# Patient Record
Sex: MALE | Race: WHITE | NOT HISPANIC OR LATINO | Employment: FULL TIME | URBAN - METROPOLITAN AREA
[De-identification: names, ages, dates, MRNs, and addresses within clinical notes are randomized per-mention and may not be internally consistent; named-entity substitution may affect disease eponyms.]

---

## 2017-01-03 DIAGNOSIS — R60.9 EDEMA: Primary | ICD-10-CM

## 2017-01-03 DIAGNOSIS — K76.6 PORTAL HYPERTENSION (H): ICD-10-CM

## 2017-01-03 DIAGNOSIS — I48.91 ATRIAL FIBRILLATION WITH RAPID VENTRICULAR RESPONSE (H): ICD-10-CM

## 2017-01-03 DIAGNOSIS — K21.9 GERD (GASTROESOPHAGEAL REFLUX DISEASE): ICD-10-CM

## 2017-01-03 RX ORDER — LISINOPRIL 2.5 MG/1
2.5 TABLET ORAL DAILY
Qty: 90 TABLET | Refills: 0 | Status: SHIPPED | OUTPATIENT
Start: 2017-01-03 | End: 2017-03-27

## 2017-01-03 RX ORDER — SPIRONOLACTONE 25 MG/1
25 TABLET ORAL DAILY
Qty: 90 TABLET | Refills: 0 | Status: SHIPPED | OUTPATIENT
Start: 2017-01-03 | End: 2017-03-27

## 2017-01-03 RX ORDER — PANTOPRAZOLE SODIUM 40 MG/1
40 TABLET, DELAYED RELEASE ORAL DAILY PRN
Qty: 90 TABLET | Refills: 0 | Status: SHIPPED | OUTPATIENT
Start: 2017-01-03 | End: 2017-03-27

## 2017-01-03 RX ORDER — METOPROLOL SUCCINATE 100 MG/1
100 TABLET, EXTENDED RELEASE ORAL DAILY
Qty: 90 TABLET | Refills: 0 | Status: SHIPPED | OUTPATIENT
Start: 2017-01-03 | End: 2017-03-27

## 2017-01-30 ENCOUNTER — TELEPHONE (OUTPATIENT)
Dept: GASTROENTEROLOGY | Facility: CLINIC | Age: 57
End: 2017-01-30

## 2017-01-30 NOTE — TELEPHONE ENCOUNTER
Patient contacted and reminded of upcoming appointment.  Patient confirmed they will be attending.  Patient instructed to bring updated medications list to appointment.  Instructed pt to arrive an hour and a half to two hours prior to appt time for labs.  Luis F Jon, CMA

## 2017-02-02 ENCOUNTER — OFFICE VISIT (OUTPATIENT)
Dept: GASTROENTEROLOGY | Facility: CLINIC | Age: 57
End: 2017-02-02
Attending: INTERNAL MEDICINE
Payer: COMMERCIAL

## 2017-02-02 VITALS
DIASTOLIC BLOOD PRESSURE: 66 MMHG | SYSTOLIC BLOOD PRESSURE: 106 MMHG | TEMPERATURE: 97.6 F | BODY MASS INDEX: 37 KG/M2 | WEIGHT: 230.2 LBS | HEIGHT: 66 IN | HEART RATE: 73 BPM

## 2017-02-02 DIAGNOSIS — K74.60 CIRRHOSIS OF LIVER WITHOUT ASCITES, UNSPECIFIED HEPATIC CIRRHOSIS TYPE (H): ICD-10-CM

## 2017-02-02 DIAGNOSIS — K74.60 CIRRHOSIS OF LIVER WITHOUT ASCITES, UNSPECIFIED HEPATIC CIRRHOSIS TYPE (H): Primary | ICD-10-CM

## 2017-02-02 PROBLEM — I81 PORTAL VEIN THROMBOSIS: Chronic | Status: ACTIVE | Noted: 2017-02-02

## 2017-02-02 LAB
AFP SERPL-MCNC: 2.5 UG/L (ref 0–8)
ALBUMIN SERPL-MCNC: 3.2 G/DL (ref 3.4–5)
ALP SERPL-CCNC: 77 U/L (ref 40–150)
ALT SERPL W P-5'-P-CCNC: 50 U/L (ref 0–70)
ANION GAP SERPL CALCULATED.3IONS-SCNC: 9 MMOL/L (ref 3–14)
AST SERPL W P-5'-P-CCNC: 46 U/L (ref 0–45)
BILIRUB DIRECT SERPL-MCNC: 0.3 MG/DL (ref 0–0.2)
BILIRUB SERPL-MCNC: 0.8 MG/DL (ref 0.2–1.3)
BUN SERPL-MCNC: 19 MG/DL (ref 7–30)
CALCIUM SERPL-MCNC: 8.5 MG/DL (ref 8.5–10.1)
CHLORIDE SERPL-SCNC: 109 MMOL/L (ref 94–109)
CO2 SERPL-SCNC: 26 MMOL/L (ref 20–32)
CREAT SERPL-MCNC: 0.64 MG/DL (ref 0.66–1.25)
ERYTHROCYTE [DISTWIDTH] IN BLOOD BY AUTOMATED COUNT: 13.2 % (ref 10–15)
GFR SERPL CREATININE-BSD FRML MDRD: ABNORMAL ML/MIN/1.7M2
GLUCOSE SERPL-MCNC: 108 MG/DL (ref 70–99)
HCT VFR BLD AUTO: 43.4 % (ref 40–53)
HGB BLD-MCNC: 15.1 G/DL (ref 13.3–17.7)
INR PPP: 1.2 (ref 0.86–1.14)
MCH RBC QN AUTO: 35 PG (ref 26.5–33)
MCHC RBC AUTO-ENTMCNC: 34.8 G/DL (ref 31.5–36.5)
MCV RBC AUTO: 101 FL (ref 78–100)
PLATELET # BLD AUTO: 62 10E9/L (ref 150–450)
POTASSIUM SERPL-SCNC: 4 MMOL/L (ref 3.4–5.3)
PROT SERPL-MCNC: 6.1 G/DL (ref 6.8–8.8)
RBC # BLD AUTO: 4.32 10E12/L (ref 4.4–5.9)
SODIUM SERPL-SCNC: 143 MMOL/L (ref 133–144)
WBC # BLD AUTO: 3.3 10E9/L (ref 4–11)

## 2017-02-02 PROCEDURE — G0009 ADMIN PNEUMOCOCCAL VACCINE: HCPCS | Mod: ZF

## 2017-02-02 PROCEDURE — 25000128 H RX IP 250 OP 636: Mod: ZF

## 2017-02-02 PROCEDURE — 36415 COLL VENOUS BLD VENIPUNCTURE: CPT | Performed by: INTERNAL MEDICINE

## 2017-02-02 PROCEDURE — 85610 PROTHROMBIN TIME: CPT | Performed by: INTERNAL MEDICINE

## 2017-02-02 PROCEDURE — 90670 PCV13 VACCINE IM: CPT | Mod: ZF

## 2017-02-02 PROCEDURE — 80076 HEPATIC FUNCTION PANEL: CPT | Performed by: INTERNAL MEDICINE

## 2017-02-02 PROCEDURE — 99212 OFFICE O/P EST SF 10 MIN: CPT | Mod: 25,ZF

## 2017-02-02 PROCEDURE — 80048 BASIC METABOLIC PNL TOTAL CA: CPT | Performed by: INTERNAL MEDICINE

## 2017-02-02 PROCEDURE — 85027 COMPLETE CBC AUTOMATED: CPT | Performed by: INTERNAL MEDICINE

## 2017-02-02 PROCEDURE — 82105 ALPHA-FETOPROTEIN SERUM: CPT | Performed by: INTERNAL MEDICINE

## 2017-02-02 ASSESSMENT — PAIN SCALES - GENERAL: PAINLEVEL: NO PAIN (0)

## 2017-02-02 NOTE — MR AVS SNAPSHOT
After Visit Summary   2/2/2017    Maurice Jon    MRN: [id number]           Patient Information     Date Of Birth          1960        Visit Information        Provider Department      2/2/2017 8:00 AM Hi Roque MD [facility]        Today's Diagnoses     Cirrhosis of liver without ascites, unspecified hepatic cirrhosis type (H)    -  1     Cirrhosis of liver without ascites, unspecified hepatic cirrhosis type (H) [K74.60]            Follow-ups after your visit        Follow-up notes from your care team     Return in about 6 months (around 8/2/2017).      Your next 10 appointments already scheduled     Aug 08, 2017  6:45 AM   Lab with  LAB   [facility] ([facility])    [address] [place] [id number] [phone]            Aug 08, 2017  7:00 AM   US ABDOMEN COMPLETE with US1   [facility] ([facility])    [address] [place] [id number] [phone]           Please bring a list of your medicines (including vitamins, minerals and over-the-counter drugs). Also, tell your doctor about any allergies you may have. Wear comfortable clothes and leave your valuables at home.  Adults: No eating or drinking for 8 hours before the exam. You may take medicine with a small sip of water.  Children: - Children 6+ years: No food or drink for 6 hours before exam. - Children 1-5 years: No food or drink for 4 hours before exam. - Infants, breast-fed: may have breast milk up to 2 hours before exam. - Infants, formula: may have bottle until 4 hours before exam.  Please call the Imaging Department at your exam site with any questions.            Aug 08, 2017  8:15 AM   (Arrive by 8:00 AM)   Return General Liver with Hi Roque MD   [facility] ([facility])    [address] [place] [id number] [phone]               Future tests that were ordered for you today     Open Standing Orders        Priority Remaining Interval Expires Ordered    US abdomen complete [ACR774] Routine 2/2 Every 6 Months 3/4/2018 2/2/2017          Open Future Orders        Priority Expected Expires Ordered     Hepatic Panel [LAB20] Routine 8/1/2017 3/4/2018 2/2/2017    Basic metabolic panel [LAB15] Routine 8/1/2017 3/4/2018 2/2/2017    CBC with platelets [NLP395] Routine 8/1/2017 3/4/2018 2/2/2017    INR [RIM3839] Routine 8/1/2017 3/4/2018 2/2/2017    AFP tumor marker [GZY473] Routine 8/1/2017 3/4/2018 2/2/2017            Who to contact     If you have questions or need follow up information about today's clinic visit or your schedule please contact [facility] directly at [phone].  Normal or non-critical lab and imaging results will be communicated to you by [employer], letter or phone within 4 business days after the clinic has received the results. If you do not hear from us within 7 days, please contact the clinic through [employer] or phone. If you have a critical or abnormal lab result, we will notify you by phone as soon as possible.  Submit refill requests through [employer] or call your pharmacy and they will forward the refill request to us. Please allow 3 business days for your refill to be completed.          Additional Information About Your Visit        [employer] Information     [employer] gives you secure access to your electronic health record. If you see a primary care provider, you can also send messages to your care team and make appointments. If you have questions, please call your primary care clinic.  If you do not have a primary care provider, please call [phone] and they will assist you.        Care EveryWhere ID     This is your Care EveryWhere ID. This could be used by other organizations to access your [place] medical records  [id number]        Your Vitals Were     Pulse Temperature Height BMI (Body Mass Index)        "   73 97.6  F (36.4  C) (Oral) 1.676 m (5' 6\") 37.17 kg/m2         Blood Pressure from Last 3 Encounters:   02/02/17 106/66   10/10/16 107/70   08/04/16 135/73    Weight from Last 3 Encounters:   02/02/17 104.418 kg (230 lb 3.2 oz)   10/10/16 103.42 kg (228 lb)   08/04/16 105.688 kg (233 lb)              We Performed the Following     COLOREC CANC SCRN,SCR COLONOSCOPY+BE     PNEUMOCOCCAL CONJ VACCINE 13 VALENT IM (PCV13)     Schedule follow up appointments          Today's Medication Changes          These changes are accurate as of: 2/2/17  8:45 AM.  If you have any questions, ask your nurse or doctor.               These medicines have changed or have updated prescriptions.        Dose/Directions    clotrimazole 1 % cream   Commonly known as:  LOTRIMIN   This may have changed:    - when to take this  - reasons to take this   Used for:  Yeast infection of the skin        Apply topically 2 times daily   Quantity:  60 g   Refills:  1       triamcinolone 0.1 % cream   Commonly known as:  KENALOG   This may have changed:    - when to take this  - reasons to take this  - additional instructions   Used for:  Eczema        Apply sparingly to affected area three times daily as needed   Quantity:  80 g   Refills:  0                Primary Care Provider Office Phone # Fax #    Adrian Braulio Pineda [phone]       [address] [address]        Thank you!     Thank you for choosing [facility]  for your care. Our goal is always to provide you with excellent care. Hearing back from our patients is one way we can continue to improve our services. Please take a few minutes to complete the written survey that you may receive in the mail after your visit with us. Thank you!             Your Updated Medication List - Protect others around you: Learn how to safely use, store and throw away your medicines at www.disposemymeds.org.          This list is accurate as " of: 2/2/17  8:45 AM.  Always use your most recent med list.                   Brand Name Dispense Instructions for use    ALBUTEROL INHALER 17GM     1 Inhaler    Inhale 2 puffs into the lungs every 4 hours as needed This product no longer available       calcium carb 1250 mg (500 mg [place])/vitamin D 200 units 500-200 MG-UNIT per tablet    OSCAL with D     Take 2 tablets by mouth daily with food.       clotrimazole 1 % cream    LOTRIMIN    60 g    Apply topically 2 times daily       furosemide 40 MG tablet    LASIX    45 tablet    Take 0.5 tablets (20 mg) by mouth daily Appt DUE Dec 2016       lisinopril 2.5 MG tablet    PRINIVIL/Zestril    90 tablet    Take 1 tablet (2.5 mg) by mouth daily No further refills until seen by cardiology       MAGNESIUM OXIDE PO      Take 1 tablet by mouth every other day.       metoprolol 100 MG 24 hr tablet    TOPROL XL    90 tablet    Take 1 tablet (100 mg) by mouth daily No further refills until seen by cardiology       mometasone-formoterol 200-5 MCG/ACT oral inhaler    DULERA    1 Inhaler    Inhale 2 puffs into the lungs 2 times daily as needed Appt DUE Jan 2017       * order for DME     2 Package    Juzo compression stockings, 30-40mm compression. Patient has portal hypertension and develops leg edema, which these control well.       * order for DME      Respironics RemStar 60 Series Auto AFlex 12-15 cm H2O with heated humidty and a modem.  Pt chose a Quattro Air FFM size medium.       * order for DME     1 Device    Equipment being ordered:  New CPAP mask, tube, filters,water tray       * order for DME     4 Package    Open toe size large 30/40 [facility] [employer] Compression socks Model [id number].       pantoprazole 40 MG EC tablet    PROTONIX    90 tablet    Take 1 tablet (40 mg) by mouth daily as needed No further refills until seen by cardiology       spironolactone 25 MG tablet    ALDACTONE    90 tablet    Take 1 tablet (25 mg) by mouth daily No further refills until seen by  cardiology       triamcinolone 0.1 % cream    KENALOG    80 g    Apply sparingly to affected area three times daily as needed       VIAGRA 100 MG cap/tab   Generic drug:  sildenafil     12    ONE TABLET TAKEN AT LEAST 30 MINUTES BEFORE INTERCOURSE       * Notice:  This list has 4 medication(s) that are the same as other medications prescribed for you. Read the directions carefully, and ask your doctor or other care provider to review them with you.

## 2017-02-02 NOTE — Clinical Note
2/2/2017       RE: Maurice Jon  [address] [place] [address]     Dear Colleague,    Thank you for referring your patient, Maurice Jon, to the [facility] at [facility]. Please see a copy of my visit note below.    I had the pleasure of seeing Maurice Jon for followup in the Liver Clinic at the [facility] on 02/02/2017.  Mr. 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  He also has a chronic portal vein thrombosis.      For the most part, he is doing well.  He does complain of some occasional abdominal bloating.  He also feels as though he is occasionally getting some fluid in his knees as well.  He does not report any lower extremity edema.  He has never had any documented ascites recently.  He did have an upper GI endoscopy just 3 months ago that showed trivial esophageal varices, and one could almost make the argument that none are present.      He otherwise denies any abdominal pain, itching or skin rash.  He does complain of some fatigue.  He also has some dyspnea on exertion when he walks longer distances or climbs hills.  He is involved with a cardiologist at [facility], as he does have recurrent atrial fibrillation.      He denies any cough or shortness of breath at rest.  He denies any fevers or chills.  He denies any nausea or vomiting.  He does not have any constipation or diarrhea, although he occasionally has some loose stools.  He has not had any gastrointestinal bleeding or any overt signs of encephalopathy.     Current Outpatient Prescriptions   Medication     spironolactone (ALDACTONE) 25 MG tablet     lisinopril (PRINIVIL/ZESTRIL) 2.5 MG tablet     metoprolol (TOPROL XL) 100 MG 24 hr tablet     pantoprazole (PROTONIX) 40 MG EC tablet     mometasone-formoterol (DULERA) 200-5 MCG/ACT oral inhaler     furosemide (LASIX) 40 MG tablet      order for DME     order for DME     triamcinolone (KENALOG) 0.1 % cream     clotrimazole (LOTRIMIN) 1 % cream     ALBUTEROL INHALER 17GM     ORDER FOR DME     ORDER FOR DME     MAGNESIUM OXIDE PO     calcium carb 1250 mg, 500 mg [place],/vitamin D 200 units (OSCAL WITH D) 500-200 MG-UNIT per tablet     VIAGRA 100 MG OR TABS     No current facility-administered medications for this visit.     B/P: 106/66, T: 97.6, P: 73, R: Data Unavailable    HEENT exam shows no scleral icterus and no temporal muscle wasting.  Chest is clear.  Abdominal exam shows no increase in girth.  No masses or tenderness to palpation are present.  His liver is 10 cm in span without left lobe enlargement.  No spleen tip is palpable, and extremity exam shows no edema.  Skin exam shows no stigmata of chronic liver disease.  Neurologic exam shows no asterixis.     Recent Results (from the past 168 hour(s))    Hepatic Panel [LAB20]    Collection Time: 02/02/17  6:28 AM   Result Value Ref Range    Bilirubin Direct 0.3 (H) 0.0 - 0.2 mg/dL    Bilirubin Total 0.8 0.2 - 1.3 mg/dL    Albumin 3.2 (L) 3.4 - 5.0 g/dL    Protein Total 6.1 (L) 6.8 - 8.8 g/dL    Alkaline Phosphatase 77 40 - 150 U/L    ALT 50 0 - 70 U/L    AST 46 (H) 0 - 45 U/L   Basic metabolic panel [LAB15]    Collection Time: 02/02/17  6:28 AM   Result Value Ref Range    Sodium 143 133 - 144 mmol/L    Potassium 4.0 3.4 - 5.3 mmol/L    Chloride 109 94 - 109 mmol/L    Carbon Dioxide 26 20 - 32 mmol/L    Anion Gap 9 3 - 14 mmol/L    Glucose 108 (H) 70 - 99 mg/dL    Urea Nitrogen 19 7 - 30 mg/dL    Creatinine 0.64 (L) 0.66 - 1.25 mg/dL    GFR Estimate >90  Non  GFR Calc   >60 mL/min/1.7m2    GFR Estimate If Black >90   GFR Calc   >60 mL/min/1.7m2    Calcium 8.5 8.5 - 10.1 mg/dL   CBC with platelets [RZI338]    Collection Time: 02/02/17  6:28 AM   Result Value Ref Range    WBC 3.3 (L) 4.0 - 11.0 10e9/L    RBC Count 4.32 (L) 4.4 - 5.9 10e12/L    Hemoglobin 15.1 13.3 -  17.7 g/dL    Hematocrit 43.4 40.0 - 53.0 %     (H) 78 - 100 fl    MCH 35.0 (H) 26.5 - 33.0 pg    MCHC 34.8 31.5 - 36.5 g/dL    RDW 13.2 10.0 - 15.0 %    Platelet Count 62 (L) 150 - 450 10e9/L   INR [[id number]]    Collection Time: 02/02/17  6:28 AM   Result Value Ref Range    INR 1.20 (H) 0.86 - 1.14   AFP tumor marker [STD400]    Collection Time: 02/02/17  6:28 AM   Result Value Ref Range    Alpha Fetoprotein 2.5 0 - 8 ug/L      I did review his ultrasound which shows no ascites.  He has a stable cyst in the liver. There are no other abnormalities.  That is my read. It has not yet been read officially.      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  His MELD score is currently 8.  He does report that he has a brother that also has the same illness and is currently on the wait list at the [facility] for liver transplantation.  He does need a Prevnar 13 vaccine, which we will give him today, and otherwise he will be up-to-date with regard to vaccines.  He is up-to-date with regard to bone density, and I think he can wait for 2 years given the results of her most recent endoscopy.      My plan will be to see the patient back in the clinic in 6 months with a followup ultrasound and blood work.      Thank you very much for allowing me to participate in the care of this patient.  If you have any questions regarding my recommendations, please do not hesitate to contact me.       Hi Roque MD    Professor of Medicine  [employer]    Executive Medical Director, Solid Organ Transplant Program  [facility]

## 2017-02-02 NOTE — NURSING NOTE
"Chief Complaint   Patient presents with     RECHECK     6 month follow up Cirrhosis       Initial /66 mmHg  Pulse 73  Temp(Src) 97.6  F (36.4  C) (Oral)  Ht 1.676 m (5' 6\")  Wt 104.418 kg (230 lb 3.2 oz)  BMI 37.17 kg/m2 Estimated body mass index is 37.17 kg/(m^2) as calculated from the following:    Height as of this encounter: 1.676 m (5' 6\").    Weight as of this encounter: 104.418 kg (230 lb 3.2 oz).  BP completed using cuff size: regular  "

## 2017-02-02 NOTE — PROGRESS NOTES
I had the pleasure of seeing Maurice Jon for followup in the Liver Clinic at the [facility] on 02/02/2017.  Mr. 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  He also has a chronic portal vein thrombosis.      For the most part, he is doing well.  He does complain of some occasional abdominal bloating.  He also feels as though he is occasionally getting some fluid in his knees as well.  He does not report any lower extremity edema.  He has never had any documented ascites recently.  He did have an upper GI endoscopy just 3 months ago that showed trivial esophageal varices, and one could almost make the argument that none are present.      He otherwise denies any abdominal pain, itching or skin rash.  He does complain of some fatigue.  He also has some dyspnea on exertion when he walks longer distances or climbs hills.  He is involved with a cardiologist at [facility], as he does have recurrent atrial fibrillation.      He denies any cough or shortness of breath at rest.  He denies any fevers or chills.  He denies any nausea or vomiting.  He does not have any constipation or diarrhea, although he occasionally has some loose stools.  He has not had any gastrointestinal bleeding or any overt signs of encephalopathy.     Current Outpatient Prescriptions   Medication     spironolactone (ALDACTONE) 25 MG tablet     lisinopril (PRINIVIL/ZESTRIL) 2.5 MG tablet     metoprolol (TOPROL XL) 100 MG 24 hr tablet     pantoprazole (PROTONIX) 40 MG EC tablet     mometasone-formoterol (DULERA) 200-5 MCG/ACT oral inhaler     furosemide (LASIX) 40 MG tablet     order for DME     order for DME     triamcinolone (KENALOG) 0.1 % cream     clotrimazole (LOTRIMIN) 1 % cream     ALBUTEROL INHALER 17GM     ORDER FOR DME     ORDER FOR DME     MAGNESIUM OXIDE PO     calcium carb 1250 mg, 500 mg [place],/vitamin D 200 units (OSCAL WITH D) 500-200 MG-UNIT  per tablet     VIAGRA 100 MG OR TABS     No current facility-administered medications for this visit.     B/P: 106/66, T: 97.6, P: 73, R: Data Unavailable    HEENT exam shows no scleral icterus and no temporal muscle wasting.  Chest is clear.  Abdominal exam shows no increase in girth.  No masses or tenderness to palpation are present.  His liver is 10 cm in span without left lobe enlargement.  No spleen tip is palpable, and extremity exam shows no edema.  Skin exam shows no stigmata of chronic liver disease.  Neurologic exam shows no asterixis.     Recent Results (from the past 168 hour(s))    Hepatic Panel [LAB20]    Collection Time: 02/02/17  6:28 AM   Result Value Ref Range    Bilirubin Direct 0.3 (H) 0.0 - 0.2 mg/dL    Bilirubin Total 0.8 0.2 - 1.3 mg/dL    Albumin 3.2 (L) 3.4 - 5.0 g/dL    Protein Total 6.1 (L) 6.8 - 8.8 g/dL    Alkaline Phosphatase 77 40 - 150 U/L    ALT 50 0 - 70 U/L    AST 46 (H) 0 - 45 U/L   Basic metabolic panel [LAB15]    Collection Time: 02/02/17  6:28 AM   Result Value Ref Range    Sodium 143 133 - 144 mmol/L    Potassium 4.0 3.4 - 5.3 mmol/L    Chloride 109 94 - 109 mmol/L    Carbon Dioxide 26 20 - 32 mmol/L    Anion Gap 9 3 - 14 mmol/L    Glucose 108 (H) 70 - 99 mg/dL    Urea Nitrogen 19 7 - 30 mg/dL    Creatinine 0.64 (L) 0.66 - 1.25 mg/dL    GFR Estimate >90  Non  GFR Calc   >60 mL/min/1.7m2    GFR Estimate If Black >90   GFR Calc   >60 mL/min/1.7m2    Calcium 8.5 8.5 - 10.1 mg/dL   CBC with platelets [[id number]]    Collection Time: 02/02/17  6:28 AM   Result Value Ref Range    WBC 3.3 (L) 4.0 - 11.0 10e9/L    RBC Count 4.32 (L) 4.4 - 5.9 10e12/L    Hemoglobin 15.1 13.3 - 17.7 g/dL    Hematocrit 43.4 40.0 - 53.0 %     (H) 78 - 100 fl    MCH 35.0 (H) 26.5 - 33.0 pg    MCHC 34.8 31.5 - 36.5 g/dL    RDW 13.2 10.0 - 15.0 %    Platelet Count 62 (L) 150 - 450 10e9/L   INR [[id number]]    Collection Time: 02/02/17  6:28 AM   Result Value Ref Range    INR  1.20 (H) 0.86 - 1.14   AFP tumor marker [[id number]]    Collection Time: 02/02/17  6:28 AM   Result Value Ref Range    Alpha Fetoprotein 2.5 0 - 8 ug/L      I did review his ultrasound which shows no ascites.  He has a stable cyst in the liver. There are no other abnormalities.  That is my read. It has not yet been read officially.      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  His MELD score is currently 8.  He does report that he has a brother that also has the same illness and is currently on the wait list at the [facility] for liver transplantation.  He does need a Prevnar 13 vaccine, which we will give him today, and otherwise he will be up-to-date with regard to vaccines.  He is up-to-date with regard to bone density, and I think he can wait for 2 years given the results of her most recent endoscopy.      My plan will be to see the patient back in the clinic in 6 months with a followup ultrasound and blood work.      Thank you very much for allowing me to participate in the care of this patient.  If you have any questions regarding my recommendations, please do not hesitate to contact me.       Hi Roque MD    Professor of Medicine  [employer]    Executive Medical Director, Solid Organ Transplant Program  [facility]

## 2017-03-17 ENCOUNTER — OFFICE VISIT (OUTPATIENT)
Dept: FAMILY MEDICINE | Facility: CLINIC | Age: 57
End: 2017-03-17
Payer: COMMERCIAL

## 2017-03-17 VITALS
TEMPERATURE: 99 F | BODY MASS INDEX: 36.96 KG/M2 | HEIGHT: 66 IN | DIASTOLIC BLOOD PRESSURE: 68 MMHG | HEART RATE: 84 BPM | OXYGEN SATURATION: 96 % | WEIGHT: 230 LBS | SYSTOLIC BLOOD PRESSURE: 102 MMHG

## 2017-03-17 DIAGNOSIS — J20.9 ACUTE BRONCHITIS WITH COEXISTING CONDITION REQUIRING PROPHYLACTIC TREATMENT: Primary | ICD-10-CM

## 2017-03-17 PROCEDURE — 99213 OFFICE O/P EST LOW 20 MIN: CPT | Performed by: FAMILY MEDICINE

## 2017-03-17 RX ORDER — AZITHROMYCIN 250 MG/1
TABLET, FILM COATED ORAL
Qty: 6 TABLET | Refills: 0 | Status: SHIPPED | OUTPATIENT
Start: 2017-03-17 | End: 2017-08-29

## 2017-03-17 NOTE — NURSING NOTE
"Chief Complaint   Patient presents with     Cough       Initial /68  Pulse 84  Temp 99  F (37.2  C) (Tympanic)  Ht 5' 6\" (1.676 m)  Wt 230 lb (104.3 kg)  SpO2 96%  BMI 37.12 kg/m2 Estimated body mass index is 37.12 kg/(m^2) as calculated from the following:    Height as of this encounter: 5' 6\" (1.676 m).    Weight as of this encounter: 230 lb (104.3 kg).  Medication Reconciliation: complete    "

## 2017-03-17 NOTE — PROGRESS NOTES
SUBJECTIVE:                                                    Maurice Jon is a 56 year old male who presents to clinic today for the following health issues:      ENT Symptoms             Symptoms: cc Present Absent Comment   Fever/Chills   x    Fatigue  x  Run down from work;    Muscle Aches  x     Eye Irritation   x  goopy   Sneezing  x     Nasal Leroy/Drg  x  Dryness causing some blood on the mucus    Sinus Pressure/Pain  x     Loss of smell   x    Dental pain   x    Sore Throat  x  Hoarse voice; intermittent sore    Swollen Glands   x    Ear Pain/Fullness   x    Cough  X  Productive    Wheeze  x  Intermittent    Chest Pain  x  Tightness, has gotten better   Shortness of breath  x  Hx of Afib   Rash   x    Other         Symptom duration:  x4 days   Symptom severity:  moderate    Treatments tried:  Mucinex;    Contacts:  URI from work      Maurice Jon is a 56 year old male with liver cirrhosis who presents with a four day history of upper respiratory symptoms as noted above. He is most bothered by the effect of his cough on his voice, which he needs for work. He comes in concerned because he has been told his cirrhosis makes him more susceptible to infection; he has also had a history of recurrent skin staph infections and severe sepsis.    Current Outpatient Prescriptions   Medication Sig     spironolactone (ALDACTONE) 25 MG tablet Take 1 tablet (25 mg) by mouth daily No further refills until seen by cardiology     lisinopril (PRINIVIL/ZESTRIL) 2.5 MG tablet Take 1 tablet (2.5 mg) by mouth daily No further refills until seen by cardiology     metoprolol (TOPROL XL) 100 MG 24 hr tablet Take 1 tablet (100 mg) by mouth daily No further refills until seen by cardiology     pantoprazole (PROTONIX) 40 MG EC tablet Take 1 tablet (40 mg) by mouth daily as needed No further refills until seen by cardiology     mometasone-formoterol (DULERA) 200-5 MCG/ACT oral inhaler Inhale 2 puffs into the lungs 2 times  "daily as needed Appt DUE Jan 2017     furosemide (LASIX) 40 MG tablet Take 0.5 tablets (20 mg) by mouth daily Appt DUE Dec 2016     triamcinolone (KENALOG) 0.1 % cream Apply sparingly to affected area three times daily as needed (Patient taking differently: as needed Apply sparingly to affected area three times daily as needed)     MAGNESIUM OXIDE PO Take 1 tablet by mouth every other day.     calcium carb 1250 mg, 500 mg [place],/vitamin D 200 units (OSCAL WITH D) 500-200 MG-UNIT per tablet Take 2 tablets by mouth daily with food.     order for DME Equipment being ordered:   New CPAP mask, tube, filters,water tray     order for DME Open toe size large 30/40 [employer] Assure Medical Compression socks Model 52374.     clotrimazole (LOTRIMIN) 1 % cream Apply topically 2 times daily (Patient taking differently: Apply topically 2 times daily as needed )     ALBUTEROL INHALER 17GM Inhale 2 puffs into the lungs every 4 hours as needed This product no longer available     ORDER FOR DME Respironics RemStar 60 Series Auto AFlex 12-15 cm H2O with heated humidty and a modem.  Pt chose a Quattro Air FFM size medium.     ORDER FOR DME Juzo compression stockings, 30-40mm compression. Patient has portal hypertension and develops leg edema, which these control well.     VIAGRA 100 MG OR TABS ONE TABLET TAKEN AT LEAST 30 MINUTES BEFORE INTERCOURSE     No current facility-administered medications for this visit.      The Dulera was ordered last fall for an episode of bronchitis. He denies any chronic respiratory problems.    OBJECTIVE: /68  Pulse 84  Temp 99  F (37.2  C) (Tympanic)  Ht 5' 6\" (1.676 m)  Wt 230 lb (104.3 kg)  SpO2 96%  BMI 37.12 kg/m2    Afebrile.  Voice is moderately hoarse.  Ear canals are normal, TM's normal without erythema or fluid.  Nose and throat are normal.  Lungs show no rales or wheezes, but a few scattered rhonchi.  His cough is coarse and bronchial.    ASSESSMENT: acute bronchitis with coexisting " condition requiring prophylactic treatment    PLAN: Rx azithromycin Z-hope.   OK for Mucinex and OTC cough relief. Continue fluid intake. Recheck if not improving after course of antibiotic.    Haley Quinn md

## 2017-03-17 NOTE — MR AVS SNAPSHOT
After Visit Summary   3/17/2017    Maurice Jon    MRN: [id number]           Patient Information     Date Of Birth          1960        Visit Information        Provider Department      3/17/2017 11:20 AM Haley Quinn MD [facility]        Today's Diagnoses     Acute bronchitis with coexisting condition requiring prophylactic treatment    -  1       Follow-ups after your visit        Your next 10 appointments already scheduled     Aug 08, 2017  6:45 AM CDT   Lab with  LAB   [facility] ([facility])    [address] [place] [address] [id number] [phone]            Aug 08, 2017  7:00 AM CDT   US ABDOMEN COMPLETE with US1   [facility] ([facility])    [address] [place] [address] [id number] [phone]           Please bring a list of your medicines (including vitamins, minerals and over-the-counter drugs). Also, tell your doctor about any allergies you may have. Wear comfortable clothes and leave your valuables at home.  Adults: No eating or drinking for 8 hours before the exam. You may take medicine with a small sip of water.  Children: - Children 6+ years: No food or drink for 6 hours before exam. - Children 1-5 years: No food or drink for 4 hours before exam. - Infants, breast-fed: may have breast milk up to 2 hours before exam. - Infants, formula: may have bottle until 4 hours before exam.  Please call the Imaging Department at your exam site with any questions.            Aug 08, 2017  8:15 AM CDT   (Arrive by 8:00 AM)   Return General Liver with Hi Roque MD   [facility] ([facility])    [address] [place] [id number] [phone]            Aug 14, 2017   Procedure with Jose Galvin MD   [facility] [place], Endoscopy ([facility])     "[address] [id number]   [phone]           The [place] campus is located on the corner of [address] and [address] on [address]. It is easily accessible from virtually any point in the [place] [facility] [place] area, via [address] and [address].              Who to contact     If you have questions or need follow up information about today's clinic visit or your schedule please contact [facility] directly at [phone].  Normal or non-critical lab and imaging results will be communicated to you by [employer], letter or phone within 4 business days after the clinic has received the results. If you do not hear from us within 7 days, please contact the clinic through [employer] or phone. If you have a critical or abnormal lab result, we will notify you by phone as soon as possible.  Submit refill requests through [employer] or call your pharmacy and they will forward the refill request to us. Please allow 3 business days for your refill to be completed.          Additional Information About Your Visit        [employer] Information     [employer] gives you secure access to your electronic health record. If you see a primary care provider, you can also send messages to your care team and make appointments. If you have questions, please call your primary care clinic.  If you do not have a primary care provider, please call [phone] and they will assist you.        Care EveryWhere ID     This is your Care EveryWhere ID. This could be used by other organizations to access your [facility] medical records  [id number]        Your Vitals Were     Pulse Temperature Height Pulse Oximetry BMI (Body Mass Index)       84 99  F (37.2  C) (Tympanic) 5' 6\" (1.676 m) 96% 37.12 kg/m2        Blood Pressure from Last 3 Encounters:   03/17/17 102/68   02/02/17 106/66   10/10/16 107/70    Weight from Last 3 Encounters:   03/17/17 230 lb (104.3 kg)   02/02/17 230 lb 3.2 oz (104.4 kg) "   10/10/16 228 lb (103.4 kg)              Today, you had the following     No orders found for display         Today's Medication Changes          These changes are accurate as of: 3/17/17 12:28 PM.  If you have any questions, ask your nurse or doctor.               Start taking these medicines.        Dose/Directions    azithromycin 250 MG tablet   Commonly known as:  ZITHROMAX   Used for:  Acute bronchitis with coexisting condition requiring prophylactic treatment        Two tablets first day, then one tablet daily for four days.   Quantity:  6 tablet   Refills:  0         These medicines have changed or have updated prescriptions.        Dose/Directions    clotrimazole 1 % cream   Commonly known as:  LOTRIMIN   This may have changed:    - when to take this  - reasons to take this   Used for:  Yeast infection of the skin        Apply topically 2 times daily   Quantity:  60 g   Refills:  1       triamcinolone 0.1 % cream   Commonly known as:  KENALOG   This may have changed:    - when to take this  - reasons to take this  - additional instructions   Used for:  Eczema        Apply sparingly to affected area three times daily as needed   Quantity:  80 g   Refills:  0            Where to get your medicines      [facility] [address]  [address], [place] [address]    Hours:  [facility] Phone:  [phone]     azithromycin 250 MG tablet                Primary Care Provider Office Phone # Fax #    Adrian Pineda [phone]       [facility] [id number] [place] [id number]        Thank you!     Thank you for choosing [facility]  for your care. Our goal is always to provide you with excellent care. Hearing back from our patients is one way we can continue to improve our services. Please take a few minutes to complete the written survey that you may receive  in the mail after your visit with us. Thank you!             Your Updated Medication List - Protect others around you: Learn how to safely use, store and throw away your medicines at www.disposemymeds.org.          This list is accurate as of: 3/17/17 12:28 PM.  Always use your most recent med list.                   Brand Name Dispense Instructions for use    ALBUTEROL INHALER 17GM     1 Inhaler    Inhale 2 puffs into the lungs every 4 hours as needed This product no longer available       azithromycin 250 MG tablet    ZITHROMAX    6 tablet    Two tablets first day, then one tablet daily for four days.       calcium carb 1250 mg (500 mg [place])/vitamin D 200 units 500-200 MG-UNIT per tablet    OSCAL with D     Take 2 tablets by mouth daily with food.       clotrimazole 1 % cream    LOTRIMIN    60 g    Apply topically 2 times daily       furosemide 40 MG tablet    LASIX    45 tablet    Take 0.5 tablets (20 mg) by mouth daily Appt DUE Dec 2016       lisinopril 2.5 MG tablet    PRINIVIL/Zestril    90 tablet    Take 1 tablet (2.5 mg) by mouth daily No further refills until seen by cardiology       MAGNESIUM OXIDE PO      Take 1 tablet by mouth every other day.       metoprolol 100 MG 24 hr tablet    TOPROL XL    90 tablet    Take 1 tablet (100 mg) by mouth daily No further refills until seen by cardiology       mometasone-formoterol 200-5 MCG/ACT oral inhaler    DULERA    1 Inhaler    Inhale 2 puffs into the lungs 2 times daily as needed Appt DUE Jan 2017       * order for DME     2 Package    Juzo compression stockings, 30-40mm compression. Patient has portal hypertension and develops leg edema, which these control well.       * order for DME      Respironics RemStar 60 Series Auto AFlex 12-15 cm H2O with heated humidty and a modem.  Pt chose a Quattro Air FFM size medium.       * order for DME     1 Device    Equipment being ordered:  New CPAP mask, tube, filters,water tray       * order for DME     4 Package    Open  toe size large 30/40 [facility] Assure Medical Compression socks Model 54769.       pantoprazole 40 MG EC tablet    PROTONIX    90 tablet    Take 1 tablet (40 mg) by mouth daily as needed No further refills until seen by cardiology       spironolactone 25 MG tablet    ALDACTONE    90 tablet    Take 1 tablet (25 mg) by mouth daily No further refills until seen by cardiology       triamcinolone 0.1 % cream    KENALOG    80 g    Apply sparingly to affected area three times daily as needed       VIAGRA 100 MG cap/tab   Generic drug:  sildenafil     12    ONE TABLET TAKEN AT LEAST 30 MINUTES BEFORE INTERCOURSE       * Notice:  This list has 4 medication(s) that are the same as other medications prescribed for you. Read the directions carefully, and ask your doctor or other care provider to review them with you.

## 2017-03-27 DIAGNOSIS — I48.91 ATRIAL FIBRILLATION WITH RAPID VENTRICULAR RESPONSE (H): ICD-10-CM

## 2017-03-27 DIAGNOSIS — R60.9 EDEMA: ICD-10-CM

## 2017-03-27 DIAGNOSIS — K76.6 PORTAL HYPERTENSION (H): ICD-10-CM

## 2017-03-27 DIAGNOSIS — K21.9 GERD (GASTROESOPHAGEAL REFLUX DISEASE): ICD-10-CM

## 2017-03-27 RX ORDER — PANTOPRAZOLE SODIUM 40 MG/1
40 TABLET, DELAYED RELEASE ORAL DAILY PRN
Qty: 30 TABLET | Refills: 0 | Status: SHIPPED | OUTPATIENT
Start: 2017-03-27 | End: 2017-08-07

## 2017-03-27 RX ORDER — LISINOPRIL 2.5 MG/1
2.5 TABLET ORAL DAILY
Qty: 30 TABLET | Refills: 0 | Status: SHIPPED | OUTPATIENT
Start: 2017-03-27 | End: 2017-08-07

## 2017-03-27 RX ORDER — METOPROLOL SUCCINATE 100 MG/1
100 TABLET, EXTENDED RELEASE ORAL DAILY
Qty: 30 TABLET | Refills: 0 | Status: SHIPPED | OUTPATIENT
Start: 2017-03-27 | End: 2017-08-07

## 2017-03-27 RX ORDER — SPIRONOLACTONE 25 MG/1
25 TABLET ORAL DAILY
Qty: 30 TABLET | Refills: 0 | Status: SHIPPED | OUTPATIENT
Start: 2017-03-27 | End: 2017-08-07

## 2017-03-27 NOTE — TELEPHONE ENCOUNTER
Furosemide      Last Written Prescription Date: 09/30/2016  Last Fill Quantity: 45, # refills: 1  Last Office Visit with [facility], [facility] or [facility] prescribing provider: 03/17/2017       Potassium   Date Value Ref Range Status   02/02/2017 4.0 3.4 - 5.3 mmol/L Final     Creatinine   Date Value Ref Range Status   02/02/2017 0.64 (L) 0.66 - 1.25 mg/dL Final     BP Readings from Last 3 Encounters:   03/17/17 102/68   02/02/17 106/66   10/10/16 107/70     Timo PERSON (R)

## 2017-03-28 RX ORDER — FUROSEMIDE 40 MG
20 TABLET ORAL DAILY
Qty: 45 TABLET | Refills: 3 | Status: SHIPPED | OUTPATIENT
Start: 2017-03-28 | End: 2018-05-23

## 2017-03-28 NOTE — TELEPHONE ENCOUNTER
Routing refill request to provider for review/approval because:  Labs out of range:  See below    Kathrin Zhu RN

## 2017-08-07 DIAGNOSIS — K76.6 PORTAL HYPERTENSION (H): ICD-10-CM

## 2017-08-07 DIAGNOSIS — I48.91 ATRIAL FIBRILLATION WITH RAPID VENTRICULAR RESPONSE (H): ICD-10-CM

## 2017-08-07 DIAGNOSIS — K21.9 GERD (GASTROESOPHAGEAL REFLUX DISEASE): ICD-10-CM

## 2017-08-07 DIAGNOSIS — R60.9 EDEMA: ICD-10-CM

## 2017-08-07 RX ORDER — LISINOPRIL 2.5 MG/1
2.5 TABLET ORAL DAILY
Qty: 30 TABLET | Refills: 0 | Status: SHIPPED | OUTPATIENT
Start: 2017-08-07 | End: 2017-09-05

## 2017-08-07 RX ORDER — PANTOPRAZOLE SODIUM 40 MG/1
40 TABLET, DELAYED RELEASE ORAL DAILY PRN
Qty: 30 TABLET | Refills: 0 | Status: SHIPPED | OUTPATIENT
Start: 2017-08-07 | End: 2017-09-06

## 2017-08-07 RX ORDER — METOPROLOL SUCCINATE 100 MG/1
100 TABLET, EXTENDED RELEASE ORAL DAILY
Qty: 30 TABLET | Refills: 0 | Status: SHIPPED | OUTPATIENT
Start: 2017-08-07 | End: 2017-10-09

## 2017-08-07 RX ORDER — SPIRONOLACTONE 25 MG/1
25 TABLET ORAL DAILY
Qty: 30 TABLET | Refills: 0 | Status: SHIPPED | OUTPATIENT
Start: 2017-08-07 | End: 2017-09-05

## 2017-08-09 ENCOUNTER — TELEPHONE (OUTPATIENT)
Dept: GASTROENTEROLOGY | Facility: CLINIC | Age: 57
End: 2017-08-09

## 2017-08-09 DIAGNOSIS — Z12.11 ENCOUNTER FOR SCREENING COLONOSCOPY: Primary | ICD-10-CM

## 2017-08-09 NOTE — TELEPHONE ENCOUNTER
Patient scheduled for colonoscopy    Indication for procedure. screening    Referring Provider. Dr. Roque    ? No    Arrival time verified? Yes, 12 noon    Facility location verified? Yes, [address]    Instructions given regarding prep and procedure    Prep Type yes    Are you taking any anticoagulants or blood thinners? no    Instructions given? email    Electronic implanted devices? No    Pre procedure teaching completed? Yes    Transportation from procedure? Yes, wife    H&P / Pre op physical completed? NA    Lori Kapadia RN

## 2017-08-14 ENCOUNTER — HOSPITAL ENCOUNTER (OUTPATIENT)
Facility: CLINIC | Age: 57
Discharge: HOME | End: 2017-08-14
Attending: INTERNAL MEDICINE | Admitting: INTERNAL MEDICINE
Payer: COMMERCIAL

## 2017-08-14 VITALS
RESPIRATION RATE: 18 BRPM | OXYGEN SATURATION: 93 % | SYSTOLIC BLOOD PRESSURE: 107 MMHG | HEART RATE: 90 BPM | DIASTOLIC BLOOD PRESSURE: 67 MMHG

## 2017-08-14 LAB — COLONOSCOPY: NORMAL

## 2017-08-14 PROCEDURE — G0121 COLON CA SCRN NOT HI RSK IND: HCPCS | Performed by: INTERNAL MEDICINE

## 2017-08-14 PROCEDURE — 25000128 H RX IP 250 OP 636: Performed by: INTERNAL MEDICINE

## 2017-08-14 PROCEDURE — G0500 MOD SEDAT ENDO SERVICE >5YRS: HCPCS | Performed by: INTERNAL MEDICINE

## 2017-08-14 PROCEDURE — 45378 DIAGNOSTIC COLONOSCOPY: CPT | Performed by: INTERNAL MEDICINE

## 2017-08-14 RX ORDER — FENTANYL CITRATE 50 UG/ML
INJECTION, SOLUTION INTRAMUSCULAR; INTRAVENOUS PRN
Status: DISCONTINUED | OUTPATIENT
Start: 2017-08-14 | End: 2017-08-14 | Stop reason: HOSPADM

## 2017-08-14 NOTE — IP AVS SNAPSHOT
MRN:[id number]                      After Visit Summary   8/14/2017    Maurice Jon    MRN: [id number]           Thank you!     Thank you for choosing [facility] for your care. Our goal is always to provide you with excellent care. Hearing back from our patients is one way we can continue to improve our services. Please take a few minutes to complete the written survey that you may receive in the mail after you visit with us. Thank you!        Patient Information     Date Of Birth          1960        About your hospital stay     You were admitted on:  August 14, 2017 You last received care in the:  [facility], Endoscopy    You were discharged on:  August 14, 2017       Who to Call     For medical emergencies, please call 911.  For non-urgent questions about your medical care, please call your primary care provider or clinic, [phone]  For questions related to your surgery, please call your surgery clinic        Attending Provider     Provider Specialty    Jose Nesbitt MD Gastroenterology       Primary Care Provider Office Phone # Fax #    Adrian Braulio Pineda [phone]      Your next 10 appointments already scheduled     Aug 29, 2017  7:00 AM CDT   Lab with  LAB   [facility] ([facility] [place])    [address] [place] [id number] [phone]            Aug 29, 2017  7:30 AM CDT   US ABDOMEN COMPLETE with [address] [facility] ([facility])    [address] [place] [address] [phone]           Please bring a list of your medicines (including vitamins, minerals and over-the-counter drugs). Also, tell your doctor about any allergies you may have. Wear comfortable clothes and leave your valuables at home.  Adults: No eating or drinking for 8 hours before the exam. You may take medicine with a small sip of water.  Children: - Children 6+  years: No food or drink for 6 hours before exam. - Children 1-5 years: No food or drink for 4 hours before exam. - Infants, breast-fed: may have breast milk up to 2 hours before exam. - Infants, formula: may have bottle until 4 hours before exam.  Please call the Imaging Department at your exam site with any questions.            Aug 29, 2017  8:30 AM CDT   (Arrive by 8:15 AM)   Return General Liver with Hi Roque MD   [facility] Hepatology ([facility] [place])    [address]  [address]rd [facility] [id number] [phone]            Sep 05, 2017  3:30 PM CDT   Return Visit with Krisotfer Brown MD   [facility] PHYSICIAN HEART AT [facility] ([facility])    [address] [place] [address] [phone]              Further instructions from your care team       Discharge Instructions after Colonoscopy  or Sigmoidoscopy    Today you had a _x___ Colonoscopy    Activity and Diet  You were given medicine for pain. You may be dizzy or sleepy.  For 24 hours:    Do not drive or use heavy equipment.    Do not make important decisions.    Do not drink any alcohol.  You may return to your normal diet and medicines.    Discomfort    Air was placed in your colon during the exam in order to see it. Walking helps to pass the air.    You may take Tylenol (acetaminophen) for pain unless your doctor has told you not to.    When to call:    Call right away if you have:    Unusual pain in belly or chest pain not relieved with passing air.    More than 1 to 2 Tablespoons of bleeding from your rectum.    Fever above 100.6  F (37.5  C).    If you have severe pain, bleeding, or shortness of breath, go to an emergency room.    If you have questions, call:  Monday to Friday, 7 a.m. to 4:30 p.m.  Endoscopy: [phone] (We may have to call you back)    After hours  Hospital: [phone] (Ask for the GI fellow on call)    Pending Results     No orders found from 8/12/2017 to  8/15/2017.            Admission Information     Date & Time Provider Department Dept. Phone    8/14/2017 Jose Nesbitt MD [facility], [place], Endoscopy [phone]      Your Vitals Were     Blood Pressure Pulse Respirations Pulse Oximetry          107/67 90 18 93%        MyChart Information     MyChart gives you secure access to your electronic health record. If you see a primary care provider, you can also send messages to your care team and make appointments. If you have questions, please call your primary care clinic.  If you do not have a primary care provider, please call [phone] and they will assist you.        Care EveryWhere ID     This is your Care EveryWhere ID. This could be used by other organizations to access your [place] medical records  [id number]        Equal Access to Services     SUSAN ONEIL : Venita Payton, wamisael logan, isak kaalmaarianna amaral, rakan duncan . So [facility] [place] [phone].    ATENCIÓN: Si habla español, tiene a allen disposición servicios gratuitos de asistencia lingüística. Llame al [phone].    We comply with applicable federal civil rights laws and [place] laws. We do not discriminate on the basis of race, color, national origin, age, disability sex, sexual orientation or gender identity.               Review of your medicines      UNREVIEWED medicines. Ask your doctor about these medicines        Dose / Directions    ALBUTEROL INHALER 17GM   Used for:  Acute bronchitis        Dose:  2 puff   Inhale 2 puffs into the lungs every 4 hours as needed This product no longer available   Quantity:  1 Inhaler   Refills:  6       azithromycin 250 MG tablet   Commonly known as:  ZITHROMAX   Used for:  Acute bronchitis with coexisting condition requiring prophylactic treatment        Two tablets first day, then one tablet daily for four days.   Quantity:  6 tablet   Refills:  0       calcium carb 1250 mg (500 mg [place])/vitamin D 200  units 500-200 MG-UNIT per tablet   Commonly known as:  OSCAL with D        Dose:  2 tablet   Take 2 tablets by mouth daily with food.   Refills:  0       clotrimazole 1 % cream   Commonly known as:  LOTRIMIN   Used for:  Yeast infection of the skin        Apply topically 2 times daily   Quantity:  60 g   Refills:  1       furosemide 40 MG tablet   Commonly known as:  LASIX   Used for:  Portal hypertension (H)        Dose:  20 mg   Take 0.5 tablets (20 mg) by mouth daily   Quantity:  45 tablet   Refills:  3       lisinopril 2.5 MG tablet   Commonly known as:  PRINIVIL/Zestril   Used for:  Portal hypertension (H)        Dose:  2.5 mg   Take 1 tablet (2.5 mg) by mouth daily We will no longer fill unless seen by cardiology   Quantity:  30 tablet   Refills:  0       MAGNESIUM OXIDE PO        Dose:  1 tablet   Take 1 tablet by mouth every other day.   Refills:  0       metoprolol 100 MG 24 hr tablet   Commonly known as:  TOPROL XL   Used for:  Atrial fibrillation with rapid ventricular response (H)        Dose:  100 mg   Take 1 tablet (100 mg) by mouth daily We will no longer fill unless seen by cardiology   Quantity:  30 tablet   Refills:  0       mometasone-formoterol 200-5 MCG/ACT oral inhaler   Commonly known as:  DULERA   Used for:  Bronchospasm        Dose:  2 puff   Inhale 2 puffs into the lungs 2 times daily as needed Appt DUE Jan 2017   Quantity:  1 Inhaler   Refills:  1       pantoprazole 40 MG EC tablet   Commonly known as:  PROTONIX   Used for:  GERD (gastroesophageal reflux disease)        Dose:  40 mg   Take 1 tablet (40 mg) by mouth daily as needed We will no longer fill unless seen by cardiology   Quantity:  30 tablet   Refills:  0       spironolactone 25 MG tablet   Commonly known as:  ALDACTONE   Used for:  Edema, Portal hypertension (H)        Dose:  25 mg   Take 1 tablet (25 mg) by mouth daily We will no longer fill unless seen by cardiology   Quantity:  30 tablet   Refills:  0       triamcinolone 0.1  % cream   Commonly known as:  KENALOG   Used for:  Eczema        Apply sparingly to affected area three times daily as needed   Quantity:  80 g   Refills:  0       VIAGRA 100 MG cap/tab   Used for:  Impotence of organic origin   Generic drug:  sildenafil        ONE TABLET TAKEN AT LEAST 30 MINUTES BEFORE INTERCOURSE   Quantity:  12   Refills:  11         CONTINUE these medicines which have NOT CHANGED        Dose / Directions    * order for DME   Used for:  Portal hypertension (H), Edema        Juzo compression stockings, 30-40mm compression. Patient has portal hypertension and develops leg edema, which these control well.   Quantity:  2 Package   Refills:  3       * order for DME   Used for:  BRUCE (obstructive sleep apnea)        Respironics RemStar 60 Series Auto AFlex 12-15 cm H2O with heated humidty and a modem.  Pt chose a QuVaunteo Air FFM size medium.   Refills:  0       * order for DME   Used for:  Sleep apnea        Equipment being ordered:  New CPAP mask, tube, filters,water tray   Quantity:  1 Device   Refills:  3       * order for DME   Used for:  Portal hypertension (H), Hepatic cirrhosis (H), Edema        Open toe size large 30/40 [employer] Assure Medical Compression socks Model 90872.   Quantity:  4 Package   Refills:  3       * Notice:  This list has 4 medication(s) that are the same as other medications prescribed for you. Read the directions carefully, and ask your doctor or other care provider to review them with you.             Protect others around you: Learn how to safely use, store and throw away your medicines at www.disposemymeds.org.             Medication List: This is a list of all your medications and when to take them. Check marks below indicate your daily home schedule. Keep this list as a reference.      Medications           Morning Afternoon Evening Bedtime As Needed    ALBUTEROL INHALER 17GM   Inhale 2 puffs into the lungs every 4 hours as needed This product no longer available                                 azithromycin 250 MG tablet   Commonly known as:  ZITHROMAX   Two tablets first day, then one tablet daily for four days.                                calcium carb 1250 mg (500 mg [place])/vitamin D 200 units 500-200 MG-UNIT per tablet   Commonly known as:  OSCAL with D   Take 2 tablets by mouth daily with food.                                clotrimazole 1 % cream   Commonly known as:  LOTRIMIN   Apply topically 2 times daily                                furosemide 40 MG tablet   Commonly known as:  LASIX   Take 0.5 tablets (20 mg) by mouth daily                                lisinopril 2.5 MG tablet   Commonly known as:  PRINIVIL/Zestril   Take 1 tablet (2.5 mg) by mouth daily We will no longer fill unless seen by cardiology                                MAGNESIUM OXIDE PO   Take 1 tablet by mouth every other day.                                metoprolol 100 MG 24 hr tablet   Commonly known as:  TOPROL XL   Take 1 tablet (100 mg) by mouth daily We will no longer fill unless seen by cardiology                                mometasone-formoterol 200-5 MCG/ACT oral inhaler   Commonly known as:  DULERA   Inhale 2 puffs into the lungs 2 times daily as needed Appt DUE Jan 2017                                * order for DME   Juzo compression stockings, 30-40mm compression. Patient has portal hypertension and develops leg edema, which these control well.                                * order for DME   Respironics RemStar 60 Series Auto AFlex 12-15 cm H2O with heated humidty and a modem.  Pt chose a Quattro Air FFM size medium.                                * order for DME   Equipment being ordered:  New CPAP mask, tube, filters,water tray                                * order for DME   Open toe size large 30/40 [facility] Assure Medical Compression socks Model 86530.                                pantoprazole 40 MG EC tablet   Commonly known as:  PROTONIX   Take 1 tablet (40 mg) by mouth  daily as needed We will no longer fill unless seen by cardiology                                spironolactone 25 MG tablet   Commonly known as:  ALDACTONE   Take 1 tablet (25 mg) by mouth daily We will no longer fill unless seen by cardiology                                triamcinolone 0.1 % cream   Commonly known as:  KENALOG   Apply sparingly to affected area three times daily as needed                                VIAGRA 100 MG cap/tab   ONE TABLET TAKEN AT LEAST 30 MINUTES BEFORE INTERCOURSE   Generic drug:  sildenafil                                * Notice:  This list has 4 medication(s) that are the same as other medications prescribed for you. Read the directions carefully, and ask your doctor or other care provider to review them with you.

## 2017-08-14 NOTE — IP AVS SNAPSHOT
[facility], [place], Endoscopy    [address] [address]    Phone:  [phone]                                       After Visit Summary   8/14/2017    Maurice Jon    MRN: [id number]           After Visit Summary Signature Page     I have received my discharge instructions, and my questions have been answered. I have discussed any challenges I see with this plan with the nurse or doctor.    ..........................................................................................................................................  Patient/Patient Representative Signature      ..........................................................................................................................................  Patient Representative Print Name and Relationship to Patient    ..................................................               ................................................  Date                                            Time    ..........................................................................................................................................  Reviewed by Signature/Title    ...................................................              ..............................................  Date                                                            Time

## 2017-08-24 ENCOUNTER — TELEPHONE (OUTPATIENT)
Dept: GASTROENTEROLOGY | Facility: CLINIC | Age: 57
End: 2017-08-24

## 2017-08-24 NOTE — TELEPHONE ENCOUNTER
Left message for pt reminding them of upcoming appointment.  Instructed pt to bring updated medications list.  Instructed pt to arrive an hour and a half to two hours prior to appt time for labs.  Luis F Jon, CMA

## 2017-08-29 ENCOUNTER — OFFICE VISIT (OUTPATIENT)
Dept: GASTROENTEROLOGY | Facility: CLINIC | Age: 57
End: 2017-08-29
Attending: INTERNAL MEDICINE
Payer: COMMERCIAL

## 2017-08-29 VITALS
TEMPERATURE: 97.7 F | SYSTOLIC BLOOD PRESSURE: 114 MMHG | WEIGHT: 239 LBS | HEART RATE: 68 BPM | BODY MASS INDEX: 38.41 KG/M2 | OXYGEN SATURATION: 98 % | HEIGHT: 66 IN | DIASTOLIC BLOOD PRESSURE: 74 MMHG

## 2017-08-29 DIAGNOSIS — K74.60 CIRRHOSIS OF LIVER WITHOUT ASCITES, UNSPECIFIED HEPATIC CIRRHOSIS TYPE (H): Primary | ICD-10-CM

## 2017-08-29 DIAGNOSIS — K74.60 CIRRHOSIS OF LIVER WITHOUT ASCITES, UNSPECIFIED HEPATIC CIRRHOSIS TYPE (H): ICD-10-CM

## 2017-08-29 LAB
AFP SERPL-MCNC: 4.4 UG/L (ref 0–8)
ALBUMIN SERPL-MCNC: 3.3 G/DL (ref 3.4–5)
ALP SERPL-CCNC: 72 U/L (ref 40–150)
ALT SERPL W P-5'-P-CCNC: 50 U/L (ref 0–70)
ANION GAP SERPL CALCULATED.3IONS-SCNC: 7 MMOL/L (ref 3–14)
AST SERPL W P-5'-P-CCNC: 49 U/L (ref 0–45)
BILIRUB DIRECT SERPL-MCNC: 0.4 MG/DL (ref 0–0.2)
BILIRUB SERPL-MCNC: 1.4 MG/DL (ref 0.2–1.3)
BUN SERPL-MCNC: 14 MG/DL (ref 7–30)
CALCIUM SERPL-MCNC: 8.8 MG/DL (ref 8.5–10.1)
CHLORIDE SERPL-SCNC: 104 MMOL/L (ref 94–109)
CO2 SERPL-SCNC: 27 MMOL/L (ref 20–32)
CREAT SERPL-MCNC: 0.69 MG/DL (ref 0.66–1.25)
ERYTHROCYTE [DISTWIDTH] IN BLOOD BY AUTOMATED COUNT: 13.7 % (ref 10–15)
GFR SERPL CREATININE-BSD FRML MDRD: >90 ML/MIN/1.7M2
GLUCOSE SERPL-MCNC: 111 MG/DL (ref 70–99)
HCT VFR BLD AUTO: 43.2 % (ref 40–53)
HGB BLD-MCNC: 15 G/DL (ref 13.3–17.7)
INR PPP: 1.24 (ref 0.86–1.14)
MCH RBC QN AUTO: 35.3 PG (ref 26.5–33)
MCHC RBC AUTO-ENTMCNC: 34.7 G/DL (ref 31.5–36.5)
MCV RBC AUTO: 102 FL (ref 78–100)
PLATELET # BLD AUTO: 62 10E9/L (ref 150–450)
POTASSIUM SERPL-SCNC: 4.2 MMOL/L (ref 3.4–5.3)
PROT SERPL-MCNC: 6.5 G/DL (ref 6.8–8.8)
RBC # BLD AUTO: 4.25 10E12/L (ref 4.4–5.9)
SODIUM SERPL-SCNC: 138 MMOL/L (ref 133–144)
WBC # BLD AUTO: 3 10E9/L (ref 4–11)

## 2017-08-29 PROCEDURE — 99212 OFFICE O/P EST SF 10 MIN: CPT | Mod: ZF

## 2017-08-29 PROCEDURE — 36415 COLL VENOUS BLD VENIPUNCTURE: CPT | Performed by: INTERNAL MEDICINE

## 2017-08-29 PROCEDURE — 80076 HEPATIC FUNCTION PANEL: CPT | Performed by: INTERNAL MEDICINE

## 2017-08-29 PROCEDURE — 82105 ALPHA-FETOPROTEIN SERUM: CPT | Performed by: INTERNAL MEDICINE

## 2017-08-29 PROCEDURE — 85027 COMPLETE CBC AUTOMATED: CPT | Performed by: INTERNAL MEDICINE

## 2017-08-29 PROCEDURE — 85610 PROTHROMBIN TIME: CPT | Performed by: INTERNAL MEDICINE

## 2017-08-29 PROCEDURE — 80048 BASIC METABOLIC PNL TOTAL CA: CPT | Performed by: INTERNAL MEDICINE

## 2017-08-29 ASSESSMENT — PAIN SCALES - GENERAL: PAINLEVEL: NO PAIN (0)

## 2017-08-29 NOTE — LETTER
8/29/2017      RE: Maurice Jon  [address]  [facility] [address]       HISTORY OF PRESENT ILLNESS:  I had the pleasure of seeing Maurice Jon for followup in the Liver Clinic at the [facility] on 08/29/2017.  Mr. Jon returns for followup of cirrhosis, most likely on the basis of nonalcoholic fatty liver disease and probably thrombosis.      He is actually doing well at this visit.  He denies any abdominal pain, itching or skin rash and has only mild fatigue.  He is working full time.  He denies any increased abdominal girth or lower extremity edema.  He denies any gastrointestinal bleeding or any overt signs of hepatic encephalopathy.  He denies any fevers or chills, cough or shortness of breath.  He denies any nausea, vomiting, diarrhea or constipation.  His appetite has been good and his weight has remained relatively stable.  He would like to lose some weight.       Current Outpatient Prescriptions   Medication     lisinopril (PRINIVIL/ZESTRIL) 2.5 MG tablet     metoprolol (TOPROL XL) 100 MG 24 hr tablet     spironolactone (ALDACTONE) 25 MG tablet     pantoprazole (PROTONIX) 40 MG EC tablet     furosemide (LASIX) 40 MG tablet     mometasone-formoterol (DULERA) 200-5 MCG/ACT oral inhaler     order for DME     order for DME     triamcinolone (KENALOG) 0.1 % cream     clotrimazole (LOTRIMIN) 1 % cream     ALBUTEROL INHALER 17GM     ORDER FOR DME     ORDER FOR DME     calcium carb 1250 mg, 500 mg [place],/vitamin D 200 units (OSCAL WITH D) 500-200 MG-UNIT per tablet     VIAGRA 100 MG OR TABS     No current facility-administered medications for this visit.      B/P: 114/74, T: 97.7, P: 68, R: Data Unavailable    HEENT exam shows no scleral icterus and no temporal muscle wasting.  His chest is clear.  His abdominal exam shows no increase in girth.  No masses or tenderness to palpation are present.  His liver is 10 cm in span without left lobe enlargement.  No spleen tip is  palpable.  Extremity exam shows no edema.  Skin exam shows no stigmata of chronic liver disease.  Neurologic exam shows no asterixis.       Recent Results (from the past 168 hour(s))    Hepatic Panel [LAB20]    Collection Time: 08/29/17  7:33 AM   Result Value Ref Range    Bilirubin Direct 0.4 (H) 0.0 - 0.2 mg/dL    Bilirubin Total 1.4 (H) 0.2 - 1.3 mg/dL    Albumin 3.3 (L) 3.4 - 5.0 g/dL    Protein Total 6.5 (L) 6.8 - 8.8 g/dL    Alkaline Phosphatase 72 40 - 150 U/L    ALT 50 0 - 70 U/L    AST 49 (H) 0 - 45 U/L   Basic metabolic panel [LAB15]    Collection Time: 08/29/17  7:33 AM   Result Value Ref Range    Sodium 138 133 - 144 mmol/L    Potassium 4.2 3.4 - 5.3 mmol/L    Chloride 104 94 - 109 mmol/L    Carbon Dioxide 27 20 - 32 mmol/L    Anion Gap 7 3 - 14 mmol/L    Glucose 111 (H) 70 - 99 mg/dL    Urea Nitrogen 14 7 - 30 mg/dL    Creatinine 0.69 0.66 - 1.25 mg/dL    GFR Estimate >90 >60 mL/min/1.7m2    GFR Estimate If Black >90 >60 mL/min/1.7m2    Calcium 8.8 8.5 - 10.1 mg/dL   CBC with platelets [YNU824]    Collection Time: 08/29/17  7:33 AM   Result Value Ref Range    WBC 3.0 (L) 4.0 - 11.0 10e9/L    RBC Count 4.25 (L) 4.4 - 5.9 10e12/L    Hemoglobin 15.0 13.3 - 17.7 g/dL    Hematocrit 43.2 40.0 - 53.0 %     (H) 78 - 100 fl    MCH 35.3 (H) 26.5 - 33.0 pg    MCHC 34.7 31.5 - 36.5 g/dL    RDW 13.7 10.0 - 15.0 %    Platelet Count 62 (L) 150 - 450 10e9/L   INR [POW0385]    Collection Time: 08/29/17  7:33 AM   Result Value Ref Range    INR 1.24 (H) 0.86 - 1.14   AFP tumor marker [TQC635]    Collection Time: 08/29/17  7:33 AM   Result Value Ref Range    Alpha Fetoprotein 4.4 0 - 8 ug/L      I did review his ultrasound which shows no particularly worrisome lesions in his liver and no ascites.      My impression is that Mr. Jon has cirrhosis, most likely on the basis of fatty liver disease with portal vein thrombosis and his portal hypertension is stable.  He is up-to-date with regard to vaccines and cancer  screening.  He is also up-to-date on variceal screening.      He is complaining of some short-term memory loss which is not characteristic of liver disease.  He does have chronic atrial fibrillation and is on metoprolol and will speak to his cardiologist about that.  They had also offered him potentially ablation therapy which he would certainly be a good candidate for.  I, otherwise, will not be making any other change to his medical regimen.  I will see him back in the clinic in 6 months with repeat ultrasound and blood testing.      Thank you very much for allowing me to participate in the care of this patient.  If you have any questions regarding my recommendations, please do not hesitate to contact me.       Hi Roque MD      Professor of Medicine  [employer]      Executive Medical Director, Solid Organ Transplant Program  [facility]

## 2017-08-29 NOTE — LETTER
8/29/2017        RE: Maurice Jon  [address] [place] [address]     Dear Colleague,    Thank you for referring your patient, Maurice Jon, to the [facility] HEPATOLOGY at [facility]. Please see a copy of my visit note below.    HISTORY OF PRESENT ILLNESS:  I had the pleasure of seeing Maurice Jon for followup in the Liver Clinic at the [facility] on 08/29/2017.  Mr. oJn returns for followup of cirrhosis, most likely on the basis of nonalcoholic fatty liver disease and probably thrombosis.      He is actually doing well at this visit.  He denies any abdominal pain, itching or skin rash and has only mild fatigue.  He is working full time.  He denies any increased abdominal girth or lower extremity edema.  He denies any gastrointestinal bleeding or any overt signs of hepatic encephalopathy.  He denies any fevers or chills, cough or shortness of breath.  He denies any nausea, vomiting, diarrhea or constipation.  His appetite has been good and his weight has remained relatively stable.  He would like to lose some weight.       Current Outpatient Prescriptions   Medication     lisinopril (PRINIVIL/ZESTRIL) 2.5 MG tablet     metoprolol (TOPROL XL) 100 MG 24 hr tablet     spironolactone (ALDACTONE) 25 MG tablet     pantoprazole (PROTONIX) 40 MG EC tablet     furosemide (LASIX) 40 MG tablet     mometasone-formoterol (DULERA) 200-5 MCG/ACT oral inhaler     order for DME     order for DME     triamcinolone (KENALOG) 0.1 % cream     clotrimazole (LOTRIMIN) 1 % cream     ALBUTEROL INHALER 17GM     ORDER FOR DME     ORDER FOR DME     calcium carb 1250 mg, 500 mg [place],/vitamin D 200 units (OSCAL WITH D) 500-200 MG-UNIT per tablet     VIAGRA 100 MG OR TABS     No current facility-administered medications for this visit.      B/P: 114/74, T: 97.7, P: 68, R: Data Unavailable    HEENT exam shows no scleral icterus and no temporal muscle  wasting.  His chest is clear.  His abdominal exam shows no increase in girth.  No masses or tenderness to palpation are present.  His liver is 10 cm in span without left lobe enlargement.  No spleen tip is palpable.  Extremity exam shows no edema.  Skin exam shows no stigmata of chronic liver disease.  Neurologic exam shows no asterixis.       Recent Results (from the past 168 hour(s))    Hepatic Panel [LAB20]    Collection Time: 08/29/17  7:33 AM   Result Value Ref Range    Bilirubin Direct 0.4 (H) 0.0 - 0.2 mg/dL    Bilirubin Total 1.4 (H) 0.2 - 1.3 mg/dL    Albumin 3.3 (L) 3.4 - 5.0 g/dL    Protein Total 6.5 (L) 6.8 - 8.8 g/dL    Alkaline Phosphatase 72 40 - 150 U/L    ALT 50 0 - 70 U/L    AST 49 (H) 0 - 45 U/L   Basic metabolic panel [LAB15]    Collection Time: 08/29/17  7:33 AM   Result Value Ref Range    Sodium 138 133 - 144 mmol/L    Potassium 4.2 3.4 - 5.3 mmol/L    Chloride 104 94 - 109 mmol/L    Carbon Dioxide 27 20 - 32 mmol/L    Anion Gap 7 3 - 14 mmol/L    Glucose 111 (H) 70 - 99 mg/dL    Urea Nitrogen 14 7 - 30 mg/dL    Creatinine 0.69 0.66 - 1.25 mg/dL    GFR Estimate >90 >60 mL/min/1.7m2    GFR Estimate If Black >90 >60 mL/min/1.7m2    Calcium 8.8 8.5 - 10.1 mg/dL   CBC with platelets [UZF786]    Collection Time: 08/29/17  7:33 AM   Result Value Ref Range    WBC 3.0 (L) 4.0 - 11.0 10e9/L    RBC Count 4.25 (L) 4.4 - 5.9 10e12/L    Hemoglobin 15.0 13.3 - 17.7 g/dL    Hematocrit 43.2 40.0 - 53.0 %     (H) 78 - 100 fl    MCH 35.3 (H) 26.5 - 33.0 pg    MCHC 34.7 31.5 - 36.5 g/dL    RDW 13.7 10.0 - 15.0 %    Platelet Count 62 (L) 150 - 450 10e9/L   INR [[id number]]    Collection Time: 08/29/17  7:33 AM   Result Value Ref Range    INR 1.24 (H) 0.86 - 1.14   AFP tumor marker [[id number]]    Collection Time: 08/29/17  7:33 AM   Result Value Ref Range    Alpha Fetoprotein 4.4 0 - 8 ug/L      I did review his ultrasound which shows no particularly worrisome lesions in his liver and no ascites.      My  impression is that Mr. Jon has cirrhosis, most likely on the basis of fatty liver disease with portal vein thrombosis and his portal hypertension is stable.  He is up-to-date with regard to vaccines and cancer screening.  He is also up-to-date on variceal screening.      He is complaining of some short-term memory loss which is not characteristic of liver disease.  He does have chronic atrial fibrillation and is on metoprolol and will speak to his cardiologist about that.  They had also offered him potentially ablation therapy which he would certainly be a good candidate for.  I, otherwise, will not be making any other change to his medical regimen.  I will see him back in the clinic in 6 months with repeat ultrasound and blood testing.      Thank you very much for allowing me to participate in the care of this patient.  If you have any questions regarding my recommendations, please do not hesitate to contact me.       Hi Roque MD      Professor of Medicine  [employer]      Executive Medical Director, Solid Organ Transplant Program  [facility]

## 2017-08-29 NOTE — NURSING NOTE
Chief Complaint   Patient presents with     RECHECK     Cirrhosis of Liver without Ascites   Pt roomed, vitals, meds, and allergies reviewed with pt. Pt ready for provider.  Luis F Jon, CMA

## 2017-08-29 NOTE — MR AVS SNAPSHOT
After Visit Summary   8/29/2017    Maurice Jon    MRN: [id number]           Patient Information     Date Of Birth          1960        Visit Information        Provider Department      8/29/2017 8:30 AM Hi Roque MD [facility] Hepatology        Today's Diagnoses     Cirrhosis of liver without ascites, unspecified hepatic cirrhosis type (H)    -  1       Follow-ups after your visit        Your next 10 appointments already scheduled     Sep 05, 2017  3:30 PM CDT   P EP RETURN with Kristofer Brown MD   [facility]YSIC[facility] AT [facility]    [address] [facility] [address]   [phone]            Feb 06, 2018  7:00 AM CST   Lab with  LAB   [facility]    [address] [place] [address] [id number] [phone]            Feb 06, 2018  7:30 AM CST   US ABDOMEN COMPLETE with UCUS2   [facility]    [address] [place] [address] [id number] [phone]           Please bring a list of your medicines (including vitamins, minerals and over-the-counter drugs). Also, tell your doctor about any allergies you may have. Wear comfortable clothes and leave your valuables at home.  Adults: No eating or drinking for 8 hours before the exam. You may take medicine with a small sip of water.  Children: - Children 6+ years: No food or drink for 6 hours before exam. - Children 1-5 years: No food or drink for 4 hours before exam. - Infants, breast-fed: may have breast milk up to 2 hours before exam. - Infants, formula: may have bottle until 4 hours before exam.  Please call the Imaging Department at your exam site with any questions.            Feb 06, 2018  8:45 AM CST   (Arrive by 8:30 AM)   Return General Liver with Hi Roque MD   [facility]    [address] [place] [address]  [facility] [address] [phone]              Future tests that were ordered for you today     Open Standing Orders        Priority Remaining Interval Expires Ordered    US abdomen complete [ANT473] Routine 2/2 Every 6 Months 8/29/2018 8/29/2017          Open Future Orders        Priority Expected Expires Ordered    Hepatic Panel [LAB20] Routine 2/25/2018 8/29/2018 8/29/2017    Basic metabolic panel [LAB15] Routine 2/25/2018 8/29/2018 8/29/2017    CBC with platelets [[id number]] Routine 2/25/2018 8/29/2018 8/29/2017    INR [[id number]] Routine 2/25/2018 8/29/2018 8/29/2017    AFP tumor marker [AYT559] Routine 2/25/2018 8/29/2018 8/29/2017            Who to contact     If you have questions or need follow up information about today's clinic visit or your schedule please contact [facility] HEPATOLOGY directly at [phone].  Normal or non-critical lab and imaging results will be communicated to you by [employer], letter or phone within 4 business days after the clinic has received the results. If you do not hear from us within 7 days, please contact the clinic through We[employer] or phone. If you have a critical or abnormal lab result, we will notify you by phone as soon as possible.  Submit refill requests through We[employer] or call your pharmacy and they will forward the refill request to us. Please allow 3 business days for your refill to be completed.          Additional Information About Your Visit        WePow Information     We[employer] gives you secure access to your electronic health record. If you see a primary care provider, you can also send messages to your care team and make appointments. If you have questions, please call your primary care clinic.  If you do not have a primary care provider, please call [phone] and they will assist you.        Care EveryWhere ID     This is your Care EveryWhere ID. This could be used by other organizations to access your [facility] medical records  [id number]       "  Your Vitals Were     Pulse Temperature Height Pulse Oximetry BMI (Body Mass Index)       68 97.7  F (36.5  C) (Oral) 1.664 m (5' 5.5\") 98% 39.17 kg/m2        Blood Pressure from Last 3 Encounters:   08/29/17 114/74   08/14/17 107/67   03/17/17 102/68    Weight from Last 3 Encounters:   08/29/17 108.4 kg (239 lb)   03/17/17 104.3 kg (230 lb)   02/02/17 104.4 kg (230 lb 3.2 oz)              We Performed the Following     Schedule follow up appointments          Today's Medication Changes          These changes are accurate as of: 8/29/17  8:56 AM.  If you have any questions, ask your nurse or doctor.               These medicines have changed or have updated prescriptions.        Dose/Directions    clotrimazole 1 % cream   Commonly known as:  LOTRIMIN   This may have changed:    - when to take this  - reasons to take this   Used for:  Yeast infection of the skin        Apply topically 2 times daily   Quantity:  60 g   Refills:  1       triamcinolone 0.1 % cream   Commonly known as:  KENALOG   This may have changed:    - when to take this  - reasons to take this  - additional instructions   Used for:  Eczema        Apply sparingly to affected area three times daily as needed   Quantity:  80 g   Refills:  0                Primary Care Provider Office Phone # Fax #    Adrian Braulio Pineda [phone] [address] [address]        Equal Access to Services     HOLLY ONEIL AH: Hadii jose Payton, waaxda luqadaha, qaybta kaalmada munir, waxay marcellus espinosa. So [facility] [phone].    ATENCIÓN: Si habla español, tiene a allen disposición servicios gratuitos de asistencia lingüística. Llame al [phone].    We comply with applicable federal civil rights laws and [place] laws. We do not discriminate on the basis of race, color, national origin, age, disability sex, sexual orientation or gender identity.            Thank you!     Thank you for " choosing [facility] HEPATOLOGY  for your care. Our goal is always to provide you with excellent care. Hearing back from our patients is one way we can continue to improve our services. Please take a few minutes to complete the written survey that you may receive in the mail after your visit with us. Thank you!             Your Updated Medication List - Protect others around you: Learn how to safely use, store and throw away your medicines at www.disposemymeds.org.          This list is accurate as of: 8/29/17  8:56 AM.  Always use your most recent med list.                   Brand Name Dispense Instructions for use Diagnosis    ALBUTEROL INHALER 17GM     1 Inhaler    Inhale 2 puffs into the lungs every 4 hours as needed This product no longer available    Acute bronchitis       calcium carb 1250 mg (500 mg [place])/vitamin D 200 units 500-200 MG-UNIT per tablet    OSCAL with D     Take 2 tablets by mouth daily with food.        clotrimazole 1 % cream    LOTRIMIN    60 g    Apply topically 2 times daily    Yeast infection of the skin       furosemide 40 MG tablet    LASIX    45 tablet    Take 0.5 tablets (20 mg) by mouth daily    Portal hypertension (H)       lisinopril 2.5 MG tablet    PRINIVIL/Zestril    30 tablet    Take 1 tablet (2.5 mg) by mouth daily We will no longer fill unless seen by cardiology    Portal hypertension (H)       metoprolol 100 MG 24 hr tablet    TOPROL XL    30 tablet    Take 1 tablet (100 mg) by mouth daily We will no longer fill unless seen by cardiology    Atrial fibrillation with rapid ventricular response (H)       mometasone-formoterol 200-5 MCG/ACT oral inhaler    DULERA    1 Inhaler    Inhale 2 puffs into the lungs 2 times daily as needed Appt DUE Jan 2017    Bronchospasm       * order for DME     2 Package    Juzo compression stockings, 30-40mm compression. Patient has portal hypertension and develops leg edema, which these control well.    Portal hypertension (H), Edema       * order for  DME      Respironics RemStar 60 Series Auto AFlex 12-15 cm H2O with heated humidty and a modem.  Pt chose a Quattro Air FFM size medium.    BRUCE (obstructive sleep apnea)       * order for DME     1 Device    Equipment being ordered:  New CPAP mask, tube, filters,water tray    Sleep apnea       * order for DME     4 Package    Open toe size large 30/40 Mediven Assure Medical Compression socks Model 32464.    Portal hypertension (H), Hepatic cirrhosis (H), Edema       pantoprazole 40 MG EC tablet    PROTONIX    30 tablet    Take 1 tablet (40 mg) by mouth daily as needed We will no longer fill unless seen by cardiology    GERD (gastroesophageal reflux disease)       spironolactone 25 MG tablet    ALDACTONE    30 tablet    Take 1 tablet (25 mg) by mouth daily We will no longer fill unless seen by cardiology    Edema, Portal hypertension (H)       triamcinolone 0.1 % cream    KENALOG    80 g    Apply sparingly to affected area three times daily as needed    Eczema       VIAGRA 100 MG cap/tab   Generic drug:  sildenafil     12    ONE TABLET TAKEN AT LEAST 30 MINUTES BEFORE INTERCOURSE    Impotence of organic origin       * Notice:  This list has 4 medication(s) that are the same as other medications prescribed for you. Read the directions carefully, and ask your doctor or other care provider to review them with you.

## 2017-08-29 NOTE — PROGRESS NOTES
HISTORY OF PRESENT ILLNESS:  I had the pleasure of seeing Maurice Jon for followup in the Liver Clinic at the [facility] on 08/29/2017.  Mr. Jon returns for followup of cirrhosis, most likely on the basis of nonalcoholic fatty liver disease and probably thrombosis.      He is actually doing well at this visit.  He denies any abdominal pain, itching or skin rash and has only mild fatigue.  He is working full time.  He denies any increased abdominal girth or lower extremity edema.  He denies any gastrointestinal bleeding or any overt signs of hepatic encephalopathy.  He denies any fevers or chills, cough or shortness of breath.  He denies any nausea, vomiting, diarrhea or constipation.  His appetite has been good and his weight has remained relatively stable.  He would like to lose some weight.       Current Outpatient Prescriptions   Medication     lisinopril (PRINIVIL/ZESTRIL) 2.5 MG tablet     metoprolol (TOPROL XL) 100 MG 24 hr tablet     spironolactone (ALDACTONE) 25 MG tablet     pantoprazole (PROTONIX) 40 MG EC tablet     furosemide (LASIX) 40 MG tablet     mometasone-formoterol (DULERA) 200-5 MCG/ACT oral inhaler     order for DME     order for DME     triamcinolone (KENALOG) 0.1 % cream     clotrimazole (LOTRIMIN) 1 % cream     ALBUTEROL INHALER 17GM     ORDER FOR DME     ORDER FOR DME     calcium carb 1250 mg, 500 mg [place],/vitamin D 200 units (OSCAL WITH D) 500-200 MG-UNIT per tablet     VIAGRA 100 MG OR TABS     No current facility-administered medications for this visit.      B/P: 114/74, T: 97.7, P: 68, R: Data Unavailable    HEENT exam shows no scleral icterus and no temporal muscle wasting.  His chest is clear.  His abdominal exam shows no increase in girth.  No masses or tenderness to palpation are present.  His liver is 10 cm in span without left lobe enlargement.  No spleen tip is palpable.  Extremity exam shows no edema.  Skin exam shows no stigmata of chronic  liver disease.  Neurologic exam shows no asterixis.       Recent Results (from the past 168 hour(s))    Hepatic Panel [LAB20]    Collection Time: 08/29/17  7:33 AM   Result Value Ref Range    Bilirubin Direct 0.4 (H) 0.0 - 0.2 mg/dL    Bilirubin Total 1.4 (H) 0.2 - 1.3 mg/dL    Albumin 3.3 (L) 3.4 - 5.0 g/dL    Protein Total 6.5 (L) 6.8 - 8.8 g/dL    Alkaline Phosphatase 72 40 - 150 U/L    ALT 50 0 - 70 U/L    AST 49 (H) 0 - 45 U/L   Basic metabolic panel [LAB15]    Collection Time: 08/29/17  7:33 AM   Result Value Ref Range    Sodium 138 133 - 144 mmol/L    Potassium 4.2 3.4 - 5.3 mmol/L    Chloride 104 94 - 109 mmol/L    Carbon Dioxide 27 20 - 32 mmol/L    Anion Gap 7 3 - 14 mmol/L    Glucose 111 (H) 70 - 99 mg/dL    Urea Nitrogen 14 7 - 30 mg/dL    Creatinine 0.69 0.66 - 1.25 mg/dL    GFR Estimate >90 >60 mL/min/1.7m2    GFR Estimate If Black >90 >60 mL/min/1.7m2    Calcium 8.8 8.5 - 10.1 mg/dL   CBC with platelets [[id number]]    Collection Time: 08/29/17  7:33 AM   Result Value Ref Range    WBC 3.0 (L) 4.0 - 11.0 10e9/L    RBC Count 4.25 (L) 4.4 - 5.9 10e12/L    Hemoglobin 15.0 13.3 - 17.7 g/dL    Hematocrit 43.2 40.0 - 53.0 %     (H) 78 - 100 fl    MCH 35.3 (H) 26.5 - 33.0 pg    MCHC 34.7 31.5 - 36.5 g/dL    RDW 13.7 10.0 - 15.0 %    Platelet Count 62 (L) 150 - 450 10e9/L   INR [[id number]]    Collection Time: 08/29/17  7:33 AM   Result Value Ref Range    INR 1.24 (H) 0.86 - 1.14   AFP tumor marker [[id number]]    Collection Time: 08/29/17  7:33 AM   Result Value Ref Range    Alpha Fetoprotein 4.4 0 - 8 ug/L      I did review his ultrasound which shows no particularly worrisome lesions in his liver and no ascites.      My impression is that Mr. Jon has cirrhosis, most likely on the basis of fatty liver disease with portal vein thrombosis and his portal hypertension is stable.  He is up-to-date with regard to vaccines and cancer screening.  He is also up-to-date on variceal screening.      He is complaining of  some short-term memory loss which is not characteristic of liver disease.  He does have chronic atrial fibrillation and is on metoprolol and will speak to his cardiologist about that.  They had also offered him potentially ablation therapy which he would certainly be a good candidate for.  I, otherwise, will not be making any other change to his medical regimen.  I will see him back in the clinic in 6 months with repeat ultrasound and blood testing.      Thank you very much for allowing me to participate in the care of this patient.  If you have any questions regarding my recommendations, please do not hesitate to contact me.       Hi Roque MD      Professor of Medicine  [facility] [employer]      Executive Medical Director, Solid Organ Transplant Program  [facility]

## 2017-09-05 ENCOUNTER — OFFICE VISIT (OUTPATIENT)
Dept: CARDIOLOGY | Facility: CLINIC | Age: 57
End: 2017-09-05
Payer: COMMERCIAL

## 2017-09-05 VITALS
BODY MASS INDEX: 39.66 KG/M2 | OXYGEN SATURATION: 96 % | DIASTOLIC BLOOD PRESSURE: 65 MMHG | WEIGHT: 242 LBS | HEART RATE: 100 BPM | SYSTOLIC BLOOD PRESSURE: 112 MMHG

## 2017-09-05 DIAGNOSIS — I48.91 ATRIAL FIBRILLATION WITH RAPID VENTRICULAR RESPONSE (H): ICD-10-CM

## 2017-09-05 DIAGNOSIS — K76.6 PORTAL HYPERTENSION (H): ICD-10-CM

## 2017-09-05 PROCEDURE — 99214 OFFICE O/P EST MOD 30 MIN: CPT | Performed by: INTERNAL MEDICINE

## 2017-09-05 RX ORDER — DILTIAZEM HYDROCHLORIDE 180 MG/1
180 CAPSULE, EXTENDED RELEASE ORAL DAILY
Qty: 30 CAPSULE | Refills: 3 | Status: SHIPPED | OUTPATIENT
Start: 2017-09-05 | End: 2017-10-09 | Stop reason: SINTOL

## 2017-09-05 RX ORDER — METOPROLOL SUCCINATE 100 MG/1
100 TABLET, EXTENDED RELEASE ORAL DAILY
Qty: 90 TABLET | Refills: 3 | Status: CANCELLED | OUTPATIENT
Start: 2017-09-05

## 2017-09-05 RX ORDER — SPIRONOLACTONE 25 MG/1
25 TABLET ORAL DAILY
Qty: 90 TABLET | Refills: 3 | Status: ON HOLD | OUTPATIENT
Start: 2017-09-05 | End: 2018-12-27

## 2017-09-05 RX ORDER — LISINOPRIL 2.5 MG/1
2.5 TABLET ORAL DAILY
Qty: 90 TABLET | Refills: 3 | Status: SHIPPED | OUTPATIENT
Start: 2017-09-05 | End: 2017-10-25 | Stop reason: ALTCHOICE

## 2017-09-05 NOTE — LETTER
2017             Adrian Pineda MD   [facility]    [address] [place] [address]      RE: Maurice Jon   MRN: [id number]   : 1960      Dear Dr. Pineda:       Thank you for allowing me to participate in the care of this very delightful patient.  As you know, Yomi is a 56-year-old gentleman with a history of liver cirrhosis due to nonalcoholic steatohepatitis and has been followed closely by the Liver Clinic at the [facility].  Yomi does have a history of symptomatic chronic atrial fibrillation who felt much better after we restored sinus rhythm with DC cardioversion a few years ago.  I last saw him in March of last year.  At that time, he was back into atrial fibrillation with symptoms of feeling more tired and fatigued.  At that time, we discussed at length about options going forward with medical therapy versus an ablation for rhythm-control strategy.  Medical therapy could be somewhat limited because of his liver cirrhosis.  The only drug that is not liver metabolized is sotalol.  I was leaning toward a catheter-based ablation.  The patient at that time wanted more time to think about it, but I never heard from him until today.        The patient stated that he ran out of his metoprolol 2 days ago, but even before that, he just feels more tired easily and not like he was when in sinus rhythm.  Unfortunately, he has been in persistent atrial fibrillation, presumably since the last time I saw him, and the longer he is in it, the more difficult it would be to maintain sinus rhythm with a single ablation.  Additionally, the patient does complain of some short-term memory, and he wants to know whether it is from his beta blocker.  I do not think this is the case, but the fact that he has been off of it for 2 days is a good time to have him off it altogether and have him on a calcium channel blocker as an attempt at a rate-control strategy.   Because of his liver cirrhosis, even though he had mild coagulopathy, according to Dr. Roque, it would be fine to proceed with an ablation as well, although with the persistent nature of his atrial fibrillation, the patient may require 2 ablations to increase his success rate.  I will call the patient regarding the result of the Holter and assess how he feels at that time.        Sincerely,      Kristofer Evans MD

## 2017-09-05 NOTE — MR AVS SNAPSHOT
After Visit Summary   9/5/2017    Maurice Jon    MRN: [id number]           Patient Information     Date Of Birth          1960        Visit Information        Provider Department      9/5/2017 3:30 PM Kristofer Evans MD [facility] PHYSICIAN HEART AT [facility]        Today's Diagnoses     Portal hypertension (H)        Atrial fibrillation with rapid ventricular response (H)           Follow-ups after your visit        Your next 10 appointments already scheduled     Sep 19, 2017  2:00 PM CDT   Ech Complete with [address] Echocardiography ([facility])    [address]  [facility] [address]   [phone]           1. Please bring or wear a comfortable two-piece outfit. 2. You may eat, drink and take your normal medicines. 3. For any questions that cannot be answered, please contact the ordering physician            Sep 19, 2017  3:00 PM CDT   Holter Monitor with [facility]   [facility] ([facility])    [address] [facility] [address]   [phone]            Feb 06, 2018  7:00 AM CST   Lab with  LAB   [facility] ([facility])    [address] [place] [id number] [phone]            Feb 06, 2018  7:30 AM CST   US ABDOMEN COMPLETE with [address] [facility] US ([facility])    [address] [place] [id number] [phone]           Please bring a list of your medicines (including vitamins, minerals and over-the-counter drugs). Also, tell your doctor about any allergies you may have. Wear comfortable clothes and leave your valuables at home.  Adults: No eating or drinking for 8 hours before the exam. You may take medicine with a small sip of water.  Children: - Children 6+ years: No food or drink for 6 hours before exam. - Children 1-5 years: No food or drink for 4  hours before exam. - Infants, breast-fed: may have breast milk up to 2 hours before exam. - Infants, formula: may have bottle until 4 hours before exam.  Please call the Imaging Department at your exam site with any questions.            Feb 06, 2018  8:45 AM CST   (Arrive by 8:30 AM)   Return General Liver with Hi Roque MD   [facility] ([facility])    [address] [place] [id number] [phone]              Future tests that were ordered for you today     Open Future Orders        Priority Expected Expires Ordered    Holter Monitor 24 hour - Adult Routine 9/12/2017 9/5/2018 9/5/2017    Echocardiogram Routine 9/12/2017 9/5/2018 9/5/2017            Who to contact     If you have questions or need follow up information about today's clinic visit or your schedule please contact [facility] PHYSICIAN HEART AT [facility] directly at [phone].  Normal or non-critical lab and imaging results will be communicated to you by [employer], letter or phone within 4 business days after the clinic has received the results. If you do not hear from us within 7 days, please contact the clinic through [employer] or phone. If you have a critical or abnormal lab result, we will notify you by phone as soon as possible.  Submit refill requests through [employer] or call your pharmacy and they will forward the refill request to us. Please allow 3 business days for your refill to be completed.          Additional Information About Your Visit        [employer] Information     [employer] gives you secure access to your electronic health record. If you see a primary care provider, you can also send messages to your care team and make appointments. If you have questions, please call your primary care clinic.  If you do not have a primary care provider, please call [phone] and they will assist you.        Care EveryWhere ID     This is your Care EveryWhere ID. This  could be used by other organizations to access your [facility] medical records  [id number]        Your Vitals Were     Pulse Pulse Oximetry BMI (Body Mass Index)             100 96% 39.66 kg/m2          Blood Pressure from Last 3 Encounters:   09/05/17 112/65   08/29/17 114/74   08/14/17 107/67    Weight from Last 3 Encounters:   09/05/17 109.8 kg (242 lb)   08/29/17 108.4 kg (239 lb)   03/17/17 104.3 kg (230 lb)                 Today's Medication Changes          These changes are accurate as of: 9/5/17 11:59 PM.  If you have any questions, ask your nurse or doctor.               Start taking these medicines.        Dose/Directions    diltiazem 180 MG 24 hr capsule   Commonly known as:  DILACOR XR   Used for:  Atrial fibrillation with rapid ventricular response (H)   Started by:  Kristofer Evans MD        Dose:  180 mg   Take 1 capsule (180 mg) by mouth daily   Quantity:  30 capsule   Refills:  3         These medicines have changed or have updated prescriptions.        Dose/Directions    clotrimazole 1 % cream   Commonly known as:  LOTRIMIN   This may have changed:    - when to take this  - reasons to take this   Used for:  Yeast infection of the skin        Apply topically 2 times daily   Quantity:  60 g   Refills:  1       triamcinolone 0.1 % cream   Commonly known as:  KENALOG   This may have changed:    - when to take this  - reasons to take this  - additional instructions   Used for:  Eczema        Apply sparingly to affected area three times daily as needed   Quantity:  80 g   Refills:  0            Where to get your medicines      [facility] [address]  [address], [place] [address]    Hours:  [place] [employer] Phone:  [phone]     diltiazem 180 MG 24 hr capsule    lisinopril 2.5 MG tablet    spironolactone 25 MG tablet                Primary Care Provider Office Phone # Fax #    Adrian Pineda MD  [phone]       [address]  [facility] [address]        Equal Access to Services     SUSAN ONEIL : Hadii jose dougherty shadia Payton, wazahrada luqsantiago, qaybta kaalmada munir, rakan espinosa. So [facility] [phone].    ATENCIÓN: Si habla español, tiene a allen disposición servicios gratuitos de asistencia lingüística. Llame al [phone].    We comply with applicable federal civil rights laws and [place] laws. We do not discriminate on the basis of race, color, national origin, age, disability sex, sexual orientation or gender identity.            Thank you!     Thank you for choosing [facility] PHYSICIAN HEART AT [facility]  for your care. Our goal is always to provide you with excellent care. Hearing back from our patients is one way we can continue to improve our services. Please take a few minutes to complete the written survey that you may receive in the mail after your visit with us. Thank you!             Your Updated Medication List - Protect others around you: Learn how to safely use, store and throw away your medicines at www.disposemymeds.org.          This list is accurate as of: 9/5/17 11:59 PM.  Always use your most recent med list.                   Brand Name Dispense Instructions for use Diagnosis    ALBUTEROL INHALER 17GM     1 Inhaler    Inhale 2 puffs into the lungs every 4 hours as needed This product no longer available    Acute bronchitis       calcium carb 1250 mg (500 mg [place])/vitamin D 200 units 500-200 MG-UNIT per tablet    OSCAL with D     Take 2 tablets by mouth daily with food.        clotrimazole 1 % cream    LOTRIMIN    60 g    Apply topically 2 times daily    Yeast infection of the skin       diltiazem 180 MG 24 hr capsule    DILACOR XR    30 capsule    Take 1 capsule (180 mg) by mouth daily    Atrial fibrillation with rapid ventricular response (H)       furosemide 40 MG tablet    LASIX    45 tablet    Take 0.5  tablets (20 mg) by mouth daily    Portal hypertension (H)       lisinopril 2.5 MG tablet    PRINIVIL/Zestril    90 tablet    Take 1 tablet (2.5 mg) by mouth daily We will no longer fill unless seen by cardiology    Portal hypertension (H)       metoprolol 100 MG 24 hr tablet    TOPROL XL    30 tablet    Take 1 tablet (100 mg) by mouth daily We will no longer fill unless seen by cardiology    Atrial fibrillation with rapid ventricular response (H)       mometasone-formoterol 200-5 MCG/ACT oral inhaler    DULERA    1 Inhaler    Inhale 2 puffs into the lungs 2 times daily as needed Appt DUE Jan 2017    Bronchospasm       * order for DME     2 Package    Juzo compression stockings, 30-40mm compression. Patient has portal hypertension and develops leg edema, which these control well.    Portal hypertension (H), Edema       * order for DME      Respironics RemStar 60 Series Auto AFlex 12-15 cm H2O with heated humidty and a modem.  Pt chose a QuVidappo Air FFM size medium.    BRUCE (obstructive sleep apnea)       * order for DME     1 Device    Equipment being ordered:  New CPAP mask, tube, filters,water tray    Sleep apnea       * order for DME     4 Package    Open toe size large 30/40 Mediven Assure Medical Compression socks Model 95969.    Portal hypertension (H), Hepatic cirrhosis (H), Edema       pantoprazole 40 MG EC tablet    PROTONIX    30 tablet    Take 1 tablet (40 mg) by mouth daily as needed We will no longer fill unless seen by cardiology    GERD (gastroesophageal reflux disease)       spironolactone 25 MG tablet    ALDACTONE    90 tablet    Take 1 tablet (25 mg) by mouth daily We will no longer fill unless seen by cardiology    Portal hypertension (H)       triamcinolone 0.1 % cream    KENALOG    80 g    Apply sparingly to affected area three times daily as needed    Eczema       VIAGRA 100 MG cap/tab   Generic drug:  sildenafil     12    ONE TABLET TAKEN AT LEAST 30 MINUTES BEFORE INTERCOURSE    Impotence of  organic origin       * Notice:  This list has 4 medication(s) that are the same as other medications prescribed for you. Read the directions carefully, and ask your doctor or other care provider to review them with you.

## 2017-09-06 DIAGNOSIS — K21.9 GERD (GASTROESOPHAGEAL REFLUX DISEASE): ICD-10-CM

## 2017-09-06 RX ORDER — PANTOPRAZOLE SODIUM 40 MG/1
40 TABLET, DELAYED RELEASE ORAL DAILY PRN
Qty: 90 TABLET | Refills: 0 | Status: SHIPPED | OUTPATIENT
Start: 2017-09-06 | End: 2017-12-04

## 2017-09-06 NOTE — PROGRESS NOTES
2017             Adrian Pineda MD   [facility]    [address] [place] [address]      RE: Maurice Jon   MRN: [id number]   : 1960      Dear Dr. Pineda:       Thank you for allowing me to participate in the care of this very delightful patient.  As you know, Yomi is a 56-year-old gentleman with a history of liver cirrhosis due to nonalcoholic steatohepatitis and has been followed closely by the Liver Clinic at the [facility].  Yomi does have a history of symptomatic chronic atrial fibrillation who felt much better after we restored sinus rhythm with DC cardioversion a few years ago.  I last saw him in March of last year.  At that time, he was back into atrial fibrillation with symptoms of feeling more tired and fatigued.  At that time, we discussed at length about options going forward with medical therapy versus an ablation for rhythm-control strategy.  Medical therapy could be somewhat limited because of his liver cirrhosis.  The only drug that is not liver metabolized is sotalol.  I was leaning toward a catheter-based ablation.  The patient at that time wanted more time to think about it, but I never heard from him until today.        The patient stated that he ran out of his metoprolol 2 days ago, but even before that, he just feels more tired easily and not like he was when in sinus rhythm.  Unfortunately, he has been in persistent atrial fibrillation, presumably since the last time I saw him, and the longer he is in it, the more difficult it would be to maintain sinus rhythm with a single ablation.  Additionally, the patient does complain of some short-term memory, and he wants to know whether it is from his beta blocker.  I do not think this is the case, but the fact that he has been off of it for 2 days is a good time to have him off it altogether and have him on a calcium channel blocker as an attempt at a rate-control strategy.   Because of his liver cirrhosis, even though he had mild coagulopathy, according to Dr. Roque, it would be fine to proceed with an ablation as well, although with the persistent nature of his atrial fibrillation, the patient may require 2 ablations to increase his success rate.  I will call the patient regarding the result of the Holter and assess how he feels at that time.        Sincerely,      MD CIRILO Hi MD             D: 2017 08:12   T: 2017 09:36   MT: toribio      Name:     MARILY NAVARRO   MRN:      [id number]        Account:      [id number]   :      1960           Service Date: 2017      Document: [id number]

## 2017-09-06 NOTE — TELEPHONE ENCOUNTER
Not originally ordered by cardiology but has been refilled by cardiology. Will give a 90 day supply with no refills and asked that patient contact PCP to refill in future.

## 2017-09-06 NOTE — PROGRESS NOTES
HPI and Plan:   See dictation  [id number]  Orders Placed This Encounter   Procedures     Holter Monitor 24 hour - Adult     Echocardiogram       Orders Placed This Encounter   Medications     lisinopril (PRINIVIL/ZESTRIL) 2.5 MG tablet     Sig: Take 1 tablet (2.5 mg) by mouth daily We will no longer fill unless seen by cardiology     Dispense:  90 tablet     Refill:  3     spironolactone (ALDACTONE) 25 MG tablet     Sig: Take 1 tablet (25 mg) by mouth daily We will no longer fill unless seen by cardiology     Dispense:  90 tablet     Refill:  3     diltiazem (DILACOR XR) 180 MG 24 hr capsule     Sig: Take 1 capsule (180 mg) by mouth daily     Dispense:  30 capsule     Refill:  3       Medications Discontinued During This Encounter   Medication Reason     lisinopril (PRINIVIL/ZESTRIL) 2.5 MG tablet Reorder     spironolactone (ALDACTONE) 25 MG tablet Reorder         Encounter Diagnoses   Name Primary?     Portal hypertension (H)      Atrial fibrillation with rapid ventricular response (H)      Edema        CURRENT MEDICATIONS:  Current Outpatient Prescriptions   Medication Sig Dispense Refill     lisinopril (PRINIVIL/ZESTRIL) 2.5 MG tablet Take 1 tablet (2.5 mg) by mouth daily We will no longer fill unless seen by cardiology 90 tablet 3     spironolactone (ALDACTONE) 25 MG tablet Take 1 tablet (25 mg) by mouth daily We will no longer fill unless seen by cardiology 90 tablet 3     diltiazem (DILACOR XR) 180 MG 24 hr capsule Take 1 capsule (180 mg) by mouth daily 30 capsule 3     metoprolol (TOPROL XL) 100 MG 24 hr tablet Take 1 tablet (100 mg) by mouth daily We will no longer fill unless seen by cardiology 30 tablet 0     pantoprazole (PROTONIX) 40 MG EC tablet Take 1 tablet (40 mg) by mouth daily as needed We will no longer fill unless seen by cardiology 30 tablet 0     furosemide (LASIX) 40 MG tablet Take 0.5 tablets (20 mg) by mouth daily 45 tablet 3     mometasone-formoterol (DULERA) 200-5 MCG/ACT oral inhaler Inhale 2  puffs into the lungs 2 times daily as needed Appt DUE Jan 2017 1 Inhaler 1     triamcinolone (KENALOG) 0.1 % cream Apply sparingly to affected area three times daily as needed (Patient taking differently: as needed Apply sparingly to affected area three times daily as needed) 80 g 0     clotrimazole (LOTRIMIN) 1 % cream Apply topically 2 times daily (Patient taking differently: Apply topically 2 times daily as needed ) 60 g 1     ALBUTEROL INHALER 17GM Inhale 2 puffs into the lungs every 4 hours as needed This product no longer available 1 Inhaler 6     calcium carb 1250 mg, 500 mg [place],/vitamin D 200 units (OSCAL WITH D) 500-200 MG-UNIT per tablet Take 2 tablets by mouth daily with food.       VIAGRA 100 MG OR TABS ONE TABLET TAKEN AT LEAST 30 MINUTES BEFORE INTERCOURSE 12 11     order for DME Equipment being ordered:   New CPAP mask, tube, filters,water tray (Patient not taking: Reported on 9/5/2017) 1 Device 3     order for DME Open toe size large 30/40 Mediven Assure Medical Compression socks Model 41818. (Patient not taking: Reported on 9/5/2017) 4 Package 3     ORDER FOR DME Respironics RemStar 60 Series Auto AFlex 12-15 cm H2O with heated humidty and a modem.  Pt chose a Quattro Air FFM size medium. (Patient not taking: Reported on 9/5/2017)       ORDER FOR DME Juzo compression stockings, 30-40mm compression. Patient has portal hypertension and develops leg edema, which these control well. (Patient not taking: Reported on 9/5/2017) 2 Package 3       ALLERGIES     Allergies   Allergen Reactions     Avelox Other (See Comments) and GI Disturbance     portal hypertension       PAST MEDICAL HISTORY:  Past Medical History:   Diagnosis Date     Acute pulmonary edema (H)      Atrial fibrillation (H)      Atrial fibrillation with rapid ventricular response (H) 5/13/2013     Calculus of ureter 1993, 1997    history of     Cirrhosis of liver without mention of alcohol 8/22/2005    Cirrhosis, idiopathic     Edema  5/13/2013     Esophag varices w/ bleed      Gallstones      Genital herpes, unspecified 3/20/1999    resolving herpes progenitalis     GERD (gastroesophageal reflux disease)      Hematuria      Hypertension      Hypochondriasis     problems in past     Obesity 11/24/2010     BRUCE (obstructive sleep apnea) 3/17/2009    Mild AHI 8.4  RDI 31     Portal hypertension (H) 1/28/2010     Unspecified thrombocytopenia 8/22/2005    Thrombocytopenia associated with cirrhosis and portal hypertension       PAST SURGICAL HISTORY:  Past Surgical History:   Procedure Laterality Date     ANESTHESIA CARDIOVERSION N/A 1/19/2015    Procedure: ANESTHESIA CARDIOVERSION;  Surgeon: Generic Anesthesia Provider;  Location: [facility]     COLONOSCOPY N/A 8/14/2017    Procedure: COLONOSCOPY;  Colonoscopy Called will arrive appx 12:30 Per phone call;  Surgeon: Vincent Whittington MD;  Location:  GI     ESOPHAGOSCOPY, GASTROSCOPY, DUODENOSCOPY (EGD), COMBINED  7/11/2011    Procedure:COMBINED ESOPHAGOSCOPY, GASTROSCOPY, DUODENOSCOPY (EGD); Surgeon:LAURA LAMAS; Location:[facility] GI     ESOPHAGOSCOPY, GASTROSCOPY, DUODENOSCOPY (EGD), COMBINED  9/10/2012    Procedure: COMBINED ESOPHAGOSCOPY, GASTROSCOPY, DUODENOSCOPY (EGD);;  Surgeon: Hi Roque MD;  Location:  GI     ESOPHAGOSCOPY, GASTROSCOPY, DUODENOSCOPY (EGD), COMBINED  9/9/2013    Procedure: COMBINED ESOPHAGOSCOPY, GASTROSCOPY, DUODENOSCOPY (EGD);;  Surgeon: Hi Roque MD;  Location:  GI     ESOPHAGOSCOPY, GASTROSCOPY, DUODENOSCOPY (EGD), COMBINED N/A 9/15/2014    Procedure: COMBINED ESOPHAGOSCOPY, GASTROSCOPY, DUODENOSCOPY (EGD);  Surgeon: Hi Roque MD;  Location:  GI     ESOPHAGOSCOPY, GASTROSCOPY, DUODENOSCOPY (EGD), COMBINED N/A 10/30/2015    Procedure: COMBINED ESOPHAGOSCOPY, GASTROSCOPY, DUODENOSCOPY (EGD);  Surgeon: Feliberto Fox MD;  Location:  GI     ESOPHAGOSCOPY, GASTROSCOPY, DUODENOSCOPY (EGD), COMBINED N/A 10/10/2016    Procedure: COMBINED ESOPHAGOSCOPY,  GASTROSCOPY, DUODENOSCOPY (EGD);  Surgeon: Jose Nesbitt MD;  Location: [facility] GI     HERNIA REPAIR       IRRIGATION AND DEBRIDEMENT ABSCESS SCROTUM, COMBINED  10/4/2012    Procedure: COMBINED IRRIGATION AND DEBRIDEMENT ABSCESS SCROTUM;  Irrigation and Debridement of Groin Abscess;  Surgeon: Benny Shoemaker MD;  Location: [facility] OR     SOFT TISSUE SURGERY       SURGICAL HISTORY OF -   1993    rt elbow     SURGICAL HISTORY OF -   1/15/98    repair of ventral and umbilical hernia     SURGICAL HISTORY OF -   2001    endoscopy       FAMILY HISTORY:  Family History   Problem Relation Age of Onset     Lipids Mother      Hypertension Mother      Eye Disorder Mother      HEART DISEASE Father      CHF     Lipids Father      Obesity Father      HEART DISEASE Brother      MI     Eye Disorder Brother      CANCER Other      maternal grandparent/throat cancer       SOCIAL HISTORY:  Social History     Social History     Marital status:      Spouse name: N/A     Number of children: N/A     Years of education: N/A     Social History Main Topics     Smoking status: Former Smoker     Packs/day: 0.80     Years: 6.00     Types: Cigarettes     Quit date: 1/1/2002     Smokeless tobacco: Never Used     Alcohol use No      Comment: quit in 2007     Drug use: No     Sexual activity: Yes     Partners: Female     Other Topics Concern     Parent/Sibling W/ Cabg, Mi Or Angioplasty Before 65f 55m? Yes     BROTHER   - MI AT AGE 44     Exercise No     Social History Narrative       Review of Systems:  Skin:  Negative       Eyes:  Negative      ENT:  Positive for nasal congestion    Respiratory:  Positive for shortness of breath;sleep apnea;CPAP     Cardiovascular:    Positive for;chest pain;fatigue    Gastroenterology: Positive for excessive gas or bloating;heartburn    Genitourinary:  Positive for urinary frequency    Musculoskeletal:  Positive for joint pain;joint stiffness;nocturnal cramping    Neurologic:  Positive for numbness  or tingling of hands;numbness or tingling of feet    Psychiatric:  Negative      Heme/Lymph/Imm:  Positive for   thrombocytopenia  Endocrine:  Negative        Physical Exam:  Vitals: /65 (BP Location: Right arm, Patient Position: Sitting, Cuff Size: Adult Regular)  Pulse 100  Wt 109.8 kg (242 lb)  SpO2 96%  BMI 39.66 kg/m2    Constitutional:  cooperative, alert and oriented, well developed, well nourished, in no acute distress        Skin:  warm and dry to the touch, no apparent skin lesions or masses noted        Head:  normocephalic, no masses or lesions        Eyes:  pupils equal and round, conjunctivae and lids unremarkable, sclera white, no xanthalasma, EOMS intact, no nystagmus        ENT:  no pallor or cyanosis, dentition good        Neck:  carotid pulses are full and equal bilaterally, JVP normal, no carotid bruit, no thyromegaly        Chest:  normal breath sounds, clear to auscultation, normal A-P diameter, normal symmetry, normal respiratory excursion, no use of accessory muscles          Cardiac:   irregularly irregular rhythm   no presence of murmur            Abdomen:           Vascular: pulses full and equal, no bruits auscultated                                        Extremities and Back:  no deformities, clubbing, cyanosis, erythema observed              Neurological:  affect appropriate, oriented to time, person and place              CC  No referring provider defined for this encounter.

## 2017-09-19 ENCOUNTER — HOSPITAL ENCOUNTER (OUTPATIENT)
Dept: CARDIOLOGY | Facility: CLINIC | Age: 57
End: 2017-09-19
Attending: INTERNAL MEDICINE
Payer: COMMERCIAL

## 2017-09-19 ENCOUNTER — HOSPITAL ENCOUNTER (OUTPATIENT)
Dept: CARDIOLOGY | Facility: CLINIC | Age: 57
Discharge: HOME | End: 2017-09-19
Attending: INTERNAL MEDICINE | Admitting: INTERNAL MEDICINE
Payer: COMMERCIAL

## 2017-09-19 DIAGNOSIS — I48.91 ATRIAL FIBRILLATION WITH RAPID VENTRICULAR RESPONSE (H): ICD-10-CM

## 2017-09-19 PROCEDURE — 93227 XTRNL ECG REC<48 HR R&I: CPT | Performed by: INTERNAL MEDICINE

## 2017-09-19 PROCEDURE — 93306 TTE W/DOPPLER COMPLETE: CPT | Mod: 26 | Performed by: INTERNAL MEDICINE

## 2017-09-19 PROCEDURE — 93225 XTRNL ECG REC<48 HRS REC: CPT

## 2017-09-19 PROCEDURE — 93306 TTE W/DOPPLER COMPLETE: CPT

## 2017-10-09 ENCOUNTER — TELEPHONE (OUTPATIENT)
Dept: CARDIOLOGY | Facility: CLINIC | Age: 57
End: 2017-10-09

## 2017-10-09 DIAGNOSIS — I48.91 ATRIAL FIBRILLATION WITH RAPID VENTRICULAR RESPONSE (H): Primary | ICD-10-CM

## 2017-10-09 RX ORDER — METOPROLOL SUCCINATE 100 MG/1
100 TABLET, EXTENDED RELEASE ORAL DAILY
Qty: 90 TABLET | Refills: 3 | Status: SHIPPED | OUTPATIENT
Start: 2017-10-09 | End: 2017-10-09

## 2017-10-09 RX ORDER — METOPROLOL SUCCINATE 100 MG/1
100 TABLET, EXTENDED RELEASE ORAL DAILY
Qty: 90 TABLET | Refills: 3 | Status: SHIPPED | OUTPATIENT
Start: 2017-10-09 | End: 2017-10-25

## 2017-10-09 NOTE — TELEPHONE ENCOUNTER
"Pt called on Friday, 10/6/17. States he was changed from Metoprolol to Diltiazem 180 mg QD 2 weeks ago at his revisit with you on 9/5/17. He states, It's just not sitting right with me\" When I asked him to explain, he states he has been having HA and upset stomach since. Wonders if he can change back to Metoprolol? Not interested in revisit at this time/had to hurry off the phone as he was at work/bad connection. Did not call back. Salena Hernandes RN Cardiology at [facility] October 9, 2017, 9:43 AM  "

## 2017-10-09 NOTE — TELEPHONE ENCOUNTER
We can have him go back on Metoprolol at the same previous dose.  ADDENDUM: Noted above. Message left for patient to call back. Salena Hernandes RN Cardiology at [facility] October 9, 2017, 1:57 PM  ADDENDUM: Discussed with patient, med change made, 24 hour holter ordered. Salena Hernandes RN Cardiology at [facility] October 9, 2017, 3:26 PM

## 2017-10-09 NOTE — TELEPHONE ENCOUNTER
And repeat holter 24 h after a week. Thanks  ADDENDUM: Noted above. Message left for patient to call back. Salena Hernandes RN Cardiology at [facility] October 9, 2017, 1:57 PM  ADDENDUM: Discussed with patient, med change made, 24 hour holter ordered. Salena Hernandes RN Cardiology at [facility] October 9, 2017, 3:26 PM

## 2017-10-18 ENCOUNTER — HOSPITAL ENCOUNTER (OUTPATIENT)
Dept: CARDIOLOGY | Facility: CLINIC | Age: 57
Discharge: HOME | End: 2017-10-18
Attending: INTERNAL MEDICINE | Admitting: INTERNAL MEDICINE
Payer: COMMERCIAL

## 2017-10-18 DIAGNOSIS — I48.91 ATRIAL FIBRILLATION WITH RAPID VENTRICULAR RESPONSE (H): ICD-10-CM

## 2017-10-18 PROCEDURE — 93225 XTRNL ECG REC<48 HRS REC: CPT | Performed by: INTERNAL MEDICINE

## 2017-10-18 PROCEDURE — 93227 XTRNL ECG REC<48 HR R&I: CPT | Performed by: INTERNAL MEDICINE

## 2017-10-25 DIAGNOSIS — I48.91 ATRIAL FIBRILLATION WITH RAPID VENTRICULAR RESPONSE (H): ICD-10-CM

## 2017-10-25 RX ORDER — METOPROLOL SUCCINATE 100 MG/1
100 TABLET, EXTENDED RELEASE ORAL EVERY EVENING
Qty: 90 TABLET | Refills: 3 | Status: ON HOLD | OUTPATIENT
Start: 2017-10-25 | End: 2018-11-02

## 2017-10-25 RX ORDER — METOPROLOL SUCCINATE 25 MG/1
25 TABLET, EXTENDED RELEASE ORAL EVERY MORNING
Qty: 90 TABLET | Refills: 3 | Status: ON HOLD | OUTPATIENT
Start: 2017-10-25 | End: 2018-11-02

## 2017-11-24 ENCOUNTER — E-VISIT (OUTPATIENT)
Dept: FAMILY MEDICINE | Facility: CLINIC | Age: 57
End: 2017-11-24
Payer: COMMERCIAL

## 2017-11-24 ENCOUNTER — TELEPHONE (OUTPATIENT)
Dept: FAMILY MEDICINE | Facility: CLINIC | Age: 57
End: 2017-11-24

## 2017-11-24 DIAGNOSIS — J20.9 ACUTE BRONCHITIS, UNSPECIFIED ORGANISM: Primary | ICD-10-CM

## 2017-11-24 DIAGNOSIS — J98.01 BRONCHOSPASM: ICD-10-CM

## 2017-11-24 PROCEDURE — 99207 ZZC NO BILLABLE SERVICE THIS VISIT: CPT | Performed by: FAMILY MEDICINE

## 2017-11-24 RX ORDER — AZITHROMYCIN 250 MG/1
TABLET, FILM COATED ORAL
Qty: 6 TABLET | Refills: 0 | Status: SHIPPED | OUTPATIENT
Start: 2017-11-24 | End: 2018-04-05

## 2017-11-24 NOTE — TELEPHONE ENCOUNTER
Patient is called and told of the need for an appt for his antibiotics.  We do not have any openings in CL today and I have gone over the different types of visits and UC.  Patient would like to try franchesca. Jessica GAVIRIA RN

## 2017-11-24 NOTE — TELEPHONE ENCOUNTER
Reason for call:  Patient reporting a symptom    Symptom or request: Pt as had cough and chest congestion X 2-3 days.  No fever.  Pt states that OTC meds are ineffective.  Pt would like an Rx for a Dulera inhaler and Zithromax sent to [facility]    Duration (how long have symptoms been present): 2-3 days    Have you been treated for this before? Yes    Additional comments:     Phone Number patient can be reached at:  Cell number on file:    Telephone Information:   Mobile [phone]       Best Time:  any    Can we leave a detailed message on this number:  YES    Call taken on 11/24/2017 at 12:38 PM by Donya Langley

## 2017-12-04 DIAGNOSIS — K21.9 GASTROESOPHAGEAL REFLUX DISEASE WITHOUT ESOPHAGITIS: Primary | ICD-10-CM

## 2017-12-04 NOTE — TELEPHONE ENCOUNTER
Pantoprazole      Last Written Prescription Date: 09/06/2017  Last Fill Quantity: 90,  # refills: 0   Last Office Visit with G, [facility] or [facility] prescribing provider: 03/17/2017      Timo PROCTOR)

## 2017-12-08 RX ORDER — PANTOPRAZOLE SODIUM 40 MG/1
40 TABLET, DELAYED RELEASE ORAL DAILY PRN
Qty: 90 TABLET | Refills: 0 | Status: SHIPPED | OUTPATIENT
Start: 2017-12-08 | End: 2018-03-06

## 2017-12-08 NOTE — TELEPHONE ENCOUNTER
Routing refill request to provider for review/approval because:  Last signed by another provider.    Kathrin Zhu RN

## 2018-03-06 DIAGNOSIS — K21.9 GASTROESOPHAGEAL REFLUX DISEASE WITHOUT ESOPHAGITIS: ICD-10-CM

## 2018-03-06 RX ORDER — PANTOPRAZOLE SODIUM 40 MG/1
40 TABLET, DELAYED RELEASE ORAL DAILY PRN
Qty: 30 TABLET | Refills: 0 | Status: SHIPPED | OUTPATIENT
Start: 2018-03-06 | End: 2018-04-13

## 2018-03-06 NOTE — TELEPHONE ENCOUNTER
"Requested Prescriptions   Pending Prescriptions Disp Refills     pantoprazole (PROTONIX) 40 MG EC tablet  Last Written Prescription Date:  12/08/2017  Last Fill Quantity: 90,  # refills: 0   Last office visit: 3/17/2017 with prescribing provider:  Kai   Future Office Visit:     90 tablet 0     Sig: Take 1 tablet (40 mg) by mouth daily as needed Needs clinic visit for additional refills.    PPI Protocol Passed    3/6/2018 10:05 AM       Passed - Not on Clopidogrel (unless Pantoprazole ordered)       Passed - No diagnosis of osteoporosis on record       Passed - Recent (12 mo) or future (30 days) visit within the authorizing provider's specialty    Patient had office visit in the last year or has a visit in the next 30 days with authorizing provider.  See \"Patient Info\" tab in inbasket, or \"Choose Columns\" in Meds & Orders section of the refill encounter.            Passed - Patient is age 18 or older        Timo PERSON (R)    "

## 2018-04-05 ENCOUNTER — RADIANT APPOINTMENT (OUTPATIENT)
Dept: ULTRASOUND IMAGING | Facility: CLINIC | Age: 58
End: 2018-04-05
Attending: INTERNAL MEDICINE
Payer: COMMERCIAL

## 2018-04-05 ENCOUNTER — OFFICE VISIT (OUTPATIENT)
Dept: GASTROENTEROLOGY | Facility: CLINIC | Age: 58
End: 2018-04-05
Attending: INTERNAL MEDICINE
Payer: COMMERCIAL

## 2018-04-05 VITALS
SYSTOLIC BLOOD PRESSURE: 99 MMHG | OXYGEN SATURATION: 97 % | WEIGHT: 236.6 LBS | RESPIRATION RATE: 18 BRPM | DIASTOLIC BLOOD PRESSURE: 63 MMHG | HEIGHT: 66 IN | TEMPERATURE: 98.1 F | BODY MASS INDEX: 38.02 KG/M2 | HEART RATE: 74 BPM

## 2018-04-05 DIAGNOSIS — K74.60 CIRRHOSIS OF LIVER WITHOUT ASCITES, UNSPECIFIED HEPATIC CIRRHOSIS TYPE (H): ICD-10-CM

## 2018-04-05 DIAGNOSIS — K74.60 CIRRHOSIS OF LIVER WITHOUT ASCITES, UNSPECIFIED HEPATIC CIRRHOSIS TYPE (H): Primary | ICD-10-CM

## 2018-04-05 LAB
AFP SERPL-MCNC: 3.7 UG/L (ref 0–8)
ALBUMIN SERPL-MCNC: 3.3 G/DL (ref 3.4–5)
ALP SERPL-CCNC: 71 U/L (ref 40–150)
ALT SERPL W P-5'-P-CCNC: 50 U/L (ref 0–70)
ANION GAP SERPL CALCULATED.3IONS-SCNC: 6 MMOL/L (ref 3–14)
AST SERPL W P-5'-P-CCNC: 67 U/L (ref 0–45)
BILIRUB DIRECT SERPL-MCNC: 0.4 MG/DL (ref 0–0.2)
BILIRUB SERPL-MCNC: 1.6 MG/DL (ref 0.2–1.3)
BUN SERPL-MCNC: 16 MG/DL (ref 7–30)
CALCIUM SERPL-MCNC: 8.8 MG/DL (ref 8.5–10.1)
CHLORIDE SERPL-SCNC: 110 MMOL/L (ref 94–109)
CO2 SERPL-SCNC: 25 MMOL/L (ref 20–32)
CREAT SERPL-MCNC: 0.63 MG/DL (ref 0.66–1.25)
ERYTHROCYTE [DISTWIDTH] IN BLOOD BY AUTOMATED COUNT: 13.6 % (ref 10–15)
GFR SERPL CREATININE-BSD FRML MDRD: >90 ML/MIN/1.7M2
GLUCOSE SERPL-MCNC: 100 MG/DL (ref 70–99)
HCT VFR BLD AUTO: 42.8 % (ref 40–53)
HGB BLD-MCNC: 14.7 G/DL (ref 13.3–17.7)
INR PPP: 1.43 (ref 0.86–1.14)
MCH RBC QN AUTO: 35 PG (ref 26.5–33)
MCHC RBC AUTO-ENTMCNC: 34.3 G/DL (ref 31.5–36.5)
MCV RBC AUTO: 102 FL (ref 78–100)
PLATELET # BLD AUTO: 64 10E9/L (ref 150–450)
POTASSIUM SERPL-SCNC: 3.6 MMOL/L (ref 3.4–5.3)
PROT SERPL-MCNC: 6 G/DL (ref 6.8–8.8)
RBC # BLD AUTO: 4.2 10E12/L (ref 4.4–5.9)
SODIUM SERPL-SCNC: 141 MMOL/L (ref 133–144)
WBC # BLD AUTO: 3.3 10E9/L (ref 4–11)

## 2018-04-05 PROCEDURE — G0463 HOSPITAL OUTPT CLINIC VISIT: HCPCS | Mod: ZF

## 2018-04-05 PROCEDURE — 82105 ALPHA-FETOPROTEIN SERUM: CPT | Performed by: INTERNAL MEDICINE

## 2018-04-05 PROCEDURE — 80076 HEPATIC FUNCTION PANEL: CPT | Performed by: INTERNAL MEDICINE

## 2018-04-05 PROCEDURE — 36415 COLL VENOUS BLD VENIPUNCTURE: CPT | Performed by: INTERNAL MEDICINE

## 2018-04-05 PROCEDURE — 80048 BASIC METABOLIC PNL TOTAL CA: CPT | Performed by: INTERNAL MEDICINE

## 2018-04-05 PROCEDURE — 85027 COMPLETE CBC AUTOMATED: CPT | Performed by: INTERNAL MEDICINE

## 2018-04-05 PROCEDURE — 85610 PROTHROMBIN TIME: CPT | Performed by: INTERNAL MEDICINE

## 2018-04-05 ASSESSMENT — PAIN SCALES - GENERAL: PAINLEVEL: NO PAIN (0)

## 2018-04-05 NOTE — MR AVS SNAPSHOT
After Visit Summary   4/5/2018    Maurice Jon    MRN: [id number]           Patient Information     Date Of Birth          1960        Visit Information        Provider Department      4/5/2018 10:30 AM Hi Roque MD [facility]        Today's Diagnoses     Cirrhosis of liver without ascites, unspecified hepatic cirrhosis type (H)    -  1       Follow-ups after your visit        Follow-up notes from your care team     Return in about 6 months (around 10/5/2018).      Your next 10 appointments already scheduled     Oct 04, 2018  7:15 AM CDT   Lab with  LAB   [facility] ([facility])    [address] [place] [address] [phone]            Oct 04, 2018  7:45 AM CDT   US ABDOMEN COMPLETE with [address] [place] [address] Imaging Center US ([facility])    [address] [place] [address] [phone]           Please bring a list of your medicines (including vitamins, minerals and over-the-counter drugs). Also, tell your doctor about any allergies you may have. Wear comfortable clothes and leave your valuables at home.  Adults: No eating or drinking for 8 hours before the exam. You may take medicine with a small sip of water.  Children: - Children 6+ years: No food or drink for 6 hours before exam. - Children 1-5 years: No food or drink for 4 hours before exam. - Infants, breast-fed: may have breast milk up to 2 hours before exam. - Infants, formula: may have bottle until 4 hours before exam.  Please call the Imaging Department at your exam site with any questions.            Oct 04, 2018  8:45 AM CDT   (Arrive by 8:30 AM)   Return General Liver with Hi Roque MD   [facility] ([facility])    [address] [place] [address]  [address]  [facility] [address] [phone]              Future tests that were ordered for you today     Open Standing Orders      "   Priority Remaining Interval Expires Ordered    US abdomen complete [NCY964] Routine 2/2 Every 6 Months 4/5/2019 4/5/2018          Open Future Orders        Priority Expected Expires Ordered    Hepatic Panel [LAB20] Routine 10/2/2018 4/5/2019 4/5/2018    Basic metabolic panel [LAB15] Routine 10/2/2018 4/5/2019 4/5/2018    CBC with platelets [[id number]] Routine 10/2/2018 4/5/2019 4/5/2018    INR [FPP8526] Routine 10/2/2018 4/5/2019 4/5/2018    AFP tumor marker [[id number]] Routine 10/2/2018 4/5/2019 4/5/2018            Who to contact     If you have questions or need follow up information about today's clinic visit or your schedule please contact [facility] HEPAT[facility] directly at [phone].  Normal or non-critical lab and imaging results will be communicated to you by Vivinohart, letter or phone within 4 business days after the clinic has received the results. If you do not hear from us within 7 days, please contact the clinic through ViSSeet or phone. If you have a critical or abnormal lab result, we will notify you by phone as soon as possible.  Submit refill requests through [employer] or call your pharmacy and they will forward the refill request to us. Please allow 3 business days for your refill to be completed.          Additional Information About Your Visit        MyChart Information     [employer] gives you secure access to your electronic health record. If you see a primary care provider, you can also send messages to your care team and make appointments. If you have questions, please call your primary care clinic.  If you do not have a primary care provider, please call [phone] and they will assist you.        Care EveryWhere ID     This is your Care EveryWhere ID. This could be used by other organizations to access your [facility] medical records  [id number]        Your Vitals Were     Pulse Temperature Respirations Height Pulse Oximetry BMI (Body Mass Index)    74 98.1  F (36.7  C) (Oral) 18 1.664 m (5' 5.5\") " 97% 38.77 kg/m2       Blood Pressure from Last 3 Encounters:   04/05/18 99/63   09/05/17 112/65   08/29/17 114/74    Weight from Last 3 Encounters:   04/05/18 107.3 kg (236 lb 9.6 oz)   09/05/17 109.8 kg (242 lb)   08/29/17 108.4 kg (239 lb)              We Performed the Following     Schedule follow up appointments          Today's Medication Changes          These changes are accurate as of 4/5/18 10:47 AM.  If you have any questions, ask your nurse or doctor.               These medicines have changed or have updated prescriptions.        Dose/Directions    clotrimazole 1 % cream   Commonly known as:  LOTRIMIN   This may have changed:    - when to take this  - reasons to take this   Used for:  Yeast infection of the skin        Apply topically 2 times daily   Quantity:  60 g   Refills:  1       triamcinolone 0.1 % cream   Commonly known as:  KENALOG   This may have changed:    - when to take this  - reasons to take this  - additional instructions   Used for:  Eczema        Apply sparingly to affected area three times daily as needed   Quantity:  80 g   Refills:  0                Primary Care Provider Office Phone # Fax #    Adrian Braulio Pineda [phone] [address] [address]        Equal Access to Services     SUSAN ONEIL AH: Venita carlsono Soginnyali, waaxda luqadaha, qaybta kaalmada adeegyada, rakan espinosa. So [facility] [phone].    ATENCIÓN: Si habla español, tiene a allen disposición servicios gratuitos de asistencia lingüística. Llame al [phone].    We comply with applicable federal civil rights laws and [place] laws. We do not discriminate on the basis of race, color, national origin, age, disability, sex, sexual orientation, or gender identity.            Thank you!     Thank you for choosing [facility]PAT[facility]  for your care. Our goal is always to provide you with excellent care. Hearing back from our patients is one way we  can continue to improve our services. Please take a few minutes to complete the written survey that you may receive in the mail after your visit with us. Thank you!             Your Updated Medication List - Protect others around you: Learn how to safely use, store and throw away your medicines at www.disposemymeds.org.          This list is accurate as of 4/5/18 10:47 AM.  Always use your most recent med list.                   Brand Name Dispense Instructions for use Diagnosis    ALBUTEROL INHALER 17GM     1 Inhaler    Inhale 2 puffs into the lungs every 4 hours as needed This product no longer available    Acute bronchitis       Calcium carb-Vitamin D 500 mg [place]-200 units 500-200 MG-UNIT per tablet    OSCAL with D;Oyster Shell Calcium     Take 2 tablets by mouth daily with food.        clotrimazole 1 % cream    LOTRIMIN    60 g    Apply topically 2 times daily    Yeast infection of the skin       furosemide 40 MG tablet    LASIX    45 tablet    Take 0.5 tablets (20 mg) by mouth daily    Portal hypertension (H)       * metoprolol succinate 100 MG 24 hr tablet    TOPROL XL    90 tablet    Take 1 tablet (100 mg) by mouth every evening in addition to your 25 mg dose every morning.    Atrial fibrillation with rapid ventricular response (H)       * metoprolol succinate 25 MG 24 hr tablet    TOPROL-XL    90 tablet    Take 1 tablet (25 mg) by mouth every morning in addition to your 100 mg dose every evening.    Atrial fibrillation with rapid ventricular response (H)       mometasone-formoterol 200-5 MCG/ACT oral inhaler    DULERA    1 Inhaler    Inhale 2 puffs into the lungs 2 times daily as needed Appt DUE Jan 2017    Bronchospasm       * order for DME     2 Package    Juzo compression stockings, 30-40mm compression. Patient has portal hypertension and develops leg edema, which these control well.    Portal hypertension (H), Edema       * order for DME      Respironics RemStar 60 Series Auto AFlex 12-15 cm H2O with  heated humidty and a modem.  Pt chose a Quattro Air FFM size medium.    BRUCE (obstructive sleep apnea)       order for DME     1 Device    Equipment being ordered:  New CPAP mask, tube, filters,water tray    Sleep apnea       order for DME     4 Package    Open toe size large 30/40 Mediven Assure Medical Compression socks Model 03171.    Portal hypertension (H), Hepatic cirrhosis (H), Edema       pantoprazole 40 MG EC tablet    PROTONIX    30 tablet    Take 1 tablet (40 mg) by mouth daily as needed (Needs follow-up appointment for this medication)    Gastroesophageal reflux disease without esophagitis       spironolactone 25 MG tablet    ALDACTONE    90 tablet    Take 1 tablet (25 mg) by mouth daily We will no longer fill unless seen by cardiology    Portal hypertension (H)       triamcinolone 0.1 % cream    KENALOG    80 g    Apply sparingly to affected area three times daily as needed    Eczema       VIAGRA 100 MG tablet   Generic drug:  sildenafil     12    ONE TABLET TAKEN AT LEAST 30 MINUTES BEFORE INTERCOURSE    Impotence of organic origin       * Notice:  This list has 4 medication(s) that are the same as other medications prescribed for you. Read the directions carefully, and ask your doctor or other care provider to review them with you.

## 2018-04-05 NOTE — NURSING NOTE
"Chief Complaint   Patient presents with     RECHECK     Cirrhosis       Initial BP 99/63 (BP Location: Right arm, Patient Position: Sitting, Cuff Size: Adult Large)  Pulse 74  Temp 98.1  F (36.7  C) (Oral)  Resp 18  Ht 1.664 m (5' 5.5\")  Wt 107.3 kg (236 lb 9.6 oz)  SpO2 97%  BMI 38.77 kg/m2 Estimated body mass index is 38.77 kg/(m^2) as calculated from the following:    Height as of this encounter: 1.664 m (5' 5.5\").    Weight as of this encounter: 107.3 kg (236 lb 9.6 oz).  Medication Reconciliation: complete     Jimena Carrizales [facility]  4/5/2018 10:17 AM          "

## 2018-04-05 NOTE — LETTER
4/5/2018      RE: Maurice Jon  [address]  [facility] [address]       I had the pleasure of seeing Maurice Jon for followup in the Liver Clinic at the [facility] on 04/05/2018.  Mr. 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        For the most part, he is unchanged.  He seems like he is most troubled by his recurrent atrial fibrillation which he believes has taken away some energy and made it less likely that he is to exercise, and this is important because he certainly needs to lose weight.      He otherwise denies any abdominal pain, itching or skin rash.  He has mild fatigue.  He continues to work full-time.  He denies any increased abdominal girth or lower extremity edema.  He has not had any gastrointestinal bleeding or any overt signs of hepatic encephalopathy.  He denies any fevers or chills, cough or shortness of breath.  He denies any nausea or vomiting, diarrhea or constipation.  His appetite has been good, and his weight has been stable.  As I mentioned above, he does need to lose some weight.     Current Outpatient Prescriptions   Medication     pantoprazole (PROTONIX) 40 MG EC tablet     metoprolol (TOPROL XL) 100 MG 24 hr tablet     metoprolol (TOPROL-XL) 25 MG 24 hr tablet     spironolactone (ALDACTONE) 25 MG tablet     furosemide (LASIX) 40 MG tablet     mometasone-formoterol (DULERA) 200-5 MCG/ACT oral inhaler     order for DME     triamcinolone (KENALOG) 0.1 % cream     clotrimazole (LOTRIMIN) 1 % cream     ALBUTEROL INHALER 17GM     ORDER FOR DME     calcium carb 1250 mg, 500 mg [place],/vitamin D 200 units (OSCAL WITH D) 500-200 MG-UNIT per tablet     VIAGRA 100 MG OR TABS     [DISCONTINUED] mometasone-formoterol (DULERA) 200-5 MCG/ACT oral inhaler     order for DME     ORDER FOR DME     No current facility-administered medications for this visit.      B/P: 99/63, T: 98.1, P: 74, R: 18    HEENT exam  shows no scleral icterus and no temporal muscle wasting.  His chest is clear.  His abdominal exam shows him to be obese.  No masses or tenderness to palpation are present.  His liver is 10-11 cm in span with a slightly prominent left lobe.  No spleen tip is palpable, and extremity exam shows no edema.  Skin exam shows no stigmata of chronic liver disease, and neurologic exam shows no asterixis.     Recent Results (from the past 168 hour(s))   Hepatic Panel [LAB20]    Collection Time: 04/05/18  7:34 AM   Result Value Ref Range    Bilirubin Direct 0.4 (H) 0.0 - 0.2 mg/dL    Bilirubin Total 1.6 (H) 0.2 - 1.3 mg/dL    Albumin 3.3 (L) 3.4 - 5.0 g/dL    Protein Total 6.0 (L) 6.8 - 8.8 g/dL    Alkaline Phosphatase 71 40 - 150 U/L    ALT 50 0 - 70 U/L    AST 67 (H) 0 - 45 U/L   Basic metabolic panel [LAB15]    Collection Time: 04/05/18  7:34 AM   Result Value Ref Range    Sodium 141 133 - 144 mmol/L    Potassium 3.6 3.4 - 5.3 mmol/L    Chloride 110 (H) 94 - 109 mmol/L    Carbon Dioxide 25 20 - 32 mmol/L    Anion Gap 6 3 - 14 mmol/L    Glucose 100 (H) 70 - 99 mg/dL    Urea Nitrogen 16 7 - 30 mg/dL    Creatinine 0.63 (L) 0.66 - 1.25 mg/dL    GFR Estimate >90 >60 mL/min/1.7m2    GFR Estimate If Black >90 >60 mL/min/1.7m2    Calcium 8.8 8.5 - 10.1 mg/dL   CBC with platelets [[id number]]    Collection Time: 04/05/18  7:34 AM   Result Value Ref Range    WBC 3.3 (L) 4.0 - 11.0 10e9/L    RBC Count 4.20 (L) 4.4 - 5.9 10e12/L    Hemoglobin 14.7 13.3 - 17.7 g/dL    Hematocrit 42.8 40.0 - 53.0 %     (H) 78 - 100 fl    MCH 35.0 (H) 26.5 - 33.0 pg    MCHC 34.3 31.5 - 36.5 g/dL    RDW 13.6 10.0 - 15.0 %    Platelet Count 64 (L) 150 - 450 10e9/L   INR [[id number]]    Collection Time: 04/05/18  7:34 AM   Result Value Ref Range    INR 1.43 (H) 0.86 - 1.14   AFP tumor marker [[id number]]    Collection Time: 04/05/18  7:34 AM   Result Value Ref Range    Alpha Fetoprotein 3.7 0 - 8 ug/L      I did review his ultrasound which shows some kidney  stones, but no abnormalities within his liver other than the fact that there is a cirrhotic appearance.  There is no fluid in his abdomen.      My impression is that Mr. Jon is doing well at this point in time.  He is up-to-date with regard to colorectal cancer screening, variceal screening and vaccinations.  He will be due for a repeat upper GI endoscopy in October.  He also has very minimal osteopenia, and I do not plan on changing repeating his bone density for another 2 years.      We did discuss the possibility of doing of ablation for his atrial fibrillation.  I certainly see no contraindication of doing that.  His liver would not preclude that procedure from moving forward.        Thank you very much for allowing me to participate in the care of this patient.  If you have any questions regarding my recommendations, please do not hesitate to contact me.       Hi Roqeu MD      Professor of Medicine  [employer]      Executive Medical Director, Solid Organ Transplant Program  [facility]

## 2018-04-05 NOTE — PROGRESS NOTES
I had the pleasure of seeing Maurice Jon for followup in the Liver Clinic at the [facility] on 04/05/2018.  Mr. 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        For the most part, he is unchanged.  He seems like he is most troubled by his recurrent atrial fibrillation which he believes has taken away some energy and made it less likely that he is to exercise, and this is important because he certainly needs to lose weight.      He otherwise denies any abdominal pain, itching or skin rash.  He has mild fatigue.  He continues to work full-time.  He denies any increased abdominal girth or lower extremity edema.  He has not had any gastrointestinal bleeding or any overt signs of hepatic encephalopathy.  He denies any fevers or chills, cough or shortness of breath.  He denies any nausea or vomiting, diarrhea or constipation.  His appetite has been good, and his weight has been stable.  As I mentioned above, he does need to lose some weight.     Current Outpatient Prescriptions   Medication     pantoprazole (PROTONIX) 40 MG EC tablet     metoprolol (TOPROL XL) 100 MG 24 hr tablet     metoprolol (TOPROL-XL) 25 MG 24 hr tablet     spironolactone (ALDACTONE) 25 MG tablet     furosemide (LASIX) 40 MG tablet     mometasone-formoterol (DULERA) 200-5 MCG/ACT oral inhaler     order for DME     triamcinolone (KENALOG) 0.1 % cream     clotrimazole (LOTRIMIN) 1 % cream     ALBUTEROL INHALER 17GM     ORDER FOR DME     calcium carb 1250 mg, 500 mg [place],/vitamin D 200 units (OSCAL WITH D) 500-200 MG-UNIT per tablet     VIAGRA 100 MG OR TABS     [DISCONTINUED] mometasone-formoterol (DULERA) 200-5 MCG/ACT oral inhaler     order for DME     ORDER FOR DME     No current facility-administered medications for this visit.      B/P: 99/63, T: 98.1, P: 74, R: 18    HEENT exam shows no scleral icterus and no temporal muscle wasting.  His chest is clear.  His  abdominal exam shows him to be obese.  No masses or tenderness to palpation are present.  His liver is 10-11 cm in span with a slightly prominent left lobe.  No spleen tip is palpable, and extremity exam shows no edema.  Skin exam shows no stigmata of chronic liver disease, and neurologic exam shows no asterixis.     Recent Results (from the past 168 hour(s))   Hepatic Panel [LAB20]    Collection Time: 04/05/18  7:34 AM   Result Value Ref Range    Bilirubin Direct 0.4 (H) 0.0 - 0.2 mg/dL    Bilirubin Total 1.6 (H) 0.2 - 1.3 mg/dL    Albumin 3.3 (L) 3.4 - 5.0 g/dL    Protein Total 6.0 (L) 6.8 - 8.8 g/dL    Alkaline Phosphatase 71 40 - 150 U/L    ALT 50 0 - 70 U/L    AST 67 (H) 0 - 45 U/L   Basic metabolic panel [LAB15]    Collection Time: 04/05/18  7:34 AM   Result Value Ref Range    Sodium 141 133 - 144 mmol/L    Potassium 3.6 3.4 - 5.3 mmol/L    Chloride 110 (H) 94 - 109 mmol/L    Carbon Dioxide 25 20 - 32 mmol/L    Anion Gap 6 3 - 14 mmol/L    Glucose 100 (H) 70 - 99 mg/dL    Urea Nitrogen 16 7 - 30 mg/dL    Creatinine 0.63 (L) 0.66 - 1.25 mg/dL    GFR Estimate >90 >60 mL/min/1.7m2    GFR Estimate If Black >90 >60 mL/min/1.7m2    Calcium 8.8 8.5 - 10.1 mg/dL   CBC with platelets [[id number]]    Collection Time: 04/05/18  7:34 AM   Result Value Ref Range    WBC 3.3 (L) 4.0 - 11.0 10e9/L    RBC Count 4.20 (L) 4.4 - 5.9 10e12/L    Hemoglobin 14.7 13.3 - 17.7 g/dL    Hematocrit 42.8 40.0 - 53.0 %     (H) 78 - 100 fl    MCH 35.0 (H) 26.5 - 33.0 pg    MCHC 34.3 31.5 - 36.5 g/dL    RDW 13.6 10.0 - 15.0 %    Platelet Count 64 (L) 150 - 450 10e9/L   INR [[id number]]    Collection Time: 04/05/18  7:34 AM   Result Value Ref Range    INR 1.43 (H) 0.86 - 1.14   AFP tumor marker [[id number]]    Collection Time: 04/05/18  7:34 AM   Result Value Ref Range    Alpha Fetoprotein 3.7 0 - 8 ug/L      I did review his ultrasound which shows some kidney stones, but no abnormalities within his liver other than the fact that there is a  cirrhotic appearance.  There is no fluid in his abdomen.      My impression is that Mr. Jon is doing well at this point in time.  He is up-to-date with regard to colorectal cancer screening, variceal screening and vaccinations.  He will be due for a repeat upper GI endoscopy in October.  He also has very minimal osteopenia, and I do not plan on changing repeating his bone density for another 2 years.      We did discuss the possibility of doing of ablation for his atrial fibrillation.  I certainly see no contraindication of doing that.  His liver would not preclude that procedure from moving forward.        Thank you very much for allowing me to participate in the care of this patient.  If you have any questions regarding my recommendations, please do not hesitate to contact me.       Hi Roque MD      Professor of Medicine  [employer]      Executive Medical Director, Solid Organ Transplant Program  [facility]

## 2018-04-13 ENCOUNTER — TELEPHONE (OUTPATIENT)
Dept: FAMILY MEDICINE | Facility: CLINIC | Age: 58
End: 2018-04-13

## 2018-04-13 DIAGNOSIS — K21.9 GASTROESOPHAGEAL REFLUX DISEASE WITHOUT ESOPHAGITIS: ICD-10-CM

## 2018-04-13 NOTE — TELEPHONE ENCOUNTER
Requested Prescriptions   Pending Prescriptions Disp Refills     pantoprazole (PROTONIX) 40 MG EC tablet 30 tablet 0     Sig: Take 1 tablet (40 mg) by mouth daily as needed (Needs follow-up appointment for this medication)    There is no refill protocol information for this order        Last Written Prescription Date:  3/6/18  Last Fill Quantity: 30,  # refills: 0   Last office visit: 3/17/2017 with prescribing provider:  1/18/16   Future Office Visit:

## 2018-04-16 RX ORDER — PANTOPRAZOLE SODIUM 40 MG/1
40 TABLET, DELAYED RELEASE ORAL DAILY PRN
Qty: 7 TABLET | Refills: 0 | Status: SHIPPED | OUTPATIENT
Start: 2018-04-16 | End: 2018-04-20

## 2018-04-16 NOTE — TELEPHONE ENCOUNTER
Prescription approved per [facility] Refill Protocol.    Violet HUTCHINSON RN

## 2018-04-20 ENCOUNTER — OFFICE VISIT (OUTPATIENT)
Dept: FAMILY MEDICINE | Facility: CLINIC | Age: 58
End: 2018-04-20
Payer: COMMERCIAL

## 2018-04-20 VITALS
SYSTOLIC BLOOD PRESSURE: 108 MMHG | OXYGEN SATURATION: 95 % | DIASTOLIC BLOOD PRESSURE: 58 MMHG | HEIGHT: 66 IN | WEIGHT: 241 LBS | RESPIRATION RATE: 18 BRPM | HEART RATE: 88 BPM | BODY MASS INDEX: 38.73 KG/M2 | TEMPERATURE: 98.6 F

## 2018-04-20 DIAGNOSIS — K21.9 GASTROESOPHAGEAL REFLUX DISEASE WITHOUT ESOPHAGITIS: ICD-10-CM

## 2018-04-20 PROCEDURE — 99213 OFFICE O/P EST LOW 20 MIN: CPT | Performed by: FAMILY MEDICINE

## 2018-04-20 RX ORDER — PANTOPRAZOLE SODIUM 40 MG/1
40 TABLET, DELAYED RELEASE ORAL DAILY PRN
Qty: 90 TABLET | Refills: 3 | Status: ON HOLD | OUTPATIENT
Start: 2018-04-20 | End: 2018-12-22

## 2018-04-20 NOTE — PATIENT INSTRUCTIONS
Thank you for choosing [facility].  You may be receiving a survey in the mail from [facility] regarding your visit today.  Please take a few minutes to complete and return the survey to let us know how we are doing.      Our Clinic hours are:  Mondays    7:20 am - 7 pm  Tues -  Fri  7:20 am - 5 pm    Clinic Phone: [phone]    The clinic lab opens at 7:30 am Mon - Fri and appointments are required.    [facility]. [phone]  Monday-Thursday 8 am - 7pm  Tues/Wed/Fri 8 am - 5:30 pm

## 2018-04-20 NOTE — PROGRESS NOTES
"  SUBJECTIVE:   Maurice Jon is a 57 year old male who presents to clinic today for the following health issues:      Medication Followup of  Protonix     Taking Medication as prescribed: yes    Side Effects:  None    Medication Helping Symptoms:  yes           Problem list and histories reviewed & adjusted, as indicated.  Additional history:         Reviewed and updated as needed this visit by clinical staff  Tobacco  Allergies  Meds       Reviewed and updated as needed this visit by Provider      OBJECTIVE:  /58  Pulse 88  Temp 98.6  F (37  C)  Resp 18  Ht 5' 5.5\" (1.664 m)  Wt 241 lb (109.3 kg)  SpO2 95%  BMI 39.49 kg/m2  LUNGS: clear to auscultation, normal breath sounds  CV: RRR without murmur  ABD: BS+, soft, nontender, no masses, no hepatosplenomegaly  EXTREMITIES: without joint tenderness, swelling or erythema.  No muscle tenderness or abnormality.  SKIN: No rashes or abnormalities  NEURO:non focal exam    ASSESSMENT:  Gastroesophageal reflux disease without esophagitis    PLAN:  Orders Placed This Encounter     pantoprazole (PROTONIX) 40 MG EC tablet         "

## 2018-04-20 NOTE — MR AVS SNAPSHOT
After Visit Summary   4/20/2018    Maurice Jon    MRN: [id number]           Patient Information     Date Of Birth          1960        Visit Information        Provider Department      4/20/2018 3:20 PM Steven Barnhart MD [facility]        Today's Diagnoses     Gastroesophageal reflux disease without esophagitis          Care Instructions          Thank you for choosing [facility].  You may be receiving a survey in the mail from [employer] regarding your visit today.  Please take a few minutes to complete and return the survey to let us know how we are doing.      Our Clinic hours are:  Mondays    7:20 am - 7 pm  Tues -  Fri  7:20 am - 5 pm    Clinic Phone: [phone]    The clinic lab opens at 7:30 am Mon - Fri and appointments are required.    [facility]. [phone]  Monday-Thursday 8 am - 7pm  Tues/Wed/Fri 8 am - 5:30 pm                 Follow-ups after your visit        Your next 10 appointments already scheduled     Oct 04, 2018  7:15 AM CDT   Lab with  LAB   [facility] ([facility])    [address] [place] [address] [id number] [phone]            Oct 04, 2018  7:45 AM CDT   US ABDOMEN COMPLETE with US[address] [place] [address] Imaging Center [facility] ([facility])    [address] [place] [id number] [phone]           Please bring a list of your medicines (including vitamins, minerals and over-the-counter drugs). Also, tell your doctor about any allergies you may have. Wear comfortable clothes and leave your valuables at home.  Adults: No eating or drinking for 8 hours before the exam. You may take medicine with a small sip of water.  Children: - Children 6+ years: No food or drink for 6 hours before exam. - Children 1-5 years: No food or drink for 4 hours before exam. - Infants, breast-fed: may have breast milk up to 2 hours before exam. -  "Infants, formula: may have bottle until 4 hours before exam.  Please call the Imaging Department at your exam site with any questions.            Oct 04, 2018  8:45 AM CDT   (Arrive by 8:30 AM)   Return General Liver with Hi Roque MD   [facility] ([facility])    [address]  [address]  [facility] [address] [phone]              Who to contact     If you have questions or need follow up information about today's clinic visit or your schedule please contact [facility] directly at [phone].  Normal or non-critical lab and imaging results will be communicated to you by MyChart, letter or phone within 4 business days after the clinic has received the results. If you do not hear from us within 7 days, please contact the clinic through [employer] or phone. If you have a critical or abnormal lab result, we will notify you by phone as soon as possible.  Submit refill requests through [employer] or call your pharmacy and they will forward the refill request to us. Please allow 3 business days for your refill to be completed.          Additional Information About Your Visit        MyChart Information     [employer] gives you secure access to your electronic health record. If you see a primary care provider, you can also send messages to your care team and make appointments. If you have questions, please call your primary care clinic.  If you do not have a primary care provider, please call [phone] and they will assist you.        Care EveryWhere ID     This is your Care EveryWhere ID. This could be used by other organizations to access your [facility] medical records  [id number]        Your Vitals Were     Pulse Temperature Respirations Height Pulse Oximetry BMI (Body Mass Index)    88 98.6  F (37  C) 18 5' 5.5\" (1.664 m) 95% 39.49 kg/m2       Blood Pressure from Last 3 Encounters:   04/20/18 108/58   04/05/18 99/63   09/05/17 112/65    Weight from " Last 3 Encounters:   04/20/18 241 lb (109.3 kg)   04/05/18 236 lb 9.6 oz (107.3 kg)   09/05/17 242 lb (109.8 kg)              Today, you had the following     No orders found for display         Today's Medication Changes          These changes are accurate as of 4/20/18  3:38 PM.  If you have any questions, ask your nurse or doctor.               These medicines have changed or have updated prescriptions.        Dose/Directions    clotrimazole 1 % cream   Commonly known as:  LOTRIMIN   This may have changed:    - when to take this  - reasons to take this   Used for:  Yeast infection of the skin        Apply topically 2 times daily   Quantity:  60 g   Refills:  1       pantoprazole 40 MG EC tablet   Commonly known as:  PROTONIX   This may have changed:  additional instructions   Used for:  Gastroesophageal reflux disease without esophagitis   Changed by:  Steven Barnhart MD        Dose:  40 mg   Take 1 tablet (40 mg) by mouth daily as needed   Quantity:  90 tablet   Refills:  3       triamcinolone 0.1 % cream   Commonly known as:  KENALOG   This may have changed:    - when to take this  - reasons to take this  - additional instructions   Used for:  Eczema        Apply sparingly to affected area three times daily as needed   Quantity:  80 g   Refills:  0            Where to get your medicines      [facility] [address]  [address], [place] [address]    H[facility]:  [facility] [place] Phone:  [phone]     pantoprazole 40 MG EC tablet                Primary Care Provider Office Phone # Fax #    Adrian Braulio Pineda [phone] [address] [address] [address]        Equal Access to Services     HOLLY ONEIL : Venita Payton, rajni logan, qaybta rakan dawson. So [facility] [phone].    ATENCIÓN: loc Martinez  disposición servicios gratuitos de asistencia lingüística. James wakefield [phone].    We comply with applicable federal civil rights laws and [place] laws. We do not discriminate on the basis of race, color, national origin, age, disability, sex, sexual orientation, or gender identity.            Thank you!     Thank you for choosing [facility]  for your care. Our goal is always to provide you with excellent care. Hearing back from our patients is one way we can continue to improve our services. Please take a few minutes to complete the written survey that you may receive in the mail after your visit with us. Thank you!             Your Updated Medication List - Protect others around you: Learn how to safely use, store and throw away your medicines at www.disposemymeds.org.          This list is accurate as of 4/20/18  3:38 PM.  Always use your most recent med list.                   Brand Name Dispense Instructions for use Diagnosis    ALBUTEROL INHALER 17GM     1 Inhaler    Inhale 2 puffs into the lungs every 4 hours as needed This product no longer available    Acute bronchitis       Calcium carb-Vitamin D 500 mg Mcgrath-200 units 500-200 MG-UNIT per tablet    OSCAL with D;Oyster Shell Calcium     Take 2 tablets by mouth daily with food.        clotrimazole 1 % cream    LOTRIMIN    60 g    Apply topically 2 times daily    Yeast infection of the skin       furosemide 40 MG tablet    LASIX    45 tablet    Take 0.5 tablets (20 mg) by mouth daily    Portal hypertension (H)       * metoprolol succinate 100 MG 24 hr tablet    TOPROL XL    90 tablet    Take 1 tablet (100 mg) by mouth every evening in addition to your 25 mg dose every morning.    Atrial fibrillation with rapid ventricular response (H)       * metoprolol succinate 25 MG 24 hr tablet    TOPROL-XL    90 tablet    Take 1 tablet (25 mg) by mouth every morning in addition to your 100 mg dose every evening.    Atrial fibrillation with rapid  ventricular response (H)       mometasone-formoterol 200-5 MCG/ACT oral inhaler    DULERA    1 Inhaler    Inhale 2 puffs into the lungs 2 times daily as needed Appt DUE Jan 2017    Bronchospasm       * order for DME     2 Package    Juzo compression stockings, 30-40mm compression. Patient has portal hypertension and develops leg edema, which these control well.    Portal hypertension (H), Edema       * order for DME      Respironics RemStar 60 Series Auto AFlex 12-15 cm H2O with heated humidty and a modem.  Pt chose a Quattro Air FFM size medium.    BRUCE (obstructive sleep apnea)       order for DME     1 Device    Equipment being ordered:  New CPAP mask, tube, filters,water tray    Sleep apnea       order for DME     4 Package    Open toe size large 30/40 [employer] Assure Medical Compression socks Model 91060.    Portal hypertension (H), Hepatic cirrhosis (H), Edema       pantoprazole 40 MG EC tablet    PROTONIX    90 tablet    Take 1 tablet (40 mg) by mouth daily as needed    Gastroesophageal reflux disease without esophagitis       spironolactone 25 MG tablet    ALDACTONE    90 tablet    Take 1 tablet (25 mg) by mouth daily We will no longer fill unless seen by cardiology    Portal hypertension (H)       triamcinolone 0.1 % cream    KENALOG    80 g    Apply sparingly to affected area three times daily as needed    Eczema       VIAGRA 100 MG tablet   Generic drug:  sildenafil     12    ONE TABLET TAKEN AT LEAST 30 MINUTES BEFORE INTERCOURSE    Impotence of organic origin       * Notice:  This list has 4 medication(s) that are the same as other medications prescribed for you. Read the directions carefully, and ask your doctor or other care provider to review them with you.

## 2018-04-20 NOTE — NURSING NOTE
"Chief Complaint   Patient presents with     Recheck Medication     refills        Initial /58  Pulse 88  Temp 98.6  F (37  C)  Resp 18  Ht 5' 5.5\" (1.664 m)  Wt 241 lb (109.3 kg)  SpO2 95%  BMI 39.49 kg/m2 Estimated body mass index is 39.49 kg/(m^2) as calculated from the following:    Height as of this encounter: 5' 5.5\" (1.664 m).    Weight as of this encounter: 241 lb (109.3 kg).  Medication Reconciliation: complete   Bhavna Barfield CMA      "

## 2018-05-10 ENCOUNTER — HOSPITAL ENCOUNTER (EMERGENCY)
Facility: CLINIC | Age: 58
Discharge: HOME | End: 2018-05-10
Attending: EMERGENCY MEDICINE | Admitting: EMERGENCY MEDICINE
Payer: COMMERCIAL

## 2018-05-10 ENCOUNTER — APPOINTMENT (OUTPATIENT)
Dept: GENERAL RADIOLOGY | Facility: CLINIC | Age: 58
End: 2018-05-10
Attending: EMERGENCY MEDICINE
Payer: COMMERCIAL

## 2018-05-10 VITALS
SYSTOLIC BLOOD PRESSURE: 100 MMHG | HEART RATE: 83 BPM | DIASTOLIC BLOOD PRESSURE: 62 MMHG | RESPIRATION RATE: 17 BRPM | TEMPERATURE: 98.1 F | OXYGEN SATURATION: 95 % | HEIGHT: 66 IN

## 2018-05-10 DIAGNOSIS — R07.9 CHEST PAIN, UNSPECIFIED TYPE: ICD-10-CM

## 2018-05-10 DIAGNOSIS — I48.20 CHRONIC ATRIAL FIBRILLATION (H): ICD-10-CM

## 2018-05-10 LAB
ALBUMIN SERPL-MCNC: 3.1 G/DL (ref 3.4–5)
ALP SERPL-CCNC: 82 U/L (ref 40–150)
ALT SERPL W P-5'-P-CCNC: 51 U/L (ref 0–70)
ANION GAP SERPL CALCULATED.3IONS-SCNC: 5 MMOL/L (ref 3–14)
AST SERPL W P-5'-P-CCNC: 49 U/L (ref 0–45)
BASOPHILS # BLD AUTO: 0 10E9/L (ref 0–0.2)
BASOPHILS NFR BLD AUTO: 0.3 %
BILIRUB SERPL-MCNC: 1 MG/DL (ref 0.2–1.3)
BUN SERPL-MCNC: 15 MG/DL (ref 7–30)
CALCIUM SERPL-MCNC: 8.7 MG/DL (ref 8.5–10.1)
CHLORIDE SERPL-SCNC: 112 MMOL/L (ref 94–109)
CO2 SERPL-SCNC: 27 MMOL/L (ref 20–32)
CREAT SERPL-MCNC: 0.7 MG/DL (ref 0.66–1.25)
DIFFERENTIAL METHOD BLD: ABNORMAL
EOSINOPHIL # BLD AUTO: 0.4 10E9/L (ref 0–0.7)
EOSINOPHIL NFR BLD AUTO: 11.6 %
ERYTHROCYTE [DISTWIDTH] IN BLOOD BY AUTOMATED COUNT: 13.4 % (ref 10–15)
GFR SERPL CREATININE-BSD FRML MDRD: >90 ML/MIN/1.7M2
GLUCOSE SERPL-MCNC: 99 MG/DL (ref 70–99)
HCT VFR BLD AUTO: 40.4 % (ref 40–53)
HGB BLD-MCNC: 14 G/DL (ref 13.3–17.7)
IMM GRANULOCYTES # BLD: 0 10E9/L (ref 0–0.4)
IMM GRANULOCYTES NFR BLD: 0.3 %
LYMPHOCYTES # BLD AUTO: 0.8 10E9/L (ref 0.8–5.3)
LYMPHOCYTES NFR BLD AUTO: 21.9 %
MCH RBC QN AUTO: 35 PG (ref 26.5–33)
MCHC RBC AUTO-ENTMCNC: 34.7 G/DL (ref 31.5–36.5)
MCV RBC AUTO: 101 FL (ref 78–100)
MONOCYTES # BLD AUTO: 0.7 10E9/L (ref 0–1.3)
MONOCYTES NFR BLD AUTO: 18.9 %
NEUTROPHILS # BLD AUTO: 1.7 10E9/L (ref 1.6–8.3)
NEUTROPHILS NFR BLD AUTO: 47 %
PLATELET # BLD AUTO: 60 10E9/L (ref 150–450)
POTASSIUM SERPL-SCNC: 4 MMOL/L (ref 3.4–5.3)
PROT SERPL-MCNC: 6 G/DL (ref 6.8–8.8)
RBC # BLD AUTO: 4 10E12/L (ref 4.4–5.9)
SODIUM SERPL-SCNC: 144 MMOL/L (ref 133–144)
TROPONIN I SERPL-MCNC: <0.015 UG/L (ref 0–0.04)
WBC # BLD AUTO: 3.7 10E9/L (ref 4–11)

## 2018-05-10 PROCEDURE — 84484 ASSAY OF TROPONIN QUANT: CPT | Performed by: EMERGENCY MEDICINE

## 2018-05-10 PROCEDURE — 99285 EMERGENCY DEPT VISIT HI MDM: CPT | Mod: 25 | Performed by: EMERGENCY MEDICINE

## 2018-05-10 PROCEDURE — 80053 COMPREHEN METABOLIC PANEL: CPT | Performed by: EMERGENCY MEDICINE

## 2018-05-10 PROCEDURE — 85025 COMPLETE CBC W/AUTO DIFF WBC: CPT | Performed by: EMERGENCY MEDICINE

## 2018-05-10 PROCEDURE — 93005 ELECTROCARDIOGRAM TRACING: CPT | Performed by: EMERGENCY MEDICINE

## 2018-05-10 PROCEDURE — 71046 X-RAY EXAM CHEST 2 VIEWS: CPT

## 2018-05-10 PROCEDURE — 93010 ELECTROCARDIOGRAM REPORT: CPT | Mod: Z6 | Performed by: EMERGENCY MEDICINE

## 2018-05-10 ASSESSMENT — ENCOUNTER SYMPTOMS
HEMATOLOGIC/LYMPHATIC NEGATIVE: 1
NEUROLOGICAL NEGATIVE: 1
ALLERGIC/IMMUNOLOGIC NEGATIVE: 1
GASTROINTESTINAL NEGATIVE: 1
RESPIRATORY NEGATIVE: 1
PSYCHIATRIC NEGATIVE: 1
ENDOCRINE NEGATIVE: 1
MUSCULOSKELETAL NEGATIVE: 1
EYES NEGATIVE: 1
CONSTITUTIONAL NEGATIVE: 1

## 2018-05-10 NOTE — ED NOTES
DC instructions reviewed with pt states understanding. Pt ambulatory out of ED. No worsening of CP during ED stay.

## 2018-05-10 NOTE — ED NOTES
Left side CP off/on for the past 2 weeks, does have a spot on his left chest that he can push to aggravate the pain and cause it to radiate. Activity doesn't make pain worse. Pt states feels like an ache in his lungs. Today pain more intense. Denies SOB, no nausea, no sweating. Pt in chronic a-fib. Denies palpitations.

## 2018-05-10 NOTE — ED AVS SNAPSHOT
[facility] Emergency Department    [address] [address]    Phone:  [phone]    Fax:  [phone]                                       Maurice Jon   MRN: [id number]    Department:  [facility] Emergency Department   Date of Visit:  5/10/2018           Patient Information     Date Of Birth          1960        Your diagnoses for this visit were:     Chest pain, unspecified type intermittent, reproducible       You were seen by Agustin Rodrigues MD.      Follow-up Information     Call Kristofer Evans MD.    Specialty:  Cardiology    Why:  Call to review your current plan with not taking aspirin or blood thinners. Also to see if you can move your appointment up from [facility] [facility] and discuss your ablation    Contact information:    [address]  [facility] [address] [phone]          Follow up with Adrian Pineda MD.    Specialties:  Family Practice, Occupational Medicine    Why:  As needed, If symptoms worsen    Contact information:    [address]  [facility] [address]  [phone]          Discharge Instructions          *CHEST PAIN, UNCERTAIN CAUSE    Based on your exam today, the exact cause of your chest pain is not certain. Your condition does not seem serious at this time, and your pain does not appear to be coming from your heart. However, sometimes the signs of a serious problem take more time to appear. Therefore, watch for the warning signs listed below.  HOME CARE:    1. Rest today and avoid strenuous activity.  2. Take any prescribed medicine as directed.  FOLLOW UP with your doctor in 1-3 days.   GET PROMPT MEDICAL ATTENTION if any of the following occur:    A change in the type of pain: if it feels different, becomes more severe, lasts longer, or begins to spread into your shoulder, arm, neck, jaw or back    Shortness of breath or increased pain with breathing    Weakness, dizziness, or fainting    Cough with blood or dark colored  sputum (phlegm)    Fever over 101  F (38.3  C)    Swelling, pain or redness in one leg    6217-0917 [employer]. [address] [place] [address], [place] [address]. All rights reserved. This information is not intended as a substitute for professional medical care. Always follow your healthcare professional's instructions.  This information has been modified by your health care provider with permission from the publisher.      Your next 10 appointments already scheduled     Jun 05, 2018  3:30 PM CDT   UMP EP RETURN with Kristofer Brown MD   [facility]   [place] ([facility])    [address] [place] [address]   [phone]            Oct 04, 2018  7:15 AM CDT   Lab with  LAB   [facility] ([place] [facility])    [address] [place] [address] [phone]            Oct 04, 2018  7:45 AM CDT   US ABDOMEN COMPLETE with UCUS1   [facility] ([place] [facility])    [address] [place] [id number] [phone]           Please bring a list of your medicines (including vitamins, minerals and over-the-counter drugs). Also, tell your doctor about any allergies you may have. Wear comfortable clothes and leave your valuables at home.  Adults: No eating or drinking for 8 hours before the exam. You may take medicine with a small sip of water.  Children: - Children 6+ years: No food or drink for 6 hours before exam. - Children 1-5 years: No food or drink for 4 hours before exam. - Infants, breast-fed: may have breast milk up to 2 hours before exam. - Infants, formula: may have bottle until 4 hours before exam.  Please call the Imaging Department at your exam site with any questions.            Oct 04, 2018  8:45 AM CDT   (Arrive by 8:30 AM)   Return General Liver with Hi Roque MD   [facility]at[facility] ([place] [facility])    [address] [place] [address]    [address]  [facility] [address] [phone]              24 Hour Appointment Hotline       To make an appointment at any [facility] ([facility]), call [phone] ([phone]). If you don't have a family doctor or clinic, we will help you find one. [facility] are conveniently located to serve the needs of you and your family.             Review of your medicines      Our records show that you are taking the medicines listed below. If these are incorrect, please call your family doctor or clinic.        Dose / Directions Last dose taken    Calcium carb-Vitamin D 500 mg [place]-200 units 500-200 MG-UNIT per tablet   Commonly known as:  OSCAL with D;Oyster Shell Calcium   Dose:  2 tablet        Take 2 tablets by mouth daily with food.   Refills:  0        clotrimazole 1 % cream   Commonly known as:  LOTRIMIN   Quantity:  60 g        Apply topically 2 times daily   Refills:  1        furosemide 40 MG tablet   Commonly known as:  LASIX   Dose:  20 mg   Quantity:  45 tablet        Take 0.5 tablets (20 mg) by mouth daily   Refills:  3        * metoprolol succinate 100 MG 24 hr tablet   Commonly known as:  TOPROL XL   Dose:  100 mg   Quantity:  90 tablet        Take 1 tablet (100 mg) by mouth every evening in addition to your 25 mg dose every morning.   Refills:  3        * metoprolol succinate 25 MG 24 hr tablet   Commonly known as:  TOPROL-XL   Dose:  25 mg   Quantity:  90 tablet        Take 1 tablet (25 mg) by mouth every morning in addition to your 100 mg dose every evening.   Refills:  3        mometasone-formoterol 200-5 MCG/ACT oral inhaler   Commonly known as:  DULERA   Dose:  2 puff   Quantity:  1 Inhaler        Inhale 2 puffs into the lungs 2 times daily as needed Appt DUE Jan 2017   Refills:  1        * order for DME   Quantity:  2 Package        Juzo compression stockings, 30-40mm compression. Patient has portal hypertension and develops leg edema, which these control well.   Refills:  3        *  order for DME        Respironics RemStar 60 Series Auto AFlex 12-15 cm H2O with heated humidty and a modem.  Pt chose a Quattro Air FFM size medium.   Refills:  0        order for DME   Quantity:  1 Device        Equipment being ordered:  New CPAP mask, tube, filters,water tray   Refills:  3        order for DME   Quantity:  4 Package        Open toe size large 30/40 [employer] Assure Medical Compression socks Model 07152.   Refills:  3        pantoprazole 40 MG EC tablet   Commonly known as:  PROTONIX   Dose:  40 mg   Quantity:  90 tablet        Take 1 tablet (40 mg) by mouth daily as needed   Refills:  3        spironolactone 25 MG tablet   Commonly known as:  ALDACTONE   Dose:  25 mg   Quantity:  90 tablet        Take 1 tablet (25 mg) by mouth daily We will no longer fill unless seen by cardiology   Refills:  3        triamcinolone 0.1 % cream   Commonly known as:  KENALOG   Quantity:  80 g        Apply sparingly to affected area three times daily as needed   Refills:  0        * Notice:  This list has 4 medication(s) that are the same as other medications prescribed for you. Read the directions carefully, and ask your doctor or other care provider to review them with you.            Procedures and tests performed during your visit     CBC with platelets differential    Chest XR,  PA & LAT    Comprehensive metabolic panel    EKG 12 lead    Troponin I      Orders Needing Specimen Collection     None      Pending Results     No orders found from 5/8/2018 to 5/11/2018.            Pending Culture Results     No orders found from 5/8/2018 to 5/11/2018.            Pending Results Instructions     If you had any lab results that were not finalized at the time of your Discharge, you can call the ED Lab Result RN at [phone]. You will be contacted by this team for any positive Lab results or changes in treatment. The nurses are available 7 days a week from 10A to 6:30P.  You can leave a message 24 hours per day and they  will return your call.        Test Results From Your Hospital Stay        5/10/2018  4:45 PM      Component Results     Component Value Ref Range & Units Status    WBC 3.7 (L) 4.0 - 11.0 10e9/L Final    RBC Count 4.00 (L) 4.4 - 5.9 10e12/L Final    Hemoglobin 14.0 13.3 - 17.7 g/dL Final    Hematocrit 40.4 40.0 - 53.0 % Final     (H) 78 - 100 fl Final    MCH 35.0 (H) 26.5 - 33.0 pg Final    MCHC 34.7 31.5 - 36.5 g/dL Final    RDW 13.4 10.0 - 15.0 % Final    Platelet Count 60 (L) 150 - 450 10e9/L Final    Diff Method Automated Method  Final    % Neutrophils 47.0 % Final    % Lymphocytes 21.9 % Final    % Monocytes 18.9 % Final    % Eosinophils 11.6 % Final    % Basophils 0.3 % Final    % Immature Granulocytes 0.3 % Final    Absolute Neutrophil 1.7 1.6 - 8.3 10e9/L Final    Absolute Lymphocytes 0.8 0.8 - 5.3 10e9/L Final    Absolute Monocytes 0.7 0.0 - 1.3 10e9/L Final    Absolute Eosinophils 0.4 0.0 - 0.7 10e9/L Final    Absolute Basophils 0.0 0.0 - 0.2 10e9/L Final    Abs Immature Granulocytes 0.0 0 - 0.4 10e9/L Final         5/10/2018  4:44 PM      Component Results     Component Value Ref Range & Units Status    Sodium 144 133 - 144 mmol/L Final    Potassium 4.0 3.4 - 5.3 mmol/L Final    Chloride 112 (H) 94 - 109 mmol/L Final    Carbon Dioxide 27 20 - 32 mmol/L Final    Anion Gap 5 3 - 14 mmol/L Final    Glucose 99 70 - 99 mg/dL Final    Urea Nitrogen 15 7 - 30 mg/dL Final    Creatinine 0.70 0.66 - 1.25 mg/dL Final    GFR Estimate >90 >60 mL/min/1.7m2 Final    Non  GFR Calc    GFR Estimate If Black >90 >60 mL/min/1.7m2 Final    African American GFR Calc    Calcium 8.7 8.5 - 10.1 mg/dL Final    Bilirubin Total 1.0 0.2 - 1.3 mg/dL Final    Albumin 3.1 (L) 3.4 - 5.0 g/dL Final    Protein Total 6.0 (L) 6.8 - 8.8 g/dL Final    Alkaline Phosphatase 82 40 - 150 U/L Final    ALT 51 0 - 70 U/L Final    AST 49 (H) 0 - 45 U/L Final         5/10/2018  4:44 PM      Component Results     Component Value  Ref Range & Units Status    Troponin I ES <0.015 0.000 - 0.045 ug/L Final    The 99th percentile for upper reference range is 0.045 ug/L.  Troponin values   in the range of 0.045 - 0.120 ug/L may be associated with risks of adverse   clinical events.           5/10/2018  5:09 PM      Narrative     CHEST TWO VIEWS  5/10/2018  5:01 PM     HISTORY: Left anterior chest pain/rib pain and discomfort x 2 weeks.  Evaluate for bony process versus other acute cardiothoracic process.    COMPARISON: Chest x-ray 7/18/2015.        Impression     IMPRESSION: PA and lateral views of the chest. Lungs are clear. Heart  is normal in size. No effusions are evident. No pneumothorax. S-shaped  scoliotic deformity of the thoracolumbar spine appears stable.    OLGA LIDIA PABLO MD                Thank you for choosing [facility]       Thank you for choosing [facility] for your care. Our goal is always to provide you with excellent care. Hearing back from our patients is one way we can continue to improve our services. Please take a few minutes to complete the written survey that you may receive in the mail after you visit with us. Thank you!        [employer] Information     [employer] gives you secure access to your electronic health record. If you see a primary care provider, you can also send messages to your care team and make appointments. If you have questions, please call your primary care clinic.  If you do not have a primary care provider, please call [phone] and they will assist you.        Care EveryWhere ID     This is your Care EveryWhere ID. This could be used by other organizations to access your [facility] medical records  [id number]        Equal Access to Services     [facility]: Hadii jose carlsono Sogninyali, waaxda luqadaha, qaybta kaalmada adeegyada, rakan espinosa. So [facility] [phone].    ATENCIÓN: Si habla español, tiene a allen disposición servicios gratuitos de asistencia lingüística. Llame al  [phone].    We comply with applicable federal civil rights laws and [place] laws. We do not discriminate on the basis of race, color, national origin, age, disability, sex, sexual orientation, or gender identity.            After Visit Summary       This is your record. Keep this with you and show to your community pharmacist(s) and doctor(s) at your next visit.

## 2018-05-10 NOTE — ED PROVIDER NOTES
"  History     Chief Complaint   Patient presents with     Chest Pain     for last few weeks, hx of afib     HPI  Maurice Jon is a 57 year old male who has a history of portal vein thrombosis, hepatic cirrhosis, portal hypertension, atrial fibrillation without anticoagulation therapy, severe sepsis, thrombocytopenia, acute pulmonary edema, and cirrhosis of liver without mention of alcohol who presents to the ED for evaluation of chest discomfort. He notes that he has had 2-3 weeks of dull chest pain in the left chest. This pain has been constant in the last 2-3 weeks, and today he noticed that it was worse. His pain is more constant and slightly more \"achy\". Patient notes that he can aggravate the pain by pressing on a spot in his left chest.     Patient denies shortness of breath during his chest discomfort. He denies any chest trauma that he can recall, but he notes that he wears safety glasses for work and has ran into walls before. Patient has a history of cardioversion for atrial fibrillation. Patient takes metoprolol 25 mg daily, Spirolactone, and furosemide.     Social History: Patient lives in [place], [place]. Patient is here via private car by himself.    Problem List:    Patient Active Problem List    Diagnosis Date Noted     Health Care Home 07/01/2011     Priority: High     01/07/2014  Status:  Declined  Care Coordinator:  Salena Joel    See Letters for [facility] Care Plan (emergency care plan only)             Portal hypertension (H) 01/28/2010     Priority: High     Hepatic cirrhosis (H) 08/22/2005     Priority: High     Cirrhosis, idiopathic  Problem list name updated by automated process. Provider to review       Portal vein thrombosis 02/02/2017     Priority: Medium     History of MRSA now culture negative 08/06/2015     Priority: Medium     Severe sepsis (H) 07/13/2015     Priority: Medium     Problem list name updated by automated process. Provider to review       Atrial fibrillation (H)      " Priority: Medium     S/p cardioversion       Atrial fibrillation with rapid ventricular response (H) 05/13/2013     Priority: Medium     Edema 05/13/2013     Priority: Medium     CARDIOVASCULAR SCREENING; LDL GOAL LESS THAN 160 10/31/2010     Priority: Medium     GERD (gastroesophageal reflux disease) 03/25/2010     Priority: Medium     Eczema 01/28/2010     Priority: Medium     BRUCE-Mild (AHI 9; REM RDI 31) 03/20/2009     Priority: Medium     Sleep study 3/09- .0 minutes, latency 3.6 minutes. REM latency 72.0 minutes. Sleep efficiency 87.7%. The sleep architecture was disrupted with frequent sleep stage changes and arousals. Snoring: mild to loud.  RDI 27.7, AHI of 8.4. REM RDI 31.5 TLO2 saturation 83.0%. This study is suggestive of mild sleep apnea, severe during REM sleep (20% TST). Other:  PLM index was 0.0.       Compulsive behaviors 08/26/2008     Priority: Medium     erectile dysfunction 08/22/2005     Priority: Medium     Obesity 11/24/2010     Priority: Low     Thrombocytopenia (H) 08/22/2005     Priority: Low     Thrombocytopenia associated with cirrhosis and portal hypertension  Problem list name updated by automated process. Provider to review          Past Medical History:    Past Medical History:   Diagnosis Date     Acute pulmonary edema (H)      Atrial fibrillation (H)      Atrial fibrillation with rapid ventricular response (H) 5/13/2013     Calculus of ureter 1993, 1997     Cirrhosis of liver without mention of alcohol 8/22/2005     Edema 5/13/2013     Esophageal varices with bleeding(456.0)      Gallstones      Genital herpes, unspecified 3/20/1999     GERD (gastroesophageal reflux disease)      Hematuria      Hypertension      Hypochondriasis      Obesity 11/24/2010     BRUCE (obstructive sleep apnea) 3/17/2009     Portal hypertension (H) 1/28/2010     Unspecified thrombocytopenia 8/22/2005       Past Surgical History:    Past Surgical History:   Procedure Laterality Date     ANESTHESIA  CARDIOVERSION N/A 1/19/2015    Procedure: ANESTHESIA CARDIOVERSION;  Surgeon: Generic Anesthesia Provider;  Location: [facility]     COLONOSCOPY N/A 8/14/2017    Procedure: COLONOSCOPY;  Colonoscopy Called will arrive appx 12:30 Per phone call;  Surgeon: Vincent Whittington MD;  Location:  GI     ESOPHAGOSCOPY, GASTROSCOPY, DUODENOSCOPY (EGD), COMBINED  7/11/2011    Procedure:COMBINED ESOPHAGOSCOPY, GASTROSCOPY, DUODENOSCOPY (EGD); Surgeon:LAURA LAMAS; Location:[facility]     ESOPHAGOSCOPY, GASTROSCOPY, DUODENOSCOPY (EGD), COMBINED  9/10/2012    Procedure: COMBINED ESOPHAGOSCOPY, GASTROSCOPY, DUODENOSCOPY (EGD);;  Surgeon: Hi Roque MD;  Location:  GI     ESOPHAGOSCOPY, GASTROSCOPY, DUODENOSCOPY (EGD), COMBINED  9/9/2013    Procedure: COMBINED ESOPHAGOSCOPY, GASTROSCOPY, DUODENOSCOPY (EGD);;  Surgeon: Hi Roque MD;  Location:  GI     ESOPHAGOSCOPY, GASTROSCOPY, DUODENOSCOPY (EGD), COMBINED N/A 9/15/2014    Procedure: COMBINED ESOPHAGOSCOPY, GASTROSCOPY, DUODENOSCOPY (EGD);  Surgeon: Hi Roque MD;  Location:  GI     ESOPHAGOSCOPY, GASTROSCOPY, DUODENOSCOPY (EGD), COMBINED N/A 10/30/2015    Procedure: COMBINED ESOPHAGOSCOPY, GASTROSCOPY, DUODENOSCOPY (EGD);  Surgeon: Feliberto Fox MD;  Location:  GI     ESOPHAGOSCOPY, GASTROSCOPY, DUODENOSCOPY (EGD), COMBINED N/A 10/10/2016    Procedure: COMBINED ESOPHAGOSCOPY, GASTROSCOPY, DUODENOSCOPY (EGD);  Surgeon: Jose Nesbitt MD;  Location:  GI     HERNIA REPAIR       IRRIGATION AND DEBRIDEMENT ABSCESS SCROTUM, COMBINED  10/4/2012    Procedure: COMBINED IRRIGATION AND DEBRIDEMENT ABSCESS SCROTUM;  Irrigation and Debridement of Groin Abscess;  Surgeon: Benny Shoemaker MD;  Location: [facility]     SOFT TISSUE SURGERY       SURGICAL HISTORY OF -   1993    rt elbow     SURGICAL HISTORY OF -   1/15/98    repair of ventral and umbilical hernia     SURGICAL HISTORY OF -   2001    endoscopy       Family History:    Family History   Problem  "Relation Age of Onset     Lipids Mother      Hypertension Mother      Eye Disorder Mother      HEART DISEASE Father      CHF     Lipids Father      Obesity Father      HEART DISEASE Brother      MI     Eye Disorder Brother      CANCER Other      maternal grandparent/throat cancer       Social History:  Marital Status:   [2]  Social History   Substance Use Topics     Smoking status: Former Smoker     Packs/day: 0.80     Years: 6.00     Types: Cigarettes     Quit date: 1/1/2002     Smokeless tobacco: Never Used     Alcohol use No      Comment: quit in 2007        Medications:      calcium carb 1250 mg, 500 mg Rincon,/vitamin D 200 units (OSCAL WITH D) 500-200 MG-UNIT per tablet   furosemide (LASIX) 40 MG tablet   metoprolol (TOPROL XL) 100 MG 24 hr tablet   metoprolol (TOPROL-XL) 25 MG 24 hr tablet   mometasone-formoterol (DULERA) 200-5 MCG/ACT oral inhaler   ORDER FOR DME   ORDER FOR DME   order for DME   order for DME   pantoprazole (PROTONIX) 40 MG EC tablet   spironolactone (ALDACTONE) 25 MG tablet   clotrimazole (LOTRIMIN) 1 % cream   triamcinolone (KENALOG) 0.1 % cream         Review of Systems   Constitutional: Negative.    HENT: Negative.    Eyes: Negative.    Respiratory: Negative.    Cardiovascular: Positive for chest pain (reproducible chest discomfort overlying left rib).   Gastrointestinal: Negative.    Endocrine: Negative.    Genitourinary: Negative.    Musculoskeletal: Negative.    Skin: Negative.    Allergic/Immunologic: Negative.    Neurological: Negative.    Hematological: Negative.    Psychiatric/Behavioral: Negative.    All other systems reviewed and are negative.      Physical Exam   BP: 112/70  Pulse: 83  Heart Rate: 75  Temp: 98.1  F (36.7  C)  Resp: 16  Height: 167.6 cm (5' 6\")  SpO2: 98 %      Physical Exam   Constitutional: He is oriented to person, place, and time. He appears well-developed. No distress.   HENT:   Head: Normocephalic and atraumatic.   Eyes: Conjunctivae and EOM are " normal. Pupils are equal, round, and reactive to light. Right eye exhibits no discharge. Left eye exhibits no discharge. No scleral icterus.   Neck: Normal range of motion. Neck supple. No JVD present. No tracheal deviation present. No thyromegaly present.   Cardiovascular: Normal rate.  An irregularly irregular rhythm present. Exam reveals no gallop and no friction rub.    No murmur heard.  Pulmonary/Chest: Effort normal and breath sounds normal. No stridor. No respiratory distress. He has no wheezes. He has no rales. He exhibits no tenderness.   Abdominal: Soft.   Lymphadenopathy:     He has no cervical adenopathy.   Neurological: He is alert and oriented to person, place, and time. He displays normal reflexes. No cranial nerve deficit. He exhibits normal muscle tone. Coordination normal.   Skin: No rash noted. He is not diaphoretic. No erythema. No pallor.   Psychiatric: He has a normal mood and affect. His behavior is normal. Judgment and thought content normal.       ED Course     ED Course     Procedures               EKG Interpretation:      Interpreted by Agustin Rodrigues  Time reviewed: 15:58  Symptoms at time of EKG: Intermittent chest pain and discomfort.   Rhythm: atrial fibrillation - controlled with atrial flutter.  Rate: Normal  Axis: Normal  Ectopy: none  Conduction: normal  ST Segments/ T Waves: Non-specific ST-T wave changes  Q Waves: nonspecific  Comparison to prior: when compared with EKG dated 2/17/16-chronic atrial fibrillation/flutter    Clinical Impression: no acute changes        Critical Care time:  none                   Medications - No data to display  ED medications: none      ED labs and imaging;  Results for orders placed or performed during the hospital encounter of 05/10/18   Chest XR,  PA & LAT    Narrative    CHEST TWO VIEWS  5/10/2018  5:01 PM     HISTORY: Left anterior chest pain/rib pain and discomfort x 2 weeks.  Evaluate for bony process versus other acute cardiothoracic  process.    COMPARISON: Chest x-ray 7/18/2015.      Impression    IMPRESSION: PA and lateral views of the chest. Lungs are clear. Heart  is normal in size. No effusions are evident. No pneumothorax. S-shaped  scoliotic deformity of the thoracolumbar spine appears stable.    OLGA LIDIA PABLO MD   CBC with platelets differential   Result Value Ref Range    WBC 3.7 (L) 4.0 - 11.0 10e9/L    RBC Count 4.00 (L) 4.4 - 5.9 10e12/L    Hemoglobin 14.0 13.3 - 17.7 g/dL    Hematocrit 40.4 40.0 - 53.0 %     (H) 78 - 100 fl    MCH 35.0 (H) 26.5 - 33.0 pg    MCHC 34.7 31.5 - 36.5 g/dL    RDW 13.4 10.0 - 15.0 %    Platelet Count 60 (L) 150 - 450 10e9/L    Diff Method Automated Method     % Neutrophils 47.0 %    % Lymphocytes 21.9 %    % Monocytes 18.9 %    % Eosinophils 11.6 %    % Basophils 0.3 %    % Immature Granulocytes 0.3 %    Absolute Neutrophil 1.7 1.6 - 8.3 10e9/L    Absolute Lymphocytes 0.8 0.8 - 5.3 10e9/L    Absolute Monocytes 0.7 0.0 - 1.3 10e9/L    Absolute Eosinophils 0.4 0.0 - 0.7 10e9/L    Absolute Basophils 0.0 0.0 - 0.2 10e9/L    Abs Immature Granulocytes 0.0 0 - 0.4 10e9/L   Comprehensive metabolic panel   Result Value Ref Range    Sodium 144 133 - 144 mmol/L    Potassium 4.0 3.4 - 5.3 mmol/L    Chloride 112 (H) 94 - 109 mmol/L    Carbon Dioxide 27 20 - 32 mmol/L    Anion Gap 5 3 - 14 mmol/L    Glucose 99 70 - 99 mg/dL    Urea Nitrogen 15 7 - 30 mg/dL    Creatinine 0.70 0.66 - 1.25 mg/dL    GFR Estimate >90 >60 mL/min/1.7m2    GFR Estimate If Black >90 >60 mL/min/1.7m2    Calcium 8.7 8.5 - 10.1 mg/dL    Bilirubin Total 1.0 0.2 - 1.3 mg/dL    Albumin 3.1 (L) 3.4 - 5.0 g/dL    Protein Total 6.0 (L) 6.8 - 8.8 g/dL    Alkaline Phosphatase 82 40 - 150 U/L    ALT 51 0 - 70 U/L    AST 49 (H) 0 - 45 U/L   Troponin I   Result Value Ref Range    Troponin I ES <0.015 0.000 - 0.045 ug/L       ED Vitals:  Vitals:    05/10/18 1604 05/10/18 1620 05/10/18 1640 05/10/18 1650   BP: 111/61 99/64 100/62    Pulse:       Resp:     17   Temp:       TempSrc:       SpO2: 95% 93% 96% 95%   Height:         4:22 PM Patient assessed.    Prior diagnostic work-up and evaluation:   Echocardiogram (9/19/2017)     Interpretation Summary     1. Normal left ventricular size and systolic function. Visual ejection  fraction 55-60%.  2. No regional wall motion abnormalities.  3. Normal right ventricular size and systolic function.  4. Moderate biatrial enlargement.  5. Trace mitral and tricuspid regurgitation.     Compared to the prior study dated 2/19/2016, there have been no changes.    Assessments & Plan (with Medical Decision Making)   Clinical impression: 57-year-old male with a history of liver cirrhosis secondary to nonalcoholic fatty liver disease who is followed by GI at [facility]-last visit with Dr. Roque on April 5, 2018, known history of chronic atrial fibrillation who presented for evaluation for intermittent chest discomfort that is reproducible over the last 2 weeks.  Patient has known portal hypertension based on recent imaging including ultrasound which was done in April 2018.  He admits that he is currently on metoprolol 25 mg in the morning and 100 mg at night and is followed by Dr. Evans (EP-cardiology).  He also takes furosemide and spironolactone and takes medication for GERD.  He reports he is uncertain if he injured his anterior chest at work by running into something. He has had a dull ache that is reproducible intermittently over the last 2 weeks.  He has no shortness of breath he also reports no leg swelling no orthopnea no exertional chest pain or discomfort.  He admits that he is not on chronic anticoagulation or antiplatelet therapy despite having a history of chronic A. fib/flutter.  He reports he has had a cardioversion in the last 2 years.  He is contemplating having an ablation and has a scheduled follow-up with Dr. Evans on June 5,.2018.  On my exam his pain is reproducible over the left second/third rib.  No redness bruising or  rash around the chest.  He has a normal lung exam.  His heart rhythm is irregularly irregular.  There is no rubs or gallops appreciated.      ED course and Plan:  EKG on ED arrival shows rate controlled atrial fibrillation with atrial flutter and no significant EKG changes that are new from February 17, 2016.  We discussed that he likely has musculoskeletal chest discomfort with low concern for acute coronary syndrome or pulmonary embolism or aortic dissection.  He agreed that his pain is reproducible.  I encouraged him to discuss his plan of care for atrial fibrillation/atrial flutter with Dr. Evans-including discussion about antiplatelet and anticoagulation therapy.  patient tells me he would like to have an ablation.  X-ray of his chest was obtained given possibility that he may have had blunt trauma to the chest with reproducible rib and anterior chest discomfort.  Blood work was obtained by nursing given patient's complaint of chest pain and discomfort.  Patient reports he has a history of chronic thrombocytopenia and his baseline platelet count is between 45-60K.  His labs today show thrombocytopenia- which is chronic as well as an abnormal bilirubin which is expected with underlying history of cirrhosis and portal hypertension with recent ultrasound from April 2018.    He was discharged home with suspect chest wall pain or pain that is musculoskeletal in nature given the reproducible nature.  Follow-up as discussed.  Return to the ED for worsening symptoms.      Disclaimer: This note consists of symbols derived from keyboarding, dictation and/or voice recognition software. As a result, there may be errors in the script that have gone undetected. Please consider this when interpreting information found in this chart.  I have reviewed the nursing notes.    I have reviewed the findings, diagnosis, plan and need for follow up with the patient.       Discharge Medication List as of 5/10/2018  6:12 PM          Final  diagnoses:   Chest pain, unspecified type - intermittent, reproducible   Chronic atrial fibrillation (H)     This document serves as a record of the services and decisions personally performed and made by Agustin Rodrigues, *. It was created on HIS/HER behalf by   Tiffani Painter, a trained medical scribe. The creation of this document is based the provider's statements to the medical scribe.  Tiffani Painter 4:22 PM 5/10/2018    Provider:   The information in this document, created by the medical scribe for me, accurately reflects the services I personally performed and the decisions made by me. I have reviewed and approved this document for accuracy prior to leaving the patient care area.  Agustin Rodrigues, * 4:22 PM 5/10/2018    5/10/2018   [facility] EMERGENCY DEPARTMENT     Agustin Rodrigues MD  05/10/18 [id number]

## 2018-05-10 NOTE — DISCHARGE INSTRUCTIONS
*CHEST PAIN, UNCERTAIN CAUSE    Based on your exam today, the exact cause of your chest pain is not certain. Your condition does not seem serious at this time, and your pain does not appear to be coming from your heart. However, sometimes the signs of a serious problem take more time to appear. Therefore, watch for the warning signs listed below.  HOME CARE:    1. Rest today and avoid strenuous activity.  2. Take any prescribed medicine as directed.  FOLLOW UP with your doctor in 1-3 days.   GET PROMPT MEDICAL ATTENTION if any of the following occur:    A change in the type of pain: if it feels different, becomes more severe, lasts longer, or begins to spread into your shoulder, arm, neck, jaw or back    Shortness of breath or increased pain with breathing    Weakness, dizziness, or fainting    Cough with blood or dark colored sputum (phlegm)    Fever over 101  F (38.3  C)    Swelling, pain or redness in one leg    3133-0489 [employer]. [address] [place] [address]. All rights reserved. This information is not intended as a substitute for professional medical care. Always follow your healthcare professional's instructions.  This information has been modified by your health care provider with permission from the publisher.

## 2018-05-10 NOTE — ED AVS SNAPSHOT
[facility] [place] [facility]    [address] [address]    Phone:  [phone]    Fax:  [phone]                                       Maurice Jon   MRN: [id number]    Department:  [facility] [place] [facility]   Date of Visit:  5/10/2018           After Visit Summary Signature Page     I have received my discharge instructions, and my questions have been answered. I have discussed any challenges I see with this plan with the nurse or doctor.    ..........................................................................................................................................  Patient/Patient Representative Signature      ..........................................................................................................................................  Patient Representative Print Name and Relationship to Patient    ..................................................               ................................................  Date                                            Time    ..........................................................................................................................................  Reviewed by Signature/Title    ...................................................              ..............................................  Date                                                            Time

## 2018-05-18 ENCOUNTER — NURSE TRIAGE (OUTPATIENT)
Dept: NURSING | Facility: CLINIC | Age: 58
End: 2018-05-18

## 2018-05-18 ENCOUNTER — TRANSFERRED RECORDS (OUTPATIENT)
Dept: HEALTH INFORMATION MANAGEMENT | Facility: CLINIC | Age: 58
End: 2018-05-18

## 2018-05-18 LAB
ALBUMIN SERPL-MCNC: 3.3 G/DL (ref 3.5–5)
ALP SERPL-CCNC: 72 U/L (ref 45–120)
ALT SERPL W P-5'-P-CCNC: 64 U/L (ref 0–45)
ALT SERPL-CCNC: 64 U/L (ref 0–45)
ANION GAP SERPL CALCULATED.3IONS-SCNC: 9 MMOL/L (ref 5–18)
AST SERPL W P-5'-P-CCNC: 87 U/L (ref 0–40)
AST SERPL-CCNC: 87 U/L (ref 0–40)
BASOPHILS # BLD AUTO: 0 THOU/UL (ref 0–0.2)
BASOPHILS NFR BLD AUTO: 0 % (ref 0–2)
BILIRUB DIRECT SERPL-MCNC: 0.9 MG/DL
BILIRUB SERPL-MCNC: 2.4 MG/DL (ref 0–1)
BUN SERPL-MCNC: 16 MG/DL (ref 8–22)
CALCIUM SERPL-MCNC: 8.9 MG/DL (ref 8.5–10.5)
CHLORIDE BLD-SCNC: 108 MMOL/L (ref 98–107)
CO2 SERPL-SCNC: 23 MMOL/L (ref 22–31)
CREAT SERPL-MCNC: 0.68 MG/DL (ref 0.7–1.3)
CREAT SERPL-MCNC: 0.68 MG/DL (ref 0.7–1.3)
EOSINOPHIL # BLD AUTO: 0.1 THOU/UL (ref 0–0.4)
EOSINOPHIL NFR BLD AUTO: 3 % (ref 0–6)
ERYTHROCYTE [DISTWIDTH] IN BLOOD BY AUTOMATED COUNT: 13.4 % (ref 11–14.5)
GFR SERPL CREATININE-BSD FRML MDRD: >60 ML/MIN/1.73M2
GFR SERPL CREATININE-BSD FRML MDRD: >60 ML/MIN/1.73M2
GLUCOSE BLD-MCNC: 87 MG/DL (ref 70–125)
GLUCOSE SERPL-MCNC: 87 MG/DL (ref 70–125)
HCT VFR BLD AUTO: 40.3 % (ref 40–54)
HGB BLD-MCNC: 14.2 G/DL (ref 14–18)
INR PPP: 1.35 (ref 0.9–1.1)
LIPASE SERPL-CCNC: 27 U/L (ref 0–52)
LYMPHOCYTES # BLD AUTO: 0.7 THOU/UL (ref 0.8–4.4)
LYMPHOCYTES NFR BLD AUTO: 15 % (ref 20–40)
MAGNESIUM SERPL-MCNC: 1.6 MG/DL (ref 1.8–2.6)
MCH RBC QN AUTO: 35.7 PG (ref 27–34)
MCHC RBC AUTO-ENTMCNC: 35.2 G/DL (ref 32–36)
MCV RBC AUTO: 101 FL (ref 80–100)
MONOCYTES # BLD AUTO: 0.8 THOU/UL (ref 0–0.9)
MONOCYTES NFR BLD AUTO: 15 % (ref 2–10)
NEUTROPHILS # BLD AUTO: 3.3 THOU/UL (ref 2–7.7)
NEUTROPHILS NFR BLD AUTO: 68 % (ref 50–70)
PLATELET # BLD AUTO: 54 THOU/UL (ref 140–440)
PMV BLD AUTO: 11.6 FL (ref 8.5–12.5)
POTASSIUM BLD-SCNC: 4.4 MMOL/L (ref 3.5–5)
POTASSIUM SERPL-SCNC: 4.4 MMOL/L (ref 3.5–5)
PROT SERPL-MCNC: 6.1 G/DL (ref 6–8)
RBC # BLD AUTO: 3.98 MILL/UL (ref 4.4–6.2)
SODIUM SERPL-SCNC: 140 MMOL/L (ref 136–145)
TROPONIN I SERPL-MCNC: 0.02 NG/ML (ref 0–0.29)
WBC: 4.9 THOU/UL (ref 4–11)

## 2018-05-18 ASSESSMENT — MIFFLIN-ST. JEOR: SCORE: 1809.86

## 2018-05-18 NOTE — TELEPHONE ENCOUNTER
Kidneys: Both kidneys are of normal echotexture, without mass or  hydronephrosis.   The craniocaudal dimensions are: right- 13.0 cm,  left- 13.0 cm. Simple cyst in the left kidney measures 8 mm,  unchanged. 5 mm calculus in the inferior pole of the right kidney is  not significantly changed. Multiple nonobstructing calculi in the left  kidney also appears similar compared to the prior exam. The larger of  which measures up to 9 mm in diameter.    Right sided pain, thinks it's his kidney stone, request to read ultrasound results to see which kidney has the stone in it. Read, as above. He will seek out care after work. He is going to push fluids. If it worsens he will seek out care sooner.  Esther Whyte RN-[facility]

## 2018-05-19 ENCOUNTER — RECORDS - HEALTHEAST (OUTPATIENT)
Dept: MEDSURG UNIT | Facility: HOSPITAL | Age: 58
End: 2018-05-19

## 2018-05-19 DIAGNOSIS — I48.91 ATRIAL FIBRILLATION WITH RAPID VENTRICULAR RESPONSE (H): ICD-10-CM

## 2018-05-19 DIAGNOSIS — R07.9 CHEST PAIN: ICD-10-CM

## 2018-05-19 DIAGNOSIS — I82.890 SPLENIC VEIN THROMBOSIS: ICD-10-CM

## 2018-05-19 DIAGNOSIS — E83.42 HYPOMAGNESEMIA: ICD-10-CM

## 2018-05-19 LAB
ANION GAP SERPL CALCULATED.3IONS-SCNC: 6 MMOL/L (ref 5–18)
BUN SERPL-MCNC: 13 MG/DL (ref 8–22)
CALCIUM SERPL-MCNC: 8 MG/DL (ref 8.5–10.5)
CHLORIDE BLD-SCNC: 107 MMOL/L (ref 98–107)
CHOLEST SERPL-MCNC: 105 MG/DL
CO2 SERPL-SCNC: 24 MMOL/L (ref 22–31)
CREAT SERPL-MCNC: 0.67 MG/DL (ref 0.7–1.3)
GFR SERPL CREATININE-BSD FRML MDRD: >60 ML/MIN/1.73M2
GLUCOSE BLD-MCNC: 93 MG/DL (ref 70–125)
HDLC SERPL-MCNC: 42 MG/DL
HDLC SERPL-MCNC: 42 MG/DL
LDLC SERPL CALC-MCNC: 50 MG/DL
LDLC SERPL CALC-MCNC: 50 MG/DL
MAGNESIUM SERPL-MCNC: 1.9 MG/DL (ref 1.8–2.6)
POTASSIUM BLD-SCNC: 4.1 MMOL/L (ref 3.5–5)
SODIUM SERPL-SCNC: 137 MMOL/L (ref 136–145)
TRIGL SERPL-MCNC: 63 MG/DL
TRIGL SERPL-MCNC: 63 MG/DL
TROPONIN I SERPL-MCNC: 0.01 NG/ML (ref 0–0.29)
TROPONIN I SERPL-MCNC: <0.01 NG/ML (ref 0–0.29)

## 2018-05-23 DIAGNOSIS — K76.6 PORTAL HYPERTENSION (H): ICD-10-CM

## 2018-05-23 NOTE — TELEPHONE ENCOUNTER
"Requested Prescriptions   Pending Prescriptions Disp Refills     furosemide (LASIX) 40 MG tablet  Last Written Prescription Date:  03/28/2017  Last Fill Quantity: 45,  # refills: 3   Last office visit: 4/20/2018 with prescribing provider:  robles   Future Office Visit:     45 tablet 3     Sig: Take 0.5 tablets (20 mg) by mouth daily    Diuretics (Including Combos) Protocol Passed    5/23/2018 10:52 AM       Passed - Blood pressure under 140/90 in past 12 months    BP Readings from Last 3 Encounters:   05/10/18 100/62   04/20/18 108/58   04/05/18 99/63                Passed - Recent (12 mo) or future (30 days) visit within the authorizing provider's specialty    Patient had office visit in the last 12 months or has a visit in the next 30 days with authorizing provider or within the authorizing provider's specialty.  See \"Patient Info\" tab in inbasket, or \"Choose Columns\" in Meds & Orders section of the refill encounter.           Passed - Patient is age 18 or older       Passed - Normal serum creatinine on file in past 12 months    Recent Labs   Lab Test  05/10/18   1600   CR  0.70             Passed - Normal serum potassium on file in past 12 months    Recent Labs   Lab Test  05/10/18   1600   POTASSIUM  4.0                   Passed - Normal serum sodium on file in past 12 months    Recent Labs   Lab Test  05/10/18   1600   NA  144              Timo Leahy RT (R)    "

## 2018-05-24 RX ORDER — FUROSEMIDE 40 MG
20 TABLET ORAL DAILY
Qty: 45 TABLET | Refills: 3 | Status: ON HOLD | OUTPATIENT
Start: 2018-05-24 | End: 2018-12-27

## 2018-06-05 ENCOUNTER — OFFICE VISIT (OUTPATIENT)
Dept: CARDIOLOGY | Facility: CLINIC | Age: 58
End: 2018-06-05
Payer: COMMERCIAL

## 2018-06-05 VITALS
WEIGHT: 235.2 LBS | OXYGEN SATURATION: 95 % | HEART RATE: 88 BPM | SYSTOLIC BLOOD PRESSURE: 98 MMHG | DIASTOLIC BLOOD PRESSURE: 62 MMHG | BODY MASS INDEX: 37.96 KG/M2

## 2018-06-05 DIAGNOSIS — I48.91 ATRIAL FIBRILLATION WITH RAPID VENTRICULAR RESPONSE (H): Primary | ICD-10-CM

## 2018-06-05 PROCEDURE — 99215 OFFICE O/P EST HI 40 MIN: CPT | Performed by: INTERNAL MEDICINE

## 2018-06-05 NOTE — LETTER
2018      Adrian Pineda MD  [address] [facility] [address]      RE: Maurice Jon       Dear Colleague,    I had the pleasure of seeing Maurice Jon in the [facility].    Service Date: 2018      Adrian Pineda MD    [facility]    [address] [place] [address]      RE: Maurice Jon    MRN:  [id number]   : 1960      Dear Dr. Pineda:       Thank you for allowing me to participate in the care of this very delightful patient.  As you know, Yomi is a 57-year-old gentleman with history of liver cirrhosis due to nonalcoholic steatohepatitis associated portal hypertension and has been followed by the Liver Clinic at the [facility] with Dr. Roque.  The patient does have a history of symptomatic, long-lasting, persistent atrial fibrillation and at one point underwent DC cardioversion and was able to maintain sinus rhythm for a brief period of time with improvement in his symptoms.  The patient unfortunately reverted back to atrial fibrillation with symptoms of feeling more fatigued.  At that time, we discussed at length about rhythm control strategy with antiarrhythmic drug versus an ablation.  Because of the liver cirrhosis, antiarrhythmic drug was somewhat limited and therefore, we opted to rate control strategy and the patient has been in chronic atrial fibrillation for the past few years now.  I last saw this patient in 2017.      Recently, he was hospitalized for atypical chest discomfort.  Extensive evaluation was done including a chest CT which showed evidence of PE.  He was in atrial fibrillation with rapid ventricular response.      We discussed at length about options going forward.  Yomi mentioned that he continued to feel fatigued and tired which could be the result of titrating up the dose of beta blocker versus atrial fibrillation itself.  The patient apparently had varices noted on  the CT scan and he is scheduled to see Dr. Roque soon.  Obviously, if we pursued rhythm control strategy, he would need to be on anticoagulation for short-term and given the progression of his liver disease, it is unclear whether he would be a candidate for short-term anticoagulation.  The other option is ablate AV node and implant either a CRT or His bundle pacing.  I would like to discuss his care with my EP colleagues and come up with the optimal treatment option for the patient.  We will contact patient with my final recommendation in the next 1-2 weeks.      Sincerely,         CIRILO MURGUIA MD             D: 2018   T: 2018   MT: ELISA      Name:     MARILY NAVARRO   MRN:      [id number]        Account:      [id number]   :      1960           Service Date: 2018      Document: [id number]         Outpatient Encounter Prescriptions as of 2018   Medication Sig Dispense Refill     calcium carb 1250 mg, 500 mg [place],/vitamin D 200 units (OSCAL WITH D) 500-200 MG-UNIT per tablet Take 2 tablets by mouth daily with food.       clotrimazole (LOTRIMIN) 1 % cream Apply topically 2 times daily (Patient taking differently: Apply topically 2 times daily as needed ) 60 g 1     furosemide (LASIX) 40 MG tablet Take 0.5 tablets (20 mg) by mouth daily 45 tablet 3     metoprolol (TOPROL XL) 100 MG 24 hr tablet Take 1 tablet (100 mg) by mouth every evening in addition to your 25 mg dose every morning. 90 tablet 3     metoprolol (TOPROL-XL) 25 MG 24 hr tablet Take 1 tablet (25 mg) by mouth every morning in addition to your 100 mg dose every evening. 90 tablet 3     mometasone-formoterol (DULERA) 200-5 MCG/ACT oral inhaler Inhale 2 puffs into the lungs 2 times daily as needed Appt DUE 2017 1 Inhaler 1     pantoprazole (PROTONIX) 40 MG EC tablet Take 1 tablet (40 mg) by mouth daily as needed 90 tablet 3     spironolactone (ALDACTONE) 25 MG tablet Take 1 tablet (25 mg) by mouth daily We will no longer  fill unless seen by cardiology 90 tablet 3     triamcinolone (KENALOG) 0.1 % cream Apply sparingly to affected area three times daily as needed (Patient taking differently: as needed Apply sparingly to affected area three times daily as needed) 80 g 0     order for DME Equipment being ordered:   New CPAP mask, tube, filters,water tray (Patient not taking: Reported on 6/5/2018) 1 Device 3     order for DME Open toe size large 30/40 Mediven Assure Medical Compression socks Model 99025. (Patient not taking: Reported on 6/5/2018) 4 Package 3     ORDER FOR DME Respironics RemStar 60 Series Auto AFlex 12-15 cm H2O with heated humidty and a modem.  Pt chose a Quattro Air FFM size medium. (Patient not taking: Reported on 6/5/2018)       ORDER FOR DME Juzo compression stockings, 30-40mm compression. Patient has portal hypertension and develops leg edema, which these control well. (Patient not taking: Reported on 6/5/2018) 2 Package 3     No facility-administered encounter medications on file as of 6/5/2018.        Again, thank you for allowing me to participate in the care of your patient.      Sincerely,    Kristofer Brown MD     [facility]

## 2018-06-05 NOTE — LETTER
6/5/2018    Adrian Pineda MD  [address]  [facility] [address]    RE: Maurice Jon       Dear Colleague,    I had the pleasure of seeing Maurice Jon in the [facility].    HPI and Plan:   See dictation  [id number]  No orders of the defined types were placed in this encounter.      No orders of the defined types were placed in this encounter.      There are no discontinued medications.      No diagnosis found.    CURRENT MEDICATIONS:  Current Outpatient Prescriptions   Medication Sig Dispense Refill     calcium carb 1250 mg, 500 mg [place],/vitamin D 200 units (OSCAL WITH D) 500-200 MG-UNIT per tablet Take 2 tablets by mouth daily with food.       clotrimazole (LOTRIMIN) 1 % cream Apply topically 2 times daily (Patient taking differently: Apply topically 2 times daily as needed ) 60 g 1     furosemide (LASIX) 40 MG tablet Take 0.5 tablets (20 mg) by mouth daily 45 tablet 3     metoprolol (TOPROL XL) 100 MG 24 hr tablet Take 1 tablet (100 mg) by mouth every evening in addition to your 25 mg dose every morning. 90 tablet 3     metoprolol (TOPROL-XL) 25 MG 24 hr tablet Take 1 tablet (25 mg) by mouth every morning in addition to your 100 mg dose every evening. 90 tablet 3     mometasone-formoterol (DULERA) 200-5 MCG/ACT oral inhaler Inhale 2 puffs into the lungs 2 times daily as needed Appt DUE Jan 2017 1 Inhaler 1     pantoprazole (PROTONIX) 40 MG EC tablet Take 1 tablet (40 mg) by mouth daily as needed 90 tablet 3     spironolactone (ALDACTONE) 25 MG tablet Take 1 tablet (25 mg) by mouth daily We will no longer fill unless seen by cardiology 90 tablet 3     triamcinolone (KENALOG) 0.1 % cream Apply sparingly to affected area three times daily as needed (Patient taking differently: as needed Apply sparingly to affected area three times daily as needed) 80 g 0     order for DME Equipment being ordered:   New CPAP mask, tube, filters,water tray (Patient not taking:  Reported on 6/5/2018) 1 Device 3     order for DME Open toe size large 30/40 Mediven Assure Medical Compression socks Model 68865. (Patient not taking: Reported on 6/5/2018) 4 Package 3     ORDER FOR DME Respironics RemStar 60 Series Auto AFlex 12-15 cm H2O with heated humidty and a modem.  Pt chose a Quattro Air FFM size medium. (Patient not taking: Reported on 6/5/2018)       ORDER FOR DME Juzo compression stockings, 30-40mm compression. Patient has portal hypertension and develops leg edema, which these control well. (Patient not taking: Reported on 6/5/2018) 2 Package 3       ALLERGIES     Allergies   Allergen Reactions     Avelox Other (See Comments) and GI Disturbance     portal hypertension       PAST MEDICAL HISTORY:  Past Medical History:   Diagnosis Date     Acute pulmonary edema (H)      Atrial fibrillation (H)      Atrial fibrillation with rapid ventricular response (H) 5/13/2013     Calculus of ureter 1993, 1997    history of     Cirrhosis of liver without mention of alcohol 8/22/2005    Cirrhosis, idiopathic     Edema 5/13/2013     Esophageal varices with bleeding(456.0)      Gallstones      Genital herpes, unspecified 3/20/1999    resolving herpes progenitalis     GERD (gastroesophageal reflux disease)      Hematuria      Hypertension      Hypochondriasis     problems in past     Obesity 11/24/2010     BRUCE (obstructive sleep apnea) 3/17/2009    Mild AHI 8.4  RDI 31     Portal hypertension (H) 1/28/2010     Unspecified thrombocytopenia 8/22/2005    Thrombocytopenia associated with cirrhosis and portal hypertension       PAST SURGICAL HISTORY:  Past Surgical History:   Procedure Laterality Date     ANESTHESIA CARDIOVERSION N/A 1/19/2015    Procedure: ANESTHESIA CARDIOVERSION;  Surgeon: Generic Anesthesia Provider;  Location: [facility]     COLONOSCOPY N/A 8/14/2017    Procedure: COLONOSCOPY;  Colonoscopy Called will arrive appx 12:30 Per phone call;  Surgeon: Vincent Whittington MD;  Location:  GI      ESOPHAGOSCOPY, GASTROSCOPY, DUODENOSCOPY (EGD), COMBINED  7/11/2011    Procedure:COMBINED ESOPHAGOSCOPY, GASTROSCOPY, DUODENOSCOPY (EGD); Surgeon:LAURA LAMAS; Location:[facility] GI     ESOPHAGOSCOPY, GASTROSCOPY, DUODENOSCOPY (EGD), COMBINED  9/10/2012    Procedure: COMBINED ESOPHAGOSCOPY, GASTROSCOPY, DUODENOSCOPY (EGD);;  Surgeon: Hi Roque MD;  Location:  GI     ESOPHAGOSCOPY, GASTROSCOPY, DUODENOSCOPY (EGD), COMBINED  9/9/2013    Procedure: COMBINED ESOPHAGOSCOPY, GASTROSCOPY, DUODENOSCOPY (EGD);;  Surgeon: Hi Roque MD;  Location:  GI     ESOPHAGOSCOPY, GASTROSCOPY, DUODENOSCOPY (EGD), COMBINED N/A 9/15/2014    Procedure: COMBINED ESOPHAGOSCOPY, GASTROSCOPY, DUODENOSCOPY (EGD);  Surgeon: Hi Roque MD;  Location:  GI     ESOPHAGOSCOPY, GASTROSCOPY, DUODENOSCOPY (EGD), COMBINED N/A 10/30/2015    Procedure: COMBINED ESOPHAGOSCOPY, GASTROSCOPY, DUODENOSCOPY (EGD);  Surgeon: Feliberto Fox MD;  Location:  GI     ESOPHAGOSCOPY, GASTROSCOPY, DUODENOSCOPY (EGD), COMBINED N/A 10/10/2016    Procedure: COMBINED ESOPHAGOSCOPY, GASTROSCOPY, DUODENOSCOPY (EGD);  Surgeon: Jose Nesbitt MD;  Location:  GI     HERNIA REPAIR       IRRIGATION AND DEBRIDEMENT ABSCESS SCROTUM, COMBINED  10/4/2012    Procedure: COMBINED IRRIGATION AND DEBRIDEMENT ABSCESS SCROTUM;  Irrigation and Debridement of Groin Abscess;  Surgeon: Benny Shoemaker MD;  Location: [facility] OR     SOFT TISSUE SURGERY       SURGICAL HISTORY OF -   1993    rt elbow     SURGICAL HISTORY OF -   1/15/98    repair of ventral and umbilical hernia     SURGICAL HISTORY OF -   2001    endoscopy       FAMILY HISTORY:  Family History   Problem Relation Age of Onset     Lipids Mother      Hypertension Mother      Eye Disorder Mother      HEART DISEASE Father      CHF     Lipids Father      Obesity Father      HEART DISEASE Brother      MI     Eye Disorder Brother      CANCER Other      maternal grandparent/throat cancer       SOCIAL  HISTORY:  Social History     Social History     Marital status:      Spouse name: N/A     Number of children: N/A     Years of education: N/A     Social History Main Topics     Smoking status: Former Smoker     Packs/day: 0.80     Years: 6.00     Types: Cigarettes     Quit date: 1/1/2002     Smokeless tobacco: Never Used     Alcohol use No      Comment: quit in 2007     Drug use: No     Sexual activity: Yes     Partners: Female     Other Topics Concern     Parent/Sibling W/ Cabg, Mi Or Angioplasty Before 65f 55m? Yes     BROTHER   - MI AT AGE 44     Exercise No     Social History Narrative       Review of Systems:  Skin:  Positive for rash     Eyes:  Negative      ENT:  Positive for nasal congestion    Respiratory:  Positive for shortness of breath;sleep apnea;CPAP     Cardiovascular:    Positive for;chest pain;palpitations;lower extremity symptoms;edema;fatigue    Gastroenterology: Positive for excessive gas or bloating;heartburn    Genitourinary:  Positive for urinary frequency    Musculoskeletal:  Positive for joint pain;joint stiffness;nocturnal cramping    Neurologic:  Positive for numbness or tingling of hands;numbness or tingling of feet    Psychiatric:  Negative      Heme/Lymph/Imm:  Positive for   thrombocytopenia  Endocrine:  Negative        Physical Exam:  Vitals: BP 98/62 (BP Location: Right arm, Patient Position: Sitting, Cuff Size: Adult Regular)  Pulse 88  Wt 106.7 kg (235 lb 3.2 oz)  SpO2 95%  BMI 37.96 kg/m2    Constitutional:  cooperative, alert and oriented, well developed, well nourished, in no acute distress obese      Skin:  warm and dry to the touch, no apparent skin lesions or masses noted          Head:  normocephalic, no masses or lesions        Eyes:  pupils equal and round, conjunctivae and lids unremarkable, sclera white, no xanthalasma, EOMS intact, no nystagmus        Lymph:No Cervical lymphadenopathy present     ENT:           Neck:  carotid pulses are full and equal  bilaterally, JVP normal, no carotid bruit        Respiratory:  normal breath sounds, clear to auscultation, normal A-P diameter, normal symmetry, normal respiratory excursion, no use of accessory muscles         Cardiac:                                                          GI:  abdomen soft, non-tender, BS normoactive, no mass, no HSM, no bruits obese      Extremities and Muscular Skeletal:  no deformities, clubbing, cyanosis, erythema observed              Neurological:  no gross motor deficits        Psych:  Alert and Oriented x 3        CC  No referring provider defined for this encounter.                Thank you for allowing me to participate in the care of your patient.      Sincerely,     Kristofer Brown MD     [facility]    cc:   No referring provider defined for this encounter.

## 2018-06-05 NOTE — MR AVS SNAPSHOT
After Visit Summary   6/5/2018    Maurice Jon    MRN: [id number]           Patient Information     Date Of Birth          1960        Visit Information        Provider Department      6/5/2018 3:30 PM Kristofer Evans MD [facility]        Today's Diagnoses     Atrial fibrillation with rapid ventricular response (H)    -  1       Follow-ups after your visit        Your next 10 appointments already scheduled     Oct 04, 2018  7:15 AM CDT   Lab with  LAB   [facility] ([facility])    [address] [place] [id number] [phone]            Oct 04, 2018  7:45 AM CDT   US ABDOMEN COMPLETE with US1   [facility] ([facility])    [address] [place] [id number] [phone]           Please bring a list of your medicines (including vitamins, minerals and over-the-counter drugs). Also, tell your doctor about any allergies you may have. Wear comfortable clothes and leave your valuables at home.  Adults: No eating or drinking for 8 hours before the exam. You may take medicine with a small sip of water.  Children: - Children 6+ years: No food or drink for 6 hours before exam. - Children 1-5 years: No food or drink for 4 hours before exam. - Infants, breast-fed: may have breast milk up to 2 hours before exam. - Infants, formula: may have bottle until 4 hours before exam.  Please call the Imaging Department at your exam site with any questions.            Oct 04, 2018  8:45 AM CDT   (Arrive by 8:30 AM)   Return General Liver with Hi Roque MD   [facility] ([facility])    [address] [place] [address]  [address]  [facility] [id number] [phone]              Who to contact     If you have questions or need follow up information about today's clinic visit or your schedule please contact [facility]  [facility] directly at [phone].  Normal or non-critical lab and imaging results will be communicated to you by MyChart, letter or phone within 4 business days after the clinic has received the results. If you do not hear from us within 7 days, please contact the clinic through [employer] or phone. If you have a critical or abnormal lab result, we will notify you by phone as soon as possible.  Submit refill requests through [employer] or call your pharmacy and they will forward the refill request to us. Please allow 3 business days for your refill to be completed.          Additional Information About Your Visit        [employer] Information     [employer] gives you secure access to your electronic health record. If you see a primary care provider, you can also send messages to your care team and make appointments. If you have questions, please call your primary care clinic.  If you do not have a primary care provider, please call [phone] and they will assist you.        Care EveryWhere ID     This is your Care EveryWhere ID. This could be used by other organizations to access your [place] medical records  [id number]        Your Vitals Were     Pulse Pulse Oximetry BMI (Body Mass Index)             88 95% 37.96 kg/m2          Blood Pressure from Last 3 Encounters:   06/05/18 98/62   05/10/18 100/62   04/20/18 108/58    Weight from Last 3 Encounters:   06/05/18 106.7 kg (235 lb 3.2 oz)   04/20/18 109.3 kg (241 lb)   04/05/18 107.3 kg (236 lb 9.6 oz)              Today, you had the following     No orders found for display         Today's Medication Changes          These changes are accurate as of 6/5/18 11:59 PM.  If you have any questions, ask your nurse or doctor.               These medicines have changed or have updated prescriptions.        Dose/Directions    clotrimazole 1 % cream   Commonly known as:  LOTRIMIN   This may have changed:    - when to take this  - reasons to take this    Used for:  Yeast infection of the skin        Apply topically 2 times daily   Quantity:  60 g   Refills:  1       triamcinolone 0.1 % cream   Commonly known as:  KENALOG   This may have changed:    - when to take this  - reasons to take this  - additional instructions   Used for:  Eczema        Apply sparingly to affected area three times daily as needed   Quantity:  80 g   Refills:  0                Primary Care Provider Office Phone # Fax #    Adrian Braulio Pineda [phone] [address] [facility] [address]        Equal Access to Services     [facility] HospitalFLORENCIA : Hadii aad ku hadasho Soomaali, waaxda luqadaha, qaybta kaalmada adeegyada, waxay marcellus haytacho duncan . So [facility] [phone].    ATENCIÓN: Si habla español, tiene a allen disposición servicios gratuitos de asistencia lingüística. [facility] [phone].    We comply with applicable federal civil rights laws and [place] laws. We do not discriminate on the basis of race, color, national origin, age, disability, sex, sexual orientation, or gender identity.            Thank you!     Thank you for choosing [facility]  for your care. Our goal is always to provide you with excellent care. Hearing back from our patients is one way we can continue to improve our services. Please take a few minutes to complete the written survey that you may receive in the mail after your visit with us. Thank you!             Your Updated Medication List - Protect others around you: Learn how to safely use, store and throw away your medicines at www.disposemymeds.org.          This list is accurate as of 6/5/18 11:59 PM.  Always use your most recent med list.                   Brand Name Dispense Instructions for use Diagnosis    Calcium carb-Vitamin D 500 mg [place]-200 units 500-200 MG-UNIT per tablet    OSCAL with D;Oyster Shell Calcium     Take 2 tablets by mouth daily with food.        clotrimazole  1 % cream    LOTRIMIN    60 g    Apply topically 2 times daily    Yeast infection of the skin       furosemide 40 MG tablet    LASIX    45 tablet    Take 0.5 tablets (20 mg) by mouth daily    Portal hypertension (H)       * metoprolol succinate 100 MG 24 hr tablet    TOPROL XL    90 tablet    Take 1 tablet (100 mg) by mouth every evening in addition to your 25 mg dose every morning.    Atrial fibrillation with rapid ventricular response (H)       * metoprolol succinate 25 MG 24 hr tablet    TOPROL-XL    90 tablet    Take 1 tablet (25 mg) by mouth every morning in addition to your 100 mg dose every evening.    Atrial fibrillation with rapid ventricular response (H)       mometasone-formoterol 200-5 MCG/ACT oral inhaler    DULERA    1 Inhaler    Inhale 2 puffs into the lungs 2 times daily as needed Appt DUE Jan 2017    Bronchospasm       * order for DME     2 Package    Juzo compression stockings, 30-40mm compression. Patient has portal hypertension and develops leg edema, which these control well.    Portal hypertension (H), Edema       * order for DME      Respironics RemStar 60 Series Auto AFlex 12-15 cm H2O with heated humidty and a modem.  Pt chose a Quattro Air FFM size medium.    BRUCE (obstructive sleep apnea)       order for DME     1 Device    Equipment being ordered:  New CPAP mask, tube, filters,water tray    Sleep apnea       order for DME     4 Package    Open toe size large 30/40 Mediven Assure Medical Compression socks Model 87500.    Portal hypertension (H), Hepatic cirrhosis (H), Edema       pantoprazole 40 MG EC tablet    PROTONIX    90 tablet    Take 1 tablet (40 mg) by mouth daily as needed    Gastroesophageal reflux disease without esophagitis       spironolactone 25 MG tablet    ALDACTONE    90 tablet    Take 1 tablet (25 mg) by mouth daily We will no longer fill unless seen by cardiology    Portal hypertension (H)       triamcinolone 0.1 % cream    KENALOG    80 g    Apply sparingly to affected  area three times daily as needed    Eczema       * Notice:  This list has 4 medication(s) that are the same as other medications prescribed for you. Read the directions carefully, and ask your doctor or other care provider to review them with you.

## 2018-06-06 NOTE — PROGRESS NOTES
HPI and Plan:   See dictation  [id number]  No orders of the defined types were placed in this encounter.      No orders of the defined types were placed in this encounter.      There are no discontinued medications.      No diagnosis found.    CURRENT MEDICATIONS:  Current Outpatient Prescriptions   Medication Sig Dispense Refill     calcium carb 1250 mg, 500 mg [place],/vitamin D 200 units (OSCAL WITH D) 500-200 MG-UNIT per tablet Take 2 tablets by mouth daily with food.       clotrimazole (LOTRIMIN) 1 % cream Apply topically 2 times daily (Patient taking differently: Apply topically 2 times daily as needed ) 60 g 1     furosemide (LASIX) 40 MG tablet Take 0.5 tablets (20 mg) by mouth daily 45 tablet 3     metoprolol (TOPROL XL) 100 MG 24 hr tablet Take 1 tablet (100 mg) by mouth every evening in addition to your 25 mg dose every morning. 90 tablet 3     metoprolol (TOPROL-XL) 25 MG 24 hr tablet Take 1 tablet (25 mg) by mouth every morning in addition to your 100 mg dose every evening. 90 tablet 3     mometasone-formoterol (DULERA) 200-5 MCG/ACT oral inhaler Inhale 2 puffs into the lungs 2 times daily as needed Appt DUE Jan 2017 1 Inhaler 1     pantoprazole (PROTONIX) 40 MG EC tablet Take 1 tablet (40 mg) by mouth daily as needed 90 tablet 3     spironolactone (ALDACTONE) 25 MG tablet Take 1 tablet (25 mg) by mouth daily We will no longer fill unless seen by cardiology 90 tablet 3     triamcinolone (KENALOG) 0.1 % cream Apply sparingly to affected area three times daily as needed (Patient taking differently: as needed Apply sparingly to affected area three times daily as needed) 80 g 0     order for DME Equipment being ordered:   New CPAP mask, tube, filters,water tray (Patient not taking: Reported on 6/5/2018) 1 Device 3     order for DME Open toe size large 30/40 Mediven Assure Medical Compression socks Model 49089. (Patient not taking: Reported on 6/5/2018) 4 Package 3     ORDER FOR DME Respironics RemStar 60 Series  Auto AFlex 12-15 cm H2O with heated humidty and a modem.  Pt chose a Quattro Air FFM size medium. (Patient not taking: Reported on 6/5/2018)       ORDER FOR DME Juzo compression stockings, 30-40mm compression. Patient has portal hypertension and develops leg edema, which these control well. (Patient not taking: Reported on 6/5/2018) 2 Package 3       ALLERGIES     Allergies   Allergen Reactions     Avelox Other (See Comments) and GI Disturbance     portal hypertension       PAST MEDICAL HISTORY:  Past Medical History:   Diagnosis Date     Acute pulmonary edema (H)      Atrial fibrillation (H)      Atrial fibrillation with rapid ventricular response (H) 5/13/2013     Calculus of ureter 1993, 1997    history of     Cirrhosis of liver without mention of alcohol 8/22/2005    Cirrhosis, idiopathic     Edema 5/13/2013     Esophageal varices with bleeding(456.0)      Gallstones      Genital herpes, unspecified 3/20/1999    resolving herpes progenitalis     GERD (gastroesophageal reflux disease)      Hematuria      Hypertension      Hypochondriasis     problems in past     Obesity 11/24/2010     BRUCE (obstructive sleep apnea) 3/17/2009    Mild AHI 8.4  RDI 31     Portal hypertension (H) 1/28/2010     Unspecified thrombocytopenia 8/22/2005    Thrombocytopenia associated with cirrhosis and portal hypertension       PAST SURGICAL HISTORY:  Past Surgical History:   Procedure Laterality Date     ANESTHESIA CARDIOVERSION N/A 1/19/2015    Procedure: ANESTHESIA CARDIOVERSION;  Surgeon: Generic Anesthesia Provider;  Location: [facility]     COLONOSCOPY N/A 8/14/2017    Procedure: COLONOSCOPY;  Colonoscopy Called will arrive appx 12:30 Per phone call;  Surgeon: Vincent Whittington MD;  Location:  GI     ESOPHAGOSCOPY, GASTROSCOPY, DUODENOSCOPY (EGD), COMBINED  7/11/2011    Procedure:COMBINED ESOPHAGOSCOPY, GASTROSCOPY, DUODENOSCOPY (EGD); Surgeon:LAURA LAMAS; Location:[facility]     ESOPHAGOSCOPY, GASTROSCOPY, DUODENOSCOPY (EGD),  COMBINED  9/10/2012    Procedure: COMBINED ESOPHAGOSCOPY, GASTROSCOPY, DUODENOSCOPY (EGD);;  Surgeon: Hi Roque MD;  Location: [facility]     ESOPHAGOSCOPY, GASTROSCOPY, DUODENOSCOPY (EGD), COMBINED  9/9/2013    Procedure: COMBINED ESOPHAGOSCOPY, GASTROSCOPY, DUODENOSCOPY (EGD);;  Surgeon: Hi Roque MD;  Location: [facility]     ESOPHAGOSCOPY, GASTROSCOPY, DUODENOSCOPY (EGD), COMBINED N/A 9/15/2014    Procedure: COMBINED ESOPHAGOSCOPY, GASTROSCOPY, DUODENOSCOPY (EGD);  Surgeon: Hi Roque MD;  Location: [facility]     ESOPHAGOSCOPY, GASTROSCOPY, DUODENOSCOPY (EGD), COMBINED N/A 10/30/2015    Procedure: COMBINED ESOPHAGOSCOPY, GASTROSCOPY, DUODENOSCOPY (EGD);  Surgeon: Feliberto Fox MD;  Location: [facility]     ESOPHAGOSCOPY, GASTROSCOPY, DUODENOSCOPY (EGD), COMBINED N/A 10/10/2016    Procedure: COMBINED ESOPHAGOSCOPY, GASTROSCOPY, DUODENOSCOPY (EGD);  Surgeon: Jose Nesbitt MD;  Location: [facility]     HERNIA REPAIR       IRRIGATION AND DEBRIDEMENT ABSCESS SCROTUM, COMBINED  10/4/2012    Procedure: COMBINED IRRIGATION AND DEBRIDEMENT ABSCESS SCROTUM;  Irrigation and Debridement of Groin Abscess;  Surgeon: Benny Shoemaker MD;  Location: [facility]     SOFT TISSUE SURGERY       SURGICAL HISTORY OF -   1993    rt elbow     SURGICAL HISTORY OF -   1/15/98    repair of ventral and umbilical hernia     SURGICAL HISTORY OF -   2001    endoscopy       FAMILY HISTORY:  Family History   Problem Relation Age of Onset     Lipids Mother      Hypertension Mother      Eye Disorder Mother      HEART DISEASE Father      CHF     Lipids Father      Obesity Father      HEART DISEASE Brother      MI     Eye Disorder Brother      CANCER Other      maternal grandparent/throat cancer       SOCIAL HISTORY:  Social History     Social History     Marital status:      Spouse name: N/A     Number of children: N/A     Years of education: N/A     Social History Main Topics     Smoking status: Former Smoker     Packs/day: 0.80      Years: 6.00     Types: Cigarettes     Quit date: 1/1/2002     Smokeless tobacco: Never Used     Alcohol use No      Comment: quit in 2007     Drug use: No     Sexual activity: Yes     Partners: Female     Other Topics Concern     Parent/Sibling W/ Cabg, Mi Or Angioplasty Before 65f 55m? Yes     BROTHER   - MI AT AGE 44     Exercise No     Social History Narrative       Review of Systems:  Skin:  Positive for rash     Eyes:  Negative      ENT:  Positive for nasal congestion    Respiratory:  Positive for shortness of breath;sleep apnea;CPAP     Cardiovascular:    Positive for;chest pain;palpitations;lower extremity symptoms;edema;fatigue    Gastroenterology: Positive for excessive gas or bloating;heartburn    Genitourinary:  Positive for urinary frequency    Musculoskeletal:  Positive for joint pain;joint stiffness;nocturnal cramping    Neurologic:  Positive for numbness or tingling of hands;numbness or tingling of feet    Psychiatric:  Negative      Heme/Lymph/Imm:  Positive for   thrombocytopenia  Endocrine:  Negative        Physical Exam:  Vitals: BP 98/62 (BP Location: Right arm, Patient Position: Sitting, Cuff Size: Adult Regular)  Pulse 88  Wt 106.7 kg (235 lb 3.2 oz)  SpO2 95%  BMI 37.96 kg/m2    Constitutional:  cooperative, alert and oriented, well developed, well nourished, in no acute distress obese      Skin:  warm and dry to the touch, no apparent skin lesions or masses noted          Head:  normocephalic, no masses or lesions        Eyes:  pupils equal and round, conjunctivae and lids unremarkable, sclera white, no xanthalasma, EOMS intact, no nystagmus        Lymph:No Cervical lymphadenopathy present     ENT:           Neck:  carotid pulses are full and equal bilaterally, JVP normal, no carotid bruit        Respiratory:  normal breath sounds, clear to auscultation, normal A-P diameter, normal symmetry, normal respiratory excursion, no use of accessory muscles         Cardiac:                                                           GI:  abdomen soft, non-tender, BS normoactive, no mass, no HSM, no bruits obese      Extremities and Muscular Skeletal:  no deformities, clubbing, cyanosis, erythema observed              Neurological:  no gross motor deficits        Psych:  Alert and Oriented x 3        CC  No referring provider defined for this encounter.

## 2018-06-06 NOTE — PROGRESS NOTES
Service Date: 2018      Adrian Pineda MD    [facility]    [address] [place] [address]      RE: Maurice Jon    MRN:  [id number]   : 1960      Dear Dr. Pineda:       Thank you for allowing me to participate in the care of this very delightful patient.  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 Roque.  The patient does have a history of symptomatic, long-lasting, persistent atrial fibrillation and at one point underwent DC cardioversion and was able to maintain sinus rhythm for a brief period of time with improvement in his symptoms.  The patient unfortunately reverted back to atrial fibrillation with symptoms of feeling more fatigued.  At that time, we discussed at length about rhythm control strategy with antiarrhythmic drug versus an ablation.  Because of the liver cirrhosis, antiarrhythmic drug was somewhat limited and therefore, we opted to rate control strategy and the patient has been in chronic atrial fibrillation for the past few years now.  I last saw this patient in 2017.      Recently, he was hospitalized for atypical chest discomfort.  Extensive evaluation was done including a chest CT which showed evidence of PE.  He was in atrial fibrillation with rapid ventricular response.      We discussed at length about options going forward.  Yomi mentioned that he continued to feel fatigued and tired which could be the result of titrating up the dose of beta blocker versus atrial fibrillation itself.  The patient apparently had varices noted on the CT scan and he is scheduled to see Dr. Roque soon.  Obviously, if we pursued rhythm control strategy, he would need to be on anticoagulation for short-term and given the progression of his liver disease, it is unclear whether he would  be a candidate for short-term anticoagulation.  The other option is ablate AV node and implant either a CRT or His bundle pacing.  I would like to discuss his care with my EP colleagues and come up with the optimal treatment option for the patient.  We will contact patient with my final recommendation in the next 1-2 weeks.      Sincerely,         CIRILO MURGUIA MD             D: 2018   T: 2018   MT: ELISA      Name:     MARILY NAVARRO   MRN:      [id number]        Account:      [id number]   :      1960           Service Date: 2018      Document: [id number]

## 2018-06-12 ENCOUNTER — TELEPHONE (OUTPATIENT)
Dept: CARDIOLOGY | Facility: CLINIC | Age: 58
End: 2018-06-12

## 2018-06-12 NOTE — TELEPHONE ENCOUNTER
"Discussed the message below with patient per phone. He will make an appointment with Dr. Roque to discuss if ok to be on anticoagulation (warfarin) for ~2-3 months. After that visit, patient will contact our office. If anticoagulation ok with Dr. Roque, pt will be set up with \"Anticoagulation Clinic\" and be started on warfarin. We will then schedule Lexiscan stress test. If negative, will initiate Flecainide medication trial per MD Cristina and proceed to DCCV after 21 consecutive therapeutic INR's documented. Pt states understanding of these instructions. Orders to be placed after patient contacts us after appointment with Dr. Roque. Salena Hernandes RN Cardiology at [facility] June 12, 2018, 4:00 PM     ----- Message from Kristofer Brown MD sent at 6/11/2018  4:17 PM CDT -----  I've discussed the case with my colleague. If Dr. Roque ok for him to be 2-3 months of warfarin I would recommend a trial of flecainide and proceed with dc cardioversion provided INR >2 for 3 weeks and negative nuclear stress test (please order one). If medical therapy failed then I would consider either AF ablation or AVN ablation with pacemaker. Thanks. qp    "

## 2018-08-06 ENCOUNTER — TELEPHONE (OUTPATIENT)
Dept: GASTROENTEROLOGY | Facility: CLINIC | Age: 58
End: 2018-08-06

## 2018-08-06 NOTE — TELEPHONE ENCOUNTER
[facility] Call Center    Phone Message    May a detailed message be left on voicemail: no    Reason for Call: Other: Pt of Dr. Roque calling and has questions about his Sept appt with Dr. Roque, please give pt a call     Action Taken: Message routed to:  [facility] Hepatology

## 2018-08-09 NOTE — TELEPHONE ENCOUNTER
Writer tried calling patient again, went straight to .  Patient has number to call back if still has questions regarding upcoming appointment.  No additional attempts will be made by writer.

## 2018-08-10 ENCOUNTER — MYC MEDICAL ADVICE (OUTPATIENT)
Dept: GASTROENTEROLOGY | Facility: CLINIC | Age: 58
End: 2018-08-10

## 2018-08-13 NOTE — TELEPHONE ENCOUNTER
Hi Roque MD Beckenbach, Shannon, LPN     I am OK with that.     ---------------    You   Hi Roque MD     Are you ok with the below per Dr. Evans for this patient?       Message from Kristofer Brown MD sent at 6/11/2018  4:17 PM CDT -----   I've discussed the case with my colleague. If Dr. Roque ok for him to be 2-3 months of warfarin I would recommend a trial of flecainide and proceed with dc cardioversion provided INR >2 for 3 weeks and negative nuclear stress test (please order one). If medical therapy failed then I would consider either AF ablation or AVN ablation with pacemaker. Thanks. lee (Routing comment)

## 2018-08-14 DIAGNOSIS — K74.60 CIRRHOSIS OF LIVER WITHOUT ASCITES, UNSPECIFIED HEPATIC CIRRHOSIS TYPE (H): Primary | ICD-10-CM

## 2018-08-16 ENCOUNTER — TELEPHONE (OUTPATIENT)
Dept: GASTROENTEROLOGY | Facility: CLINIC | Age: 58
End: 2018-08-16

## 2018-08-16 NOTE — TELEPHONE ENCOUNTER
[facility] Call Center    Phone Message    May a detailed message be left on voicemail: yes    Reason for Call: Other: Patient called to make sure it's OK to have ablation procedure and cardio version, needs the OK from Dr. Roque.     Action Taken: Message routed to:  [facility] hep

## 2018-08-17 NOTE — TELEPHONE ENCOUNTER
LVM that per Dr. Roque he was ok with Dr. Evans's recommendations for ablation and cardio version, this was already indicated in a [employer] message on 8/10/18 that patient didn't read. Call back number given if needed.

## 2018-08-28 ENCOUNTER — TELEPHONE (OUTPATIENT)
Dept: CARDIOLOGY | Facility: CLINIC | Age: 58
End: 2018-08-28

## 2018-08-28 DIAGNOSIS — I48.0 PAROXYSMAL ATRIAL FIBRILLATION (H): Primary | ICD-10-CM

## 2018-08-28 NOTE — TELEPHONE ENCOUNTER
Pt called in to say that Dr Roque has given him the OK to proceed with DCCV and ablation with short term anticoagulation. He will need to start on warfarin and have a Lexiscan done to start. Orders entered. Will have [facility] contact patient to set up warfarin and cardiology  contact patient to set up Lexiscan. Next steps depend on Lexiscan results. Pt aware. Melany Ewing RN Cardiology August 28, 2018, 11:04 AM

## 2018-08-30 ENCOUNTER — ANTICOAGULATION THERAPY VISIT (OUTPATIENT)
Dept: ANTICOAGULATION | Facility: CLINIC | Age: 58
End: 2018-08-30

## 2018-08-30 DIAGNOSIS — I48.20 CHRONIC ATRIAL FIBRILLATION (H): Primary | ICD-10-CM

## 2018-08-30 RX ORDER — WARFARIN SODIUM 5 MG/1
TABLET ORAL
Qty: 45 TABLET | Refills: 0 | Status: SHIPPED | OUTPATIENT
Start: 2018-08-30 | End: 2018-10-23

## 2018-08-30 NOTE — PROGRESS NOTES
Plan to reach out to new patient again tomorrow with warfarin start plan and new pt edu.  Kathy Conn RN  Anticoagulation Clinic

## 2018-08-30 NOTE — MR AVS SNAPSHOT
Maurice Jon   8/30/2018   Anticoagulation Therapy Visit    Description:  57 year old male   Provider:  Kathy Conn, RN   Department:  Cl Anticoag           INR as of 8/30/2018         The patient was not given dosing instructions in this encounter.      Anticoagulation Summary as of 8/30/2018         The patient was not given dosing instructions in this encounter.      Your next Anticoagulation Clinic appointment(s)     Sep 07, 2018  2:45 PM CDT   Anticoagulation Visit with [facility] ANTI COAG   [facility] ([facility])    [address]  [facility] [address]   [phone]

## 2018-09-04 NOTE — TELEPHONE ENCOUNTER
Could you make sure I have stress test result because I like to start him on flecainide prior to dc cardioversion. Thanks, qp

## 2018-09-07 ENCOUNTER — ANTICOAGULATION THERAPY VISIT (OUTPATIENT)
Dept: ANTICOAGULATION | Facility: CLINIC | Age: 58
End: 2018-09-07
Payer: COMMERCIAL

## 2018-09-07 DIAGNOSIS — I48.0 PAROXYSMAL ATRIAL FIBRILLATION (H): ICD-10-CM

## 2018-09-07 LAB — INR POINT OF CARE: 1.2 (ref 0.86–1.14)

## 2018-09-07 PROCEDURE — 36416 COLLJ CAPILLARY BLOOD SPEC: CPT

## 2018-09-07 PROCEDURE — 85610 PROTHROMBIN TIME: CPT | Mod: QW

## 2018-09-07 PROCEDURE — 99207 ZZC NO CHARGE NURSE ONLY: CPT

## 2018-09-07 NOTE — MR AVS SNAPSHOT
Maurice LISBETH Danay   9/7/2018 2:45 PM   Anticoagulation Therapy Visit    Description:  57 year old male   Provider:  MA[facility] ANTI COAG   Department:  Cl Anticoag           INR as of 9/7/2018     Today's INR 1.2!      Anticoagulation Summary as of 9/7/2018     INR goal 2.0-3.0    Today's INR 1.2!    Full warfarin instructions 9/7: 0 mg; 9/8: 2.5 mg; 9/9: 2.5 mg; 9/10: 2.5 mg; Otherwise No maintenance plan    Next INR check 9/11/2018      Your next Anticoagulation Clinic appointment(s)     Sep 11, 2018 10:45 AM CDT   Anticoagulation Visit with [facility] ANTI COAG   [facility]    [address]  [facility] [address] [phone]              Contact Numbers     Please call [phone] with any problems or questions regarding your therapy.    If you need to cancel and/or reschedule your appointment please call one of the following numbers:  [facility] [phone]  [facility] [phone]  [facility] [phone]  [facility] [place] [phone]  [place] - [phone]            September 2018 Details    Sun Mon Tue Wed Thu Fri Sat           1                 2               3               4               5               6               7      0 mg   See details      8      2.5 mg           9      2.5 mg         10      2.5 mg         11            12               13               14               15                 16               17               18               19               20               21               22                 23               24               25               26               27               28               29                 30                      Date Details   09/07 This INR check       Date of next INR:  9/11/2018         How to take your warfarin dose     To take:  2.5 mg Take 0.5 of a 5 mg tablet.

## 2018-09-07 NOTE — PROGRESS NOTES
ANTICOAGULATION INITIAL CLINIC VISIT    Patient Name:  Maurice Jon  Date:  9/7/2018  Referred by: Dr. Evans /Alon HINOJOSA  Contact Type:  Face to Face    SUBJECTIVE:  Coumadin education was completed today.  Topics covered include:  -Introduction to coumadin-USED warfarin in 2015 with DCCV  -Proper Administration  -INR Testing  -Sign/Symptoms of Bleeding  -Signs/Symptoms of Clot Formation or Stroke  -Dietary Intake of Vitamin K-eats salads a couple times weekly  -Drug Interactions-Protonix daily  -Anticoagulation Identification (bracelet, necklace or wallet card)  -Future Surgery DCCV ABLATON  -Effects of Alcohol, Tobacco, and Exercise on Coumadin-No tobacco no alcohol no study exercise routine    Coumadin Education Booklet and Coumadin Identification Wallet Card and pill cutter were given to the patient.       Patient Findings     Positives Initiation of therapy    Comments Patient was previously on warfarin therapy surrounding his last DCCV. Dr. Evans has ordered it again surround his DCCV and Ablation. He consulted Dr. Roque and he is ok with doing anticoagulation with warfarin even with his Platelets at 60 and his portal hypertension history. His previous warfarin maintenance dose was 20mg weekly. He did not begin warfarin prior to today, writer instructed him to begin tomorrow and take 2.5mg daily until his INR recheck 8:15 9/11/8. Patient verbalizes understanding and agrees to plan. No further questions or concerns.            OBJECTIVE    INR Protime   Date Value Ref Range Status   09/07/2018 1.2 (A) 0.86 - 1.14 Final       ASSESSMENT / PLAN  INR assessment SUB    Recheck INR In: 4 DAYS    INR Location Clinic      Anticoagulation Summary as of 9/7/2018     INR goal 2.0-3.0    Today's INR 1.2!    Warfarin maintenance plan No maintenance plan    Full warfarin instructions 9/7: 0 mg; 9/8: 2.5 mg; 9/9: 2.5 mg; 9/10: 2.5 mg; Otherwise No maintenance plan    Next INR check 9/11/2018    Target end  date 10/4/2018      Anticoagulation Episode Summary     INR check location     Preferred lab     Send INR reminders to CL ANTICOAG POOL    Comments anticoagulation short period surrounding ablation only per Dr. Evans's note via Melany Ewing RN      Anticoagulation Care Providers     Provider Role Specialty Phone number    Alon Pineda MD Referring [facility] [phone]            See the Encounter Report to view Anticoagulation Flowsheet and Dosing Calendar (Go to Encounters tab in chart review, and find the Anticoagulation Therapy Visit)    Dosage adjustment made based on physician directed care plan.    Kathy Conn RN

## 2018-09-11 ENCOUNTER — ANTICOAGULATION THERAPY VISIT (OUTPATIENT)
Dept: ANTICOAGULATION | Facility: CLINIC | Age: 58
End: 2018-09-11
Payer: COMMERCIAL

## 2018-09-11 LAB — INR POINT OF CARE: 1.5 (ref 0.86–1.14)

## 2018-09-11 PROCEDURE — 99207 ZZC NO CHARGE NURSE ONLY: CPT

## 2018-09-11 PROCEDURE — 36416 COLLJ CAPILLARY BLOOD SPEC: CPT

## 2018-09-11 PROCEDURE — 85610 PROTHROMBIN TIME: CPT | Mod: QW

## 2018-09-11 NOTE — PROGRESS NOTES
ANTICOAGULATION FOLLOW-UP CLINIC VISIT    Patient Name:  Maurice Jon  Date:  9/11/2018  Contact Type:  Face to Face    SUBJECTIVE:     Patient Findings     Positives Initiation of therapy    Comments Patient took 2.5mg daily as instructed starting Saturday. Patient denies missed warfarin doses changes to diet, activity or medications, states took warfarin as instructed. No signs or symptoms of increased bruising or bleeding reported. Writer instructed him to take 5mg Tues/Wed and 2.5mg Thurs. He will recheck his INR in [place] On Friday           OBJECTIVE    INR Protime   Date Value Ref Range Status   09/11/2018 1.5 (A) 0.86 - 1.14 Final       ASSESSMENT / PLAN  INR assessment SUB    Recheck INR In: 3 DAYS    INR Location Clinic      Anticoagulation Summary as of 9/11/2018     INR goal 2.0-3.0    Today's INR 1.5!    Warfarin maintenance plan No maintenance plan    Full warfarin instructions 9/11: 5 mg; 9/12: 5 mg; 9/13: 2.5 mg; Otherwise No maintenance plan    Next INR check 9/14/2018    Target end date 10/4/2018      Anticoagulation Episode Summary     INR check location     Preferred lab     Send INR reminders to CL ANTICOAG POOL    Comments * anticoagulation short period surrounding ablation only per Dr. Evans's note via Melany Ewing RN (Portal hypertension)      Anticoagulation Care Providers     Provider Role Specialty Phone number    Alon Pineda MD Referring [facility] [phone]            See the Encounter Report to view Anticoagulation Flowsheet and Dosing Calendar (Go to Encounters tab in chart review, and find the Anticoagulation Therapy Visit)        Kathy Conn RN

## 2018-09-11 NOTE — Clinical Note
Started warfarin Saturday INR 1.5 today will recheck Friday. Thanks, Kathy Conn RN Anticoagulation Clinic

## 2018-09-11 NOTE — MR AVS SNAPSHOT
Maurice Jon   9/11/2018 10:45 AM   Anticoagulation Therapy Visit    Description:  57 year old male   Provider:  [facility] ANTI COAG   Department:  Cl Anticoag           INR as of 9/11/2018     Today's INR 1.5!      Anticoagulation Summary as of 9/11/2018     INR goal 2.0-3.0    Today's INR 1.5!    Full warfarin instructions 9/11: 5 mg; 9/12: 5 mg; 9/13: 2.5 mg; Otherwise No maintenance plan    Next INR check 9/14/2018      Your next Anticoagulation Clinic appointment(s)     Sep 11, 2018 10:45 AM CDT   Anticoagulation Visit with CL ANTI COAG   [facility] ([facility])    [address]  [facility] [id number] [phone]            Sep 14, 2018  3:30 PM CDT   Anticoagulation Visit with [facility]TI COAG   [facility]    [address] [facility] [address] [phone]              Contact Numbers     Please call [phone] with any problems or questions regarding your therapy.    If you need to cancel and/or reschedule your appointment please call one of the following numbers:  [facility] - [phone]  [place] - [phone]  [place] - [phone]  [facility] [phone]  [place] - [phone]            September 2018 Details    Sun Mon Tue Wed Thu Fri Sat           1                 2               3               4               5               6               7               8                 9               10               11      5 mg   See details      12      5 mg         13      2.5 mg         14            15                 16               17               18               19               20               21               22                 23               24               25               26               27               28               29                 30                      Date Details   09/11 This INR check       Date of next INR:  9/14/2018         How to take your warfarin dose      To take:  2.5 mg Take 0.5 of a 5 mg tablet.    To take:  5 mg Take 1 of the 5 mg tablets.

## 2018-09-14 ENCOUNTER — ANTICOAGULATION THERAPY VISIT (OUTPATIENT)
Dept: ANTICOAGULATION | Facility: CLINIC | Age: 58
End: 2018-09-14
Payer: COMMERCIAL

## 2018-09-14 LAB — INR POINT OF CARE: 3.8 (ref 0.86–1.14)

## 2018-09-14 PROCEDURE — 85610 PROTHROMBIN TIME: CPT | Mod: QW

## 2018-09-14 PROCEDURE — 36416 COLLJ CAPILLARY BLOOD SPEC: CPT

## 2018-09-14 PROCEDURE — 99207 ZZC NO CHARGE NURSE ONLY: CPT

## 2018-09-14 NOTE — MR AVS SNAPSHOT
Maurice Jon   9/14/2018 3:30 PM   Anticoagulation Therapy Visit    Description:  57 year old male   Provider:  [facility]   Department:  [facility]           INR as of 9/14/2018     Today's INR 3.8!      Anticoagulation Summary as of 9/14/2018     INR goal 2.0-3.0    Today's INR 3.8!    Full warfarin instructions 9/14: Hold; 9/15: 2.5 mg; 9/16: 2.5 mg; 9/17: 5 mg; Otherwise No maintenance plan    Next INR check 9/18/2018      Your next Anticoagulation Clinic appointment(s)     Sep 18, 2018  3:15 PM CDT   Anticoagulation Visit with [facility]   [facility] ([facility])    [address] [id number] [phone]            Sep 21, 2018  3:15 PM CDT   Anticoagulation Visit with CL ANTI COAG   [facility] ([facility])    [address] [address]   [phone]              Contact Numbers     Please call [phone] with any problems or questions regarding your therapy.    If you need to cancel and/or reschedule your appointment please call one of the following numbers:  [facility] - [phone]  [place] - [phone]  [place] - [phone]  [place] - [phone]  [place] - [phone]            September 2018 Details    Sun Mon Tue Wed Thu Fri Sat           1                 2               3               4               5               6               7               8                 9               10               11               12               13               14      Hold   See details      15      2.5 mg           16      2.5 mg         17      5 mg         18            19               20               21               22                 23               24               25               26               27               28               29                 30                      Date Details   09/14 This INR check       Date of next INR:  9/18/2018         How  to take your warfarin dose     To take:  2.5 mg Take 0.5 of a 5 mg tablet.    To take:  5 mg Take 1 of the 5 mg tablets.    Hold Do not take your warfarin dose. See the Details table to the right for additional instructions.

## 2018-09-14 NOTE — PROGRESS NOTES
ANTICOAGULATION FOLLOW-UP CLINIC VISIT    Patient Name:  Maurice Jon  Date:  9/14/2018  Contact Type:  Face to Face    SUBJECTIVE:     Patient Findings     Positives Inflammation (LE edema)    Comments Patient states he has chronic lower extremity edema. He is on Lasix and Spironolactone, however on days he works that he is not near a bathroom, he does not take his medication. This results in fluid retention and increased lower extremity. Patient currently has his compression stalkings on at this visit today. Patient is also newly starting Coumadin and has not yet established a maintenance dose. The INR is supra-therapeutic so I instructed the patient to hold this Coumadin for today then to take 2.5 mg tomorrow and Sunday and 5 mg on Monday. Recheck INR in 4 days. Patient denies signs or symptoms of bleeding. Writer educated patient regarding increased bleed risk and when to seek immediate medical attention. Patient verbalized understanding.             OBJECTIVE    INR Protime   Date Value Ref Range Status   09/14/2018 3.8 (A) 0.86 - 1.14 Final       ASSESSMENT / PLAN  INR assessment SUPRA    Recheck INR In: 4 DAYS    INR Location Clinic      Anticoagulation Summary as of 9/14/2018     INR goal 2.0-3.0    Today's INR 3.8!    Warfarin maintenance plan No maintenance plan    Full warfarin instructions 9/14: Hold; 9/15: 2.5 mg; 9/16: 2.5 mg; 9/17: 5 mg; Otherwise No maintenance plan    Next INR check 9/18/2018    Target end date 10/4/2018      Anticoagulation Episode Summary     INR check location     Preferred lab     Send INR reminders to CL ANTICOAG POOL    Comments * anticoagulation short period surrounding ablation only per Dr. Evans's note via Melany Ewing RN (Portal hypertension) SEND INR RESULTS TO CARDIOLOGY      Anticoagulation Care Providers     Provider Role Specialty Phone number    Alon Pineda MD Referring Family Practice [phone]            See the Encounter Report to view  Anticoagulation Flowsheet and Dosing Calendar (Go to Encounters tab in chart review, and find the Anticoagulation Therapy Visit)        Taylor Felix RN

## 2018-09-18 ENCOUNTER — ANTICOAGULATION THERAPY VISIT (OUTPATIENT)
Dept: ANTICOAGULATION | Facility: CLINIC | Age: 58
End: 2018-09-18
Payer: COMMERCIAL

## 2018-09-18 DIAGNOSIS — Z79.01 LONG-TERM (CURRENT) USE OF ANTICOAGULANTS: ICD-10-CM

## 2018-09-18 DIAGNOSIS — I48.0 PAROXYSMAL ATRIAL FIBRILLATION (H): ICD-10-CM

## 2018-09-18 DIAGNOSIS — I48.91 ATRIAL FIBRILLATION WITH RAPID VENTRICULAR RESPONSE (H): ICD-10-CM

## 2018-09-18 LAB — INR POINT OF CARE: 3.7 (ref 0.86–1.14)

## 2018-09-18 PROCEDURE — 99207 ZZC NO CHARGE NURSE ONLY: CPT

## 2018-09-18 PROCEDURE — 36416 COLLJ CAPILLARY BLOOD SPEC: CPT

## 2018-09-18 PROCEDURE — 85610 PROTHROMBIN TIME: CPT | Mod: QW

## 2018-09-18 RX ORDER — WARFARIN SODIUM 2.5 MG/1
TABLET ORAL
Qty: 90 TABLET | Refills: 0 | Status: SHIPPED | OUTPATIENT
Start: 2018-09-18 | End: 2018-12-14

## 2018-09-18 NOTE — PROGRESS NOTES
ANTICOAGULATION FOLLOW-UP CLINIC VISIT    Patient Name:  Maurice Jon  Date:  9/18/2018  Contact Type:  Face to Face    SUBJECTIVE:     Patient Findings     Positives Initiation of therapy    Comments Patient continues to take his diuretics sporadically. Writer encouraged him to take them as prescribed. He is still have LISE and has TEDS in place. INR remains supra-therapetuic in 22.5mg in the previous 7 days of initiating warfarin. Writer instructed him to hold warfarin today then take 3.75mg Wed/Thurs and recheck his INR on Friday. Writer ordered 2.5mg tablets for patient.            OBJECTIVE    INR Protime   Date Value Ref Range Status   09/18/2018 3.7 (A) 0.86 - 1.14 Corrected       ASSESSMENT / PLAN  INR assessment SUPRA    Recheck INR In: 3 DAYS    INR Location Clinic      Anticoagulation Summary as of 9/18/2018     INR goal 2.0-3.0   Today's INR 3.7!   Warfarin maintenance plan No maintenance plan   Full warfarin instructions 9/18: Hold; 9/19: 3.75 mg; 9/20: 3.75 mg   Plan last modified Kathy Conn RN (9/18/2018)   Next INR check 9/21/2018   Target end date 10/4/2018    Indications   Atrial fibrillation (H) [I48.91]  Atrial fibrillation with rapid ventricular response (H) [I48.91]  Long-term (current) use of anticoagulants [Z79.01] [Z79.01]         Anticoagulation Episode Summary     INR check location     Preferred lab     Send INR reminders to CL ANTICOAG POOL    Comments * anticoagulation short period surrounding ablation only per Dr. Evans's note via Melany Ewing RN (Portal hypertension) SEND INR RESULTS TO CARDIOLOGY      Anticoagulation Care Providers     Provider Role Specialty Phone number    Alon Pineda MD Referring [facility] [phone]            See the Encounter Report to view Anticoagulation Flowsheet and Dosing Calendar (Go to Encounters tab in chart review, and find the Anticoagulation Therapy Visit)    Sent results to cardiology.    Kathy Conn RN

## 2018-09-18 NOTE — MR AVS SNAPSHOT
Mauricetom Trujilloon   9/18/2018 3:15 PM   Anticoagulation Therapy Visit    Description:  57 year old male   Provider:  [facility]   Department:  [facility]tico[facility]           INR as of 9/18/2018     Today's INR 3.7!      Anticoagulation Summary as of 9/18/2018     INR goal 2.0-3.0   Today's INR 3.7!   Full warfarin instructions 9/18: Hold; 9/19: 3.75 mg; 9/20: 3.75 mg   Next INR check 9/21/2018    Indications   Atrial fibrillation (H) [I48.91]  Atrial fibrillation with rapid ventricular response (H) [I48.91]  Long-term (current) use of anticoagulants [Z79.01] [Z79.01]         Your next Anticoagulation Clinic appointment(s)     Sep 21, 2018  3:15 PM CDT   Anticoagulation Visit with CL ANTI COAG   [facility] ([facility])    [address] [address] [phone]              Contact Numbers     Please call [phone] with any problems or questions regarding your therapy.    If you need to cancel and/or reschedule your appointment please call one of the following numbers:  [facility] - [phone]  [place] - [phone]  [facility] [phone]  [facility] [phone]  [place] - [phone]            September 2018 Details    Sun Mon Tue Wed Thu Fri Sat           1                 2               3               4               5               6               7               8                 9               10               11               12               13               14               15                 16               17               18      Hold   See details      19      3.75 mg         20      3.75 mg         21            22                 23               24               25               26               27               28               29                 30                      Date Details   09/18 This INR check       Date of next INR:  9/21/2018         How to take your warfarin dose     To take:  3.75 mg Take 1.5 of the 2.5 mg  tablets.    Hold Do not take your warfarin dose. See the Details table to the right for additional instructions.

## 2018-09-20 ENCOUNTER — OFFICE VISIT (OUTPATIENT)
Dept: GASTROENTEROLOGY | Facility: CLINIC | Age: 58
End: 2018-09-20
Attending: INTERNAL MEDICINE
Payer: COMMERCIAL

## 2018-09-20 ENCOUNTER — RADIANT APPOINTMENT (OUTPATIENT)
Dept: ULTRASOUND IMAGING | Facility: CLINIC | Age: 58
End: 2018-09-20
Attending: INTERNAL MEDICINE
Payer: COMMERCIAL

## 2018-09-20 VITALS
OXYGEN SATURATION: 94 % | HEIGHT: 66 IN | BODY MASS INDEX: 39.29 KG/M2 | HEART RATE: 76 BPM | TEMPERATURE: 98.2 F | SYSTOLIC BLOOD PRESSURE: 119 MMHG | WEIGHT: 244.5 LBS | DIASTOLIC BLOOD PRESSURE: 70 MMHG

## 2018-09-20 DIAGNOSIS — K74.60 CIRRHOSIS OF LIVER WITHOUT ASCITES, UNSPECIFIED HEPATIC CIRRHOSIS TYPE (H): ICD-10-CM

## 2018-09-20 DIAGNOSIS — K74.60 CIRRHOSIS OF LIVER WITHOUT ASCITES, UNSPECIFIED HEPATIC CIRRHOSIS TYPE (H): Primary | ICD-10-CM

## 2018-09-20 LAB
AFP SERPL-MCNC: 1.5 UG/L (ref 0–8)
ALBUMIN SERPL-MCNC: 3.2 G/DL (ref 3.4–5)
ALP SERPL-CCNC: 74 U/L (ref 40–150)
ALT SERPL W P-5'-P-CCNC: 44 U/L (ref 0–70)
ANION GAP SERPL CALCULATED.3IONS-SCNC: 6 MMOL/L (ref 3–14)
AST SERPL W P-5'-P-CCNC: 46 U/L (ref 0–45)
BILIRUB DIRECT SERPL-MCNC: 0.6 MG/DL (ref 0–0.2)
BILIRUB SERPL-MCNC: 1.9 MG/DL (ref 0.2–1.3)
BUN SERPL-MCNC: 15 MG/DL (ref 7–30)
CALCIUM SERPL-MCNC: 8.6 MG/DL (ref 8.5–10.1)
CHLORIDE SERPL-SCNC: 106 MMOL/L (ref 94–109)
CO2 SERPL-SCNC: 27 MMOL/L (ref 20–32)
CREAT SERPL-MCNC: 0.72 MG/DL (ref 0.66–1.25)
ERYTHROCYTE [DISTWIDTH] IN BLOOD BY AUTOMATED COUNT: 13.3 % (ref 10–15)
GFR SERPL CREATININE-BSD FRML MDRD: >90 ML/MIN/1.7M2
GLUCOSE SERPL-MCNC: 91 MG/DL (ref 70–99)
HCT VFR BLD AUTO: 42.9 % (ref 40–53)
HGB BLD-MCNC: 14.6 G/DL (ref 13.3–17.7)
INR PPP: 3.02 (ref 0.86–1.14)
MCH RBC QN AUTO: 34.9 PG (ref 26.5–33)
MCHC RBC AUTO-ENTMCNC: 34 G/DL (ref 31.5–36.5)
MCV RBC AUTO: 103 FL (ref 78–100)
PLATELET # BLD AUTO: 62 10E9/L (ref 150–450)
POTASSIUM SERPL-SCNC: 3.8 MMOL/L (ref 3.4–5.3)
PROT SERPL-MCNC: 6 G/DL (ref 6.8–8.8)
RBC # BLD AUTO: 4.18 10E12/L (ref 4.4–5.9)
SODIUM SERPL-SCNC: 139 MMOL/L (ref 133–144)
WBC # BLD AUTO: 2.8 10E9/L (ref 4–11)

## 2018-09-20 PROCEDURE — 80076 HEPATIC FUNCTION PANEL: CPT | Performed by: INTERNAL MEDICINE

## 2018-09-20 PROCEDURE — G0463 HOSPITAL OUTPT CLINIC VISIT: HCPCS

## 2018-09-20 PROCEDURE — 85610 PROTHROMBIN TIME: CPT | Performed by: INTERNAL MEDICINE

## 2018-09-20 PROCEDURE — 82105 ALPHA-FETOPROTEIN SERUM: CPT | Performed by: INTERNAL MEDICINE

## 2018-09-20 PROCEDURE — 36415 COLL VENOUS BLD VENIPUNCTURE: CPT | Performed by: INTERNAL MEDICINE

## 2018-09-20 PROCEDURE — 80048 BASIC METABOLIC PNL TOTAL CA: CPT | Performed by: INTERNAL MEDICINE

## 2018-09-20 PROCEDURE — 85027 COMPLETE CBC AUTOMATED: CPT | Performed by: INTERNAL MEDICINE

## 2018-09-20 RX ORDER — IBUPROFEN 200 MG
1 CAPSULE ORAL DAILY
Status: ON HOLD | COMMUNITY
End: 2018-10-31

## 2018-09-20 ASSESSMENT — PAIN SCALES - GENERAL: PAINLEVEL: NO PAIN (0)

## 2018-09-20 NOTE — LETTER
9/20/2018      RE: Maurice Jon  [address] [address]       HISTORY OF PRESENT ILLNESS:  I had the pleasure of seeing Maurice Jon for followup in the Liver Clinic at the [facility] on 09/20/2018.  Mr. 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      For the most part, he is doing fairly well.  His major issue has been his recurrent atrial fibrillation.  He is on anticoagulation, and the plan is to make another attempt at an ablation.  This should be occurring in the next couple of weeks.       He otherwise does report some very mild right upper quadrant pain.  He denies any itching or skin rash.  He does complain of marked fatigue.  He denies any increased abdominal girth.  He has some mild lower extremity edema.  He has not had any gastrointestinal bleeding or any overt signs of hepatic encephalopathy.       He denies any fevers or chills, cough or shortness of breath.  He denies any nausea or vomiting, diarrhea or constipation.  His appetite has been good and, unfortunately, his weight is up.     Current Outpatient Prescriptions   Medication     calcium citrate (CALCITRATE) 950 MG tablet     furosemide (LASIX) 40 MG tablet     metoprolol (TOPROL XL) 100 MG 24 hr tablet     metoprolol (TOPROL-XL) 25 MG 24 hr tablet     order for DME     order for DME     ORDER FOR DME     ORDER FOR DME     pantoprazole (PROTONIX) 40 MG EC tablet     spironolactone (ALDACTONE) 25 MG tablet     warfarin (COUMADIN) 2.5 MG tablet     calcium carb 1250 mg, 500 mg [place],/vitamin D 200 units (OSCAL WITH D) 500-200 MG-UNIT per tablet     clotrimazole (LOTRIMIN) 1 % cream     mometasone-formoterol (DULERA) 200-5 MCG/ACT oral inhaler     triamcinolone (KENALOG) 0.1 % cream     warfarin (COUMADIN) 5 MG tablet     No current facility-administered medications for this visit.      B/P: 119/70, T: 98.2, P:  76, R: Data Unavailable    PHYSICAL EXAMINATION:    GENERAL:  In general, he appears unchanged and other than being obese is relatively healthy.     HEENT:  HEENT exam shows no scleral icterus or temporal muscle wasting.    CHEST:  His chest is clear.    ABDOMEN:  His abdominal exam shows no obvious ascites.  No masses or tenderness to palpation is present.  His liver is 10 cm in span without left lobe enlargement.  No spleen tip is palpable.    EXTREMITIES:  Extremity exam shows no edema.     SKIN:  Skin exam shows no stigmata of chronic liver disease.    NEUROLOGIC:  Neurologic exam shows no asterixis.     Recent Results (from the past 168 hour(s))   INR point of care    Collection Time: 09/14/18 12:00 AM   Result Value Ref Range    INR Protime 3.8 (A) 0.86 - 1.14   INR point of care    Collection Time: 09/18/18 12:00 AM   Result Value Ref Range    INR Protime 3.7 (A) 0.86 - 1.14   Hepatic panel    Collection Time: 09/20/18  9:55 AM   Result Value Ref Range    Bilirubin Direct 0.6 (H) 0.0 - 0.2 mg/dL    Bilirubin Total 1.9 (H) 0.2 - 1.3 mg/dL    Albumin 3.2 (L) 3.4 - 5.0 g/dL    Protein Total 6.0 (L) 6.8 - 8.8 g/dL    Alkaline Phosphatase 74 40 - 150 U/L    ALT 44 0 - 70 U/L    AST 46 (H) 0 - 45 U/L   CBC with platelets    Collection Time: 09/20/18  9:55 AM   Result Value Ref Range    WBC 2.8 (L) 4.0 - 11.0 10e9/L    RBC Count 4.18 (L) 4.4 - 5.9 10e12/L    Hemoglobin 14.6 13.3 - 17.7 g/dL    Hematocrit 42.9 40.0 - 53.0 %     (H) 78 - 100 fl    MCH 34.9 (H) 26.5 - 33.0 pg    MCHC 34.0 31.5 - 36.5 g/dL    RDW 13.3 10.0 - 15.0 %    Platelet Count 62 (L) 150 - 450 10e9/L   INR    Collection Time: 09/20/18  9:55 AM   Result Value Ref Range    INR 3.02 (H) 0.86 - 1.14   Basic metabolic panel    Collection Time: 09/20/18  9:55 AM   Result Value Ref Range    Sodium 139 133 - 144 mmol/L    Potassium 3.8 3.4 - 5.3 mmol/L    Chloride 106 94 - 109 mmol/L    Carbon Dioxide 27 20 - 32 mmol/L    Anion Gap 6 3 - 14 mmol/L     Glucose 91 70 - 99 mg/dL    Urea Nitrogen 15 7 - 30 mg/dL    Creatinine 0.72 0.66 - 1.25 mg/dL    GFR Estimate >90 >60 mL/min/1.7m2    GFR Estimate If Black >90 >60 mL/min/1.7m2    Calcium 8.6 8.5 - 10.1 mg/dL   AFP tumor marker [[id number]]    Collection Time: 09/20/18  9:55 AM   Result Value Ref Range    Alpha Fetoprotein 1.5 0 - 8 ug/L      IMAGING:  I did review his ultrasound which shows a small portal vein, but it appears to perhaps be recanalized.  There are no mass lesions and no significant ascites.       IMPRESSION AND PLAN:  My impression is that Mr. Jon has well-compensated cirrhosis most likely on the basis of nonalcoholic fatty liver disease.  He is up-to-date with regard to vaccines and cancer screening.       In terms of the ablation, he certainly has my approval to go forward.  I think it will be relatively low risk in him and certainly his cirrhosis should not be considered a contraindication for that.  The hope is that that will give him a bit more energy and perhaps he can exercise more and work on his weight.  Otherwise, my plan will be to see him back in the clinic in 6 months with repeat ultrasound and blood work.       Thank you very much for allowing me to participate in the care of this patient.  If you have any questions regarding my recommendations, please do not hesitate to contact me.       Hi Roque MD      Professor of Medicine  [employer]      Executive Medical Director, Solid Organ Transplant Program  [facility]

## 2018-09-20 NOTE — PROGRESS NOTES
HISTORY OF PRESENT ILLNESS:  I had the pleasure of seeing Maurice Jon for followup in the Liver Clinic at the [facility] on 09/20/2018.  Mr. 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      For the most part, he is doing fairly well.  His major issue has been his recurrent atrial fibrillation.  He is on anticoagulation, and the plan is to make another attempt at an ablation.  This should be occurring in the next couple of weeks.       He otherwise does report some very mild right upper quadrant pain.  He denies any itching or skin rash.  He does complain of marked fatigue.  He denies any increased abdominal girth.  He has some mild lower extremity edema.  He has not had any gastrointestinal bleeding or any overt signs of hepatic encephalopathy.       He denies any fevers or chills, cough or shortness of breath.  He denies any nausea or vomiting, diarrhea or constipation.  His appetite has been good and, unfortunately, his weight is up.     Current Outpatient Prescriptions   Medication     calcium citrate (CALCITRATE) 950 MG tablet     furosemide (LASIX) 40 MG tablet     metoprolol (TOPROL XL) 100 MG 24 hr tablet     metoprolol (TOPROL-XL) 25 MG 24 hr tablet     order for DME     order for DME     ORDER FOR DME     ORDER FOR DME     pantoprazole (PROTONIX) 40 MG EC tablet     spironolactone (ALDACTONE) 25 MG tablet     warfarin (COUMADIN) 2.5 MG tablet     calcium carb 1250 mg, 500 mg [place],/vitamin D 200 units (OSCAL WITH D) 500-200 MG-UNIT per tablet     clotrimazole (LOTRIMIN) 1 % cream     mometasone-formoterol (DULERA) 200-5 MCG/ACT oral inhaler     triamcinolone (KENALOG) 0.1 % cream     warfarin (COUMADIN) 5 MG tablet     No current facility-administered medications for this visit.      B/P: 119/70, T: 98.2, P: 76, R: Data Unavailable    PHYSICAL EXAMINATION:    GENERAL:  In general, he appears  unchanged and other than being obese is relatively healthy.     HEENT:  HEENT exam shows no scleral icterus or temporal muscle wasting.    CHEST:  His chest is clear.    ABDOMEN:  His abdominal exam shows no obvious ascites.  No masses or tenderness to palpation is present.  His liver is 10 cm in span without left lobe enlargement.  No spleen tip is palpable.    EXTREMITIES:  Extremity exam shows no edema.     SKIN:  Skin exam shows no stigmata of chronic liver disease.    NEUROLOGIC:  Neurologic exam shows no asterixis.     Recent Results (from the past 168 hour(s))   INR point of care    Collection Time: 09/14/18 12:00 AM   Result Value Ref Range    INR Protime 3.8 (A) 0.86 - 1.14   INR point of care    Collection Time: 09/18/18 12:00 AM   Result Value Ref Range    INR Protime 3.7 (A) 0.86 - 1.14   Hepatic panel    Collection Time: 09/20/18  9:55 AM   Result Value Ref Range    Bilirubin Direct 0.6 (H) 0.0 - 0.2 mg/dL    Bilirubin Total 1.9 (H) 0.2 - 1.3 mg/dL    Albumin 3.2 (L) 3.4 - 5.0 g/dL    Protein Total 6.0 (L) 6.8 - 8.8 g/dL    Alkaline Phosphatase 74 40 - 150 U/L    ALT 44 0 - 70 U/L    AST 46 (H) 0 - 45 U/L   CBC with platelets    Collection Time: 09/20/18  9:55 AM   Result Value Ref Range    WBC 2.8 (L) 4.0 - 11.0 10e9/L    RBC Count 4.18 (L) 4.4 - 5.9 10e12/L    Hemoglobin 14.6 13.3 - 17.7 g/dL    Hematocrit 42.9 40.0 - 53.0 %     (H) 78 - 100 fl    MCH 34.9 (H) 26.5 - 33.0 pg    MCHC 34.0 31.5 - 36.5 g/dL    RDW 13.3 10.0 - 15.0 %    Platelet Count 62 (L) 150 - 450 10e9/L   INR    Collection Time: 09/20/18  9:55 AM   Result Value Ref Range    INR 3.02 (H) 0.86 - 1.14   Basic metabolic panel    Collection Time: 09/20/18  9:55 AM   Result Value Ref Range    Sodium 139 133 - 144 mmol/L    Potassium 3.8 3.4 - 5.3 mmol/L    Chloride 106 94 - 109 mmol/L    Carbon Dioxide 27 20 - 32 mmol/L    Anion Gap 6 3 - 14 mmol/L    Glucose 91 70 - 99 mg/dL    Urea Nitrogen 15 7 - 30 mg/dL    Creatinine 0.72 0.66  - 1.25 mg/dL    GFR Estimate >90 >60 mL/min/1.7m2    GFR Estimate If Black >90 >60 mL/min/1.7m2    Calcium 8.6 8.5 - 10.1 mg/dL   AFP tumor marker [[id number]]    Collection Time: 09/20/18  9:55 AM   Result Value Ref Range    Alpha Fetoprotein 1.5 0 - 8 ug/L      IMAGING:  I did review his ultrasound which shows a small portal vein, but it appears to perhaps be recanalized.  There are no mass lesions and no significant ascites.       IMPRESSION AND PLAN:  My impression is that Mr. Jon has well-compensated cirrhosis most likely on the basis of nonalcoholic fatty liver disease.  He is up-to-date with regard to vaccines and cancer screening.       In terms of the ablation, he certainly has my approval to go forward.  I think it will be relatively low risk in him and certainly his cirrhosis should not be considered a contraindication for that.  The hope is that that will give him a bit more energy and perhaps he can exercise more and work on his weight.  Otherwise, my plan will be to see him back in the clinic in 6 months with repeat ultrasound and blood work.       Thank you very much for allowing me to participate in the care of this patient.  If you have any questions regarding my recommendations, please do not hesitate to contact me.       Hi Roque MD      Professor of Medicine  [employer]      Executive Medical Director, Solid Organ Transplant Program  [facility]

## 2018-09-20 NOTE — MR AVS SNAPSHOT
After Visit Summary   9/20/2018    Maurice Jon    MRN: [id number]           Patient Information     Date Of Birth          1960        Visit Information        Provider Department      9/20/2018 10:30 AM Hi Roque MD [facility] Hepatology        Today's Diagnoses     Cirrhosis of liver without ascites, unspecified hepatic cirrhosis type (H)    -  1       Follow-ups after your visit        Follow-up notes from your care team     Return in about 6 months (around 3/20/2019).      Your next 10 appointments already scheduled     Sep 21, 2018  3:15 PM CDT   Anticoagulation Visit with CL ANTI COAG   [facility] ([facility])    [address] [address] [facility] [address]   [phone]            Sep 25, 2018  3:15 PM CDT   Anticoagulation Visit with CL ANTI COAG   [facility] ([facility])    [address] [facility] [address]   [phone]            Mar 21, 2019  6:15 AM CDT   US ABDOMEN COMPLETE with UCUS1   [facility] ([facility])    [address]  1st Floor  [facility] [address] [phone]           How do I prepare for my exam? (Food and drink instructions) Adults: No eating, smoking, gum chewing or drinking for 8 hours before the exam. You may take medicine with a small sip of water.  Children: * Infants, breast-fed: may have breast milk up to 2 hours before exam. * Infants, formula: may have bottle until 4 hours before exam. * Children 1-5 years: No food or drink for 4 hours before exam. * Children 6 -12 years: No food or drink for 6 hours before exam. * Children over 12 years: No food or drink for 8 hours before exam.  * J Tube Fed: No need to stop feedings.  What should I wear: Wear comfortable clothes.  How long does the exam take: Most ultrasounds take 30 to 60 minutes.  What should I bring: Bring a list of your medicines, including vitamins,  minerals and over-the-counter drugs. It is safest to leave personal items at home.  Do I need a :  No  is needed.  What do I need to tell my doctor: Tell your doctor about any allergies you may have.  What should I do after the exam: No restrictions, You may resume normal activities.  What is this test: An ultrasound uses sound waves to make pictures of the body. Sound waves do not cause pain. The only discomfort may be the pressure of the wand against your skin or full bladder.  Who should I call with questions: If you have any questions, please call the Imaging Department where you will have your exam. Directions, parking instructions, and other information is available on our website, [email or web address].            Mar 21, 2019  7:00 AM CDT   Lab with  LAB   [facility] ([facility])    [address] [place] [address]  1st Floor  [facility] [address] [phone]            Mar 21, 2019  8:00 AM CDT   (Arrive by 7:45 AM)   Return General Liver with Hi Roque MD   [facility] ([facility])    [address] [place] [address]  [address]  [facility] [address] [phone]              Future tests that were ordered for you today     Open Standing Orders        Priority Remaining Interval Expires Ordered    US abdomen complete [JAT722] Routine 2/2 Every 6 Months 9/20/2019 9/20/2018          Open Future Orders        Priority Expected Expires Ordered    Hepatic Panel [LAB20] Routine 3/19/2019 9/20/2019 9/20/2018    Basic metabolic panel [LAB15] Routine 3/19/2019 9/20/2019 9/20/2018    CBC with platelets [NLJ614] Routine 3/19/2019 9/20/2019 9/20/2018    INR [EGW4952] Routine 3/19/2019 9/20/2019 9/20/2018    AFP tumor marker [PFG273] Routine 3/19/2019 9/20/2019 9/20/2018            Who to contact     If you have questions or need follow up information about today's clinic visit or your schedule please contact [facility]PATOLOG[facility] directly at  "[phone].  Normal or non-critical lab and imaging results will be communicated to you by MyChart, letter or phone within 4 business days after the clinic has received the results. If you do not hear from us within 7 days, please contact the clinic through [employer] or phone. If you have a critical or abnormal lab result, we will notify you by phone as soon as possible.  Submit refill requests through [employer]) or call your pharmacy and they will forward the refill request to us. Please allow 3 business days for your refill to be completed.          Additional Information About Your Visit        [employer] Information     [employer] gives you secure access to your electronic health record. If you see a primary care provider, you can also send messages to your care team and make appointments. If you have questions, please call your primary care clinic.  If you do not have a primary care provider, please call [phone] and they will assist you.        Care EveryWhere ID     This is your Care EveryWhere ID. This could be used by other organizations to access your [place] medical records  [id number]        Your Vitals Were     Pulse Temperature Height Pulse Oximetry BMI (Body Mass Index)       76 98.2  F (36.8  C) (Oral) 1.676 m (5' 6\") 94% 39.46 kg/m2        Blood Pressure from Last 3 Encounters:   09/20/18 119/70   06/05/18 98/62   05/10/18 100/62    Weight from Last 3 Encounters:   09/20/18 110.9 kg (244 lb 8 oz)   06/05/18 106.7 kg (235 lb 3.2 oz)   04/20/18 109.3 kg (241 lb)              We Performed the Following     Schedule follow up appointments          Today's Medication Changes          These changes are accurate as of 9/20/18 11:06 AM.  If you have any questions, ask your nurse or doctor.               These medicines have changed or have updated prescriptions.        Dose/Directions    clotrimazole 1 % cream   Commonly known as:  LOTRIMIN   This may have changed:    - when to take this  - reasons to take " this   Used for:  Yeast infection of the skin        Apply topically 2 times daily   Quantity:  60 g   Refills:  1       triamcinolone 0.1 % cream   Commonly known as:  KENALOG   This may have changed:    - when to take this  - reasons to take this  - additional instructions   Used for:  Eczema        Apply sparingly to affected area three times daily as needed   Quantity:  80 g   Refills:  0                Primary Care Provider Office Phone # Fax #    Adrian Braulio Pineda [phone] [address] [facility] [address]        Equal Access to Services     [facility]: Hadii aad ku hadasho Soomaali, waaxda luqadaha, qaybta kaalmada adeegyada, waxdov duncan . So [facility] [phone].    ATENCIÓN: Si habla español, tiene a allen disposición servicios gratuitos de asistencia lingüística. [facility] [phone].    We comply with applicable federal civil rights laws and [place] laws. We do not discriminate on the basis of race, color, national origin, age, disability, sex, sexual orientation, or gender identity.            Thank you!     Thank you for choosing [facility] HEPATOLOG[facility]  for your care. Our goal is always to provide you with excellent care. Hearing back from our patients is one way we can continue to improve our services. Please take a few minutes to complete the written survey that you may receive in the mail after your visit with us. Thank you!             Your Updated Medication List - Protect others around you: Learn how to safely use, store and throw away your medicines at www.disposemymeds.org.          This list is accurate as of 9/20/18 11:06 AM.  Always use your most recent med list.                   Brand Name Dispense Instructions for use Diagnosis    calcium carbonate 500 mg-vitamin D 200 units 500-200 MG-UNIT per tablet    OSCAL with D;OYSTER SHELL CALCIUM     Take 2 tablets by mouth daily with food.        calcium citrate 950 MG tablet     CALCITRATE     Take 1 tablet by mouth daily        clotrimazole 1 % cream    LOTRIMIN    60 g    Apply topically 2 times daily    Yeast infection of the skin       furosemide 40 MG tablet    LASIX    45 tablet    Take 0.5 tablets (20 mg) by mouth daily    Portal hypertension (H)       * metoprolol succinate 100 MG 24 hr tablet    TOPROL XL    90 tablet    Take 1 tablet (100 mg) by mouth every evening in addition to your 25 mg dose every morning.    Atrial fibrillation with rapid ventricular response (H)       * metoprolol succinate 25 MG 24 hr tablet    TOPROL-XL    90 tablet    Take 1 tablet (25 mg) by mouth every morning in addition to your 100 mg dose every evening.    Atrial fibrillation with rapid ventricular response (H)       mometasone-formoterol 200-5 MCG/ACT oral inhaler    DULERA    1 Inhaler    Inhale 2 puffs into the lungs 2 times daily as needed Appt DUE Jan 2017    Bronchospasm       * order for DME     2 Package    Juzo compression stockings, 30-40mm compression. Patient has portal hypertension and develops leg edema, which these control well.    Portal hypertension (H), Edema       * order for DME      Respironics RemStar 60 Series Auto AFlex 12-15 cm H2O with heated humidty and a modem.  Pt chose a Quattro Air FFM size medium.    BRUCE (obstructive sleep apnea)       order for DME     1 Device    Equipment being ordered:  New CPAP mask, tube, filters,water tray    Sleep apnea       order for DME     4 Package    Open toe size large 30/40 Mediven Assure Medical Compression socks Model 93859.    Portal hypertension (H), Hepatic cirrhosis (H), Edema       pantoprazole 40 MG EC tablet    PROTONIX    90 tablet    Take 1 tablet (40 mg) by mouth daily as needed    Gastroesophageal reflux disease without esophagitis       spironolactone 25 MG tablet    ALDACTONE    90 tablet    Take 1 tablet (25 mg) by mouth daily We will no longer fill unless seen by cardiology    Portal hypertension (H)       triamcinolone  0.1 % cream    KENALOG    80 g    Apply sparingly to affected area three times daily as needed    Eczema       * warfarin 5 MG tablet    COUMADIN    45 tablet    5mg daily, establishing maintenance dose or As directed by Anticoagulation Clinic    Chronic atrial fibrillation (H)       * warfarin 2.5 MG tablet    COUMADIN    90 tablet    2.5mg daily or As directed by Anticoagulation Clinic no maintenance dose establish    Long-term (current) use of anticoagulants, Paroxysmal atrial fibrillation (H), Atrial fibrillation with rapid ventricular response (H)       * Notice:  This list has 6 medication(s) that are the same as other medications prescribed for you. Read the directions carefully, and ask your doctor or other care provider to review them with you.

## 2018-09-20 NOTE — NURSING NOTE
"Chief Complaint   Patient presents with     RECHECK     f/u cirrhosis     /70  Pulse 76  Temp 98.2  F (36.8  C) (Oral)  Ht 1.676 m (5' 6\")  Wt 110.9 kg (244 lb 8 oz)  SpO2 94%  BMI 39.46 kg/m2  Yun Whiting    "

## 2018-09-21 ENCOUNTER — ANTICOAGULATION THERAPY VISIT (OUTPATIENT)
Dept: ANTICOAGULATION | Facility: CLINIC | Age: 58
End: 2018-09-21
Payer: COMMERCIAL

## 2018-09-21 DIAGNOSIS — Z79.01 LONG-TERM (CURRENT) USE OF ANTICOAGULANTS: ICD-10-CM

## 2018-09-21 DIAGNOSIS — I48.91 ATRIAL FIBRILLATION WITH RAPID VENTRICULAR RESPONSE (H): ICD-10-CM

## 2018-09-21 LAB — INR POINT OF CARE: 3.7 (ref 0.86–1.14)

## 2018-09-21 PROCEDURE — 36416 COLLJ CAPILLARY BLOOD SPEC: CPT

## 2018-09-21 PROCEDURE — 99207 ZZC NO CHARGE NURSE ONLY: CPT

## 2018-09-21 PROCEDURE — 85610 PROTHROMBIN TIME: CPT | Mod: QW

## 2018-09-21 NOTE — MR AVS SNAPSHOT
Maurice Jon   9/21/2018 3:15 PM   Anticoagulation Therapy Visit    Description:  57 year old male   Provider:  MAHESH ANTI COAG   Department:  Cl Anticoag           INR as of 9/21/2018     Today's INR 3.7!      Anticoagulation Summary as of 9/21/2018     INR goal 2.0-3.0   Today's INR 3.7!   Full warfarin instructions 9/21: 1.25 mg; 9/22: 2.5 mg; 9/23: 2.5 mg; 9/24: 2.5 mg   Next INR check 9/25/2018    Indications   Atrial fibrillation (H) [I48.91]  Atrial fibrillation with rapid ventricular response (H) [I48.91]  Long-term (current) use of anticoagulants [Z79.01] [Z79.01]         Description     Some signs and symptoms of bleeding include: Nose bleed or cut that does not stop bleeding in 10 minutes, bleeding of the gums, vomiting (will look like coffee grounds) or coughing up blood, unusual, easy or large areas of bruising, increased or unexpected  Vaginal bleeding or increased menstrual flow, red or black stools, red or orange urine, prolonged or severe headache, pale skin, unusual or constant tiredness.  If you have these please call 911 or seek medical care immediately.         Your next Anticoagulation Clinic appointment(s)     Sep 25, 2018  3:15 PM CDT   Anticoagulation Visit with CL ANTI COAG   [facility] ([facility])    [address] [facility] [address] [phone]              Contact Numbers     Please call [phone] with any problems or questions regarding your therapy.    If you need to cancel and/or reschedule your appointment please call one of the following numbers:  [facility] - [phone]  [place] - [phone]  [place] - [phone]  [place] - [phone]  [place] - [phone]            September 2018 Details    Sun Mon Tue Wed Thu Fri Sat           1                 2               3               4               5               6               7               8                 9               10               11                12               13               14               15                 16               17               18               19               20               21      1.25 mg   See details      22      2.5 mg           23      2.5 mg         24      2.5 mg         25            26               27               28               29                 30                      Date Details   09/21 This INR check       Date of next INR:  9/25/2018         How to take your warfarin dose     To take:  1.25 mg Take 0.5 of a 2.5 mg tablet.    To take:  2.5 mg Take 1 of the 2.5 mg tablets.

## 2018-09-21 NOTE — PROGRESS NOTES
ANTICOAGULATION FOLLOW-UP CLINIC VISIT    Patient Name:  Maurice Jon  Date:  9/21/2018  Contact Type:  Face to Face    SUBJECTIVE:     Patient Findings     Positives Other complaints (pt has a cold), Initiation of therapy    Comments Writer does not want to hold an entire dose of warfarin due to future cardioversion plans so will lower dose today to 1.25 mg, try 2.5 mg the next 3 days and recheck Tues. Looking at recent hx, when pt takes more than 2.5 mg, his INR increases. No other changes besides having a head cold. No bleeding signs.            OBJECTIVE    INR Protime   Date Value Ref Range Status   09/21/2018 3.7 (A) 0.86 - 1.14 Final       ASSESSMENT / PLAN  INR assessment SUPRA    Recheck INR In: 4 DAYS    INR Location Clinic      Anticoagulation Summary as of 9/21/2018     INR goal 2.0-3.0   Today's INR 3.7!   Warfarin maintenance plan No maintenance plan   Full warfarin instructions 9/21: 1.25 mg; 9/22: 2.5 mg; 9/23: 2.5 mg; 9/24: 2.5 mg   Plan last modified Kathy Conn RN (9/18/2018)   Next INR check 9/25/2018   Target end date 10/4/2018    Indications   Atrial fibrillation (H) [I48.91]  Atrial fibrillation with rapid ventricular response (H) [I48.91]  Long-term (current) use of anticoagulants [Z79.01] [Z79.01]         Anticoagulation Episode Summary     INR check location     Preferred lab     Send INR reminders to  [facility] POOL    Comments * anticoagulation short period surrounding cardioversion/ablation only per Dr. Evans's note via Melany Ewing RN (Portal hypertension) SEND INR RESULTS TO CARDIOLOGY. has well-compensated cirrhosis      Anticoagulation Care Providers     Provider Role Specialty Phone number    Alon Pineda MD Referring Family Practice [phone]            See the Encounter Report to view Anticoagulation Flowsheet and Dosing Calendar (Go to Encounters tab in chart review, and find the Anticoagulation Therapy Visit)        Susan Beyer RN

## 2018-09-25 ENCOUNTER — ANTICOAGULATION THERAPY VISIT (OUTPATIENT)
Dept: ANTICOAGULATION | Facility: CLINIC | Age: 58
End: 2018-09-25
Payer: COMMERCIAL

## 2018-09-25 DIAGNOSIS — Z79.01 LONG-TERM (CURRENT) USE OF ANTICOAGULANTS: ICD-10-CM

## 2018-09-25 DIAGNOSIS — I48.91 ATRIAL FIBRILLATION WITH RAPID VENTRICULAR RESPONSE (H): ICD-10-CM

## 2018-09-25 DIAGNOSIS — I48.91 ATRIAL FIBRILLATION (H): ICD-10-CM

## 2018-09-25 LAB — INR POINT OF CARE: 2.5 (ref 0.86–1.14)

## 2018-09-25 PROCEDURE — 36416 COLLJ CAPILLARY BLOOD SPEC: CPT

## 2018-09-25 PROCEDURE — 85610 PROTHROMBIN TIME: CPT | Mod: QW

## 2018-09-25 PROCEDURE — 99207 ZZC NO CHARGE NURSE ONLY: CPT

## 2018-09-25 NOTE — MR AVS SNAPSHOT
Mauricetom Trujilloon   9/25/2018 3:15 PM   Anticoagulation Therapy Visit    Description:  57 year old male   Provider:  CHARLES CRUZ   Department:  [facility] Anticoag           INR as of 9/25/2018     Today's INR 2.5      Anticoagulation Summary as of 9/25/2018     INR goal 2.0-3.0   Today's INR 2.5   Full warfarin instructions 9/25: 2.5 mg; 9/26: 3.75 mg; 9/27: 1.25 mg   Next INR check 9/28/2018    Indications   Atrial fibrillation (H) [I48.91]  Atrial fibrillation with rapid ventricular response (H) [I48.91]  Long-term (current) use of anticoagulants [Z79.01] [Z79.01]         Your next Anticoagulation Clinic appointment(s)     Sep 28, 2018  3:45 PM CDT   Anticoagulation Visit with [place] ANTI COAG   [facility]    [address] [address] [phone]              Contact Numbers     Please call [phone] with any problems or questions regarding your therapy.    If you need to cancel and/or reschedule your appointment please call one of the following numbers:  [facility] [phone]  [place] - [phone]  [facility] [place] [phone]  [facility] [phone]  [place] - [phone]            September 2018 Details    Sun Mon Tue Wed Thu Fri Sat           1                 2               3               4               5               6               7               8                 9               10               11               12               13               14               15                 16               17               18               19               20               21               22                 23               24               25      2.5 mg   See details      26      3.75 mg         27      1.25 mg         28            29                 30                      Date Details   09/25 This INR check       Date of next INR:  9/28/2018         How to take your warfarin dose     To take:  1.25 mg Take 0.5 of a 2.5 mg tablet.     To take:  2.5 mg Take 1 of the 2.5 mg tablets.    To take:  3.75 mg Take 1.5 of the 2.5 mg tablets.

## 2018-09-28 ENCOUNTER — ANTICOAGULATION THERAPY VISIT (OUTPATIENT)
Dept: ANTICOAGULATION | Facility: CLINIC | Age: 58
End: 2018-09-28
Payer: COMMERCIAL

## 2018-09-28 DIAGNOSIS — Z79.01 LONG-TERM (CURRENT) USE OF ANTICOAGULANTS: ICD-10-CM

## 2018-09-28 DIAGNOSIS — I48.91 ATRIAL FIBRILLATION WITH RAPID VENTRICULAR RESPONSE (H): ICD-10-CM

## 2018-09-28 LAB — INR POINT OF CARE: 2.9 (ref 0.86–1.14)

## 2018-09-28 PROCEDURE — 85610 PROTHROMBIN TIME: CPT | Mod: QW

## 2018-09-28 PROCEDURE — 36416 COLLJ CAPILLARY BLOOD SPEC: CPT

## 2018-09-28 PROCEDURE — 99207 ZZC NO CHARGE NURSE ONLY: CPT

## 2018-09-28 NOTE — PROGRESS NOTES
ANTICOAGULATION FOLLOW-UP CLINIC VISIT    Patient Name:  Maurice Jon  Date:  9/28/2018  Contact Type:  Face to Face    SUBJECTIVE:     Patient Findings     Positives No Problem Findings    Comments No changes in diet, activity level, medications (including over the counter), or health. No missed doses of warfarin. Patient took dosing as prescribed. No signs of clots or bleeding concerns.   Pt is working towards cardioversion. We will continue 16.25 mg/week, which is keeping patient in range at this point. Recheck in 1 week. Writer will notify cardiology team of result.               OBJECTIVE    INR Protime   Date Value Ref Range Status   09/28/2018 2.9 (A) 0.86 - 1.14 Final       ASSESSMENT / PLAN  INR assessment THER    Recheck INR In: 1 WEEK    INR Location Clinic      Anticoagulation Summary as of 9/28/2018     INR goal 2.0-3.0   Today's INR 2.9   Warfarin maintenance plan 1.25 mg (2.5 mg x 0.5) on Fri; 2.5 mg (2.5 mg x 1) all other days   Full warfarin instructions 1.25 mg on Fri; 2.5 mg all other days   Weekly warfarin total 16.25 mg   Plan last modified Susan Beyer, RN (9/28/2018)   Next INR check 10/5/2018   Target end date 10/4/2018    Indications   Atrial fibrillation (H) [I48.91]  Atrial fibrillation with rapid ventricular response (H) [I48.91]  Long-term (current) use of anticoagulants [Z79.01] [Z79.01]         Anticoagulation Episode Summary     INR check location     Preferred lab     Send INR reminders to [facility] POOL    Comments * anticoagulation short period surrounding cardioversion/ablation only per Dr. Evans's note via Melany Ewing RN (Portal hypertension) SEND INR RESULTS TO CARDIOLOGY. has well-compensated cirrhosis      Anticoagulation Care Providers     Provider Role Specialty Phone number    Alon Pineda MD Referring [facility] [phone]            See the Encounter Report to view Anticoagulation Flowsheet and Dosing Calendar (Go to Encounters tab in chart  review, and find the Anticoagulation Therapy Visit)        Susan Beyer RN

## 2018-09-28 NOTE — MR AVS SNAPSHOT
Maurice Jon   9/28/2018 3:45 PM   Anticoagulation Therapy Visit    Description:  57 year old male   Provider:  [place] ANTI COAG   Department:  [facility]           INR as of 9/28/2018     Today's INR 2.9      Anticoagulation Summary as of 9/28/2018     INR goal 2.0-3.0   Today's INR 2.9   Full warfarin instructions 1.25 mg on Fri; 2.5 mg all other days   Next INR check 10/5/2018    Indications   Atrial fibrillation (H) [I48.91]  Atrial fibrillation with rapid ventricular response (H) [I48.91]  Long-term (current) use of anticoagulants [Z79.01] [Z79.01]         Your next Anticoagulation Clinic appointment(s)     Oct 05, 2018  3:30 PM CDT   Anticoagulation Visit with CL ANTI COAG   [facility] ([facility]    [address] [facility] [address] [phone]              Contact Numbers     Please call [phone] with any problems or questions regarding your therapy.    If you need to cancel and/or reschedule your appointment please call one of the following numbers:  [facility] - [phone]  [place] - [phone]  [facility] [phone]  [facility] [phone]  [place] - [phone]            September 2018 Details    Sun Mon Tue Wed Thu Fri Sat           1                 2               3               4               5               6               7               8                 9               10               11               12               13               14               15                 16               17               18               19               20               21               22                 23               24               25               26               27               28      1.25 mg   See details      29      2.5 mg           30      2.5 mg                Date Details   09/28 This INR check               How to take your warfarin dose     To take:  1.25 mg Take 0.5 of a 2.5 mg tablet.    To take:  2.5 mg Take  1 of the 2.5 mg tablets.           October 2018 Details    Sun Mon Tue Wed Thu Fri Sat      1      2.5 mg         2      2.5 mg         3      2.5 mg         4      2.5 mg         5            6                 7               8               9               10               11               12               13                 14               15               16               17               18               19               20                 21               22               23               24               25               26               27                 28               29               30               31                   Date Details   No additional details    Date of next INR:  10/5/2018         How to take your warfarin dose     To take:  1.25 mg Take 0.5 of a 2.5 mg tablet.    To take:  2.5 mg Take 1 of the 2.5 mg tablets.

## 2018-10-04 ENCOUNTER — HOSPITAL ENCOUNTER (OUTPATIENT)
Dept: NUCLEAR MEDICINE | Facility: CLINIC | Age: 58
Setting detail: NUCLEAR MEDICINE
Discharge: HOME | End: 2018-10-04
Attending: INTERNAL MEDICINE | Admitting: INTERNAL MEDICINE
Payer: COMMERCIAL

## 2018-10-04 DIAGNOSIS — I48.0 PAROXYSMAL ATRIAL FIBRILLATION (H): ICD-10-CM

## 2018-10-04 PROCEDURE — 25000128 H RX IP 250 OP 636: Performed by: INTERNAL MEDICINE

## 2018-10-04 PROCEDURE — 93018 CV STRESS TEST I&R ONLY: CPT | Performed by: INTERNAL MEDICINE

## 2018-10-04 PROCEDURE — 93017 CV STRESS TEST TRACING ONLY: CPT

## 2018-10-04 PROCEDURE — 78452 HT MUSCLE IMAGE SPECT MULT: CPT

## 2018-10-04 PROCEDURE — A9502 TC99M TETROFOSMIN: HCPCS | Performed by: INTERNAL MEDICINE

## 2018-10-04 PROCEDURE — 34300033 ZZH RX 343: Performed by: INTERNAL MEDICINE

## 2018-10-04 PROCEDURE — 93016 CV STRESS TEST SUPVJ ONLY: CPT

## 2018-10-04 PROCEDURE — 78452 HT MUSCLE IMAGE SPECT MULT: CPT | Mod: 26 | Performed by: INTERNAL MEDICINE

## 2018-10-04 RX ORDER — REGADENOSON 0.08 MG/ML
0.4 INJECTION, SOLUTION INTRAVENOUS ONCE
Status: COMPLETED | OUTPATIENT
Start: 2018-10-04 | End: 2018-10-04

## 2018-10-04 RX ADMIN — TETROFOSMIN 12.7 MCI.: 1.38 INJECTION, POWDER, LYOPHILIZED, FOR SOLUTION INTRAVENOUS at 12:00

## 2018-10-04 RX ADMIN — TETROFOSMIN 37 MCI.: 1.38 INJECTION, POWDER, LYOPHILIZED, FOR SOLUTION INTRAVENOUS at 14:00

## 2018-10-04 RX ADMIN — REGADENOSON 0.4 MG: 0.08 INJECTION, SOLUTION INTRAVENOUS at 13:21

## 2018-10-05 ENCOUNTER — ANTICOAGULATION THERAPY VISIT (OUTPATIENT)
Dept: ANTICOAGULATION | Facility: CLINIC | Age: 58
End: 2018-10-05
Payer: COMMERCIAL

## 2018-10-05 DIAGNOSIS — I48.91 ATRIAL FIBRILLATION (H): ICD-10-CM

## 2018-10-05 DIAGNOSIS — I48.91 ATRIAL FIBRILLATION WITH RAPID VENTRICULAR RESPONSE (H): ICD-10-CM

## 2018-10-05 LAB — INR POINT OF CARE: 2.8 (ref 0.86–1.14)

## 2018-10-05 PROCEDURE — 99207 ZZC NO CHARGE NURSE ONLY: CPT

## 2018-10-05 PROCEDURE — 36416 COLLJ CAPILLARY BLOOD SPEC: CPT

## 2018-10-05 PROCEDURE — 85610 PROTHROMBIN TIME: CPT | Mod: QW

## 2018-10-05 NOTE — MR AVS SNAPSHOT
Maurice LISBETH Jon   10/5/2018 3:30 PM   Anticoagulation Therapy Visit    Description:  57 year old male   Provider:  [facility] ANTI [facility]AG   Department:  Cl Anticoag           INR as of 10/5/2018     Today's INR 2.8      Anticoagulation Summary as of 10/5/2018     INR goal 2.0-3.0   Today's INR 2.8   Full warfarin instructions 1.25 mg on Fri; 2.5 mg all other days   Next INR check 10/19/2018    Indications   Atrial fibrillation (H) [I48.91]  Atrial fibrillation with rapid ventricular response (H) [I48.91]  Long-term (current) use of anticoagulants [Z79.01] [Z79.01]         Your next Anticoagulation Clinic appointment(s)     Oct 19, 2018  3:15 PM CDT   Anticoagulation Visit with [facility] ANTI [facility]AG   [facility] ([facility])    [address] [facility] [address] [phone]              Contact Numbers     Please call [phone] with any problems or questions regarding your therapy.    If you need to cancel and/or reschedule your appointment please call one of the following numbers:  [facility] - [phone]  [facility] [phone]  [facility] [phone]  [facility] [phone]  [place] - [phone]            October 2018 Details    Sun Mon Tue Wed Thu Fri Sat      1               2               3               4               5      1.25 mg   See details      6      2.5 mg           7      2.5 mg         8      2.5 mg         9      2.5 mg         10      2.5 mg         11      2.5 mg         12      1.25 mg         13      2.5 mg           14      2.5 mg         15      2.5 mg         16      2.5 mg         17      2.5 mg         18      2.5 mg         19            20                 21               22               23               24               25               26               27                 28               29               30               31                   Date Details   10/05 This INR check       Date of next INR:  10/19/2018         How to  take your warfarin dose     To take:  1.25 mg Take 0.5 of a 2.5 mg tablet.    To take:  2.5 mg Take 1 of the 2.5 mg tablets.

## 2018-10-05 NOTE — PROGRESS NOTES
ANTICOAGULATION FOLLOW-UP CLINIC VISIT    Patient Name:  Maurice Jon  Date:  10/5/2018  Contact Type:  Face to Face    SUBJECTIVE:     Patient Findings     Positives No Problem Findings    Comments No changes in medications, diet, or activity. No concerns with bleeding or bruising. Took warfarin as prescribed.   Continue maintenance warfarin dose. Will recheck INR in 2 weeks.   Patient verbalizes understanding and agrees with plan. No further questions at this time.              OBJECTIVE    INR Protime   Date Value Ref Range Status   10/05/2018 2.8 (A) 0.86 - 1.14 Final       ASSESSMENT / PLAN  INR assessment THER    Recheck INR In: 2 WEEKS    INR Location Clinic      Anticoagulation Summary as of 10/5/2018     INR goal 2.0-3.0   Today's INR 2.8   Warfarin maintenance plan 1.25 mg (2.5 mg x 0.5) on Fri; 2.5 mg (2.5 mg x 1) all other days   Full warfarin instructions 1.25 mg on Fri; 2.5 mg all other days   Weekly warfarin total 16.25 mg   No change documented Delores Bolaños RN   Plan last modified Susan Beyer RN (9/28/2018)   Next INR check 10/19/2018   Target end date 10/4/2018    Indications   Atrial fibrillation (H) [I48.91]  Atrial fibrillation with rapid ventricular response (H) [I48.91]  Long-term (current) use of anticoagulants [Z79.01] [Z79.01]         Anticoagulation Episode Summary     INR check location     Preferred lab     Send INR reminders to [facility]    Comments * anticoagulation short period surrounding cardioversion/ablation only per Dr. Evans's note via Melany Ewing RN (Portal hypertension) SEND INR RESULTS TO CARDIOLOGY. has well-compensated cirrhosis      Anticoagulation Care Providers     Provider Role Specialty Phone number    Alon Pineda MD Referring Family Practice [phone]            See the Encounter Report to view Anticoagulation Flowsheet and Dosing Calendar (Go to Encounters tab in chart review, and find the Anticoagulation Therapy  Visit)        Delores Bolaños RN

## 2018-10-05 NOTE — Clinical Note
Patient has had 3 INRs within range. Per patient he has met requirements for cardioversion. Can your team contact him to set up an appointment or further instructions?

## 2018-10-08 ENCOUNTER — TELEPHONE (OUTPATIENT)
Dept: CARDIOLOGY | Facility: CLINIC | Age: 58
End: 2018-10-08

## 2018-10-08 NOTE — TELEPHONE ENCOUNTER
Message left for patient to review results of nuc stress test and recommendations per Dr Evans.  Awaiting return call.  RIKI Cope

## 2018-10-10 NOTE — TELEPHONE ENCOUNTER
Pt returned call once he was home from work with more questions and to go over what was already discussed.  Discussed the Nuc stress test again and also why the Sotalol over the flecainide and why Sotalol is needed prior to Cardioversion, and why pt needs to be in hospital to start the Sotalol.  Went over what the plan would be if the Sotalol does not work and that an ablation would be recommended.  Pt stated that Dr Evans had discussed a device at one time, but that he was not ready to have metal in his chest yet and did explain that this is usually not normally what is do first.  Pt then decided that he would proceed with the Sotalol load on 10/31-11/2. Explained that check in is at 0730, but that we would be touching base with pt prior to the date of admission.  Pt to call with any further questions.  Spoke to pt for 33:15 min. Nick

## 2018-10-10 NOTE — TELEPHONE ENCOUNTER
Notes Recorded by Kristofer Evans MD on 10/8/2018 at 11:38 AM  Stress test is somewhat equivocal but likely negative for ischemia. Initially, I was thinking about starting flecainide but given this finding it's not safe. I would have come to [facility] for Sotalol loading and proceed with CVN. If sotalol not working then I would proceed with ablation.      Pt returned call from [facility] and was on his break, so left a message to call back and leave a message as to why he was called the other day.  LM that the call was about his stress test and the recommendation of starting a medication, which would entail staying in the hospital, thus this writer has asked pt to call back to discuss further. Nick

## 2018-10-10 NOTE — TELEPHONE ENCOUNTER
Pt returned call and the test and Sotalol was discussed more. Pt was on his lunch break, so conversation was limited.  Did explain everything and also did ask pt if he had any chest pain, of which pt stated that he did not, but was SOB when going up stairs.  Pt is in persistent A Fib.  Explained why the order of Sotalol, cardioversion and Ablation.  Pt really wishing not to be on many medications.  Pt needing to go back to work will do some thinking and call back today or later in the week. Nick

## 2018-10-23 ENCOUNTER — ANTICOAGULATION THERAPY VISIT (OUTPATIENT)
Dept: ANTICOAGULATION | Facility: CLINIC | Age: 58
End: 2018-10-23
Payer: COMMERCIAL

## 2018-10-23 DIAGNOSIS — I48.91 ATRIAL FIBRILLATION (H): ICD-10-CM

## 2018-10-23 DIAGNOSIS — I48.91 ATRIAL FIBRILLATION WITH RAPID VENTRICULAR RESPONSE (H): ICD-10-CM

## 2018-10-23 DIAGNOSIS — I48.20 CHRONIC ATRIAL FIBRILLATION (H): ICD-10-CM

## 2018-10-23 LAB — INR POINT OF CARE: 2.4 (ref 0.86–1.14)

## 2018-10-23 PROCEDURE — 36416 COLLJ CAPILLARY BLOOD SPEC: CPT

## 2018-10-23 PROCEDURE — 85610 PROTHROMBIN TIME: CPT | Mod: QW

## 2018-10-23 RX ORDER — WARFARIN SODIUM 5 MG/1
TABLET ORAL
Qty: 45 TABLET | Refills: 0 | Status: ON HOLD | COMMUNITY
Start: 2018-10-23 | End: 2018-10-31

## 2018-10-23 NOTE — MR AVS SNAPSHOT
Maurice Jon   10/23/2018 6:30 PM   Anticoagulation Therapy Visit    Description:  57 year old male   Provider:  [place] [facility]   Department:  [facility]           INR as of 10/23/2018     Today's INR 2.4      Anticoagulation Summary as of 10/23/2018     INR goal 2.0-3.0   Today's INR 2.4   Full warfarin instructions 1.25 mg on Fri; 2.5 mg all other days   Next INR check 10/31/2018    Indications   Atrial fibrillation (H) [I48.91]  Atrial fibrillation with rapid ventricular response (H) [I48.91]  Long-term (current) use of anticoagulants [Z79.01] [Z79.01]         Contact Numbers     Please call [phone] with any problems or questions regarding your therapy.    If you need to cancel and/or reschedule your appointment please call one of the following numbers:  [facility] [phone]  [place] - [phone]  [place] - [phone]  [facility] [phone]  [place] - [phone]            October 2018 Details    Sun Mon Tue Wed Thu Fri Sat      1               2               3               4               5               6                 7               8               9               10               11               12               13                 14               15               16               17               18               19               20                 21               22               23      2.5 mg   See details      24      2.5 mg         25      2.5 mg         26      1.25 mg         27      2.5 mg           28      2.5 mg         29      2.5 mg         30      2.5 mg         31                Date Details   10/23 This INR check       Date of next INR:  10/31/2018         How to take your warfarin dose     To take:  1.25 mg Take 0.5 of a 2.5 mg tablet.    To take:  2.5 mg Take 1 of the 2.5 mg tablets.

## 2018-10-24 NOTE — PROGRESS NOTES
10/31/18 ADDENDUM: As of today, patient is hospitalized for monitoring sotalol therapy. Warfarin dosing will be managed by inpatient pharmacy.    Dheeraj PANIAGUA RN, CACP     ANTICOAGULATION FOLLOW-UP CLINIC VISIT    Patient Name:  Maurice Jon  Date:  10/23/2018  Contact Type:  Face to Face    SUBJECTIVE:     Patient Findings     Positives Dental/Other procedures (Soltalol-Cardioversion/ablation 10/31)    Comments Patient is going to [facility] on 10/31 a cardioversion. Notes Recorded by Kristofer Evans MD on 10/8/2018 at 11:38 AM  Stress test is somewhat equivocal but likely negative for ischemia. Initially, I was thinking about starting flecainide but given this finding it's not safe. I would have come to [facility] for Sotalol loading and proceed with CVN. If sotalol not working then I would proceed with ablation.  Patient will continue on maintenance dose at this time. [facility] will f/u on 10/31 to see hospital status and update [facility] chart as needed. No further appointments made at this time. Plan is too wean patient off Coumadin if the procedure is successful.           OBJECTIVE    INR Protime   Date Value Ref Range Status   10/23/2018 2.4 (A) 0.86 - 1.14 Final       ASSESSMENT / PLAN  INR assessment THER    Recheck INR In: 1 WEEK    INR Location Clinic      Anticoagulation Summary as of 10/23/2018     INR goal 2.0-3.0   Today's INR 2.4   Warfarin maintenance plan 1.25 mg (2.5 mg x 0.5) on Fri; 2.5 mg (2.5 mg x 1) all other days   Full warfarin instructions 1.25 mg on Fri; 2.5 mg all other days   Weekly warfarin total 16.25 mg   Plan last modified Susan Beyer RN (9/28/2018)   Next INR check 10/31/2018   Target end date 10/4/2018    Indications   Atrial fibrillation (H) [I48.91]  Atrial fibrillation with rapid ventricular response (H) [I48.91]  Long-term (current) use of anticoagulants [Z79.01] [Z79.01]         Anticoagulation Episode Summary     INR check location     Preferred lab     Send INR reminders to [facility]     Comments * anticoagulation short period surrounding cardioversion/ablation on 10/31/18. Only per Dr. Evans's note via Melany Ewing RN (Portal hypertension) SEND INR RESULTS TO CARDIOLOGY. has well-compensated cirrhosis      Anticoagulation Care Providers     Provider Role Specialty Phone number    Alon Pineda MD Referring [facility] [phone]            See the Encounter Report to view Anticoagulation Flowsheet and Dosing Calendar (Go to Encounters tab in chart review, and find the Anticoagulation Therapy Visit)        Taylor Felix RN

## 2018-10-29 RX ORDER — SOTALOL HYDROCHLORIDE 120 MG/1
120 TABLET ORAL EVERY 12 HOURS SCHEDULED
Status: CANCELLED | OUTPATIENT
Start: 2018-10-29

## 2018-10-29 RX ORDER — LIDOCAINE 40 MG/G
CREAM TOPICAL
Status: CANCELLED | OUTPATIENT
Start: 2018-10-29

## 2018-10-29 RX ORDER — ACETAMINOPHEN 650 MG/1
650 SUPPOSITORY RECTAL EVERY 4 HOURS PRN
Status: CANCELLED | OUTPATIENT
Start: 2018-10-29

## 2018-10-29 RX ORDER — NITROGLYCERIN 0.4 MG/1
0.4 TABLET SUBLINGUAL EVERY 5 MIN PRN
Status: CANCELLED | OUTPATIENT
Start: 2018-10-29

## 2018-10-29 RX ORDER — ACETAMINOPHEN 325 MG/1
650 TABLET ORAL EVERY 4 HOURS PRN
Status: CANCELLED | OUTPATIENT
Start: 2018-10-29

## 2018-10-29 NOTE — TELEPHONE ENCOUNTER
ELIZABETH for pt to call back to discuss his Admission on Wednesday for Sotalol load.  Will have pt reduce his night time dose of Metoprolol XL to 50 mg and hold AM dose. Nick

## 2018-10-29 NOTE — TELEPHONE ENCOUNTER
Pt called back and made aware that he will decrease the Metoprolol to 50 mg tomorrow night and then will hold the AM of admit. Pt is concerned with the Sotalol and his Cirrhosis and explained that Dr Evans has it noted in his office notes, so this writer feels that it is just fine to take the medication, but will verify for pt peace of mind. Pt made aware where to come on Thursday and at what time. JNElder

## 2018-10-31 ENCOUNTER — HOSPITAL ENCOUNTER (INPATIENT)
Facility: CLINIC | Age: 58
LOS: 2 days | Discharge: HOME | DRG: 309 | End: 2018-11-02
Attending: INTERNAL MEDICINE | Admitting: INTERNAL MEDICINE
Payer: COMMERCIAL

## 2018-10-31 DIAGNOSIS — I48.91 ATRIAL FIBRILLATION WITH RAPID VENTRICULAR RESPONSE (H): Primary | ICD-10-CM

## 2018-10-31 PROBLEM — Z79.899 ENCOUNTER FOR MONITORING SOTALOL THERAPY: Status: ACTIVE | Noted: 2018-10-31

## 2018-10-31 PROBLEM — Z51.81 ENCOUNTER FOR MONITORING SOTALOL THERAPY: Status: ACTIVE | Noted: 2018-10-31

## 2018-10-31 LAB
CREAT SERPL-MCNC: 0.66 MG/DL (ref 0.66–1.25)
GFR SERPL CREATININE-BSD FRML MDRD: >90 ML/MIN/1.7M2
INR PPP: 2.54 (ref 0.86–1.14)
MAGNESIUM SERPL-MCNC: 1.9 MG/DL (ref 1.6–2.3)
POTASSIUM SERPL-SCNC: 4 MMOL/L (ref 3.4–5.3)

## 2018-10-31 PROCEDURE — 93010 ELECTROCARDIOGRAM REPORT: CPT | Performed by: INTERNAL MEDICINE

## 2018-10-31 PROCEDURE — 82565 ASSAY OF CREATININE: CPT | Performed by: NURSE PRACTITIONER

## 2018-10-31 PROCEDURE — 93005 ELECTROCARDIOGRAM TRACING: CPT

## 2018-10-31 PROCEDURE — 25000132 ZZH RX MED GY IP 250 OP 250 PS 637: Performed by: INTERNAL MEDICINE

## 2018-10-31 PROCEDURE — 84132 ASSAY OF SERUM POTASSIUM: CPT | Performed by: NURSE PRACTITIONER

## 2018-10-31 PROCEDURE — 85610 PROTHROMBIN TIME: CPT | Performed by: NURSE PRACTITIONER

## 2018-10-31 PROCEDURE — 36415 COLL VENOUS BLD VENIPUNCTURE: CPT | Performed by: NURSE PRACTITIONER

## 2018-10-31 PROCEDURE — 21000001 ZZH R&B HEART CARE

## 2018-10-31 PROCEDURE — 99223 1ST HOSP IP/OBS HIGH 75: CPT | Performed by: INTERNAL MEDICINE

## 2018-10-31 PROCEDURE — 83735 ASSAY OF MAGNESIUM: CPT | Performed by: NURSE PRACTITIONER

## 2018-10-31 PROCEDURE — 25000132 ZZH RX MED GY IP 250 OP 250 PS 637: Performed by: NURSE PRACTITIONER

## 2018-10-31 RX ORDER — SPIRONOLACTONE 25 MG/1
25 TABLET ORAL DAILY
Status: DISCONTINUED | OUTPATIENT
Start: 2018-10-31 | End: 2018-11-02 | Stop reason: HOSPADM

## 2018-10-31 RX ORDER — ACETAMINOPHEN 650 MG/1
650 SUPPOSITORY RECTAL EVERY 4 HOURS PRN
Status: DISCONTINUED | OUTPATIENT
Start: 2018-10-31 | End: 2018-11-02 | Stop reason: HOSPADM

## 2018-10-31 RX ORDER — PANTOPRAZOLE SODIUM 40 MG/1
40 TABLET, DELAYED RELEASE ORAL DAILY PRN
Status: DISCONTINUED | OUTPATIENT
Start: 2018-10-31 | End: 2018-11-02 | Stop reason: HOSPADM

## 2018-10-31 RX ORDER — SOTALOL HYDROCHLORIDE 80 MG/1
80 TABLET ORAL EVERY 12 HOURS SCHEDULED
Status: DISCONTINUED | OUTPATIENT
Start: 2018-10-31 | End: 2018-10-31

## 2018-10-31 RX ORDER — NITROGLYCERIN 0.4 MG/1
0.4 TABLET SUBLINGUAL EVERY 5 MIN PRN
Status: DISCONTINUED | OUTPATIENT
Start: 2018-10-31 | End: 2018-11-02 | Stop reason: HOSPADM

## 2018-10-31 RX ORDER — WARFARIN SODIUM 2.5 MG/1
2.5 TABLET ORAL
Status: COMPLETED | OUTPATIENT
Start: 2018-10-31 | End: 2018-10-31

## 2018-10-31 RX ORDER — SOTALOL HYDROCHLORIDE 120 MG/1
120 TABLET ORAL EVERY 12 HOURS SCHEDULED
Status: DISCONTINUED | OUTPATIENT
Start: 2018-10-31 | End: 2018-11-02 | Stop reason: HOSPADM

## 2018-10-31 RX ORDER — FUROSEMIDE 20 MG
20 TABLET ORAL DAILY
Status: DISCONTINUED | OUTPATIENT
Start: 2018-10-31 | End: 2018-11-02 | Stop reason: HOSPADM

## 2018-10-31 RX ORDER — LIDOCAINE 40 MG/G
CREAM TOPICAL
Status: DISCONTINUED | OUTPATIENT
Start: 2018-10-31 | End: 2018-11-02 | Stop reason: HOSPADM

## 2018-10-31 RX ORDER — ACETAMINOPHEN 325 MG/1
650 TABLET ORAL EVERY 4 HOURS PRN
Status: DISCONTINUED | OUTPATIENT
Start: 2018-10-31 | End: 2018-11-02 | Stop reason: HOSPADM

## 2018-10-31 RX ADMIN — SPIRONOLACTONE 25 MG: 25 TABLET ORAL at 10:41

## 2018-10-31 RX ADMIN — WARFARIN SODIUM 2.5 MG: 2.5 TABLET ORAL at 18:47

## 2018-10-31 RX ADMIN — FUROSEMIDE 20 MG: 20 TABLET ORAL at 10:41

## 2018-10-31 RX ADMIN — SOTALOL HYDROCHLORIDE 80 MG: 80 TABLET ORAL at 10:41

## 2018-10-31 RX ADMIN — PANTOPRAZOLE SODIUM 40 MG: 40 TABLET, DELAYED RELEASE ORAL at 16:16

## 2018-10-31 RX ADMIN — SOTALOL HYDROCHLORIDE 120 MG: 120 TABLET ORAL at 21:22

## 2018-10-31 ASSESSMENT — ACTIVITIES OF DAILY LIVING (ADL)
ADLS_ACUITY_SCORE: 9
TOILETING: 0-->INDEPENDENT
SWALLOWING: 0-->SWALLOWS FOODS/LIQUIDS WITHOUT DIFFICULTY
ADLS_ACUITY_SCORE: 9
FALL_HISTORY_WITHIN_LAST_SIX_MONTHS: NO
AMBULATION: 0-->INDEPENDENT
COGNITION: 0 - NO COGNITION ISSUES REPORTED
RETIRED_COMMUNICATION: 0-->UNDERSTANDS/COMMUNICATES WITHOUT DIFFICULTY
RETIRED_EATING: 0-->INDEPENDENT
DRESS: 0-->INDEPENDENT
TRANSFERRING: 0-->INDEPENDENT
ADLS_ACUITY_SCORE: 9
BATHING: 0-->INDEPENDENT

## 2018-10-31 NOTE — PLAN OF CARE
Problem: Patient Care Overview  Goal: Plan of Care/Patient Progress Review  Outcome: No Change  VSS. Tele AFib CVR. Up independently in room. Sotalol loading started this AM. Pt tolerating well, next dose increased for 2100. Pt denies CP, SOB. Edema to BLE. Continue to monitor.

## 2018-10-31 NOTE — PHARMACY-ANTICOAGULATION SERVICE
Clinical Pharmacy - Warfarin Dosing Consult     Pharmacy has been consulted to manage this patient s warfarin therapy.  Indication: Atrial Fibrillation  Therapy Goal: INR 2-3  Warfarin Prior to Admission: Yes  Warfarin PTA Regimen: 2.5mg daily    INR   Date Value Ref Range Status   10/31/2018 2.54 (H) 0.86 - 1.14 Final     INR Protime   Date Value Ref Range Status   10/23/2018 2.4 (A) 0.86 - 1.14 Final       Recommend warfarin 2.5 mg today.  Pharmacy will monitor Maurice Jon daily and order warfarin doses to achieve specified goal.      Please contact pharmacy as soon as possible if the warfarin needs to be held for a procedure or if the warfarin goals change.

## 2018-10-31 NOTE — PLAN OF CARE
Problem: Arrhythmia/Dysrhythmia (Symptomatic) (Adult)  Goal: Signs and Symptoms of Listed Potential Problems Will be Absent, Minimized or Managed (Arrhythmia/Dysrhythmia)  Signs and symptoms of listed potential problems will be absent, minimized or managed by discharge/transition of care (reference Arrhythmia/Dysrhythmia (Symptomatic) (Adult) CPG).   Outcome: No Change  A/o x4. VSS. RA. IV SL. Regular diet. Tolerating well. Tele AFIB CVR. Denies CP or SOB.Sotalol initiated. If sotalol fails, has signed and held orders for cardioversion.  Independent in room. Calls appropriately.

## 2018-10-31 NOTE — IP AVS SNAPSHOT
[facility]    [address] [place] [address]., [address]    [place] [address]    Phone:  [phone]                                       After Visit Summary   10/31/2018    Maurice Jon    MRN: [id number]           After Visit Summary Signature Page     I have received my discharge instructions, and my questions have been answered. I have discussed any challenges I see with this plan with the nurse or doctor.    ..........................................................................................................................................  Patient/Patient Representative Signature      ..........................................................................................................................................  Patient Representative Print Name and Relationship to Patient    ..................................................               ................................................  Date                                   Time    ..........................................................................................................................................  Reviewed by Signature/Title    ...................................................              ..............................................  Date                                               Time          22EPIC Rev 08/18

## 2018-10-31 NOTE — IP AVS SNAPSHOT
MRN:[id number]                      After Visit Summary   10/31/2018    Maurice Jon    MRN: [id number]           Thank you!     Thank you for choosing [facility] for your care. Our goal is always to provide you with excellent care. Hearing back from our patients is one way we can continue to improve our services. Please take a few minutes to complete the written survey that you may receive in the mail after you visit with us. Thank you!        Patient Information     Date Of Birth          1960        Designated Caregiver       Most Recent Value    Caregiver    Will someone help with your care after discharge? yes    Name of designated caregiver Delano    Phone number of caregiver [phone]    Caregiver address same as pt      About your hospital stay     You were admitted on:  October 31, 2018 You last received care in the:  [facility]    You were discharged on:  November 2, 2018       Who to Call     For medical emergencies, please call 911.  For non-urgent questions about your medical care, please call your primary care provider or clinic, None  For questions related to your surgery, please call your surgery clinic        Attending Provider     Provider Specialty    Kristofer Evans MD Cardiology       Primary Care Provider Fax #    Physician No Ref-Primary [phone]      Your next 10 appointments already scheduled     Dec 18, 2018 11:30 AM CST   ecg with [place] [facility]   [facility] ([facility])    [address] [facility] [address] [facility] [address]   [phone]            Dec 18, 2018 12:00 PM CST   [facility] RETURN with Kristofer Brown MD   [facility] ([facility])    [address] [facility] [address] [address]   [phone]            Mar 21, 2019  6:15 AM CDT   US ABDOMEN COMPLETE with [facility]    [facility]    [address] [address] [phone]           How do I prepare for my exam? (Food and drink instructions) Adults: No eating, smoking, gum chewing or drinking for 8 hours before the exam. You may take medicine with a small sip of water.  Children: * Infants, breast-fed: may have breast milk up to 2 hours before exam. * Infants, formula: may have bottle until 4 hours before exam. * Children 1-5 years: No food or drink for 4 hours before exam. * Children 6 -12 years: No food or drink for 6 hours before exam. * Children over 12 years: No food or drink for 8 hours before exam.  * J Tube Fed: No need to stop feedings.  What should I wear: Wear comfortable clothes.  How long does the exam take: Most ultrasounds take 30 to 60 minutes.  What should I bring: Bring a list of your medicines, including vitamins, minerals and over-the-counter drugs. It is safest to leave personal items at home.  Do I need a :  No  is needed.  What do I need to tell my doctor: Tell your doctor about any allergies you may have.  What should I do after the exam: No restrictions, You may resume normal activities.  What is this test: An ultrasound uses sound waves to make pictures of the body. Sound waves do not cause pain. The only discomfort may be the pressure of the wand against your skin or full bladder.  Who should I call with questions: If you have any questions, please call the Imaging Department where you will have your exam. Directions, parking instructions, and other information is available on our website, [email or web address].            Mar 21, 2019  7:00 AM CDT   Lab with  LAB   [facility] ([facility])    [address] [place] [id number] [phone]            Mar 21, 2019  8:00 AM CDT   (Arrive by 7:45 AM)   Return General Liver with Hi Roque MD   [facility] ([facility])    [address] [place] [address]  [address]  [address]  [facility] [address] [phone]              Additional Services     Follow-Up with Electrophysiologist           Follow-Up with Electrophysiologist                 Further instructions from your care team       Discharge pt if QTC <500 ms with next scheduled ECG    Pending Results     Date and Time Order Name Status Description    11/2/2018 1011 EKG 12-lead, tracing only Preliminary     11/2/2018 0500 EKG 12-lead, tracing only Preliminary     11/1/2018 1025 EKG 12-lead, tracing only Preliminary     10/31/2018 0941 EKG 12-lead, tracing only Preliminary             Statement of Approval     Ordered          11/02/18 1138  I have reviewed and agree with all the recommendations and orders detailed in this document.  EFFECTIVE NOW     Approved and electronically signed by:  Virginia Montilla PA-C           11/02/18 1017  I have reviewed and agree with all the recommendations and orders detailed in this document.  EFFECTIVE NOW     Approved and electronically signed by:  Kristofer Evans MD             Admission Information     Date & Time Provider Department Dept. Phone    10/31/2018 Kristofer Evans MD [facility] [phone]      Your Vitals Were     Blood Pressure Pulse Temperature Respirations Weight Pulse Oximetry    115/50 (BP Location: Right arm) 103 97.8  F (36.6  C) (Oral) 18 108 kg (238 lb 3.2 oz) 98%    BMI (Body Mass Index)                   38.45 kg/m2           MyChart Information     [employer] gives you secure access to your electronic health record. If you see a primary care provider, you can also send messages to your care team and make appointments. If you have questions, please call your primary care clinic.  If you do not have a primary care provider, please call [phone] and they will assist you.        Care EveryWhere ID     This is your Care EveryWhere ID. This could be used by other organizations to access your [facility]  records  [id number]        Equal Access to Services     CHASITYHOLLY CLARICE : Hadii jose dougherty aldoseamus Delanoali, waaxda luqadaha, qaybta yennykimarianna amaral, rakan jaramillolucycarlos espinosa. So [facility] [phone].    ATENCIÓN: Si habla español, tiene a allen disposición servicios gratuitos de asistencia lingüística. Llame al [phone].    We comply with applicable federal civil rights laws and [place] laws. We do not discriminate on the basis of race, color, national origin, age, disability, sex, sexual orientation, or gender identity.               Review of your medicines      START taking        Dose / Directions    sotalol 120 MG tablet   Commonly known as:  BETAPACE   Used for:  Atrial fibrillation with rapid ventricular response (H)   Notes to Patient:  Take doses 12 hours apart        Dose:  120 mg   Take 1 tablet (120 mg) by mouth every 12 hours   Quantity:  60 tablet   Refills:  11         CONTINUE these medicines which have NOT CHANGED        Dose / Directions    calcium carbonate 500 mg-vitamin D 200 units 500-200 MG-UNIT per tablet   Commonly known as:  OSCAL with D;OYSTER SHELL CALCIUM   Notes to Patient:  Not given in hospital        Dose:  2 tablet   Take 2 tablets by mouth daily with food.   Refills:  0       furosemide 40 MG tablet   Commonly known as:  LASIX   Used for:  Portal hypertension (H)        Dose:  20 mg   Take 0.5 tablets (20 mg) by mouth daily   Quantity:  45 tablet   Refills:  3       mometasone-formoterol 200-5 MCG/ACT oral inhaler   Commonly known as:  DULERA   Used for:  Bronchospasm        Dose:  2 puff   Inhale 2 puffs into the lungs 2 times daily as needed Appt DUE Jan 2017   Quantity:  1 Inhaler   Refills:  1       * order for DME   Used for:  Portal hypertension (H), Edema        Juzo compression stockings, 30-40mm compression. Patient has portal hypertension and develops leg edema, which these control well.   Quantity:  2 Package   Refills:  3       * order for DME   Used  for:  BRUCE (obstructive sleep apnea)        Respironics RemStar 60 Series Auto AFlex 12-15 cm H2O with heated humidty and a modem.  Pt chose a Quattro Air FFM size medium.   Refills:  0       order for DME   Used for:  Sleep apnea        Equipment being ordered:  New CPAP mask, tube, filters,water tray   Quantity:  1 Device   Refills:  3       order for DME   Used for:  Portal hypertension (H), Hepatic cirrhosis (H), Edema        Open toe size large 30/40 Mediven Assure Medical Compression socks Model 66963.   Quantity:  4 Package   Refills:  3       pantoprazole 40 MG EC tablet   Commonly known as:  PROTONIX   Used for:  Gastroesophageal reflux disease without esophagitis        Dose:  40 mg   Take 1 tablet (40 mg) by mouth daily as needed   Quantity:  90 tablet   Refills:  3       spironolactone 25 MG tablet   Commonly known as:  ALDACTONE   Used for:  Portal hypertension (H)        Dose:  25 mg   Take 1 tablet (25 mg) by mouth daily We will no longer fill unless seen by cardiology   Quantity:  90 tablet   Refills:  3       warfarin 2.5 MG tablet   Commonly known as:  COUMADIN   Used for:  Long-term (current) use of anticoagulants, Paroxysmal atrial fibrillation (H), Atrial fibrillation with rapid ventricular response (H)        2.5mg daily or As directed by Anticoagulation Clinic no maintenance dose establish   Quantity:  90 tablet   Refills:  0       * Notice:  This list has 2 medication(s) that are the same as other medications prescribed for you. Read the directions carefully, and ask your doctor or other care provider to review them with you.      STOP taking     metoprolol succinate 100 MG 24 hr tablet   Commonly known as:  TOPROL XL           metoprolol succinate 25 MG 24 hr tablet   Commonly known as:  TOPROL-XL                Where to get your medicines      These medications were sent to [facility] [place]  [place], [place] - [address]  [address], [place] [address]     Phone:   [phone]     sotalol 120 MG tablet                Protect others around you: Learn how to safely use, store and throw away your medicines at www.disposemymeds.org.             Medication List: This is a list of all your medications and when to take them. Check marks below indicate your daily home schedule. Keep this list as a reference.      Medications           Morning Afternoon Evening Bedtime As Needed    calcium carbonate 500 mg-vitamin D 200 units 500-200 MG-UNIT per tablet   Commonly known as:  OSCAL with D;OYSTER SHELL CALCIUM   Take 2 tablets by mouth daily with food.   Next Dose Due:  11/3/18   Notes to Patient:  Not given in hospital                                      furosemide 40 MG tablet   Commonly known as:  LASIX   Take 0.5 tablets (20 mg) by mouth daily   Last time this was given:  20 mg on 11/2/2018  8:38 AM   Next Dose Due:  11/3/18                                   mometasone-formoterol 200-5 MCG/ACT oral inhaler   Commonly known as:  DULERA   Inhale 2 puffs into the lungs 2 times daily as needed Appt DUE Jan 2017                                   * order for DME   Juzo compression stockings, 30-40mm compression. Patient has portal hypertension and develops leg edema, which these control well.                                * order for DME   Respironics RemStar 60 Series Auto AFlex 12-15 cm H2O with heated humidty and a modem.  Pt chose a Quattro Air FFM size medium.                                order for DME   Equipment being ordered:  New CPAP mask, tube, filters,water tray                                order for DME   Open toe size large 30/40 Mediven Assure Medical Compression socks Model 12915.                                pantoprazole 40 MG EC tablet   Commonly known as:  PROTONIX   Take 1 tablet (40 mg) by mouth daily as needed   Last time this was given:  40 mg on 10/31/2018  4:16 PM                                   sotalol 120 MG tablet   Commonly known as:  BETAPACE   Take  1 tablet (120 mg) by mouth every 12 hours   Last time this was given:  120 mg on 11/2/2018  8:38 AM   Next Dose Due:  9:00 PM 11/2/18   Notes to Patient:  Take doses 12 hours apart                                      spironolactone 25 MG tablet   Commonly known as:  ALDACTONE   Take 1 tablet (25 mg) by mouth daily We will no longer fill unless seen by cardiology   Last time this was given:  25 mg on 11/2/2018  8:38 AM   Next Dose Due:  11/3/18                                   warfarin 2.5 MG tablet   Commonly known as:  COUMADIN   2.5mg daily or As directed by Anticoagulation Clinic no maintenance dose establish   Last time this was given:  2.5 mg on 11/1/2018  5:45 PM   Next Dose Due:  6:00 PM 11/2/18                                   * Notice:  This list has 2 medication(s) that are the same as other medications prescribed for you. Read the directions carefully, and ask your doctor or other care provider to review them with you.              More Information        Methicillin-Resistant Staphylococcus aureus (MRSA)  What is MRSA?  Staphylococcus aureus bacteria or  staph  is a very common germ. It is found on the skin or in the nose of many people. Sometimes the bacteria causes no problem, or it causes a mild infection. But it can also cause severe infections of the skin, lungs, blood, or other organs or tissues. Some staph infections can be easily treated with antibiotics. But one type of staph, Methicillin-Resistant staphylococcus areas (MRSA) can t. It s called Methicillin-Resistant because the antibiotic methicillin, which used to be effective treatment, no longer works. MRSA is common in hospitals and nursing homes or long-term care facilities. It is also spreading among healthy children and adults outside the health care system. There are two different ways MRSA can appear in the body:      Colonization: When a person carries the MRSA bacteria (often in nose or on skin), but is healthy. This person can  spread MRSA to others.    Infection: When a person gets sick because of the bacteria, it's called being infected with MRSA. This person can also spread MRSA to others. If not treated properly, MRSA infections can be very serious.  What are the symptoms of MRSA infection?  MRSA skin infections start as small red bumps on the skin that look like pimples or spider bites. The small bumps usually get larger and become swollen, painful, warm to the touch and filled with pus. MRSA can also start in other ways, and it can spread deeper into the body. Common places and symptoms include:    Urinary tract: pain and burning when urinating, the need to urinate more often, fever    Blood: high fever, chills, nausea and vomiting, shortness of breath, confusion    Bone, muscle, or other tissue infections    Lungs: pneumonia in one or both lungs    Surgical wound infections    Heart: Infection of the lining of the heart called endocarditis  Who s at risk?  Anyone can get a staph infection. But certain factors make infection more likely, including:    A current or recent stay in the hospital    Living in a nursing home or long-term care facility or other crowded areas, like  barracks or assisted    Taking antibiotics    Having certain health conditions, such as diabetes or HIV    Getting kidney dialysis    Sharing sports equipment, razors or other sharp objects  How does MRSA spread?  MRSA usually spreads through skin-to-skin contact, whether through contact with an infected person or on the hands of health care workers who work with infected patients. MRSA can also spread through contact with contaminated objects, such as cart handles and bedrails or shared towels or athletic equipment.  How is MRSA treated?  MRSA infections are usually treated with antibiotics. These may be in pill form or through a vein (intravenous or IV). If you have a skin abscess, we may drain it.   Patients who test positive for MRSA colonization may go  through a process called decolonization. We apply a topical antibiotic inside your nose to kill the bacteria. We may also wash your skin with a special soap.  Controlling and Preventing MRSA: In the hospital  Hospitals and nursing homes control and prevent MRSA by doing the following:    Handwashing. This is the single most important way to prevent the spread of germs.    Protective clothing. Health care workers and visitors may wear gloves and a gown when entering the room of a patient with MRSA. They remove these items before leaving.    Private rooms. Patients with MRSA infections are placed in private rooms or in a room with others who have the same infection.    Avoid antibiotic overuse. Too much use can cause germs to resist some antibiotics.    Monitoring. Hospitals monitor the spread of MRSA and educate all staff on the best ways to prevent it.  What you can do as a patient    Ask all hospital staff to wash their hands before touching you. Don t be afraid to speak up!    Wash your own hands often with soap and warm water, or use an alcohol-based hand gel. This is especially important:  ? After using the bathroom  ? After touching a bandage  ? Before eating    Encourage family and friends to wash hands as well    If you need to have a test done, such as an X-ray, follow instructions from staff. You may need to change into a clean hospital gown and wash your hands just before leaving your room.  Controlling and Preventing MRSA: at Home  Patients:    Wash your hands often with soap and warm water, or use an alcohol-based hand gel. This is especially important:  ? After using the bathroom  ? After touching a bandage  ? Before eating    Follow instructions we give you for caring for surgical wounds or any tubes that you have, such as a catheter or dialysis port.    Keep cuts and scrapes clean and covered until they heal.    Do not share towels, razors, clothing or athletic equipment.    Take all antibiotics your  doctor prescribed. Don t take half doses or stop the antibiotics, even if you feel better.  Caregivers:    Wash your hands well with soap and warm water before and after any contact with the patient. You can use an alcohol-based hand gel if your hands aren t visibly dirty.    Wear disposable gloves when changing a bandage, touching an infected wound or handling dirty laundry. Throw away the gloves after each use. Then wash your hands well.    Change the patient s bedding once a week, or more often if it s soiled with feces or body fluids. Wash and dry it alone in a washer and dryer using the warmest temperatures recommended on the labels. Use liquid bleach during the wash cycle if the label permits.    Clean surfaces like tabletops and sinks really well. Keep bathrooms, toilets and bedside commodes clean. A mixture of 1/4 cup of bleach to 1 quart of water works great for this.  Understanding drug resistance  Hard-to-kill (resistant) germs, such as MRSA, develop when antibiotics are taken longer than needed. They can also develop when antibiotics are taken when they aren't needed, or are not taken exactly as directed. This is because any germs that survive treatment with an antibiotic can multiply and thus create more resistant germs. The more often antibiotics are used, the more chances resistant germs have to develop. This is why your care team may not prescribe antibiotics unless he or she is certain that they are needed.  Date Last Reviewed: 10/1/2016    5805-6733 [employer]. [address] [place] [address], [place] [address]. All rights reserved. This information is not intended as a substitute for professional medical care. Always follow your healthcare professional's instructions.  This information has been modified by your health care provider with permission from the publisher.

## 2018-10-31 NOTE — PROVIDER NOTIFICATION
Spoke with Dr Evans regarding pt's EKG orders after doses to see if there was a goal QTc to stay under. Dr Evans stated that since pt is in AFib which can have an unreliable QTc, we do not need to do EKGs as ordered after sotalol doses. The goal will be to keep his HR above 50 bpm.

## 2018-10-31 NOTE — PHARMACY-ADMISSION MEDICATION HISTORY
Admission medication history interview status for the 10/31/2018  admission is complete. See EPIC admission navigator for prior to admission medications     Medication history source reliability:Good    Actions taken by pharmacist (provider contacted, etc):None     Additional medication history information not noted on PTA med list :None    Medication reconciliation/reorder completed by provider prior to medication history? No    Time spent in this activity: 15min    Prior to Admission medications    Medication Sig Last Dose Taking? Auth Provider   calcium carb 1250 mg, 500 mg [place],/vitamin D 200 units (OSCAL WITH D) 500-200 MG-UNIT per tablet Take 2 tablets by mouth daily with food. 10/31/2018 at Unknown time Yes Reported, Patient   furosemide (LASIX) 40 MG tablet Take 0.5 tablets (20 mg) by mouth daily 10/30/2018 at Unknown time Yes Steven Barnhart MD   metoprolol (TOPROL XL) 100 MG 24 hr tablet Take 1 tablet (100 mg) by mouth every evening in addition to your 25 mg dose every morning. 10/30/2018 at Unknown time Yes Kristofer Evans MD   metoprolol (TOPROL-XL) 25 MG 24 hr tablet Take 1 tablet (25 mg) by mouth every morning in addition to your 100 mg dose every evening. 10/30/2018 at Unknown time Yes Kristofer Evans MD   mometasone-formoterol (DULERA) 200-5 MCG/ACT oral inhaler Inhale 2 puffs into the lungs 2 times daily as needed Appt DUE Jan 2017 prn Yes Adrian Pineda MD   pantoprazole (PROTONIX) 40 MG EC tablet Take 1 tablet (40 mg) by mouth daily as needed 10/30/2018 at Unknown time Yes Steven Barnhart MD   spironolactone (ALDACTONE) 25 MG tablet Take 1 tablet (25 mg) by mouth daily We will no longer fill unless seen by cardiology 10/30/2018 at Unknown time Yes Kristofer Evans MD   warfarin (COUMADIN) 2.5 MG tablet 2.5mg daily or As directed by Anticoagulation Clinic no maintenance dose establish 10/30/2018 at Unknown time Yes Alon Pineda MD   order for DME Equipment being ordered:   New  CPAP mask, tube, filters,water tray   Adrian Pineda MD   order for DME Open toe size large 30/40 Mediven Assure Medical Compression socks Model 37585.   Adrian Pineda MD   ORDER FOR DME Respironics RemStar 60 Series Auto AFlex 12-15 cm H2O with heated humidty and a modem.  Pt chose a Quattro Air FFM size medium.   Linda Tan PA-C   ORDER FOR DME Juzo compression stockings, 30-40mm compression. Patient has portal hypertension and develops leg edema, which these control well.   Selvin Everett MD

## 2018-10-31 NOTE — PROVIDER NOTIFICATION
Notified: Aleksandra Diehl NP with EP    10/31/18 2:13 PM    Notification Interaction: Text page    Purpose of Notification: Pt needs order for pharmacy to dose coumadin. Current order is for daily coumadin at set dose.     Orders Received: [place], to order pharmacy to dose coumadin.

## 2018-10-31 NOTE — H&P
[facility] admission    Date of Admission:  10/31/2018    Primary cardiologist:Dr. Evans  Staff Cardiologist:      Reason for admission: atrial fibrillation, sotalol loading with possible cardioversion.    History is obtained from the patient and electronic health record    History of Present Illness   Maurice Jon is a 57 year old male who was direct admitted for sotalol initiation on 10/31/2018.  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 Roque.  The patient has a history of symptomatic, persistent atrial fibrillation.  He underwent a cardioversion over a year ago and maintain sinus rhythm for about 10 months.  The patient states that he felt well during that time.  Unfortunately he reverted back to atrial fibrillation with symptoms of fatigue.  He met with Dr. Evans June 5, 2018 to discuss rhythm control -versus- antiarrhythmic drug control, versus- ablation.  Due to his history of liver cirrhosis Dr. Evans was limited on which antiarrhythmic drug would be best suitable for this patient.    He was hospitalized in June 2018 with chest pain and underwent an extensive evaluation including a chest CT which showed no evidence of PE.  He was noted to be in atrial fibrillation with rapid ventricular response.    Nuclear stress test was completed October 14, 2018 which showed a small reversible mid anterior wall defect suggesting a small area of ischemia.  This was thought to be most likely due to the patient's body habitus and bowel loop interference.  Global hypokinesis was noted in the left ventricle with an EF of 48%.    Code Status    Prior    Past Medical History   I have reviewed this patient's medical history and updated it with pertinent information if needed.   Past Medical History:   Diagnosis Date     Acute  pulmonary edema (H)      Atrial fibrillation (H)      Atrial fibrillation with rapid ventricular response (H) 5/13/2013     Calculus of ureter 1993, 1997    history of     Cirrhosis of liver without mention of alcohol 8/22/2005    Cirrhosis, idiopathic     Edema 5/13/2013     Esophageal varices with bleeding(456.0)      Gallstones      Genital herpes, unspecified 3/20/1999    resolving herpes progenitalis     GERD (gastroesophageal reflux disease)      Hematuria      Hypertension      Hypochondriasis     problems in past     Obesity 11/24/2010     BRUCE (obstructive sleep apnea) 3/17/2009    Mild AHI 8.4  RDI 31     Portal hypertension (H) 1/28/2010     Unspecified thrombocytopenia 8/22/2005    Thrombocytopenia associated with cirrhosis and portal hypertension       Past Surgical History   I have reviewed this patient's surgical history and updated it with pertinent information if needed.  Past Surgical History:   Procedure Laterality Date     ANESTHESIA CARDIOVERSION N/A 1/19/2015    Procedure: ANESTHESIA CARDIOVERSION;  Surgeon: Generic Anesthesia Provider;  Location: [facility] OR     COLONOSCOPY N/A 8/14/2017    Procedure: COLONOSCOPY;  Colonoscopy Called will arrive appx 12:30 Per phone call;  Surgeon: Vincent Whittington MD;  Location:  GI     ESOPHAGOSCOPY, GASTROSCOPY, DUODENOSCOPY (EGD), COMBINED  7/11/2011    Procedure:COMBINED ESOPHAGOSCOPY, GASTROSCOPY, DUODENOSCOPY (EGD); Surgeon:LAURA LAMAS; Location:[facility] GI     ESOPHAGOSCOPY, GASTROSCOPY, DUODENOSCOPY (EGD), COMBINED  9/10/2012    Procedure: COMBINED ESOPHAGOSCOPY, GASTROSCOPY, DUODENOSCOPY (EGD);;  Surgeon: Hi Roque MD;  Location:  GI     ESOPHAGOSCOPY, GASTROSCOPY, DUODENOSCOPY (EGD), COMBINED  9/9/2013    Procedure: COMBINED ESOPHAGOSCOPY, GASTROSCOPY, DUODENOSCOPY (EGD);;  Surgeon: Hi Roque MD;  Location:  GI     ESOPHAGOSCOPY, GASTROSCOPY, DUODENOSCOPY (EGD), COMBINED N/A 9/15/2014    Procedure: COMBINED ESOPHAGOSCOPY, GASTROSCOPY,  DUODENOSCOPY (EGD);  Surgeon: Hi Roque MD;  Location:  GI     ESOPHAGOSCOPY, GASTROSCOPY, DUODENOSCOPY (EGD), COMBINED N/A 10/30/2015    Procedure: COMBINED ESOPHAGOSCOPY, GASTROSCOPY, DUODENOSCOPY (EGD);  Surgeon: Feliberto Fox MD;  Location:  GI     ESOPHAGOSCOPY, GASTROSCOPY, DUODENOSCOPY (EGD), COMBINED N/A 10/10/2016    Procedure: COMBINED ESOPHAGOSCOPY, GASTROSCOPY, DUODENOSCOPY (EGD);  Surgeon: Jose Nesbitt MD;  Location:  GI     HERNIA REPAIR       IRRIGATION AND DEBRIDEMENT ABSCESS SCROTUM, COMBINED  10/4/2012    Procedure: COMBINED IRRIGATION AND DEBRIDEMENT ABSCESS SCROTUM;  Irrigation and Debridement of Groin Abscess;  Surgeon: Benny Shoemaker MD;  Location: [facility] OR     SOFT TISSUE SURGERY       SURGICAL HISTORY OF -   1993    rt elbow     SURGICAL HISTORY OF -   1/15/98    repair of ventral and umbilical hernia     SURGICAL HISTORY OF -   2001    endoscopy       Prior to Admission Medications   Prior to Admission Medications   Prescriptions Last Dose Informant Patient Reported? Taking?   ORDER FOR DME  Self No No   Sig: Juzo compression stockings, 30-40mm compression. Patient has portal hypertension and develops leg edema, which these control well.   ORDER FOR DME  Self No No   Sig: Respironics RemStar 60 Series Auto AFlex 12-15 cm H2O with heated humidty and a modem.  Pt chose a QuSchedule Savvyo Air FFM size medium.   calcium carb 1250 mg, 500 mg [place],/vitamin D 200 units (OSCAL WITH D) 500-200 MG-UNIT per tablet  Self Yes No   Sig: Take 2 tablets by mouth daily with food.   calcium citrate (CALCITRATE) 950 MG tablet   Yes No   Sig: Take 1 tablet by mouth daily   clotrimazole (LOTRIMIN) 1 % cream   No No   Sig: Apply topically 2 times daily   Patient taking differently: Apply topically 2 times daily as needed    furosemide (LASIX) 40 MG tablet   No No   Sig: Take 0.5 tablets (20 mg) by mouth daily   metoprolol (TOPROL XL) 100 MG 24 hr tablet   No No   Sig: Take 1 tablet (100  mg) by mouth every evening in addition to your 25 mg dose every morning.   metoprolol (TOPROL-XL) 25 MG 24 hr tablet   No No   Sig: Take 1 tablet (25 mg) by mouth every morning in addition to your 100 mg dose every evening.   mometasone-formoterol (DULERA) 200-5 MCG/ACT oral inhaler   No No   Sig: Inhale 2 puffs into the lungs 2 times daily as needed Appt DUE 2017   order for DME   No No   Sig: Equipment being ordered:   New CPAP mask, tube, filters,water tray   order for DME   No No   Sig: Open toe size large 30/40 [facility] Compression socks Model 08196.   pantoprazole (PROTONIX) 40 MG EC tablet   No No   Sig: Take 1 tablet (40 mg) by mouth daily as needed   spironolactone (ALDACTONE) 25 MG tablet   No No   Sig: Take 1 tablet (25 mg) by mouth daily We will no longer fill unless seen by cardiology   triamcinolone (KENALOG) 0.1 % cream   No No   Sig: Apply sparingly to affected area three times daily as needed   Patient taking differently: as needed Apply sparingly to affected area three times daily as needed   warfarin (COUMADIN) 2.5 MG tablet   No No   Si.5mg daily or As directed by Anticoagulation Clinic no maintenance dose establish   warfarin (COUMADIN) 5 MG tablet   Yes No   Si.25 mg (2.5 mg x 0.5) on Fri; 2.5 mg (2.5 mg x 1) all other days or As directed by Anticoagulation Clinic      Facility-Administered Medications: None     Allergies   Allergies   Allergen Reactions     Avelox Other (See Comments) and GI Disturbance     portal hypertension       Social History   I have reviewed this patient's social history and updated it with pertinent information if needed. Maurice Jon  reports that he quit smoking about 16 years ago. His smoking use included Cigarettes. He has a 4.80 pack-year smoking history. He has never used smokeless tobacco. He reports that he does not drink alcohol or use illicit drugs.    Family History   I have reviewed this patient's family history and updated  it with pertinent information if needed.   Family History   Problem Relation Age of Onset     Lipids Mother      Hypertension Mother      Eye Disorder Mother      HEART DISEASE Father      CHF     Lipids Father      Obesity Father      HEART DISEASE Brother      MI     Eye Disorder Brother      Cancer Other      maternal grandparent/throat cancer       Review of Systems   Patient feels well, but appears anxious and has multiple questions regarding sotalol, ablations, and long-term plans.  Currently he is feeling well denies palpitations, shortness of breath, or chest discomfort.    Physical Exam                      Vital Signs with Ranges     0 lbs 0 oz    Constitutional   alert and oriented, in no acute distress.  Skin   warm and dry to touch  ENT  no pallor or cyanosis  Neck  JVP normal  Chest   no tenderness to palpation   Lungs clear  Cardiac irregular irregular no murmur noted  Abdomen   Distended (portal hypertension)  Extremities and Back   . No edema.    Neurological   no gross motor deficits noted, affect appropriate, oriented to time, person and place.    Data :   Tele afib  bpm    9/250/18 US abdomen IMPRESSION:   1.  Cirrhotic configuration of the liver with evidence of portal  hypertension including retrograde blood flow within the splenic vein  and splenomegaly. No solid focal mass identified.  2.  Mild narrowing of the main portal vein with intraluminal echogenic  filling defect, likely represent nonobstructing portal vein thrombus.  This has not been significantly changed from 2/22/2017 ultrasound.  3.  Cholelithiasis without sonographic findings of acute  cholecystitis.  4.  Bilateral nonobstructing renal calculi, left greater than right.        Assessment & Plan   Maurice Jon is a 57 year old male who was admitted on 10/31/2018 for sotalol initiation for persistent atrial fibrillation.  His last dose of metoprolol was 10/30/18 it was discontinued this morning.  He will start sotalol  at 80 mg twice daily.  Lab work including creatinine, potassium, magnesium, and INR are pending.  Patient will be made n.p.o. after midnight for possible cardioversion.  INR's have been stable and as follows:     9/25: 2.5  9/28: 2.9  10/5: 2.8  10/23: 2.4    Patient understands that there is a chance sotalol will medically cardiovert him.  Consent will be signed tomorrow if he remains in A. Fib.  He will be monitored until Friday morning and if stable can be discharged after fifth dose.   Last QT/QTc was on 5/10/18 and  was 382/420 ms. EKG pending this am.    Patient had questions regarding if and when sotalol could be discontinued.  I explained to the patient that he will most likely need to remain on sotalol indefinitely to help maintain rhythm unless he is found to be a candidate for ablation therapy.  Additional questions were answered as well.        Aleksandra Diehl NP

## 2018-11-01 ENCOUNTER — ANESTHESIA (OUTPATIENT)
Dept: SURGERY | Facility: CLINIC | Age: 58
DRG: 309 | End: 2018-11-01
Payer: COMMERCIAL

## 2018-11-01 ENCOUNTER — ANESTHESIA EVENT (OUTPATIENT)
Dept: SURGERY | Facility: CLINIC | Age: 58
DRG: 309 | End: 2018-11-01
Payer: COMMERCIAL

## 2018-11-01 LAB
ANION GAP SERPL CALCULATED.3IONS-SCNC: 3 MMOL/L (ref 3–14)
BUN SERPL-MCNC: 18 MG/DL (ref 7–30)
CALCIUM SERPL-MCNC: 8.6 MG/DL (ref 8.5–10.1)
CHLORIDE SERPL-SCNC: 110 MMOL/L (ref 94–109)
CO2 SERPL-SCNC: 27 MMOL/L (ref 20–32)
CREAT SERPL-MCNC: 0.71 MG/DL (ref 0.66–1.25)
GFR SERPL CREATININE-BSD FRML MDRD: >90 ML/MIN/1.7M2
GLUCOSE SERPL-MCNC: 106 MG/DL (ref 70–99)
INR PPP: 2.44 (ref 0.86–1.14)
MRSA DNA SPEC QL NAA+PROBE: NEGATIVE
POTASSIUM SERPL-SCNC: 4.4 MMOL/L (ref 3.4–5.3)
SODIUM SERPL-SCNC: 140 MMOL/L (ref 133–144)
SPECIMEN SOURCE: NORMAL

## 2018-11-01 PROCEDURE — 25000132 ZZH RX MED GY IP 250 OP 250 PS 637: Performed by: INTERNAL MEDICINE

## 2018-11-01 PROCEDURE — 25000128 H RX IP 250 OP 636: Performed by: NURSE ANESTHETIST, CERTIFIED REGISTERED

## 2018-11-01 PROCEDURE — 37000008 ZZH ANESTHESIA TECHNICAL FEE, 1ST 30 MIN

## 2018-11-01 PROCEDURE — 87641 MR-STAPH DNA AMP PROBE: CPT | Performed by: INTERNAL MEDICINE

## 2018-11-01 PROCEDURE — 36415 COLL VENOUS BLD VENIPUNCTURE: CPT | Performed by: NURSE PRACTITIONER

## 2018-11-01 PROCEDURE — 25000132 ZZH RX MED GY IP 250 OP 250 PS 637: Performed by: NURSE PRACTITIONER

## 2018-11-01 PROCEDURE — 92960 CARDIOVERSION ELECTRIC EXT: CPT | Performed by: INTERNAL MEDICINE

## 2018-11-01 PROCEDURE — 5A2204Z RESTORATION OF CARDIAC RHYTHM, SINGLE: ICD-10-PCS | Performed by: INTERNAL MEDICINE

## 2018-11-01 PROCEDURE — 85610 PROTHROMBIN TIME: CPT | Performed by: NURSE PRACTITIONER

## 2018-11-01 PROCEDURE — 93010 ELECTROCARDIOGRAM REPORT: CPT | Performed by: INTERNAL MEDICINE

## 2018-11-01 PROCEDURE — 99232 SBSQ HOSP IP/OBS MODERATE 35: CPT | Mod: 25 | Performed by: INTERNAL MEDICINE

## 2018-11-01 PROCEDURE — 87640 STAPH A DNA AMP PROBE: CPT | Performed by: INTERNAL MEDICINE

## 2018-11-01 PROCEDURE — 21000001 ZZH R&B HEART CARE

## 2018-11-01 PROCEDURE — 93005 ELECTROCARDIOGRAM TRACING: CPT

## 2018-11-01 PROCEDURE — 80048 BASIC METABOLIC PNL TOTAL CA: CPT | Performed by: NURSE PRACTITIONER

## 2018-11-01 RX ORDER — PROPOFOL 10 MG/ML
INJECTION, EMULSION INTRAVENOUS PRN
Status: DISCONTINUED | OUTPATIENT
Start: 2018-11-01 | End: 2018-11-01

## 2018-11-01 RX ORDER — SODIUM CHLORIDE, SODIUM LACTATE, POTASSIUM CHLORIDE, CALCIUM CHLORIDE 600; 310; 30; 20 MG/100ML; MG/100ML; MG/100ML; MG/100ML
INJECTION, SOLUTION INTRAVENOUS CONTINUOUS PRN
Status: DISCONTINUED | OUTPATIENT
Start: 2018-11-01 | End: 2018-11-01

## 2018-11-01 RX ORDER — WARFARIN SODIUM 2.5 MG/1
2.5 TABLET ORAL
Status: COMPLETED | OUTPATIENT
Start: 2018-11-01 | End: 2018-11-01

## 2018-11-01 RX ADMIN — SPIRONOLACTONE 25 MG: 25 TABLET ORAL at 09:01

## 2018-11-01 RX ADMIN — FUROSEMIDE 20 MG: 20 TABLET ORAL at 09:01

## 2018-11-01 RX ADMIN — PROPOFOL 60 MG: 10 INJECTION, EMULSION INTRAVENOUS at 10:21

## 2018-11-01 RX ADMIN — SOTALOL HYDROCHLORIDE 120 MG: 120 TABLET ORAL at 21:08

## 2018-11-01 RX ADMIN — WARFARIN SODIUM 2.5 MG: 2.5 TABLET ORAL at 17:45

## 2018-11-01 RX ADMIN — SOTALOL HYDROCHLORIDE 120 MG: 120 TABLET ORAL at 09:01

## 2018-11-01 RX ADMIN — PROPOFOL 20 MG: 10 INJECTION, EMULSION INTRAVENOUS at 10:22

## 2018-11-01 RX ADMIN — PROPOFOL 20 MG: 10 INJECTION, EMULSION INTRAVENOUS at 10:23

## 2018-11-01 RX ADMIN — SODIUM CHLORIDE, POTASSIUM CHLORIDE, SODIUM LACTATE AND CALCIUM CHLORIDE: 600; 310; 30; 20 INJECTION, SOLUTION INTRAVENOUS at 10:18

## 2018-11-01 ASSESSMENT — ACTIVITIES OF DAILY LIVING (ADL)
ADLS_ACUITY_SCORE: 9
ADLS_ACUITY_SCORE: 10
ADLS_ACUITY_SCORE: 9

## 2018-11-01 ASSESSMENT — ENCOUNTER SYMPTOMS
SEIZURES: 0
DYSRHYTHMIAS: 1

## 2018-11-01 ASSESSMENT — COPD QUESTIONNAIRES: COPD: 0

## 2018-11-01 NOTE — ANESTHESIA CARE TRANSFER NOTE
Patient: Maurice Jon    Procedure(s):  ANESTHESIA OUT OF OR    Diagnosis: ENCOUNTER FOR MONITORING SOTALOL THERAPY.  Diagnosis Additional Information: No value filed.    Anesthesia Type:   No value filed.     Note:  Airway :Nasal Cannula  Patient transferred to:ICU  ICU Handoff: Call for PAUSE to initiate/utilize ICU HANDOFF, Identified Patient, Identified Responsible Provider, Reviewed the Pertinent Medical History, Discussed Surgical Course, Reviewed Intra-OP Anesthesia Management and Issues during Anesthesia, Set Expectations for Post Procedure Period and Allowed Opportunity for Questions and Acknowledgement of Understanding      Vitals: (Last set prior to Anesthesia Care Transfer)    CRNA VITALS  11/1/2018 0957 - 11/1/2018 1031      11/1/2018             NIBP: 109/58    NIBP Mean: 74    Ht Rate: 66    SpO2: 98 %                Electronically Signed By: JANETTE Jefferson CRNA  November 1, 2018  10:31 AM

## 2018-11-01 NOTE — ANESTHESIA POSTPROCEDURE EVALUATION
Patient: Maurice Jon    Procedure(s):  ANESTHESIA OUT OF OR    Diagnosis:ENCOUNTER FOR MONITORING SOTALOL THERAPY.  Diagnosis Additional Information: No value filed.    Anesthesia Type:  No value filed.    Note:  Anesthesia Post Evaluation    Patient location during evaluation: PACU  Patient participation: Able to fully participate in evaluation  Level of consciousness: awake, awake and alert and responsive to verbal stimuli  Pain management: adequate  Airway patency: patent  Cardiovascular status: acceptable  Respiratory status: acceptable  Hydration status: acceptable  PONV: none     Anesthetic complications: None          Last vitals:  Vitals:    11/01/18 1100 11/01/18 1213 11/01/18 1235   BP: 116/69 107/68 118/75   Pulse:      Resp: 20  18   Temp:   36.7  C (98  F)   SpO2:  97% 96%         Electronically Signed By: Anna Ely  November 1, 2018  1:30 PM

## 2018-11-01 NOTE — PLAN OF CARE
Problem: Arrhythmia/Dysrhythmia (Symptomatic) (Adult)  Goal: Signs and Symptoms of Listed Potential Problems Will be Absent, Minimized or Managed (Arrhythmia/Dysrhythmia)  Signs and symptoms of listed potential problems will be absent, minimized or managed by discharge/transition of care (reference Arrhythmia/Dysrhythmia (Symptomatic) (Adult) CPG).   Outcome: Improving  Pt. With Atrial Fib rhythm on admission. Pt. Taking Sotolol BID.   1015- Dr. Evans/ Anesthesia here and patient Cardioverted with 200 joules. From AFib to SR.   Remains in Sinus rhythm with VSS.  Up in room  And voiding without problems.   Probably home tomorrow.

## 2018-11-01 NOTE — PROGRESS NOTES
Cardiac Electrophysiology Progress Note          Assessment and Plan:   Chronic AF, symptomatic despite adequate rate control, sotalol loading  Stable liver cirrhosis with portal htn from non-alcoholic steatohepatitis    Therapeutic INRs last few months  Direct current cvn today  ecg when in nsr    Discussed risks of cardioversion including but not limited to cva                 Interval History:   doing well; no cp, sob, n/v/d, or abd pain.              Review of Systems:   As per subjective, otherwise 5 systems reviewed and negative.           Physical Exam:   Blood pressure 129/54, pulse 103, temperature 97.7  F (36.5  C), temperature source Oral, resp. rate 18, weight 108 kg (238 lb 3.2 oz), SpO2 96 %.          Intake/Output Summary (Last 24 hours) at 11/01/18 0830  Last data filed at 10/31/18 1949   Gross per 24 hour   Intake             1600 ml   Output              600 ml   Net             1000 ml       Constitutional:   NAD   Skin:   Warm and dry   Head:   Nontraumatic   Neck:   Supple, symmetrical, trachea midline, no adenopathy, thyroid symmetric, not enlarged and no tenderness, skin normal   Lungs:   normal   Cardiovascular:   irregularly irregular rhythm no murmur noted   Abdomen:   Benign   Extremities and Back:   Symmetric, no curvature, spinous processes are non-tender on palpation, paraspinous muscles are non-tender on palpation, no costal vertebral tenderness   Neurological:   Grossly nonfocal            Medications:       furosemide  20 mg Oral Daily     sodium chloride (PF)  3 mL Intracatheter Q8H     sotalol  120 mg Oral Q12H KENYATTA     spironolactone  25 mg Oral Daily     Anticoagulation Therapy Visit on 10/23/2018   Component Date Value Ref Range Status     INR Protime 10/23/2018 2.4* 0.86 - 1.14 Final                  Kristofer MD Cristina

## 2018-11-01 NOTE — PROCEDURES
Maurice Jon  [id number]  1960    Procedures:   1. Direct Current Cardioversion    Indication: Atrial fibrillation    HPI: This is a 57 year old patient with a history of atrial fibrillation and has been on uninterrupted oral anticoagulation for at least the past 4 weeks. Patient was referred for direct current cardioversion. Risks of procedure was discussed including but not limited to respiratory distress, skin rash and stroke. Patient understood and accepted these risks. Informed consent was obtained.    Methods: Defibrillation patches were placed in the right anterior and left apical position. General anesthesia was administered by the anesthesiology service. 1 application of synchronized in a biphasic fashion at 200 joules were delivered and successfully converted to normal sinus rhythm.     Complications:  None    Conclusion: Successful direct current cardioversion of atrial fibrillation into a normal sinus rhythm at 60 bpm.    Kristofer Evans MD

## 2018-11-01 NOTE — ANESTHESIA PREPROCEDURE EVALUATION
Procedure: Procedure(s):  ANESTHESIA OUT OF OR  Preop diagnosis: ENCOUNTER FOR MONITORING SOTALOL THERAPY.    Allergies   Allergen Reactions     Avelox Other (See Comments) and GI Disturbance     portal hypertension     Past Medical History:   Diagnosis Date     Acute pulmonary edema (H)      Atrial fibrillation (H)      Atrial fibrillation with rapid ventricular response (H) 5/13/2013     Calculus of ureter 1993, 1997    history of     Cirrhosis of liver without mention of alcohol 8/22/2005    Cirrhosis, idiopathic     Edema 5/13/2013     Esophageal varices with bleeding(456.0)      Gallstones      Genital herpes, unspecified 3/20/1999    resolving herpes progenitalis     GERD (gastroesophageal reflux disease)      Hematuria      Hypertension      Hypochondriasis     problems in past     Obesity 11/24/2010     BRUCE (obstructive sleep apnea) 3/17/2009    Mild AHI 8.4  RDI 31     Portal hypertension (H) 1/28/2010     Unspecified thrombocytopenia 8/22/2005    Thrombocytopenia associated with cirrhosis and portal hypertension     Past Surgical History:   Procedure Laterality Date     ANESTHESIA CARDIOVERSION N/A 1/19/2015    Procedure: ANESTHESIA CARDIOVERSION;  Surgeon: Generic Anesthesia Provider;  Location: [facility]     COLONOSCOPY N/A 8/14/2017    Procedure: COLONOSCOPY;  Colonoscopy Called will arrive appx 12:30 Per phone call;  Surgeon: Vincent Whittington MD;  Location:  GI     ESOPHAGOSCOPY, GASTROSCOPY, DUODENOSCOPY (EGD), COMBINED  7/11/2011    Procedure:COMBINED ESOPHAGOSCOPY, GASTROSCOPY, DUODENOSCOPY (EGD); Surgeon:LAURA LAMAS; Location:[facility]     ESOPHAGOSCOPY, GASTROSCOPY, DUODENOSCOPY (EGD), COMBINED  9/10/2012    Procedure: COMBINED ESOPHAGOSCOPY, GASTROSCOPY, DUODENOSCOPY (EGD);;  Surgeon: Hi Roque MD;  Location:  GI     ESOPHAGOSCOPY, GASTROSCOPY, DUODENOSCOPY (EGD), COMBINED  9/9/2013    Procedure: COMBINED ESOPHAGOSCOPY, GASTROSCOPY, DUODENOSCOPY (EGD);;  Surgeon: Hi Roque MD;   Location:  GI     ESOPHAGOSCOPY, GASTROSCOPY, DUODENOSCOPY (EGD), COMBINED N/A 9/15/2014    Procedure: COMBINED ESOPHAGOSCOPY, GASTROSCOPY, DUODENOSCOPY (EGD);  Surgeon: Hi Roque MD;  Location:  GI     ESOPHAGOSCOPY, GASTROSCOPY, DUODENOSCOPY (EGD), COMBINED N/A 10/30/2015    Procedure: COMBINED ESOPHAGOSCOPY, GASTROSCOPY, DUODENOSCOPY (EGD);  Surgeon: Feliberto Fox MD;  Location:  GI     ESOPHAGOSCOPY, GASTROSCOPY, DUODENOSCOPY (EGD), COMBINED N/A 10/10/2016    Procedure: COMBINED ESOPHAGOSCOPY, GASTROSCOPY, DUODENOSCOPY (EGD);  Surgeon: Jose Nesbitt MD;  Location:  GI     HERNIA REPAIR       IRRIGATION AND DEBRIDEMENT ABSCESS SCROTUM, COMBINED  10/4/2012    Procedure: COMBINED IRRIGATION AND DEBRIDEMENT ABSCESS SCROTUM;  Irrigation and Debridement of Groin Abscess;  Surgeon: Benny Shoemaker MD;  Location: [facility] OR     SOFT TISSUE SURGERY       SURGICAL HISTORY OF -   1993    rt elbow     SURGICAL HISTORY OF -   1/15/98    repair of ventral and umbilical hernia     SURGICAL HISTORY OF -   2001    endoscopy     Social History   Substance Use Topics     Smoking status: Former Smoker     Packs/day: 0.80     Years: 6.00     Types: Cigarettes     Quit date: 1/1/2002     Smokeless tobacco: Never Used     Alcohol use No      Comment: quit in 2007     Prior to Admission medications    Medication Sig Start Date End Date Taking? Authorizing Provider   calcium carb 1250 mg, 500 mg [place],/vitamin D 200 units (OSCAL WITH D) 500-200 MG-UNIT per tablet Take 2 tablets by mouth daily with food.   Yes Reported, Patient   furosemide (LASIX) 40 MG tablet Take 0.5 tablets (20 mg) by mouth daily 5/24/18  Yes Steven Barnhart MD   metoprolol (TOPROL XL) 100 MG 24 hr tablet Take 1 tablet (100 mg) by mouth every evening in addition to your 25 mg dose every morning. 10/25/17  Yes Kristofer Evans MD   metoprolol (TOPROL-XL) 25 MG 24 hr tablet Take 1 tablet (25 mg) by mouth every morning in addition to your  100 mg dose every evening. 10/25/17  Yes Kristofer Evans MD   mometasone-formoterol (DULERA) 200-5 MCG/ACT oral inhaler Inhale 2 puffs into the lungs 2 times daily as needed Appt DUE Jan 2017 11/29/16  Yes Adrian Pineda MD   pantoprazole (PROTONIX) 40 MG EC tablet Take 1 tablet (40 mg) by mouth daily as needed 4/20/18  Yes Steven Barnhart MD   spironolactone (ALDACTONE) 25 MG tablet Take 1 tablet (25 mg) by mouth daily We will no longer fill unless seen by cardiology 9/5/17  Yes Kristofer Evans MD   warfarin (COUMADIN) 2.5 MG tablet 2.5mg daily or As directed by Anticoagulation Clinic no maintenance dose establish 9/18/18  Yes Alon Pineda MD   order for DME Equipment being ordered:   New CPAP mask, tube, filters,water tray 11/10/15   Adrian Pineda MD   order for DME Open toe size large 30/40 Mediven Assure Medical Compression socks Model [id number]. 11/10/15   Adrian Pineda MD   ORDER FOR DME Respironics RemStar 60 Series Auto AFlex 12-15 cm H2O with heated humidty and a modem.  Pt chose a Quattro Air FFM size medium. 9/11/13   Linda Tan PA-C   ORDER FOR DME Juzo compression stockings, 30-40mm compression. Patient has portal hypertension and develops leg edema, which these control well. 8/7/13   Selvin Everett MD     Current Facility-Administered Medications Ordered in Epic   Medication Dose Route Frequency Last Rate Last Dose     acetaminophen (TYLENOL) Suppository 650 mg  650 mg Rectal Q4H PRN         acetaminophen (TYLENOL) tablet 650 mg  650 mg Oral Q4H PRN         fluticasone-vilanterol (BREO ELLIPTA) 200-25 MCG/INH inhaler 1 puff  1 puff Inhalation Daily PRN         furosemide (LASIX) tablet 20 mg  20 mg Oral Daily   20 mg at 11/01/18 0901     lidocaine (LMX4) cream   Topical Q1H PRN         lidocaine 1 % 1 mL  1 mL Other Q1H PRN         medication instruction   Does not apply Continuous PRN         nitroGLYcerin (NITROSTAT) sublingual tablet 0.4 mg  0.4  mg Sublingual Q5 Min PRN         pantoprazole (PROTONIX) EC tablet 40 mg  40 mg Oral Daily PRN   40 mg at 10/31/18 1616     Patient is already receiving anticoagulation with heparin, enoxaparin (LOVENOX), warfarin (COUMADIN)  or other anticoagulant medication   Does not apply Continuous PRN         sodium chloride (PF) 0.9% PF flush 3 mL  3 mL Intracatheter Q1H PRN         sodium chloride (PF) 0.9% PF flush 3 mL  3 mL Intracatheter Q8H   3 mL at 11/01/18 0902     sotalol (BETAPACE) tablet 120 mg  120 mg Oral Q12H KENYATTA   120 mg at 11/01/18 0901     spironolactone (ALDACTONE) tablet 25 mg  25 mg Oral Daily   25 mg at 11/01/18 0901     warfarin (COUMADIN) tablet 2.5 mg  2.5 mg Oral ONCE at 18:00         Warfarin Therapy Reminder (Check START DATE - warfarin may be starting in the FUTURE)  1 each Does not apply Continuous PRN         No current [facility]-ordered outpatient prescriptions on file.       - MEDICATION INSTRUCTIONS -       - MEDICATION INSTRUCTIONS -       Warfarin Therapy Reminder       Wt Readings from Last 1 Encounters:   11/01/18 108 kg (238 lb 3.2 oz)     Temp Readings from Last 1 Encounters:   11/01/18 36.7  C (98  F) (Oral)     BP Readings from Last 6 Encounters:   11/01/18 118/75   09/20/18 119/70   06/05/18 98/62   05/10/18 100/62   04/20/18 108/58   04/05/18 99/63     Pulse Readings from Last 4 Encounters:   10/31/18 103   09/20/18 76   06/05/18 88   05/10/18 83     Resp Readings from Last 1 Encounters:   11/01/18 18     SpO2 Readings from Last 1 Encounters:   11/01/18 96%     Recent Labs   Lab Test  11/01/18   0545  10/31/18   0854  09/20/18   0955   NA  140   --   139   POTASSIUM  4.4  4.0  3.8   CHLORIDE  110*   --   106   CO2  27   --   27   ANIONGAP  3   --   6   GLC  106*   --   91   BUN  18   --   15   CR  0.71  0.66  0.72   HALEY  8.6   --   8.6     Recent Labs   Lab Test  09/20/18   0955 05/18/18  05/10/18   1600   07/13/15   1515   05/13/13   1800   AST  46*  87*  49*   < >  71*   < >  83*   ALT   44  64*  51   < >  52   < >  75*   ALKPHOS  74   --   82   < >  51   < >  82   BILITOTAL  1.9*   --   1.0   < >  1.5*   < >  1.7*   LIPASE   --    --    --    --   119   --   201    < > = values in this interval not displayed.     Recent Labs   Lab Test  09/20/18   0955  05/10/18   1600   WBC  2.8*  3.7*   HGB  14.6  14.0   PLT  62*  60*     No results for input(s): ABO, RH in the last 70026 hours.  Recent Labs   Lab Test  11/01/18   0545  10/31/18   1038   07/14/15   0620   12/12/13   0941   INR  2.44*  2.54*   < >  1.80*   < >  1.25*   PTT   --    --    --   42*   --   32    < > = values in this interval not displayed.      Recent Labs   Lab Test  05/10/18   1600  05/16/13   0610  05/14/13   0800   TROPI  <0.015  0.026  0.022     Recent Results (from the past 744 hour(s))   NM Lexiscan stress test    Narrative    GATED MYOCARDIAL PERFUSION SCINTIGRAPHY WITH INTRAVENOUS PHARMACOLOGIC  VASODILATATION LEXISCAN -ONE DAY STUDY     10/4/2018 2:28 PM  MARILY NAVARRO  57 years  Male  1960.    Indication/Clinical History: Atrial fibrillation    Impression  1.  Myocardial perfusion imaging using single isotope technique  demonstrated the small reversible mid anterolateral wall defect  suggesting a small area of ischemia. However the accuracy of this  finding is likely reduced due to the patient's body habitus. Body mass  index is 39.4. In addition there is also bowel loop interference  present in this study.   2. Gated images demonstrated borderline hypokinesis of the left  ventricle.  The left ventricular systolic function is borderline to  mildly reduced with a calculated ejection fraction of 48%.    Procedure  Pharmacologic stress testing was performed with Lexiscan at a rate of  0.08 mg/ml rapid bolus injection, for 15 seconds, 0.4 mg/5ml  intravenously. Low-level exercise was not performed along with the  vasodilator infusion.  The heart rate was 79 at baseline and jazmine to  111 beats per minute during the  Lexiscan infusion. The rest blood  pressure was 110/74 mmHg and was 108/70 mm Hg during Lexiscan  infusion. The patient experienced no symptoms  during the test.    Myocardial perfusion imaging was performed at rest, approximately 45  minutes after the injection intravenously of 12.7 mCi of Tc-99m  Myoview. At peak pharmacologic effect, 10-20 seconds after Lexiscan,   the patient was injected intravenously with 37 mCi of  Tc-99m Myoview.  The post-stress tomographic imaging was performed approximately 60  minutes after stress.    EKG Findings  The resting EKG demonstrated atrial fibrillation with delayed R-wave  progression and nonspecific ST segment changes. The stress EKG  demonstrated no significant ST segment changes.    Tomographic Findings  Overall, the study quality is suboptimal with significant soft tissue  attenuation as well as high bowel loops . On the stress images, there  is a mild reduction in radiotracer uptake in the mid anterolateral  wall.. On the rest images, a bright bowel loop artifact is seen  interfering with radiotracer activity interpretation of the  inferolateral wall . Gated images demonstrated borderline global  hypokinesis of the left ventricle. The left ventricular ejection  fraction was calculated to be 48% the resting images. TID was absent.    VISH LANZA MD           Anesthesia Evaluation     .             ROS/MED HX    ENT/Pulmonary:     (+)sleep apnea, uses CPAP , . .   (-) asthma and COPD   Neurologic:      (-) seizures and migraines   Cardiovascular:     (+) hypertension----. : . . . :. dysrhythmias a-fib, Irregular Heartbeat/Palpitations, .       METS/Exercise Tolerance:     Hematologic: Comments: Thrombocytopenia         Musculoskeletal:         GI/Hepatic:     (+) GERD liver disease (portal hypertension),       Renal/Genitourinary:      (-) renal disease   Endo:     (+) Obesity, .      Psychiatric:         Infectious Disease:         Malignancy:         Other:                      Physical Exam  Normal systems: dental    Airway   Mallampati: III  TM distance: >3 FB  Neck ROM: full    Dental     Cardiovascular       Pulmonary                     Anesthesia Plan      History & Physical Review  History and physical reviewed and following examination; no interval change.    ASA Status:  3 .        Plan for General          Postoperative Care      Consents  Anesthetic plan, risks, benefits and alternatives discussed with:  Patient..                          .

## 2018-11-01 NOTE — PLAN OF CARE
Problem: Patient Care Overview  Goal: Plan of Care/Patient Progress Review  Outcome: No Change  AOx4, Ind, AF -130's, sotalol therapy started 10/31 am, if does not convert back to SR will undergo cardioversion Friday.  LS Clear, denies SOB or pain, edema on Lower Extremities (+2-+3), pt claims he hasn't been wearing his compression stockings like he normally does, weak pulses, used home CPAP overnight

## 2018-11-01 NOTE — PLAN OF CARE
Problem: Patient Care Overview  Goal: Plan of Care/Patient Progress Review  Outcome: No Change  Patient is A/Ox4. Slightly tachy (100s) VSS on RA. Tele Afib with RVR. LS clear. denies SOB/MENARD. IV SL. Up independently. Regular diet, tolerating well. Plan on continuing to monitor.

## 2018-11-01 NOTE — PLAN OF CARE
Problem: Patient Care Overview  Goal: Plan of Care/Patient Progress Review  Outcome: Improving  Cardioverted to SR today.  VSS. Denies discomfort.   MRSA swab pending. Pt with MRSA of absess in 2012 and needs one more negative MRSA nares swab to be considered non MRSA.  Infection Control aware.

## 2018-11-02 VITALS
BODY MASS INDEX: 38.45 KG/M2 | HEART RATE: 103 BPM | SYSTOLIC BLOOD PRESSURE: 115 MMHG | RESPIRATION RATE: 18 BRPM | WEIGHT: 238.2 LBS | DIASTOLIC BLOOD PRESSURE: 50 MMHG | OXYGEN SATURATION: 98 % | TEMPERATURE: 97.8 F

## 2018-11-02 LAB — INR PPP: 2.23 (ref 0.86–1.14)

## 2018-11-02 PROCEDURE — 25000132 ZZH RX MED GY IP 250 OP 250 PS 637: Performed by: NURSE PRACTITIONER

## 2018-11-02 PROCEDURE — 93005 ELECTROCARDIOGRAM TRACING: CPT

## 2018-11-02 PROCEDURE — 99238 HOSP IP/OBS DSCHRG MGMT 30/<: CPT | Performed by: INTERNAL MEDICINE

## 2018-11-02 PROCEDURE — 85610 PROTHROMBIN TIME: CPT | Performed by: NURSE PRACTITIONER

## 2018-11-02 PROCEDURE — 93010 ELECTROCARDIOGRAM REPORT: CPT | Performed by: INTERNAL MEDICINE

## 2018-11-02 PROCEDURE — 36415 COLL VENOUS BLD VENIPUNCTURE: CPT | Performed by: NURSE PRACTITIONER

## 2018-11-02 PROCEDURE — 25000132 ZZH RX MED GY IP 250 OP 250 PS 637: Performed by: INTERNAL MEDICINE

## 2018-11-02 RX ORDER — WARFARIN SODIUM 2.5 MG/1
2.5 TABLET ORAL
Status: DISCONTINUED | OUTPATIENT
Start: 2018-11-02 | End: 2018-11-02 | Stop reason: HOSPADM

## 2018-11-02 RX ORDER — SOTALOL HYDROCHLORIDE 120 MG/1
120 TABLET ORAL EVERY 12 HOURS
Qty: 60 TABLET | Refills: 11 | Status: SHIPPED | OUTPATIENT
Start: 2018-11-02 | End: 2018-12-03

## 2018-11-02 RX ADMIN — FUROSEMIDE 20 MG: 20 TABLET ORAL at 08:38

## 2018-11-02 RX ADMIN — SOTALOL HYDROCHLORIDE 120 MG: 120 TABLET ORAL at 08:38

## 2018-11-02 RX ADMIN — SPIRONOLACTONE 25 MG: 25 TABLET ORAL at 08:38

## 2018-11-02 ASSESSMENT — ACTIVITIES OF DAILY LIVING (ADL)
ADLS_ACUITY_SCORE: 9

## 2018-11-02 NOTE — PLAN OF CARE
Problem: Patient Care Overview  Goal: Plan of Care/Patient Progress Review  Outcome: Improving  A&Ox4. VSS. Denies pain. 0000 EKG normal/unchanged. TELE NSR 70s. Up independently. Wore CPAP overnight. MRSA swab negative. Plan for dismissal today.

## 2018-11-02 NOTE — PLAN OF CARE
Problem: Arrhythmia/Dysrhythmia (Symptomatic) (Adult)  Goal: Signs and Symptoms of Listed Potential Problems Will be Absent, Minimized or Managed (Arrhythmia/Dysrhythmia)  Signs and symptoms of listed potential problems will be absent, minimized or managed by discharge/transition of care (reference Arrhythmia/Dysrhythmia (Symptomatic) (Adult) CPG).   Outcome: Adequate for Discharge Date Met: 11/02/18  Has remained in SR/SD since DCCV on 11/1/18.  Discharged home on Sotatol.  Pt instructed to have INR checked no later than 11/6/18.

## 2018-11-02 NOTE — PROGRESS NOTES
Made 1 m Follow-up with Dr. Evans up in [place] - December 18 @ 11:30 AM (EKG) and see Cristina following    Reviewed EKG done this AM 11/2 @ 11:15 showing SR with QTc ~480 ms.     OK to DC

## 2018-11-02 NOTE — PLAN OF CARE
Problem: Patient Care Overview  Goal: Plan of Care/Patient Progress Review  Outcome: Improving  VSS. Monitor remains Sinus rhythm. Pt. Denies pain. CPAP at bedtime. Plan for possible discharge tomorrow.

## 2018-11-02 NOTE — PROGRESS NOTES
Received 5th dose of Sotalol this am  ms. If no changes in qtc with next ecg go home today with current dose of sotalol and will schedule for f/u. Discharge summary dictated  [id number]

## 2018-11-03 LAB
INTERPRETATION ECG - MUSE: NORMAL

## 2018-11-05 ENCOUNTER — ANTICOAGULATION THERAPY VISIT (OUTPATIENT)
Dept: ANTICOAGULATION | Facility: CLINIC | Age: 58
End: 2018-11-05

## 2018-11-05 DIAGNOSIS — I48.91 ATRIAL FIBRILLATION WITH RAPID VENTRICULAR RESPONSE (H): ICD-10-CM

## 2018-11-05 DIAGNOSIS — I48.91 ATRIAL FIBRILLATION (H): ICD-10-CM

## 2018-11-05 PROCEDURE — 99207 ZZC NO CHARGE NURSE ONLY: CPT

## 2018-11-05 NOTE — MR AVS SNAPSHOT
Maurice Jon   11/5/2018   Anticoagulation Therapy Visit    Description:  57 year old male   Provider:  Deisy Reza, RN   Department:  [facility]           INR as of 11/5/2018     Today's INR 2.23 (11/2/2018)      Anticoagulation Summary as of 11/5/2018     INR goal 2.0-3.0   Today's INR 2.23 (11/2/2018)   Full warfarin instructions 1.25 mg on Fri; 2.5 mg all other days   Next INR check 11/6/2018    Indications   Atrial fibrillation (H) [I48.91]  Atrial fibrillation with rapid ventricular response (H) [I48.91]  Long-term (current) use of anticoagulants [Z79.01] [Z79.01]         Your next Anticoagulation Clinic appointment(s)     Nov 06, 2018  3:15 PM CST   Anticoagulation Visit with CL ANTI COAG   [facility] ([facility])    [address] [address]   [phone]              November 2018 Details    Sun Mon Tue Wed Thu Fri Sat         1               2               3                 4               5      2.5 mg   See details      6            7               8               9               10                 11               12               13               14               15               16               17                 18               19               20               21               22               23               24                 25               26               27               28               29               30                 Date Details   11/05 This INR check       Date of next INR:  11/6/2018         How to take your warfarin dose     To take:  2.5 mg Take 1 of the 2.5 mg tablets.

## 2018-11-05 NOTE — DISCHARGE SUMMARY
Admit Date:     10/31/2018   Discharge Date:     2018      DISCHARGE DIAGNOSIS:  Persistent atrial fibrillation.      PROCEDURE PERFORMED:  DC cardioversion.      DISCHARGE MEDICATIONS:   1.  Sotalol 120 mg b.i.d.   2.  Lasix 20 mg daily.   3.  Spironolactone 25 mg daily.   4.  Warfarin as directed.      HOSPITAL COURSE:  Marily is a delightful 57-year-old gentleman with a history of stable liver cirrhosis and portal hypertension due to steatohepatitis.  He has been followed closely by Dr. Roque, a GI specialist at [facility].  I have been following the patient for symptomatic persistent atrial fibrillation.  The patient was in sinus rhythm after last DC cardioversion a few years ago, but over the past several months has developed persistent atrial fibrillation.  Usually we attempt a rate control strategy but symptoms persist, including fatigue and shortness of breath.  In light of that, I recommend a trial of sotalol.  His INR has been greater than 2 and was okay by GI to take given his liver cirrhosis.      The patient received sotalol 80 mg b.i.d.  His metoprolol was discontinued the day before admission.  He received 80 mg sotalol with the first dose and then 120 after that.  He underwent DC cardioversion after receiving the third dose of sotalol and has remained in sinus rhythm since then.  EKG was done today prior to receiving the fifth dose of sotalol and showed a QTc of 488 milliseconds.  We do not have a baseline EKG when in sinus rhythm for QTc comparison, but it is less than 500 milliseconds.      The patient will continue with current dose of sotalol along with warfarin and see me back in a month in [facility] for followup.         CIRILO MURGUIA MD             D: 2018   T: 2018   MT: BARRY      Name:     MARILY NAVARRO   MRN:      [id number]        Account:        [id number]   :      1960           Admit Date:     10/31/2018                                   Discharge Date: 11/02/2018      Document: [id number]

## 2018-11-05 NOTE — PROGRESS NOTES
ANTICOAGULATION FOLLOW-UP CLINIC VISIT    Patient Name:  Maurice Jon  Date:  11/5/2018  Contact Type:  Chart Review only -- dosing instructions given by inpatient pharmacy while hospitalized    SUBJECTIVE:     Patient Findings     Positives Change in medications (sotalol)    Comments Patient was hospitalized from 10/31 - 11/2 for sotalol monitoring. Patient additionally had a cardioversion on 11/1/18. He was discharged on the same medication, with sotalol added and requested to return to see cardiology in 1 month.            OBJECTIVE    INR   Date Value Ref Range Status   11/02/2018 2.23 (H) 0.86 - 1.14 Final       ASSESSMENT / PLAN  No question data found.  Anticoagulation Summary as of 11/5/2018     INR goal 2.0-3.0   Today's INR 2.23 (11/2/2018)   Warfarin maintenance plan 1.25 mg (2.5 mg x 0.5) on Fri; 2.5 mg (2.5 mg x 1) all other days   Full warfarin instructions 1.25 mg on Fri; 2.5 mg all other days   Weekly warfarin total 16.25 mg   No change documented Deisy Reza RN   Plan last modified Susan Beyer RN (9/28/2018)   Next INR check 11/6/2018   Priority INR   Target end date 10/4/2018    Indications   Atrial fibrillation (H) [I48.91]  Atrial fibrillation with rapid ventricular response (H) [I48.91]  Long-term (current) use of anticoagulants [Z79.01] [Z79.01]         Anticoagulation Episode Summary     INR check location     Preferred lab     Send INR reminders to [facility]    Comments * anticoagulation short period surrounding cardioversion/ablation on 10/31/18. Only per Dr. Evans's note via Melany Ewing RN (Portal hypertension) SEND INR RESULTS TO CARDIOLOGY. has well-compensated cirrhosis      Anticoagulation Care Providers     Provider Role Specialty Phone number    Alon Pineda MD Referring [facility] [phone]            See the Encounter Report to view Anticoagulation Flowsheet and Dosing Calendar (Go to Encounters tab in chart review, and find  the Anticoagulation Therapy Visit)        Deisy Reza RN CACP

## 2018-11-06 ENCOUNTER — ANTICOAGULATION THERAPY VISIT (OUTPATIENT)
Dept: ANTICOAGULATION | Facility: CLINIC | Age: 58
End: 2018-11-06
Payer: COMMERCIAL

## 2018-11-06 DIAGNOSIS — I48.91 ATRIAL FIBRILLATION (H): ICD-10-CM

## 2018-11-06 DIAGNOSIS — I48.91 ATRIAL FIBRILLATION WITH RAPID VENTRICULAR RESPONSE (H): ICD-10-CM

## 2018-11-06 LAB
INR POINT OF CARE: 2 (ref 0.86–1.14)
INTERPRETATION ECG - MUSE: NORMAL

## 2018-11-06 PROCEDURE — 36416 COLLJ CAPILLARY BLOOD SPEC: CPT

## 2018-11-06 PROCEDURE — 99207 ZZC NO CHARGE NURSE ONLY: CPT

## 2018-11-06 PROCEDURE — 85610 PROTHROMBIN TIME: CPT | Mod: QW

## 2018-11-06 NOTE — MR AVS SNAPSHOT
Mauricetom Trujilloon   11/6/2018 3:15 PM   Anticoagulation Therapy Visit    Description:  57 year old male   Provider:  MAHESH ANTI COAG   Department:  Cl Anticoag           INR as of 11/6/2018     Today's INR 2.0      Anticoagulation Summary as of 11/6/2018     INR goal 2.0-3.0   Today's INR 2.0   Full warfarin instructions 1.25 mg on Fri; 2.5 mg all other days   Next INR check 11/13/2018    Indications   Atrial fibrillation (H) [I48.91]  Atrial fibrillation with rapid ventricular response (H) [I48.91]  Long-term (current) use of anticoagulants [Z79.01] [Z79.01]         Your next Anticoagulation Clinic appointment(s)     Nov 13, 2018  3:30 PM CST   Anticoagulation Visit with MAHESH ANTI COAG   [facility] ([facility])    [address] [facility] [address] [address] [phone]              Contact Numbers     Please call [phone] with any problems or questions regarding your therapy.    If you need to cancel and/or reschedule your appointment please call one of the following numbers:  [facility] - [phone]  [facility] [phone]  [facility] [phone]  [facility] [phone]  [place] - [phone]            November 2018 Details    Sun Mon Tue Wed Thu Fri Sat         1               2               3                 4               5               6      2.5 mg   See details      7      2.5 mg         8      2.5 mg         9      1.25 mg         10      2.5 mg           11      2.5 mg         12      2.5 mg         13            14               15               16               17                 18               19               20               21               22               23               24                 25               26               27               28               29               30                 Date Details   11/06 This INR check       Date of next INR:  11/13/2018         How to take your warfarin dose     To take:  1.25 mg Take 0.5 of  a 2.5 mg tablet.    To take:  2.5 mg Take 1 of the 2.5 mg tablets.

## 2018-11-06 NOTE — PROGRESS NOTES
ANTICOAGULATION FOLLOW-UP CLINIC VISIT    Patient Name:  Maurice Jon  Date:  11/6/2018  Contact Type:  Face to Face    SUBJECTIVE:     Patient Findings     Positives No Problem Findings    Comments No changes in medications, activity, or diet noted. No bleeding or increased bruising noted. Took warfarin as prescribed.  Pt was hospitalized last week and had a cardioversion. Pt will have a provider visit w/ cardio in Dec to discuss further coumadin Tx.  Patient is to continue maintenance warfarin plan, and check INR in one week.  Patient verbalizes understanding and agrees to plan. No further questions or concerns.           OBJECTIVE    INR Protime   Date Value Ref Range Status   11/06/2018 2.0 (A) 0.86 - 1.14 Final       ASSESSMENT / PLAN  INR assessment THER    Recheck INR In: 1 WEEK    INR Location Clinic      Anticoagulation Summary as of 11/6/2018     INR goal 2.0-3.0   Today's INR 2.0   Warfarin maintenance plan 1.25 mg (2.5 mg x 0.5) on Fri; 2.5 mg (2.5 mg x 1) all other days   Full warfarin instructions 1.25 mg on Fri; 2.5 mg all other days   Weekly warfarin total 16.25 mg   No change documented Sondra Santos, RIKI   Plan last modified Susan Beyer, RN (9/28/2018)   Next INR check 11/13/2018   Priority INR   Target end date 10/4/2018    Indications   Atrial fibrillation (H) [I48.91]  Atrial fibrillation with rapid ventricular response (H) [I48.91]  Long-term (current) use of anticoagulants [Z79.01] [Z79.01]         Anticoagulation Episode Summary     INR check location     Preferred lab     Send INR reminders to  ANTICOAG POOL    Comments * anticoagulation short period surrounding cardioversion/ablation on 10/31/18. Only per Dr. Evans's note via Melany Ewing RN (Portal hypertension) SEND INR RESULTS TO CARDIOLOGY. has well-compensated cirrhosis      Anticoagulation Care Providers     Provider Role Specialty Phone number    Alon Pineda MD [facility] [phone]             See the Encounter Report to view Anticoagulation Flowsheet and Dosing Calendar (Go to Encounters tab in chart review, and find the Anticoagulation Therapy Visit)        Sondra Santos RN

## 2018-11-07 ENCOUNTER — TELEPHONE (OUTPATIENT)
Dept: FAMILY MEDICINE | Facility: CLINIC | Age: 58
End: 2018-11-07

## 2018-11-07 NOTE — TELEPHONE ENCOUNTER
"ED/Discharge Protocol    \"Hi, my name is Safia Griffin, a registered nurse, and I am calling on behalf of 's office at [place].  I am calling to follow up and see how things are going for you after your recent visit.\"    \"I see that you were IP for Afib and Cardioversion. 10/31/18-11/5/18.  How are you doing now that you are home?\" LM to call clinic/RN with questions or concerns. KpavelRN          "

## 2018-11-07 NOTE — TELEPHONE ENCOUNTER
ED/UC/IP follow up phone call :[facility] 11/2/2018 A-Fib with RVR    RN please call to follow up.    Number of ED visits in past 12 months = 1/1  Xena Zapata  Clinic Station  Flex

## 2018-11-13 ENCOUNTER — ANTICOAGULATION THERAPY VISIT (OUTPATIENT)
Dept: ANTICOAGULATION | Facility: CLINIC | Age: 58
End: 2018-11-13
Payer: COMMERCIAL

## 2018-11-13 DIAGNOSIS — I48.91 ATRIAL FIBRILLATION (H): ICD-10-CM

## 2018-11-13 DIAGNOSIS — I48.91 ATRIAL FIBRILLATION WITH RAPID VENTRICULAR RESPONSE (H): ICD-10-CM

## 2018-11-13 LAB — INR POINT OF CARE: 2.5 (ref 0.86–1.14)

## 2018-11-13 PROCEDURE — 36416 COLLJ CAPILLARY BLOOD SPEC: CPT

## 2018-11-13 PROCEDURE — 99207 ZZC NO CHARGE NURSE ONLY: CPT

## 2018-11-13 PROCEDURE — 85610 PROTHROMBIN TIME: CPT | Mod: QW

## 2018-11-13 NOTE — PROGRESS NOTES
ANTICOAGULATION FOLLOW-UP CLINIC VISIT    Patient Name:  Maurice Jon  Date:  11/13/2018  Contact Type:  Face to Face    SUBJECTIVE:     Patient Findings     Positives No Problem Findings    Comments No changes in medications, activity, or diet noted. No bleeding or increased bruising noted. Took warfarin as prescribed.  Patient is to continue maintenance warfarin plan, and check INR in 2 weeks.  Patient verbalizes understanding and agrees to plan. No further questions or concerns.           OBJECTIVE    INR Protime   Date Value Ref Range Status   11/13/2018 2.5 (A) 0.86 - 1.14 Final       ASSESSMENT / PLAN  INR assessment THER    Recheck INR In: 2 WEEKS    INR Location Clinic      Anticoagulation Summary as of 11/13/2018     INR goal 2.0-3.0   Today's INR 2.5   Warfarin maintenance plan 1.25 mg (2.5 mg x 0.5) on Fri; 2.5 mg (2.5 mg x 1) all other days   Full warfarin instructions 1.25 mg on Fri; 2.5 mg all other days   Weekly warfarin total 16.25 mg   No change documented Sondra Santos RN   Plan last modified Susan Beyer RN (9/28/2018)   Next INR check 11/27/2018   Priority INR   Target end date 10/4/2018    Indications   Atrial fibrillation (H) [I48.91]  Atrial fibrillation with rapid ventricular response (H) [I48.91]  Long-term (current) use of anticoagulants [Z79.01] [Z79.01]         Anticoagulation Episode Summary     INR check location     Preferred lab     Send INR reminders to CL ANTICOAG POOL    Comments * anticoagulation short period surrounding cardioversion/ablation on 10/31/18. Only per Dr. Evans's note via Melany Ewing RN (Portal hypertension) SEND INR RESULTS TO CARDIOLOGY. has well-compensated cirrhosis      Anticoagulation Care Providers     Provider Role Specialty Phone number    Alon Pineda MD Responsible Family Practice [phone]            See the Encounter Report to view Anticoagulation Flowsheet and Dosing Calendar (Go to Encounters tab in chart review, and find  the Anticoagulation Therapy Visit)        Sondra Santos RN

## 2018-11-27 ENCOUNTER — APPOINTMENT (OUTPATIENT)
Dept: FAMILY MEDICINE | Facility: CLINIC | Age: 58
End: 2018-11-27
Payer: COMMERCIAL

## 2018-11-27 ENCOUNTER — ANTICOAGULATION THERAPY VISIT (OUTPATIENT)
Dept: ANTICOAGULATION | Facility: CLINIC | Age: 58
End: 2018-11-27
Payer: COMMERCIAL

## 2018-11-27 DIAGNOSIS — I48.91 ATRIAL FIBRILLATION (H): ICD-10-CM

## 2018-11-27 DIAGNOSIS — I48.91 ATRIAL FIBRILLATION WITH RAPID VENTRICULAR RESPONSE (H): ICD-10-CM

## 2018-11-27 LAB — INR POINT OF CARE: 2.1 (ref 0.86–1.14)

## 2018-11-27 PROCEDURE — 85610 PROTHROMBIN TIME: CPT | Mod: QW

## 2018-11-27 PROCEDURE — 99207 ZZC NO CHARGE NURSE ONLY: CPT

## 2018-11-27 PROCEDURE — 36416 COLLJ CAPILLARY BLOOD SPEC: CPT

## 2018-11-27 NOTE — PROGRESS NOTES
ANTICOAGULATION FOLLOW-UP CLINIC VISIT    Patient Name:  Maurice Jon  Date:  11/27/2018  Contact Type:  Face to Face    SUBJECTIVE:     Patient Findings     Positives No Problem Findings    Comments No changes in medications, activity, or diet noted. No bleeding or increased bruising noted. Took warfarin as prescribed.  Patient is to continue maintenance warfarin plan, and check INR in 2 weeks.  Pt had questions about being in A-Fib and was advised to discuss with PCP.  Patient verbalizes understanding and agrees to plan. No further questions or concerns.           OBJECTIVE    INR Protime   Date Value Ref Range Status   11/27/2018 2.1 (A) 0.86 - 1.14 Final       ASSESSMENT / PLAN  INR assessment THER    Recheck INR In: 2 WEEKS    INR Location Clinic      Anticoagulation Summary as of 11/27/2018     INR goal 2.0-3.0   Today's INR 2.1   Warfarin maintenance plan 1.25 mg (2.5 mg x 0.5) on Fri; 2.5 mg (2.5 mg x 1) all other days   Full warfarin instructions 1.25 mg on Fri; 2.5 mg all other days   Weekly warfarin total 16.25 mg   No change documented Sondra Santos, RIKI   Plan last modified Susan Beyer RN (9/28/2018)   Next INR check 12/7/2018   Priority INR   Target end date 10/4/2018    Indications   Atrial fibrillation (H) [I48.91]  Atrial fibrillation with rapid ventricular response (H) [I48.91]  Long-term (current) use of anticoagulants [Z79.01] [Z79.01]         Anticoagulation Episode Summary     INR check location     Preferred lab     Send INR reminders to  ANTICOAG POOL    Comments * anticoagulation short period surrounding cardioversion/ablation on 10/31/18. Only per Dr. Evans's note via Melany Ewing RN (Portal hypertension) SEND INR RESULTS TO CARDIOLOGY. has well-compensated cirrhosis      Anticoagulation Care Providers     Provider Role Specialty Phone number    Alon Pineda MD [facility] [phone]            See the Encounter Report to view Anticoagulation  Flowsheet and Dosing Calendar (Go to Encounters tab in chart review, and find the Anticoagulation Therapy Visit)        Sondra Santos RN

## 2018-11-27 NOTE — MR AVS SNAPSHOT
Maurice Jon   11/27/2018 2:45 PM   Anticoagulation Therapy Visit    Description:  57 year old male   Provider:  MAHESH ANTI COAG   Department:  Cl Anticoag           INR as of 11/27/2018     Today's INR 2.1      Anticoagulation Summary as of 11/27/2018     INR goal 2.0-3.0   Today's INR 2.1   Full warfarin instructions 1.25 mg on Fri; 2.5 mg all other days   Next INR check 12/7/2018    Indications   Atrial fibrillation (H) [I48.91]  Atrial fibrillation with rapid ventricular response (H) [I48.91]  Long-term (current) use of anticoagulants [Z79.01] [Z79.01]         Your next Anticoagulation Clinic appointment(s)     Dec 07, 2018  3:15 PM CST   Anticoagulation Visit with MAHESH ANTI COAG   [facility] ([facility]    [address] [facility] [address] [phone]              Contact Numbers     Please call [phone] with any problems or questions regarding your therapy.    If you need to cancel and/or reschedule your appointment please call one of the following numbers:  [facility] - [phone]  [place] - [phone]  [facility] [phone]  [facility] [phone]  [place] - [phone]            November 2018 Details    Sun Mon Tue Wed Thu Fri Sat         1               2               3                 4               5               6               7               8               9               10                 11               12               13               14               15               16               17                 18               19               20               21               22               23               24                 25               26               27      2.5 mg   See details      28      2.5 mg         29      2.5 mg         30      1.25 mg           Date Details   11/27 This INR check               How to take your warfarin dose     To take:  1.25 mg Take 0.5 of a 2.5 mg tablet.    To take:  2.5 mg Take 1  of the 2.5 mg tablets.           December 2018 Details    Sun Mon Tue Wed Thu Fri Sat           1      2.5 mg           2      2.5 mg         3      2.5 mg         4      2.5 mg         5      2.5 mg         6      2.5 mg         7            8                 9               10               11               12               13               14               15                 16               17               18               19               20               21               22                 23               24               25               26               27               28               29                 30               31                     Date Details   No additional details    Date of next INR:  12/7/2018         How to take your warfarin dose     To take:  1.25 mg Take 0.5 of a 2.5 mg tablet.    To take:  2.5 mg Take 1 of the 2.5 mg tablets.

## 2018-12-03 ENCOUNTER — TELEPHONE (OUTPATIENT)
Dept: CARDIOLOGY | Facility: CLINIC | Age: 58
End: 2018-12-03

## 2018-12-03 DIAGNOSIS — Z98.890 STATUS POST ABLATION OF ATRIAL FIBRILLATION: Primary | ICD-10-CM

## 2018-12-03 DIAGNOSIS — I48.91 ATRIAL FIBRILLATION WITH RAPID VENTRICULAR RESPONSE (H): ICD-10-CM

## 2018-12-03 DIAGNOSIS — Z86.79 STATUS POST ABLATION OF ATRIAL FIBRILLATION: Primary | ICD-10-CM

## 2018-12-03 RX ORDER — SOTALOL HYDROCHLORIDE 120 MG/1
120 TABLET ORAL EVERY 12 HOURS
Qty: 60 TABLET | Refills: 11 | Status: ON HOLD | OUTPATIENT
Start: 2018-12-03 | End: 2018-12-22

## 2018-12-03 NOTE — TELEPHONE ENCOUNTER
Next step is AF ablation. He can see me to discuss it otherwise we can schedule it without seeing me as we have discussed this option in the past. Thanks, qp    ADDENDUM: Left detailed message (per pt request) regarding Dr Evans's recommendation. Enc pt to call Disha Aguilar for sooner appointment or call me and I can make appointment for him. Melany Ewing RN Cardiology December 3, 2018, 12:14 PM    ADDENDUM: Pt called in to state that he DOES want to schedule AFib ablation WITHOUT office visit prior as his main goal is to get it done before the end of the year. Will discuss with Dr Evans. Melany Ewing RN Cardiology December 4, 2018, 9:29 AM

## 2018-12-03 NOTE — TELEPHONE ENCOUNTER
"Pt called in this morning to report that he thinks he may be in AFib again. States he stopped in a his PCP clinic and the nurse listened to his heart and told him he was back in AFib. No documentation in EPIC. Pt states he put his CPAP mask on the other night and he felt like he was gasping--in the past he hasn't felt that way. He also feels \"winded\" going up the stairs and walking from the parking lot into work this morning. Was in hospital 10/31/18 to 11/2/18 for sotalol initiation and DCCV and was in NSR when he left the hospital. Will discuss with Dr Evans for further recommendations. Melany Ewing RN Cardiology December 3, 2018, 9:27 AM    "

## 2018-12-04 DIAGNOSIS — I48.19 PERSISTENT ATRIAL FIBRILLATION (H): Primary | ICD-10-CM

## 2018-12-05 ENCOUNTER — TELEPHONE (OUTPATIENT)
Dept: FAMILY MEDICINE | Facility: CLINIC | Age: 58
End: 2018-12-05

## 2018-12-05 DIAGNOSIS — J98.01 BRONCHOSPASM: ICD-10-CM

## 2018-12-05 NOTE — TELEPHONE ENCOUNTER
Left message for patient to return call to clinic.  This was prescribed for bronchospasm back in Nov 2016.  He should f/u in clinic for refill.  Due for annual physical anyways.  Vera HOYOS RN

## 2018-12-05 NOTE — TELEPHONE ENCOUNTER
"Requested Prescriptions   Pending Prescriptions Disp Refills     mometasone-formoterol (DULERA) 200-5 MCG/ACT inhaler  Last Written Prescription Date:  11/29/2016  Last Fill Quantity: 1,  # refills: 1   Last office visit: 03/17/2017 with prescribing provider:  sage   Future Office Visit:     1 Inhaler 1     Sig: Inhale 2 puffs into the lungs 2 times daily as needed Appt DUE Jan 2017    Inhaled Steroids Protocol Failed    12/5/2018  2:17 PM       Failed - Recent (12 mo) or future (30 days) visit within the authorizing provider's specialty    Patient had office visit in the last 12 months or has a visit in the next 30 days with authorizing provider or within the authorizing provider's specialty.  See \"Patient Info\" tab in inbasket, or \"Choose Columns\" in Meds & Orders section of the refill encounter.             Passed - Patient is age 12 or older        Timo Leahy RT (R)    "

## 2018-12-07 ENCOUNTER — ANTICOAGULATION THERAPY VISIT (OUTPATIENT)
Dept: ANTICOAGULATION | Facility: CLINIC | Age: 58
End: 2018-12-07
Payer: COMMERCIAL

## 2018-12-07 ENCOUNTER — OFFICE VISIT (OUTPATIENT)
Dept: FAMILY MEDICINE | Facility: CLINIC | Age: 58
End: 2018-12-07
Payer: COMMERCIAL

## 2018-12-07 VITALS
TEMPERATURE: 98.6 F | BODY MASS INDEX: 39.21 KG/M2 | DIASTOLIC BLOOD PRESSURE: 60 MMHG | WEIGHT: 244 LBS | HEART RATE: 92 BPM | OXYGEN SATURATION: 97 % | SYSTOLIC BLOOD PRESSURE: 115 MMHG | HEIGHT: 66 IN | RESPIRATION RATE: 14 BRPM

## 2018-12-07 DIAGNOSIS — I48.91 ATRIAL FIBRILLATION WITH RAPID VENTRICULAR RESPONSE (H): ICD-10-CM

## 2018-12-07 DIAGNOSIS — I48.20 CHRONIC ATRIAL FIBRILLATION (H): ICD-10-CM

## 2018-12-07 DIAGNOSIS — J20.9 ACUTE BRONCHITIS, UNSPECIFIED ORGANISM: Primary | ICD-10-CM

## 2018-12-07 DIAGNOSIS — I48.91 ATRIAL FIBRILLATION (H): ICD-10-CM

## 2018-12-07 LAB — INR POINT OF CARE: 2.5 (ref 0.86–1.14)

## 2018-12-07 PROCEDURE — 36416 COLLJ CAPILLARY BLOOD SPEC: CPT

## 2018-12-07 PROCEDURE — 85610 PROTHROMBIN TIME: CPT | Mod: QW

## 2018-12-07 PROCEDURE — 99213 OFFICE O/P EST LOW 20 MIN: CPT | Performed by: NURSE PRACTITIONER

## 2018-12-07 PROCEDURE — 99207 ZZC NO CHARGE NURSE ONLY: CPT

## 2018-12-07 RX ORDER — AZITHROMYCIN 250 MG/1
TABLET, FILM COATED ORAL
Qty: 6 TABLET | Refills: 0 | Status: ON HOLD | OUTPATIENT
Start: 2018-12-07 | End: 2018-12-27

## 2018-12-07 ASSESSMENT — PAIN SCALES - GENERAL: PAINLEVEL: NO PAIN (0)

## 2018-12-07 NOTE — PROGRESS NOTES
SUBJECTIVE:   Maurice Jon is a 58 year old male who presents to clinic today for the following health issues:      Medication Followup of Dulera    Taking Medication as prescribed: yes    Side Effects:  None    Medication Helping Symptoms:  yes     Acute Illness   Acute illness concerns: Chest congestion, cough  Onset: 2 days    Fever: no    Chills/Sweats: YES    Headache (location?): YES    Sinus Pressure:YES    Conjunctivitis:  no    Ear Pain: no    Rhinorrhea: YES    Congestion: YES    Sore Throat: no     Cough: YES-productive of green sputum, with shortness of breath    Wheeze: YES    Decreased Appetite: YES    Nausea: no    Vomiting: no    Diarrhea:  no    Dysuria/Freq.: YES    Fatigue/Achiness: YES    Sick/Strep Exposure: no     Therapies Tried and outcome: none      Problem list and histories reviewed & adjusted, as indicated.  Further history obtained, clarified or corrected by provider:  Just 2 days of symptoms, felt run down prior, long days at work.   When he gets bronchitis, it has some hard.  Generally he has bronchitis annually.  Zithromax has been effective in the past.        Patient Active Problem List   Diagnosis     Thrombocytopenia (H)     Hepatic cirrhosis (H)     erectile dysfunction     Compulsive behaviors     BRUCE-Mild (AHI 9; REM RDI 31)     Portal hypertension (H)     Eczema     GERD (gastroesophageal reflux disease)     CARDIOVASCULAR SCREENING; LDL GOAL LESS THAN 160     Obesity     Health Care Home     Atrial fibrillation with rapid ventricular response (H)     Edema     Atrial fibrillation (H)     Severe sepsis (H)     History of MRSA now culture negative     Portal vein thrombosis     Long-term (current) use of anticoagulants [Z79.01]     Encounter for monitoring sotalol therapy     Persistent atrial fibrillation (H)     Past Surgical History:   Procedure Laterality Date     ANESTHESIA CARDIOVERSION N/A 1/19/2015    Procedure: ANESTHESIA CARDIOVERSION;  Surgeon: Generic  Anesthesia Provider;  Location: [facility] OR     COLONOSCOPY N/A 8/14/2017    Procedure: COLONOSCOPY;  Colonoscopy Called will arrive appx 12:30 Per phone call;  Surgeon: Vincent Whittington MD;  Location:  GI     ESOPHAGOSCOPY, GASTROSCOPY, DUODENOSCOPY (EGD), COMBINED  7/11/2011    Procedure:COMBINED ESOPHAGOSCOPY, GASTROSCOPY, DUODENOSCOPY (EGD); Surgeon:LAURA LAMAS; Location:[facility] GI     ESOPHAGOSCOPY, GASTROSCOPY, DUODENOSCOPY (EGD), COMBINED  9/10/2012    Procedure: COMBINED ESOPHAGOSCOPY, GASTROSCOPY, DUODENOSCOPY (EGD);;  Surgeon: Hi Roque MD;  Location:  GI     ESOPHAGOSCOPY, GASTROSCOPY, DUODENOSCOPY (EGD), COMBINED  9/9/2013    Procedure: COMBINED ESOPHAGOSCOPY, GASTROSCOPY, DUODENOSCOPY (EGD);;  Surgeon: Hi Roque MD;  Location:  GI     ESOPHAGOSCOPY, GASTROSCOPY, DUODENOSCOPY (EGD), COMBINED N/A 9/15/2014    Procedure: COMBINED ESOPHAGOSCOPY, GASTROSCOPY, DUODENOSCOPY (EGD);  Surgeon: Hi Roque MD;  Location:  GI     ESOPHAGOSCOPY, GASTROSCOPY, DUODENOSCOPY (EGD), COMBINED N/A 10/30/2015    Procedure: COMBINED ESOPHAGOSCOPY, GASTROSCOPY, DUODENOSCOPY (EGD);  Surgeon: Feliberto Fox MD;  Location:  GI     ESOPHAGOSCOPY, GASTROSCOPY, DUODENOSCOPY (EGD), COMBINED N/A 10/10/2016    Procedure: COMBINED ESOPHAGOSCOPY, GASTROSCOPY, DUODENOSCOPY (EGD);  Surgeon: Jose Nesbitt MD;  Location:  GI     HERNIA REPAIR       IRRIGATION AND DEBRIDEMENT ABSCESS SCROTUM, COMBINED  10/4/2012    Procedure: COMBINED IRRIGATION AND DEBRIDEMENT ABSCESS SCROTUM;  Irrigation and Debridement of Groin Abscess;  Surgeon: Benny Shoemaker MD;  Location: [facility] OR     SOFT TISSUE SURGERY       SURGICAL HISTORY OF -   1993    rt elbow     SURGICAL HISTORY OF -   1/15/98    repair of ventral and umbilical hernia     SURGICAL HISTORY OF -   2001    endoscopy       Social History     Tobacco Use     Smoking status: Former Smoker     Packs/day: 0.80     Years: 6.00     Pack years: 4.80      "Types: Cigarettes     Last attempt to quit: 2002     Years since quittin.9     Smokeless tobacco: Never Used   Substance Use Topics     Alcohol use: No     Alcohol/week: 0.0 oz     Comment: quit in      Family History   Problem Relation Age of Onset     Lipids Mother      Hypertension Mother      Eye Disorder Mother      Heart Disease Father         CHF     Lipids Father      Obesity Father      Heart Disease Brother         MI     Eye Disorder Brother      Cancer Other         maternal grandparent/throat cancer           Reviewed and updated as needed this visit by clinical staff  Tobacco  Allergies  Meds  Problems  Med Hx  Surg Hx  Fam Hx  Soc Hx        Reviewed and updated as needed this visit by Provider  Tobacco  Allergies  Meds  Problems  Med Hx  Surg Hx  Fam Hx         ROS:  Constitutional, HEENT, cardiovascular, pulmonary, gi and gu systems are negative, except as otherwise noted.    OBJECTIVE:     /60 (BP Location: Right arm, Patient Position: Chair, Cuff Size: Adult Large)   Pulse 92   Temp 98.6  F (37  C) (Tympanic)   Resp 14   Ht 1.664 m (5' 5.5\")   Wt 110.7 kg (244 lb)   SpO2 97%   BMI 39.99 kg/m    Body mass index is 39.99 kg/m .  GENERAL: healthy, alert and no distress  NECK: no adenopathy, no asymmetry, masses, or scars and thyroid normal to palpation  RESP: lungs clear to auscultation - no rales, rhonchi or wheezes  CV: regular rate and rhythm, normal S1 S2, no S3 or S4, no murmur, click or rub, no peripheral edema and peripheral pulses strong  MS: no gross musculoskeletal defects noted, no edema    Diagnostic Test Results:  none     ASSESSMENT/PLAN:     ASSESSMENT:  1. Acute bronchitis, unspecified organism    2. Chronic atrial fibrillation (H)        PLAN:  Orders Placed This Encounter     azithromycin (ZITHROMAX) 250 MG tablet       Follow-up in 3 days if symptoms worsen or fail to improve    Kailey Ac, NP, APRN [facility]  "

## 2018-12-07 NOTE — MR AVS SNAPSHOT
After Visit Summary   12/7/2018    Maurice Jon    MRN: [id number]           Patient Information     Date Of Birth          1960        Visit Information        Provider Department      12/7/2018 1:20 PM Kailey Ac APRN CNP [facility]        Today's Diagnoses     Acute bronchitis, unspecified organism    -  1    Chronic atrial fibrillation (H)           Follow-ups after your visit        Follow-up notes from your care team     Return in about 3 days (around 12/10/2018), or if symptoms worsen or fail to improve.      Your next 10 appointments already scheduled     Dec 07, 2018  3:15 PM CST   Anticoagulation Visit with [facility]   [facility]    [address]  [facility] [address]   [phone]            Dec 18, 2018 11:30 AM CST   [facility]g with [facility] [facility]   [facility] ([facility])    [address] [facility] [facility] [address]   [phone]            Dec 18, 2018 12:00 PM CST   [facility] EP RETURN with Kristofer Brown MD   [facility]   [place] ([facility])    [address] [facility] [facility] [address]   [phone]            Mar 21, 2019  6:15 AM CDT   US ABDOMEN COMPLETE with UCUS[facility]    [facility] ([facility])    [address] [id number] [phone]           How do I prepare for my exam? (Food and drink instructions) Adults: No eating, smoking, gum chewing or drinking for 8 hours before the exam. You may take medicine with a small sip of water.  Children: * Infants, breast-fed: may have breast milk up to 2 hours before exam. * Infants, formula: may have bottle until 4 hours before exam. * Children 1-5 years: No food or drink for 4 hours before exam. * Children 6 -12 years: No food or drink for 6 hours before exam. * Children over 12 years: No food or drink for 8  hours before exam.  * J Tube Fed: No need to stop feedings.  What should I wear: Wear comfortable clothes.  How long does the exam take: Most ultrasounds take 30 to 60 minutes.  What should I bring: Bring a list of your medicines, including vitamins, minerals and over-the-counter drugs. It is safest to leave personal items at home.  Do I need a :  No  is needed.  What do I need to tell my doctor: Tell your doctor about any allergies you may have.  What should I do after the exam: No restrictions, You may resume normal activities.  What is this test: An ultrasound uses sound waves to make pictures of the body. Sound waves do not cause pain. The only discomfort may be the pressure of the wand against your skin or full bladder.  Who should I call with questions: If you have any questions, please call the Imaging Department where you will have your exam. Directions, parking instructions, and other information is available on our website, [email or web address].            Mar 21, 2019  7:00 AM CDT   Lab with  LAB   [facility] ([facility])    [address]  1st Floor  [facility] [id number] [phone]            Mar 21, 2019  8:00 AM CDT   (Arrive by 7:45 AM)   Return General Liver with Hi Roque MD   [facility] ([facility])    [address] [place] [address]  [address]  [facility] [id number] [phone]              Who to contact     If you have questions or need follow up information about today's clinic visit or your schedule please contact [facility] directly at [phone].  Normal or non-critical lab and imaging results will be communicated to you by MyChart, letter or phone within 4 business days after the clinic has received the results. If you do not hear from us within 7 days, please contact the clinic through MyChart or phone. If you have a critical or abnormal lab result, we will notify you by phone as  "soon as possible.  Submit refill requests through [employer] or call your pharmacy and they will forward the refill request to us. Please allow 3 business days for your refill to be completed.          Additional Information About Your Visit        [employer] Information     [employer] gives you secure access to your electronic health record. If you see a primary care provider, you can also send messages to your care team and make appointments. If you have questions, please call your primary care clinic.  If you do not have a primary care provider, please call [phone] and they will assist you.        Care EveryWhere ID     This is your Care EveryWhere ID. This could be used by other organizations to access your [place] medical records  [id number]        Your Vitals Were     Pulse Temperature Respirations Height Pulse Oximetry BMI (Body Mass Index)    92 98.6  F (37  C) (Tympanic) 14 5' 5.5\" (1.664 m) 97% 39.99 kg/m2       Blood Pressure from Last 3 Encounters:   12/07/18 115/60   11/02/18 115/50   09/20/18 119/70    Weight from Last 3 Encounters:   12/07/18 244 lb (110.7 kg)   11/02/18 238 lb 3.2 oz (108 kg)   09/20/18 244 lb 8 oz (110.9 kg)              We Performed the Following     INR          Today's Medication Changes          These changes are accurate as of 12/7/18  2:14 PM.  If you have any questions, ask your nurse or doctor.               Start taking these medicines.        Dose/Directions    azithromycin 250 MG tablet   Commonly known as:  ZITHROMAX   Used for:  Acute bronchitis, unspecified organism   Started by:  Kailey Ac APRN CNP        Take 500 mg on the first day, then 250 mg daily for 4 days   Quantity:  6 tablet   Refills:  0            Where to get your medicines      These medications were sent to [facility] - - [address] - [id number] [facility]  [address] [facility] [address]    Hours:  [facility] Phone:  [phone]     " azithromycin 250 MG tablet                Primary Care Provider Fax #    Physician No Ref-Primary [phone]       No address on file        Equal Access to Services     SUSAN ONEIL : Hadii jose dougherty shadia Payton, rajni logan, isak amaral, rakan geigerquan jesús. So [facility] [phone].    ATENCIÓN: Si habla español, tiene a allen disposición servicios gratuitos de asistencia lingüística. Llame al [phone].    We comply with applicable federal civil rights laws and [place] laws. We do not discriminate on the basis of race, color, national origin, age, disability, sex, sexual orientation, or gender identity.            Thank you!     Thank you for choosing [facility]  for your care. Our goal is always to provide you with excellent care. Hearing back from our patients is one way we can continue to improve our services. Please take a few minutes to complete the written survey that you may receive in the mail after your visit with us. Thank you!             Your Updated Medication List - Protect others around you: Learn how to safely use, store and throw away your medicines at www.disposemymeds.org.          This list is accurate as of 12/7/18  2:14 PM.  Always use your most recent med list.                   Brand Name Dispense Instructions for use Diagnosis    azithromycin 250 MG tablet    ZITHROMAX    6 tablet    Take 500 mg on the first day, then 250 mg daily for 4 days    Acute bronchitis, unspecified organism       calcium carbonate-vitamin D 500-200 MG-UNIT tablet    OSCAL w/D     Take 2 tablets by mouth daily with food.        furosemide 40 MG tablet    LASIX    45 tablet    Take 0.5 tablets (20 mg) by mouth daily    Portal hypertension (H)       mometasone-formoterol 200-5 MCG/ACT inhaler    DULERA    1 Inhaler    Inhale 2 puffs into the lungs 2 times daily as needed Appt DUE Jan 2017    Bronchospasm       * order for DME     2 Package    Kristin  compression stockings, 30-40mm compression. Patient has portal hypertension and develops leg edema, which these control well.    Portal hypertension (H), Edema       * order for DME      Respironics RemStar 60 Series Auto AFlex 12-15 cm H2O with heated humidty and a modem.  Pt chose a Quattro Air FFM size medium.    BRUCE (obstructive sleep apnea)       order for DME     1 Device    Equipment being ordered:  New CPAP mask, tube, filters,water tray    Sleep apnea       order for DME     4 Package    Open toe size large 30/40 [employer] Assure Medical Compression socks Model 02666.    Portal hypertension (H), Hepatic cirrhosis (H), Edema       pantoprazole 40 MG EC tablet    PROTONIX    90 tablet    Take 1 tablet (40 mg) by mouth daily as needed    Gastroesophageal reflux disease without esophagitis       sotalol 120 MG tablet    BETAPACE    60 tablet    Take 1 tablet (120 mg) by mouth every 12 hours    Atrial fibrillation with rapid ventricular response (H)       spironolactone 25 MG tablet    ALDACTONE    90 tablet    Take 1 tablet (25 mg) by mouth daily We will no longer fill unless seen by cardiology    Portal hypertension (H)       warfarin 2.5 MG tablet    COUMADIN    90 tablet    2.5mg daily or As directed by Anticoagulation Clinic no maintenance dose establish    Long-term (current) use of anticoagulants, Paroxysmal atrial fibrillation (H), Atrial fibrillation with rapid ventricular response (H)       * Notice:  This list has 2 medication(s) that are the same as other medications prescribed for you. Read the directions carefully, and ask your doctor or other care provider to review them with you.

## 2018-12-07 NOTE — PROGRESS NOTES
ANTICOAGULATION FOLLOW-UP CLINIC VISIT    Patient Name:  Maurice Jon  Date:  12/7/2018  Contact Type:  Face to Face    SUBJECTIVE:     Patient Findings     Positives Inflammation (URI), Antibiotic use or infection (z pack started 12/7/18)    Comments Patient has URI, starting Z pack tonight x 5 days.   No changes in diet. No concerns with bleeding or bruising. Took warfarin as prescribed.   Continue maintenance warfarin dose. Will recheck INR in 1 week.   Patient verbalizes understanding and agrees with plan. No further questions at this time.              OBJECTIVE    INR Protime   Date Value Ref Range Status   12/07/2018 2.5 (A) 0.86 - 1.14 Final       ASSESSMENT / PLAN  INR assessment THER    Recheck INR In: 1 WEEK    INR Location Clinic      Anticoagulation Summary as of 12/7/2018     INR goal 2.0-3.0   Today's INR 2.5   Warfarin maintenance plan 1.25 mg (2.5 mg x 0.5) on Fri; 2.5 mg (2.5 mg x 1) all other days   Full warfarin instructions 1.25 mg on Fri; 2.5 mg all other days   Weekly warfarin total 16.25 mg   No change documented Delores Bolaños RN   Plan last modified Susan Beyer, RN (9/28/2018)   Next INR check 12/14/2018   Priority INR   Target end date 10/4/2018    Indications   Atrial fibrillation (H) [I48.91]  Atrial fibrillation with rapid ventricular response (H) [I48.91]  Long-term (current) use of anticoagulants [Z79.01] [Z79.01]         Anticoagulation Episode Summary     INR check location     Preferred lab     Send INR reminders to [facility] POOL    Comments * anticoagulation short period surrounding cardioversion/ablation on 10/31/18. Only per Dr. Evans's note via Melany Ewing RN (Portal hypertension) SEND INR RESULTS TO CARDIOLOGY. has well-compensated cirrhosis      Anticoagulation Care Providers     Provider Role Specialty Phone number    Alon Pineda MD [facility] [phone]            See the Encounter Report to view Anticoagulation Flowsheet  and Dosing Calendar (Go to Encounters tab in chart review, and find the Anticoagulation Therapy Visit)        Delores Bolaños RN

## 2018-12-07 NOTE — MR AVS SNAPSHOT
Mauricetom Trujilloon   12/7/2018 3:15 PM   Anticoagulation Therapy Visit    Description:  58 year old male   Provider:  CL ANTI COAG   Department:  Cl Anticoag           INR as of 12/7/2018     Today's INR 2.5      Anticoagulation Summary as of 12/7/2018     INR goal 2.0-3.0   Today's INR 2.5   Full warfarin instructions 1.25 mg on Fri; 2.5 mg all other days   Next INR check 12/14/2018    Indications   Atrial fibrillation (H) [I48.91]  Atrial fibrillation with rapid ventricular response (H) [I48.91]  Long-term (current) use of anticoagulants [Z79.01] [Z79.01]         Your next Anticoagulation Clinic appointment(s)     Dec 07, 2018  3:15 PM CST   Anticoagulation Visit with CL ANTI COAG   [facility]    [address] [facility] [address]   [phone]            Dec 14, 2018  3:15 PM CST   Anticoagulation Visit with CL ANTI COAG   [facility] ([facility])    [address] [facility] [address]   [phone]              Contact Numbers     Please call [phone] with any problems or questions regarding your therapy.    If you need to cancel and/or reschedule your appointment please call one of the following numbers:  [facility] - [phone]  [place] - [phone]  [place] - [phone]  [place] - [phone]  [place] - [phone]            December 2018 Details    Sun Mon Tue Wed Thu Fri Sat           1                 2               3               4               5               6               7      1.25 mg   See details      8      2.5 mg           9      2.5 mg         10      2.5 mg         11      2.5 mg         12      2.5 mg         13      2.5 mg         14            15                 16               17               18               19               20               21               22                 23               24               25               26               27                28               29                 30               31                     Date Details   12/07 This INR check       Date of next INR:  12/14/2018         How to take your warfarin dose     To take:  1.25 mg Take 0.5 of a 2.5 mg tablet.    To take:  2.5 mg Take 1 of the 2.5 mg tablets.

## 2018-12-10 DIAGNOSIS — I48.19 PERSISTENT ATRIAL FIBRILLATION (H): Primary | ICD-10-CM

## 2018-12-10 NOTE — TELEPHONE ENCOUNTER
"Called pt. He said he discussed the Dulera at 12/7/18 OV with Kavita Ac CNP, and this was not being recommended due to other meds. Says \"it's getting better.\"  Called in sick to work today as he is still not quite. \"Hackin' up crap.\"  Routed to Kavita Ac CNP as MOLLYI.    Laurie CASAREZ RN      "

## 2018-12-14 ENCOUNTER — ANTICOAGULATION THERAPY VISIT (OUTPATIENT)
Dept: ANTICOAGULATION | Facility: CLINIC | Age: 58
End: 2018-12-14
Payer: COMMERCIAL

## 2018-12-14 DIAGNOSIS — I48.0 PAROXYSMAL ATRIAL FIBRILLATION (H): ICD-10-CM

## 2018-12-14 DIAGNOSIS — Z79.01 LONG TERM CURRENT USE OF ANTICOAGULANT THERAPY: ICD-10-CM

## 2018-12-14 DIAGNOSIS — I48.91 ATRIAL FIBRILLATION WITH RAPID VENTRICULAR RESPONSE (H): ICD-10-CM

## 2018-12-14 LAB — INR POINT OF CARE: 2.4 (ref 0.86–1.14)

## 2018-12-14 PROCEDURE — 99207 ZZC NO CHARGE NURSE ONLY: CPT

## 2018-12-14 PROCEDURE — 36416 COLLJ CAPILLARY BLOOD SPEC: CPT

## 2018-12-14 PROCEDURE — 85610 PROTHROMBIN TIME: CPT | Mod: QW

## 2018-12-14 RX ORDER — WARFARIN SODIUM 2.5 MG/1
TABLET ORAL
Qty: 90 TABLET | Refills: 0 | COMMUNITY
Start: 2018-12-14 | End: 2018-12-28

## 2018-12-14 NOTE — PROGRESS NOTES
ANTICOAGULATION FOLLOW-UP CLINIC VISIT    Patient Name:  Maurice Jon  Date:  2018  Contact Type:  Face to Face    SUBJECTIVE:     Patient Findings     Positives:   Other complaints (short of breath and tired), OTC meds (mucinex), Antibiotic use or infection (just finished a zpak a few days ago)    Comments:   Patient has had increasing SOB and fatigue. He is having an ablation on Friday for his a fib. Per patient, he can stay on his warfarin for this but cardiology wants an INR the day prior. He just finished taking a zpak for bronchitis a few days ago but is still coughing up mucus frequently. He is taking mucinex to help with this. No other changes or concerns. Pt remains in range today so will continue current dosing of warfarin.             OBJECTIVE    INR Protime   Date Value Ref Range Status   2018 2.4 (A) 0.86 - 1.14 Final       ASSESSMENT / PLAN  INR assessment THER    Recheck INR In: 6 DAYS    INR Location Clinic      Anticoagulation Summary  As of 2018    INR goal:   2.0-3.0   TTR:   87.5 % (2.7 mo)   INR used for dosin.4 (2018)   Warfarin maintenance plan:   1.25 mg (2.5 mg x 0.5) every Fri; 2.5 mg (2.5 mg x 1) all other days   Full warfarin instructions:   1.25 mg every Fri; 2.5 mg all other days   Weekly warfarin total:   16.25 mg   No change documented:   Susan Beyer RN   Plan last modified:   Susan Beyer RN (2018)   Next INR check:   2018   Priority:   INR   Target end date:   10/4/2018    Indications    Atrial fibrillation (H) [I48.91]  Atrial fibrillation with rapid ventricular response (H) [I48.91]  Long-term (current) use of anticoagulants [Z79.01] [Z79.01]             Anticoagulation Episode Summary     INR check location:       Preferred lab:       Send INR reminders to:   [facility]    Comments:   * anticoagulation short period surrounding cardioversion/ablation on 10/31/18. Cardiology to decide when to stop warfarin. has  well-compensated cirrhosis      Anticoagulation Care Providers     Provider Role Specialty Phone number    Alon Pineda MD [facility] [phone]            See the Encounter Report to view Anticoagulation Flowsheet and Dosing Calendar (Go to Encounters tab in chart review, and find the Anticoagulation Therapy Visit)        Susan Beyer RN

## 2018-12-18 ENCOUNTER — TELEPHONE (OUTPATIENT)
Dept: CARDIOLOGY | Facility: CLINIC | Age: 58
End: 2018-12-18

## 2018-12-18 NOTE — TELEPHONE ENCOUNTER
Late entry for 12/13--patient called inquired about upcoming procedure on 12/21.  Reviewed with patient prep for procedure and that he was to hold his sotalol 5 days prior to ablation starting on 12/16.  Also per verbal order Dr Evans patient to get INR day before procedure.  Currently patient is scheduled for INR on 12/20 at 3:30pm.  Patient provided verbal understanding of prep for afib ablation.  Another call will be placed to patient day before procedure to review and answer any questions patient may have.  RIKI Cope

## 2018-12-20 ENCOUNTER — TELEPHONE (OUTPATIENT)
Dept: CARDIOLOGY | Facility: CLINIC | Age: 58
End: 2018-12-20

## 2018-12-20 ENCOUNTER — ANTICOAGULATION THERAPY VISIT (OUTPATIENT)
Dept: ANTICOAGULATION | Facility: CLINIC | Age: 58
End: 2018-12-20
Payer: COMMERCIAL

## 2018-12-20 DIAGNOSIS — I48.91 ATRIAL FIBRILLATION WITH RAPID VENTRICULAR RESPONSE (H): ICD-10-CM

## 2018-12-20 DIAGNOSIS — I48.91 ATRIAL FIBRILLATION (H): ICD-10-CM

## 2018-12-20 LAB — INR POINT OF CARE: 2.7 (ref 0.86–1.14)

## 2018-12-20 PROCEDURE — 85610 PROTHROMBIN TIME: CPT | Mod: QW

## 2018-12-20 PROCEDURE — 36416 COLLJ CAPILLARY BLOOD SPEC: CPT

## 2018-12-20 PROCEDURE — 99207 ZZC NO CHARGE NURSE ONLY: CPT

## 2018-12-20 RX ORDER — SODIUM CHLORIDE 450 MG/100ML
INJECTION, SOLUTION INTRAVENOUS CONTINUOUS
Status: CANCELLED | OUTPATIENT
Start: 2018-12-20

## 2018-12-20 RX ORDER — LIDOCAINE 40 MG/G
CREAM TOPICAL
Status: CANCELLED | OUTPATIENT
Start: 2018-12-20

## 2018-12-20 NOTE — TELEPHONE ENCOUNTER
Message left for patient to review instructions for afib ablation scheduled for 12/21.  Awaiting return call.  RIKI Cope

## 2018-12-20 NOTE — PROGRESS NOTES
ANTICOAGULATION FOLLOW-UP CLINIC VISIT    Patient Name:  Maurice Jon  Date:  2018  Contact Type:  Face to Face    SUBJECTIVE:     Patient Findings     Positives:   No Problem Findings    Comments:   No changes in medications, diet, or activity noted. Took warfarin as instructed, denies any missed doses.    Patient is having an ablation tomorrow. Patient will speak with Dr. Evans tomorrow to see if he has any further recommendations regarding his anticoagulation. Writer strongly encouraged him to make an appointment with [facility] since there is limited options with the holidays the next 2 weeks.            OBJECTIVE    INR Protime   Date Value Ref Range Status   2018 2.7 (A) 0.86 - 1.14 Final       ASSESSMENT / PLAN  No question data found.  Anticoagulation Summary  As of 2018    INR goal:   2.0-3.0   TTR:   88.3 % (2.9 mo)   INR used for dosin.7 (2018)   Warfarin maintenance plan:   1.25 mg (2.5 mg x 0.5) every Fri; 2.5 mg (2.5 mg x 1) all other days   Full warfarin instructions:   1.25 mg every Fri; 2.5 mg all other days   Weekly warfarin total:   16.25 mg   No change documented:   Deisy Reza RN   Plan last modified:   Susan Beyer RN (2018)   Next INR check:   2019   Priority:   INR   Target end date:   10/4/2018    Indications    Atrial fibrillation (H) [I48.91]  Atrial fibrillation with rapid ventricular response (H) [I48.91]  Long-term (current) use of anticoagulants [Z79.01] [Z79.01]             Anticoagulation Episode Summary     INR check location:       Preferred lab:       Send INR reminders to:   [facility] ANTICOAG POOL    Comments:   * anticoagulation short period surrounding cardioversion/ablation on 10/31/18. Cardiology to decide when to stop warfarin. has well-compensated cirrhosis      Anticoagulation Care Providers     Provider Role Specialty Phone number    Alon Pineda MD [facility] [phone]            See the  Encounter Report to view Anticoagulation Flowsheet and Dosing Calendar (Go to Encounters tab in chart review, and find the Anticoagulation Therapy Visit)        Deisy Reza RN [facility]

## 2018-12-21 ENCOUNTER — HOSPITAL ENCOUNTER (OUTPATIENT)
Dept: CARDIOLOGY | Facility: CLINIC | Age: 58
Discharge: HOME | End: 2018-12-21
Attending: INTERNAL MEDICINE | Admitting: INTERNAL MEDICINE
Payer: COMMERCIAL

## 2018-12-21 ENCOUNTER — SURGERY (OUTPATIENT)
Age: 58
End: 2018-12-21
Payer: COMMERCIAL

## 2018-12-21 ENCOUNTER — ANESTHESIA EVENT (OUTPATIENT)
Dept: CARDIOLOGY | Facility: CLINIC | Age: 58
DRG: 273 | End: 2018-12-21
Payer: COMMERCIAL

## 2018-12-21 ENCOUNTER — ANESTHESIA (OUTPATIENT)
Dept: CARDIOLOGY | Facility: CLINIC | Age: 58
DRG: 273 | End: 2018-12-21
Payer: COMMERCIAL

## 2018-12-21 ENCOUNTER — HOSPITAL ENCOUNTER (INPATIENT)
Facility: CLINIC | Age: 58
LOS: 4 days | Discharge: HOME | DRG: 273 | End: 2018-12-27
Admitting: INTERNAL MEDICINE
Payer: COMMERCIAL

## 2018-12-21 VITALS — SYSTOLIC BLOOD PRESSURE: 114 MMHG | DIASTOLIC BLOOD PRESSURE: 77 MMHG | HEART RATE: 88 BPM

## 2018-12-21 DIAGNOSIS — I48.19 PERSISTENT ATRIAL FIBRILLATION (H): ICD-10-CM

## 2018-12-21 DIAGNOSIS — K76.6 PORTAL HYPERTENSION (H): ICD-10-CM

## 2018-12-21 DIAGNOSIS — I48.20 CHRONIC A-FIB (H): ICD-10-CM

## 2018-12-21 DIAGNOSIS — I50.23 ACUTE ON CHRONIC SYSTOLIC CONGESTIVE HEART FAILURE (H): ICD-10-CM

## 2018-12-21 DIAGNOSIS — I50.21 ACUTE SYSTOLIC CONGESTIVE HEART FAILURE (H): ICD-10-CM

## 2018-12-21 DIAGNOSIS — I48.20 CHRONIC A-FIB (H): Primary | ICD-10-CM

## 2018-12-21 LAB
ANION GAP SERPL CALCULATED.3IONS-SCNC: 10 MMOL/L (ref 3–14)
BUN SERPL-MCNC: 14 MG/DL (ref 7–30)
CALCIUM SERPL-MCNC: 8.3 MG/DL (ref 8.5–10.1)
CHLORIDE SERPL-SCNC: 106 MMOL/L (ref 94–109)
CO2 SERPL-SCNC: 24 MMOL/L (ref 20–32)
CREAT SERPL-MCNC: 0.73 MG/DL (ref 0.66–1.25)
ERYTHROCYTE [DISTWIDTH] IN BLOOD BY AUTOMATED COUNT: 13.8 % (ref 10–15)
GFR SERPL CREATININE-BSD FRML MDRD: >90 ML/MIN/{1.73_M2}
GLUCOSE SERPL-MCNC: 87 MG/DL (ref 70–99)
HCT VFR BLD AUTO: 37.5 % (ref 40–53)
HGB BLD-MCNC: 13 G/DL (ref 13.3–17.7)
INR PPP: 2.35 (ref 0.86–1.14)
KCT BLD-ACNC: 172 SEC (ref 75–150)
KCT BLD-ACNC: 266 SEC (ref 75–150)
KCT BLD-ACNC: 428 SEC (ref 75–150)
MCH RBC QN AUTO: 34.6 PG (ref 26.5–33)
MCHC RBC AUTO-ENTMCNC: 34.7 G/DL (ref 31.5–36.5)
MCV RBC AUTO: 100 FL (ref 78–100)
PLATELET # BLD AUTO: 63 10E9/L (ref 150–450)
POTASSIUM SERPL-SCNC: 3.7 MMOL/L (ref 3.4–5.3)
RBC # BLD AUTO: 3.76 10E12/L (ref 4.4–5.9)
SODIUM SERPL-SCNC: 140 MMOL/L (ref 133–144)
WBC # BLD AUTO: 4 10E9/L (ref 4–11)

## 2018-12-21 PROCEDURE — 27210995 ZZH RX 272: Performed by: INTERNAL MEDICINE

## 2018-12-21 PROCEDURE — 27210794 ZZH OR GENERAL SUPPLY STERILE: Performed by: INTERNAL MEDICINE

## 2018-12-21 PROCEDURE — 85027 COMPLETE CBC AUTOMATED: CPT

## 2018-12-21 PROCEDURE — 25000125 ZZHC RX 250: Performed by: INTERNAL MEDICINE

## 2018-12-21 PROCEDURE — 93010 ELECTROCARDIOGRAM REPORT: CPT | Performed by: INTERNAL MEDICINE

## 2018-12-21 PROCEDURE — 02573ZZ DESTRUCTION OF LEFT ATRIUM, PERCUTANEOUS APPROACH: ICD-10-PCS | Performed by: INTERNAL MEDICINE

## 2018-12-21 PROCEDURE — C1888 ENDOVAS NON-CARDIAC ABL CATH: HCPCS | Performed by: INTERNAL MEDICINE

## 2018-12-21 PROCEDURE — 75572 CT HRT W/3D IMAGE: CPT

## 2018-12-21 PROCEDURE — 25000128 H RX IP 250 OP 636: Performed by: INTERNAL MEDICINE

## 2018-12-21 PROCEDURE — 93662 INTRACARDIAC ECG (ICE): CPT | Performed by: INTERNAL MEDICINE

## 2018-12-21 PROCEDURE — 40000935 ZZH STATISTIC OUTPATIENT (NON-OBS) EVE

## 2018-12-21 PROCEDURE — 93656 COMPRE EP EVAL ABLTJ ATR FIB: CPT | Performed by: INTERNAL MEDICINE

## 2018-12-21 PROCEDURE — C1730 CATH, EP, 19 OR FEW ELECT: HCPCS | Performed by: INTERNAL MEDICINE

## 2018-12-21 PROCEDURE — C1732 CATH, EP, DIAG/ABL, 3D/VECT: HCPCS | Performed by: INTERNAL MEDICINE

## 2018-12-21 PROCEDURE — 85347 COAGULATION TIME ACTIVATED: CPT

## 2018-12-21 PROCEDURE — 40000936 ZZH STATISTIC OUTPATIENT (NON-OBS) NIGHT

## 2018-12-21 PROCEDURE — 85610 PROTHROMBIN TIME: CPT

## 2018-12-21 PROCEDURE — 93005 ELECTROCARDIOGRAM TRACING: CPT

## 2018-12-21 PROCEDURE — C1894 INTRO/SHEATH, NON-LASER: HCPCS | Performed by: INTERNAL MEDICINE

## 2018-12-21 PROCEDURE — 4A023FZ MEASUREMENT OF CARDIAC RHYTHM, PERCUTANEOUS APPROACH: ICD-10-PCS | Performed by: INTERNAL MEDICINE

## 2018-12-21 PROCEDURE — 75572 CT HRT W/3D IMAGE: CPT | Mod: 26 | Performed by: INTERNAL MEDICINE

## 2018-12-21 PROCEDURE — 4A0234Z MEASUREMENT OF CARDIAC ELECTRICAL ACTIVITY, PERCUTANEOUS APPROACH: ICD-10-PCS | Performed by: INTERNAL MEDICINE

## 2018-12-21 PROCEDURE — 40000852 ZZH STATISTIC HEART CATH LAB OR EP LAB

## 2018-12-21 PROCEDURE — 93613 INTRACARDIAC EPHYS 3D MAPG: CPT | Performed by: INTERNAL MEDICINE

## 2018-12-21 PROCEDURE — B245ZZZ ULTRASONOGRAPHY OF LEFT HEART: ICD-10-PCS | Performed by: INTERNAL MEDICINE

## 2018-12-21 PROCEDURE — 25000132 ZZH RX MED GY IP 250 OP 250 PS 637: Performed by: INTERNAL MEDICINE

## 2018-12-21 PROCEDURE — 02K83ZZ MAP CONDUCTION MECHANISM, PERCUTANEOUS APPROACH: ICD-10-PCS | Performed by: INTERNAL MEDICINE

## 2018-12-21 PROCEDURE — 37000009 ZZH ANESTHESIA TECHNICAL FEE, EACH ADDTL 15 MIN: Performed by: INTERNAL MEDICINE

## 2018-12-21 PROCEDURE — 80048 BASIC METABOLIC PNL TOTAL CA: CPT

## 2018-12-21 PROCEDURE — 37000008 ZZH ANESTHESIA TECHNICAL FEE, 1ST 30 MIN: Performed by: INTERNAL MEDICINE

## 2018-12-21 PROCEDURE — 25000128 H RX IP 250 OP 636: Performed by: NURSE ANESTHETIST, CERTIFIED REGISTERED

## 2018-12-21 PROCEDURE — 93662 INTRACARDIAC ECG (ICE): CPT | Mod: 26 | Performed by: INTERNAL MEDICINE

## 2018-12-21 PROCEDURE — 25000125 ZZHC RX 250: Performed by: NURSE ANESTHETIST, CERTIFIED REGISTERED

## 2018-12-21 PROCEDURE — 25000566 ZZH SEVOFLURANE, EA 15 MIN: Performed by: INTERNAL MEDICINE

## 2018-12-21 RX ORDER — MORPHINE SULFATE 2 MG/ML
1-2 INJECTION, SOLUTION INTRAMUSCULAR; INTRAVENOUS EVERY 5 MIN PRN
Status: DISCONTINUED | OUTPATIENT
Start: 2018-12-21 | End: 2018-12-21 | Stop reason: HOSPADM

## 2018-12-21 RX ORDER — IOPAMIDOL 755 MG/ML
150 INJECTION, SOLUTION INTRAVASCULAR ONCE
Status: COMPLETED | OUTPATIENT
Start: 2018-12-21 | End: 2018-12-21

## 2018-12-21 RX ORDER — SODIUM CHLORIDE 450 MG/100ML
INJECTION, SOLUTION INTRAVENOUS CONTINUOUS
Status: DISCONTINUED | OUTPATIENT
Start: 2018-12-21 | End: 2018-12-21 | Stop reason: HOSPADM

## 2018-12-21 RX ORDER — PROTAMINE SULFATE 10 MG/ML
5-40 INJECTION, SOLUTION INTRAVENOUS EVERY 10 MIN PRN
Status: DISCONTINUED | OUTPATIENT
Start: 2018-12-21 | End: 2018-12-21 | Stop reason: HOSPADM

## 2018-12-21 RX ORDER — FENTANYL CITRATE 50 UG/ML
25-100 INJECTION, SOLUTION INTRAMUSCULAR; INTRAVENOUS
Status: DISCONTINUED | OUTPATIENT
Start: 2018-12-21 | End: 2018-12-21

## 2018-12-21 RX ORDER — LORAZEPAM 2 MG/ML
.5-2 INJECTION INTRAMUSCULAR EVERY 10 MIN PRN
Status: DISCONTINUED | OUTPATIENT
Start: 2018-12-21 | End: 2018-12-21 | Stop reason: HOSPADM

## 2018-12-21 RX ORDER — BUPIVACAINE HYDROCHLORIDE 2.5 MG/ML
10-30 INJECTION, SOLUTION EPIDURAL; INFILTRATION; INTRACAUDAL
Status: DISCONTINUED | OUTPATIENT
Start: 2018-12-21 | End: 2018-12-21 | Stop reason: HOSPADM

## 2018-12-21 RX ORDER — ATROPINE SULFATE 0.1 MG/ML
.5-1 INJECTION INTRAVENOUS
Status: DISCONTINUED | OUTPATIENT
Start: 2018-12-21 | End: 2018-12-21 | Stop reason: HOSPADM

## 2018-12-21 RX ORDER — NALOXONE HYDROCHLORIDE 0.4 MG/ML
0.4 INJECTION, SOLUTION INTRAMUSCULAR; INTRAVENOUS; SUBCUTANEOUS EVERY 5 MIN PRN
Status: DISCONTINUED | OUTPATIENT
Start: 2018-12-21 | End: 2018-12-21 | Stop reason: HOSPADM

## 2018-12-21 RX ORDER — LIDOCAINE HYDROCHLORIDE 10 MG/ML
10-30 INJECTION, SOLUTION EPIDURAL; INFILTRATION; INTRACAUDAL; PERINEURAL
Status: COMPLETED | OUTPATIENT
Start: 2018-12-21 | End: 2018-12-21

## 2018-12-21 RX ORDER — LIDOCAINE 40 MG/G
CREAM TOPICAL
Status: DISCONTINUED | OUTPATIENT
Start: 2018-12-21 | End: 2018-12-21 | Stop reason: HOSPADM

## 2018-12-21 RX ORDER — ADENOSINE 3 MG/ML
6-12 INJECTION, SOLUTION INTRAVENOUS EVERY 5 MIN PRN
Status: DISCONTINUED | OUTPATIENT
Start: 2018-12-21 | End: 2018-12-21 | Stop reason: HOSPADM

## 2018-12-21 RX ORDER — KETOROLAC TROMETHAMINE 15 MG/ML
15-30 INJECTION, SOLUTION INTRAMUSCULAR; INTRAVENOUS
Status: DISCONTINUED | OUTPATIENT
Start: 2018-12-21 | End: 2018-12-21 | Stop reason: HOSPADM

## 2018-12-21 RX ORDER — LIDOCAINE HYDROCHLORIDE AND EPINEPHRINE 10; 10 MG/ML; UG/ML
10-30 INJECTION, SOLUTION INFILTRATION; PERINEURAL
Status: DISCONTINUED | OUTPATIENT
Start: 2018-12-21 | End: 2018-12-21 | Stop reason: HOSPADM

## 2018-12-21 RX ORDER — SPIRONOLACTONE 25 MG/1
25 TABLET ORAL DAILY
Status: DISCONTINUED | OUTPATIENT
Start: 2018-12-21 | End: 2018-12-23

## 2018-12-21 RX ORDER — HEPARIN SODIUM 1000 [USP'U]/ML
1000-10000 INJECTION, SOLUTION INTRAVENOUS; SUBCUTANEOUS EVERY 5 MIN PRN
Status: DISCONTINUED | OUTPATIENT
Start: 2018-12-21 | End: 2018-12-21 | Stop reason: HOSPADM

## 2018-12-21 RX ORDER — PROPOFOL 10 MG/ML
INJECTION, EMULSION INTRAVENOUS CONTINUOUS PRN
Status: DISCONTINUED | OUTPATIENT
Start: 2018-12-21 | End: 2018-12-21

## 2018-12-21 RX ORDER — ONDANSETRON 2 MG/ML
4 INJECTION INTRAMUSCULAR; INTRAVENOUS EVERY 30 MIN PRN
Status: DISCONTINUED | OUTPATIENT
Start: 2018-12-21 | End: 2018-12-21

## 2018-12-21 RX ORDER — ONDANSETRON 2 MG/ML
INJECTION INTRAMUSCULAR; INTRAVENOUS PRN
Status: DISCONTINUED | OUTPATIENT
Start: 2018-12-21 | End: 2018-12-21

## 2018-12-21 RX ORDER — ONDANSETRON 4 MG/1
4 TABLET, ORALLY DISINTEGRATING ORAL EVERY 30 MIN PRN
Status: DISCONTINUED | OUTPATIENT
Start: 2018-12-21 | End: 2018-12-21

## 2018-12-21 RX ORDER — NALOXONE HYDROCHLORIDE 0.4 MG/ML
.1-.4 INJECTION, SOLUTION INTRAMUSCULAR; INTRAVENOUS; SUBCUTANEOUS
Status: ACTIVE | OUTPATIENT
Start: 2018-12-21 | End: 2018-12-22

## 2018-12-21 RX ORDER — FLUMAZENIL 0.1 MG/ML
0.2 INJECTION, SOLUTION INTRAVENOUS
Status: DISCONTINUED | OUTPATIENT
Start: 2018-12-21 | End: 2018-12-21 | Stop reason: HOSPADM

## 2018-12-21 RX ORDER — FENTANYL CITRATE 50 UG/ML
25-50 INJECTION, SOLUTION INTRAMUSCULAR; INTRAVENOUS
Status: DISCONTINUED | OUTPATIENT
Start: 2018-12-21 | End: 2018-12-21 | Stop reason: HOSPADM

## 2018-12-21 RX ORDER — DOBUTAMINE HYDROCHLORIDE 200 MG/100ML
5-40 INJECTION INTRAVENOUS CONTINUOUS PRN
Status: DISCONTINUED | OUTPATIENT
Start: 2018-12-21 | End: 2018-12-21 | Stop reason: HOSPADM

## 2018-12-21 RX ORDER — SODIUM CHLORIDE, SODIUM LACTATE, POTASSIUM CHLORIDE, CALCIUM CHLORIDE 600; 310; 30; 20 MG/100ML; MG/100ML; MG/100ML; MG/100ML
INJECTION, SOLUTION INTRAVENOUS CONTINUOUS
Status: DISCONTINUED | OUTPATIENT
Start: 2018-12-21 | End: 2018-12-21

## 2018-12-21 RX ORDER — OXYCODONE AND ACETAMINOPHEN 5; 325 MG/1; MG/1
1 TABLET ORAL EVERY 4 HOURS PRN
Status: DISCONTINUED | OUTPATIENT
Start: 2018-12-21 | End: 2018-12-23

## 2018-12-21 RX ORDER — PANTOPRAZOLE SODIUM 40 MG/1
40 TABLET, DELAYED RELEASE ORAL DAILY
Status: DISCONTINUED | OUTPATIENT
Start: 2018-12-22 | End: 2018-12-27 | Stop reason: HOSPADM

## 2018-12-21 RX ORDER — DIPHENHYDRAMINE HYDROCHLORIDE 50 MG/ML
25-50 INJECTION INTRAMUSCULAR; INTRAVENOUS
Status: DISCONTINUED | OUTPATIENT
Start: 2018-12-21 | End: 2018-12-21 | Stop reason: HOSPADM

## 2018-12-21 RX ORDER — LABETALOL HYDROCHLORIDE 5 MG/ML
10 INJECTION, SOLUTION INTRAVENOUS
Status: DISCONTINUED | OUTPATIENT
Start: 2018-12-21 | End: 2018-12-21

## 2018-12-21 RX ORDER — FUROSEMIDE 10 MG/ML
20-100 INJECTION INTRAMUSCULAR; INTRAVENOUS
Status: COMPLETED | OUTPATIENT
Start: 2018-12-21 | End: 2018-12-21

## 2018-12-21 RX ORDER — IBUTILIDE FUMARATE 1 MG/10ML
1 INJECTION, SOLUTION INTRAVENOUS
Status: DISCONTINUED | OUTPATIENT
Start: 2018-12-21 | End: 2018-12-21 | Stop reason: HOSPADM

## 2018-12-21 RX ORDER — AMIODARONE HYDROCHLORIDE 200 MG/1
200 TABLET ORAL DAILY
Status: DISCONTINUED | OUTPATIENT
Start: 2018-12-21 | End: 2018-12-22

## 2018-12-21 RX ORDER — ONDANSETRON 2 MG/ML
4 INJECTION INTRAMUSCULAR; INTRAVENOUS EVERY 4 HOURS PRN
Status: DISCONTINUED | OUTPATIENT
Start: 2018-12-21 | End: 2018-12-21 | Stop reason: HOSPADM

## 2018-12-21 RX ORDER — LIDOCAINE 40 MG/G
CREAM TOPICAL
Status: DISCONTINUED | OUTPATIENT
Start: 2018-12-21 | End: 2018-12-26

## 2018-12-21 RX ORDER — FENTANYL CITRATE 50 UG/ML
INJECTION, SOLUTION INTRAMUSCULAR; INTRAVENOUS PRN
Status: DISCONTINUED | OUTPATIENT
Start: 2018-12-21 | End: 2018-12-21

## 2018-12-21 RX ORDER — PROPOFOL 10 MG/ML
INJECTION, EMULSION INTRAVENOUS PRN
Status: DISCONTINUED | OUTPATIENT
Start: 2018-12-21 | End: 2018-12-21

## 2018-12-21 RX ORDER — LIDOCAINE HYDROCHLORIDE 10 MG/ML
10-30 INJECTION, SOLUTION EPIDURAL; INFILTRATION; INTRACAUDAL; PERINEURAL
Status: DISCONTINUED | OUTPATIENT
Start: 2018-12-21 | End: 2018-12-21 | Stop reason: HOSPADM

## 2018-12-21 RX ORDER — NALOXONE HYDROCHLORIDE 0.4 MG/ML
.1-.4 INJECTION, SOLUTION INTRAMUSCULAR; INTRAVENOUS; SUBCUTANEOUS
Status: DISCONTINUED | OUTPATIENT
Start: 2018-12-21 | End: 2018-12-21

## 2018-12-21 RX ORDER — FUROSEMIDE 20 MG
20 TABLET ORAL DAILY
Status: DISCONTINUED | OUTPATIENT
Start: 2018-12-22 | End: 2018-12-23

## 2018-12-21 RX ORDER — KETOROLAC TROMETHAMINE 30 MG/ML
30 INJECTION, SOLUTION INTRAMUSCULAR; INTRAVENOUS
Status: DISCONTINUED | OUTPATIENT
Start: 2018-12-21 | End: 2018-12-21

## 2018-12-21 RX ORDER — LIDOCAINE HYDROCHLORIDE 20 MG/ML
INJECTION, SOLUTION INFILTRATION; PERINEURAL PRN
Status: DISCONTINUED | OUTPATIENT
Start: 2018-12-21 | End: 2018-12-21

## 2018-12-21 RX ORDER — PROTAMINE SULFATE 10 MG/ML
1-5 INJECTION, SOLUTION INTRAVENOUS
Status: DISCONTINUED | OUTPATIENT
Start: 2018-12-21 | End: 2018-12-21 | Stop reason: HOSPADM

## 2018-12-21 RX ADMIN — PROPOFOL 200 MG: 10 INJECTION, EMULSION INTRAVENOUS at 13:28

## 2018-12-21 RX ADMIN — HEPARIN SODIUM 8000 UNITS: 1000 INJECTION, SOLUTION INTRAVENOUS; SUBCUTANEOUS at 14:14

## 2018-12-21 RX ADMIN — PROTAMINE SULFATE 50 MG: 10 INJECTION, SOLUTION INTRAVENOUS at 15:20

## 2018-12-21 RX ADMIN — PHENYLEPHRINE HYDROCHLORIDE 100 MCG: 10 INJECTION, SOLUTION INTRAMUSCULAR; INTRAVENOUS; SUBCUTANEOUS at 13:45

## 2018-12-21 RX ADMIN — LIDOCAINE HYDROCHLORIDE 20 ML: 10 INJECTION, SOLUTION EPIDURAL; INFILTRATION; INTRACAUDAL; PERINEURAL at 13:45

## 2018-12-21 RX ADMIN — ONDANSETRON 4 MG: 2 INJECTION INTRAMUSCULAR; INTRAVENOUS at 15:19

## 2018-12-21 RX ADMIN — PHENYLEPHRINE HYDROCHLORIDE 100 MCG: 10 INJECTION, SOLUTION INTRAMUSCULAR; INTRAVENOUS; SUBCUTANEOUS at 14:00

## 2018-12-21 RX ADMIN — WARFARIN SODIUM 1.25 MG: 2.5 TABLET ORAL at 19:13

## 2018-12-21 RX ADMIN — SPIRONOLACTONE 25 MG: 25 TABLET ORAL at 19:13

## 2018-12-21 RX ADMIN — PHENYLEPHRINE HYDROCHLORIDE 0.5 MCG/KG/MIN: 10 INJECTION, SOLUTION INTRAMUSCULAR; INTRAVENOUS; SUBCUTANEOUS at 13:40

## 2018-12-21 RX ADMIN — AMIODARONE HYDROCHLORIDE 200 MG: 200 TABLET ORAL at 19:13

## 2018-12-21 RX ADMIN — FENTANYL CITRATE 50 MCG: 50 INJECTION, SOLUTION INTRAMUSCULAR; INTRAVENOUS at 13:35

## 2018-12-21 RX ADMIN — FENTANYL CITRATE 50 MCG: 50 INJECTION, SOLUTION INTRAMUSCULAR; INTRAVENOUS at 13:28

## 2018-12-21 RX ADMIN — HEPARIN SODIUM 8000 UNITS: 1000 INJECTION, SOLUTION INTRAVENOUS; SUBCUTANEOUS at 13:59

## 2018-12-21 RX ADMIN — PHENYLEPHRINE HYDROCHLORIDE 200 MCG: 10 INJECTION, SOLUTION INTRAMUSCULAR; INTRAVENOUS; SUBCUTANEOUS at 15:45

## 2018-12-21 RX ADMIN — PROTAMINE SULFATE 50 MG: 10 INJECTION, SOLUTION INTRAVENOUS at 15:26

## 2018-12-21 RX ADMIN — SODIUM CHLORIDE: 4.5 INJECTION, SOLUTION INTRAVENOUS at 13:22

## 2018-12-21 RX ADMIN — LIDOCAINE HYDROCHLORIDE 60 MG: 20 INJECTION, SOLUTION INFILTRATION; PERINEURAL at 13:28

## 2018-12-21 RX ADMIN — PHENYLEPHRINE HYDROCHLORIDE 100 MCG: 10 INJECTION, SOLUTION INTRAMUSCULAR; INTRAVENOUS; SUBCUTANEOUS at 13:55

## 2018-12-21 RX ADMIN — HEPARIN SODIUM 200 UNITS: 200 INJECTION, SOLUTION INTRAVENOUS at 14:00

## 2018-12-21 RX ADMIN — SUCCINYLCHOLINE CHLORIDE 150 MG: 20 INJECTION, SOLUTION INTRAMUSCULAR; INTRAVENOUS; PARENTERAL at 13:28

## 2018-12-21 RX ADMIN — PROPOFOL 100 MCG/KG/MIN: 10 INJECTION, EMULSION INTRAVENOUS at 13:31

## 2018-12-21 RX ADMIN — IOPAMIDOL 100 ML: 755 INJECTION, SOLUTION INTRAVENOUS at 08:59

## 2018-12-21 RX ADMIN — PHENYLEPHRINE HYDROCHLORIDE 100 MCG: 10 INJECTION, SOLUTION INTRAMUSCULAR; INTRAVENOUS; SUBCUTANEOUS at 14:05

## 2018-12-21 RX ADMIN — PROPOFOL 100 MG: 10 INJECTION, EMULSION INTRAVENOUS at 13:40

## 2018-12-21 RX ADMIN — PHENYLEPHRINE HYDROCHLORIDE 100 MCG: 10 INJECTION, SOLUTION INTRAMUSCULAR; INTRAVENOUS; SUBCUTANEOUS at 13:50

## 2018-12-21 RX ADMIN — FUROSEMIDE 20 MG: 10 INJECTION, SOLUTION INTRAVENOUS at 15:23

## 2018-12-21 ASSESSMENT — MIFFLIN-ST. JEOR
SCORE: 1859.77
SCORE: 1900.14

## 2018-12-21 ASSESSMENT — ENCOUNTER SYMPTOMS: DYSRHYTHMIAS: 1

## 2018-12-21 NOTE — ANESTHESIA POSTPROCEDURE EVALUATION
Patient: Maurice Jon    Procedure(s):  EP Ablation Focal AFIB    Diagnosis:persistent atrial fibrillation  Diagnosis Additional Information: No value filed.    Anesthesia Type:  General, RSI, ETT    Note:  Anesthesia Post Evaluation    Patient location during evaluation: PACU  Patient participation: Able to fully participate in evaluation  Level of consciousness: awake  Pain management: adequate  Airway patency: patent  Cardiovascular status: acceptable  Respiratory status: acceptable  Hydration status: acceptable  PONV: controlled     Anesthetic complications: None          Last vitals:  Vitals:    12/21/18 1630 12/21/18 1640 12/21/18 1650   BP: 112/62 112/64 93/57   Pulse: 80 79 81   Resp: 20 14 24   Temp: 37.2  C (98.9  F)     SpO2: 95% 96% 96%         Electronically Signed By: Sergio Duckworth MD  December 21, 2018  5:04 PM

## 2018-12-21 NOTE — ANESTHESIA CARE TRANSFER NOTE
Patient: Maurice Jon    Procedure(s):  EP Ablation Focal AFIB    Diagnosis: persistent atrial fibrillation  Diagnosis Additional Information: No value filed.    Anesthesia Type:   General, RSI, ETT     Note:  Airway :Face Mask  Patient transferred to:PACU  Comments: Neuromuscular blockade not used after succinylcholine for intubation, spontaneous return of TOF 4/4 with sustained tetany, spontaneous respirations, adequate tidal volumes, followed commands to voice, oropharynx suctioned with soft flexible catheter, extubated atraumatically, extubated with suction, airway patent after extubation.  Oxygen via facemask at 8 liters per minute to PACU. Oxygen tubing connected to wall O2 in PACU, SpO2, NiBP, and EKG monitors and alarms on and functioning, Nai Hugger warmer connected to patient gown, report on patient's clinical status given to PACU RN, RN questions answered. Handoff Report: Identifed the Patient, Identified the Reponsible Provider, Reviewed the pertinent medical history, Discussed the surgical course, Reviewed Intra-OP anesthesia mangement and issues during anesthesia, Set expectations for post-procedure period and Allowed opportunity for questions and acknowledgement of understanding      Vitals: (Last set prior to Anesthesia Care Transfer)    CRNA VITALS  12/21/2018 1544 - 12/21/2018 1616      12/21/2018             NIBP:  103/65    Pulse:  85    NIBP Mean:  75    SpO2:  90 %    Resp Rate (set):  10                Electronically Signed By: JANETTE Goncalves CRNA  December 21, 2018  4:16 PM

## 2018-12-21 NOTE — Clinical Note
Potential access sites were evaluated for patency using ultrasound.   The } selected.   Access was obtained under ultrasound guidance with direct visualization of needle entry.

## 2018-12-21 NOTE — Clinical Note
Potential access sites were evaluated for patency using ultrasound.   The left femoral vein was selected.   Access was obtained under ultrasound guidance with direct visualization of needle entry.

## 2018-12-21 NOTE — TELEPHONE ENCOUNTER
Patient returned call.  Spoke to patient regarding procedure today.  Patient reports he has been NPO since midnight.  Held diuretic medication this am and sotalol for for the past 5 days.  Answered several questions regarding procedure.  Patient scheduled for CT heart today at 9am and procedure at 1pm today.  RIKI Cope

## 2018-12-21 NOTE — ANESTHESIA PREPROCEDURE EVALUATION
Anesthesia Pre-Procedure Evaluation    Patient: Maurice Jon   MRN: [id number] : 1960          Preoperative Diagnosis: persistent atrial fibrillation    Procedure(s):  EP Ablation Focal AFIB    Past Medical History:   Diagnosis Date     Acute pulmonary edema (H)      Atrial fibrillation (H)      Atrial fibrillation with rapid ventricular response (H) 2013     Calculus of ureter ,     history of     Cirrhosis of liver without mention of alcohol 2005    Cirrhosis, idiopathic     Edema 2013     Esophageal varices with bleeding(456.0)      Gallstones      Genital herpes, unspecified 3/20/1999    resolving herpes progenitalis     GERD (gastroesophageal reflux disease)      Hematuria      Hypertension      Hypochondriasis     problems in past     Obesity 2010     BRUCE (obstructive sleep apnea) 3/17/2009    Mild AHI 8.4  RDI 31     Portal hypertension (H) 2010     Unspecified thrombocytopenia 2005    Thrombocytopenia associated with cirrhosis and portal hypertension     Past Surgical History:   Procedure Laterality Date     ANESTHESIA CARDIOVERSION N/A 2015    Procedure: ANESTHESIA CARDIOVERSION;  Surgeon: Generic Anesthesia Provider;  Location: [facility]     COLONOSCOPY N/A 2017    Procedure: COLONOSCOPY;  Colonoscopy Called will arrive appx 12:30 Per phone call;  Surgeon: Vincent Whittington MD;  Location:  GI     ESOPHAGOSCOPY, GASTROSCOPY, DUODENOSCOPY (EGD), COMBINED  2011    Procedure:COMBINED ESOPHAGOSCOPY, GASTROSCOPY, DUODENOSCOPY (EGD); Surgeon:LAURA LAMAS; Location:[facility]     ESOPHAGOSCOPY, GASTROSCOPY, DUODENOSCOPY (EGD), COMBINED  9/10/2012    Procedure: COMBINED ESOPHAGOSCOPY, GASTROSCOPY, DUODENOSCOPY (EGD);;  Surgeon: Hi Roque MD;  Location:  GI     ESOPHAGOSCOPY, GASTROSCOPY, DUODENOSCOPY (EGD), COMBINED  2013    Procedure: COMBINED ESOPHAGOSCOPY, GASTROSCOPY, DUODENOSCOPY (EGD);;  Surgeon: Hi Roque MD;  Location:   GI     ESOPHAGOSCOPY, GASTROSCOPY, DUODENOSCOPY (EGD), COMBINED N/A 9/15/2014    Procedure: COMBINED ESOPHAGOSCOPY, GASTROSCOPY, DUODENOSCOPY (EGD);  Surgeon: Hi Roque MD;  Location:  GI     ESOPHAGOSCOPY, GASTROSCOPY, DUODENOSCOPY (EGD), COMBINED N/A 10/30/2015    Procedure: COMBINED ESOPHAGOSCOPY, GASTROSCOPY, DUODENOSCOPY (EGD);  Surgeon: Feliberto Fox MD;  Location:  GI     ESOPHAGOSCOPY, GASTROSCOPY, DUODENOSCOPY (EGD), COMBINED N/A 10/10/2016    Procedure: COMBINED ESOPHAGOSCOPY, GASTROSCOPY, DUODENOSCOPY (EGD);  Surgeon: Jose Nesbitt MD;  Location:  GI     HERNIA REPAIR       IRRIGATION AND DEBRIDEMENT ABSCESS SCROTUM, COMBINED  10/4/2012    Procedure: COMBINED IRRIGATION AND DEBRIDEMENT ABSCESS SCROTUM;  Irrigation and Debridement of Groin Abscess;  Surgeon: Benny Shoemaker MD;  Location: [facility]     SOFT TISSUE SURGERY       SURGICAL HISTORY OF -   1993    rt elbow     SURGICAL HISTORY OF -   1/15/98    repair of ventral and umbilical hernia     SURGICAL HISTORY OF -   2001    endoscopy       Anesthesia Evaluation     . Pt has had prior anesthetic.     No history of anesthetic complications          ROS/MED HX    ENT/Pulmonary:     (+)sleep apnea (mild), uses CPAP , . .    Neurologic:       Cardiovascular:     (+) hypertension-range: controlled, ---. : . . . :. dysrhythmias a-fib, . Previous cardiac testing Echodate:2017results:Interpretation Summary     1. Normal left ventricular size and systolic function. Visual ejection fraction 55-60%.  2. No regional wall motion abnormalities.  3. Normal right ventricular size and systolic function.  4. Moderate biatrial enlargement.  5. Trace mitral and tricuspid regurgitation.date: results: date: results: date: results:          METS/Exercise Tolerance:     Hematologic:     (+) Other Hematologic Disorder-thrombocytopenia      Musculoskeletal:         GI/Hepatic:     (+) GERD Asymptomatic on medication, liver disease (cirrhosis),  "Other GI/Hepatic portal HTN and ho portal vein thrombosis.  No ascites.  Does have varices      Renal/Genitourinary:         Endo:     (+) Obesity (BMI 40), .      Psychiatric:         Infectious Disease:         Malignancy:         Other:                          Physical Exam  Normal systems: cardiovascular, pulmonary and dental    Airway   Mallampati: II  TM distance: >3 FB  Neck ROM: full    Dental     Cardiovascular       Pulmonary             Lab Results   Component Value Date    WBC 4.0 12/21/2018    HGB 13.0 (L) 12/21/2018    HCT 37.5 (L) 12/21/2018    PLT 63 (L) 12/21/2018    CRP 8.9 (H) 07/13/2015     12/21/2018    POTASSIUM 3.7 12/21/2018    CHLORIDE 106 12/21/2018    CO2 24 12/21/2018    BUN 14 12/21/2018    CR 0.73 12/21/2018    GLC 87 12/21/2018    HALEY 8.3 (L) 12/21/2018    MAG 1.9 10/31/2018    ALBUMIN 3.2 (L) 09/20/2018    PROTTOTAL 6.0 (L) 09/20/2018    ALT 44 09/20/2018    AST 46 (H) 09/20/2018    GGT 44 08/16/2005    ALKPHOS 74 09/20/2018    BILITOTAL 1.9 (H) 09/20/2018    LIPASE 119 07/13/2015    HIMANSHU 69 (H) 07/13/2015    PTT 42 (H) 07/14/2015    INR 2.35 (H) 12/21/2018    FIBR 333 12/12/2013    TSH 2.57 07/28/2015    T4 4.3 02/27/2013       Preop Vitals  BP Readings from Last 3 Encounters:   12/21/18 123/60   12/21/18 114/77   12/07/18 115/60    Pulse Readings from Last 3 Encounters:   12/21/18 83   12/21/18 88   12/07/18 92      Resp Readings from Last 3 Encounters:   12/21/18 18   12/07/18 14   11/02/18 18    SpO2 Readings from Last 3 Encounters:   12/21/18 96%   12/07/18 97%   11/02/18 98%      Temp Readings from Last 1 Encounters:   12/21/18 36.8  C (98.2  F) (Oral)    Ht Readings from Last 1 Encounters:   12/21/18 1.664 m (5' 5.5\")      Wt Readings from Last 1 Encounters:   12/21/18 110.5 kg (243 lb 9.6 oz)    Estimated body mass index is 39.92 kg/m  as calculated from the following:    Height as of this encounter: 1.664 m (5' 5.5\").    Weight as of this encounter: 110.5 kg (243 lb " 9.6 oz).       Anesthesia Plan      History & Physical Review  History and physical reviewed and following examination; no interval change.    ASA Status:  4 .    NPO Status:  > 8 hours    Plan for General, RSI and ETT with Intravenous induction. Maintenance will be Balanced.    PONV prophylaxis:  Ondansetron (or other 5HT-3)  Additional equipment: Videolaryngoscope No midazolam.  Minimize opioids and other sedatives given cirrhosis.  1/2 MAC propofol, 1/2 MAC sevoflurane  RSI with glidescope.  Ramp or many pillows/blankets to elevate head.  Patient has a large abdomen.      Postoperative Care  Postoperative pain management:  IV analgesics.      Consents  Anesthetic plan, risks, benefits and alternatives discussed with:  Patient..                 Sergio Duckworth MD

## 2018-12-21 NOTE — PROGRESS NOTES
AF ablation  Tremendous amount of scarring noted throughout LA. Isolation of Pvs and LA posterior wall and empiric ablation of CTI. AF cardioverted. AF likely to recur due to amount of scarring noted especially during 3 months blanking period. Trial of amiodarone 200 mg daily. Pt does have stable liver cirrhosis. Will monitor transanimases closely.

## 2018-12-21 NOTE — Clinical Note
Arrhythmia Type: atrial fibrillation.   Method of Cardioversion: synchronous.   The arrhythmia was terminated.   Energy shock delivered: 150 joules.

## 2018-12-21 NOTE — Clinical Note
Potential access sites were evaluated for patency using ultrasound.   The right femoral vein was selected.   Access was obtained under ultrasound guidance with direct visualization of needle entry.

## 2018-12-22 LAB
ALBUMIN SERPL-MCNC: 2.9 G/DL (ref 3.4–5)
ALP SERPL-CCNC: 73 U/L (ref 40–150)
ALT SERPL W P-5'-P-CCNC: 45 U/L (ref 0–70)
AST SERPL W P-5'-P-CCNC: 70 U/L (ref 0–45)
BILIRUB DIRECT SERPL-MCNC: 0.5 MG/DL (ref 0–0.2)
BILIRUB SERPL-MCNC: 1.3 MG/DL (ref 0.2–1.3)
INR PPP: 3.1 (ref 0.86–1.14)
PROT SERPL-MCNC: 5.7 G/DL (ref 6.8–8.8)

## 2018-12-22 PROCEDURE — 40000936 ZZH STATISTIC OUTPATIENT (NON-OBS) NIGHT

## 2018-12-22 PROCEDURE — 93005 ELECTROCARDIOGRAM TRACING: CPT

## 2018-12-22 PROCEDURE — 25000132 ZZH RX MED GY IP 250 OP 250 PS 637

## 2018-12-22 PROCEDURE — 25000132 ZZH RX MED GY IP 250 OP 250 PS 637: Performed by: INTERNAL MEDICINE

## 2018-12-22 PROCEDURE — 99239 HOSP IP/OBS DSCHRG MGMT >30: CPT | Performed by: INTERNAL MEDICINE

## 2018-12-22 PROCEDURE — 40000934 ZZH STATISTIC OUTPATIENT (NON-OBS) DAY

## 2018-12-22 PROCEDURE — 36415 COLL VENOUS BLD VENIPUNCTURE: CPT | Performed by: INTERNAL MEDICINE

## 2018-12-22 PROCEDURE — 40000935 ZZH STATISTIC OUTPATIENT (NON-OBS) EVE

## 2018-12-22 PROCEDURE — 80076 HEPATIC FUNCTION PANEL: CPT | Performed by: INTERNAL MEDICINE

## 2018-12-22 PROCEDURE — 85610 PROTHROMBIN TIME: CPT | Performed by: INTERNAL MEDICINE

## 2018-12-22 RX ORDER — COLCHICINE 0.6 MG/1
1.2 TABLET ORAL 2 TIMES DAILY
Status: COMPLETED | OUTPATIENT
Start: 2018-12-22 | End: 2018-12-22

## 2018-12-22 RX ORDER — METOPROLOL SUCCINATE 100 MG/1
100 TABLET, EXTENDED RELEASE ORAL AT BEDTIME
Status: DISCONTINUED | OUTPATIENT
Start: 2018-12-22 | End: 2018-12-27 | Stop reason: HOSPADM

## 2018-12-22 RX ORDER — COLCHICINE 0.6 MG/1
1.2 TABLET ORAL 2 TIMES DAILY
Status: DISCONTINUED | OUTPATIENT
Start: 2018-12-22 | End: 2018-12-22

## 2018-12-22 RX ORDER — NITROGLYCERIN 0.4 MG/1
0.4 TABLET SUBLINGUAL EVERY 5 MIN PRN
Status: DISCONTINUED | OUTPATIENT
Start: 2018-12-22 | End: 2018-12-27 | Stop reason: HOSPADM

## 2018-12-22 RX ORDER — PANTOPRAZOLE SODIUM 40 MG/1
40 TABLET, DELAYED RELEASE ORAL DAILY
Qty: 30 TABLET | Refills: 0 | Status: ON HOLD
Start: 2018-12-23 | End: 2019-02-06

## 2018-12-22 RX ORDER — NITROGLYCERIN 0.4 MG/1
TABLET SUBLINGUAL
Status: COMPLETED
Start: 2018-12-22 | End: 2018-12-22

## 2018-12-22 RX ORDER — WARFARIN SODIUM 1 MG/1
1 TABLET ORAL
Status: COMPLETED | OUTPATIENT
Start: 2018-12-22 | End: 2018-12-22

## 2018-12-22 RX ORDER — WARFARIN SODIUM 2.5 MG/1
2.5 TABLET ORAL
Status: DISCONTINUED | OUTPATIENT
Start: 2018-12-22 | End: 2018-12-22 | Stop reason: DRUGHIGH

## 2018-12-22 RX ORDER — METOPROLOL SUCCINATE 100 MG/1
100 TABLET, EXTENDED RELEASE ORAL DAILY
Qty: 30 TABLET | Refills: 0
Start: 2018-12-22 | End: 2018-12-28

## 2018-12-22 RX ORDER — COLCHICINE 0.6 MG/1
0.6 TABLET ORAL 2 TIMES DAILY
Qty: 14 TABLET | Refills: 0 | Status: ON HOLD | OUTPATIENT
Start: 2018-12-22 | End: 2019-02-06

## 2018-12-22 RX ADMIN — SPIRONOLACTONE 25 MG: 25 TABLET ORAL at 07:40

## 2018-12-22 RX ADMIN — NITROGLYCERIN 0.4 MG: 0.4 TABLET SUBLINGUAL at 12:58

## 2018-12-22 RX ADMIN — FUROSEMIDE 20 MG: 20 TABLET ORAL at 07:40

## 2018-12-22 RX ADMIN — METOPROLOL SUCCINATE 100 MG: 100 TABLET, EXTENDED RELEASE ORAL at 22:47

## 2018-12-22 RX ADMIN — WARFARIN SODIUM 1 MG: 1 TABLET ORAL at 23:38

## 2018-12-22 RX ADMIN — PANTOPRAZOLE SODIUM 40 MG: 40 TABLET, DELAYED RELEASE ORAL at 07:40

## 2018-12-22 RX ADMIN — AMIODARONE HYDROCHLORIDE 200 MG: 200 TABLET ORAL at 07:40

## 2018-12-22 RX ADMIN — OXYCODONE HYDROCHLORIDE AND ACETAMINOPHEN 1 TABLET: 5; 325 TABLET ORAL at 11:37

## 2018-12-22 RX ADMIN — COLCHICINE 1.2 MG: 0.6 TABLET, FILM COATED ORAL at 19:32

## 2018-12-22 RX ADMIN — COLCHICINE 1.2 MG: 0.6 TABLET, FILM COATED ORAL at 14:21

## 2018-12-22 RX ADMIN — NITROGLYCERIN 0.4 MG: 0.4 TABLET SUBLINGUAL at 13:18

## 2018-12-22 ASSESSMENT — MIFFLIN-ST. JEOR: SCORE: 1876.56

## 2018-12-22 NOTE — PROVIDER NOTIFICATION
MD Notification    Notified Person: MD    Notified Person Name: Cristina    Notification Date/Time: 12/22/18 1230    Notification Interaction: text page    Purpose of Notification: persistent chest pain unrelieved w/ percocet, pt feels SOB. O2 sats upper 90s on RA. stat EKG ordered    Orders Received: awaiting callback

## 2018-12-22 NOTE — PROGRESS NOTES
Cp not responding to ntg and worsened when lying flat better upright c/w acute pericarditis post af ablation. Observe for another night for treatment of pericarditis. In light of cirrhosis NSAIDS not indicated. Will load him with Colchicine 1.2 mg bid for 1 day then 0.6 mg bid next day.

## 2018-12-22 NOTE — PROVIDER NOTIFICATION
MD Notification    Notified Person: MD    Notified Person Name: Haider MD    Notification Date/Time: 12/21    Notification Interaction: on call answering service    Purpose of Notification: pt tachycardic at times and update on left groin site that was bleeding earlier.    Orders Received: continue to monitor. No intervention at this time.    Comments: left groin site CDI and pt stable. SR/ST.

## 2018-12-22 NOTE — PROVIDER NOTIFICATION
MD Notification    Notified Person: MD    Notified Person Name: MD Evans    Notification Date/Time: 12/22/18 0852    Notification Interaction: paged    Purpose of Notification: clarification about note MD wrote in AM about restarting metoprolol 100 mg at bedtime.     Orders Received: MICHAEL NGUYỄN wants metoprolol ordered 100 mg PO at bedtime extended release. No parameters.    Comments:

## 2018-12-22 NOTE — PROGRESS NOTES
Doing well without arrhythmia overnight. AST up a bit. Stop amiodarone and go back to toprol 100 mg at bedtime (pt has it at home) and protonix 40 mg daily for 30 days. Discharge summary dictated ([id number])

## 2018-12-22 NOTE — PLAN OF CARE
A/O, VSS ex tachy at times, Ax1, denies pain, RA, SR, small amount of left groin site bleeding upon arrival to unit. Manual pressure applied to left groin site (dressing changed tonight CDI), right groin site CDI. CMS intact, denies nausea, 1+ BLE edema, voiding WDL, diet advanced.

## 2018-12-22 NOTE — PLAN OF CARE
AVSS; tele SR 80's, although HR occasionally to the 120's to 140's with activity, (self-limiting for about 6 seconds e.g, when voiding); bilateral groin site puncture sites with slight ecchymosis, but no swelling, drainage, or hematoma; dressings  clean, dry, and intact; bilateral LE's with good CMS; bilateral dorsalis pedis and posterior tibial pulses 2+ by palpation; pt voiding in adequate amounts; denied pain; up with standby assist; slight bilateral lower extremity edema.

## 2018-12-22 NOTE — PLAN OF CARE
1115: Reviewed discharge information and medications w/ pt.     1137: Percocet administered for chest soreness    1230: Pt's wife arrived at door 6, As pt was getting ready to be wheeled out door he c/o SOB and stated his chest pain had not resolved w/ percocent administration. Stat EKG ordered, NSR. Dr Evans evaluated (see note).     1530: Pt to stay overnight. Pain relieved from 9 to 6/10 after Colchecine administration, pt visibly more comfortable. Able to sit up straight w/o hunching, able to speak w/o sounding out of breath, smiling and joking w/ family. New IV put in. Tele back on. A&Ox4. Wife and daughter in room. Continue to monitor.

## 2018-12-22 NOTE — PROGRESS NOTES
Chest pain occurred while walking worse with inspiration. No ST changes noted on ECG. Suspect pericarditis or pleurisy from ablation. Not candidate for NSAIDS due to liver disease. Will start Colchicine 0.6 mg bid for 1 week. Normal stress test in the past and no signifcant CAD noted on CT angio done recently. Will observe for a few hours and reassess.

## 2018-12-23 ENCOUNTER — APPOINTMENT (OUTPATIENT)
Dept: CARDIOLOGY | Facility: CLINIC | Age: 58
DRG: 273 | End: 2018-12-23
Attending: INTERNAL MEDICINE
Payer: COMMERCIAL

## 2018-12-23 ENCOUNTER — APPOINTMENT (OUTPATIENT)
Dept: GENERAL RADIOLOGY | Facility: CLINIC | Age: 58
DRG: 273 | End: 2018-12-23
Attending: INTERNAL MEDICINE
Payer: COMMERCIAL

## 2018-12-23 PROBLEM — I50.9 CHF (CONGESTIVE HEART FAILURE) (H): Status: ACTIVE | Noted: 2018-12-23

## 2018-12-23 LAB
BASOPHILS # BLD AUTO: 0 10E9/L (ref 0–0.2)
BASOPHILS NFR BLD AUTO: 0.2 %
CRP SERPL-MCNC: 23.7 MG/L (ref 0–8)
DIFFERENTIAL METHOD BLD: ABNORMAL
EOSINOPHIL # BLD AUTO: 0.3 10E9/L (ref 0–0.7)
EOSINOPHIL NFR BLD AUTO: 4 %
ERYTHROCYTE [DISTWIDTH] IN BLOOD BY AUTOMATED COUNT: 13.9 % (ref 10–15)
HCT VFR BLD AUTO: 36.5 % (ref 40–53)
HGB BLD-MCNC: 12.6 G/DL (ref 13.3–17.7)
IMM GRANULOCYTES # BLD: 0 10E9/L (ref 0–0.4)
IMM GRANULOCYTES NFR BLD: 0.3 %
INR PPP: 3.03 (ref 0.86–1.14)
INTERPRETATION ECG - MUSE: NORMAL
LYMPHOCYTES # BLD AUTO: 0.6 10E9/L (ref 0.8–5.3)
LYMPHOCYTES NFR BLD AUTO: 9.6 %
MCH RBC QN AUTO: 34.9 PG (ref 26.5–33)
MCHC RBC AUTO-ENTMCNC: 34.5 G/DL (ref 31.5–36.5)
MCV RBC AUTO: 101 FL (ref 78–100)
MONOCYTES # BLD AUTO: 1.2 10E9/L (ref 0–1.3)
MONOCYTES NFR BLD AUTO: 17.6 %
NEUTROPHILS # BLD AUTO: 4.5 10E9/L (ref 1.6–8.3)
NEUTROPHILS NFR BLD AUTO: 68.3 %
NRBC # BLD AUTO: 0 10*3/UL
NRBC BLD AUTO-RTO: 0 /100
PLATELET # BLD AUTO: 65 10E9/L (ref 150–450)
PROCALCITONIN SERPL-MCNC: 0.11 NG/ML
RBC # BLD AUTO: 3.61 10E12/L (ref 4.4–5.9)
WBC # BLD AUTO: 6.6 10E9/L (ref 4–11)
WBC # BLD AUTO: 6.8 10E9/L (ref 4–11)

## 2018-12-23 PROCEDURE — 25500064 ZZH RX 255 OP 636

## 2018-12-23 PROCEDURE — 93306 TTE W/DOPPLER COMPLETE: CPT | Mod: 26 | Performed by: INTERNAL MEDICINE

## 2018-12-23 PROCEDURE — 99223 1ST HOSP IP/OBS HIGH 75: CPT | Performed by: HOSPITALIST

## 2018-12-23 PROCEDURE — 40000264 ECHOCARDIOGRAM COMPLETE

## 2018-12-23 PROCEDURE — 85048 AUTOMATED LEUKOCYTE COUNT: CPT | Performed by: INTERNAL MEDICINE

## 2018-12-23 PROCEDURE — 40000935 ZZH STATISTIC OUTPATIENT (NON-OBS) EVE

## 2018-12-23 PROCEDURE — 71046 X-RAY EXAM CHEST 2 VIEWS: CPT

## 2018-12-23 PROCEDURE — 99207 ZZC CONSULT E&M CHANGED TO INITIAL LEVEL: CPT | Performed by: HOSPITALIST

## 2018-12-23 PROCEDURE — 84145 PROCALCITONIN (PCT): CPT | Performed by: HOSPITALIST

## 2018-12-23 PROCEDURE — 25000132 ZZH RX MED GY IP 250 OP 250 PS 637: Performed by: INTERNAL MEDICINE

## 2018-12-23 PROCEDURE — 21400004 ZZH R&B CCU CSC INTERMEDIATE

## 2018-12-23 PROCEDURE — 36415 COLL VENOUS BLD VENIPUNCTURE: CPT | Performed by: INTERNAL MEDICINE

## 2018-12-23 PROCEDURE — 87040 BLOOD CULTURE FOR BACTERIA: CPT | Performed by: HOSPITALIST

## 2018-12-23 PROCEDURE — 93005 ELECTROCARDIOGRAM TRACING: CPT

## 2018-12-23 PROCEDURE — 85025 COMPLETE CBC W/AUTO DIFF WBC: CPT | Performed by: INTERNAL MEDICINE

## 2018-12-23 PROCEDURE — 40000934 ZZH STATISTIC OUTPATIENT (NON-OBS) DAY

## 2018-12-23 PROCEDURE — 25000128 H RX IP 250 OP 636: Performed by: HOSPITALIST

## 2018-12-23 PROCEDURE — 36415 COLL VENOUS BLD VENIPUNCTURE: CPT | Performed by: HOSPITALIST

## 2018-12-23 PROCEDURE — 85610 PROTHROMBIN TIME: CPT | Performed by: INTERNAL MEDICINE

## 2018-12-23 PROCEDURE — 99232 SBSQ HOSP IP/OBS MODERATE 35: CPT | Mod: 25 | Performed by: INTERNAL MEDICINE

## 2018-12-23 PROCEDURE — 25000132 ZZH RX MED GY IP 250 OP 250 PS 637: Performed by: HOSPITALIST

## 2018-12-23 PROCEDURE — 25000128 H RX IP 250 OP 636: Performed by: INTERNAL MEDICINE

## 2018-12-23 PROCEDURE — 99207 ZZC NON-BILLABLE SERV PER CHARTING: CPT | Performed by: INTERNAL MEDICINE

## 2018-12-23 PROCEDURE — 86140 C-REACTIVE PROTEIN: CPT | Performed by: HOSPITALIST

## 2018-12-23 RX ORDER — WARFARIN SODIUM 1 MG/1
1 TABLET ORAL
Status: COMPLETED | OUTPATIENT
Start: 2018-12-23 | End: 2018-12-23

## 2018-12-23 RX ORDER — ACETAMINOPHEN 325 MG/1
325 TABLET ORAL EVERY 4 HOURS PRN
Status: DISCONTINUED | OUTPATIENT
Start: 2018-12-23 | End: 2018-12-27 | Stop reason: HOSPADM

## 2018-12-23 RX ORDER — OXYCODONE HYDROCHLORIDE 5 MG/1
5 TABLET ORAL EVERY 4 HOURS PRN
Status: DISCONTINUED | OUTPATIENT
Start: 2018-12-23 | End: 2018-12-26 | Stop reason: ALTCHOICE

## 2018-12-23 RX ORDER — FUROSEMIDE 10 MG/ML
40 INJECTION INTRAMUSCULAR; INTRAVENOUS ONCE
Status: COMPLETED | OUTPATIENT
Start: 2018-12-23 | End: 2018-12-23

## 2018-12-23 RX ORDER — FUROSEMIDE 10 MG/ML
40 INJECTION INTRAMUSCULAR; INTRAVENOUS EVERY 12 HOURS
Status: COMPLETED | OUTPATIENT
Start: 2018-12-23 | End: 2018-12-24

## 2018-12-23 RX ORDER — NALOXONE HYDROCHLORIDE 0.4 MG/ML
.1-.4 INJECTION, SOLUTION INTRAMUSCULAR; INTRAVENOUS; SUBCUTANEOUS
Status: DISCONTINUED | OUTPATIENT
Start: 2018-12-23 | End: 2018-12-26

## 2018-12-23 RX ORDER — ALBUTEROL SULFATE 0.83 MG/ML
2.5 SOLUTION RESPIRATORY (INHALATION)
Status: DISCONTINUED | OUTPATIENT
Start: 2018-12-23 | End: 2018-12-27 | Stop reason: HOSPADM

## 2018-12-23 RX ORDER — FUROSEMIDE 10 MG/ML
20 INJECTION INTRAMUSCULAR; INTRAVENOUS ONCE
Status: COMPLETED | OUTPATIENT
Start: 2018-12-23 | End: 2018-12-23

## 2018-12-23 RX ORDER — SPIRONOLACTONE 25 MG/1
50 TABLET ORAL
Status: DISCONTINUED | OUTPATIENT
Start: 2018-12-23 | End: 2018-12-24

## 2018-12-23 RX ORDER — COLCHICINE 0.6 MG/1
0.6 TABLET ORAL 2 TIMES DAILY
Status: DISCONTINUED | OUTPATIENT
Start: 2018-12-23 | End: 2018-12-27 | Stop reason: HOSPADM

## 2018-12-23 RX ADMIN — FUROSEMIDE 40 MG: 10 INJECTION, SOLUTION INTRAVENOUS at 11:28

## 2018-12-23 RX ADMIN — SPIRONOLACTONE 50 MG: 25 TABLET ORAL at 15:39

## 2018-12-23 RX ADMIN — FUROSEMIDE 20 MG: 10 INJECTION, SOLUTION INTRAMUSCULAR; INTRAVENOUS at 04:59

## 2018-12-23 RX ADMIN — HUMAN ALBUMIN MICROSPHERES AND PERFLUTREN 9 ML: 10; .22 INJECTION, SOLUTION INTRAVENOUS at 15:15

## 2018-12-23 RX ADMIN — PANTOPRAZOLE SODIUM 40 MG: 40 TABLET, DELAYED RELEASE ORAL at 08:46

## 2018-12-23 RX ADMIN — WARFARIN SODIUM 1 MG: 1 TABLET ORAL at 20:44

## 2018-12-23 RX ADMIN — SPIRONOLACTONE 25 MG: 25 TABLET ORAL at 08:47

## 2018-12-23 RX ADMIN — COLCHICINE 0.6 MG: 0.6 TABLET, FILM COATED ORAL at 20:44

## 2018-12-23 RX ADMIN — OXYCODONE HYDROCHLORIDE AND ACETAMINOPHEN 1 TABLET: 5; 325 TABLET ORAL at 00:24

## 2018-12-23 RX ADMIN — FUROSEMIDE 40 MG: 10 INJECTION, SOLUTION INTRAVENOUS at 15:52

## 2018-12-23 RX ADMIN — ACETAMINOPHEN 325 MG: 325 TABLET, FILM COATED ORAL at 02:04

## 2018-12-23 RX ADMIN — FUROSEMIDE 20 MG: 20 TABLET ORAL at 08:47

## 2018-12-23 RX ADMIN — OXYCODONE HYDROCHLORIDE 5 MG: 5 TABLET ORAL at 05:13

## 2018-12-23 RX ADMIN — OXYCODONE HYDROCHLORIDE 5 MG: 5 TABLET ORAL at 11:11

## 2018-12-23 RX ADMIN — METOPROLOL SUCCINATE 100 MG: 100 TABLET, EXTENDED RELEASE ORAL at 20:50

## 2018-12-23 ASSESSMENT — ACTIVITIES OF DAILY LIVING (ADL)
AMBULATION: 0-->INDEPENDENT
COGNITION: 0 - NO COGNITION ISSUES REPORTED
DRESS: 0-->INDEPENDENT
FALL_HISTORY_WITHIN_LAST_SIX_MONTHS: NO
RETIRED_COMMUNICATION: 0-->UNDERSTANDS/COMMUNICATES WITHOUT DIFFICULTY
TRANSFERRING: 0-->INDEPENDENT
ADLS_ACUITY_SCORE: 11
BATHING: 0-->INDEPENDENT
SWALLOWING: 0-->SWALLOWS FOODS/LIQUIDS WITHOUT DIFFICULTY
TOILETING: 0-->INDEPENDENT
RETIRED_EATING: 0-->INDEPENDENT

## 2018-12-23 ASSESSMENT — COLUMBIA-SUICIDE SEVERITY RATING SCALE - C-SSRS
6. HAVE YOU EVER DONE ANYTHING, STARTED TO DO ANYTHING, OR PREPARED TO DO ANYTHING TO END YOUR LIFE?: NO
1. IN THE PAST MONTH, HAVE YOU WISHED YOU WERE DEAD OR WISHED YOU COULD GO TO SLEEP AND NOT WAKE UP?: NO
2. HAVE YOU ACTUALLY HAD ANY THOUGHTS OF KILLING YOURSELF IN THE PAST MONTH?: NO

## 2018-12-23 ASSESSMENT — MIFFLIN-ST. JEOR: SCORE: 1918.29

## 2018-12-23 NOTE — PROVIDER NOTIFICATION
Dr. Maloney's note states to continue to Colchicine and PRN Nsaids but no active order noted in Epic. Dr. Lopez notified via web-page. Awaiting for response.

## 2018-12-23 NOTE — CONSULTS
Consult Date:  12/23/2018      PRIMARY CARE PROVIDER:  Steven Barnhart MD      REASON FOR CONSULTATION:  STAT consult for evaluation of Low-grade fever and abnormal chest x-ray.      HISTORY OF PRESENT ILLNESS:  Maurice Jon is a 58-year-old male with a medical history significant for nonalcoholic steatohepatitis with cirrhosis, portal hypertension and thrombocytopenia, chronic atrial fibrillation with recent acute bronchitis, who was placed in observation yesterday after elective atrial fibrillation ablation.  Post-procedure, the patient reported chest pain which was worse with inspiration and he felt like he could not take deep breaths.  The patient was planned discharge after ambulation but given ongoing pain, pleurisy/pericarditis was suspected postablation and was placed on continued observation.  The patient reported continued shortness of breath and so a chest x-ray was performed which revealed pulmonary vascular congestion and patchy opacity in the left lung base.  He also had a low-grade fever of 100.3.  Hospitalist Service was consulted  for evaluation and management given low-grade fever and abnormal chest x-ray.      Upon my evaluation, patient is sitting up in the chair, comfortable, still feels like he cannot take deep breaths due to central chest pain.  Reports cough which is ongoing for about 3 weeks.  He was evaluated by his primary care provider on 12/07/2018.  Was treated with Z-Derek.  His greenish sputum cleared, but he is noticing it again infrequently.  No hemoptysis.  Had chills but no fever during evaluation  3 weeks ago and currently denies any fever, chills, rigors, sweating.  No nausea.  Chest pain was 8-9 out of 10 earlier which improved to 5 out of 10 after 2 Percocet.  Denies abdominal pain, nausea, vomiting, diarrhea.      REVIEW OF SYSTEMS:  All 10-point systems were reviewed and is negative, other than mentioned in the HPI.      PAST MEDICAL HISTORY:   1.  Atrial fibrillation,  chronic.   2.  Ureterolithiasis.   3.  Cirrhosis of liver due to steatohepatitis (nonalcoholic).   4.  Portal hypertension/esophageal varices.   5.  Thrombocytopenia.   6.  GERD.   7.  Hypertension.   8.  Obesity.   9.  BRUCE.      PAST SURGICAL HISTORY:    Past Surgical History:   Procedure Laterality Date     ANESTHESIA CARDIOVERSION N/A 1/19/2015    Procedure: ANESTHESIA CARDIOVERSION;  Surgeon: Generic Anesthesia Provider;  Location: [facility]     COLONOSCOPY N/A 8/14/2017    Procedure: COLONOSCOPY;  Colonoscopy Called will arrive appx 12:30 Per phone call;  Surgeon: Vincent Whittington MD;  Location: [facility]     CV HEART CATHETERIZATION WITH POSSIBLE INTERVENTION N/A 12/26/2018    Procedure: Heart Catheterization with possible Intervention;  Surgeon: Brandon Arroyo MD;  Location:  HEART CARDIAC CATH LAB     EP ABLATION FOCAL AFIB N/A 12/21/2018    Procedure: EP Ablation Focal AFIB;  Surgeon: Kristofer Evans MD;  Location:  HEART CARDIAC CATH LAB     ESOPHAGOSCOPY, GASTROSCOPY, DUODENOSCOPY (EGD), COMBINED  7/11/2011    Procedure:COMBINED ESOPHAGOSCOPY, GASTROSCOPY, DUODENOSCOPY (EGD); Surgeon:LAURA LAMAS; Location:[facility]     ESOPHAGOSCOPY, GASTROSCOPY, DUODENOSCOPY (EGD), COMBINED  9/10/2012    Procedure: COMBINED ESOPHAGOSCOPY, GASTROSCOPY, DUODENOSCOPY (EGD);;  Surgeon: Hi Roque MD;  Location:  GI     ESOPHAGOSCOPY, GASTROSCOPY, DUODENOSCOPY (EGD), COMBINED  9/9/2013    Procedure: COMBINED ESOPHAGOSCOPY, GASTROSCOPY, DUODENOSCOPY (EGD);;  Surgeon: Hi Roque MD;  Location:  GI     ESOPHAGOSCOPY, GASTROSCOPY, DUODENOSCOPY (EGD), COMBINED N/A 9/15/2014    Procedure: COMBINED ESOPHAGOSCOPY, GASTROSCOPY, DUODENOSCOPY (EGD);  Surgeon: Hi Roque MD;  Location:  GI     ESOPHAGOSCOPY, GASTROSCOPY, DUODENOSCOPY (EGD), COMBINED N/A 10/30/2015    Procedure: COMBINED ESOPHAGOSCOPY, GASTROSCOPY, DUODENOSCOPY (EGD);  Surgeon: Feliberto Fox MD;  Location:  GI     ESOPHAGOSCOPY,  GASTROSCOPY, DUODENOSCOPY (EGD), COMBINED N/A 10/10/2016    Procedure: COMBINED ESOPHAGOSCOPY, GASTROSCOPY, DUODENOSCOPY (EGD);  Surgeon: Jose Nesbitt MD;  Location: [facility]     H ABLATION FOCAL AFIB  2018     HERNIA REPAIR       IRRIGATION AND DEBRIDEMENT ABSCESS SCROTUM, COMBINED  10/4/2012    Procedure: COMBINED IRRIGATION AND DEBRIDEMENT ABSCESS SCROTUM;  Irrigation and Debridement of Groin Abscess;  Surgeon: Benny Shoemaker MD;  Location: [facility]     SOFT TISSUE SURGERY       SURGICAL HISTORY OF -       rt elbow     SURGICAL HISTORY OF -   1/15/98    repair of ventral and umbilical hernia     SURGICAL HISTORY OF -       endoscopy        SOCIAL HISTORY:  Former smoker, quit in  after a 4.8 pack-year of smoking.  Used to drink alcohol but quit in .      FAMILY HISTORY:  Father with CHF, .  Mother alive, no significant disease.  Brother also with heart disease.      ALLERGIES:  AVELOX.      HOME MEDICATIONS:   1.  Lasix 20 mg p.o. daily.   2.  Aldactone 25 mg p.o. daily.   3.  Warfarin per [facility].   4.  Dulera 200/5 mcg 2 puffs into lungs twice a day as needed.   5.  Protonix 40 mg p.o. daily.   6.  Sotalol 120 mg by mouth every 12 hours.      PHYSICAL EXAMINATION:   GENERAL:  The patient is alert, awake, oriented x 3, pleasant and comfortable, sitting up in the chair.   VITAL SIGNS:  Blood pressure 116/60, heart rate 97, temperature 99.1 (was 100.3 earlier), SpO2 of 95% on 2 liter nasal cannula.   HEENT:  PERRLA, EOMI.  Oral mucosa moist.   NECK:  Supple.   LUNGS:  Bilateral equal air entry, clear to auscultation anteriorly.  Fine crepitations on the right lung base.  Left lung base is coarse with crackles, more than on the right.  No wheezing.  No respiratory distress.  He is on room air now.   CARDIOVASCULAR:  S1, S2, regular, no murmur, rub, gallop.   ABDOMEN:  Soft, obese/distended, nontender.   MUSCULOSKELETAL:  Bilateral leg edema with compression  stockings noted.  Bilateral groins examined. Vascular access sites for ablation over the bilateral groins with small bruises, right more than left,     NEUROLOGIC:  Alert and oriented.  Cranial nerves II-XII intact.  Strength symmetrical.      LABORATORY DATA:  White cell count is 6.6, hemoglobin 12.6, hematocrit 36.5, platelets 65.  BMP from 2 days ago appears normal.  LFTs show AST 70, ALT 45, alkaline phosphatase 73, total bilirubin 1.3, albumin 2.9, total protein 5.7.      A 12-lead EKG from last night shows underlying sinus rhythm, rate controlled, a few PACs noted.      IMAGING:  Chest x-ray was reviewed by me.  He has pulmonary vascular congestion and blunting of the costophrenic angles.  Also noted cardiomegaly and possible retrocardiac opacity.   Reported patchy opacities in the left lung base, atelectasis versus pneumonia, with bilateral tiny effusion and pulmonary vascular congestion.      ASSESSMENT AND PLAN:  Maurice Jon is a 58-year-old male with above-mentioned medical history who is status post atrial fibrillation ablation yesterday with persistent chest pain, shortness of breath, now with low-grade fever.      1.  Fever: The patient had low-grade fever postop.  He has ongoing cough for 3 weeks which has improved.  Was treated with Z-Derek for presumed acute bronchitis.  X-ray shows possible left retrocardiac/lower lobe opacity, atelectasis versus pneumonia.  Had chest pain, pleuritic, worse since ablation.  This could be developing pneumonia versus pericarditis/pleurisy post ablation. His WBC count is normal, but note that he is cirrhotic.  He appears comfortable and vitals otherwise stable.  I will check procalcitonin and CRP as well.  Also, 2 sets of blood cultures ordered.  Hold off on antibiotic for now.  Encourage incentive spirometry.  I will avoid Tylenol given cirrhosis.  If his temperature is above 101, I will empirically start him on antibiotic.  Follow WBC count in the morning as  well.  I have added p.r.n. albuterol.  Prior cardiac workup including CT coronary angiogram and stress test were not concerning for significant reversible ischemia or significant coronary artery disease.  He seems to have possible ascites, no abdominal pain or diarrhea or nausea.  No abdominal tenderness on exam.  Not suspicious for spontaneous bacterial peritonitis.  Given subjective dyspnea and possible pulmonary vascular congestion, I will give him a one-time dose of IV Lasix 20 mg x 1.  He is on p.o. Lasix at home, continue.   2.  Atrial fibrillation, chronic.   3.  Failed prior direct current cardioversion: The patient reports allergic reaction to SOTALOL. Not a candidate for long-term amiodarone given liver disease. Underwent atrial fibrillation ablation, remains in sinus.  Cardiology planning metoprolol and discontinuing amiodarone at this point.   4.  Suspected post-ablation pericarditis/pleurisy and also being treated with colchicine.  On warfarin, INR therapeutic.   5.  Steatohepatitis and cirrhosis with portal hypertension, thrombocytopenia and esophageal varices.  The patient is followed by GI at [facility].  Has a diagnosis of portal vein thrombosis as well.  LFTs appear stable and platelet count as well is stable.  PTA Lasix and losartan continued as above.  Has a few points of weight above baseline.  One time dose of Lasix as above.  Likely has ascites but no concern for spontaneous bacterial peritonitis.  Minimize/avoid Tylenol.  Was on Percocet for pleuritic chest pain, changed to oxycodone.   6.  Bilateral leg edema, chronic, due to cirrhosis and recurrent cellulitis.  PTA Lasix continued.   Continue compression stockings.   7.  Obstructive sleep apnea.  Uses CPAP.   8.  Gastroesophageal reflux disease.  PTA Protonix continued.   9.  Obesity.  Per PCP.   10.  Deep venous thrombosis prophylaxis.  On Coumadin.      The above care plan was discussed with the patient and nursing staff.   Continue to monitor.  If persistent low-grade fever, we will change to inpatient and treat empirically with antibiotic for presumed pneumonia.        Thank you for the consultation.  Hospitalist Service will continue to follow.         ODALIS DUGAN MD             D: 2018   T: 2018   MT: NELSY      Name:     MARILY NAVARRO   MRN:      [id number]        Account:       [id number]   :      1960           Consult Date:  2018      Document: [id number]       cc: Kristofer Barnhart MD

## 2018-12-23 NOTE — PLAN OF CARE
A/O, VSS, up independently, chest discomfort improved with scheduled colchicine, tele SR, 2L O2 for comfort at times, groin sites CDI, CMS intact.

## 2018-12-23 NOTE — PROVIDER NOTIFICATION
MD Notification    Notified Person: MD    Notified Person Name: March    Notification Date/Time: 12/22 2000    Notification Interaction: on call answering service    Purpose of Notification: no warfarin scheduled this evening.    Orders Received: [facility] order pt home dose of warfarin 2.5 mg PO this evening.    Comments:

## 2018-12-23 NOTE — PROGRESS NOTES
[facility]    Hospitalist Progress Note    Assessment & Plan   Maurice Jon is a 58 year old male who was admitted on 12/21/2018 with:    Impression:   Principal Problem:    Persistent atrial fibrillation (H)  Active Problems:    Liver Cirrhosis 2nd ot Fatty liver, w Ascites    Portal hypertension (H)    Portal vein thrombosis    Chronic a-fib, S/P Ablation 12/22/18 -- on Warfarin    CHF (congestive heart failure) (H)    Thrombocytopenia (H)      Plan:  Received additional IV lasix today, off O2 but still with dyspnea, will admit to cardiology telemetry, increase Spironolactone to 50 mg bid and check INR and BMP in AM.     DVT Prophylaxis: warfarin   Code Status: Prior    Disposition: Expected discharge in 1 days once breathing better.    Valdo Henry MD  Pager [phone]  Cell Phone [phone]  Text Page (7am to 6pm)    Interval History   This AM SOB at rest, now off O2 but still MENARD and check discomfort.     Physical Exam   Temp: 99.5  F (37.5  C) Temp src: Oral BP: 103/50 Pulse: 100 Heart Rate: 82 Resp: 20 SpO2: 95 % O2 Device: None (Room air) Oxygen Delivery: 2 LPM  Vitals:    12/21/18 2229 12/22/18 0515 12/23/18 0838   Weight: 114.5 kg (252 lb 8 oz) 112.2 kg (247 lb 4.8 oz) 116.3 kg (256 lb 8 oz)     Vital Signs with Ranges  Temp:  [97.7  F (36.5  C)-100.3  F (37.9  C)] 99.5  F (37.5  C)  Pulse:  [] 100  Heart Rate:  [] 82  Resp:  [18-24] 20  BP: ()/(44-60) 103/50  SpO2:  [87 %-100 %] 95 %  I/O last 3 completed shifts:  In: 1319 [P.O.:1316; I.V.:3]  Out: 400 [Urine:400]    # Pain Assessment:  Current Pain Score 12/23/2018   Patient currently in pain? yes   Pain score (0-10) 5   Pain location -   Pain descriptors -   Maurice ortiz pain level is related to chest pain, ?pericarditis.       Constitutional: Awake, alert, cooperative, no apparent distress  Respiratory: Clear to auscultation bilaterally, no crackles or wheezing  Cardiovascular: Regular rate and  rhythm, normal S1 and S2, and no murmur noted  GI: Normal bowel sounds, soft, moderate distension with ascites, non-tender  Extrem: No calf tenderness, 1+ bilateral ankle edema  Neuro: Ox3, no focal motor or sensory deficits    Medications     Warfarin Therapy Reminder         furosemide  40 mg Intravenous Q12H     metoprolol succinate ER  100 mg Oral At Bedtime     pantoprazole  40 mg Oral Daily     sodium chloride (PF)  10 mL Intravenous Once     sodium chloride (PF)  3 mL Intracatheter Q8H     spironolactone  50 mg Oral BID     warfarin  1 mg Oral ONCE at 18:00       Data   Recent Labs   Lab 12/23/18  0615 12/23/18  0115 12/22/18  2148 12/22/18  0621 12/21/18  1020   WBC 6.8 6.6  --   --  4.0   HGB  --  12.6*  --   --  13.0*   MCV  --  101*  --   --  100   PLT  --  65*  --   --  63*   INR 3.03*  --  3.10*  --  2.35*   NA  --   --   --   --  140   POTASSIUM  --   --   --   --  3.7   CHLORIDE  --   --   --   --  106   CO2  --   --   --   --  24   BUN  --   --   --   --  14   CR  --   --   --   --  0.73   ANIONGAP  --   --   --   --  10   HALEY  --   --   --   --  8.3*   GLC  --   --   --   --  87   ALBUMIN  --   --   --  2.9*  --    PROTTOTAL  --   --   --  5.7*  --    BILITOTAL  --   --   --  1.3  --    ALKPHOS  --   --   --  73  --    ALT  --   --   --  45  --    AST  --   --   --  70*  --        Imaging:   Recent Results (from the past 24 hour(s))   XR Chest 2 Views    Narrative    CHEST 2 VIEWS   12/23/2018 1:36 AM     HISTORY: Pleurisy. Crackles.    COMPARISON: 5/10/2018.    FINDINGS: Prominence of the central pulmonary vasculature. Patchy  opacities are present in the left lung base. Cardiomegaly again noted.  Tiny bilateral pleural effusions.      Impression    IMPRESSION:   1. Prominence to the central pulmonary vasculature, suggestive of  pulmonary vascular congestion.  2. Patchy opacities are present in the left lung base and could  represent pneumonia or atelectasis.  3. Tiny bilateral pleural  effusions.    FUNMILAYO NÚÑEZ MD

## 2018-12-23 NOTE — PROGRESS NOTES
MD Notification    Notified Person: MD    Notified Person Name:  March     Notification Date/Time: 12/23/18 at 0058    Notification Interaction: Phone     Purpose of Notification: Temp 100.3, CP 7/10, given PRN Percocet w/ decrease, LS crackles in bases. EKG SR w/ occ PACs and PVCs, no change. Suspected pericarditis/pleurisy. CXR? /60. Nitrostat held.    Orders Received: CBC w/ differential; CXR, Acetaminophen 325 mg Q4H PRN for temp. Ok to hold Nitrostat.     Comments: Notify Hospitalist w/ concerning CXR results.

## 2018-12-23 NOTE — PLAN OF CARE
Pt A&Ox4; VS: Afebrile. soft BP but asymptomatic. Weaned off O2 but desats down to 87-88% with activity. O2 stable in the 90s on RA. IS encouraged. Still has c/o chest pain that is worse with inspiration but improving, per pt's report. Oxycodone given/ helpful. Tele SR with PACs. LS with crackles in LLL; pt MENARD; increase in weight gain this AM (Dr. Maloney aware during rounds.) Pt given an extra dose of IV lasix (40 mg) x1 during the day; voiding adequately but unable to get accurate I/O due to pt not always using urinal. Echo pending. Ambulated only up to the nurses d/t dyspnea. Dr. Lopez will reassess pt this afternoon to decide if pt can go home today or not. Will continue to monitor.     Addendum: Per [place], pt will be staying tonight. Transfer to [facility]. Dr. Lopez updated.

## 2018-12-23 NOTE — PLAN OF CARE
A&Ox4, VSS on RA with ex of fast respirations beginning of shift due to chest pain. Occ 2L NC for comfort. Elevated temp 100.1 at beginning of shift. PRN percocet given, PRN tylenol given due to elevated temp. Temp down to 99.1.  Additionally gave PRN oxycodone since pt has liver disease. Avoid NSAIDS if possible due to cirrhosis. R and L groin Incisions CDI. Up ad hubert. Coarse lung sounds and fine crackles, paged hospitalist. Recommended lasix, blood cultures, CRP due to possible atelectasis vs pneumonia on chest x ray.  Reg diet, voiding adequately. CMS intact.  IV SL. Encouraged IS, reached 1000 with 3 repetitions. Plan to discharge today if medically stable. Will continue to monitor.

## 2018-12-23 NOTE — PROGRESS NOTES
A/O, VSS, up independently, declines pain meds for chest discomfort at this time, tele SR, BM this evening, 1+ BLE with compression stockings in place, groin sites CDI, plan to transfer inpatient to [facility].

## 2018-12-23 NOTE — PROVIDER NOTIFICATION
MD Notification    Notified Person: MD    Notified Person Name: March    Notification Date/Time: 12/22 2222    Notification Interaction: on call answering service    Purpose of Notification: INR 3.10 this evening. Consider changing dosage for warfarin.    Orders Received: TOVRB give just 1mg this evening.    Comments:

## 2018-12-24 PROBLEM — I48.19 PERSISTENT ATRIAL FIBRILLATION (H): Status: RESOLVED | Noted: 2018-12-10 | Resolved: 2018-12-24

## 2018-12-24 PROBLEM — I50.41 ACUTE COMBINED SYSTOLIC AND DIASTOLIC HEART FAILURE (H): Status: ACTIVE | Noted: 2018-12-24

## 2018-12-24 LAB
ANION GAP SERPL CALCULATED.3IONS-SCNC: 7 MMOL/L (ref 3–14)
BUN SERPL-MCNC: 20 MG/DL (ref 7–30)
CALCIUM SERPL-MCNC: 8.3 MG/DL (ref 8.5–10.1)
CHLORIDE SERPL-SCNC: 101 MMOL/L (ref 94–109)
CO2 SERPL-SCNC: 26 MMOL/L (ref 20–32)
CREAT SERPL-MCNC: 0.81 MG/DL (ref 0.66–1.25)
ERYTHROCYTE [DISTWIDTH] IN BLOOD BY AUTOMATED COUNT: 14.2 % (ref 10–15)
GFR SERPL CREATININE-BSD FRML MDRD: >90 ML/MIN/{1.73_M2}
GLUCOSE SERPL-MCNC: 98 MG/DL (ref 70–99)
HCT VFR BLD AUTO: 36.4 % (ref 40–53)
HGB BLD-MCNC: 12.4 G/DL (ref 13.3–17.7)
INR PPP: 1.92 (ref 0.86–1.14)
INR PPP: 2.1 (ref 0.86–1.14)
MCH RBC QN AUTO: 34.4 PG (ref 26.5–33)
MCHC RBC AUTO-ENTMCNC: 34.1 G/DL (ref 31.5–36.5)
MCV RBC AUTO: 101 FL (ref 78–100)
PLATELET # BLD AUTO: 65 10E9/L (ref 150–450)
POTASSIUM SERPL-SCNC: 4 MMOL/L (ref 3.4–5.3)
RBC # BLD AUTO: 3.6 10E12/L (ref 4.4–5.9)
SODIUM SERPL-SCNC: 134 MMOL/L (ref 133–144)
WBC # BLD AUTO: 4.6 10E9/L (ref 4–11)

## 2018-12-24 PROCEDURE — 85610 PROTHROMBIN TIME: CPT | Performed by: INTERNAL MEDICINE

## 2018-12-24 PROCEDURE — 99233 SBSQ HOSP IP/OBS HIGH 50: CPT | Performed by: INTERNAL MEDICINE

## 2018-12-24 PROCEDURE — 21400004 ZZH R&B CCU CSC INTERMEDIATE

## 2018-12-24 PROCEDURE — 85027 COMPLETE CBC AUTOMATED: CPT | Performed by: INTERNAL MEDICINE

## 2018-12-24 PROCEDURE — 25000128 H RX IP 250 OP 636

## 2018-12-24 PROCEDURE — 25000132 ZZH RX MED GY IP 250 OP 250 PS 637: Performed by: INTERNAL MEDICINE

## 2018-12-24 PROCEDURE — 36415 COLL VENOUS BLD VENIPUNCTURE: CPT | Performed by: INTERNAL MEDICINE

## 2018-12-24 PROCEDURE — 80048 BASIC METABOLIC PNL TOTAL CA: CPT | Performed by: INTERNAL MEDICINE

## 2018-12-24 PROCEDURE — 25000128 H RX IP 250 OP 636: Performed by: INTERNAL MEDICINE

## 2018-12-24 PROCEDURE — 85610 PROTHROMBIN TIME: CPT

## 2018-12-24 PROCEDURE — 99207 ZZC CDG-MDM COMPONENT: MEETS MODERATE - UP CODED: CPT | Performed by: INTERNAL MEDICINE

## 2018-12-24 RX ORDER — SPIRONOLACTONE 25 MG/1
25 TABLET ORAL
Status: DISCONTINUED | OUTPATIENT
Start: 2018-12-24 | End: 2018-12-27 | Stop reason: HOSPADM

## 2018-12-24 RX ORDER — LIDOCAINE 40 MG/G
CREAM TOPICAL
Status: DISCONTINUED | OUTPATIENT
Start: 2018-12-25 | End: 2018-12-26 | Stop reason: HOSPADM

## 2018-12-24 RX ORDER — LISINOPRIL 2.5 MG/1
2.5 TABLET ORAL DAILY
Status: DISCONTINUED | OUTPATIENT
Start: 2018-12-24 | End: 2018-12-27 | Stop reason: HOSPADM

## 2018-12-24 RX ORDER — POTASSIUM CHLORIDE 1500 MG/1
20 TABLET, EXTENDED RELEASE ORAL
Status: COMPLETED | OUTPATIENT
Start: 2018-12-25 | End: 2018-12-25

## 2018-12-24 RX ORDER — WARFARIN SODIUM 2.5 MG/1
2.5 TABLET ORAL
Status: DISCONTINUED | OUTPATIENT
Start: 2018-12-24 | End: 2018-12-24

## 2018-12-24 RX ORDER — FUROSEMIDE 40 MG
40 TABLET ORAL
Status: DISCONTINUED | OUTPATIENT
Start: 2018-12-24 | End: 2018-12-27 | Stop reason: HOSPADM

## 2018-12-24 RX ADMIN — LISINOPRIL 2.5 MG: 2.5 TABLET ORAL at 14:40

## 2018-12-24 RX ADMIN — FUROSEMIDE 40 MG: 40 TABLET ORAL at 16:42

## 2018-12-24 RX ADMIN — HEPARIN SODIUM 1000 UNITS/HR: 10000 INJECTION, SOLUTION INTRAVENOUS at 18:21

## 2018-12-24 RX ADMIN — FUROSEMIDE 40 MG: 10 INJECTION, SOLUTION INTRAVENOUS at 05:38

## 2018-12-24 RX ADMIN — SPIRONOLACTONE 25 MG: 25 TABLET ORAL at 16:42

## 2018-12-24 RX ADMIN — COLCHICINE 0.6 MG: 0.6 TABLET, FILM COATED ORAL at 20:48

## 2018-12-24 RX ADMIN — METOPROLOL SUCCINATE 100 MG: 100 TABLET, EXTENDED RELEASE ORAL at 20:49

## 2018-12-24 RX ADMIN — PANTOPRAZOLE SODIUM 40 MG: 40 TABLET, DELAYED RELEASE ORAL at 08:26

## 2018-12-24 RX ADMIN — COLCHICINE 0.6 MG: 0.6 TABLET, FILM COATED ORAL at 08:26

## 2018-12-24 RX ADMIN — SPIRONOLACTONE 50 MG: 25 TABLET ORAL at 08:26

## 2018-12-24 ASSESSMENT — ACTIVITIES OF DAILY LIVING (ADL)
ADLS_ACUITY_SCORE: 11

## 2018-12-24 ASSESSMENT — MIFFLIN-ST. JEOR
SCORE: 1911.48
SCORE: 1906.04
SCORE: 1905.13

## 2018-12-24 NOTE — PROGRESS NOTES
Assessment and Plan:     This is a 58-year-old male patient who was seen by cardiac EP for chronic atrial fibrillation.  The patient in the past had been cardioverted and subsequently stayed okay in sinus rhythm however more recently had to be admitted for antiarrhythmic loading with sotalol.  Patient of Dr. Evans. He failed that as well and subsequently underwent an A. fib ablation on 21 December.  He has a history of liver disease and cirrhosis and portal hypertension but no bleeding.  Tolerates anticoagulation well he says without bleeding.  Post A. fib ablation patient had some chest discomfort and concerning for procedural pericarditis.  Been treated with anti-inflammatory agents and colchicine for that.  Subsequent echocardiogram was done to assess for pericardial effusion shows new onset LV dysfunction with EF of 25-30%.  In the past his EF was normal at 50-55%.  Patient has premature family history of coronary artery disease.  Echo also showed right ventricular dilatation and TR.    Etiology of his chest pain is likely pericarditis however one cannot rule out coronary artery disease given his severe family history.  He does have biventricular dysfunction and his heart failure may be secondary to atrial fibrillation however occult coronary disease cannot be ruled out.  Given his chest pain and new LV dysfunction we discussed further ischemic workup.  His pre-A. fib ablation CT scan did not show coronary calcium.  However this was not gated to look at the coronary arteries.  Nuc MPI in August was positive, but could not rule out artifact. Given his portal hypertension, edema and new LV dysfunction we will plan on doing a left and right heart catheterization on Wednesday.    Risks and benefits of the procedure were discussed with the patient in detail that includes the risk of stroke, heart attack, death, emergent stenting or bypass, contrast induced allergic reaction, renal dysfunction (including risks  of temporary or permanent dialysis), and pulmonary, sedation, and vascular complications (including bleeding and transfusion) were discussed. Patient understands the overall risks of the procedure and wishes to proceed.    Continue Metoprolol . Will start lisinopril 2.5 mg daily. Will decrease spironolactone to 25 BID again given ACE initiation. Start lasix 40 BID.     Will hold warfarin tonight and plan for radial cath Wednesday. Last INR was 2.1 (given recent ablation, bridging would be important). Please check INR Q12 hours and once <2, please initiate heparin infusion as bridge.     Gualberto Farah MD        Interval History:   No overnight events doing well.  Mild chest pain otherwise he says it is better overall.          Medications:     Medications Prior to Admission   Medication Sig Dispense Refill Last Dose     azithromycin (ZITHROMAX) 250 MG tablet Take 500 mg on the first day, then 250 mg daily for 4 days 6 tablet 0 Past Month at Unknown time     calcium carb 1250 mg, 500 mg [place],/vitamin D 200 units (OSCAL WITH D) 500-200 MG-UNIT per tablet Take 2 tablets by mouth daily with food.   Past Week at Unknown time     furosemide (LASIX) 40 MG tablet Take 0.5 tablets (20 mg) by mouth daily 45 tablet 3 12/20/2018 at Unknown time     spironolactone (ALDACTONE) 25 MG tablet Take 1 tablet (25 mg) by mouth daily We will no longer fill unless seen by cardiology 90 tablet 3 12/20/2018 at Unknown time     warfarin (COUMADIN) 2.5 MG tablet 1.25 mg Fridays; 2.5mg all other days or As directed by Anticoagulation Clinic  90 tablet 0 12/20/2018 at Unknown time     mometasone-formoterol (DULERA) 200-5 MCG/ACT oral inhaler Inhale 2 puffs into the lungs 2 times daily as needed Appt DUE Jan 2017 1 Inhaler 1 More than a month at Unknown time     order for DME Equipment being ordered:   New CPAP mask, tube, filters,water tray 1 Device 3 Taking     order for DME Open toe size large 30/40 Mediven Assure Medical Compression  socks Model [id number]. 4 Package 3 Taking     ORDER FOR DME Respironics RemStar 60 Series Auto AFlex 12-15 cm H2O with heated humidty and a modem.  Pt chose a Quattro Air FFM size medium.   Taking     ORDER FOR DME Juzo compression stockings, 30-40mm compression. Patient has portal hypertension and develops leg edema, which these control well. 2 Package 3 Taking     [DISCONTINUED] pantoprazole (PROTONIX) 40 MG EC tablet Take 1 tablet (40 mg) by mouth daily as needed 90 tablet 3 12/21/2018 at Unknown time     [DISCONTINUED] sotalol (BETAPACE) 120 MG tablet Take 1 tablet (120 mg) by mouth every 12 hours 60 tablet 11 Past Week at Unknown time            Physical Exam:     Patient Vitals for the past 24 hrs:   BP Temp Temp src Heart Rate Resp SpO2 Weight   12/24/18 0827 104/55 98.2  F (36.8  C) Axillary 76 16 95 % --   12/24/18 0730 -- -- -- -- -- -- 115 kg (253 lb 9.6 oz)   12/24/18 0600 -- -- -- 80 18 -- --   12/24/18 0500 97/55 97.5  F (36.4  C) Oral -- 18 98 % 115.7 kg (255 lb)   12/24/18 0400 -- -- -- 80 18 -- --   12/24/18 0200 -- -- -- 80 18 -- --   12/24/18 0130 97/64 -- -- -- 18 -- --   12/24/18 0000 -- -- -- 80 18 -- --   12/23/18 2029 112/58 98.3  F (36.8  C) Oral 73 18 92 % --   12/23/18 1901 118/66 98.6  F (37  C) Oral 83 16 97 % --   12/23/18 1713 105/44 99.8  F (37.7  C) Oral 84 18 96 % --   12/23/18 1534 103/50 99.5  F (37.5  C) Oral 82 20 95 % --     Vitals:    12/21/18 0943 12/21/18 2229 12/22/18 0515 12/23/18 0838   Weight: 110.5 kg (243 lb 9.6 oz) 114.5 kg (252 lb 8 oz) 112.2 kg (247 lb 4.8 oz) 116.3 kg (256 lb 8 oz)    12/24/18 0500 12/24/18 0730   Weight: 115.7 kg (255 lb) 115 kg (253 lb 9.6 oz)         Intake/Output Summary (Last 24 hours) at 12/24/2018 1404  Last data filed at 12/24/2018 0800  Gross per 24 hour   Intake 2040 ml   Output 325 ml   Net 1715 ml       12/19 0700 - 12/24 0659  In: 5119 [P.O.:4316; I.V.:803]  Out: 2525 [Urine:2525]  Net: 2594    Exam:  GENERAL APPEARANCE ADULT: Alert, in no  acute distress  RESP: lungs clear to auscultation   CV: RRR no rubs or gallops  ABDOMEN: normal bowel sounds, soft, nontender possible ascites  EXTREMITIES: 2+ Edema         Data:   LABS (Last four results)  CMP  Recent Labs   Lab 12/24/18  0652 12/22/18  0621 12/21/18  1020     --  140   POTASSIUM 4.0  --  3.7   CHLORIDE 101  --  106   CO2 26  --  24   ANIONGAP 7  --  10   GLC 98  --  87   BUN 20  --  14   CR 0.81  --  0.73   GFRESTIMATED >90  --  >90   GFRESTBLACK >90  --  >90   HALEY 8.3*  --  8.3*   PROTTOTAL  --  5.7*  --    ALBUMIN  --  2.9*  --    BILITOTAL  --  1.3  --    ALKPHOS  --  73  --    AST  --  70*  --    ALT  --  45  --      CBC  Recent Labs   Lab 12/23/18  0615 12/23/18  0115 12/21/18  1020   WBC 6.8 6.6 4.0   RBC  --  3.61* 3.76*   HGB  --  12.6* 13.0*   HCT  --  36.5* 37.5*   MCV  --  101* 100   MCH  --  34.9* 34.6*   MCHC  --  34.5 34.7   RDW  --  13.9 13.8   PLT  --  65* 63*     INR  Recent Labs   Lab 12/24/18  0652 12/23/18  0615 12/22/18  2148 12/21/18  1020   INR 2.10* 3.03* 3.10* 2.35*     TROPONINS   Lab Results   Component Value Date    TROPI <0.015 05/10/2018    TROPI 0.026 05/16/2013    TROPI 0.022 05/14/2013    TROPI 0.012 10/08/2009    TROPONIN 0.02 04/05/2006    TROPONIN <0.02 04/04/2006                                                                                                               Gualberto Farah MD

## 2018-12-24 NOTE — PLAN OF CARE
[id number]. Pt is A+OX4. VSS on RA. Denies chest pain this shift. Denies SOB. Lung sounds- wheezes expiratory+diminished. Tele this shift is sinus dysrhythmia. Regular diet, good appetite. Up independently. Plan for continued monitoring with possible discharge once breathing better.

## 2018-12-24 NOTE — PROGRESS NOTES
[facility]    Hospitalist Progress Note    Assessment & Plan   Maurice Jon is a 58 year old male with Liver cirrhosis with ascites related to fatty liver, with thrombocytopenia, who was admitted on 12/21/2018 for persistent afib and failed cardioversion on Sotalol, and then had Ablation for afib with Dr. Evans on 12/22 and has stayed in NSR but has increased SOB and echo with worsening Systolic and Diastolic heart failure and right sided heart failure, and now will diuresis and plan Right and Left heart cath on Wed 12/26:    Impression:   Principal Problem:    Acute systolic/diastolic Heart failure  Active Problems:    Liver Cirrhosis 2nd ot Fatty liver, w Ascites    Portal hypertension (H)    Portal vein thrombosis    Chronic a-fib, S/P Ablation 12/22/18 -- on Warfarin    CHF (congestive heart failure) (H)    Right heart failure (H)    Thrombocytopenia (H)      Plan:  Received additional IV lasix today, off O2 but still with dyspnea, will admit to cardiology telemetry, increase Spironolactone to 50 mg bid and check INR and BMP in AM.     DVT Prophylaxis: warfarin   Code Status: Prior    Disposition: Expected discharge in 3 days once breathing better and after right and left heart cath.     Valdo Henyr MD  Pager [phone]  Cell Phone [phone]  Text Page (7am to 6pm)    Interval History   Breathing better and off O2 but still with MENARD.   Overall wt up 10 lbs (down 3 lbs from yesterday).      Physical Exam   Temp: 98.2  F (36.8  C) Temp src: Axillary BP: 111/62   Heart Rate: 76 Resp: 18 SpO2: 93 % O2 Device: None (Room air)    Vitals:    12/23/18 0838 12/24/18 0500 12/24/18 0730   Weight: 116.3 kg (256 lb 8 oz) 115.7 kg (255 lb) 115 kg (253 lb 9.6 oz)     Vital Signs with Ranges  Temp:  [97.5  F (36.4  C)-99.8  F (37.7  C)] 98.2  F (36.8  C)  Heart Rate:  [73-84] 76  Resp:  [16-20] 18  BP: ()/(44-66) 111/62  SpO2:  [92 %-98 %] 93 %  I/O last 3 completed shifts:  In: 1920  [P.O.:1920]  Out: 475 [Urine:475]    # Pain Assessment:  Current Pain Score 12/24/2018   Patient currently in pain? denies   Pain score (0-10) -   Pain location -   Pain descriptors -   Maurice s pain level is related to chest pain, ?pericarditis.       Constitutional: Awake, alert, cooperative, no apparent distress  Respiratory: Clear to auscultation bilaterally, no crackles or wheezing  Cardiovascular: Regular rate and rhythm, normal S1 and S2, and no murmur noted  GI: Normal bowel sounds, soft, moderate distension with ascites, non-tender  Extrem: No calf tenderness, trace bilateral ankle edema  Neuro: Ox3, no focal motor or sensory deficits    Medications     Warfarin Therapy Reminder         colchicine  0.6 mg Oral BID     furosemide  40 mg Oral BID     lisinopril  2.5 mg Oral Daily     metoprolol succinate ER  100 mg Oral At Bedtime     pantoprazole  40 mg Oral Daily     sodium chloride (PF)  3 mL Intracatheter Q8H     spironolactone  25 mg Oral BID     warfarin-No DOSE today  1 each Does not apply no dose today (warfarin)       Data   Recent Labs   Lab 12/24/18  0652 12/23/18  0615 12/23/18  0115 12/22/18  2148 12/22/18  0621 12/21/18  1020   WBC  --  6.8 6.6  --   --  4.0   HGB  --   --  12.6*  --   --  13.0*   MCV  --   --  101*  --   --  100   PLT  --   --  65*  --   --  63*   INR 2.10* 3.03*  --  3.10*  --  2.35*     --   --   --   --  140   POTASSIUM 4.0  --   --   --   --  3.7   CHLORIDE 101  --   --   --   --  106   CO2 26  --   --   --   --  24   BUN 20  --   --   --   --  14   CR 0.81  --   --   --   --  0.73   ANIONGAP 7  --   --   --   --  10   HALEY 8.3*  --   --   --   --  8.3*   GLC 98  --   --   --   --  87   ALBUMIN  --   --   --   --  2.9*  --    PROTTOTAL  --   --   --   --  5.7*  --    BILITOTAL  --   --   --   --  1.3  --    ALKPHOS  --   --   --   --  73  --    ALT  --   --   --   --  45  --    AST  --   --   --   --  70*  --      Echo --   The right ventricle is not well  visualized but is likely moderate to severely  dilated. The right ventricular systolic function is moderately reduced.  The tricuspid valve is not well visualized and the inferior vena cava not well  visualized for estimation of right atrial pressure. The right ventricular  systolic pressure is approximated at 17mmHg plus the right atrial pressure.  Limited views suggest there is at least moderate to mod-severe (2-3+)  tricuspid regurgitation. The right atrium is severely dilated.  Left ventricular systolic function is moderate to severely reduced. The visual  ejection fraction is estimated at 25-30%. There is mod-severe global  hypokinesia of the left ventricle. Flattened septum is consistent with RV  pressure/volume overload.    Imaging:   No results found for this or any previous visit (from the past 24 hour(s)).

## 2018-12-24 NOTE — PLAN OF CARE
Patient had stable night.  Slept fair.  VSS, with BP's on softer side.  Rhythm continues to be SR, but patient has frequent PAC's and occasional 3 second non-sustained bursts of FADI.  Will get last dose of Lasix this morning.  Remains edematous.  No complaints of chest pain or SOB.

## 2018-12-24 NOTE — PROGRESS NOTES
Pt transferred to [facility]. Report given to receiving RN. Pt stable upon transfer.

## 2018-12-25 LAB
ANION GAP SERPL CALCULATED.3IONS-SCNC: 6 MMOL/L (ref 3–14)
BUN SERPL-MCNC: 18 MG/DL (ref 7–30)
CALCIUM SERPL-MCNC: 8.3 MG/DL (ref 8.5–10.1)
CHLORIDE SERPL-SCNC: 105 MMOL/L (ref 94–109)
CO2 SERPL-SCNC: 27 MMOL/L (ref 20–32)
CREAT SERPL-MCNC: 0.7 MG/DL (ref 0.66–1.25)
GFR SERPL CREATININE-BSD FRML MDRD: >90 ML/MIN/{1.73_M2}
GLUCOSE SERPL-MCNC: 104 MG/DL (ref 70–99)
INR PPP: 1.59 (ref 0.86–1.14)
INR PPP: 1.69 (ref 0.86–1.14)
INR PPP: 1.87 (ref 0.86–1.14)
LMWH PPP CHRO-ACNC: 0.36 IU/ML
LMWH PPP CHRO-ACNC: 0.38 IU/ML
LMWH PPP CHRO-ACNC: <0.1 IU/ML
LMWH PPP CHRO-ACNC: <0.1 IU/ML
NT-PROBNP SERPL-MCNC: 509 PG/ML (ref 0–900)
POTASSIUM SERPL-SCNC: 3.9 MMOL/L (ref 3.4–5.3)
SODIUM SERPL-SCNC: 138 MMOL/L (ref 133–144)

## 2018-12-25 PROCEDURE — 85520 HEPARIN ASSAY: CPT | Performed by: INTERNAL MEDICINE

## 2018-12-25 PROCEDURE — 99232 SBSQ HOSP IP/OBS MODERATE 35: CPT | Performed by: HOSPITALIST

## 2018-12-25 PROCEDURE — 36415 COLL VENOUS BLD VENIPUNCTURE: CPT | Performed by: INTERNAL MEDICINE

## 2018-12-25 PROCEDURE — 25000132 ZZH RX MED GY IP 250 OP 250 PS 637: Performed by: INTERNAL MEDICINE

## 2018-12-25 PROCEDURE — 80048 BASIC METABOLIC PNL TOTAL CA: CPT | Performed by: INTERNAL MEDICINE

## 2018-12-25 PROCEDURE — 85610 PROTHROMBIN TIME: CPT | Performed by: INTERNAL MEDICINE

## 2018-12-25 PROCEDURE — 25000128 H RX IP 250 OP 636

## 2018-12-25 PROCEDURE — 99233 SBSQ HOSP IP/OBS HIGH 50: CPT | Performed by: INTERNAL MEDICINE

## 2018-12-25 PROCEDURE — 21400004 ZZH R&B CCU CSC INTERMEDIATE

## 2018-12-25 PROCEDURE — 83880 ASSAY OF NATRIURETIC PEPTIDE: CPT | Performed by: INTERNAL MEDICINE

## 2018-12-25 RX ORDER — POTASSIUM CHLORIDE 1.5 G/1.58G
20-40 POWDER, FOR SOLUTION ORAL
Status: DISCONTINUED | OUTPATIENT
Start: 2018-12-25 | End: 2018-12-27 | Stop reason: HOSPADM

## 2018-12-25 RX ORDER — POTASSIUM CHLORIDE 29.8 MG/ML
20 INJECTION INTRAVENOUS
Status: DISCONTINUED | OUTPATIENT
Start: 2018-12-25 | End: 2018-12-27 | Stop reason: HOSPADM

## 2018-12-25 RX ORDER — POTASSIUM CL/LIDO/0.9 % NACL 10MEQ/0.1L
10 INTRAVENOUS SOLUTION, PIGGYBACK (ML) INTRAVENOUS
Status: DISCONTINUED | OUTPATIENT
Start: 2018-12-25 | End: 2018-12-27 | Stop reason: HOSPADM

## 2018-12-25 RX ORDER — POTASSIUM CHLORIDE 7.45 MG/ML
10 INJECTION INTRAVENOUS
Status: DISCONTINUED | OUTPATIENT
Start: 2018-12-25 | End: 2018-12-27 | Stop reason: HOSPADM

## 2018-12-25 RX ORDER — ASPIRIN 81 MG/1
81 TABLET ORAL DAILY
Status: DISCONTINUED | OUTPATIENT
Start: 2018-12-26 | End: 2018-12-26 | Stop reason: HOSPADM

## 2018-12-25 RX ORDER — POTASSIUM CHLORIDE 1500 MG/1
20-40 TABLET, EXTENDED RELEASE ORAL
Status: DISCONTINUED | OUTPATIENT
Start: 2018-12-25 | End: 2018-12-27 | Stop reason: HOSPADM

## 2018-12-25 RX ADMIN — COLCHICINE 0.6 MG: 0.6 TABLET, FILM COATED ORAL at 22:20

## 2018-12-25 RX ADMIN — FUROSEMIDE 40 MG: 40 TABLET ORAL at 15:52

## 2018-12-25 RX ADMIN — METOPROLOL SUCCINATE 100 MG: 100 TABLET, EXTENDED RELEASE ORAL at 22:20

## 2018-12-25 RX ADMIN — PANTOPRAZOLE SODIUM 40 MG: 40 TABLET, DELAYED RELEASE ORAL at 08:19

## 2018-12-25 RX ADMIN — FUROSEMIDE 40 MG: 40 TABLET ORAL at 08:19

## 2018-12-25 RX ADMIN — SPIRONOLACTONE 25 MG: 25 TABLET ORAL at 15:52

## 2018-12-25 RX ADMIN — Medication 4000 UNITS: at 17:40

## 2018-12-25 RX ADMIN — POTASSIUM CHLORIDE 20 MEQ: 1500 TABLET, EXTENDED RELEASE ORAL at 06:47

## 2018-12-25 RX ADMIN — HEPARIN SODIUM 1050 UNITS/HR: 10000 INJECTION, SOLUTION INTRAVENOUS at 12:06

## 2018-12-25 RX ADMIN — SPIRONOLACTONE 25 MG: 25 TABLET ORAL at 08:19

## 2018-12-25 RX ADMIN — COLCHICINE 0.6 MG: 0.6 TABLET, FILM COATED ORAL at 08:19

## 2018-12-25 RX ADMIN — Medication 4000 UNITS: at 02:20

## 2018-12-25 RX ADMIN — LISINOPRIL 2.5 MG: 2.5 TABLET ORAL at 08:19

## 2018-12-25 ASSESSMENT — ACTIVITIES OF DAILY LIVING (ADL)
ADLS_ACUITY_SCORE: 11

## 2018-12-25 ASSESSMENT — MIFFLIN-ST. JEOR: SCORE: 1903.61

## 2018-12-25 NOTE — PROGRESS NOTES
12 hour RN.  Transfers independently.  A/Ox4.  VSS.  Tele SR with PVC.  Denies pain.  Pt continues on Heparin gtt.  Plan to have left and right heart cath Wednesday.  Pt voiding w/o difficulty.  Ambulated up in [place] x1.

## 2018-12-25 NOTE — PROGRESS NOTES
[facility]  Hospitalist Progress Note        Esteban Mcfadden MD   12/25/2018        Interval History:      - no acute issues overnight; denies chest pain ; shortness of breath has improved         Assessment and Plan:      Maurice Jon is a 58-year-old male with a medical history significant for nonalcoholic steatohepatitis with cirrhosis, portal hypertension and thrombocytopenia, chronic atrial fibrillation with recent acute bronchitis, who was placed in observation after elective atrial fibrillation ablation; Hospitalist consulted post procedure after he complained of chest pain and shortness of breath, noted with low grade fever. CXR was noted with pulmonary vascular congestion and patchy opacity in the left lung base. ECHO noted with new LV dysfunction.    # A fib s/p ablation 12/21/18  # Post ablation SOB, likely acute systolic/diastolic CHF  # Likely post ablation pericarditis  - ECHO noted with new onset LV dysfunction with EF of 25-30%, a decrease from prior EF of 50-55%; also noted with right ventricular dilatation and TR  - cardiology following; plans for Left and Right heart cath on 12/26/18; NPO after midnight  - continue Toprol  mg po daily; started lisinopril 2.5 mg daily; decreased spironolactone 25 mg po bid; weight still slightly up; IV lasix switched to 40 mg po Bid on 12/24/18; meds adjustment per cardiology  - holding coumadin; continue heparin drip  - continue Aspirin 81 mg daily  - on cochicine 0.6 mg po bid  - low grade fever has improved; was recently treated with Z-pack for presumed acute bronchitis    # Steatohepatitis and cirrhosis with portal hypertension, thrombocytopenia and esophageal varices.    - The patient is followed by GI at [facility].  Has a diagnosis of portal vein thrombosis as well.  LFTs appear stable and platelet count as well is stable.  PTA Lasix and losartan continued as above.    - Continue compression stockings.     #.   "Obstructive sleep apnea.  Continue CPAP.   8.  Gastroesophageal reflux disease.  PTA Protonix continued.   9.  Obesity.  Per PCP.   10.  Deep venous thrombosis prophylaxis.  On heparin drip; to resume Coumadin post cath when ok with cardiology.     Disposition: pending cardiology clearance after cardiac cath                     Physical Exam:      Blood pressure 107/66, pulse 100, temperature 98.1  F (36.7  C), temperature source Oral, resp. rate 16, height 1.664 m (5' 5.5\"), weight 114.9 kg (253 lb 4.2 oz), SpO2 97 %.  Vitals:    12/24/18 0730 12/24/18 1641 12/25/18 0400   Weight: 115 kg (253 lb 9.6 oz) 115.1 kg (253 lb 12.8 oz) 114.9 kg (253 lb 4.2 oz)     Vital Signs with Ranges  Temp:  [98.1  F (36.7  C)-98.3  F (36.8  C)] 98.1  F (36.7  C)  Heart Rate:  [76-88] 77  Resp:  [16-18] 16  BP: (107-111)/(52-66) 107/66  SpO2:  [93 %-97 %] 97 %  I/O's Last 24 hours  I/O last 3 completed shifts:  In: 1200 [P.O.:1200]  Out: 450 [Urine:450]    Constitutional: Alert, awake and orienetd X 3; sitting up in chair; in no apparent distress   HEENT: Pupils equal and reactive to light and accomodation, EOMI intact; neck supple no raised JVD or rigidity    Oral cavity: Moist mucosa   Cardiovascular: Normal s1 s2, regular rate and rhythm, no murmur   Lungs: B/l caorse breath sounds   Abdomen: Soft, nt, nd, no guarding, rigidity or rebound; BS +   LE : Mild b/l edema +; compression stockings in place    Musculoskeletal: Power 5/5 in all extremities   Neuro: No focal neurological deficits noted, CN II to XII grossly intact   Psychiatry: normal mood and affect                Medications:          [START ON 12/26/2018] aspirin  81 mg Oral Daily     colchicine  0.6 mg Oral BID     furosemide  40 mg Oral BID     lisinopril  2.5 mg Oral Daily     metoprolol succinate ER  100 mg Oral At Bedtime     pantoprazole  40 mg Oral Daily     sodium chloride (PF)  3 mL Intracatheter Q8H     sodium chloride (PF)  3 mL Intracatheter Q8H     " spironolactone  25 mg Oral BID     warfarin-No DOSE today  1 each Does not apply no dose today (warfarin)     PRN Meds: acetaminophen, albuterol, lidocaine 4%, lidocaine 4%, lidocaine (buffered or not buffered), lidocaine (buffered or not buffered), naloxone, nitroGLYcerin, oxyCODONE, - MEDICATION INSTRUCTIONS -, sodium chloride (PF), sodium chloride (PF), Warfarin Therapy Reminder         Data:      All new lab and imaging data was reviewed.   Recent Labs   Lab Test 12/25/18  0545 12/25/18  0020 12/24/18  1625 12/24/18  1505  12/23/18  0615 12/23/18  0115  12/21/18  1020   WBC  --   --  4.6  --   --  6.8 6.6  --  4.0   HGB  --   --  12.4*  --   --   --  12.6*  --  13.0*   MCV  --   --  101*  --   --   --  101*  --  100   PLT  --   --  65*  --   --   --  65*  --  63*   INR 1.69* 1.87*  --  1.92*   < > 3.03*  --    < > 2.35*    < > = values in this interval not displayed.      Recent Labs   Lab Test 12/25/18  0545 12/24/18  0652 12/21/18  1020    134 140   POTASSIUM 3.9 4.0 3.7   CHLORIDE 105 101 106   CO2 27 26 24   BUN 18 20 14   CR 0.70 0.81 0.73   ANIONGAP 6 7 10   HALEY 8.3* 8.3* 8.3*   * 98 87     Recent Labs   Lab Test 05/10/18  1600 05/16/13  0610 05/14/13  0800   TROPI <0.015 0.026 0.022

## 2018-12-25 NOTE — PLAN OF CARE
Pt is A&Ox4, VSS on RA. Denies pain. Slight MENARD on exertion. Crackles in lower lung lobes. Encouraged IS. Ambulated in horton x3. +2 BLE edema, compression stockings applied. Showered. Tele: SR w/ frequent PACs. Good appetite, reg diet. Hep gtt started @ 1830, 1000 units/hr.   To be NPO for possible cath tomorrow, pending if INR <2.0.

## 2018-12-25 NOTE — PROGRESS NOTES
Assessment and Plan:     This is a 58-year-old male patient who was seen by cardiac EP for chronic atrial fibrillation.  The patient in the past had been cardioverted and subsequently stayed okay in sinus rhythm however more recently had to be admitted for antiarrhythmic loading with sotalol.  Patient of Dr. Evans. He failed that as well and subsequently underwent an A. fib ablation on 21 December.  He has a history of liver disease and cirrhosis and portal hypertension but no bleeding.  Tolerates anticoagulation well he says without bleeding.  Post A. fib ablation patient had some chest discomfort and concerning for procedural pericarditis.  Been treated with anti-inflammatory agents and colchicine for that.  Subsequent echocardiogram was done to assess for pericardial effusion shows new onset LV dysfunction with EF of 25-30%.  In the past his EF was normal at 50-55%.  Patient has premature family history of coronary artery disease.  Echo also showed right ventricular dilatation and TR.    Etiology of his chest pain is likely pericarditis however one cannot rule out coronary artery disease given his severe family history.  He does have biventricular dysfunction and his heart failure may be secondary to atrial fibrillation however occult coronary disease cannot be ruled out.  Given his chest pain and new LV dysfunction we discussed further ischemic workup.  His pre-A. fib ablation CT scan did not show coronary calcium.  However this was not gated to look at the coronary arteries.  Nuc MPI in August was positive, but could not rule out artifact. Given his portal hypertension, edema and new LV dysfunction we will plan on doing a left and right heart catheterization on Wednesday. His has cirrhosis and low platelets and unless there are high risk features that need PCI, we may have to consider medical Rx vs. BMS. We will see what his angiogram shows    Risks and benefits of the procedure were discussed with the  patient in detail that includes the risk of stroke, heart attack, death, emergent stenting or bypass, contrast induced allergic reaction, renal dysfunction (including risks of temporary or permanent dialysis), and pulmonary, sedation, and vascular complications (including bleeding and transfusion) were discussed. Patient understands the overall risks of the procedure and wishes to proceed.     Continue Metoprolol . Started lisinopril 2.5 mg daily 12/24. Decreased spironolactone to 25 BID 12/24 given ACE initiation. Started lasix 40 BID, still volume overloaded.    Continue bridging with heparin and hold warfarin. Will make final recs on anticoagulation plan depending on cath tomorrow. 2g Na diet, fluid restriction, IO charting and weights.    Gualberto Farah MD        Interval History:   No overnight events doing well.  No chest pain          Medications:     Medications Prior to Admission   Medication Sig Dispense Refill Last Dose     azithromycin (ZITHROMAX) 250 MG tablet Take 500 mg on the first day, then 250 mg daily for 4 days 6 tablet 0 Past Month at Unknown time     calcium carb 1250 mg, 500 mg [place],/vitamin D 200 units (OSCAL WITH D) 500-200 MG-UNIT per tablet Take 2 tablets by mouth daily with food.   Past Week at Unknown time     furosemide (LASIX) 40 MG tablet Take 0.5 tablets (20 mg) by mouth daily 45 tablet 3 12/20/2018 at Unknown time     spironolactone (ALDACTONE) 25 MG tablet Take 1 tablet (25 mg) by mouth daily We will no longer fill unless seen by cardiology 90 tablet 3 12/20/2018 at Unknown time     warfarin (COUMADIN) 2.5 MG tablet 1.25 mg Fridays; 2.5mg all other days or As directed by Anticoagulation Clinic  90 tablet 0 12/20/2018 at Unknown time     mometasone-formoterol (DULERA) 200-5 MCG/ACT oral inhaler Inhale 2 puffs into the lungs 2 times daily as needed Appt DUE Jan 2017 1 Inhaler 1 More than a month at Unknown time     order for DME Equipment being ordered:   New CPAP mask, tube,  filters,water tray 1 Device 3 Taking     order for DME Open toe size large 30/40 Mediven Assure Medical Compression socks Model 07846. 4 Package 3 Taking     ORDER FOR DME Respironics RemStar 60 Series Auto AFlex 12-15 cm H2O with heated humidty and a modem.  Pt chose a Quattro Air FFM size medium.   Taking     ORDER FOR DME Juzo compression stockings, 30-40mm compression. Patient has portal hypertension and develops leg edema, which these control well. 2 Package 3 Taking     [DISCONTINUED] pantoprazole (PROTONIX) 40 MG EC tablet Take 1 tablet (40 mg) by mouth daily as needed 90 tablet 3 12/21/2018 at Unknown time     [DISCONTINUED] sotalol (BETAPACE) 120 MG tablet Take 1 tablet (120 mg) by mouth every 12 hours 60 tablet 11 Past Week at Unknown time            Physical Exam:     Patient Vitals for the past 24 hrs:   BP Temp Temp src Heart Rate Resp SpO2 Weight   12/25/18 0800 107/66 98.1  F (36.7  C) Oral 77 16 97 % --   12/25/18 0400 111/58 98.1  F (36.7  C) Oral 80 16 97 % 114.9 kg (253 lb 4.2 oz)   12/25/18 0000 111/60 98.2  F (36.8  C) Oral 88 16 96 % --   12/24/18 2047 109/52 98.3  F (36.8  C) Oral 85 16 95 % --   12/24/18 1641 -- -- -- -- -- -- 115.1 kg (253 lb 12.8 oz)   12/24/18 1440 111/62 -- -- 76 18 93 % --     Vitals:    12/21/18 0943 12/21/18 2229 12/22/18 0515 12/23/18 0838   Weight: 110.5 kg (243 lb 9.6 oz) 114.5 kg (252 lb 8 oz) 112.2 kg (247 lb 4.8 oz) 116.3 kg (256 lb 8 oz)    12/24/18 0500 12/24/18 0730 12/24/18 1641 12/25/18 0400   Weight: 115.7 kg (255 lb) 115 kg (253 lb 9.6 oz) 115.1 kg (253 lb 12.8 oz) 114.9 kg (253 lb 4.2 oz)         Intake/Output Summary (Last 24 hours) at 12/24/2018 1404  Last data filed at 12/24/2018 0800  Gross per 24 hour   Intake 2040 ml   Output 325 ml   Net 1715 ml       12/20 0700 - 12/25 0659  In: 6319 [P.O.:5516; I.V.:803]  Out: 2975 [Urine:2975]  Net: 3344    Exam:  GENERAL APPEARANCE ADULT: Alert, in no acute distress  RESP: lungs clear to auscultation   CV: RRR  no rubs or gallops  ABDOMEN: normal bowel sounds, soft, nontender possible ascites  EXTREMITIES: 2+ Edema         Data:   LABS (Last four results)  CMP  Recent Labs   Lab 12/25/18  0545 12/24/18  0652 12/22/18  0621 12/21/18  1020    134  --  140   POTASSIUM 3.9 4.0  --  3.7   CHLORIDE 105 101  --  106   CO2 27 26  --  24   ANIONGAP 6 7  --  10   * 98  --  87   BUN 18 20  --  14   CR 0.70 0.81  --  0.73   GFRESTIMATED >90 >90  --  >90   GFRESTBLACK >90 >90  --  >90   HALEY 8.3* 8.3*  --  8.3*   PROTTOTAL  --   --  5.7*  --    ALBUMIN  --   --  2.9*  --    BILITOTAL  --   --  1.3  --    ALKPHOS  --   --  73  --    AST  --   --  70*  --    ALT  --   --  45  --      CBC  Recent Labs   Lab 12/24/18  1625 12/23/18  0615 12/23/18  0115 12/21/18  1020   WBC 4.6 6.8 6.6 4.0   RBC 3.60*  --  3.61* 3.76*   HGB 12.4*  --  12.6* 13.0*   HCT 36.4*  --  36.5* 37.5*   *  --  101* 100   MCH 34.4*  --  34.9* 34.6*   MCHC 34.1  --  34.5 34.7   RDW 14.2  --  13.9 13.8   PLT 65*  --  65* 63*     INR  Recent Labs   Lab 12/25/18  0545 12/25/18  0020 12/24/18  1505 12/24/18  0652   INR 1.69* 1.87* 1.92* 2.10*     TROPONINS   Lab Results   Component Value Date    TROPI <0.015 05/10/2018    TROPI 0.026 05/16/2013    TROPI 0.022 05/14/2013    TROPI 0.012 10/08/2009    TROPONIN 0.02 04/05/2006    TROPONIN <0.02 04/04/2006                                                                                                               Gualberto Farah MD

## 2018-12-25 NOTE — PROVIDER NOTIFICATION
"MD Notification    Notified Person: MD-Cardiology    Notified Person Name:    Notification Date/Time:12/24/18 4275    Notification Interaction: \"no further instructions\" telephone per Dr March.     Purpose of Notification:Update that patient is refusing  due tonight part of pre angio order set.  \"Refusing due to history of Liver cirrhosis and thrombocytopenia, will not take.\"    Orders Received: \"no further instructions\" telephone per Dr March.    Comments:  "

## 2018-12-26 ENCOUNTER — TELEPHONE (OUTPATIENT)
Dept: FAMILY MEDICINE | Facility: CLINIC | Age: 58
End: 2018-12-26

## 2018-12-26 ENCOUNTER — SURGERY (OUTPATIENT)
Age: 58
End: 2018-12-26
Payer: COMMERCIAL

## 2018-12-26 DIAGNOSIS — R60.9 EDEMA, UNSPECIFIED TYPE: ICD-10-CM

## 2018-12-26 DIAGNOSIS — I50.9 CHRONIC CONGESTIVE HEART FAILURE, UNSPECIFIED HEART FAILURE TYPE (H): Primary | ICD-10-CM

## 2018-12-26 DIAGNOSIS — G47.33 OSA (OBSTRUCTIVE SLEEP APNEA): Primary | ICD-10-CM

## 2018-12-26 LAB
CO2 BLD-SCNC: 24 MMOL/L (ref 21–28)
ERYTHROCYTE [DISTWIDTH] IN BLOOD BY AUTOMATED COUNT: 14.1 % (ref 10–15)
HCT VFR BLD AUTO: 38.9 % (ref 40–53)
HGB BLD-MCNC: 13.4 G/DL (ref 13.3–17.7)
INR PPP: 1.52 (ref 0.86–1.14)
INR PPP: 1.61 (ref 0.86–1.14)
KCT BLD-ACNC: 180 SEC (ref 75–150)
LMWH PPP CHRO-ACNC: 0.35 IU/ML
MCH RBC QN AUTO: 34.7 PG (ref 26.5–33)
MCHC RBC AUTO-ENTMCNC: 34.4 G/DL (ref 31.5–36.5)
MCV RBC AUTO: 101 FL (ref 78–100)
PCO2 BLD: 37 MM HG (ref 35–45)
PH BLD: 7.41 PH (ref 7.35–7.45)
PLATELET # BLD AUTO: 80 10E9/L (ref 150–450)
PO2 BLD: 90 MM HG (ref 80–105)
RBC # BLD AUTO: 3.86 10E12/L (ref 4.4–5.9)
SAO2 % BLDA FROM PO2: 97 % (ref 92–100)
WBC # BLD AUTO: 3.4 10E9/L (ref 4–11)

## 2018-12-26 PROCEDURE — 25000132 ZZH RX MED GY IP 250 OP 250 PS 637: Performed by: INTERNAL MEDICINE

## 2018-12-26 PROCEDURE — 27210794 ZZH OR GENERAL SUPPLY STERILE: Performed by: INTERNAL MEDICINE

## 2018-12-26 PROCEDURE — 25000132 ZZH RX MED GY IP 250 OP 250 PS 637

## 2018-12-26 PROCEDURE — 85347 COAGULATION TIME ACTIVATED: CPT

## 2018-12-26 PROCEDURE — 93460 R&L HRT ART/VENTRICLE ANGIO: CPT | Mod: 26 | Performed by: INTERNAL MEDICINE

## 2018-12-26 PROCEDURE — 99232 SBSQ HOSP IP/OBS MODERATE 35: CPT | Mod: 25 | Performed by: INTERNAL MEDICINE

## 2018-12-26 PROCEDURE — 40000852 ZZH STATISTIC HEART CATH LAB OR EP LAB

## 2018-12-26 PROCEDURE — 85520 HEPARIN ASSAY: CPT | Performed by: INTERNAL MEDICINE

## 2018-12-26 PROCEDURE — 85027 COMPLETE CBC AUTOMATED: CPT | Performed by: HOSPITALIST

## 2018-12-26 PROCEDURE — 99152 MOD SED SAME PHYS/QHP 5/>YRS: CPT

## 2018-12-26 PROCEDURE — 25000128 H RX IP 250 OP 636

## 2018-12-26 PROCEDURE — 93010 ELECTROCARDIOGRAM REPORT: CPT | Performed by: INTERNAL MEDICINE

## 2018-12-26 PROCEDURE — 85610 PROTHROMBIN TIME: CPT | Performed by: INTERNAL MEDICINE

## 2018-12-26 PROCEDURE — 99232 SBSQ HOSP IP/OBS MODERATE 35: CPT | Performed by: HOSPITALIST

## 2018-12-26 PROCEDURE — 02HQ32Z INSERTION OF MONITORING DEVICE INTO RIGHT PULMONARY ARTERY, PERCUTANEOUS APPROACH: ICD-10-PCS | Performed by: INTERNAL MEDICINE

## 2018-12-26 PROCEDURE — 36415 COLL VENOUS BLD VENIPUNCTURE: CPT | Performed by: INTERNAL MEDICINE

## 2018-12-26 PROCEDURE — 25000125 ZZHC RX 250: Performed by: INTERNAL MEDICINE

## 2018-12-26 PROCEDURE — 99153 MOD SED SAME PHYS/QHP EA: CPT

## 2018-12-26 PROCEDURE — 4A023N8 MEASUREMENT OF CARDIAC SAMPLING AND PRESSURE, BILATERAL, PERCUTANEOUS APPROACH: ICD-10-PCS | Performed by: INTERNAL MEDICINE

## 2018-12-26 PROCEDURE — 4A1239Z MONITORING OF CARDIAC OUTPUT, PERCUTANEOUS APPROACH: ICD-10-PCS | Performed by: INTERNAL MEDICINE

## 2018-12-26 PROCEDURE — 21400004 ZZH R&B CCU CSC INTERMEDIATE

## 2018-12-26 PROCEDURE — 93005 ELECTROCARDIOGRAM TRACING: CPT

## 2018-12-26 PROCEDURE — C1725 CATH, TRANSLUMIN NON-LASER: HCPCS | Performed by: INTERNAL MEDICINE

## 2018-12-26 PROCEDURE — 25000128 H RX IP 250 OP 636: Performed by: INTERNAL MEDICINE

## 2018-12-26 PROCEDURE — C1894 INTRO/SHEATH, NON-LASER: HCPCS | Performed by: INTERNAL MEDICINE

## 2018-12-26 PROCEDURE — C1887 CATHETER, GUIDING: HCPCS | Performed by: INTERNAL MEDICINE

## 2018-12-26 PROCEDURE — 4A133B3 MONITORING OF ARTERIAL PRESSURE, PULMONARY, PERCUTANEOUS APPROACH: ICD-10-PCS | Performed by: INTERNAL MEDICINE

## 2018-12-26 PROCEDURE — 82803 BLOOD GASES ANY COMBINATION: CPT

## 2018-12-26 PROCEDURE — 36415 COLL VENOUS BLD VENIPUNCTURE: CPT | Performed by: HOSPITALIST

## 2018-12-26 PROCEDURE — 99152 MOD SED SAME PHYS/QHP 5/>YRS: CPT | Performed by: INTERNAL MEDICINE

## 2018-12-26 PROCEDURE — 93460 R&L HRT ART/VENTRICLE ANGIO: CPT | Performed by: INTERNAL MEDICINE

## 2018-12-26 PROCEDURE — B2111ZZ FLUOROSCOPY OF MULTIPLE CORONARY ARTERIES USING LOW OSMOLAR CONTRAST: ICD-10-PCS | Performed by: INTERNAL MEDICINE

## 2018-12-26 RX ORDER — FENTANYL CITRATE 50 UG/ML
25-50 INJECTION, SOLUTION INTRAMUSCULAR; INTRAVENOUS
Status: ACTIVE | OUTPATIENT
Start: 2018-12-26 | End: 2018-12-27

## 2018-12-26 RX ORDER — VERAPAMIL HYDROCHLORIDE 2.5 MG/ML
1-2.5 INJECTION, SOLUTION INTRAVENOUS
Status: DISCONTINUED | OUTPATIENT
Start: 2018-12-26 | End: 2018-12-26 | Stop reason: HOSPADM

## 2018-12-26 RX ORDER — ATROPINE SULFATE 0.1 MG/ML
0.5 INJECTION INTRAVENOUS EVERY 5 MIN PRN
Status: ACTIVE | OUTPATIENT
Start: 2018-12-26 | End: 2018-12-27

## 2018-12-26 RX ORDER — NICARDIPINE HYDROCHLORIDE 2.5 MG/ML
100 INJECTION INTRAVENOUS
Status: DISCONTINUED | OUTPATIENT
Start: 2018-12-26 | End: 2018-12-26 | Stop reason: HOSPADM

## 2018-12-26 RX ORDER — PROTAMINE SULFATE 10 MG/ML
25-100 INJECTION, SOLUTION INTRAVENOUS EVERY 5 MIN PRN
Status: DISCONTINUED | OUTPATIENT
Start: 2018-12-26 | End: 2018-12-26 | Stop reason: HOSPADM

## 2018-12-26 RX ORDER — CLOPIDOGREL 300 MG/1
300-600 TABLET, FILM COATED ORAL
Status: DISCONTINUED | OUTPATIENT
Start: 2018-12-26 | End: 2018-12-26 | Stop reason: HOSPADM

## 2018-12-26 RX ORDER — METOPROLOL TARTRATE 1 MG/ML
5 INJECTION, SOLUTION INTRAVENOUS EVERY 5 MIN PRN
Status: DISCONTINUED | OUTPATIENT
Start: 2018-12-26 | End: 2018-12-26 | Stop reason: HOSPADM

## 2018-12-26 RX ORDER — NITROGLYCERIN 20 MG/100ML
.07-1.79 INJECTION INTRAVENOUS CONTINUOUS PRN
Status: DISCONTINUED | OUTPATIENT
Start: 2018-12-26 | End: 2018-12-26 | Stop reason: HOSPADM

## 2018-12-26 RX ORDER — NIFEDIPINE 10 MG/1
10 CAPSULE ORAL
Status: DISCONTINUED | OUTPATIENT
Start: 2018-12-26 | End: 2018-12-26 | Stop reason: HOSPADM

## 2018-12-26 RX ORDER — DOBUTAMINE HYDROCHLORIDE 200 MG/100ML
2-20 INJECTION INTRAVENOUS CONTINUOUS PRN
Status: DISCONTINUED | OUTPATIENT
Start: 2018-12-26 | End: 2018-12-26 | Stop reason: HOSPADM

## 2018-12-26 RX ORDER — ONDANSETRON 2 MG/ML
4 INJECTION INTRAMUSCULAR; INTRAVENOUS EVERY 4 HOURS PRN
Status: DISCONTINUED | OUTPATIENT
Start: 2018-12-26 | End: 2018-12-26 | Stop reason: HOSPADM

## 2018-12-26 RX ORDER — DEXTROSE MONOHYDRATE 25 G/50ML
12.5-5 INJECTION, SOLUTION INTRAVENOUS EVERY 30 MIN PRN
Status: DISCONTINUED | OUTPATIENT
Start: 2018-12-26 | End: 2018-12-26 | Stop reason: HOSPADM

## 2018-12-26 RX ORDER — LIDOCAINE 40 MG/G
CREAM TOPICAL
Status: DISCONTINUED | OUTPATIENT
Start: 2018-12-26 | End: 2018-12-27 | Stop reason: HOSPADM

## 2018-12-26 RX ORDER — LIDOCAINE HYDROCHLORIDE 10 MG/ML
30 INJECTION, SOLUTION EPIDURAL; INFILTRATION; INTRACAUDAL; PERINEURAL
Status: COMPLETED | OUTPATIENT
Start: 2018-12-26 | End: 2018-12-26

## 2018-12-26 RX ORDER — DOPAMINE HYDROCHLORIDE 160 MG/100ML
2-20 INJECTION, SOLUTION INTRAVENOUS CONTINUOUS PRN
Status: DISCONTINUED | OUTPATIENT
Start: 2018-12-26 | End: 2018-12-26 | Stop reason: HOSPADM

## 2018-12-26 RX ORDER — PRASUGREL 10 MG/1
10-60 TABLET, FILM COATED ORAL
Status: DISCONTINUED | OUTPATIENT
Start: 2018-12-26 | End: 2018-12-26 | Stop reason: HOSPADM

## 2018-12-26 RX ORDER — BUPIVACAINE HYDROCHLORIDE 2.5 MG/ML
1-10 INJECTION, SOLUTION EPIDURAL; INFILTRATION; INTRACAUDAL
Status: DISCONTINUED | OUTPATIENT
Start: 2018-12-26 | End: 2018-12-26 | Stop reason: HOSPADM

## 2018-12-26 RX ORDER — NITROGLYCERIN 5 MG/ML
100-500 VIAL (ML) INTRAVENOUS
Status: DISCONTINUED | OUTPATIENT
Start: 2018-12-26 | End: 2018-12-26 | Stop reason: HOSPADM

## 2018-12-26 RX ORDER — ATROPINE SULFATE 0.1 MG/ML
.5-1 INJECTION INTRAVENOUS
Status: DISCONTINUED | OUTPATIENT
Start: 2018-12-26 | End: 2018-12-26 | Stop reason: HOSPADM

## 2018-12-26 RX ORDER — PHENYLEPHRINE HCL IN 0.9% NACL 1 MG/10 ML
20-100 SYRINGE (ML) INTRAVENOUS
Status: DISCONTINUED | OUTPATIENT
Start: 2018-12-26 | End: 2018-12-26 | Stop reason: HOSPADM

## 2018-12-26 RX ORDER — NALOXONE HYDROCHLORIDE 0.4 MG/ML
.2-.4 INJECTION, SOLUTION INTRAMUSCULAR; INTRAVENOUS; SUBCUTANEOUS
Status: ACTIVE | OUTPATIENT
Start: 2018-12-26 | End: 2018-12-27

## 2018-12-26 RX ORDER — PROPOFOL 10 MG/ML
5-75 INJECTION, EMULSION INTRAVENOUS CONTINUOUS
Status: DISCONTINUED | OUTPATIENT
Start: 2018-12-26 | End: 2018-12-26 | Stop reason: HOSPADM

## 2018-12-26 RX ORDER — ASPIRIN 81 MG/1
81-324 TABLET, CHEWABLE ORAL
Status: DISCONTINUED | OUTPATIENT
Start: 2018-12-26 | End: 2018-12-26 | Stop reason: HOSPADM

## 2018-12-26 RX ORDER — FLUMAZENIL 0.1 MG/ML
0.2 INJECTION, SOLUTION INTRAVENOUS
Status: DISCONTINUED | OUTPATIENT
Start: 2018-12-26 | End: 2018-12-26 | Stop reason: HOSPADM

## 2018-12-26 RX ORDER — LORAZEPAM 2 MG/ML
0.5 INJECTION INTRAMUSCULAR
Status: DISCONTINUED | OUTPATIENT
Start: 2018-12-26 | End: 2018-12-26 | Stop reason: HOSPADM

## 2018-12-26 RX ORDER — NITROGLYCERIN 5 MG/ML
100-200 VIAL (ML) INTRAVENOUS
Status: DISCONTINUED | OUTPATIENT
Start: 2018-12-26 | End: 2018-12-26 | Stop reason: HOSPADM

## 2018-12-26 RX ORDER — CLOPIDOGREL BISULFATE 75 MG/1
75 TABLET ORAL
Status: DISCONTINUED | OUTPATIENT
Start: 2018-12-26 | End: 2018-12-26 | Stop reason: HOSPADM

## 2018-12-26 RX ORDER — SODIUM NITROPRUSSIDE 25 MG/ML
100-200 INJECTION INTRAVENOUS
Status: DISCONTINUED | OUTPATIENT
Start: 2018-12-26 | End: 2018-12-26 | Stop reason: HOSPADM

## 2018-12-26 RX ORDER — EPINEPHRINE 1 MG/ML
0.3 INJECTION, SOLUTION, CONCENTRATE INTRAVENOUS
Status: DISCONTINUED | OUTPATIENT
Start: 2018-12-26 | End: 2018-12-26 | Stop reason: HOSPADM

## 2018-12-26 RX ORDER — FLUMAZENIL 0.1 MG/ML
0.2 INJECTION, SOLUTION INTRAVENOUS
Status: ACTIVE | OUTPATIENT
Start: 2018-12-26 | End: 2018-12-27

## 2018-12-26 RX ORDER — HYDRALAZINE HYDROCHLORIDE 20 MG/ML
10-20 INJECTION INTRAMUSCULAR; INTRAVENOUS
Status: DISCONTINUED | OUTPATIENT
Start: 2018-12-26 | End: 2018-12-26 | Stop reason: HOSPADM

## 2018-12-26 RX ORDER — NALOXONE HYDROCHLORIDE 0.4 MG/ML
.1-.4 INJECTION, SOLUTION INTRAMUSCULAR; INTRAVENOUS; SUBCUTANEOUS
Status: DISCONTINUED | OUTPATIENT
Start: 2018-12-26 | End: 2018-12-27 | Stop reason: HOSPADM

## 2018-12-26 RX ORDER — ASPIRIN 325 MG
325 TABLET ORAL
Status: DISCONTINUED | OUTPATIENT
Start: 2018-12-26 | End: 2018-12-26 | Stop reason: HOSPADM

## 2018-12-26 RX ORDER — LORAZEPAM 0.5 MG/1
0.5 TABLET ORAL
Status: DISCONTINUED | OUTPATIENT
Start: 2018-12-26 | End: 2018-12-26 | Stop reason: HOSPADM

## 2018-12-26 RX ORDER — NITROGLYCERIN 0.4 MG/1
0.4 TABLET SUBLINGUAL EVERY 5 MIN PRN
Status: DISCONTINUED | OUTPATIENT
Start: 2018-12-26 | End: 2018-12-26 | Stop reason: HOSPADM

## 2018-12-26 RX ORDER — ACETAMINOPHEN 325 MG/1
325-650 TABLET ORAL EVERY 4 HOURS PRN
Status: DISCONTINUED | OUTPATIENT
Start: 2018-12-26 | End: 2018-12-27 | Stop reason: HOSPADM

## 2018-12-26 RX ORDER — FUROSEMIDE 10 MG/ML
20-100 INJECTION INTRAMUSCULAR; INTRAVENOUS
Status: DISCONTINUED | OUTPATIENT
Start: 2018-12-26 | End: 2018-12-26 | Stop reason: HOSPADM

## 2018-12-26 RX ORDER — SODIUM CHLORIDE 9 MG/ML
INJECTION, SOLUTION INTRAVENOUS CONTINUOUS
Status: DISCONTINUED | OUTPATIENT
Start: 2018-12-26 | End: 2018-12-27

## 2018-12-26 RX ORDER — LIDOCAINE HYDROCHLORIDE 10 MG/ML
1-10 INJECTION, SOLUTION EPIDURAL; INFILTRATION; INTRACAUDAL; PERINEURAL
Status: COMPLETED | OUTPATIENT
Start: 2018-12-26 | End: 2018-12-26

## 2018-12-26 RX ORDER — ENALAPRILAT 1.25 MG/ML
1.25-2.5 INJECTION INTRAVENOUS
Status: DISCONTINUED | OUTPATIENT
Start: 2018-12-26 | End: 2018-12-26 | Stop reason: HOSPADM

## 2018-12-26 RX ORDER — FENTANYL CITRATE 50 UG/ML
INJECTION, SOLUTION INTRAMUSCULAR; INTRAVENOUS
Status: DISCONTINUED | OUTPATIENT
Start: 2018-12-26 | End: 2018-12-26 | Stop reason: HOSPADM

## 2018-12-26 RX ORDER — LORAZEPAM 2 MG/ML
.5-2 INJECTION INTRAMUSCULAR EVERY 4 HOURS PRN
Status: DISCONTINUED | OUTPATIENT
Start: 2018-12-26 | End: 2018-12-26 | Stop reason: HOSPADM

## 2018-12-26 RX ORDER — OXYCODONE HYDROCHLORIDE 5 MG/1
5 TABLET ORAL EVERY 4 HOURS PRN
Status: DISCONTINUED | OUTPATIENT
Start: 2018-12-26 | End: 2018-12-27 | Stop reason: HOSPADM

## 2018-12-26 RX ORDER — METHYLPREDNISOLONE SODIUM SUCCINATE 125 MG/2ML
125 INJECTION, POWDER, LYOPHILIZED, FOR SOLUTION INTRAMUSCULAR; INTRAVENOUS
Status: DISCONTINUED | OUTPATIENT
Start: 2018-12-26 | End: 2018-12-26 | Stop reason: HOSPADM

## 2018-12-26 RX ORDER — MORPHINE SULFATE 2 MG/ML
1-2 INJECTION, SOLUTION INTRAMUSCULAR; INTRAVENOUS EVERY 5 MIN PRN
Status: DISCONTINUED | OUTPATIENT
Start: 2018-12-26 | End: 2018-12-26 | Stop reason: HOSPADM

## 2018-12-26 RX ORDER — NALOXONE HYDROCHLORIDE 0.4 MG/ML
0.4 INJECTION, SOLUTION INTRAMUSCULAR; INTRAVENOUS; SUBCUTANEOUS EVERY 5 MIN PRN
Status: DISCONTINUED | OUTPATIENT
Start: 2018-12-26 | End: 2018-12-26 | Stop reason: HOSPADM

## 2018-12-26 RX ORDER — WARFARIN SODIUM 5 MG/1
5 TABLET ORAL
Status: COMPLETED | OUTPATIENT
Start: 2018-12-26 | End: 2018-12-26

## 2018-12-26 RX ORDER — ASPIRIN 81 MG/1
81 TABLET ORAL DAILY
Status: DISCONTINUED | OUTPATIENT
Start: 2018-12-27 | End: 2018-12-27 | Stop reason: HOSPADM

## 2018-12-26 RX ORDER — POTASSIUM CHLORIDE 7.45 MG/ML
10 INJECTION INTRAVENOUS
Status: DISCONTINUED | OUTPATIENT
Start: 2018-12-26 | End: 2018-12-26 | Stop reason: HOSPADM

## 2018-12-26 RX ORDER — ADENOSINE 3 MG/ML
12-12000 INJECTION, SOLUTION INTRAVENOUS
Status: DISCONTINUED | OUTPATIENT
Start: 2018-12-26 | End: 2018-12-26 | Stop reason: HOSPADM

## 2018-12-26 RX ORDER — DIPHENHYDRAMINE HYDROCHLORIDE 50 MG/ML
25-50 INJECTION INTRAMUSCULAR; INTRAVENOUS
Status: DISCONTINUED | OUTPATIENT
Start: 2018-12-26 | End: 2018-12-26 | Stop reason: HOSPADM

## 2018-12-26 RX ORDER — HEPARIN SODIUM 1000 [USP'U]/ML
1000-10000 INJECTION, SOLUTION INTRAVENOUS; SUBCUTANEOUS EVERY 5 MIN PRN
Status: DISCONTINUED | OUTPATIENT
Start: 2018-12-26 | End: 2018-12-26 | Stop reason: HOSPADM

## 2018-12-26 RX ORDER — FENTANYL CITRATE 50 UG/ML
25-50 INJECTION, SOLUTION INTRAMUSCULAR; INTRAVENOUS
Status: DISCONTINUED | OUTPATIENT
Start: 2018-12-26 | End: 2018-12-26 | Stop reason: HOSPADM

## 2018-12-26 RX ORDER — POTASSIUM CHLORIDE 29.8 MG/ML
20 INJECTION INTRAVENOUS
Status: DISCONTINUED | OUTPATIENT
Start: 2018-12-26 | End: 2018-12-26 | Stop reason: HOSPADM

## 2018-12-26 RX ORDER — PROPOFOL 10 MG/ML
10-20 INJECTION, EMULSION INTRAVENOUS EVERY 30 MIN PRN
Status: DISCONTINUED | OUTPATIENT
Start: 2018-12-26 | End: 2018-12-26 | Stop reason: HOSPADM

## 2018-12-26 RX ORDER — PROTAMINE SULFATE 10 MG/ML
1-5 INJECTION, SOLUTION INTRAVENOUS
Status: DISCONTINUED | OUTPATIENT
Start: 2018-12-26 | End: 2018-12-26 | Stop reason: HOSPADM

## 2018-12-26 RX ORDER — HYDROCODONE BITARTRATE AND ACETAMINOPHEN 5; 325 MG/1; MG/1
1-2 TABLET ORAL EVERY 4 HOURS PRN
Status: DISCONTINUED | OUTPATIENT
Start: 2018-12-26 | End: 2018-12-26

## 2018-12-26 RX ADMIN — COLCHICINE 0.6 MG: 0.6 TABLET, FILM COATED ORAL at 08:07

## 2018-12-26 RX ADMIN — MIDAZOLAM 1 MG: 1 INJECTION INTRAMUSCULAR; INTRAVENOUS at 13:25

## 2018-12-26 RX ADMIN — LISINOPRIL 2.5 MG: 2.5 TABLET ORAL at 08:06

## 2018-12-26 RX ADMIN — WARFARIN SODIUM 5 MG: 5 TABLET ORAL at 21:12

## 2018-12-26 RX ADMIN — MIDAZOLAM 1 MG: 1 INJECTION INTRAMUSCULAR; INTRAVENOUS at 13:20

## 2018-12-26 RX ADMIN — COLCHICINE 0.6 MG: 0.6 TABLET, FILM COATED ORAL at 21:12

## 2018-12-26 RX ADMIN — METOPROLOL SUCCINATE 100 MG: 100 TABLET, EXTENDED RELEASE ORAL at 21:13

## 2018-12-26 RX ADMIN — LIDOCAINE HYDROCHLORIDE 5 ML: 10 INJECTION, SOLUTION EPIDURAL; INFILTRATION; INTRACAUDAL; PERINEURAL at 13:24

## 2018-12-26 RX ADMIN — FUROSEMIDE 40 MG: 40 TABLET ORAL at 08:06

## 2018-12-26 RX ADMIN — SPIRONOLACTONE 25 MG: 25 TABLET ORAL at 08:06

## 2018-12-26 RX ADMIN — PANTOPRAZOLE SODIUM 40 MG: 40 TABLET, DELAYED RELEASE ORAL at 08:07

## 2018-12-26 RX ADMIN — LIDOCAINE HYDROCHLORIDE 1 ML: 10 INJECTION, SOLUTION EPIDURAL; INFILTRATION; INTRACAUDAL; PERINEURAL at 13:31

## 2018-12-26 RX ADMIN — FENTANYL CITRATE 50 MCG: 50 INJECTION, SOLUTION INTRAMUSCULAR; INTRAVENOUS at 13:43

## 2018-12-26 RX ADMIN — ASPIRIN 81 MG: 81 TABLET, COATED ORAL at 08:06

## 2018-12-26 RX ADMIN — FUROSEMIDE 40 MG: 40 TABLET ORAL at 17:41

## 2018-12-26 RX ADMIN — FENTANYL CITRATE 50 MCG: 50 INJECTION, SOLUTION INTRAMUSCULAR; INTRAVENOUS at 13:23

## 2018-12-26 RX ADMIN — HEPARIN SODIUM 4000 UNITS: 1000 INJECTION, SOLUTION INTRAVENOUS; SUBCUTANEOUS at 13:43

## 2018-12-26 RX ADMIN — SPIRONOLACTONE 25 MG: 25 TABLET ORAL at 17:41

## 2018-12-26 RX ADMIN — HEPARIN SODIUM 1300 UNITS/HR: 10000 INJECTION, SOLUTION INTRAVENOUS at 02:10

## 2018-12-26 ASSESSMENT — ACTIVITIES OF DAILY LIVING (ADL)
ADLS_ACUITY_SCORE: 11

## 2018-12-26 ASSESSMENT — MIFFLIN-ST. JEOR: SCORE: 1869.75

## 2018-12-26 NOTE — TELEPHONE ENCOUNTER
Reason for call:  Symptom   Symptom or request: patient called 12 pair of compression socking due edema, ([facility] fax patient did not know)    Phone number to reach patient:  Home number on file [phone] (home)    Best Time:  any    Can we leave a detailed message on this number?  YES     Maria Fernanda VERNON  Station

## 2018-12-26 NOTE — PROGRESS NOTES
[facility]  Hospitalist Progress Note        Esteban Mcfadden MD   12/26/2018        Interval History:      - no acute issues overnight; denies chest pain ; shortness of breath has improved; had cardiac cath done today; feels tired and fatigued after the cardiac cath         Assessment and Plan:      Maurice Jon is a 58-year-old male with a medical history significant for nonalcoholic steatohepatitis with cirrhosis, portal hypertension and thrombocytopenia, chronic atrial fibrillation with recent acute bronchitis, who was placed in observation after elective atrial fibrillation ablation; Hospitalist consulted post procedure after he complained of chest pain and shortness of breath, noted with low grade fever. CXR was noted with pulmonary vascular congestion and patchy opacity in the left lung base. ECHO noted with new LV dysfunction.    # A fib s/p elective ablation 12/21/18  # Post ablation SOB, likely acute systolic/diastolic CHF  # Likely post ablation pericarditis  - ECHO noted with new onset LV dysfunction with EF of 25-30%, a decrease from prior EF of 50-55%; also noted with right ventricular dilatation and TR  - cardiology following; s/p Left and Right heart cath on 12/26/18--- Minimal coronary artery disease  - continue Toprol  mg po daily; started lisinopril 2.5 mg daily; decreased spironolactone 25 mg po bid; weight still slightly up; IV lasix switched to 40 mg po Bid on 12/24/18; meds adjustment per cardiology  - holding coumadin; continue heparin drip  - continue Aspirin 81 mg daily  - on cochicine 0.6 mg po bid  - low grade fever has improved; was recently treated with Z-pack for presumed acute bronchitis    # Steatohepatitis and cirrhosis with portal hypertension, thrombocytopenia and esophageal varices.    - The patient is followed by GI at [facility].  Has a diagnosis of portal vein thrombosis as well.  LFTs appear stable and platelet count as well is  "stable.  PTA Lasix and losartan continued as above.    - Continue compression stockings.     #.  Obstructive sleep apnea.  Continue CPAP.   8.  Gastroesophageal reflux disease.  PTA Protonix continued.   9.  Obesity.  Per PCP.   10.  Deep venous thrombosis prophylaxis.  On heparin drip; to resume Coumadin post cath when ok with cardiology.     Disposition: pending cardiology clearance after cardiac cath                     Physical Exam:      Blood pressure 115/61, pulse 100, temperature 97.9  F (36.6  C), temperature source Oral, resp. rate 16, height 1.664 m (5' 5.5\"), weight 111.5 kg (245 lb 12.8 oz), SpO2 95 %.  Vitals:    12/24/18 1641 12/25/18 0400 12/26/18 0640   Weight: 115.1 kg (253 lb 12.8 oz) 114.9 kg (253 lb 4.2 oz) 111.5 kg (245 lb 12.8 oz)     Vital Signs with Ranges  Temp:  [97.9  F (36.6  C)-98.4  F (36.9  C)] 97.9  F (36.6  C)  Heart Rate:  [74-90] 77  Resp:  [16] 16  BP: (101-115)/(50-69) 115/61  SpO2:  [94 %-98 %] 95 %  I/O's Last 24 hours  I/O last 3 completed shifts:  In: 730 [P.O.:600; I.V.:130]  Out: 3150 [Urine:3150]    Constitutional: Alert, awake and orienetd X 3; lying in bed; looks fatigued; in no apparent distress   HEENT: Pupils equal and reactive to light and accomodation, EOMI intact; neck supple no raised JVD or rigidity    Oral cavity: Moist mucosa   Cardiovascular: Normal s1 s2, regular rate and rhythm, no murmur   Lungs: B/l caorse breath sounds   Abdomen: Soft, nt, nd, no guarding, rigidity or rebound; BS +   LE : Mild b/l edema +; compression stockings in place    Musculoskeletal: Power 5/5 in all extremities   Neuro: No focal neurological deficits noted, CN II to XII grossly intact   Psychiatry: normal mood and affect                Medications:          colchicine  0.6 mg Oral BID     furosemide  40 mg Oral BID     lisinopril  2.5 mg Oral Daily     metoprolol succinate ER  100 mg Oral At Bedtime     pantoprazole  40 mg Oral Daily     sodium chloride (PF)  3 mL Intracatheter " Q8H     spironolactone  25 mg Oral BID     PRN Meds: acetaminophen, albuterol, naloxone, nitroGLYcerin, oxyCODONE, potassium chloride, potassium chloride with lidocaine, potassium chloride, potassium chloride, potassium chloride, sodium chloride (PF), Warfarin Therapy Reminder         Data:      All new lab and imaging data was reviewed.   Recent Labs   Lab Test 12/26/18  0545 12/26/18  0005 12/25/18  1358  12/24/18  1625  12/23/18  0615 12/23/18  0115  12/21/18  1020   WBC  --   --   --   --  4.6  --  6.8 6.6  --  4.0   HGB  --   --   --   --  12.4*  --   --  12.6*  --  13.0*   MCV  --   --   --   --  101*  --   --  101*  --  100   PLT  --   --   --   --  65*  --   --  65*  --  63*   INR 1.52* 1.61* 1.59*   < >  --    < > 3.03*  --    < > 2.35*    < > = values in this interval not displayed.      Recent Labs   Lab Test 12/25/18  0545 12/24/18  0652 12/21/18  1020    134 140   POTASSIUM 3.9 4.0 3.7   CHLORIDE 105 101 106   CO2 27 26 24   BUN 18 20 14   CR 0.70 0.81 0.73   ANIONGAP 6 7 10   HALEY 8.3* 8.3* 8.3*   * 98 87     Recent Labs   Lab Test 05/10/18  1600 05/16/13  0610 05/14/13  0800   TROPI <0.015 0.026 0.022

## 2018-12-26 NOTE — PHARMACY-ANTICOAGULATION SERVICE
Clinical Pharmacy - Warfarin Dosing Consult     Pharmacy has been consulted to manage this patient s warfarin therapy.  Indication: Atrial Fibrillation  Therapy Goal: INR 2-3  Warfarin Prior to Admission: Yes  Warfarin PTA Regimen: 1.25 mg on Fridays, 2.5 mg on all other days  Dose Comments: Off therapy for 2 days in anticipation of coronary angio.  Now s/p Coronary angio    INR   Date Value Ref Range Status   12/26/2018 1.52 (H) 0.86 - 1.14 Final   12/26/2018 1.61 (H) 0.86 - 1.14 Final       Recommend warfarin 5 mg today.  Pharmacy will monitor Maurice Jon daily and order warfarin doses to achieve specified goal.      Please contact pharmacy as soon as possible if the warfarin needs to be held for a procedure or if the warfarin goals change.        Talha MartinezD

## 2018-12-26 NOTE — PROGRESS NOTES
1250 Pt to Care Suites at this time. No family present at this time. Pt A/O. VSS. No NS infusing per D.O. Left forearm IV site - taped securely. Pulses palpable & documented.  1305 Dr Arroyo at bedside to speak with pt & family. Consent completed at this time.  1307 Pt to Cath Lab / IR / EP Lab at this time.

## 2018-12-26 NOTE — PROGRESS NOTES
[facility]  Cardiology Progress Note  Date of Service: 12/26/2018  Primary Cardiologist: Dr. Evans and Dr. Farah    Assessment & Plan    Maurice Jon is a 58 year old male with past medical history significant for atrial fibrillation s/p ablation 12/21/18, nonalcoholic liver steatohepatitis with cirrhosis, portal hypertension and thrombocytopenia admitted on 12/21/2018 with s/p ablation related to complaints of chest pain. CXR was noted with pulmonary vascular congestion and patchy opacity in the left lung base. ECHO noted with new LV dysfunction (EF 25-30% prior 50-55%). Patient underwent cardiac catheterization today which showed mild nonobstructive coronary artery disease, wedge 18mmHg and  Mean RA 14mmHg.     Assessment:  1.  Atrial fibrillation, chronic, s/p ablation 12/21/18, failed sotalol, amio stopped post ablation, currently on no antiarrhythmic, stable, rate controlled, currently in sinus rhythm. EKG today showed sinus rhythm. Warfarin was held for cardiac catheterization today, restart with bridging at discharge. Reviewed with pharmacist given low platelets, the option of starting eliquis 5mg BID for thromboembolic prophylaxis given lower bleeding risk, patient wishes to resume warfarin therapy. Bilateral groin sites assess, no bruit.    - Reviewed with EP, will reschedule EP followup appt to 1/15/18 in [place] with Nadege Montilla PA-C   - Continue warfarin for 3 months post ablation    2. Heart failure with reduced ejection fraction - new onset, EF 25-30%, prior 50-55%, cardiac catheterization completed today which showed  mild nonobstructive coronary artery disease, wedge 18mmHg and  Mean RA 14mmHg. Patient appears close to euvolemic on exam.    - Continue lisinopril 2.5mg daily   - Continue furosemide 40mg BID   - Continue metoprolol XL 100mg daily   - Continue spironolactone 25mg daily   - Daily weights at home and low sodium diet   - Continue compression stockings   - Enroll in  [facility], post hospital followup within 1 week.     3. Likely post ablation pericarditis - cardiac catheterization today showed mild nonobstructive CAD.    Continue cochine 0.6mg PO BID    4. Cirrhosis with portal hypertension and thrombocytopenia- per IM    5. Obstructive sleep apnea - compliant with CPAP    Plan:   1. Restart warfarin, will require bridging  2. From cardiology standpoint okay for discharge with close outpatient cardiology follow-up for new HFrEF within 1 week, scheduled for 1/3/18 with Natalia Manzo.   3. [place] paperwork needs to be completed, will fax back to patients employer.   4. Reschedule EP followup appt to 1/15/18 in [place] with LOUISA Dinh APRN CNP  [facility]  (M-F, 7:30 - 4:00 pm)     Interval History   Patient seen post cardiac catheterization. Reports feeling good. Denies chest pain or chest tightness. Denies dizziness, lightheadedness or other presyncopal symptoms. Denies palpitations or tachycardia. Denies dyspnea, orthopnea or PND. Typically using CPAP nightly but notes currently note using CPAP as he feels it needs to be cleaned.     Physical Exam   Temp: 98  F (36.7  C) Temp src: Axillary BP: 107/61   Heart Rate: 75 Resp: 16 SpO2: 98 % O2 Device: Oxymask Oxygen Delivery: 4 LPM  Vitals:    12/24/18 1641 12/25/18 0400 12/26/18 0640   Weight: 115.1 kg (253 lb 12.8 oz) 114.9 kg (253 lb 4.2 oz) 111.5 kg (245 lb 12.8 oz)     Constitutional  alert and oriented, in no acute distress.  Skin warm and dry to touch  ENT  no pallor or cyanosis  Neck  No JVP  Lungs clear  Cardiac regular rate and rhythm  Abdomen  abdomen soft, bowel sounds normoactive, obese  Extremities and Back   no clubbing, cyanosis. +1 edema, bilateral femoral pulses palpable, small area of ecchymosis surrounding both access site, soft, no drainage, nonpainful, no bruit.    Neurological  no gross motor deficits noted, affect appropriate, oriented to time, person and place.    Medications     HEParin  1,300 Units/hr (12/26/18 0806)     - MEDICATION INSTRUCTIONS -       Warfarin Therapy Reminder         aspirin  81 mg Oral Daily     colchicine  0.6 mg Oral BID     furosemide  40 mg Oral BID     lisinopril  2.5 mg Oral Daily     metoprolol succinate ER  100 mg Oral At Bedtime     pantoprazole  40 mg Oral Daily     sodium chloride (PF)  3 mL Intracatheter Q8H     sodium chloride (PF)  3 mL Intracatheter Q8H     spironolactone  25 mg Oral BID     Data   Most Recent 3 CBC's:  Recent Labs   Lab Test 12/24/18  1625 12/23/18  0615 12/23/18  0115 12/21/18  1020   WBC 4.6 6.8 6.6 4.0   HGB 12.4*  --  12.6* 13.0*   *  --  101* 100   PLT 65*  --  65* 63*     Most Recent 3 BMP's:  Recent Labs   Lab Test 12/25/18  0545 12/24/18  0652 12/21/18  1020    134 140   POTASSIUM 3.9 4.0 3.7   CHLORIDE 105 101 106   CO2 27 26 24   BUN 18 20 14   CR 0.70 0.81 0.73   ANIONGAP 6 7 10   HALEY 8.3* 8.3* 8.3*   * 98 87     Most Recent 3 BNP's:  Recent Labs   Lab Test 12/25/18  0545 05/16/13  0610 05/15/13  0610   NTBNPI 509 420 268     Last 24 hours labs reviewed     EKG: (reviewed personally) 12/26/18 sinus rhythm with PAC    Imaging:   CXR 12/21/18  1. Prominence to the central pulmonary vasculature, suggestive of  pulmonary vascular congestion.  2. Patchy opacities are present in the left lung base and could  represent pneumonia or atelectasis.  3. Tiny bilateral pleural effusions.    Tele: sinus rhythm    Echo: 12/23/18  The right ventricle is not well visualized but is likely moderate to severely  dilated. The right ventricular systolic function is moderately reduced.  The tricuspid valve is not well visualized and the inferior vena cava not well  visualized for estimation of right atrial pressure. The right ventricular  systolic pressure is approximated at 17mmHg plus the right atrial pressure.  Limited views suggest there is at least moderate to mod-severe (2-3+)  tricuspid regurgitation. The right atrium is severely  dilated.  Left ventricular systolic function is moderate to severely reduced. The visual  ejection fraction is estimated at 25-30%. There is mod-severe global  hypokinesia of the left ventricle. Flattened septum is consistent with RV  pressure/volume overload.     Technically difficult, suboptimal study. Contrast was used without apparent  complications. LV and RV systolic function are both worse compared to the  prior echo.    Last ischemic eval: Cardiac catheterization today (12/26/18)  IMPRESSION:   1. Minimal coronary artery disease  2. Mildly elevated left heart filling pressure (wedge 18 mmHg) and  moderate-severely elevated right heart filling pressure (mean RA 14  mmHg)     HEMODYNAMICS  RIGHT ATRIAL PRESSURE: The mean RA pressure is 14 mmHg  RIGHT VENTRICULAR PRESSURE: 36/8/13  PULMONARY ARTERY PRESSURE: 38/20/26  PULMONARY CAPILLARY WEDGE PRESSURE: Mean wedge is 18 mmHg  CARDIAC OUTPUT AND INDEX: 8.5 l/min and 3.95 l/min/m2 per Leon  LEFT VENTRICULAR END-DIASTOLIC PRESSURE: 15 mmHg

## 2018-12-26 NOTE — PLAN OF CARE
"VSS, O2 sats 97% 4L Oxymask, patient has home CPAP but refusing to use \"believe it is making me ill with infection from being so dirty, has not been cleaned in 2 years\". RN had RT assess and recommended oxymask for overnight and then address cpap needs in morning. Patient denies SOB, does have frequent scant productive weak cough. A/O x3. Quiet. No alarms per pt request,calls appropriately. Call light wthin reach.NPO midnite angio tomorrow. Heparin infusing as charted.  "

## 2018-12-26 NOTE — PROGRESS NOTES
Call from hospitalist, Dr Mcfadden, requesting this nurse clarifies that Cards would like to bridge to coumadin with heparin before discharge.    Spoke to Dr Farah with cardiology who stated that yes he would like the patient to restart heparin gtt 4-6 hours after internal jugular sheath is pulled. The patient will then discharge home with lovenox.     Per Dr Mcfadden, this nurse to place orders for pharmacy to dose heparin.    Jackie Sellers RN 5:53 PM December 26, 2018

## 2018-12-26 NOTE — TELEPHONE ENCOUNTER
Patient needs a new DME order for compression stockings.  Patient would like them sent to [facility].  Patient will call back with fax number.     [facility] toe [facility] large 30/[facility] [employer] [facility]ression socks Model [id number].      Thank you    Violet HUTCHINSON RN

## 2018-12-26 NOTE — PLAN OF CARE
VSS. Monitor remains Sinus rhythm. Pt. Denies pain. Right radial site stable with TR band in place. Right internal jugular catheter in place. Continue to monitor.

## 2018-12-26 NOTE — TELEPHONE ENCOUNTER
Fax [phone].    Will send to provider to review and advise. DME order is pended.    Thank you    Violet HUTCHINSON RN

## 2018-12-27 ENCOUNTER — TELEPHONE (OUTPATIENT)
Dept: FAMILY MEDICINE | Facility: CLINIC | Age: 58
End: 2018-12-27

## 2018-12-27 ENCOUNTER — ANTICOAGULATION THERAPY VISIT (OUTPATIENT)
Dept: ANTICOAGULATION | Facility: CLINIC | Age: 58
End: 2018-12-27

## 2018-12-27 ENCOUNTER — CARE COORDINATION (OUTPATIENT)
Dept: CARDIOLOGY | Facility: CLINIC | Age: 58
End: 2018-12-27

## 2018-12-27 VITALS
HEART RATE: 60 BPM | HEIGHT: 66 IN | BODY MASS INDEX: 38.15 KG/M2 | SYSTOLIC BLOOD PRESSURE: 107 MMHG | OXYGEN SATURATION: 99 % | TEMPERATURE: 98.4 F | WEIGHT: 237.4 LBS | RESPIRATION RATE: 16 BRPM | DIASTOLIC BLOOD PRESSURE: 66 MMHG

## 2018-12-27 DIAGNOSIS — G47.33 OSA (OBSTRUCTIVE SLEEP APNEA): Primary | ICD-10-CM

## 2018-12-27 LAB
ANION GAP SERPL CALCULATED.3IONS-SCNC: 6 MMOL/L (ref 3–14)
BUN SERPL-MCNC: 14 MG/DL (ref 7–30)
CALCIUM SERPL-MCNC: 8.3 MG/DL (ref 8.5–10.1)
CHLORIDE SERPL-SCNC: 103 MMOL/L (ref 94–109)
CO2 BLDCOV-SCNC: 25 MMOL/L (ref 21–28)
CO2 SERPL-SCNC: 28 MMOL/L (ref 20–32)
CREAT SERPL-MCNC: 0.75 MG/DL (ref 0.66–1.25)
ERYTHROCYTE [DISTWIDTH] IN BLOOD BY AUTOMATED COUNT: 14 % (ref 10–15)
GFR SERPL CREATININE-BSD FRML MDRD: >90 ML/MIN/{1.73_M2}
GLUCOSE SERPL-MCNC: 104 MG/DL (ref 70–99)
HCT VFR BLD AUTO: 37 % (ref 40–53)
HGB BLD-MCNC: 12.7 G/DL (ref 13.3–17.7)
INR PPP: 1.35 (ref 0.86–1.14)
LMWH PPP CHRO-ACNC: 0.18 IU/ML
MCH RBC QN AUTO: 34.5 PG (ref 26.5–33)
MCHC RBC AUTO-ENTMCNC: 34.3 G/DL (ref 31.5–36.5)
MCV RBC AUTO: 101 FL (ref 78–100)
PCO2 BLDV: 42 MM HG (ref 40–50)
PH BLDV: 7.38 PH (ref 7.32–7.43)
PLATELET # BLD AUTO: 85 10E9/L (ref 150–450)
PO2 BLDV: 45 MM HG (ref 25–47)
POTASSIUM SERPL-SCNC: 3.9 MMOL/L (ref 3.4–5.3)
RBC # BLD AUTO: 3.68 10E12/L (ref 4.4–5.9)
SAO2 % BLDV FROM PO2: 79 %
SODIUM SERPL-SCNC: 137 MMOL/L (ref 133–144)
WBC # BLD AUTO: 4.1 10E9/L (ref 4–11)

## 2018-12-27 PROCEDURE — 99239 HOSP IP/OBS DSCHRG MGMT >30: CPT | Performed by: HOSPITALIST

## 2018-12-27 PROCEDURE — 25000132 ZZH RX MED GY IP 250 OP 250 PS 637: Performed by: INTERNAL MEDICINE

## 2018-12-27 PROCEDURE — 80048 BASIC METABOLIC PNL TOTAL CA: CPT | Performed by: INTERNAL MEDICINE

## 2018-12-27 PROCEDURE — 99207 ZZC NO CHARGE NURSE ONLY: CPT

## 2018-12-27 PROCEDURE — 85520 HEPARIN ASSAY: CPT | Performed by: INTERNAL MEDICINE

## 2018-12-27 PROCEDURE — 80048 BASIC METABOLIC PNL TOTAL CA: CPT | Performed by: NURSE PRACTITIONER

## 2018-12-27 PROCEDURE — 25000128 H RX IP 250 OP 636

## 2018-12-27 PROCEDURE — 99232 SBSQ HOSP IP/OBS MODERATE 35: CPT | Performed by: NURSE PRACTITIONER

## 2018-12-27 PROCEDURE — 85027 COMPLETE CBC AUTOMATED: CPT | Performed by: INTERNAL MEDICINE

## 2018-12-27 PROCEDURE — 85610 PROTHROMBIN TIME: CPT | Performed by: INTERNAL MEDICINE

## 2018-12-27 PROCEDURE — 36415 COLL VENOUS BLD VENIPUNCTURE: CPT | Performed by: INTERNAL MEDICINE

## 2018-12-27 RX ORDER — LISINOPRIL 2.5 MG/1
2.5 TABLET ORAL DAILY
Qty: 30 TABLET | Refills: 0 | Status: SHIPPED | OUTPATIENT
Start: 2018-12-28 | End: 2019-01-03

## 2018-12-27 RX ORDER — WARFARIN SODIUM 2.5 MG/1
2.5 TABLET ORAL
Status: DISCONTINUED | OUTPATIENT
Start: 2018-12-27 | End: 2018-12-27 | Stop reason: HOSPADM

## 2018-12-27 RX ORDER — FUROSEMIDE 40 MG
40 TABLET ORAL 2 TIMES DAILY
Qty: 60 TABLET | Refills: 0 | Status: SHIPPED | OUTPATIENT
Start: 2018-12-27 | End: 2019-01-16

## 2018-12-27 RX ORDER — SPIRONOLACTONE 25 MG/1
25 TABLET ORAL 2 TIMES DAILY
Qty: 60 TABLET | Refills: 0 | Status: SHIPPED | OUTPATIENT
Start: 2018-12-27 | End: 2019-01-08

## 2018-12-27 RX ADMIN — COLCHICINE 0.6 MG: 0.6 TABLET, FILM COATED ORAL at 09:28

## 2018-12-27 RX ADMIN — SPIRONOLACTONE 25 MG: 25 TABLET ORAL at 09:29

## 2018-12-27 RX ADMIN — PANTOPRAZOLE SODIUM 40 MG: 40 TABLET, DELAYED RELEASE ORAL at 09:29

## 2018-12-27 RX ADMIN — OXYCODONE HYDROCHLORIDE 5 MG: 5 TABLET ORAL at 00:25

## 2018-12-27 RX ADMIN — FUROSEMIDE 40 MG: 40 TABLET ORAL at 09:28

## 2018-12-27 RX ADMIN — HEPARIN SODIUM 1300 UNITS/HR: 10000 INJECTION, SOLUTION INTRAVENOUS at 09:47

## 2018-12-27 ASSESSMENT — MIFFLIN-ST. JEOR: SCORE: 1831.65

## 2018-12-27 ASSESSMENT — ACTIVITIES OF DAILY LIVING (ADL)
ADLS_ACUITY_SCORE: 11

## 2018-12-27 NOTE — DISCHARGE INSTRUCTIONS
A Fib Ablation Discharge Instructions    After you go home:    Have an adult stay with you until tomorrow.    You may eat your normal diet, unless your doctor tells you otherwise.    RELAX and take it easy for 3 days.        For 24-48 hours (due to the sedation you received):    DO NOT DRIVE FOR 2 DAYS!     Do NOT make any important or legal decisions.    Do NOT drive or operate machines at home or at work.    Do NOT drink alcohol.    Care of Puncture Site:    Check the puncture site every 1-2 hours while awake.    For 2-3 days, when you cough, sneeze, laugh or move your bowels, hold your hand over the puncture site and press firmly.    Change band aid daily for at least 3 days. If there is minor oozing, apply another band aid and remove it after 12 hours.     It is normal to have a small bruise or pea size lump at the site.    You may shower. Do NOT take a bath, or use a hot tub or pool until groin site heals, which may take up to a week.  Do NOT scrub the site. Do not use lotion or powder near the puncture site.    Activity:    Do NOT lift, push or pull more than 10 pounds (equal to a gallon of milk) for 3 days.    NO repetitive motions such as loading , vacuuming, raking, shoveling.     Bleeding:    If you start bleeding from the groin site, lie down flat and press firmly on the site for 10 minutes or until bleeding stops.     Once bleeding stops, lay flat for 1-2 hours.    Call your A Fib nurse if bleeding does not stop or after hours will need to go to ER.       Go to ER or Call 911 right away if you have heavy bleeding or bleeding that does not stop.    Medications:    Take your medications, including blood thinners, unless your doctor tells you not to.    If you have stopped any other medicines, check with your nurse or provider about when to restart them.    If you have pain, you may takeTylenol (acetaminophen) and if this does not help may take Advil (ibuprofen-400 mg with food).    Call the A Fib  RN if:    Chest pain not relieved by tylenol or ibuprofen    Difficulty swallowing and/or coughing up blood    Shortness of breath    increased groin pain or a large or growing hard lump around the site.    Groin site is red, swollen, hot or tender.    Blood or fluid is draining from the groin site.    You have chills or a fever greater than 101 F (38 C).    Your leg feels numb, cool or changes color.    If groin pain is not relieved by Tylenol or Ibuprofen.    Recurrent irregular or fast heart rate lasting over 2-3 hours.    Any questions or concerns.    Heart rhythms:  You may have some irregular heartbeats. These feel very strong. They may make you feel that the A Fib is going to start again.  Give it time. The irregular beats should occur less often.    Follow Up Appointments:    12/28/18 with IRMA Dinh at 11am in [place]    Will call for 3 month follow up when schedule opens up     [facility]: A Fib clinic RN's Liat Joseph [phone] (Mon-Fri, 8:00-5:00)                                                                            [phone] (7 days a week) after hours for on call Cardiologist.       Right Heart Cath Discharge Instructions - Neck     After you go home:      Have an adult stay with you until tomorrow.    Drink extra fluids for 2 days.    You may resume your normal diet.    No smoking       For 24 hours - due to the sedation you received:    Relax and take it easy.    Do NOT make any important or legal decisions.    Do NOT drive or operate machines at home or at work.    Do NOT drink alcohol.    Care of Neck Puncture Site:      For the first 24 hrs - check the puncture site every 1-2 hours while awake.    It is normal to have soreness at the puncture site and mild tingling in your hand for up to 3 days.    Remove the bandaid after 24 hours. If there is minor oozing, apply another bandaid and remove it after 12 hours.    You may shower tomorrow. Do NOT take a bath, or  use a hot tub or pool for at least 3 days. Do NOT scrub the site. Do not use lotion or powder near the puncture site.           Activity - For 2 days:      Avoid heavy lifting or the overuse of your shoulder.      Bleeding:       If you start bleeding from the site in your neck, sit down and press gently on the site for 10 minutes.     Once bleeding stops, sit still for 2 hours.     Call [facility] as soon as you can    Call 911 right away if you have heavy bleeding or bleeding that does not stop.      Medicines:      If you are taking an antiplatelet medication such as Plavix, Brilinta or Effient, do not stop taking it until you talk to your cardiologist.        If you are on Metformin (Glucophage), do not restart it until you have blood tests (within 2 to 3 days after discharge).  After you have your blood drawn, you may restart the Metformin.     Take your medications, including blood thinners, unless your provider tells you not to.  If you take Coumadin (Warfarin), have your INR checked by your provider in  3-5 days. Call your clinic to schedule this.    If you have stopped any medicines, check with your provider about when to restart them.    Follow Up Appointments:      Follow up with [facility] Nurse Practitioner at [facility] of patient preference in 7-10 days.    Call the clinic if:      You have increased pain or a large or growing hard lump around the site.    The site is red, swollen, hot or tender.    Blood or fluid is draining from the site.    You have chills or a fever greater than 101 F (38 C).    You have hives, a rash or unusual itching.    Any questions or concerns.      [facility] [place]:    [phone] [facility] (7 days a week)      Cardiac Angiogram Discharge Instructions - Radial    After you go home:      Have an adult stay with you until tomorrow.    Drink extra fluids for 2 days.    You may resume your normal diet.    No smoking       For 24 hours - due  to the sedation you received:    Relax and take it easy.    Do NOT make any important or legal decisions.    Do NOT drive or operate machines at home or at work.    Do NOT drink alcohol.    Care of Wrist Puncture Site:      For the first 24 hrs - check the puncture site every 1-2 hours while awake.    It is normal to have soreness at the puncture site and mild tingling in your hand for up to 3 days.    Remove the bandaid after 24 hours. If there is minor oozing, apply another bandaid and remove it after 12 hours.    You may shower tomorrow.  Do NOT take a bath, or use a hot tub or pool for at least 3 days. Do NOT scrub the site. Do not use lotion or powder near the puncture site.           Activity:        For 2 days:     do not use your hand or arm to support your weight (such as rising from a chair)     do not bend your wrist (such as lifting a garage door).    do not lift more than 5 pounds or exercise your arm (such as tennis, golf or bowling).    Do NOT do any heavy activity such as exercise, lifting, or straining.     Bleeding:      If you start bleeding from the site in your wrist, sit down and press firmly on/above the site for 10 minutes.     Once bleeding stops, keep arm still for 2 hours.     Call [facility] as soon as you can.       Call 911 right away if you have heavy bleeding or bleeding that does not stop.      Medicines:      If you are taking an antiplatelet medication such as Plavix, Brilinta or Effient, do not stop taking it until you talk to your cardiologist.        If you are on Metformin (Glucophage), do not restart it until you have blood tests (within 2 to 3 days after discharge).  After you have your blood drawn, you may restart the Metformin.     Take your medications, including blood thinners, unless your provider tells you not to.  If you take Coumadin (Warfarin), have your INR checked by your provider in  3-5 days. Call your clinic to schedule this.    If you have stopped any  medicines, check with your provider about when to restart them.    Follow Up Appointments:      Follow up with [facility] Nurse Practitioner at [facility] of patient preference in 7-10 days.    Call the clinic if:      You have a large or growing hard lump around the site.    The site is red, swollen, hot or tender.    Blood or fluid is draining from the site.    You have chills or a fever greater than 101 F (38 C).    Your arm feels numb, cool or changes color.    You have hives, a rash or unusual itching.    Any questions or concerns.          [facility] [place]:    [phone] [facility] (7 days a week)

## 2018-12-27 NOTE — PROGRESS NOTES
Per SARA Albright's request, reminder sent to [facility] board for 12/31/18 to call pt for wt/sx update after hospital discharge 12/27/18.    RIKI Lang 1:50 PM 12/27/2018

## 2018-12-27 NOTE — DISCHARGE SUMMARY
[facility]  Discharge Summary        Maurice Jon MRN# [id number]   YOB: 1960 Age: 58 year old     Date of Admission:  12/21/2018  Date of Discharge:  12/27/2018  Admitting Physician:  Valdo Lopez MD  Discharge Physician: Esteban Mcfadden MD  Discharging Service: Hospitalist     Primary Provider: Kailey Ac  Primary Care Physician Phone Number: [phone]         Discharge Diagnoses/Problem Oriented Hospital Course (Providers):    Maurice Jon was admitted on 12/21/2018 by Valdo Lopez MD and I would refer you to their history and physical.  The following problems were addressed during his hospitalization:    Maurice Jon is a 58-year-old male with a medical history significant for nonalcoholic steatohepatitis with cirrhosis, portal hypertension and thrombocytopenia, chronic atrial fibrillation with recent acute bronchitis, who was placed in observation after elective atrial fibrillation ablation; Hospitalist was consulted post procedure after he complained of chest pain and shortness of breath, noted with low grade fever. CXR was noted with pulmonary vascular congestion and patchy opacity in the left lung base. ECHO noted with new LV dysfunction.     # A fib s/p elective ablation 12/21/18  # Post ablation SOB, likely new onset acute systolic/diastolic CHF--EF 25-30%  # Likely post ablation pericarditis  - ECHO noted with new onset LV dysfunction with EF of 25-30%, a decrease from prior EF of 50-55%; also noted with right ventricular dilatation and TR  - cardiology followed; s/p Left and Right heart cath on 12/26/18--- Minimal coronary artery disease, wedge 18mmHg and  Mean RA 14mmHg.  - continued Toprol  mg po daily; started lisinopril 2.5 mg daily; decreased spironolactone 25 mg po bid; lasix switched to 40 mg po Bid   - resumed coumadin after cath with heparin bridging  - patient refused NOACs  - refuses to take  Aspirin  - continue cochicine 0.6 mg po bid  - low grade fever has improved; was recently treated with Z-pack for presumed acute bronchitis  - cards plan to reschedule EP followup appt to 1/15/18 in [place] with Nadege Montilla PA-C  - CORE clinic follow up in 1 week  - plan to Continue warfarin for 3 months post ablation; will have bridging with Lovenox on discharge     # Steatohepatitis and cirrhosis with portal hypertension, thrombocytopenia and esophageal varices.    - The patient is followed by GI at [facility].  Has a diagnosis of portal vein thrombosis as well.  LFTs appear stable and platelet count as well is stable.  PTA Lasix and losartan continued as above.    - Continue compression stockings.      #.  Obstructive sleep apnea.  Continue CPAP.   8.  Gastroesophageal reflux disease.  PTA Protonix continued.   9.  Obesity.  Per PCP.               Brief Hospital Stay Summary Sent Home With Patient in AVS:               Pending Results:        Unresulted Labs Ordered in the Past 30 Days of this Admission     Date and Time Order Name Status Description    12/23/2018 0418 Blood culture Preliminary     12/23/2018 0418 Blood culture Preliminary             Discharge Instructions and Follow-Up:      Follow-up Appointments     Follow-up and recommended labs and tests       Coumadin clinic follow 12/28/18 for repeat INR  Follow up with PCP in 7-10 days for post hospitalization follow up.                 Discharge Disposition:      Discharged to home        Discharge Medications:        Current Discharge Medication List      START taking these medications    Details   colchicine (COLCYRS) 0.6 MG tablet Take 1 tablet (0.6 mg) by mouth 2 times daily for 7 days  Qty: 14 tablet, Refills: 0    Associated Diagnoses: Chronic a-fib (H)      enoxaparin (LOVENOX) 120 MG/0.8ML syringe Inject 0.7 mLs (105 mg) Subcutaneous every 12 hours for 5 days  Qty: 7 mL, Refills: 0    Associated Diagnoses: Chronic a-fib (H)       lisinopril (PRINIVIL/ZESTRIL) 2.5 MG tablet Take 1 tablet (2.5 mg) by mouth daily  Qty: 30 tablet, Refills: 0    Associated Diagnoses: Acute systolic congestive heart failure (H)      metoprolol succinate ER (TOPROL-XL) 100 MG 24 hr tablet Take 1 tablet (100 mg) by mouth daily  Qty: 30 tablet, Refills: 0    Associated Diagnoses: Chronic a-fib (H)         CONTINUE these medications which have CHANGED    Details   furosemide (LASIX) 40 MG tablet Take 1 tablet (40 mg) by mouth 2 times daily  Qty: 60 tablet, Refills: 0    Associated Diagnoses: Portal hypertension (H)      pantoprazole (PROTONIX) 40 MG EC tablet Take 1 tablet (40 mg) by mouth daily  Qty: 30 tablet, Refills: 0    Associated Diagnoses: Chronic a-fib (H)      spironolactone (ALDACTONE) 25 MG tablet Take 1 tablet (25 mg) by mouth 2 times daily We will no longer fill unless seen by cardiology  Qty: 60 tablet, Refills: 0    Comments: Future refills by PCP Dr. Kailey Ac, NP with phone number [phone].  Associated Diagnoses: Portal hypertension (H)         CONTINUE these medications which have NOT CHANGED    Details   calcium carb 1250 mg, 500 mg [place],/vitamin D 200 units (OSCAL WITH D) 500-200 MG-UNIT per tablet Take 2 tablets by mouth daily with food.      warfarin (COUMADIN) 2.5 MG tablet 1.25 mg Fridays; 2.5mg all other days or As directed by Anticoagulation Clinic   Qty: 90 tablet, Refills: 0    Associated Diagnoses: Long term current use of anticoagulant therapy; Paroxysmal atrial fibrillation (H); Atrial fibrillation with rapid ventricular response (H)      mometasone-formoterol (DULERA) 200-5 MCG/ACT oral inhaler Inhale 2 puffs into the lungs 2 times daily as needed Appt DUE Jan 2017  Qty: 1 Inhaler, Refills: 1    Associated Diagnoses: Bronchospasm      !! order for DME Open toe size large 30/40 [facility] Assure Medical Compression socks Model 60933.    Fax to Fax [phone]. [facility]  Qty: 12 Device, Refills: 0    Associated  "Diagnoses: Chronic congestive heart failure, unspecified heart failure type (H); Edema, unspecified type      !! order for DME Equipment being ordered:   New CPAP mask, tube, filters,water tray  Qty: 1 Device, Refills: 3    Associated Diagnoses: Sleep apnea      !! order for DME Open toe size large 30/40 [employer] Assure Medical Compression socks Model [id number].  Qty: 4 Package, Refills: 3    Associated Diagnoses: Portal hypertension (H); Hepatic cirrhosis (H); Edema      !! ORDER FOR DME Respironics RemStar 60 Series Auto AFlex 12-15 cm H2O with heated humidty and a modem.  Pt chose a Quattro Air FFM size medium.    Associated Diagnoses: BRUCE (obstructive sleep apnea)      !! ORDER FOR DME Juzo compression stockings, 30-40mm compression. Patient has portal hypertension and develops leg edema, which these control well.  Qty: 2 Package, Refills: 3    Associated Diagnoses: Portal hypertension (H); Edema       !! - Potential duplicate medications found. Please discuss with provider.      STOP taking these medications       azithromycin (ZITHROMAX) 250 MG tablet Comments:   Reason for Stopping:         sotalol (BETAPACE) 120 MG tablet Comments:   Reason for Stopping:                 Allergies:         Allergies   Allergen Reactions     Avelox Other (See Comments) and GI Disturbance     portal hypertension           Consultations This Hospital Stay:      Consultation during this admission received from cardiology        Condition and Physical on Discharge:      Discharge condition: Stable   Vitals: Blood pressure 98/46, pulse 75, temperature 97.7  F (36.5  C), temperature source Oral, resp. rate 14, height 1.664 m (5' 5.5\"), weight 107.7 kg (237 lb 6.4 oz), SpO2 97 %.     Constitutional: Alert, awake and orienetd X 3; lying in bed; in no apparent distress   HEENT: Pupils equal and reactive to light and accomodation, EOMI intact; neck supple no raised JVD or rigidity    Oral cavity: Moist mucosa   Cardiovascular: Normal s1 s2, " regular rate and rhythm, no murmur   Lungs: B/l coarse breath sounds   Abdomen: Soft, nt, nd, no guarding, rigidity or rebound; BS +   LE : Mild b/l edema +; compression stockings in place    Musculoskeletal: Power 5/5 in all extremities   Neuro: No focal neurological deficits noted, CN II to XII grossly intact   Psychiatry: normal mood and affect                   Discharge Time:      Greater than 30 minutes.        Image Results From This Hospital Stay (For Non-EPIC Providers):        Results for orders placed or performed during the hospital encounter of 12/21/18   XR Chest 2 Views    Narrative    CHEST 2 VIEWS   12/23/2018 1:36 AM     HISTORY: Pleurisy. Crackles.    COMPARISON: 5/10/2018.    FINDINGS: Prominence of the central pulmonary vasculature. Patchy  opacities are present in the left lung base. Cardiomegaly again noted.  Tiny bilateral pleural effusions.      Impression    IMPRESSION:   1. Prominence to the central pulmonary vasculature, suggestive of  pulmonary vascular congestion.  2. Patchy opacities are present in the left lung base and could  represent pneumonia or atelectasis.  3. Tiny bilateral pleural effusions.    FUNMILAYO NÚÑEZ MD           Most Recent Lab Results In EPIC (For Non-EPIC Providers):    Most Recent 3 CBC's:  Recent Labs   Lab Test 12/27/18  0634 12/26/18  1836 12/24/18  1625   WBC 4.1 3.4* 4.6   HGB 12.7* 13.4 12.4*   * 101* 101*   PLT 85* 80* 65*      Most Recent 3 BMP's:  Recent Labs   Lab Test 12/27/18  0634 12/25/18  0545 12/24/18  0652    138 134   POTASSIUM 3.9 3.9 4.0   CHLORIDE 103 105 101   CO2 28 27 26   BUN 14 18 20   CR 0.75 0.70 0.81   ANIONGAP 6 6 7   HALEY 8.3* 8.3* 8.3*   * 104* 98     Most Recent 3 Troponin's:  Recent Labs   Lab Test 05/10/18  1600 05/16/13  0610 05/14/13  0800   TROPI <0.015 0.026 0.022     Most Recent 3 INR's:  Recent Labs   Lab Test 12/27/18  0634 12/26/18  0545 12/26/18  0005   INR 1.35* 1.52* 1.61*     Most Recent 2  LFT's:  Recent Labs   Lab Test 12/22/18  0621 09/20/18  0955   AST 70* 46*   ALT 45 44   ALKPHOS 73 74   BILITOTAL 1.3 1.9*     Most Recent Cholesterol Panel:  Recent Labs   Lab Test 05/19/18 02/27/13   CHOL  --  127   LDL 50 54   HDL 42 59   TRIG 63 72     Most Recent 6 Bacteria Isolates From Any Culture (See EPIC Reports for Culture Details):  Recent Labs   Lab Test 12/23/18  0455 12/23/18  0450 07/18/15  0800 07/13/15  1600 07/13/15  1540 10/09/13  1635   CULT No growth after 4 days No growth after 4 days No growth after 6 days  No growth after 6 days No growth after 6 days No growth after 6 days No Beta Streptococcus isolated Moderate growth Probable Moraxella (Branhamella) catarrhalis     Most Recent TSH, T4 and HgbA1c:   Recent Labs   Lab Test 07/28/15  1440  02/27/13   TSH 2.57   < >  --    T4  --   --  4.3    < > = values in this interval not displayed.

## 2018-12-27 NOTE — DISCHARGE SUMMARY
VS stable. Tele monitor shows patient is in SR, HR of 80. Medications discussed, information reviewed, questions and concerns addressed, follow-up instructions reviewed. Pt belongings accounted for and sent home with them. Pt left via wheelchair and driven by his significant other      **NOTE: Pt left without FMLA paper filled out, APRN with Cardiology contacted.

## 2018-12-27 NOTE — CONSULTS
Medication coverage check for Lovenox. $0 copay.  Kayce Land CphT  [facility] Discharge Pharmacy Liaison  Liaison Cell: [phone]

## 2018-12-27 NOTE — PROGRESS NOTES
Pt's FMLA paperwork completed and faxed by SARA Albright.  Copy of paperwork sent to reina.    RIKI Lang 1:48 PM 12/27/2018

## 2018-12-27 NOTE — PROGRESS NOTES
[facility]  Hospitalist Progress Note        Esteban Mcfadden MD   12/27/2018        Interval History:      - no acute issues overnight; denies chest pain ; shortness of breath has improved; had cardiac cath done yesterday  - denies any active complaints         Assessment and Plan:      Maurice Jon is a 58-year-old male with a medical history significant for nonalcoholic steatohepatitis with cirrhosis, portal hypertension and thrombocytopenia, chronic atrial fibrillation with recent acute bronchitis, who was placed in observation after elective atrial fibrillation ablation; Hospitalist consulted post procedure after he complained of chest pain and shortness of breath, noted with low grade fever. CXR was noted with pulmonary vascular congestion and patchy opacity in the left lung base. ECHO noted with new LV dysfunction.    # A fib s/p elective ablation 12/21/18  # Post ablation SOB, likely new onset acute systolic/diastolic CHF--EF 25-30%  # Likely post ablation pericarditis  - ECHO noted with new onset LV dysfunction with EF of 25-30%, a decrease from prior EF of 50-55%; also noted with right ventricular dilatation and TR  - cardiology following; s/p Left and Right heart cath on 12/26/18--- Minimal coronary artery disease, wedge 18mmHg and  Mean RA 14mmHg.  - continue Toprol  mg po daily; started lisinopril 2.5 mg daily; decreased spironolactone 25 mg po bid; lasix switched to 40 mg po Bid on 12/24/18; meds adjustment per cardiology  - resumed coumadin after cath with heparin bridging  - patient refused NOACs  - refuses to take Aspirin  - continue cochicine 0.6 mg po bid  - low grade fever has improved; was recently treated with Z-pack for presumed acute bronchitis  - cards plan to reschedule EP followup appt to 1/15/18 in [place] with Nadege Montilla PA-C  - CORE clinic follow up in 1 week  - plan to Continue warfarin for 3 months post ablation; will have bridging with Lovenox on  "discharge    # Steatohepatitis and cirrhosis with portal hypertension, thrombocytopenia and esophageal varices.    - The patient is followed by GI at [facility].  Has a diagnosis of portal vein thrombosis as well.  LFTs appear stable and platelet count as well is stable.  PTA Lasix and losartan continued as above.    - Continue compression stockings.     #.  Obstructive sleep apnea.  Continue CPAP.   8.  Gastroesophageal reflux disease.  PTA Protonix continued.   9.  Obesity.  Per PCP.   10.  Deep venous thrombosis prophylaxis.  anticoagulation as above    Disposition: cleared by cardiology for discharge home today                     Physical Exam:      Blood pressure 98/46, pulse 75, temperature 97.7  F (36.5  C), temperature source Oral, resp. rate 14, height 1.664 m (5' 5.5\"), weight 107.7 kg (237 lb 6.4 oz), SpO2 97 %.  Vitals:    12/25/18 0400 12/26/18 0640 12/27/18 0601   Weight: 114.9 kg (253 lb 4.2 oz) 111.5 kg (245 lb 12.8 oz) 107.7 kg (237 lb 6.4 oz)     Vital Signs with Ranges  Temp:  [97.5  F (36.4  C)-98  F (36.7  C)] 97.7  F (36.5  C)  Pulse:  [75] 75  Heart Rate:  [74-80] 80  Resp:  [14-16] 14  BP: ()/(46-69) 98/46  SpO2:  [93 %-97 %] 97 %  I/O's Last 24 hours  I/O last 3 completed shifts:  In: 120 [P.O.:120]  Out: 2250 [Urine:2250]    Constitutional: Alert, awake and orienetd X 3; lying in bed; in no apparent distress   HEENT: Pupils equal and reactive to light and accomodation, EOMI intact; neck supple no raised JVD or rigidity    Oral cavity: Moist mucosa   Cardiovascular: Normal s1 s2, regular rate and rhythm, no murmur   Lungs: B/l caorse breath sounds   Abdomen: Soft, nt, nd, no guarding, rigidity or rebound; BS +   LE : Mild b/l edema +; compression stockings in place    Musculoskeletal: Power 5/5 in all extremities   Neuro: No focal neurological deficits noted, CN II to XII grossly intact   Psychiatry: normal mood and affect                Medications:          aspirin  81 " mg Oral Daily     colchicine  0.6 mg Oral BID     furosemide  40 mg Oral BID     lisinopril  2.5 mg Oral Daily     metoprolol succinate ER  100 mg Oral At Bedtime     pantoprazole  40 mg Oral Daily     sodium chloride (PF)  3 mL Intracatheter Q8H     spironolactone  25 mg Oral BID     warfarin  2.5 mg Oral ONCE at 18:00     PRN Meds: acetaminophen, acetaminophen, albuterol, HOLD MEDICATION, lidocaine 4%, lidocaine (buffered or not buffered), naloxone, nitroGLYcerin, oxyCODONE, potassium chloride, potassium chloride with lidocaine, potassium chloride, potassium chloride, potassium chloride, sodium chloride (PF), Warfarin Therapy Reminder         Data:      All new lab and imaging data was reviewed.   Recent Labs   Lab Test 12/27/18  0634 12/26/18  1836 12/26/18  0545 12/26/18  0005  12/24/18  1625   WBC 4.1 3.4*  --   --   --  4.6   HGB 12.7* 13.4  --   --   --  12.4*   * 101*  --   --   --  101*   PLT 85* 80*  --   --   --  65*   INR 1.35*  --  1.52* 1.61*   < >  --     < > = values in this interval not displayed.      Recent Labs   Lab Test 12/27/18  0634 12/25/18  0545 12/24/18  0652    138 134   POTASSIUM 3.9 3.9 4.0   CHLORIDE 103 105 101   CO2 28 27 26   BUN 14 18 20   CR 0.75 0.70 0.81   ANIONGAP 6 6 7   HALEY 8.3* 8.3* 8.3*   * 104* 98     Recent Labs   Lab Test 05/10/18  1600 05/16/13  0610 05/14/13  0800   TROPI <0.015 0.026 0.022

## 2018-12-27 NOTE — PROGRESS NOTES
"ANTICOAGULATION FOLLOW-UP CLINIC VISIT    Patient Name:  Maurice Jon  Date:  12/27/2018  Contact Type:  Chart review only    SUBJECTIVE:     Patient Findings     Positives:   Change in medications (colchicine, lovenox , lisinopril and metoprolol added with changes to lasix, protonix and spironolactone. zpak and sotalol stopped), Intentional hold of therapy (12-24 and 12-25 for coronary angiogram)    Comments:   Pt hospitalized 12-21 to 12-27 for:  # A fib s/p elective ablation 12/21/18  # Post ablation SOB, likely new onset acute systolic/diastolic CHF--EF 25-30%  # Likely post ablation pericarditis    Per discharge notes: \"plan to Continue warfarin for 3 months post ablation; will have bridging with Lovenox on discharge\". Pt has INR scheduled at [facility] on 12-28-18.           OBJECTIVE    INR   Date Value Ref Range Status   12/27/2018 1.35 (H) 0.86 - 1.14 Final       ASSESSMENT / PLAN  No question data found.  Anticoagulation Summary  As of 12/27/2018    INR goal:   2.0-3.0   TTR:   88.1 % (3 mo)   INR used for dosing:   No new INR was available at the time of this encounter.   Warfarin maintenance plan:   1.25 mg (2.5 mg x 0.5) every Fri; 2.5 mg (2.5 mg x 1) all other days   Full warfarin instructions:   1.25 mg every Fri; 2.5 mg all other days   Weekly warfarin total:   16.25 mg   Plan last modified:   Susan Beyer RN (9/28/2018)   Next INR check:   12/28/2018   Priority:   INR   Target end date:   10/4/2018    Indications    Atrial fibrillation (H) (Resolved) [I48.91]  Atrial fibrillation with rapid ventricular response (H) (Resolved) [I48.91]  Long-term (current) use of anticoagulants [Z79.01] [Z79.01]             Anticoagulation Episode Summary     INR check location:       Preferred lab:       Send INR reminders to:   [employer] POOL    Comments:   * anticoagulation short period surrounding ablation on 12/21/18. Cardiology to decide when to stop warfarin. has well-compensated cirrhosis "      Anticoagulation Care Providers     Provider Role Specialty Phone number    Alon Pineda MD [facility] [phone]            See the Encounter Report to view Anticoagulation Flowsheet and Dosing Calendar (Go to Encounters tab in chart review, and find the Anticoagulation Therapy Visit)        Susan Beyer RN

## 2018-12-27 NOTE — PROGRESS NOTES
Care Coordination:    -An INR appointment has been made for this patient.       Dec 28, 2018  9:00 AM CST  LAB with CL LAB  [facility] ([facility]) [id number]  [facility] [id number] [phone]     Pt states he would like to make his own PCP appointment.     Katarzyna Brown RN, BAN   Care Coordinator  Ph. [phone]

## 2018-12-27 NOTE — PLAN OF CARE
VSS. Tele SR with PACs. Denies CP, SOB. Right radial site CDI, CMS intact. Right internal jugular sheath was removed with no complications though pt has some tenderness at the site. Vaseline gauze and tegaderm in place. Coumadin restarted today, Heparin gtt to be restarted around midnight for bridging. Continue to monitor.

## 2018-12-27 NOTE — PLAN OF CARE
Pt A&Ox4, VSS on home cpap overnight, otherwise RA. Tele SR w/ PAC's. Pt denies CP, dizziness, and SOB. Pt c/o right internal jugular site tenderness, received PRN oxy w/ good relief, otherwise site CDI. Right radial site CDI, mildly ecchymotic w/ previous hematoma, CMS intact. Pt intake 300mL overnight. Hep gtt 13mL/hr started @ 0000, 10A draw this AM. Plan to bridge for coumadin. Will continue to monitor.

## 2018-12-27 NOTE — TELEPHONE ENCOUNTER
Kailey Ac, APRN CNP  P Cl Provider Careteam Pool             Please call patient.  He will need to schedule a follow-up visit in the clinic.  Will need to establish primary care.      L.M. for pt to call back and reschedule appt to establish care with new PCP, per MELODIE Ac.

## 2018-12-27 NOTE — PROGRESS NOTES
[facility]  Cardiology Progress Note  Date of Service: 12/27/2018  Primary Cardiologist: Dr. Evans and Dr. Farah    Assessment & Plan       Maurice Jon is a 58 year old male with past medical history significant for atrial fibrillation s/p ablation 12/21/18, nonalcoholic liver steatohepatitis with cirrhosis, portal hypertension and thrombocytopenia admitted on 12/21/2018 with s/p ablation related to complaints of chest pain. CXR was noted with pulmonary vascular congestion and patchy opacity in the left lung base. ECHO noted with new LV dysfunction (EF 25-30% prior 50-55%). Patient underwent cardiac catheterization today which showed mild nonobstructive coronary artery disease, wedge 18mmHg and  Mean RA 14mmHg.       Assessment:  1.  Atrial fibrillation, chronic, s/p ablation 12/21/18, failed sotalol, amio stopped post ablation, currently on no antiarrhythmic, stable, rate controlled, currently in sinus rhythm. EKG today showed sinus rhythm. Warfarin was held for cardiac catheterization today, warfarin restarted and bridge with lovenox at discharge. Reviewed with pharmacist given low platelets, reviewed the option of starting eliquis 5mg BID for thromboembolic prophylaxis given lower bleeding risk with patient, patient wishes to resume warfarin therapy. Bilateral groin sites assess, no bruit.               - Reviewed with EP, will reschedule EP followup appt to 1/15/18 in [place] with Nadege Montilla PA-C              - Continue warfarin for 3 months post ablation   - Bridge with lovenox with close anticoagulation clinic followup.   - Educated patient on s/s of bleeding and when to seek emergency care.       2. Heart failure with reduced ejection fraction - new onset, EF 25-30%, prior 50-55%, cardiac catheterization completed today which showed  mild nonobstructive coronary artery disease, wedge 18mmHg and  Mean RA 14mmHg. Patient appears euvolemic on exam, weight down from 253#->245#->237# today.                - Continue lisinopril 2.5mg daily              - Continue furosemide 40mg BID              - Continue metoprolol XL 100mg daily              - Continue spironolactone 25mg daily              - Daily weights at home and low sodium diet              - Continue compression stockings              - Enroll in [facility], post hospital followup within 1 week, scheduled for 1/3/18.       3. Likely post ablation pericarditis - cardiac catheterization today showed mild nonobstructive CAD.               Continue cochine 0.6mg PO BID     4. Cirrhosis with portal hypertension and thrombocytopenia- per IM     5. Obstructive sleep apnea - compliant with CPAP      Plan:   1. Okay from cardiology perspective for discharge  2. Continue furosemide 40mg BID, advised patient if >5# weight loss overnight decrease furosemide to 40mg daily  3. Cardiac rehab outpatient ordered.   4. [facility] f/u 1/3/18 with Natalia Manzo PA-C, [place] location, for HF management and further optimization of HF medications.   5. EP f/u 1/15/18 with Nadege Montilla PA-C, [place] location.   6. [facility] paperwork completed and faxed to employer.     JANETTE Ramon CNP  Text Page  (M-F, 7:30 - 4:00 pm)    Interval History   Patient resting in bed comfortably, denies chest pain, chest tightness, dizziness, lightheadedness or other presyncopal symptoms. Denies syncope. Denies palpitations or tachycardia. Denies dyspnea, orthopnea or PND.     Physical Exam   Temp: 97.5  F (36.4  C) Temp src: Oral BP: 102/59   Heart Rate: 78 Resp: 16 SpO2: 94 % O2 Device: None (Room air) Oxygen Delivery: 2 LPM  Vitals:    12/25/18 0400 12/26/18 0640 12/27/18 0601   Weight: 114.9 kg (253 lb 4.2 oz) 111.5 kg (245 lb 12.8 oz) 107.7 kg (237 lb 6.4 oz)       Constitutional alert and oriented, in no acute distress.  Skin  warm and dry to touch  ENT  no pallor or cyanosis  Neck  No JVP  Lungs clear anteriorly  Cardiac regular rate and rhythm  Abdomen  abdomen soft, bowel sounds  normoactive, no hepatosplenomegaly  Extremities and Back   no clubbing, cyanosis. trace edema.   Neurological no gross motor deficits noted, affect appropriate, oriented to time, person and place.    Medications     HEParin 1,300 Units/hr (12/27/18 0026)     sodium chloride       Warfarin Therapy Reminder         aspirin  81 mg Oral Daily     colchicine  0.6 mg Oral BID     furosemide  40 mg Oral BID     lisinopril  2.5 mg Oral Daily     metoprolol succinate ER  100 mg Oral At Bedtime     pantoprazole  40 mg Oral Daily     sodium chloride (PF)  3 mL Intracatheter Q8H     spironolactone  25 mg Oral BID     warfarin  2.5 mg Oral ONCE at 18:00       Data   Most Recent 3 CBC's:  Recent Labs   Lab Test 12/27/18  0634 12/26/18  1836 12/24/18  1625   WBC 4.1 3.4* 4.6   HGB 12.7* 13.4 12.4*   * 101* 101*   PLT 85* 80* 65*     Most Recent 3 BMP's:  Recent Labs   Lab Test 12/25/18  0545 12/24/18  0652 12/21/18  1020    134 140   POTASSIUM 3.9 4.0 3.7   CHLORIDE 105 101 106   CO2 27 26 24   BUN 18 20 14   CR 0.70 0.81 0.73   ANIONGAP 6 7 10   HALEY 8.3* 8.3* 8.3*   * 98 87     Most Recent 3 INR's:  Recent Labs   Lab Test 12/27/18  0634 12/26/18  0545 12/26/18  0005   INR 1.35* 1.52* 1.61*     Last 24 hours labs reviewed     EKG: (reviewed personally) 12/26/18 sinus rhythm with PAC     Imaging:   CXR 12/21/18  1. Prominence to the central pulmonary vasculature, suggestive of  pulmonary vascular congestion.  2. Patchy opacities are present in the left lung base and could  represent pneumonia or atelectasis.  3. Tiny bilateral pleural effusions.     Tele: sinus rhythm     Echo: 12/23/18  The right ventricle is not well visualized but is likely moderate to severely  dilated. The right ventricular systolic function is moderately reduced.  The tricuspid valve is not well visualized and the inferior vena cava not well  visualized for estimation of right atrial pressure. The right ventricular  systolic pressure is  approximated at 17mmHg plus the right atrial pressure.  Limited views suggest there is at least moderate to mod-severe (2-3+)  tricuspid regurgitation. The right atrium is severely dilated.  Left ventricular systolic function is moderate to severely reduced. The visual  ejection fraction is estimated at 25-30%. There is mod-severe global  hypokinesia of the left ventricle. Flattened septum is consistent with RV  pressure/volume overload.     Technically difficult, suboptimal study. Contrast was used without apparent  complications. LV and RV systolic function are both worse compared to the  prior echo.     Last ischemic eval: Cardiac catheterization today (12/26/18)  IMPRESSION:   1. Minimal coronary artery disease  2. Mildly elevated left heart filling pressure (wedge 18 mmHg) and  moderate-severely elevated right heart filling pressure (mean RA 14  mmHg)     HEMODYNAMICS  RIGHT ATRIAL PRESSURE: The mean RA pressure is 14 mmHg  RIGHT VENTRICULAR PRESSURE: 36/8/13  PULMONARY ARTERY PRESSURE: 38/20/26  PULMONARY CAPILLARY WEDGE PRESSURE: Mean wedge is 18 mmHg  CARDIAC OUTPUT AND INDEX: 8.5 l/min and 3.95 l/min/m2 per Leon  LEFT VENTRICULAR END-DIASTOLIC PRESSURE: 15 mmHg

## 2018-12-27 NOTE — DISCHARGE SUMMARY
Admit Date:     12/21/2018   Discharge Date:           DISCHARGE DIAGNOSIS:     1.  Long long-lasting persistent atrial fibrillation.   2.  Liver cirrhosis with portal hypertension.      PROCEDURE PERFORMED:  Catheter-based ablation of atrial fibrillation.      DISCHARGE MEDICATIONS:   1.  Warfarin as directed.     2.  Toprol 100 mg each day at bedtime (restarted).   3.  Lasix 20 mg daily.   4.  Aldactone 25 mg daily.      DISCHARGE INSTRUCTIONS:   1.  Protonix 40 mg p.o. daily for the next 30 days.   2.  Sotalol was discontinued.      HOSPITAL COURSE:  Mr. Jon is a delightful 58-year-old gentleman with a history of stable liver cirrhosis and portal hypertension due to steatohepatitis.  He has been followed closely by Dr. Roque, GI specialist at [facility].  He has been having symptomatic persistent atrial fibrillation refractory to sotalol, with symptoms of shortness of breath and fatigue.  He underwent a catheter-based ablation with isolation of pulmonary veins in the left atrial posterior wall.  The patient's left atrium was quite enlarged and there was a tremendous amount of scarring noted throughout the left atrium.  Both of the inferior veins have poor vein potentials, even the upper one is miniscule.  The atrial fibrillation was cardioverted during the procedure.  He received 40 mg Lasix at the time of procedure as well.      Overnight, there was no arrhythmia noted nor the patient noted any chest pain, chest discomfort, or shortness of breath.  Amiodarone was started at 200 mg, but given his liver cirrhosis, even though stable, there is a small chance this will make it worse and therefore, it was stopped.  The patient instead will go back to his Toprol at 100 mg each day at bedtime with 25 mg in the morning as needed.  I told the patient it is very likely that he will need a second procedure based on his left atrial substrate and conversation should be started regarding AV node ablation and  pacer, as there is no guarantee that even with a second procedure, the success rate of maintaining sinus would be that great.  Additionally, the patient may be a candidate for a left atrial appendage occlusion device in the future, given that he is not a candidate for long-term anticoagulation due to his liver cirrhosis.  Followup appointments have been made, along with instruction provided for the patient regarding the symptoms and signs to watch for, such as fever, chills, neurological deficits, shortness of breath and chest discomfort.  If that is the case, contact our office for further instructions.         CIRILO MURGUIA MD             D: 2018   T: 2018   MT: TRESA      Name:     MARILY NAVARRO   MRN:      [id number]        Account:        [id number]   :      1960           Admit Date:     2018                                  Discharge Date:       Document: [id number]

## 2018-12-28 ENCOUNTER — OFFICE VISIT (OUTPATIENT)
Dept: SLEEP MEDICINE | Facility: CLINIC | Age: 58
End: 2018-12-28
Payer: COMMERCIAL

## 2018-12-28 ENCOUNTER — TELEPHONE (OUTPATIENT)
Dept: CARDIOLOGY | Facility: CLINIC | Age: 58
End: 2018-12-28

## 2018-12-28 ENCOUNTER — ANTICOAGULATION THERAPY VISIT (OUTPATIENT)
Dept: ANTICOAGULATION | Facility: CLINIC | Age: 58
End: 2018-12-28

## 2018-12-28 VITALS
BODY MASS INDEX: 38.57 KG/M2 | HEIGHT: 66 IN | HEART RATE: 88 BPM | SYSTOLIC BLOOD PRESSURE: 97 MMHG | WEIGHT: 240 LBS | DIASTOLIC BLOOD PRESSURE: 52 MMHG | OXYGEN SATURATION: 100 %

## 2018-12-28 DIAGNOSIS — G47.33 OSA (OBSTRUCTIVE SLEEP APNEA): Primary | ICD-10-CM

## 2018-12-28 DIAGNOSIS — I48.91 ATRIAL FIBRILLATION WITH RAPID VENTRICULAR RESPONSE (H): ICD-10-CM

## 2018-12-28 DIAGNOSIS — E66.01 MORBID OBESITY (H): ICD-10-CM

## 2018-12-28 DIAGNOSIS — I48.20 CHRONIC A-FIB (H): ICD-10-CM

## 2018-12-28 DIAGNOSIS — K74.60 CIRRHOSIS OF LIVER WITHOUT ASCITES, UNSPECIFIED HEPATIC CIRRHOSIS TYPE (H): ICD-10-CM

## 2018-12-28 DIAGNOSIS — I48.0 PAROXYSMAL ATRIAL FIBRILLATION (H): ICD-10-CM

## 2018-12-28 DIAGNOSIS — Z79.01 LONG TERM CURRENT USE OF ANTICOAGULANT THERAPY: ICD-10-CM

## 2018-12-28 LAB
INR PPP: 1.57 (ref 0.86–1.14)
INTERPRETATION ECG - MUSE: NORMAL

## 2018-12-28 PROCEDURE — 36415 COLL VENOUS BLD VENIPUNCTURE: CPT | Performed by: INTERNAL MEDICINE

## 2018-12-28 PROCEDURE — 99207 ZZC NO CHARGE NURSE ONLY: CPT

## 2018-12-28 PROCEDURE — 99215 OFFICE O/P EST HI 40 MIN: CPT | Performed by: FAMILY MEDICINE

## 2018-12-28 PROCEDURE — 85610 PROTHROMBIN TIME: CPT | Performed by: INTERNAL MEDICINE

## 2018-12-28 RX ORDER — METOPROLOL SUCCINATE 100 MG/1
100 TABLET, EXTENDED RELEASE ORAL DAILY
Qty: 90 TABLET | Refills: 3 | Status: SHIPPED | OUTPATIENT
Start: 2018-12-28 | End: 2019-12-10

## 2018-12-28 RX ORDER — WARFARIN SODIUM 2.5 MG/1
TABLET ORAL
Qty: 90 TABLET | Refills: 0 | Status: SHIPPED | OUTPATIENT
Start: 2018-12-28 | End: 2019-04-02

## 2018-12-28 RX ORDER — METOPROLOL SUCCINATE 100 MG/1
100 TABLET, EXTENDED RELEASE ORAL DAILY
Qty: 90 TABLET | Refills: 3 | Status: SHIPPED | OUTPATIENT
Start: 2018-12-28 | End: 2018-12-28

## 2018-12-28 RX ORDER — ALBUTEROL SULFATE 90 UG/1
2 AEROSOL, METERED RESPIRATORY (INHALATION)
COMMUNITY
End: 2019-01-15

## 2018-12-28 ASSESSMENT — MIFFLIN-ST. JEOR: SCORE: 1843.44

## 2018-12-28 NOTE — TELEPHONE ENCOUNTER
Patient called with several questions after his discharge. He wanted to know if his FMLA paperwork had been submitted to his employer. I confirmed this by reading note in Epic to him. He is taking Enoxaparin and wanted to know if he is supposed to restart his Warfarin. I told him he is and explained the Enoxaparin is to help untit his INR is back up. We reviewed his appointments, including today at 9:00 for INR. He was aware of all his appointments. He sees [facility] and Nadege Montilla for EP f/u 1/15. I gave him the direct number for the [facility] clinic and encouraged him to call back with any questions. He is concerned about going back into A-fib and I told him if he does they would try to cardiovert him again. He said they talked about putting in a pacemaker if he goes into A-fib again. He also said he understands his heart is only working at about 20% and he needs to get that back to normal. I encouraged him to rest after his appointments today and told him he will be more fatigued with his decreased heart function. He does understand all this. He didn't have any more questions.

## 2018-12-28 NOTE — TELEPHONE ENCOUNTER
Appt made with MELODIE Ac for a hospital follow up next week.  He would also like to discuss and get her opinion for a new PCP and he will then schedule an appt with a new PCP to establish care

## 2018-12-28 NOTE — TELEPHONE ENCOUNTER
Pt left VM on [facility] phone, stating he needs refill for Jantoven 2.5mg tabs and also metoprolol. Called pt back. He is s/p afib ablation and hospital discharge for afib related HF exacerbation. Explained to pt he would need to call his PCP office for refill of Jantoven as they manage his INR's (he had INR done today). Told pt we can refill metoprolol for him. Asked pt to call me back if he runs into issues getting Jantoven filled through his PCP today, as he needs to continue to take it daily, very important with hx afib and low LVEF. Pt said he is on most days of 2.5 mg now and he has 5 mg tabs that he could cut if needed until a Rx for 2.5 mg tabs goes through. Pt to call us if questions/concerns with sx or meds. Allie Mittal RN December 28, 2018, 2:27 PM

## 2018-12-28 NOTE — NURSING NOTE
"Chief Complaint   Patient presents with     Consult For     Consult for continuation of care for his sleep apnea       Initial BP 97/52   Pulse 88   Ht 1.664 m (5' 5.5\")   Wt 108.9 kg (240 lb)   SpO2 100%   BMI 39.33 kg/m   Estimated body mass index is 39.33 kg/m  as calculated from the following:    Height as of this encounter: 1.664 m (5' 5.5\").    Weight as of this encounter: 108.9 kg (240 lb).    Medication Reconciliation: complete    Neck circumference: 16 inches / 40 centimeters.    DME: m  "

## 2018-12-28 NOTE — PROGRESS NOTES
"Sleep Consultation:    Date on this visit: 12/28/2018    Maurice Jon is self-referred for a sleep consultation regarding re-establishing care for BRUCE and need for updated supplies.    Primary Physician: Kailey Ac     Chief complaint: \"I needed new supplies.\"    HPI:  Maurice Jon is a pleasant 57 yo M with significant PMHx (see summary below) who presents to re-establish care for mild to moderate BRUCE treated with CPAP.    Pertinent history of recalcitrant paroxysmal atrial fibrillation, HTN, non-alcoholic steatohepatitis, portal HTN, and recent evidence of acute HFpEF and HFrEF following ablation with subsequent post-ablation pericarditis.  Most recent echocardigram on 12/23/2018 with LVEF 25-30%, underwent L and R heart cath on 12/23/2018 with minimal non-obstructive CAD and no evidence of pulmonary HTN.    Overall, he feels he continues to sleep well with his CPAP and no concerns today.  CPAP download from 11/28/2018 - 12/27/2018 (includes hospitalization from 12/21 - 12/27) on set pressure 12 cm H2O.  Used 30/30 days, average daily usage of 5 hours 1 minutes, AHI 2.8.    Interestingly, he feels his CPAP can help him tell when he leaves NSR.  On nights when in NSR, will put his CPAP on and falls asleep almost immediately.  When he is in afib, he will put his CPAP on and feel the pressure is excessively high or low for a few minutes.    Maurice denies any sleep walking and dream enactment.    Patient's Delano Sleepiness score 18/24 consistent with excessive  daytime sleepiness.      SHx:    Prior Sleep Testing:  3/17/2009 - PSG.  Weight 246 lbs, AHI 8.4, RDI 27.7, rosales SpO2 83%.  8/14/2013 - PAP titration PSG.  Weight 232 lbs, CPAP 12 effective but no supine REM observed.    Allergies:    Allergies   Allergen Reactions     Avelox Other (See Comments) and GI Disturbance     portal hypertension     Moxifloxacin Nausea and Vomiting, Nausea and Other (See Comments)     portal hypertension "       Medications:    Current Outpatient Medications   Medication Sig Dispense Refill     albuterol (PROAIR HFA) 108 (90 Base) MCG/ACT inhaler Inhale 2 puffs into the lungs       calcium carb 1250 mg, 500 mg [place],/vitamin D 200 units (OSCAL WITH D) 500-200 MG-UNIT per tablet Take 2 tablets by mouth daily with food.       colchicine (COLCYRS) 0.6 MG tablet Take 1 tablet (0.6 mg) by mouth 2 times daily for 7 days 14 tablet 0     enoxaparin (LOVENOX) 120 MG/0.8ML syringe Inject 0.7 mLs (105 mg) Subcutaneous every 12 hours for 5 days 7 mL 0     furosemide (LASIX) 40 MG tablet Take 1 tablet (40 mg) by mouth 2 times daily 60 tablet 0     lisinopril (PRINIVIL/ZESTRIL) 2.5 MG tablet Take 1 tablet (2.5 mg) by mouth daily 30 tablet 0     metoprolol succinate ER (TOPROL-XL) 100 MG 24 hr tablet Take 1 tablet (100 mg) by mouth daily 30 tablet 0     order for DME Open toe size large 30/40 Mediven Assure Medical Compression socks Model 22151.    Fax to Fax [phone]. [facility] 12 Device 0     order for DME Equipment being ordered:   New CPAP mask, tube, filters,water tray 1 Device 3     order for DME Open toe size large 30/40 Mediven Assure Medical Compression socks Model 44992. 4 Package 3     ORDER FOR DME Respironics RemStar 60 Series Auto AFlex 12-15 cm H2O with heated humidty and a modem.  Pt chose a Quattro Air FFM size medium.       ORDER FOR DME Juzo compression stockings, 30-40mm compression. Patient has portal hypertension and develops leg edema, which these control well. 2 Package 3     pantoprazole (PROTONIX) 40 MG EC tablet Take 1 tablet (40 mg) by mouth daily 30 tablet 0     spironolactone (ALDACTONE) 25 MG tablet Take 1 tablet (25 mg) by mouth 2 times daily We will no longer fill unless seen by cardiology 60 tablet 0     warfarin (COUMADIN) 2.5 MG tablet 1.25 mg Fridays; 2.5mg all other days or As directed by Anticoagulation Clinic  90 tablet 0     mometasone-formoterol (DULERA) 200-5 MCG/ACT oral  inhaler Inhale 2 puffs into the lungs 2 times daily as needed Appt DUE Jan 2017 1 Inhaler 1       Problem List:  Patient Active Problem List    Diagnosis Date Noted     Health Care Home 07/01/2011     Priority: High     01/07/2014  Status:  Declined  Care Coordinator:  Salena Joel    See Letters for [facility] Care Plan (emergency care plan only)             Portal hypertension (H) 01/28/2010     Priority: High     Liver Cirrhosis 2nd ot Fatty liver, w Ascites 08/22/2005     Priority: High     Cirrhosis, idiopathic  Problem list name updated by automated process. Provider to review       Acute systolic/diastolic Heart failure 12/24/2018     Priority: Medium     Right heart failure (H) 12/24/2018     Priority: Medium     CHF (congestive heart failure) (H) 12/23/2018     Priority: Medium     Chronic a-fib, S/P Ablation 12/22/18 -- on Warfarin 12/21/2018     Priority: Medium     Encounter for monitoring sotalol therapy 10/31/2018     Priority: Medium     Long-term (current) use of anticoagulants [Z79.01] 09/18/2018     Priority: Medium     Portal vein thrombosis 02/02/2017     Priority: Medium     History of MRSA now culture negative 08/06/2015     Priority: Medium     Severe sepsis (H) 07/13/2015     Priority: Medium     Problem list name updated by automated process. Provider to review       Edema 05/13/2013     Priority: Medium     CARDIOVASCULAR SCREENING; LDL GOAL LESS THAN 160 10/31/2010     Priority: Medium     GERD (gastroesophageal reflux disease) 03/25/2010     Priority: Medium     Eczema 01/28/2010     Priority: Medium     BRUCE-Mild (AHI 9; REM RDI 31) 03/20/2009     Priority: Medium     Sleep study 3/09- .0 minutes, latency 3.6 minutes. REM latency 72.0 minutes. Sleep efficiency 87.7%. The sleep architecture was disrupted with frequent sleep stage changes and arousals. Snoring: mild to loud.  RDI 27.7, AHI of 8.4. REM RDI 31.5 TLO2 saturation 83.0%. This study is suggestive of mild sleep apnea, severe  during REM sleep (20% TST). Other:  PLM index was 0.0.       Compulsive behaviors 08/26/2008     Priority: Medium     erectile dysfunction 08/22/2005     Priority: Medium     Obesity 11/24/2010     Priority: Low     Thrombocytopenia (H) 08/22/2005     Priority: Low     Thrombocytopenia associated with cirrhosis and portal hypertension  Problem list name updated by automated process. Provider to review          Past Medical/Surgical History:  Past Medical History:   Diagnosis Date     Acute pulmonary edema (H)      Atrial fibrillation (H)      Atrial fibrillation with rapid ventricular response (H) 5/13/2013     Calculus of ureter 1993, 1997    history of     Cirrhosis of liver without mention of alcohol 8/22/2005    Cirrhosis, idiopathic     Edema 5/13/2013     Esophageal varices with bleeding(456.0)      Gallstones      Genital herpes, unspecified 3/20/1999    resolving herpes progenitalis     GERD (gastroesophageal reflux disease)      Hematuria      Hypertension      Hypochondriasis     problems in past     Obesity 11/24/2010     BRUCE (obstructive sleep apnea) 03/17/2009    Mild AHI 8.4  RDI 31 - Pt refuses to wear his CPAP     Portal hypertension (H) 1/28/2010     Unspecified thrombocytopenia 8/22/2005    Thrombocytopenia associated with cirrhosis and portal hypertension     Past Surgical History:   Procedure Laterality Date     ANESTHESIA CARDIOVERSION N/A 1/19/2015    Procedure: ANESTHESIA CARDIOVERSION;  Surgeon: Generic Anesthesia Provider;  Location: [facility]     COLONOSCOPY N/A 8/14/2017    Procedure: COLONOSCOPY;  Colonoscopy Called will arrive appx 12:30 Per phone call;  Surgeon: Vincent Whittington MD;  [facility]     CV HEART CATHETERIZATION WITH POSSIBLE INTERVENTION N/A 12/26/2018    Procedure: Heart Catheterization with possible Intervention;  Surgeon: Brandon Arroyo MD;  Location:  HEART CARDIAC CATH LAB     EP ABLATION FOCAL AFIB N/A 12/21/2018    Procedure: EP Ablation Focal AFIB;  Surgeon:  Kristofer Evans MD;  Location: [facility] CARDIAC CATH LAB     ESOPHAGOSCOPY, GASTROSCOPY, DUODENOSCOPY (EGD), COMBINED  7/11/2011    Procedure:COMBINED ESOPHAGOSCOPY, GASTROSCOPY, DUODENOSCOPY (EGD); Surgeon:LAURA LAMAS; Location:[facility]     ESOPHAGOSCOPY, GASTROSCOPY, DUODENOSCOPY (EGD), COMBINED  9/10/2012    Procedure: COMBINED ESOPHAGOSCOPY, GASTROSCOPY, DUODENOSCOPY (EGD);;  Surgeon: Hi Roque MD;  Location:  GI     ESOPHAGOSCOPY, GASTROSCOPY, DUODENOSCOPY (EGD), COMBINED  9/9/2013    Procedure: COMBINED ESOPHAGOSCOPY, GASTROSCOPY, DUODENOSCOPY (EGD);;  Surgeon: Hi Roque MD;  Location: [facility]     ESOPHAGOSCOPY, GASTROSCOPY, DUODENOSCOPY (EGD), COMBINED N/A 9/15/2014    Procedure: COMBINED ESOPHAGOSCOPY, GASTROSCOPY, DUODENOSCOPY (EGD);  Surgeon: Hi Roque MD;  Location:  GI     ESOPHAGOSCOPY, GASTROSCOPY, DUODENOSCOPY (EGD), COMBINED N/A 10/30/2015    Procedure: COMBINED ESOPHAGOSCOPY, GASTROSCOPY, DUODENOSCOPY (EGD);  Surgeon: Feliberto Fox MD;  Location: [facility]     ESOPHAGOSCOPY, GASTROSCOPY, DUODENOSCOPY (EGD), COMBINED N/A 10/10/2016    Procedure: COMBINED ESOPHAGOSCOPY, GASTROSCOPY, DUODENOSCOPY (EGD);  Surgeon: Jose Nesbitt MD;  Location:  GI     H ABLATION FOCAL AFIB  12/21/2018     HERNIA REPAIR       IRRIGATION AND DEBRIDEMENT ABSCESS SCROTUM, COMBINED  10/4/2012    Procedure: COMBINED IRRIGATION AND DEBRIDEMENT ABSCESS SCROTUM;  Irrigation and Debridement of Groin Abscess;  Surgeon: Benny Shoemaker MD;  Location: [facility]     SOFT TISSUE SURGERY       SURGICAL HISTORY OF -   1993    rt elbow     SURGICAL HISTORY OF -   1/15/98    repair of ventral and umbilical hernia     SURGICAL HISTORY OF -   2001    endoscopy       Social History:  Social History     Socioeconomic History     Marital status:      Spouse name: Not on file     Number of children: Not on file     Years of education: Not on file     Highest education level: Not on file   Social  Needs     Financial resource strain: Not on file     Food insecurity - worry: Not on file     Food insecurity - inability: Not on file     Transportation needs - medical: Not on file     Transportation needs - non-medical: Not on file   Occupational History     Not on file   Tobacco Use     Smoking status: Former Smoker     Packs/day: 0.80     Years: 6.00     Pack years: 4.80     Types: Cigarettes     Last attempt to quit: 2002     Years since quittin.0     Smokeless tobacco: Never Used   Substance and Sexual Activity     Alcohol use: No     Alcohol/week: 0.0 oz     Comment: quit in      Drug use: No     Sexual activity: Yes     Partners: Female   Other Topics Concern     Parent/sibling w/ CABG, MI or angioplasty before 65F 55M? Yes     Comment: BROTHER   - MI AT AGE 44      Service Not Asked     Blood Transfusions Not Asked     Caffeine Concern Not Asked     Occupational Exposure Not Asked     Hobby Hazards Not Asked     Sleep Concern Not Asked     Stress Concern Not Asked     Weight Concern Not Asked     Special Diet Not Asked     Back Care Not Asked     Exercise No     Bike Helmet Not Asked     Seat Belt Not Asked     Self-Exams Not Asked   Social History Narrative     Not on file       Family History:  Family History   Problem Relation Age of Onset     Lipids Mother      Hypertension Mother      Eye Disorder Mother      Heart Disease Father         CHF     Lipids Father      Obesity Father      Heart Disease Brother         MI     Eye Disorder Brother      Cancer Other         maternal grandparent/throat cancer       Review of Systems:  A complete review of systems reviewed by me is negative with the exeption of what has been mentioned in the history of present illness.  CONSTITUTIONAL:  POSITIVE for  weight gain and weight loss  EYES:  POSITIVE for changes in vision  ENT: NEGATIVE for ear pain, sore throat, sinus pain, post-nasal drip, runny nose, bloody nose  CARDIAC:  POSITIVE for  fast  "heart beats, fluttering in chest, chest pain, chest pressure, breathlessness when lying flat, swollen legs and swollen feet  NEUROLOGIC: NEGATIVE headaches, weakness or numbness in the arms or legs.  DERMATOLOGIC: NEGATIVE for rashes, new moles or change in mole(s)  PULMONARY:  POSITIVE for  SOB with activity, productive cough and wheezing   GASTROINTESTINAL: NEGATIVE for nausea or vomitting, loose or watery stools, fat or grease in stools, constipation, abdominal pain, bowel movements black in color or blood noted.  GENITOURINARY:  POSITIVE for  urinating more frequently than usual  MUSCULOSKELETAL: NEGATIVE for muscle pain, bone or joint pain, swollen joints.  ENDOCRINE: NEGATIVE for increased thirst or urination, diabetes.  LYMPHATIC: NEGATIVE for swollen lymph nodes, lumps or bumps in the breasts or nipple discharge.    Physical Examination:  Vitals: BP 97/52   Pulse 88   Ht 1.664 m (5' 5.5\")   Wt 108.9 kg (240 lb)   SpO2 100%   BMI 39.33 kg/m    BMI= Body mass index is 39.33 kg/m .    Neck Cir (cm): 40 cm    Beggs Total Score 12/28/2018   Total score - Beggs 18            GENERAL APPEARANCE: healthy, alert and no distress  PSYCH: mentation appears normal and affect normal/bright    Impression/Plan:    Maurice Jon is a pleasant 57 yo M with significant PMHx (see summary below) who presents to re-establish care for mild to moderate BRUCE treated with CPAP.    1.)  Mild to moderate BRUCE   - No evidence of new central apnea or  on download today   - Appears well controlled on CPAP 12 cm H2O, well tolerated.   - Did not see strong indication for repeat sleep testing at this time.   - Plan for new supplies and continue CPAP at current settings.    Polysomnography reviewed.  Obstructive sleep apnea reviewed.  Complications of untreated sleep apnea were reviewed.    I have spent 60 minutes with this patient today in which greater than 50% of this time was spent in the counseling / coordination of " care.    Manuel Benitez MD    CC: No ref. provider found

## 2018-12-28 NOTE — PROGRESS NOTES
ANTICOAGULATION FOLLOW-UP CLINIC VISIT    Patient Name:  Maurice Jon  Date:  2018  Contact Type:  Telephone    SUBJECTIVE:     Patient Findings     Positives:   Intentional hold of therapy    Comments:   Previous hold.   Patient will continue to bridge with Lovenox. Patient will take 2.5 mg daily through the weekend.   Recheck the INR on Monday.   Patient verbalizes understanding and agrees to plan. No further questions or concerns.;             OBJECTIVE    INR   Date Value Ref Range Status   2018 1.57 (H) 0.86 - 1.14 Final       ASSESSMENT / PLAN  INR assessment SUB    Recheck INR In: 3 DAYS    INR Location Outside lab      Anticoagulation Summary  As of 2018    INR goal:   2.0-3.0   TTR:   88.1 % (3 mo)   INR used for dosin.57! (2018)   Warfarin maintenance plan:   1.25 mg (2.5 mg x 0.5) every Fri; 2.5 mg (2.5 mg x 1) all other days   Full warfarin instructions:   : 2.5 mg; Otherwise 1.25 mg every Fri; 2.5 mg all other days   Weekly warfarin total:   16.25 mg   Plan last modified:   Susan Beyer RN (2018)   Next INR check:   2018   Priority:   INR   Target end date:   10/4/2018    Indications    Atrial fibrillation (H) (Resolved) [I48.91]  Atrial fibrillation with rapid ventricular response (H) (Resolved) [I48.91]  Long-term (current) use of anticoagulants [Z79.01] [Z79.01]             Anticoagulation Episode Summary     INR check location:       Preferred lab:       Send INR reminders to:   [facility] PHONE ANTICOAG POOL    Comments:   * anticoagulation short period surrounding ablation on 18. Cardiology to decide when to stop warfarin. has well-compensated cirrhosis      Anticoagulation Care Providers     Provider Role Specialty Phone number    Alon Pineda MD [facility] [phone]            See the Encounter Report to view Anticoagulation Flowsheet and Dosing Calendar (Go to Encounters tab in chart review, and find the  Anticoagulation Therapy Visit)        Taylor Fleix RN

## 2018-12-28 NOTE — TELEPHONE ENCOUNTER
Left message for patient to return call to clinic.  Please help patient schedule appt to establish care with Kavita Ac.  Hospital f/u can be done at this appt.  Vera HOYOS RN

## 2018-12-28 NOTE — PATIENT INSTRUCTIONS
Your BMI is Body mass index is 39.33 kg/m .  Weight management is a personal decision.  If you are interested in exploring weight loss strategies, the following discussion covers the approaches that may be successful. Body mass index (BMI) is one way to tell whether you are at a healthy weight, overweight, or obese. It measures your weight in relation to your height.  A BMI of 18.5 to 24.9 is in the healthy range. A person with a BMI of 25 to 29.9 is considered overweight, and someone with a BMI of 30 or greater is considered obese. More than two-thirds of American adults are considered overweight or obese.  Being overweight or obese increases the risk for further weight gain. Excess weight may lead to heart disease and diabetes.  Creating and following plans for healthy eating and physical activity may help you improve your health.  Weight control is part of healthy lifestyle and includes exercise, emotional health, and healthy eating habits. Careful eating habits lifelong are the mainstay of weight control. Though there are significant health benefits from weight loss, long-term weight loss with diet alone may be very difficult to achieve- studies show long-term success with dietary management in less than 10% of people. Attaining a healthy weight may be especially difficult to achieve in those with severe obesity. In some cases, medications, devices and surgical management might be considered.  What can you do?  If you are overweight or obese and are interested in methods for weight loss, you should discuss this with your provider.     Consider reducing daily calorie intake by 500 calories.     Keep a food journal.     Avoiding skipping meals, consider cutting portions instead.    Diet combined with exercise helps maintain muscle while optimizing fat loss. Strength training is particularly important for building and maintaining muscle mass. Exercise helps reduce stress, increase energy, and improves fitness.  Increasing exercise without diet control, however, may not burn enough calories to loose weight.       Start walking three days a week 10-20 minutes at a time    Work towards walking thirty minutes five days a week     Eventually, increase the speed of your walking for 1-2 minutes at time    In addition, we recommend that you review healthy lifestyles and methods for weight loss available through the National Institutes of Health patient information sites:  http://win.niddk.nih.gov/publications/index.htm    And look into health and wellness programs that may be available through your health insurance provider, employer, local community center, or rohith club.    Weight management plan: Patient was referred to their PCP to discuss a diet and exercise plan.

## 2018-12-28 NOTE — TELEPHONE ENCOUNTER
"Requested Prescriptions   Pending Prescriptions Disp Refills     warfarin (COUMADIN) 2.5 MG tablet 90 tablet 0    Last Written Prescription Date:  18  Last Fill Quantity: 90,  # refills: 0   Last office visit: 2018 with prescribing provider:  Yashira   Future Office Visit:   Next 5 appointments (look out 90 days)    2019  8:20 AM CST  SHORT with JANETTE Nelson CNP  [facility] ([facility]) [address]  [facility] [address]  [phone]   Luis F 15, 2019  9:00 AM CST  Return Visit with Virginia Montilla PA-C  [facility] ([facility]) [address] [facility] [address]  [phone]          Si.25 mg ; 2.5mg all other days or As directed by Anticoagulation Clinic    Vitamin K Antagonists Failed - 2018  2:26 PM       Failed - INR is within goal in the past 6 weeks    Confirm INR is within goal in the past 6 weeks.     Recent Labs   Lab Test 18  0634   INR 1.35*                      Passed - Recent (12 mo) or future (30 days) visit within the authorizing provider's specialty    Patient had office visit in the last 12 months or has a visit in the next 30 days with authorizing provider or within the authorizing provider's specialty.  See \"Patient Info\" tab in inbasket, or \"Choose Columns\" in Meds & Orders section of the refill encounter.             Passed - Patient is 18 years of age or older          "

## 2018-12-29 LAB
BACTERIA SPEC CULT: NO GROWTH
BACTERIA SPEC CULT: NO GROWTH
Lab: NORMAL
Lab: NORMAL
SPECIMEN SOURCE: NORMAL
SPECIMEN SOURCE: NORMAL

## 2018-12-31 ENCOUNTER — ANTICOAGULATION THERAPY VISIT (OUTPATIENT)
Dept: ANTICOAGULATION | Facility: CLINIC | Age: 58
End: 2018-12-31
Payer: COMMERCIAL

## 2018-12-31 DIAGNOSIS — I50.23 ACUTE ON CHRONIC SYSTOLIC CONGESTIVE HEART FAILURE (H): ICD-10-CM

## 2018-12-31 DIAGNOSIS — Z79.01 LONG TERM CURRENT USE OF ANTICOAGULANT THERAPY: Primary | ICD-10-CM

## 2018-12-31 DIAGNOSIS — K74.60 CIRRHOSIS OF LIVER WITHOUT ASCITES, UNSPECIFIED HEPATIC CIRRHOSIS TYPE (H): ICD-10-CM

## 2018-12-31 LAB
ANION GAP SERPL CALCULATED.3IONS-SCNC: 9 MMOL/L (ref 3–14)
BUN SERPL-MCNC: 17 MG/DL (ref 7–30)
CALCIUM SERPL-MCNC: 8.8 MG/DL (ref 8.5–10.1)
CHLORIDE SERPL-SCNC: 100 MMOL/L (ref 94–109)
CO2 SERPL-SCNC: 24 MMOL/L (ref 20–32)
CREAT SERPL-MCNC: 0.77 MG/DL (ref 0.66–1.25)
GFR SERPL CREATININE-BSD FRML MDRD: >90 ML/MIN/{1.73_M2}
GLUCOSE SERPL-MCNC: 102 MG/DL (ref 70–99)
INR PPP: 2.02 (ref 0.86–1.14)
POTASSIUM SERPL-SCNC: 4.1 MMOL/L (ref 3.4–5.3)
SODIUM SERPL-SCNC: 133 MMOL/L (ref 133–144)

## 2018-12-31 PROCEDURE — 85610 PROTHROMBIN TIME: CPT | Performed by: INTERNAL MEDICINE

## 2018-12-31 PROCEDURE — 99207 ZZC NO CHARGE NURSE ONLY: CPT

## 2018-12-31 PROCEDURE — 36415 COLL VENOUS BLD VENIPUNCTURE: CPT | Performed by: NURSE PRACTITIONER

## 2018-12-31 PROCEDURE — 80048 BASIC METABOLIC PNL TOTAL CA: CPT | Performed by: NURSE PRACTITIONER

## 2018-12-31 NOTE — PROGRESS NOTES
ANTICOAGULATION FOLLOW-UP CLINIC VISIT    Patient Name:  Maurice Jon  Date:  2018  Contact Type:  Telephone    SUBJECTIVE:     Patient Findings     Comments:   INR therapeutic today. Patient will have his INR checked Wednesday morning in [facility] prior to his appt. Patient already took his warfarin dose today. Will have him take 1.25mg tomorrow and continue his Lovenox injections. Patient only has enough Lovenox to take through Tuesday evening. Writer offered to send another prescription in to the Pharmacy for patient but he declined.            OBJECTIVE    INR   Date Value Ref Range Status   2018 2.02 (H) 0.86 - 1.14 Final       ASSESSMENT / PLAN  INR assessment THER    Recheck INR In: 2 DAYS    INR Location Outside lab      Anticoagulation Summary  As of 2018    INR goal:   2.0-3.0   TTR:   85.5 % (3.1 mo)   INR used for dosin.02 (2018)   Warfarin maintenance plan:   1.25 mg (2.5 mg x 0.5) every Fri; 2.5 mg (2.5 mg x 1) all other days   Full warfarin instructions:   /: 1.25 mg; Otherwise 1.25 mg every Fri; 2.5 mg all other days   Weekly warfarin total:   16.25 mg   Plan last modified:   Susan Beyer RN (2018)   Next INR check:   2019   Priority:   INR   Target end date:   10/4/2018    Indications    Atrial fibrillation (H) (Resolved) [I48.91]  Atrial fibrillation with rapid ventricular response (H) (Resolved) [I48.91]  Long-term (current) use of anticoagulants [Z79.01] [Z79.01]             Anticoagulation Episode Summary     INR check location:       Preferred lab:       Send INR reminders to:   [facility] YEYO [facility] POOL    Comments:   * anticoagulation short period surrounding ablation on 18. Cardiology to decide when to stop warfarin. has well-compensated cirrhosis      Anticoagulation Care Providers     Provider Role Specialty Phone number    Alon Pineda MD [facility] [phone]            See the Encounter Report to view  Anticoagulation Flowsheet and Dosing Calendar (Go to Encounters tab in chart review, and find the Anticoagulation Therapy Visit)        Christina Singer RN

## 2018-12-31 NOTE — PROGRESS NOTES
"Called pt for update on wt/sx. Pt said he's lost a couple of pounds since discharge, is feeling well. He denies dizziness. He said he's been watching his sodium intake and feels like he's in sinus rhythm. He said he still has his cough, which he had prior to his hospitalization. Pt said he was treated for bronchitis with a Z-pack. He denies fever or chills or chest pain or SOB. He said sometimes he gets coughing fits where he will cough up some mucus. Pt reports he sees his PCP later this week and will address that. He also reported feeling a small \"bump\" in left groin cath site, denies painfulness/tenderness or large swollen area with warmth or any numbness/tingling of extremity. Pt reports it is small and he has some bruising, which I told pt that would be expected especially with bridging on lovenox and warfarin, as long as none of the signs/sx which I reviewed. Pt instructed to call PRN for sx or wt changes. Reminded pt of his labs and OV on 1/3 at 0750 and reviewed parking options with pt. Allie Mittal RN December 31, 2018, 2:20 PM    "

## 2019-01-02 ENCOUNTER — TELEPHONE (OUTPATIENT)
Dept: FAMILY MEDICINE | Facility: CLINIC | Age: 59
End: 2019-01-02

## 2019-01-02 ENCOUNTER — ANTICOAGULATION THERAPY VISIT (OUTPATIENT)
Dept: ANTICOAGULATION | Facility: CLINIC | Age: 59
End: 2019-01-02
Payer: COMMERCIAL

## 2019-01-02 ENCOUNTER — ANCILLARY PROCEDURE (OUTPATIENT)
Dept: GENERAL RADIOLOGY | Facility: CLINIC | Age: 59
End: 2019-01-02
Attending: NURSE PRACTITIONER
Payer: COMMERCIAL

## 2019-01-02 ENCOUNTER — OFFICE VISIT (OUTPATIENT)
Dept: FAMILY MEDICINE | Facility: CLINIC | Age: 59
End: 2019-01-02
Payer: COMMERCIAL

## 2019-01-02 VITALS
HEIGHT: 66 IN | TEMPERATURE: 98.4 F | SYSTOLIC BLOOD PRESSURE: 91 MMHG | BODY MASS INDEX: 37.28 KG/M2 | WEIGHT: 232 LBS | OXYGEN SATURATION: 96 % | RESPIRATION RATE: 14 BRPM | HEART RATE: 88 BPM | DIASTOLIC BLOOD PRESSURE: 60 MMHG

## 2019-01-02 DIAGNOSIS — Z79.01 LONG TERM CURRENT USE OF ANTICOAGULANT THERAPY: ICD-10-CM

## 2019-01-02 DIAGNOSIS — I48.20 CHRONIC A-FIB (H): ICD-10-CM

## 2019-01-02 DIAGNOSIS — J98.01 BRONCHOSPASM: ICD-10-CM

## 2019-01-02 DIAGNOSIS — I50.811 ACUTE RIGHT-SIDED HEART FAILURE (H): Primary | ICD-10-CM

## 2019-01-02 DIAGNOSIS — R06.02 SHORTNESS OF BREATH: Primary | ICD-10-CM

## 2019-01-02 DIAGNOSIS — R05.9 COUGH: ICD-10-CM

## 2019-01-02 LAB — INR PPP: 1.94 (ref 0.86–1.14)

## 2019-01-02 PROCEDURE — 99207 ZZC NO CHARGE NURSE ONLY: CPT

## 2019-01-02 PROCEDURE — 99214 OFFICE O/P EST MOD 30 MIN: CPT | Performed by: NURSE PRACTITIONER

## 2019-01-02 PROCEDURE — 36415 COLL VENOUS BLD VENIPUNCTURE: CPT | Performed by: FAMILY MEDICINE

## 2019-01-02 PROCEDURE — 85610 PROTHROMBIN TIME: CPT | Performed by: FAMILY MEDICINE

## 2019-01-02 PROCEDURE — 71046 X-RAY EXAM CHEST 2 VIEWS: CPT | Mod: FY

## 2019-01-02 ASSESSMENT — PAIN SCALES - GENERAL: PAINLEVEL: NO PAIN (0)

## 2019-01-02 ASSESSMENT — MIFFLIN-ST. JEOR: SCORE: 1807.16

## 2019-01-02 NOTE — PROGRESS NOTES
SUBJECTIVE:   Maurice Jon is a 58 year old male who presents to clinic today for the following health issues:      Hospital Follow-up Visit:    Hospital/Nursing Home/IP Rehab Facility: [facility]  Date of Admission: 12/21/18  Date of Discharge: 12/27/18  Reason(s) for Admission: Acute systolic/diastolic Heart failure, fluid on the lung            Problems taking medications regularly:  None       Medication changes since discharge: None       Problems adhering to non-medication therapy:  None    Summary of hospitalization:  [facility] discharge summary reviewed and copied from epic:        Discharge Diagnoses/Problem Oriented Hospital Course (Providers):    Maurice Jon was admitted on 12/21/2018 by Valdo Lopez MD and I would refer you to their history and physical.  The following problems were addressed during his hospitalization:     Maurice Jon is a 58-year-old male with a medical history significant for nonalcoholic steatohepatitis with cirrhosis, portal hypertension and thrombocytopenia, chronic atrial fibrillation with recent acute bronchitis, who was placed in observation after elective atrial fibrillation ablation; Hospitalist was consulted post procedure after he complained of chest pain and shortness of breath, noted with low grade fever. CXR was noted with pulmonary vascular congestion and patchy opacity in the left lung base. ECHO noted with new LV dysfunction.     # A fib s/p elective ablation 12/21/18  # Post ablation SOB, likely new onset acute systolic/diastolic CHF--EF 25-30%  # Likely post ablation pericarditis  - ECHO noted with new onset LV dysfunction with EF of 25-30%, a decrease from prior EF of 50-55%; also noted with right ventricular dilatation and TR  - cardiology followed; s/p Left and Right heart cath on 12/26/18--- Minimal coronary artery disease, wedge 18mmHg and  Mean RA 14mmHg.  - continued Toprol  mg po daily; started  lisinopril 2.5 mg daily; decreased spironolactone 25 mg po bid; lasix switched to 40 mg po Bid   - resumed coumadin after cath with heparin bridging  - patient refused NOACs  - refuses to take Aspirin  - continue cochicine 0.6 mg po bid  - low grade fever has improved; was recently treated with Z-pack for presumed acute bronchitis  - cards plan to reschedule EP followup appt to 1/15/18 in [place] with Nadege Montilla PA-C  - [facility] clinic follow up in 1 week  - plan to Continue warfarin for 3 months post ablation; will have bridging with Lovenox on discharge     # Steatohepatitis and cirrhosis with portal hypertension, thrombocytopenia and esophageal varices.    - The patient is followed by GI at [facility].  Has a diagnosis of portal vein thrombosis as well.  LFTs appear stable and platelet count as well is stable.  PTA Lasix and losartan continued as above.    - Continue compression stockings.      #.  Obstructive sleep apnea.  Continue CPAP.   8.  Gastroesophageal reflux disease.  PTA Protonix continued.   9.  Obesity.  Per PCP.     Diagnostic Tests/Treatments reviewed.  Follow up needed: Cardiology, Coumadin clinic.  Other Healthcare Providers Involved in Patient s Care:         Specialist appointment - Cardiology follow-up on 1/3/2019 and Anticoagulation clinic  Update since discharge: stable.    Post Discharge Medication Reconciliation: discharge medications reconciled, continue medications without change.  Plan of care communicated with patient     Coding guidelines for this visit:  Type of Medical   Decision Making Face-to-Face Visit       within 7 Days of discharge Face-to-Face Visit        within 14 days of discharge   Moderate Complexity [id number]   High Complexity [id number]            Problem list and histories reviewed & adjusted, as indicated.  Further history obtained, clarified or corrected by provider:  Cough has intensified over the past few days.  Cough is worst during the night and was  very intense on day following discharge. .   Occasionally, cough is productive of white green sputum.  No change from when in the hospital.  Pericarditis- took colchicine for 7 days.   Completed lovenox injections yesterday  Edema is down, not wearing acosta hose.      Patient Active Problem List   Diagnosis     Thrombocytopenia (H)     Liver Cirrhosis 2nd ot Fatty liver, w Ascites     erectile dysfunction     Compulsive behaviors     BRUCE-Mild (AHI 9; REM RDI 31)     Portal hypertension (H)     Eczema     GERD (gastroesophageal reflux disease)     CARDIOVASCULAR SCREENING; LDL GOAL LESS THAN 160     Obesity     Health Care Home     Edema     Paroxysmal atrial fibrillation (H)     Severe sepsis (H)     History of MRSA now culture negative     Portal vein thrombosis     Long-term (current) use of anticoagulants [Z79.01]     Encounter for monitoring sotalol therapy     Chronic a-fib, S/P Ablation 12/22/18 -- on Warfarin     Right heart failure (H)     Obesity (BMI 35.0-39.9) with comorbidity (H)     CAD (coronary artery disease)     Cardiomyopathy (H)     Pericarditis     Acute on chronic systolic congestive heart failure (H)     Acute combined systolic and diastolic (congestive) hrt fail (H)     Past Surgical History:   Procedure Laterality Date     ANESTHESIA CARDIOVERSION N/A 1/19/2015    Procedure: ANESTHESIA CARDIOVERSION;  Surgeon: Generic Anesthesia Provider;  Location: [facility] OR     COLONOSCOPY N/A 8/14/2017    Procedure: COLONOSCOPY;  Colonoscopy Called will arrive appx 12:30 Per phone call;  Surgeon: Vincent Whittington MD;  Location:  GI     CV HEART CATHETERIZATION WITH POSSIBLE INTERVENTION N/A 12/26/2018    Procedure: Heart Catheterization with possible Intervention;  Surgeon: Brandon Arroyo MD;  Location: [facility] CARDIAC CATH LAB     EP ABLATION FOCAL AFIB N/A 12/21/2018    Procedure: EP Ablation Focal AFIB;  Surgeon: Kristofer Evans MD;  Location: [facility] CARDIAC CATH LAB     ESOPHAGOSCOPY, GASTROSCOPY,  DUODENOSCOPY (EGD), COMBINED  2011    Procedure:COMBINED ESOPHAGOSCOPY, GASTROSCOPY, DUODENOSCOPY (EGD); Surgeon:LAURA LAMAS; Location:[facility] GI     ESOPHAGOSCOPY, GASTROSCOPY, DUODENOSCOPY (EGD), COMBINED  9/10/2012    Procedure: COMBINED ESOPHAGOSCOPY, GASTROSCOPY, DUODENOSCOPY (EGD);;  Surgeon: iH Roque MD;  Location:  GI     ESOPHAGOSCOPY, GASTROSCOPY, DUODENOSCOPY (EGD), COMBINED  2013    Procedure: COMBINED ESOPHAGOSCOPY, GASTROSCOPY, DUODENOSCOPY (EGD);;  Surgeon: Hi Roque MD;  Location:  GI     ESOPHAGOSCOPY, GASTROSCOPY, DUODENOSCOPY (EGD), COMBINED N/A 9/15/2014    Procedure: COMBINED ESOPHAGOSCOPY, GASTROSCOPY, DUODENOSCOPY (EGD);  Surgeon: Hi Roque MD;  Location:  GI     ESOPHAGOSCOPY, GASTROSCOPY, DUODENOSCOPY (EGD), COMBINED N/A 10/30/2015    Procedure: COMBINED ESOPHAGOSCOPY, GASTROSCOPY, DUODENOSCOPY (EGD);  Surgeon: Feliberto Fox MD;  Location:  GI     ESOPHAGOSCOPY, GASTROSCOPY, DUODENOSCOPY (EGD), COMBINED N/A 10/10/2016    Procedure: COMBINED ESOPHAGOSCOPY, GASTROSCOPY, DUODENOSCOPY (EGD);  Surgeon: Jose Nesbitt MD;  Location:  GI     H ABLATION FOCAL AFIB  2018     HERNIA REPAIR       IRRIGATION AND DEBRIDEMENT ABSCESS SCROTUM, COMBINED  10/4/2012    Procedure: COMBINED IRRIGATION AND DEBRIDEMENT ABSCESS SCROTUM;  Irrigation and Debridement of Groin Abscess;  Surgeon: Benny Shoemaker MD;  Location: [facility] OR     SOFT TISSUE SURGERY       SURGICAL HISTORY OF -       rt elbow     SURGICAL HISTORY OF -   1/15/98    repair of ventral and umbilical hernia     SURGICAL HISTORY OF -       endoscopy       Social History     Tobacco Use     Smoking status: Former Smoker     Packs/day: 0.80     Years: 6.00     Pack years: 4.80     Types: Cigarettes     Last attempt to quit: 2002     Years since quittin.0     Smokeless tobacco: Never Used   Substance Use Topics     Alcohol use: No     Alcohol/week: 0.0 oz     Comment: quit  in 2007     Family History   Problem Relation Age of Onset     Lipids Mother      Hypertension Mother      Eye Disorder Mother      Heart Disease Father         CHF     Lipids Father      Obesity Father      Heart Disease Brother         MI     Eye Disorder Brother      Hypertension Brother         portal HTN     Thrombosis Sister         in her lung     Cancer Other         maternal grandparent/throat cancer         Current Outpatient Medications   Medication Sig Dispense Refill     albuterol (PROAIR HFA) 108 (90 Base) MCG/ACT inhaler Inhale 2 puffs into the lungs       calcium carb 1250 mg, 500 mg [place],/vitamin D 200 units (OSCAL WITH D) 500-200 MG-UNIT per tablet Take 2 tablets by mouth daily with food.       furosemide (LASIX) 40 MG tablet Take 1 tablet (40 mg) by mouth 2 times daily 60 tablet 0     metoprolol succinate ER (TOPROL-XL) 100 MG 24 hr tablet Take 1 tablet (100 mg) by mouth daily 90 tablet 3     mometasone-formoterol (DULERA) 200-5 MCG/ACT inhaler Inhale 2 puffs into the lungs 2 times daily as needed Appt DUE Jan 2017 1 Inhaler 1     order for DME Open toe size large 30/40 Mediven Assure Medical Compression socks Model 66703.    Fax to Fax [phone]. [facility] medical 12 Device 0     order for DME Equipment being ordered:   New CPAP mask, tube, filters,water tray 1 Device 3     order for DME Open toe size large 30/40 Mediven Assure Medical Compression socks Model 12138. 4 Package 3     ORDER FOR DME Respironics RemStar 60 Series Auto AFlex 12-15 cm H2O with heated humidty and a modem.  Pt chose a Quattro Air FFM size medium.       ORDER FOR DME Juzo compression stockings, 30-40mm compression. Patient has portal hypertension and develops leg edema, which these control well. 2 Package 3     pantoprazole (PROTONIX) 40 MG EC tablet Take 1 tablet (40 mg) by mouth daily 30 tablet 0     spironolactone (ALDACTONE) 25 MG tablet Take 1 tablet (25 mg) by mouth 2 times daily We will no longer fill unless  "seen by cardiology 60 tablet 0     warfarin (COUMADIN) 2.5 MG tablet 1.25 mg Fridays; 2.5mg all other days or As directed by Anticoagulation Clinic 90 tablet 0     colchicine (MITIGARE) 0.6 MG capsule Take 0.6 mg by mouth daily 1/3/19: Pt has 2 doses left       digoxin (LANOXIN) 125 MCG tablet Take 1 tablet (125 mcg) by mouth daily 30 tablet 3     Allergies   Allergen Reactions     Avelox Other (See Comments) and GI Disturbance     portal hypertension     Moxifloxacin Nausea and Vomiting, Nausea and Other (See Comments)     portal hypertension       Reviewed and updated as needed this visit by clinical staff  Tobacco  Allergies  Meds  Problems  Med Hx  Surg Hx  Fam Hx  Soc Hx        Reviewed and updated as needed this visit by Provider  Tobacco  Allergies  Meds  Problems  Med Hx  Surg Hx  Fam Hx         ROS:  Constitutional, HEENT, cardiovascular, pulmonary, GI, , musculoskeletal, neuro, skin, endocrine and psych systems are negative, except as otherwise noted.    OBJECTIVE:     BP 91/60 (BP Location: Right arm, Patient Position: Chair, Cuff Size: Adult Large)   Pulse 88   Temp 98.4  F (36.9  C) (Tympanic)   Resp 14   Ht 1.664 m (5' 5.5\")   Wt 105.2 kg (232 lb)   SpO2 96%   BMI 38.02 kg/m    Body mass index is 38.02 kg/m .  GENERAL: healthy, alert, no distress and obese  EYES: Eyes grossly normal to inspection and conjunctivae and sclerae normal  HENT: ear canals and TM's normal, nose and mouth without ulcers or lesions  NECK: no adenopathy, no asymmetry, masses, or scars and thyroid normal to palpation  RESP: expiratory wheezes bibasilar  CV: regular rate and rhythm, normal S1 S2, no S3 or S4, no murmur, click or rub, no peripheral edema and peripheral pulses strong  ABDOMEN: soft, nontender, no hepatosplenomegaly, no masses and bowel sounds normal  MS: no gross musculoskeletal defects noted, no edema  SKIN: no suspicious lesions or rashes  NEURO: Normal strength and tone, mentation intact " and speech normal  PSYCH: mentation appears normal, affect normal/bright    Diagnostic Test Results:  Results for orders placed or performed in visit on 01/02/19   INR   Result Value Ref Range    INR 1.94 (H) 0.86 - 1.14       ASSESSMENT/PLAN:     ASSESSMENT:  1. Acute right-sided heart failure (H).  Stable since hospital discharge.  Will see cardiology tomorrow.   2. Chronic a-fib, S/P Ablation 12/22/18 -- on Warfarin.  Followed by anticoagulation clinic.   3. Bronchospasm.  Would like to try Dulera again.  It was not covered by insurance in the past.   4. Cough        PLAN:  Orders Placed This Encounter     XR Chest 2 Views     CBC with platelets and differential     mometasone-formoterol (DULERA) 200-5 MCG/ACT inhaler       Patient Instructions   We will call you with the results of your labs     Continue with current plan of care    Dulera prescription has been refilled.  Patient agrees with plan of care as outlined. Call or return to the clinic with any worsening of symptoms or no resolution. Medication side effects reviewed.      Kailey Ac, SARA, APRN CNP  [facility]

## 2019-01-02 NOTE — TELEPHONE ENCOUNTER
Please let Pt know he needs to have labs done before his next cardiology appt. pt can make a lab only visit labs are ordered. Jenn Lawrence CMA

## 2019-01-02 NOTE — PROGRESS NOTES
ANTICOAGULATION FOLLOW-UP CLINIC VISIT    Patient Name:  Maurice Jon  Date:  2019  Contact Type:  Telephone    SUBJECTIVE:     Patient Findings     Positives:   No Problem Findings    Comments:   No changes in medications, diet, or activity. No concerns with bleeding or bruising. Took warfarin as prescribed.   Pt no longer taking Lovenox injections. Discontinued from medication list.   Continue maintenance warfarin dose. Will recheck INR in 2 days per patient preference as he already has an appt set up.   Patient verbalizes understanding and agrees with plan. No further questions at this time.              OBJECTIVE    INR   Date Value Ref Range Status   2019 1.94 (H) 0.86 - 1.14 Final       ASSESSMENT / PLAN  INR assessment THER +/- 0.1   Recheck INR In: 2 DAYS per patient preference   INR Location Outside lab      Anticoagulation Summary  As of 2019    INR goal:   2.0-3.0   TTR:   84.3 % (3.2 mo)   INR used for dosin.94! (2019)   Warfarin maintenance plan:   1.25 mg (2.5 mg x 0.5) every Fri; 2.5 mg (2.5 mg x 1) all other days   Full warfarin instructions:   1.25 mg every Fri; 2.5 mg all other days   Weekly warfarin total:   16.25 mg   No change documented:   Delores Bolaños RN   Plan last modified:   Susan Beyer RN (2018)   Next INR check:   2019   Priority:   INR   Target end date:   10/4/2018    Indications    Atrial fibrillation (H) (Resolved) [I48.91]  Atrial fibrillation with rapid ventricular response (H) (Resolved) [I48.91]  Long-term (current) use of anticoagulants [Z79.01] [Z79.01]             Anticoagulation Episode Summary     INR check location:       Preferred lab:       Send INR reminders to:   [facility] PHONE [employer]    Comments:   * anticoagulation short period surrounding ablation on 18. Cardiology to decide when to stop warfarin. has well-compensated cirrhosis      Anticoagulation Care Providers     Provider Role Specialty Phone number     Alon Pineda MD [facility] [phone]            See the Encounter Report to view Anticoagulation Flowsheet and Dosing Calendar (Go to Encounters tab in chart review, and find the Anticoagulation Therapy Visit)        Delores Bolaños RN

## 2019-01-02 NOTE — TELEPHONE ENCOUNTER
Please check with pharmacy regarding alternate medications covered by insurance.  If none, please obtained prior authorization information. Kailey Ac RNC, NP

## 2019-01-02 NOTE — TELEPHONE ENCOUNTER
Pt was prescribed Dulera today. The pharmacy told him there is a problem with his insurance and he should call us. More information needed. [facility] in [place] is the drug store.      Please call patient to inform of status. He wants to get going with the meds.

## 2019-01-02 NOTE — TELEPHONE ENCOUNTER
Spoke to pharmacy, this needs a prior auth and they state they have already sent it over. They stated the patient was confused on what needed to happen.  Patient advised we are waiting on the prior auth. He will keep checking with the pharmacy.    Kathrin Zhu RN

## 2019-01-02 NOTE — PATIENT INSTRUCTIONS
We will call you with the results of your labs     Continue with current plan of care    Dulera prescription has been refilled.

## 2019-01-02 NOTE — TELEPHONE ENCOUNTER
Per fax received from [facility]  - Dulera is not covered by patient's Insurance Company  SEliseo Ac - Please choose:  1.  Change medication that is not covered to a different medication and send new prescription to patient's pharmacy?  2.  Patient will need to pay for the non-covered medication out-of-pocket?   3.  Try for Prior Authorization with Insurance Company to get medication covered?       Key# [id number]

## 2019-01-02 NOTE — TELEPHONE ENCOUNTER
Patient had told me he wanted to wait for the Dulera in an earlier phone encounter today.    Kathrin Zhu RN

## 2019-01-03 ENCOUNTER — CARE COORDINATION (OUTPATIENT)
Dept: CARDIOLOGY | Facility: CLINIC | Age: 59
End: 2019-01-03

## 2019-01-03 ENCOUNTER — OFFICE VISIT (OUTPATIENT)
Dept: CARDIOLOGY | Facility: CLINIC | Age: 59
End: 2019-01-03
Payer: COMMERCIAL

## 2019-01-03 VITALS
OXYGEN SATURATION: 97 % | HEART RATE: 104 BPM | SYSTOLIC BLOOD PRESSURE: 90 MMHG | HEIGHT: 66 IN | WEIGHT: 235.3 LBS | BODY MASS INDEX: 37.82 KG/M2 | DIASTOLIC BLOOD PRESSURE: 50 MMHG

## 2019-01-03 DIAGNOSIS — I30.9 ACUTE PERICARDITIS, UNSPECIFIED TYPE: ICD-10-CM

## 2019-01-03 DIAGNOSIS — I50.9 CHF (CONGESTIVE HEART FAILURE) (H): Primary | ICD-10-CM

## 2019-01-03 DIAGNOSIS — I95.2 HYPOTENSION DUE TO DRUGS: ICD-10-CM

## 2019-01-03 DIAGNOSIS — I50.9 CHF (CONGESTIVE HEART FAILURE) (H): ICD-10-CM

## 2019-01-03 DIAGNOSIS — I50.23 ACUTE ON CHRONIC SYSTOLIC CONGESTIVE HEART FAILURE (H): Primary | ICD-10-CM

## 2019-01-03 DIAGNOSIS — I50.811 ACUTE RIGHT-SIDED HEART FAILURE (H): ICD-10-CM

## 2019-01-03 DIAGNOSIS — I50.23 ACUTE ON CHRONIC SYSTOLIC CONGESTIVE HEART FAILURE (H): ICD-10-CM

## 2019-01-03 DIAGNOSIS — I48.0 PAROXYSMAL ATRIAL FIBRILLATION (H): ICD-10-CM

## 2019-01-03 LAB
ALBUMIN SERPL-MCNC: 3.3 G/DL (ref 3.4–5)
ALP SERPL-CCNC: 85 U/L (ref 40–150)
ALT SERPL W P-5'-P-CCNC: 39 U/L (ref 0–70)
ANION GAP SERPL CALCULATED.3IONS-SCNC: 6.1 MMOL/L (ref 6–17)
AST SERPL W P-5'-P-CCNC: 35 U/L (ref 0–45)
BASOPHILS # BLD AUTO: 0 10E9/L (ref 0–0.2)
BASOPHILS NFR BLD AUTO: 0.3 %
BILIRUB DIRECT SERPL-MCNC: 0.5 MG/DL (ref 0–0.2)
BILIRUB SERPL-MCNC: 1.2 MG/DL (ref 0.2–1.3)
BUN SERPL-MCNC: 16 MG/DL (ref 7–30)
CALCIUM SERPL-MCNC: 9.3 MG/DL (ref 8.5–10.5)
CHLORIDE SERPL-SCNC: 103 MMOL/L (ref 98–107)
CO2 SERPL-SCNC: 27 MMOL/L (ref 23–29)
CREAT SERPL-MCNC: 0.87 MG/DL (ref 0.7–1.3)
DIFFERENTIAL METHOD BLD: ABNORMAL
EOSINOPHIL # BLD AUTO: 0.5 10E9/L (ref 0–0.7)
EOSINOPHIL NFR BLD AUTO: 13.1 %
ERYTHROCYTE [DISTWIDTH] IN BLOOD BY AUTOMATED COUNT: 13.9 % (ref 10–15)
GFR SERPL CREATININE-BSD FRML MDRD: >90 ML/MIN/{1.73_M2}
GLUCOSE SERPL-MCNC: 105 MG/DL (ref 70–105)
HCT VFR BLD AUTO: 42.2 % (ref 40–53)
HGB BLD-MCNC: 14.3 G/DL (ref 13.3–17.7)
IMM GRANULOCYTES # BLD: 0 10E9/L (ref 0–0.4)
IMM GRANULOCYTES NFR BLD: 0 %
LYMPHOCYTES # BLD AUTO: 0.9 10E9/L (ref 0.8–5.3)
LYMPHOCYTES NFR BLD AUTO: 23.2 %
MCH RBC QN AUTO: 33.7 PG (ref 26.5–33)
MCHC RBC AUTO-ENTMCNC: 33.9 G/DL (ref 31.5–36.5)
MCV RBC AUTO: 100 FL (ref 78–100)
MONOCYTES # BLD AUTO: 0.6 10E9/L (ref 0–1.3)
MONOCYTES NFR BLD AUTO: 15.6 %
NEUTROPHILS # BLD AUTO: 1.9 10E9/L (ref 1.6–8.3)
NEUTROPHILS NFR BLD AUTO: 47.8 %
PLATELET # BLD AUTO: 118 10E9/L (ref 150–450)
POTASSIUM SERPL-SCNC: 4.1 MMOL/L (ref 3.5–5.1)
PROT SERPL-MCNC: 6.7 G/DL (ref 6.8–8.8)
RBC # BLD AUTO: 4.24 10E12/L (ref 4.4–5.9)
SODIUM SERPL-SCNC: 132 MMOL/L (ref 136–145)
T4 FREE SERPL-MCNC: 1.11 NG/DL (ref 0.76–1.46)
TSH SERPL DL<=0.005 MIU/L-ACNC: 5.48 MU/L (ref 0.4–4)
WBC # BLD AUTO: 4 10E9/L (ref 4–11)

## 2019-01-03 PROCEDURE — 84443 ASSAY THYROID STIM HORMONE: CPT | Performed by: PHYSICIAN ASSISTANT

## 2019-01-03 PROCEDURE — 99215 OFFICE O/P EST HI 40 MIN: CPT | Mod: 25 | Performed by: PHYSICIAN ASSISTANT

## 2019-01-03 PROCEDURE — 84439 ASSAY OF FREE THYROXINE: CPT | Performed by: PHYSICIAN ASSISTANT

## 2019-01-03 PROCEDURE — 80076 HEPATIC FUNCTION PANEL: CPT | Performed by: INTERNAL MEDICINE

## 2019-01-03 PROCEDURE — 93000 ELECTROCARDIOGRAM COMPLETE: CPT | Performed by: PHYSICIAN ASSISTANT

## 2019-01-03 PROCEDURE — 36415 COLL VENOUS BLD VENIPUNCTURE: CPT | Performed by: PHYSICIAN ASSISTANT

## 2019-01-03 PROCEDURE — 85025 COMPLETE CBC W/AUTO DIFF WBC: CPT | Performed by: INTERNAL MEDICINE

## 2019-01-03 PROCEDURE — 80048 BASIC METABOLIC PNL TOTAL CA: CPT | Performed by: PHYSICIAN ASSISTANT

## 2019-01-03 RX ORDER — COLCHICINE 0.6 MG/1
0.6 CAPSULE ORAL DAILY
COMMUNITY
End: 2019-01-08

## 2019-01-03 ASSESSMENT — MIFFLIN-ST. JEOR: SCORE: 1822.12

## 2019-01-03 NOTE — PROGRESS NOTES
Cardiology Clinic Progress Note    Maurice Jon MRN# [id number]   YOB: 1960 Age: 58 year old          Assessment and Plan:     In summary, Maurice Jon presents today for a [facility] enrollment visit. He has a history of biventricular heart failure with uncontrolled atrial fib in the past. He now presents following a recent admission for acute congestive heart failure with ECHO notable for recurrent biventricular dysfunction, developed after atrial fibrillation ablation on 12/21/18, requiring a prolonged hospital stay. Today around 5 am his wife noted that he'd re-developed symptoms consistent with prior atrial fibrillation. Indeed, in clinic, he's back in mildly rapid AF in the low 100's, and his BP is low at 90/50 mmHg. He overall does not feel poorly with this. His biggest complaint today is of persistent cough, for which Dulera was started by his PCP yesterday - to be used in addition to his Albuterol, which he's already using 2-3x per day. I don't believe has diagnosed asthma at this time, but has a history of episodes of acute bronchitis - no recent PFT's are noted. Regarding his heart failure, his weight is stable and he hasn't noted progressive abdominal bloating or leg swelling. His prior chest pain has resolved. Exam is consistent with mild volume overload. CXR performed just yesterday did not appear acutely congested.     1. HFrEF    ECHO: EF 25-30%, mild LVH (IVSd/PWd 1.4)    ACC/AHA Stage: C; FC III    Etiology: nonischemic, probably tachy-mediated, ?lung and liver dz contributing    RV: dilated, reduced fn    Valves: mod-severe TR, valves overall not well-visualized on ECHO    Volume: Mildly up    Admit Wt: 253 lbs    Discharge Wt: 237 lbs - wedge of 18, RAP of 14 at this wt    Current Wt: 235 lbs    Dry Wt: Suspect ~ 225 - 230 lbs    Diuretics: Lasix 40 BID, Aldactone 25 BID    Rx: Toprol 100, Lisinopril 2.5    Rhythm: SR    QRS: narrow    Device? none    Other (anemic? TSH?  BRUCE?) BRUCE on CPAP. TSH last noted from 2015. Hemoglobin mildly reduced with markedly low platelet ct.    Plan:  He's disappointed and a little tearful during our visit due to being back in atrial fibrillation, and the majority of our conversation was spent on counseling.   - Discussed that it is not uncommon to go in and out of atrial fibrillation for up to several months after ablation. After discussion with EP, I recommended he hold his second dose of Aldactone this afternoon, and to instead take one dose of Toprol 25 mg. He'll have an EKG tomorrow at the [facility]. Pending that, he may or may not be set up for a cardioversion.   - Will check a TSH and T4f today.   - He'll stop lisinopril 2.5 given hypotension and need for ongoing diuresis. I've asked him to limit his inhaler use, if possible.   - Will maintain diuretics at current dose.   - We discussed that with maintenance of SR, his volume will be easier to control and ECHO will likely improve substantially, as it has in the past. Plan to repeat ECHO in > 3 months.   - Would opt to continue colchicine for a total of three months, as long as it's tolerated.  - I'll have him scheduled to see Nadege Chatterjeey in [place] next week, in addition to his already scheduled appointment with her on the 15th. He'll see me the week after that in [facility] clinic in [place].   - Low salt, fluid restricted diet and daily weights discussed with the patient and his wife today.   - Continued activity restrictions at work.        History of Presenting Illness:      Maurice Jon is a pleasant 58 year old patient of  Dr. Garner who presents today for a [facility] clinic enrollment visit.    The patient has a history of the following -   1. HFrEF, biventricular heart failure   2. AF s/p ablation on 12/21/18, on warfarin  3. Post-ablative pericarditis, on colchicine  4. FH of premature CAD (brother) with personal hx of nonobstructive CAD per cath 12/2018  5. Long-standing NAFLD, with  "cirrhosis complicated by portal vein thrombosis and portal hypertension - follows with GI  6. Thrombocytopenia and splenomegaly in the setting of liver disease  7. BRUCE on CPAP  8. LVH  9. Obesity    The patient has a history of symptomatic PAF and tachy-mediated heart failure since 2013. We first met him when he was admitted in May of 2013 with Unfortunately, earlier this year he developed abdominal distention, a 20-pound weight gain, increasing shortness of breath and associated chest heaviness.  He came in to the emergency room where he was found to be in atrial fibrillation with a rapid ventricular response.  This patient was in the 130s, and was started on Lasix and a diltiazem drip.     The patient went on to transthoracic echocardiography which revealed an ejection fraction of 40%, a flattened septum, along with a moderately dilated right ventricle and mildly decreased right ventricular systolic function, moderate pulmonary hypertension and a dilated IVC.  The patient was placed on loop diuretics, and has had a subsequent 25-pound weight loss. Weight at that time was ~ 225 lbs. Following adequate rate control his ECHO improved.     Since that time, he'd been seen mainly by EP in attempts to control his PAF. As of ECHO in 2015, his LV and RV size and fn had normalized and there was no further evidence of significant VHD. This was evident on his 2016 and 2017 ECHOs, as well. However, h remained symptomatic despite adequate rate control, and therefore underwent DCCV which \"stuck\" for months, but he unfortunately reverted. He then was loaded with sotalol but developed diffuse itching with this. Of note, he isn't consistently symptomatic in AF, but can note exertional dyspnea, leg swelling, fatigue, and perhaps some lightheadedness/dizziness. Probably the most consistent symptom is relayed by his wife, who notes that he snores and appears uncomfortable wearing his CPAP at night while in AF.    Most recently, on " 12/21/18, he underwent atrial fibrillation ablation with EP, after failing prior cardioversion and sotalol. Post-procedure he developed chest discomfort and low-grade fever with a CRP of 23. He was admitted and treated for post-ablative pericarditis with anti-inflammatory meds and colchicine. However he became progressively dyspneic and CXR showed evidence of congestive heart failure. ECHO was performed and notable for new LV dysfunction with an EF of 25-30% (c/w 50-55% per ECHO 9/2017), RV dilation, reduced fn and a flattened septum indicative of volume/pressure overload, and mod-severe TR. No pericardial effusion was noted. Cardiology was consulted and he was diuresed from a weight of 253 to 237 lbs. Additionally, a R/LHC was recommended. This showed mild, nonobstructive CAD with a wedge of 18 and RAP of 14 (performed day prior to discharge).  Ultimately discharged on 12/27/18, on Lasix 40 BID, Aldactone 25 BID, lisinopril 2.5, Toprol 100. He'd previously been on Lasix at a dose of 20 mg daily. Lisinopril was new for him.     Since that time, it appears he's had marginal pressures in the 90's / 50's-60's. Weight has remained stable.   Today, he presents with his wife Violet. Around 5 am this morning, she noticed that he appeared to struggle while sleeping with his CPAP, consistent with prior episodes of AF. Indeed, the patient is back in AF in the low 100's today. His biggest complaint is of an intermittent and bothersome cough, typically exertional, sometimes productive with brown or yellow sputum. CXR was performed yesterday with his PCP and was non-acute. He was placed on a Z-pack for this prior to admission, and started on Dulera yesterday by his PCP. He's already been using Albuterol 2-3 x daily, which helps some.  His prior chest pain has resolved. He hasn't felt lightheaded or pre-syncopal. His LE edema is barely noticeable and he's surprised that he hasn't needed to wear his compression stockings since  discharge. His abdominal swelling is at baseline, he states.   He works for a [employer] company and does fairly frequent heavy lifting with this, although has been able to return to work at this time with activity restrictions. He has asked that his appointments be made in the late afternoons, if possible going forward, as he's an hourly employee.     Holter monitor from 2017 showed CAF with an average HR of 94 bpm. No TSH noted since 2015. BMP today shows a mildly depressed sodium level of 132, otherwise WNL.          Review of Systems:     12-pt ROS is negative except for as noted in the HPI.          Physical Exam:     Vitals: There were no vitals taken for this visit.  Wt Readings from Last 3 Encounters:   01/02/19 105.2 kg (232 lb)   12/28/18 108.9 kg (240 lb)   12/27/18 107.7 kg (237 lb 6.4 oz)       Constitutional:  Patient is pleasant, alert, cooperative, and in NAD, although intermittently tearful.   HEENT:  NCAT. PERRLA. EOM's intact.   Neck:  JV is mildly engorged.  Pulmonary: Normal respiratory effort. Fine bibasilar rales appreciated.   Cardiac: RRR, normal S1/S2, no S3/S4, no murmur or rub.   Abdomen:  Abdominal is distended, but non-tender, and no hepatosplenomegaly is appreciated.   Vascular: Pulses in the upper and lower extremities are 2+ and equal bilaterally. RUE radial cath site closed, well-healed, without hematoma, bruit, thrill, ecchymosis, or TTP.   Extremities: Trace ankle edema.  Skin:  No rashes or lesions appreciated.   Neurological:  No gross motor or sensory deficits.   Psych: Appropriate affect.        Data:     Cardiac Diagnostics reviewed:  Type Date Result   TTE 12/23/18 The right ventricle is not well visualized but is likely moderate to severely  dilated. The right ventricular systolic function is moderately reduced.  The tricuspid valve is not well visualized and the inferior vena cava not well visualized for estimation of right atrial pressure. The right ventricular systolic  pressure is approximated at 17mmHg plus the right atrial pressure.  Limited views suggest there is at least moderate to mod-severe (2-3+)  tricuspid regurgitation. The right atrium is severely dilated.  Left ventricular systolic function is moderate to severely reduced. The visual ejection fraction is estimated at 25-30%. There is mod-severe global  hypokinesia of the left ventricle. Flattened septum is consistent with RV  pressure/volume overload.  Technically difficult, suboptimal study. Contrast was used without apparent complications. LV and RV systolic function are both worse compared to the prior echo.    9/19/17 1. Normal left ventricular size and systolic function. Visual ejection  fraction 55-60%.  2. No regional wall motion abnormalities.  3. Normal right ventricular size and systolic function.  4. Moderate biatrial enlargement.  5. Trace mitral and tricuspid regurgitation.     Compared to the prior study dated 2/19/2016, there have been no changes.    5/14/13 LV normal in size with an EF of 40%. Flattened septum consistent with RV volume overload. The RV is moderately dilated. Midlly decreased RV systolic function. Moderate pulmonary hypertension is present. Dilated IVC with no respiratory collapse; RAP > 20 mmHg. Moderately severe TR.    Cath 12/26/18 Angiogram: Only mild irregularities of a codominant system.    HEMODYNAMICS  RIGHT ATRIAL PRESSURE: The mean RA pressure is 14 mmHg  RIGHT VENTRICULAR PRESSURE: 36/8/13  PULMONARY ARTERY PRESSURE: 38/20/26  PULMONARY CAPILLARY WEDGE PRESSURE: Mean wedge is 18 mmHg  CARDIAC OUTPUT AND INDEX: 8.5 l/min and 3.95 l/min/m2 per Leon  LEFT VENTRICULAR END-DIASTOLIC PRESSURE: 15 mmHg    IMPRESSION:   1. Minimal coronary artery disease  2. Mildly elevated left heart filling pressure (wedge 18 mmHg) and  moderate-severely elevated right heart filling pressure (mean RA 14  mmHg)   EKG 1/3/19 AF at 105 bpm    12/26/18 SR with PACs, iRBBB, QTc 470   Holter 10/23/17 CAF,  average HR 94 bpm   EP ablation  12/21/18 PROCEDURES PERFORMED:  1. Wide circumferential pulmonary vein isolation.  2.  Isolation of the left posterior atrial wall and Empiric ablation of the cavotricuspid isthmus (CTI).  3. Three-dimensional CARTO mapping.  4. Comprehensive electrophysiology (EP) study with arrhythmia induction  5. Left atrial recording and pacing via the CS.  6. Transseptal access.  7. Cardiac fluoroscopy.  8. Intracardiac echocardiogram (ICE)     Labs reviewed:  Recent Labs   Lab Test 05/19/18 07/28/15  1440 05/13/13  1800 02/27/13 02/07/13  0834 08/02/12  0753  08/04/11  0826   LDL 50  --   --  54 65  --   --  67   HDL 42  --   --  59 57  --   --  68   CHOL  --   --   --  127 137  --   --  156   TRIG 63  --   --  72 76  --   --  106   TSH  --  2.57 4.25  --   --  3.96   < >  --    YOVANY  --   --   --   --   --   --   --  48    < > = values in this interval not displayed.       Lab Results   Component Value Date    WBC 4.1 12/27/2018    RBC 3.68 (L) 12/27/2018    HGB 12.7 (L) 12/27/2018    HCT 37.0 (L) 12/27/2018     (H) 12/27/2018    MCH 34.5 (H) 12/27/2018    MCHC 34.3 12/27/2018    RDW 14.0 12/27/2018    PLT 85 (L) 12/27/2018       Lab Results   Component Value Date     12/31/2018    POTASSIUM 4.1 12/31/2018    CHLORIDE 100 12/31/2018    CO2 24 12/31/2018    ANIONGAP 9 12/31/2018     (H) 12/31/2018    BUN 17 12/31/2018    CR 0.77 12/31/2018    GFRESTIMATED >90 12/31/2018    GFRESTBLACK >90 12/31/2018    HALEY 8.8 12/31/2018      Lab Results   Component Value Date    AST 70 (H) 12/22/2018    ALT 45 12/22/2018       No results found for: A1C    Lab Results   Component Value Date    INR 1.94 (H) 01/02/2019    INR 2.02 (H) 12/31/2018           Problem List:     Patient Active Problem List   Diagnosis     Thrombocytopenia (H)     Liver Cirrhosis 2nd ot Fatty liver, w Ascites     erectile dysfunction     Compulsive behaviors     BRUCE-Mild (AHI 9; REM RDI 31)     Portal hypertension  (H)     Eczema     GERD (gastroesophageal reflux disease)     CARDIOVASCULAR SCREENING; LDL GOAL LESS THAN 160     Obesity     Health Care Home     Edema     Severe sepsis (H)     History of MRSA now culture negative     Portal vein thrombosis     Long-term (current) use of anticoagulants [Z79.01]     Encounter for monitoring sotalol therapy     Chronic a-fib, S/P Ablation 12/22/18 -- on Warfarin     Right heart failure (H)     Obesity (BMI 35.0-39.9) with comorbidity (H)     CAD (coronary artery disease)     Cardiomyopathy (H)     Pericarditis     Chronic systolic CHF (congestive heart failure) (H)           Medications:     Current Outpatient Medications   Medication Sig Dispense Refill     albuterol (PROAIR HFA) 108 (90 Base) MCG/ACT inhaler Inhale 2 puffs into the lungs       calcium carb 1250 mg, 500 mg [place],/vitamin D 200 units (OSCAL WITH D) 500-200 MG-UNIT per tablet Take 2 tablets by mouth daily with food.       furosemide (LASIX) 40 MG tablet Take 1 tablet (40 mg) by mouth 2 times daily 60 tablet 0     lisinopril (PRINIVIL/ZESTRIL) 2.5 MG tablet Take 1 tablet (2.5 mg) by mouth daily 30 tablet 0     metoprolol succinate ER (TOPROL-XL) 100 MG 24 hr tablet Take 1 tablet (100 mg) by mouth daily 90 tablet 3     mometasone-formoterol (DULERA) 200-5 MCG/ACT inhaler Inhale 2 puffs into the lungs 2 times daily as needed Appt DUE Jan 2017 1 Inhaler 1     order for DME Open toe size large 30/40 Mediven Assure Medical Compression socks Model 03843.    Fax to Fax [phone]. [facility] medical 12 Device 0     order for DME Equipment being ordered:   New CPAP mask, tube, filters,water tray 1 Device 3     order for DME Open toe size large 30/40 Mediven Assure Medical Compression socks Model 90021. 4 Package 3     ORDER FOR DME Respironics RemStar 60 Series Auto AFlex 12-15 cm H2O with heated humidty and a modem.  Pt chose a Quattro Air FFM size medium.       ORDER FOR DME Juzo compression stockings, 30-40mm  compression. Patient has portal hypertension and develops leg edema, which these control well. 2 Package 3     pantoprazole (PROTONIX) 40 MG EC tablet Take 1 tablet (40 mg) by mouth daily 30 tablet 0     spironolactone (ALDACTONE) 25 MG tablet Take 1 tablet (25 mg) by mouth 2 times daily We will no longer fill unless seen by cardiology 60 tablet 0     warfarin (COUMADIN) 2.5 MG tablet 1.25 mg Fridays; 2.5mg all other days or As directed by Anticoagulation Clinic 90 tablet 0           Past Medical History:     Past Medical History:   Diagnosis Date     Acute pulmonary edema (H)      Atrial fibrillation (H)      Atrial fibrillation with rapid ventricular response (H) 5/13/2013     CAD (coronary artery disease)     Cath 12/26/18- mild nonobstructive disease     Calculus of ureter 1993, 1997    history of     Cardiomyopathy (H)     12/23/18 Echo- EF 25-30%     Chronic systolic CHF (congestive heart failure) (H)      Cirrhosis of liver without mention of alcohol 8/22/2005    Cirrhosis, idiopathic     Edema 5/13/2013     Esophageal varices with bleeding(456.0)      Gallstones      Genital herpes, unspecified 3/20/1999    resolving herpes progenitalis     GERD (gastroesophageal reflux disease)      Hematuria      Hypertension      Hypochondriasis     problems in past     Obesity 11/24/2010     BRUCE (obstructive sleep apnea) 03/17/2009    Mild AHI 8.4  RDI 31 - Pt refuses to wear his CPAP     Pericarditis      Portal hypertension (H) 1/28/2010     Unspecified thrombocytopenia 8/22/2005    Thrombocytopenia associated with cirrhosis and portal hypertension     Past Surgical History:   Procedure Laterality Date     ANESTHESIA CARDIOVERSION N/A 1/19/2015    Procedure: ANESTHESIA CARDIOVERSION;  Surgeon: Generic Anesthesia Provider;  Location: [place] OR     COLONOSCOPY N/A 8/14/2017    Procedure: COLONOSCOPY;  Colonoscopy Called will arrive appx 12:30 Per phone call;  Surgeon: Vincent Whittington MD;  Location:  GI     CV HEART  CATHETERIZATION WITH POSSIBLE INTERVENTION N/A 12/26/2018    Procedure: Heart Catheterization with possible Intervention;  Surgeon: Brandon Arroyo MD;  Location:  HEART CARDIAC CATH LAB     EP ABLATION FOCAL AFIB N/A 12/21/2018    Procedure: EP Ablation Focal AFIB;  Surgeon: Kristofer Evans MD;  Location:  HEART CARDIAC CATH LAB     ESOPHAGOSCOPY, GASTROSCOPY, DUODENOSCOPY (EGD), COMBINED  7/11/2011    Procedure:COMBINED ESOPHAGOSCOPY, GASTROSCOPY, DUODENOSCOPY (EGD); Surgeon:LAURA LAMAS; Location:[place] GI     ESOPHAGOSCOPY, GASTROSCOPY, DUODENOSCOPY (EGD), COMBINED  9/10/2012    Procedure: COMBINED ESOPHAGOSCOPY, GASTROSCOPY, DUODENOSCOPY (EGD);;  Surgeon: Hi Roque MD;  Location:  GI     ESOPHAGOSCOPY, GASTROSCOPY, DUODENOSCOPY (EGD), COMBINED  9/9/2013    Procedure: COMBINED ESOPHAGOSCOPY, GASTROSCOPY, DUODENOSCOPY (EGD);;  Surgeon: Hi Roque MD;  Location:  GI     ESOPHAGOSCOPY, GASTROSCOPY, DUODENOSCOPY (EGD), COMBINED N/A 9/15/2014    Procedure: COMBINED ESOPHAGOSCOPY, GASTROSCOPY, DUODENOSCOPY (EGD);  Surgeon: Hi Roque MD;  Location:  GI     ESOPHAGOSCOPY, GASTROSCOPY, DUODENOSCOPY (EGD), COMBINED N/A 10/30/2015    Procedure: COMBINED ESOPHAGOSCOPY, GASTROSCOPY, DUODENOSCOPY (EGD);  Surgeon: Feliberto Fox MD;  Location:  GI     ESOPHAGOSCOPY, GASTROSCOPY, DUODENOSCOPY (EGD), COMBINED N/A 10/10/2016    Procedure: COMBINED ESOPHAGOSCOPY, GASTROSCOPY, DUODENOSCOPY (EGD);  Surgeon: Jose Nesbitt MD;  Location:  GI     H ABLATION FOCAL AFIB  12/21/2018     HERNIA REPAIR       IRRIGATION AND DEBRIDEMENT ABSCESS SCROTUM, COMBINED  10/4/2012    Procedure: COMBINED IRRIGATION AND DEBRIDEMENT ABSCESS SCROTUM;  Irrigation and Debridement of Groin Abscess;  Surgeon: Benny Shoemaker MD;  Location: [place] OR     SOFT TISSUE SURGERY       SURGICAL HISTORY OF -   1993    rt elbow     SURGICAL HISTORY OF -   1/15/98    repair of ventral and umbilical hernia     SURGICAL  HISTORY OF -   2001    endoscopy     Family History   Problem Relation Age of Onset     Lipids Mother      Hypertension Mother      Eye Disorder Mother      Heart Disease Father         CHF     Lipids Father      Obesity Father      Heart Disease Brother         MI     Eye Disorder Brother      Cancer Other         maternal grandparent/throat cancer     Social History     Socioeconomic History     Marital status:      Spouse name: Not on file     Number of children: Not on file     Years of education: Not on file     Highest education level: Not on file   Social Needs     Financial resource strain: Not on file     Food insecurity - worry: Not on file     Food insecurity - inability: Not on file     Transportation needs - medical: Not on file     Transportation needs - non-medical: Not on file   Occupational History     Not on file   Tobacco Use     Smoking status: Former Smoker     Packs/day: 0.80     Years: 6.00     Pack years: 4.80     Types: Cigarettes     Last attempt to quit: 2002     Years since quittin.0     Smokeless tobacco: Never Used   Substance and Sexual Activity     Alcohol use: No     Alcohol/week: 0.0 oz     Comment: quit in      Drug use: No     Sexual activity: Yes     Partners: Female   Other Topics Concern     Parent/sibling w/ CABG, MI or angioplasty before 65F 55M? Yes     Comment: BROTHER   - MI AT AGE 44      Service Not Asked     Blood Transfusions Not Asked     Caffeine Concern Not Asked     Occupational Exposure Not Asked     Hobby Hazards Not Asked     Sleep Concern Not Asked     Stress Concern Not Asked     Weight Concern Not Asked     Special Diet Not Asked     Back Care Not Asked     Exercise No     Bike Helmet Not Asked     Seat Belt Not Asked     Self-Exams Not Asked   Social History Narrative     Not on file           Allergies:   Avelox and Moxifloxacin      Kayce Manzo PA-C  [facility]  Pager: [phone]

## 2019-01-03 NOTE — NURSING NOTE
The After Visit Summary was reviewed with the patient and wife following their office visit with IRMA Sullivan. Patient was educated about any medication changes, the importance of following a low sodium diet, importance of recording daily weights, and when to call [facility] clinic. Patient verbalized understanding of the information and agrees to call with any questions or concerns.     Labs: Lab results from today were reviewed with the patient during the office visit. A copy of the results were provided on the After Visit Summary. Patient requested labs drawn from today be sent to PCP, as PCP has requested labs be done tomorrow for CMP and CBC. BMP and TSH done today. Told pt he would still need Hepatic and CBC, pt requested those be drawn today and sent to PCP. Added pt to lab schedule per PCP orders. Will forward results to PCP as requested. Canceled lab appt for tomorrow per pt request.    Return appointment: Patient was given instructions on when and how to schedule their next office visit with the [facility] clinic. Per IRMA Sullivan- pt to see IRMA Bradley in [place] on 1/8, keep visit with IRMA Bradley in [place] on 1/15 (may be able to cancel this depending on other visit), and then scheduled pt to see IRMA Thomas in [place] on 1/21 (next available).     Medication changes: Stop Lisinopril      Kelli Gomez, RN  [facility] RN Care Coordinator  [facility]  [phone]

## 2019-01-03 NOTE — LETTER
1/3/2019    Kailey Ac, SARA, APRN CNP  [address] [facility] [place] [address]    RE: Maurice Jon       Dear Colleague,    I had the pleasure of seeing Maurice Jon in the [facility].        Cardiology Clinic Progress Note    Maurice Jon MRN# [id number]   YOB: 1960 Age: 58 year old          Assessment and Plan:     In summary, Maurice Jon presents today for a CORE enrollment visit. He has a history of biventricular heart failure with uncontrolled atrial fib in the past. He now presents following a recent admission for acute congestive heart failure with ECHO notable for recurrent biventricular dysfunction, developed after atrial fibrillation ablation on 12/21/18, requiring a prolonged hospital stay. Today around 5 am his wife noted that he'd re-developed symptoms consistent with prior atrial fibrillation. Indeed, in clinic, he's back in mildly rapid AF in the low 100's, and his BP is low at 90/50 mmHg. He overall does not feel poorly with this. His biggest complaint today is of persistent cough, for which Dulera was started by his PCP yesterday - to be used in addition to his Albuterol, which he's already using 2-3x per day. I don't believe has diagnosed asthma at this time, but has a history of episodes of acute bronchitis - no recent PFT's are noted. Regarding his heart failure, his weight is stable and he hasn't noted progressive abdominal bloating or leg swelling. His prior chest pain has resolved. Exam is consistent with mild volume overload. CXR performed just yesterday did not appear acutely congested.     1. HFrEF    ECHO: EF 25-30%, mild LVH (IVSd/PWd 1.4)    ACC/AHA Stage: C; FC III    Etiology: nonischemic, probably tachy-mediated, ?lung and liver dz contributing    RV: dilated, reduced fn    Valves: mod-severe TR, valves overall not well-visualized on ECHO    Volume: Mildly up    Admit Wt: 253 lbs    Discharge Wt: 237 lbs - wedge of  18, RAP of 14 at this wt    Current Wt: 235 lbs    Dry Wt: Suspect ~ 225 - 230 lbs    Diuretics: Lasix 40 BID, Aldactone 25 BID    Rx: Toprol 100, Lisinopril 2.5    Rhythm: SR    QRS: narrow    Device? none    Other (anemic? TSH? BRUCE?) BRUCE on CPAP. TSH last noted from 2015. Hemoglobin mildly reduced with markedly low platelet ct.    Plan:  He's disappointed and a little tearful during our visit due to being back in atrial fibrillation, and the majority of our conversation was spent on counseling.   - Discussed that it is not uncommon to go in and out of atrial fibrillation for up to several months after ablation. After discussion with EP, I recommended he hold his second dose of Aldactone this afternoon, and to instead take one dose of Toprol 25 mg. He'll have an EKG tomorrow at the [facility]. Pending that, he may or may not be set up for a cardioversion.   - Will check a TSH and T4f today.   - He'll stop lisinopril 2.5 given hypotension and need for ongoing diuresis. I've asked him to limit his inhaler use, if possible.   - Will maintain diuretics at current dose.   - We discussed that with maintenance of SR, his volume will be easier to control and ECHO will likely improve substantially, as it has in the past. Plan to repeat ECHO in > 3 months.   - Would opt to continue colchicine for a total of three months, as long as it's tolerated.  - I'll have him scheduled to see Nadege Chatterjeey in [place] next week, in addition to his already scheduled appointment with her on the 15th. He'll see me the week after that in [facility] in [place].   - Low salt, fluid restricted diet and daily weights discussed with the patient and his wife today.   - Continued activity restrictions at work.        History of Presenting Illness:      Maurice Jon is a pleasant 58 year old patient of  Dr. Garner who presents today for a CORE clinic enrollment visit.    The patient has a history of the following -   1. HFrEF, biventricular  "heart failure   2. AF s/p ablation on 12/21/18, on warfarin  3. Post-ablative pericarditis, on colchicine  4. FH of premature CAD (brother) with personal hx of nonobstructive CAD per cath 12/2018  5. Long-standing NAFLD, with cirrhosis complicated by portal vein thrombosis and portal hypertension - follows with GI  6. Thrombocytopenia and splenomegaly in the setting of liver disease  7. BRUCE on CPAP  8. LVH  9. Obesity    The patient has a history of symptomatic PAF and tachy-mediated heart failure since 2013. We first met him when he was admitted in May of 2013 with Unfortunately, earlier this year he developed abdominal distention, a 20-pound weight gain, increasing shortness of breath and associated chest heaviness.  He came in to the emergency room where he was found to be in atrial fibrillation with a rapid ventricular response.  This patient was in the 130s, and was started on Lasix and a diltiazem drip.     The patient went on to transthoracic echocardiography which revealed an ejection fraction of 40%, a flattened septum, along with a moderately dilated right ventricle and mildly decreased right ventricular systolic function, moderate pulmonary hypertension and a dilated IVC.  The patient was placed on loop diuretics, and has had a subsequent 25-pound weight loss. Weight at that time was ~ 225 lbs. Following adequate rate control his ECHO improved.     Since that time, he'd been seen mainly by EP in attempts to control his PAF. As of ECHO in 2015, his LV and RV size and fn had normalized and there was no further evidence of significant VHD. This was evident on his 2016 and 2017 ECHOs, as well. However, h remained symptomatic despite adequate rate control, and therefore underwent DCCV which \"stuck\" for months, but he unfortunately reverted. He then was loaded with sotalol but developed diffuse itching with this. Of note, he isn't consistently symptomatic in AF, but can note exertional dyspnea, leg swelling, " fatigue, and perhaps some lightheadedness/dizziness. Probably the most consistent symptom is relayed by his wife, who notes that he snores and appears uncomfortable wearing his CPAP at night while in AF.    Most recently, on 12/21/18, he underwent atrial fibrillation ablation with EP, after failing prior cardioversion and sotalol. Post-procedure he developed chest discomfort and low-grade fever with a CRP of 23. He was admitted and treated for post-ablative pericarditis with anti-inflammatory meds and colchicine. However he became progressively dyspneic and CXR showed evidence of congestive heart failure. ECHO was performed and notable for new LV dysfunction with an EF of 25-30% (c/w 50-55% per ECHO 9/2017), RV dilation, reduced fn and a flattened septum indicative of volume/pressure overload, and mod-severe TR. No pericardial effusion was noted. Cardiology was consulted and he was diuresed from a weight of 253 to 237 lbs. Additionally, a R/LHC was recommended. This showed mild, nonobstructive CAD with a wedge of 18 and RAP of 14 (performed day prior to discharge).  Ultimately discharged on 12/27/18, on Lasix 40 BID, Aldactone 25 BID, lisinopril 2.5, Toprol 100. He'd previously been on Lasix at a dose of 20 mg daily. Lisinopril was new for him.     Since that time, it appears he's had marginal pressures in the 90's / 50's-60's. Weight has remained stable.   Today, he presents with his wife Violet. Around 5 am this morning, she noticed that he appeared to struggle while sleeping with his CPAP, consistent with prior episodes of AF. Indeed, the patient is back in AF in the low 100's today. His biggest complaint is of an intermittent and bothersome cough, typically exertional, sometimes productive with brown or yellow sputum. CXR was performed yesterday with his PCP and was non-acute. He was placed on a Z-pack for this prior to admission, and started on Dulera yesterday by his PCP. He's already been using Albuterol  2-3 x daily, which helps some.  His prior chest pain has resolved. He hasn't felt lightheaded or pre-syncopal. His LE edema is barely noticeable and he's surprised that he hasn't needed to wear his compression stockings since discharge. His abdominal swelling is at baseline, he states.   He works for a [employer] company and does fairly frequent heavy lifting with this, although has been able to return to work at this time with activity restrictions. He has asked that his appointments be made in the late afternoons, if possible going forward, as he's an hourly employee.     Holter monitor from 2017 showed CAF with an average HR of 94 bpm. No TSH noted since 2015. BMP today shows a mildly depressed sodium level of 132, otherwise WNL.          Review of Systems:     12-pt ROS is negative except for as noted in the HPI.          Physical Exam:     Vitals: There were no vitals taken for this visit.  Wt Readings from Last 3 Encounters:   01/02/19 105.2 kg (232 lb)   12/28/18 108.9 kg (240 lb)   12/27/18 107.7 kg (237 lb 6.4 oz)       Constitutional:  Patient is pleasant, alert, cooperative, and in NAD, although intermittently tearful.   HEENT:  NCAT. PERRLA. EOM's intact.   Neck:  JV is mildly engorged.  Pulmonary: Normal respiratory effort. Fine bibasilar rales appreciated.   Cardiac: RRR, normal S1/S2, no S3/S4, no murmur or rub.   Abdomen:  Abdominal is distended, but non-tender, and no hepatosplenomegaly is appreciated.   Vascular: Pulses in the upper and lower extremities are 2+ and equal bilaterally. RUE radial cath site closed, well-healed, without hematoma, bruit, thrill, ecchymosis, or TTP.   Extremities: Trace ankle edema.  Skin:  No rashes or lesions appreciated.   Neurological:  No gross motor or sensory deficits.   Psych: Appropriate affect.        Data:     Cardiac Diagnostics reviewed:  Type Date Result   TTE 12/23/18 The right ventricle is not well visualized but is likely moderate to severely  dilated. The  right ventricular systolic function is moderately reduced.  The tricuspid valve is not well visualized and the inferior vena cava not well visualized for estimation of right atrial pressure. The right ventricular systolic pressure is approximated at 17mmHg plus the right atrial pressure.  Limited views suggest there is at least moderate to mod-severe (2-3+)  tricuspid regurgitation. The right atrium is severely dilated.  Left ventricular systolic function is moderate to severely reduced. The visual ejection fraction is estimated at 25-30%. There is mod-severe global  hypokinesia of the left ventricle. Flattened septum is consistent with RV  pressure/volume overload.  Technically difficult, suboptimal study. Contrast was used without apparent complications. LV and RV systolic function are both worse compared to the prior echo.    9/19/17 1. Normal left ventricular size and systolic function. Visual ejection  fraction 55-60%.  2. No regional wall motion abnormalities.  3. Normal right ventricular size and systolic function.  4. Moderate biatrial enlargement.  5. Trace mitral and tricuspid regurgitation.     Compared to the prior study dated 2/19/2016, there have been no changes.    5/14/13 LV normal in size with an EF of 40%. Flattened septum consistent with RV volume overload. The RV is moderately dilated. Midlly decreased RV systolic function. Moderate pulmonary hypertension is present. Dilated IVC with no respiratory collapse; RAP > 20 mmHg. Moderately severe TR.    Cath 12/26/18 Angiogram: Only mild irregularities of a codominant system.    HEMODYNAMICS  RIGHT ATRIAL PRESSURE: The mean RA pressure is 14 mmHg  RIGHT VENTRICULAR PRESSURE: 36/8/13  PULMONARY ARTERY PRESSURE: 38/20/26  PULMONARY CAPILLARY WEDGE PRESSURE: Mean wedge is 18 mmHg  CARDIAC OUTPUT AND INDEX: 8.5 l/min and 3.95 l/min/m2 per Leon  LEFT VENTRICULAR END-DIASTOLIC PRESSURE: 15 mmHg    IMPRESSION:   1. Minimal coronary artery disease  2. Mildly  elevated left heart filling pressure (wedge 18 mmHg) and  moderate-severely elevated right heart filling pressure (mean RA 14  mmHg)   EKG 1/3/19 AF at 105 bpm    12/26/18 SR with PACs, iRBBB, QTc 470   Holter 10/23/17 CAF, average HR 94 bpm   EP ablation  12/21/18 PROCEDURES PERFORMED:  1. Wide circumferential pulmonary vein isolation.  2.  Isolation of the left posterior atrial wall and Empiric ablation of the cavotricuspid isthmus (CTI).  3. Three-dimensional CARTO mapping.  4. Comprehensive electrophysiology (EP) study with arrhythmia induction  5. Left atrial recording and pacing via the CS.  6. Transseptal access.  7. Cardiac fluoroscopy.  8. Intracardiac echocardiogram (ICE)     Labs reviewed:  Recent Labs   Lab Test 05/19/18 07/28/15  1440 05/13/13  1800 02/27/13 02/07/13  0834 08/02/12  0753  08/04/11  0826   LDL 50  --   --  54 65  --   --  67   HDL 42  --   --  59 57  --   --  68   CHOL  --   --   --  127 137  --   --  156   TRIG 63  --   --  72 76  --   --  106   TSH  --  2.57 4.25  --   --  3.96   < >  --    YOVANY  --   --   --   --   --   --   --  48    < > = values in this interval not displayed.       Lab Results   Component Value Date    WBC 4.1 12/27/2018    RBC 3.68 (L) 12/27/2018    HGB 12.7 (L) 12/27/2018    HCT 37.0 (L) 12/27/2018     (H) 12/27/2018    MCH 34.5 (H) 12/27/2018    MCHC 34.3 12/27/2018    RDW 14.0 12/27/2018    PLT 85 (L) 12/27/2018       Lab Results   Component Value Date     12/31/2018    POTASSIUM 4.1 12/31/2018    CHLORIDE 100 12/31/2018    CO2 24 12/31/2018    ANIONGAP 9 12/31/2018     (H) 12/31/2018    BUN 17 12/31/2018    CR 0.77 12/31/2018    GFRESTIMATED >90 12/31/2018    GFRESTBLACK >90 12/31/2018    HALEY 8.8 12/31/2018      Lab Results   Component Value Date    AST 70 (H) 12/22/2018    ALT 45 12/22/2018       No results found for: A1C    Lab Results   Component Value Date    INR 1.94 (H) 01/02/2019    INR 2.02 (H) 12/31/2018           Problem List:      Patient Active Problem List   Diagnosis     Thrombocytopenia (H)     Liver Cirrhosis 2nd ot Fatty liver, w Ascites     erectile dysfunction     Compulsive behaviors     BRUCE-Mild (AHI 9; REM RDI 31)     Portal hypertension (H)     Eczema     GERD (gastroesophageal reflux disease)     CARDIOVASCULAR SCREENING; LDL GOAL LESS THAN 160     Obesity     Health Care Home     Edema     Severe sepsis (H)     History of MRSA now culture negative     Portal vein thrombosis     Long-term (current) use of anticoagulants [Z79.01]     Encounter for monitoring sotalol therapy     Chronic a-fib, S/P Ablation 12/22/18 -- on Warfarin     Right heart failure (H)     Obesity (BMI 35.0-39.9) with comorbidity (H)     CAD (coronary artery disease)     Cardiomyopathy (H)     Pericarditis     Chronic systolic CHF (congestive heart failure) (H)           Medications:     Current Outpatient Medications   Medication Sig Dispense Refill     albuterol (PROAIR HFA) 108 (90 Base) MCG/ACT inhaler Inhale 2 puffs into the lungs       calcium carb 1250 mg, 500 mg [place],/vitamin D 200 units (OSCAL WITH D) 500-200 MG-UNIT per tablet Take 2 tablets by mouth daily with food.       furosemide (LASIX) 40 MG tablet Take 1 tablet (40 mg) by mouth 2 times daily 60 tablet 0     lisinopril (PRINIVIL/ZESTRIL) 2.5 MG tablet Take 1 tablet (2.5 mg) by mouth daily 30 tablet 0     metoprolol succinate ER (TOPROL-XL) 100 MG 24 hr tablet Take 1 tablet (100 mg) by mouth daily 90 tablet 3     mometasone-formoterol (DULERA) 200-5 MCG/ACT inhaler Inhale 2 puffs into the lungs 2 times daily as needed Appt DUE Jan 2017 1 Inhaler 1     order for DME Open toe size large 30/40 Mediven Assure Medical Compression socks Model 79942.    Fax to Fax [phone]. [facility] medical 12 Device 0     order for DME Equipment being ordered:   New CPAP mask, tube, filters,water tray 1 Device 3     order for DME Open toe size large 30/40 Mediven Assure Medical Compression socks Model  19170. 4 Package 3     ORDER FOR DME Respironics RemStar 60 Series Auto AFlex 12-15 cm H2O with heated humidty and a modem.  Pt chose a Quattro Air FFM size medium.       ORDER FOR DME Juzo compression stockings, 30-40mm compression. Patient has portal hypertension and develops leg edema, which these control well. 2 Package 3     pantoprazole (PROTONIX) 40 MG EC tablet Take 1 tablet (40 mg) by mouth daily 30 tablet 0     spironolactone (ALDACTONE) 25 MG tablet Take 1 tablet (25 mg) by mouth 2 times daily We will no longer fill unless seen by cardiology 60 tablet 0     warfarin (COUMADIN) 2.5 MG tablet 1.25 mg Fridays; 2.5mg all other days or As directed by Anticoagulation Clinic 90 tablet 0           Past Medical History:     Past Medical History:   Diagnosis Date     Acute pulmonary edema (H)      Atrial fibrillation (H)      Atrial fibrillation with rapid ventricular response (H) 5/13/2013     CAD (coronary artery disease)     Cath 12/26/18- mild nonobstructive disease     Calculus of ureter 1993, 1997    history of     Cardiomyopathy (H)     12/23/18 Echo- EF 25-30%     Chronic systolic CHF (congestive heart failure) (H)      Cirrhosis of liver without mention of alcohol 8/22/2005    Cirrhosis, idiopathic     Edema 5/13/2013     Esophageal varices with bleeding(456.0)      Gallstones      Genital herpes, unspecified 3/20/1999    resolving herpes progenitalis     GERD (gastroesophageal reflux disease)      Hematuria      Hypertension      Hypochondriasis     problems in past     Obesity 11/24/2010     BRUCE (obstructive sleep apnea) 03/17/2009    Mild AHI 8.4  RDI 31 - Pt refuses to wear his CPAP     Pericarditis      Portal hypertension (H) 1/28/2010     Unspecified thrombocytopenia 8/22/2005    Thrombocytopenia associated with cirrhosis and portal hypertension     Past Surgical History:   Procedure Laterality Date     ANESTHESIA CARDIOVERSION N/A 1/19/2015    Procedure: ANESTHESIA CARDIOVERSION;  Surgeon: Generic  Anesthesia Provider;  Location: [facility] OR     COLONOSCOPY N/A 8/14/2017    Procedure: COLONOSCOPY;  Colonoscopy Called will arrive appx 12:30 Per phone call;  Surgeon: Vincent Whittington MD;  Location:  GI     CV HEART CATHETERIZATION WITH POSSIBLE INTERVENTION N/A 12/26/2018    Procedure: Heart Catheterization with possible Intervention;  Surgeon: Brandon Arroyo MD;  Location:  HEART CARDIAC CATH LAB     EP ABLATION FOCAL AFIB N/A 12/21/2018    Procedure: EP Ablation Focal AFIB;  Surgeon: Kristofer Evans MD;  Location:  HEART CARDIAC CATH LAB     ESOPHAGOSCOPY, GASTROSCOPY, DUODENOSCOPY (EGD), COMBINED  7/11/2011    Procedure:COMBINED ESOPHAGOSCOPY, GASTROSCOPY, DUODENOSCOPY (EGD); Surgeon:LAURA LAMAS; Location:[facility] GI     ESOPHAGOSCOPY, GASTROSCOPY, DUODENOSCOPY (EGD), COMBINED  9/10/2012    Procedure: COMBINED ESOPHAGOSCOPY, GASTROSCOPY, DUODENOSCOPY (EGD);;  Surgeon: Hi Roque MD;  Location:  GI     ESOPHAGOSCOPY, GASTROSCOPY, DUODENOSCOPY (EGD), COMBINED  9/9/2013    Procedure: COMBINED ESOPHAGOSCOPY, GASTROSCOPY, DUODENOSCOPY (EGD);;  Surgeon: Hi Roque MD;  Location:  GI     ESOPHAGOSCOPY, GASTROSCOPY, DUODENOSCOPY (EGD), COMBINED N/A 9/15/2014    Procedure: COMBINED ESOPHAGOSCOPY, GASTROSCOPY, DUODENOSCOPY (EGD);  Surgeon: Hi Roque MD;  Location:  GI     ESOPHAGOSCOPY, GASTROSCOPY, DUODENOSCOPY (EGD), COMBINED N/A 10/30/2015    Procedure: COMBINED ESOPHAGOSCOPY, GASTROSCOPY, DUODENOSCOPY (EGD);  Surgeon: Feliberto Fox MD;  Location:  GI     ESOPHAGOSCOPY, GASTROSCOPY, DUODENOSCOPY (EGD), COMBINED N/A 10/10/2016    Procedure: COMBINED ESOPHAGOSCOPY, GASTROSCOPY, DUODENOSCOPY (EGD);  Surgeon: Jose Nesbitt MD;  Location:  GI     H ABLATION FOCAL AFIB  12/21/2018     HERNIA REPAIR       IRRIGATION AND DEBRIDEMENT ABSCESS SCROTUM, COMBINED  10/4/2012    Procedure: COMBINED IRRIGATION AND DEBRIDEMENT ABSCESS SCROTUM;  Irrigation and Debridement of Groin  Abscess;  Surgeon: Benny Shoemaker MD;  Location: [facility] OR     SOFT TISSUE SURGERY       SURGICAL HISTORY OF -       rt elbow     SURGICAL HISTORY OF -   1/15/98    repair of ventral and umbilical hernia     SURGICAL HISTORY OF -       endoscopy     Family History   Problem Relation Age of Onset     Lipids Mother      Hypertension Mother      Eye Disorder Mother      Heart Disease Father         CHF     Lipids Father      Obesity Father      Heart Disease Brother         MI     Eye Disorder Brother      Cancer Other         maternal grandparent/throat cancer     Social History     Socioeconomic History     Marital status:      Spouse name: Not on file     Number of children: Not on file     Years of education: Not on file     Highest education level: Not on file   Social Needs     Financial resource strain: Not on file     Food insecurity - worry: Not on file     Food insecurity - inability: Not on file     Transportation needs - medical: Not on file     Transportation needs - non-medical: Not on file   Occupational History     Not on file   Tobacco Use     Smoking status: Former Smoker     Packs/day: 0.80     Years: 6.00     Pack years: 4.80     Types: Cigarettes     Last attempt to quit: 2002     Years since quittin.0     Smokeless tobacco: Never Used   Substance and Sexual Activity     Alcohol use: No     Alcohol/week: 0.0 oz     Comment: quit in      Drug use: No     Sexual activity: Yes     Partners: Female   Other Topics Concern     Parent/sibling w/ CABG, MI or angioplasty before 65F 55M? Yes     Comment: BROTHER   - MI AT AGE 44      Service Not Asked     Blood Transfusions Not Asked     Caffeine Concern Not Asked     Occupational Exposure Not Asked     Hobby Hazards Not Asked     Sleep Concern Not Asked     Stress Concern Not Asked     Weight Concern Not Asked     Special Diet Not Asked     Back Care Not Asked     Exercise No     Bike Helmet Not Asked     Seat Belt  Not Asked     Self-Exams Not Asked   Social History Narrative     Not on file           Allergies:   Avelox and Moxifloxacin      Kayce Manzo PA-C  [facility]  Pager: [phone]    Thank you for allowing me to participate in the care of your patient.    Sincerely,     Kayce Manzo PA-C     [facility]    cc:   JANETTE Steen CNP  [address]  [place] [id number]

## 2019-01-03 NOTE — PATIENT INSTRUCTIONS
Call CORE nurse for any questions or concerns:  [phone]   *If you have concerns after hours, please call [phone], option 2 to speak with on call Cardiologist.    1. Medication changes from today:  STOP Lisinopril     2. Weigh yourself daily and write it down.     3. Call CORE nurse if your weight is up more than 2 pounds in one day or 5 pounds in one week.     4. Call CORE nurse if you feel more short of breath, have more abdominal bloating, or leg swelling.     5. Continue low sodium diet (less than 2000 mg daily). If you eat less salt, you will retain less fluid.     6. Alcohol can weaken your heart further. You should avoid alcohol or limit its use to special times, such as a holiday or birthday.      7. Do NOT take Aleve or ibuprofen without talking to your doctor first.      8. Lab Results:   Component      Latest Ref Rng & Units 12/31/2018 1/3/2019   Sodium      136 - 145 mmol/L 133 132 (L)   Potassium      3.5 - 5.1 mmol/L 4.1 4.1   Chloride      98 - 107 mmol/L 100 103   Carbon Dioxide      23 - 29 mmol/L 24 27   Anion Gap      6 - 17 mmol/L 9 6.1   Glucose      70 - 105 mg/dL 102 (H) 105   Urea Nitrogen      7 - 30 mg/dL 17 16   Creatinine      0.70 - 1.30 mg/dL 0.77 0.87   GFR Estimate      >60 mL/min/1.73:m2 >90 >90   GFR Estimate If Black      >60 mL/min/1.73:m2 >90 >90   Calcium      8.5 - 10.5 mg/dL 8.8 9.3        [facility]: Cardiomyopathy, Optimization, Rehabilitation, Education  The [facility] Clinic is a heart failure specialty clinic within the [facility] where you will work with specialized nurse practitioners, physician assistants, doctors, and registered nurses. They are dedicated to helping patients with heart failure to carefully adjust medications, receive education, and learn who and when to call if symptoms develop. They specialize in helping you better understand your condition, slow the progression of your disease, improve the length and quality of your life, help you  detect future heart problems before they become life threatening, and avoid hospitalizations.

## 2019-01-03 NOTE — LETTER
1/3/2019    Kailey Ac, SARA, APRN CNP  [address] [facility] [address]    RE: Maurice Jon       Dear Colleague,    I had the pleasure of seeing Maurice Jon in the [facility].        [facility] Progress Note    Maurice Jon MRN# [id number]   YOB: 1960 Age: 58 year old          Assessment and Plan:     In summary, Maurice Jon presents today for a CORE enrollment visit. He has a history of biventricular heart failure with uncontrolled atrial fib in the past. He now presents following a recent admission for acute congestive heart failure with ECHO notable for recurrent biventricular dysfunction, developed after atrial fibrillation ablation on 12/21/18, requiring a prolonged hospital stay. Today around 5 am his wife noted that he'd re-developed symptoms consistent with prior atrial fibrillation. Indeed, in clinic, he's back in mildly rapid AF in the low 100's, and his BP is low at 90/50 mmHg. He overall does not feel poorly with this. His biggest complaint today is of persistent cough, for which Dulera was started by his PCP yesterday - to be used in addition to his Albuterol, which he's already using 2-3x per day. I don't believe has diagnosed asthma at this time, but has a history of episodes of acute bronchitis - no recent PFT's are noted. Regarding his heart failure, his weight is stable and he hasn't noted progressive abdominal bloating or leg swelling. His prior chest pain has resolved. Exam is consistent with mild volume overload. CXR performed just yesterday did not appear acutely congested.     1. HFrEF    ECHO: EF 25-30%, mild LVH (IVSd/PWd 1.4)    ACC/AHA Stage: C; FC III    Etiology: nonischemic, probably tachy-mediated, ?lung and liver dz contributing    RV: dilated, reduced fn    Valves: mod-severe TR, valves overall not well-visualized on ECHO    Volume: Mildly up    Admit Wt: 253 lbs    Discharge Wt: 237 lbs - wedge  of 18, RAP of 14 at this wt    Current Wt: 235 lbs    Dry Wt: Suspect ~ 225 - 230 lbs    Diuretics: Lasix 40 BID, Aldactone 25 BID    Rx: Toprol 100, Lisinopril 2.5    Rhythm: SR    QRS: narrow    Device? none    Other (anemic? TSH? BRUCE?) BRUCE on CPAP. TSH last noted from 2015. Hemoglobin mildly reduced with markedly low platelet ct.    Plan:  He's disappointed and a little tearful during our visit due to being back in atrial fibrillation, and the majority of our conversation was spent on counseling.   - Discussed that it is not uncommon to go in and out of atrial fibrillation for up to several months after ablation. After discussion with EP, I recommended he hold his second dose of Aldactone this afternoon, and to instead take one dose of Toprol 25 mg. He'll have an E[facility] at the [facility]. Pending that, he may or may not be set up for a cardioversion.   - Will check a TSH and T4f today.   - He'll stop lisinopril 2.5 given hypotension and need for ongoing diuresis. I've asked him to limit his inhaler use, if possible.   - Will maintain diuretics at current dose.   - We discussed that with maintenance of SR, his volume will be easier to control and ECHO will likely improve substantially, as it has in the past. Plan to repeat ECHO in > 3 months.   - Would opt to continue colchicine for a total of three months, as long as it's tolerated.  - I'll have him scheduled to see Nadege Roldan in [place] next week, in addition to his already scheduled appointment with her on the 15th. He'll see me the week after that in [facility] in [place].   - Low salt, fluid restricted diet and daily weights discussed with the patient and his wife today.   - Continued activity restrictions at work.        History of Presenting Illness:      Maurice Jon is a pleasant 58 year old patient of  Dr. Garner who presents today for a CORE clinic enrollment visit.    The patient has a history of the following -   1. HFrEF, biventricular  "heart failure   2. AF s/p ablation on 12/21/18, on warfarin  3. Post-ablative pericarditis, on colchicine  4. FH of premature CAD (brother) with personal hx of nonobstructive CAD per cath 12/2018  5. Long-standing NAFLD, with cirrhosis complicated by portal vein thrombosis and portal hypertension - follows with GI  6. Thrombocytopenia and splenomegaly in the setting of liver disease  7. BRUCE on CPAP  8. LVH  9. Obesity    The patient has a history of symptomatic PAF and tachy-mediated heart failure since 2013. We first met him when he was admitted in May of 2013 with Unfortunately, earlier this year he developed abdominal distention, a 20-pound weight gain, increasing shortness of breath and associated chest heaviness.  He came in to the emergency room where he was found to be in atrial fibrillation with a rapid ventricular response.  This patient was in the 130s, and was started on Lasix and a diltiazem drip.     The patient went on to transthoracic echocardiography which revealed an ejection fraction of 40%, a flattened septum, along with a moderately dilated right ventricle and mildly decreased right ventricular systolic function, moderate pulmonary hypertension and a dilated IVC.  The patient was placed on loop diuretics, and has had a subsequent 25-pound weight loss. Weight at that time was ~ 225 lbs. Following adequate rate control his ECHO improved.     Since that time, he'd been seen mainly by EP in attempts to control his PAF. As of ECHO in 2015, his LV and RV size and fn had normalized and there was no further evidence of significant VHD. This was evident on his 2016 and 2017 ECHOs, as well. However, h remained symptomatic despite adequate rate control, and therefore underwent DCCV which \"stuck\" for months, but he unfortunately reverted. He then was loaded with sotalol but developed diffuse itching with this. Of note, he isn't consistently symptomatic in AF, but can note exertional dyspnea, leg swelling, " fatigue, and perhaps some lightheadedness/dizziness. Probably the most consistent symptom is relayed by his wife, who notes that he snores and appears uncomfortable wearing his CPAP at night while in AF.    Most recently, on 12/21/18, he underwent atrial fibrillation ablation with EP, after failing prior cardioversion and sotalol. Post-procedure he developed chest discomfort and low-grade fever with a CRP of 23. He was admitted and treated for post-ablative pericarditis with anti-inflammatory meds and colchicine. However he became progressively dyspneic and CXR showed evidence of congestive heart failure. ECHO was performed and notable for new LV dysfunction with an EF of 25-30% (c/w 50-55% per ECHO 9/2017), RV dilation, reduced fn and a flattened septum indicative of volume/pressure overload, and mod-severe TR. No pericardial effusion was noted. Cardiology was consulted and he was diuresed from a weight of 253 to 237 lbs. Additionally, a R/LHC was recommended. This showed mild, nonobstructive CAD with a wedge of 18 and RAP of 14 (performed day prior to discharge).  Ultimately discharged on 12/27/18, on Lasix 40 BID, Aldactone 25 BID, lisinopril 2.5, Toprol 100. He'd previously been on Lasix at a dose of 20 mg daily. Lisinopril was new for him.     Since that time, it appears he's had marginal pressures in the 90's / 50's-60's. Weight has remained stable.   Today, he presents with his wife Violet. Around 5 am this morning, she noticed that he appeared to struggle while sleeping with his CPAP, consistent with prior episodes of AF. Indeed, the patient is back in AF in the low 100's today. His biggest complaint is of an intermittent and bothersome cough, typically exertional, sometimes productive with brown or yellow sputum. CXR was performed yesterday with his PCP and was non-acute. He was placed on a Z-pack for this prior to admission, and started on Dulera yesterday by his PCP. He's already been using Albuterol  2-3 x daily, which helps some.  His prior chest pain has resolved. He hasn't felt lightheaded or pre-syncopal. His LE edema is barely noticeable and he's surprised that he hasn't needed to wear his compression stockings since discharge. His abdominal swelling is at baseline, he states.   He works for a [employer] company and does fairly frequent heavy lifting with this, although has been able to return to work at this time with activity restrictions. He has asked that his appointments be made in the late afternoons, if possible going forward, as he's an hourly employee.     Holter monitor from 2017 showed CAF with an average HR of 94 bpm. No TSH noted since 2015. BMP today shows a mildly depressed sodium level of 132, otherwise WNL.          Review of Systems:     12-pt ROS is negative except for as noted in the HPI.          Physical Exam:     Vitals: There were no vitals taken for this visit.  Wt Readings from Last 3 Encounters:   01/02/19 105.2 kg (232 lb)   12/28/18 108.9 kg (240 lb)   12/27/18 107.7 kg (237 lb 6.4 oz)       Constitutional:  Patient is pleasant, alert, cooperative, and in NAD, although intermittently tearful.   HEENT:  NCAT. PERRLA. EOM's intact.   Neck:  JV is mildly engorged.  Pulmonary: Normal respiratory effort. Fine bibasilar rales appreciated.   Cardiac: RRR, normal S1/S2, no S3/S4, no murmur or rub.   Abdomen:  Abdominal is distended, but non-tender, and no hepatosplenomegaly is appreciated.   Vascular: Pulses in the upper and lower extremities are 2+ and equal bilaterally. RUE radial cath site closed, well-healed, without hematoma, bruit, thrill, ecchymosis, or TTP.   Extremities: Trace ankle edema.  Skin:  No rashes or lesions appreciated.   Neurological:  No gross motor or sensory deficits.   Psych: Appropriate affect.        Data:     Cardiac Diagnostics reviewed:  Type Date Result   TTE 12/23/18 The right ventricle is not well visualized but is likely moderate to severely  dilated. The  right ventricular systolic function is moderately reduced.  The tricuspid valve is not well visualized and the inferior vena cava not well visualized for estimation of right atrial pressure. The right ventricular systolic pressure is approximated at 17mmHg plus the right atrial pressure.  Limited views suggest there is at least moderate to mod-severe (2-3+)  tricuspid regurgitation. The right atrium is severely dilated.  Left ventricular systolic function is moderate to severely reduced. The visual ejection fraction is estimated at 25-30%. There is mod-severe global  hypokinesia of the left ventricle. Flattened septum is consistent with RV  pressure/volume overload.  Technically difficult, suboptimal study. Contrast was used without apparent complications. LV and RV systolic function are both worse compared to the prior echo.    9/19/17 1. Normal left ventricular size and systolic function. Visual ejection  fraction 55-60%.  2. No regional wall motion abnormalities.  3. Normal right ventricular size and systolic function.  4. Moderate biatrial enlargement.  5. Trace mitral and tricuspid regurgitation.     Compared to the prior study dated 2/19/2016, there have been no changes.    5/14/13 LV normal in size with an EF of 40%. Flattened septum consistent with RV volume overload. The RV is moderately dilated. Midlly decreased RV systolic function. Moderate pulmonary hypertension is present. Dilated IVC with no respiratory collapse; RAP > 20 mmHg. Moderately severe TR.    Cath 12/26/18 Angiogram: Only mild irregularities of a codominant system.    HEMODYNAMICS  RIGHT ATRIAL PRESSURE: The mean RA pressure is 14 mmHg  RIGHT VENTRICULAR PRESSURE: 36/8/13  PULMONARY ARTERY PRESSURE: 38/20/26  PULMONARY CAPILLARY WEDGE PRESSURE: Mean wedge is 18 mmHg  CARDIAC OUTPUT AND INDEX: 8.5 l/min and 3.95 l/min/m2 per Leon  LEFT VENTRICULAR END-DIASTOLIC PRESSURE: 15 mmHg    IMPRESSION:   1. Minimal coronary artery disease  2. Mildly  elevated left heart filling pressure (wedge 18 mmHg) and  moderate-severely elevated right heart filling pressure (mean RA 14  mmHg)   EKG 1/3/19 AF at 105 bpm    12/26/18 SR with PACs, iRBBB, QTc 470   Holter 10/23/17 CAF, average HR 94 bpm   EP ablation  12/21/18 PROCEDURES PERFORMED:  1. Wide circumferential pulmonary vein isolation.  2.  Isolation of the left posterior atrial wall and Empiric ablation of the cavotricuspid isthmus (CTI).  3. Three-dimensional CARTO mapping.  4. Comprehensive electrophysiology (EP) study with arrhythmia induction  5. Left atrial recording and pacing via the CS.  6. Transseptal access.  7. Cardiac fluoroscopy.  8. Intracardiac echocardiogram (ICE)     Labs reviewed:  Recent Labs   Lab Test 05/19/18 07/28/15  1440 05/13/13  1800 02/27/13 02/07/13  0834 08/02/12  0753  08/04/11  0826   LDL 50  --   --  54 65  --   --  67   HDL 42  --   --  59 57  --   --  68   CHOL  --   --   --  127 137  --   --  156   TRIG 63  --   --  72 76  --   --  106   TSH  --  2.57 4.25  --   --  3.96   < >  --    YOVANY  --   --   --   --   --   --   --  48    < > = values in this interval not displayed.       Lab Results   Component Value Date    WBC 4.1 12/27/2018    RBC 3.68 (L) 12/27/2018    HGB 12.7 (L) 12/27/2018    HCT 37.0 (L) 12/27/2018     (H) 12/27/2018    MCH 34.5 (H) 12/27/2018    MCHC 34.3 12/27/2018    RDW 14.0 12/27/2018    PLT 85 (L) 12/27/2018       Lab Results   Component Value Date     12/31/2018    POTASSIUM 4.1 12/31/2018    CHLORIDE 100 12/31/2018    CO2 24 12/31/2018    ANIONGAP 9 12/31/2018     (H) 12/31/2018    BUN 17 12/31/2018    CR 0.77 12/31/2018    GFRESTIMATED >90 12/31/2018    GFRESTBLACK >90 12/31/2018    HALEY 8.8 12/31/2018      Lab Results   Component Value Date    AST 70 (H) 12/22/2018    ALT 45 12/22/2018       No results found for: A1C    Lab Results   Component Value Date    INR 1.94 (H) 01/02/2019    INR 2.02 (H) 12/31/2018           Problem List:      Patient Active Problem List   Diagnosis     Thrombocytopenia (H)     Liver Cirrhosis 2nd ot Fatty liver, w Ascites     erectile dysfunction     Compulsive behaviors     BRUCE-Mild (AHI 9; REM RDI 31)     Portal hypertension (H)     Eczema     GERD (gastroesophageal reflux disease)     CARDIOVASCULAR SCREENING; LDL GOAL LESS THAN 160     Obesity     Health Care Home     Edema     Severe sepsis (H)     History of MRSA now culture negative     Portal vein thrombosis     Long-term (current) use of anticoagulants [Z79.01]     Encounter for monitoring sotalol therapy     Chronic a-fib, S/P Ablation 12/22/18 -- on Warfarin     Right heart failure (H)     Obesity (BMI 35.0-39.9) with comorbidity (H)     CAD (coronary artery disease)     Cardiomyopathy (H)     Pericarditis     Chronic systolic CHF (congestive heart failure) (H)           Medications:     Current Outpatient Medications   Medication Sig Dispense Refill     albuterol (PROAIR HFA) 108 (90 Base) MCG/ACT inhaler Inhale 2 puffs into the lungs       calcium carb 1250 mg, 500 mg [place],/vitamin D 200 units (OSCAL WITH D) 500-200 MG-UNIT per tablet Take 2 tablets by mouth daily with food.       furosemide (LASIX) 40 MG tablet Take 1 tablet (40 mg) by mouth 2 times daily 60 tablet 0     lisinopril (PRINIVIL/ZESTRIL) 2.5 MG tablet Take 1 tablet (2.5 mg) by mouth daily 30 tablet 0     metoprolol succinate ER (TOPROL-XL) 100 MG 24 hr tablet Take 1 tablet (100 mg) by mouth daily 90 tablet 3     mometasone-formoterol (DULERA) 200-5 MCG/ACT inhaler Inhale 2 puffs into the lungs 2 times daily as needed Appt DUE Jan 2017 1 Inhaler 1     order for DME Open toe size large 30/40 Mediven Assure Medical Compression socks Model 59012.    Fax to Fax [phone]. [facility] medical 12 Device 0     order for DME Equipment being ordered:   New CPAP mask, tube, filters,water tray 1 Device 3     order for DME Open toe size large 30/40 Mediven Assure Medical Compression socks Model  04215. 4 Package 3     ORDER FOR DME Respironics RemStar 60 Series Auto AFlex 12-15 cm H2O with heated humidty and a modem.  Pt chose a Quattro Air FFM size medium.       ORDER FOR DME Juzo compression stockings, 30-40mm compression. Patient has portal hypertension and develops leg edema, which these control well. 2 Package 3     pantoprazole (PROTONIX) 40 MG EC tablet Take 1 tablet (40 mg) by mouth daily 30 tablet 0     spironolactone (ALDACTONE) 25 MG tablet Take 1 tablet (25 mg) by mouth 2 times daily We will no longer fill unless seen by cardiology 60 tablet 0     warfarin (COUMADIN) 2.5 MG tablet 1.25 mg Fridays; 2.5mg all other days or As directed by Anticoagulation Clinic 90 tablet 0           Past Medical History:     Past Medical History:   Diagnosis Date     Acute pulmonary edema (H)      Atrial fibrillation (H)      Atrial fibrillation with rapid ventricular response (H) 5/13/2013     CAD (coronary artery disease)     Cath 12/26/18- mild nonobstructive disease     Calculus of ureter 1993, 1997    history of     Cardiomyopathy (H)     12/23/18 Echo- EF 25-30%     Chronic systolic CHF (congestive heart failure) (H)      Cirrhosis of liver without mention of alcohol 8/22/2005    Cirrhosis, idiopathic     Edema 5/13/2013     Esophageal varices with bleeding(456.0)      Gallstones      Genital herpes, unspecified 3/20/1999    resolving herpes progenitalis     GERD (gastroesophageal reflux disease)      Hematuria      Hypertension      Hypochondriasis     problems in past     Obesity 11/24/2010     BRUCE (obstructive sleep apnea) 03/17/2009    Mild AHI 8.4  RDI 31 - Pt refuses to wear his CPAP     Pericarditis      Portal hypertension (H) 1/28/2010     Unspecified thrombocytopenia 8/22/2005    Thrombocytopenia associated with cirrhosis and portal hypertension     Past Surgical History:   Procedure Laterality Date     ANESTHESIA CARDIOVERSION N/A 1/19/2015    Procedure: ANESTHESIA CARDIOVERSION;  Surgeon: Generic  Anesthesia Provider;  Location: [facility] OR     COLONOSCOPY N/A 8/14/2017    Procedure: COLONOSCOPY;  Colonoscopy Called will arrive appx 12:30 Per phone call;  Surgeon: Vincent Whittington MD;  Location:  GI     CV HEART CATHETERIZATION WITH POSSIBLE INTERVENTION N/A 12/26/2018    Procedure: Heart Catheterization with possible Intervention;  Surgeon: Brandon Arroyo MD;  Location:  HEART CARDIAC CATH LAB     EP ABLATION FOCAL AFIB N/A 12/21/2018    Procedure: EP Ablation Focal AFIB;  Surgeon: Kristofer Evans MD;  Location:  HEART CARDIAC CATH LAB     ESOPHAGOSCOPY, GASTROSCOPY, DUODENOSCOPY (EGD), COMBINED  7/11/2011    Procedure:COMBINED ESOPHAGOSCOPY, GASTROSCOPY, DUODENOSCOPY (EGD); Surgeon:LAURA LAMAS; Location:[facility] GI     ESOPHAGOSCOPY, GASTROSCOPY, DUODENOSCOPY (EGD), COMBINED  9/10/2012    Procedure: COMBINED ESOPHAGOSCOPY, GASTROSCOPY, DUODENOSCOPY (EGD);;  Surgeon: Hi Roque MD;  Location:  GI     ESOPHAGOSCOPY, GASTROSCOPY, DUODENOSCOPY (EGD), COMBINED  9/9/2013    Procedure: COMBINED ESOPHAGOSCOPY, GASTROSCOPY, DUODENOSCOPY (EGD);;  Surgeon: Hi Roque MD;  Location:  GI     ESOPHAGOSCOPY, GASTROSCOPY, DUODENOSCOPY (EGD), COMBINED N/A 9/15/2014    Procedure: COMBINED ESOPHAGOSCOPY, GASTROSCOPY, DUODENOSCOPY (EGD);  Surgeon: Hi Roque MD;  Location:  GI     ESOPHAGOSCOPY, GASTROSCOPY, DUODENOSCOPY (EGD), COMBINED N/A 10/30/2015    Procedure: COMBINED ESOPHAGOSCOPY, GASTROSCOPY, DUODENOSCOPY (EGD);  Surgeon: Feliberto Fox MD;  Location:  GI     ESOPHAGOSCOPY, GASTROSCOPY, DUODENOSCOPY (EGD), COMBINED N/A 10/10/2016    Procedure: COMBINED ESOPHAGOSCOPY, GASTROSCOPY, DUODENOSCOPY (EGD);  Surgeon: Jose Nesbitt MD;  Location:  GI     H ABLATION FOCAL AFIB  12/21/2018     HERNIA REPAIR       IRRIGATION AND DEBRIDEMENT ABSCESS SCROTUM, COMBINED  10/4/2012    Procedure: COMBINED IRRIGATION AND DEBRIDEMENT ABSCESS SCROTUM;  Irrigation and Debridement of Groin  Abscess;  Surgeon: Benny Shoemaker MD;  Location: [facility] OR     SOFT TISSUE SURGERY       SURGICAL HISTORY OF -       rt elbow     SURGICAL HISTORY OF -   1/15/98    repair of ventral and umbilical hernia     SURGICAL HISTORY OF -       endoscopy     Family History   Problem Relation Age of Onset     Lipids Mother      Hypertension Mother      Eye Disorder Mother      Heart Disease Father         CHF     Lipids Father      Obesity Father      Heart Disease Brother         MI     Eye Disorder Brother      Cancer Other         maternal grandparent/throat cancer     Social History     Socioeconomic History     Marital status:      Spouse name: Not on file     Number of children: Not on file     Years of education: Not on file     Highest education level: Not on file   Social Needs     Financial resource strain: Not on file     Food insecurity - worry: Not on file     Food insecurity - inability: Not on file     Transportation needs - medical: Not on file     Transportation needs - non-medical: Not on file   Occupational History     Not on file   Tobacco Use     Smoking status: Former Smoker     Packs/day: 0.80     Years: 6.00     Pack years: 4.80     Types: Cigarettes     Last attempt to quit: 2002     Years since quittin.0     Smokeless tobacco: Never Used   Substance and Sexual Activity     Alcohol use: No     Alcohol/week: 0.0 oz     Comment: quit in      Drug use: No     Sexual activity: Yes     Partners: Female   Other Topics Concern     Parent/sibling w/ CABG, MI or angioplasty before 65F 55M? Yes     Comment: BROTHER   - MI AT AGE 44      Service Not Asked     Blood Transfusions Not Asked     Caffeine Concern Not Asked     Occupational Exposure Not Asked     Hobby Hazards Not Asked     Sleep Concern Not Asked     Stress Concern Not Asked     Weight Concern Not Asked     Special Diet Not Asked     Back Care Not Asked     Exercise No     Bike Helmet Not Asked     Seat Belt  Not Asked     Self-Exams Not Asked   Social History Narrative     Not on file           Allergies:   Avelox and Moxifloxacin      Kayce Manzo PA-C  [facility]  Pager: [phone]  ank you for allowing me to participate in the care of your patient.      Sincerely,     Kayce Manzo PA-C     [facility]    cc:   JANETTE Steen CNP  [address]  [place] [id number]

## 2019-01-04 ENCOUNTER — HOSPITAL ENCOUNTER (OUTPATIENT)
Dept: CARDIOLOGY | Facility: CLINIC | Age: 59
Discharge: HOME | End: 2019-01-04
Attending: PHYSICIAN ASSISTANT | Admitting: PHYSICIAN ASSISTANT
Payer: COMMERCIAL

## 2019-01-04 ENCOUNTER — ANTICOAGULATION THERAPY VISIT (OUTPATIENT)
Dept: ANTICOAGULATION | Facility: CLINIC | Age: 59
End: 2019-01-04
Payer: COMMERCIAL

## 2019-01-04 ENCOUNTER — DOCUMENTATION ONLY (OUTPATIENT)
Dept: CARDIOLOGY | Facility: CLINIC | Age: 59
End: 2019-01-04

## 2019-01-04 DIAGNOSIS — I48.0 PAROXYSMAL ATRIAL FIBRILLATION (H): ICD-10-CM

## 2019-01-04 DIAGNOSIS — I48.92 ATRIAL FLUTTER (H): Primary | ICD-10-CM

## 2019-01-04 PROBLEM — I50.41: Status: ACTIVE | Noted: 2019-01-04

## 2019-01-04 LAB — INR POINT OF CARE: 2.1 (ref 0.86–1.14)

## 2019-01-04 PROCEDURE — 36416 COLLJ CAPILLARY BLOOD SPEC: CPT

## 2019-01-04 PROCEDURE — 85610 PROTHROMBIN TIME: CPT | Mod: QW

## 2019-01-04 PROCEDURE — 99207 ZZC NO CHARGE NURSE ONLY: CPT

## 2019-01-04 PROCEDURE — 93005 ELECTROCARDIOGRAM TRACING: CPT

## 2019-01-04 RX ORDER — DIGOXIN 125 MCG
125 TABLET ORAL DAILY
Qty: 30 TABLET | Refills: 3 | Status: SHIPPED | OUTPATIENT
Start: 2019-01-04 | End: 2019-04-22

## 2019-01-04 NOTE — PROGRESS NOTES
ANTICOAGULATION FOLLOW-UP CLINIC VISIT    Patient Name:  Maurice Jon  Date:  2019  Contact Type:  Face to Face    SUBJECTIVE:     Patient Findings     Positives:   No Problem Findings    Comments:   No changes in medications, activity, or diet noted. No bleeding or increased bruising noted. Took warfarin as prescribed.  Patient will continue weekly maintenance dose. INR is therapeutic.   Recheck INR in 10 days.   Patient verbalizes understanding and agrees to plan. No further questions or concerns.             OBJECTIVE    INR Protime   Date Value Ref Range Status   2019 2.1 (A) 0.86 - 1.14 Final       ASSESSMENT / PLAN  INR assessment THER    Recheck INR In: 10 DAYS    INR Location Clinic      Anticoagulation Summary  As of 2019    INR goal:   2.0-3.0   TTR:   83.9 % (3.2 mo)   INR used for dosin.1 (2019)   Warfarin maintenance plan:   1.25 mg (2.5 mg x 0.5) every Fri; 2.5 mg (2.5 mg x 1) all other days   Full warfarin instructions:   1.25 mg every Fri; 2.5 mg all other days   Weekly warfarin total:   16.25 mg   Plan last modified:   Susan Beyer RN (2018)   Next INR check:   1/15/2019   Priority:   INR   Target end date:   10/4/2018    Indications    Paroxysmal atrial fibrillation (H) [I48.0]  Atrial fibrillation with rapid ventricular response (H) (Resolved) [I48.91]  Long-term (current) use of anticoagulants [Z79.01] [Z79.01]             Anticoagulation Episode Summary     INR check location:       Preferred lab:       Send INR reminders to:   [facility] POOL    Comments:   * anticoagulation short period surrounding ablation on 18. Cardiology to decide when to stop warfarin. has well-compensated cirrhosis      Anticoagulation Care Providers     Provider Role Specialty Phone number    Alon Pineda MD [facility] [phone]            See the Encounter Report to view Anticoagulation Flowsheet and Dosing Calendar (Go to Encounters  tab in chart review, and find the Anticoagulation Therapy Visit)        Taylor Felix RN

## 2019-01-04 NOTE — PROGRESS NOTES
Spoke with IRMA Sullivan yesterday re: patient's recurrent AFib with RVR/Atypical AFlutter.  As had started only ~5 hours before visit, recommended no changes with repeat EKG this AM.    EKG this AM ([facility]) shows Atypical AFlutter at 107 bpm. Had PVI, LA posterior wall and CTI done 12/21 with Dr. Evans after sotalol was ineffective at controlling AFib. Significant scarring noted.    ** D/w Dr. Maloney in Dr. Evans's absence.      Start digoxin 0.125 mg daily today for better rate control  Continue Metoprolol  mg daily  See me 1/8    Needs DCCV next week and Dr. Maloney recommended restarting sotalol AFTER DCCV. I will review his INRs to determine if we need to wait before DCCV.    Thx -

## 2019-01-04 NOTE — PROGRESS NOTES
Spoke to pt to make pt aware that EKG shows that pt is in A Flutter rate of 107 bpm.  Explained that it was recommended that pt start on Digoxin 0.125 mg daily for better control of his heart rate.  Pt asked that this be sent to [facility] and will  and start today. Pt will then see Nadege on 1/8 and at that time a Cardioversion will be discussed, which will also depend on pt INR's. Pt INR today was 2.1. Will need to address INR's having one done again next week. Will discuss with scheduling in [place]. Nick

## 2019-01-04 NOTE — TELEPHONE ENCOUNTER
PA Initiation    Medication: Dulera  Insurance Company: [employer] - Phone [phone] Fax [phone]  Pharmacy Filling the Rx: [facility] - - [address] CRIS[address] - [id number] [facility]  Filling Pharmacy Phone: [phone]  Filling Pharmacy Fax:    Start Date: 1/4/2019    Central Prior Authorization Team   Phone: [phone]

## 2019-01-04 NOTE — TELEPHONE ENCOUNTER
Prior Authorization Retail Medication Request    Medication/Dose: Dulera  ICD code (if different than what is on RX):    Previously Tried and Failed:  Proair; proventil; ventolin; albuterol; breo ellipta; isuprel  Rationale:  Patient has used with success since 2014    Insurance Name:  Not providedf  Insurance ID:  Not provided  Cover My Med Key: [id number]      Pharmacy Information (if different than what is on RX)  Name:    Phone:

## 2019-01-04 NOTE — TELEPHONE ENCOUNTER
PRIOR AUTHORIZATION DENIED    Medication: Dulera    Denial Date: 1/4/2019    Denial Rational: Patient must have a history of trial & failure to the formulary alternative(s) or have a contraindication or intolerance to the formulary alternatives: Advair, Breo Ellipta, & Symbicort.         Appeal Information:

## 2019-01-05 NOTE — TELEPHONE ENCOUNTER
Please call patient.  Pauline Rice was denied, we need to try 3 different medications that are on the formulary first.  I do not see that patient has been on Symbicort in the past.  Please check with him and if he'd like to start it, I will put in an order.Kailey Ac RNC, NP

## 2019-01-05 NOTE — TELEPHONE ENCOUNTER
Dr. Ac,  PA for Dulera has been denied.    Patient needs to have tried and failed all three formulary alternatives.  Denial Rational: Patient must have a history of trial & failure to the formulary alternative(s) or have a contraindication or intolerance to the formulary alternatives: Advair, Breo Ellipta, & Symbicort.     It appears patient has tried Breo.

## 2019-01-08 ENCOUNTER — DOCUMENTATION ONLY (OUTPATIENT)
Dept: CARDIOLOGY | Facility: CLINIC | Age: 59
End: 2019-01-08

## 2019-01-08 ENCOUNTER — OFFICE VISIT (OUTPATIENT)
Dept: CARDIOLOGY | Facility: CLINIC | Age: 59
End: 2019-01-08
Payer: COMMERCIAL

## 2019-01-08 ENCOUNTER — HOSPITAL ENCOUNTER (OUTPATIENT)
Dept: CARDIOLOGY | Facility: CLINIC | Age: 59
Discharge: HOME | End: 2019-01-08
Attending: NURSE PRACTITIONER | Admitting: NURSE PRACTITIONER
Payer: COMMERCIAL

## 2019-01-08 VITALS
WEIGHT: 235.2 LBS | OXYGEN SATURATION: 97 % | DIASTOLIC BLOOD PRESSURE: 55 MMHG | BODY MASS INDEX: 38.54 KG/M2 | SYSTOLIC BLOOD PRESSURE: 103 MMHG | HEART RATE: 93 BPM

## 2019-01-08 DIAGNOSIS — K76.6 PORTAL HYPERTENSION (H): ICD-10-CM

## 2019-01-08 DIAGNOSIS — Z98.890 STATUS POST ABLATION OF ATRIAL FIBRILLATION: ICD-10-CM

## 2019-01-08 DIAGNOSIS — Z86.79 STATUS POST ABLATION OF ATRIAL FIBRILLATION: ICD-10-CM

## 2019-01-08 PROCEDURE — 99215 OFFICE O/P EST HI 40 MIN: CPT | Performed by: PHYSICIAN ASSISTANT

## 2019-01-08 PROCEDURE — 93010 ELECTROCARDIOGRAM REPORT: CPT | Performed by: PHYSICIAN ASSISTANT

## 2019-01-08 PROCEDURE — 93005 ELECTROCARDIOGRAM TRACING: CPT

## 2019-01-08 RX ORDER — SPIRONOLACTONE 25 MG/1
25 TABLET ORAL DAILY
Refills: 0 | COMMUNITY
Start: 2019-01-08 | End: 2019-03-11

## 2019-01-08 NOTE — LETTER
1/8/2019    Kailey Ac, NP, APRN CNP  [address] [facility] [address]    RE: Maurice Jon       Dear Colleague,    I had the pleasure of seeing Maurice Jon in the [facility].        HPI:   I had the pleasure of meeting Yomi when he came for follow up of persistent atrial fibrillation s/p PVI, LA posterior wall ablation, CTI and DCCV 12/21/2018.  He sees Dr. Evans for his history of:    1. Persistent AFib, first diagnosed 2013, with unsuccessful attempts at maintaining SR leading to persistent atrial fibrillation. He noted excessive fatigue, which Dr. Evans was unsure was related to metoprolol therapy vs atrial fibrillation itself and attempted rhythm control. Loaded with sotalol and underwent DCCV 10/31/2018, which maintained SR until 12/3. Developed terrible pruritis with sotalol, which resolved after this was discontinued. Underwent PVI 12/21 (full details below) complicated by pericarditis and biventricular heart failure, with EF down to 25-30% (previously normal 9/2017). Remained in SR until 1/3/19.    2. NAFLD with cirrhosis followed by Dr. Roque at [facility].  Complicated by portal vein thrombosis, portal hypertension, thrombocytopenia (though 118K most recently 1/3), splenomegaly, splenic vein thrombosis and known esophageal varices (Upper GI 10/2016)    3.  New onset BiV Heart Failure with EF down to 25-30% and moderate reduction in RV function and moderate-severe TR. Angiogram 12/26 with mild nonobstructive disease and mildly increased LV filling pressure (wedge 18) and moderate-severely increased RV filling pressure (RAP 14).    4. Pulmonary nodules noted on CTA Heart 12/21/2018 (multiple nodules in lingula, measuring 6 mm). Multiple 4 mm nodules in subpleural location in lingula (unchanged c/w 2006). RLL nodule 3 mm    I saw Yomi today in follow up for atrial fibrillation. As above, he was admitted 12/21 for elective AFib ablation. Dr. Evans noted  a tremendous amount of scar in the left atrium. The entire LA had very low/absent voltage except on the floor and anterior aspect of the mitral annulus. No potentials were noted in the inferior PVs, with a few potentials noted in both superior PVs. He performed PV isolation of the superior PVs, and due to significant scarring, isolation of the LA posterior wall by linear ablations at the roof of the LA connecting the superior PVcs and inferior PVCs. He also performed empiric CTI. He required DCCV at that time.     Following the procedure, Dr. Evans considered initiating antiarrhythmic therapy with amiodarone (for a short time, given his cirrhosis), but after one dose, AST increased and therefore he was to be discharged on his PTA dose of metoprolol  mg daily. INR was therapeutic at 2.35 on 12/21.    Before discharge on 12/22, he described chest discomfort. This improved some with colchicine, but recurred, along with complaints of respiratory distress and low grade fever. Echo was done to look for pericardial effusion 12/23, which surprisingly showed LVEF down to 25-30% and moderate RV systolic dysfunction with moderate-severe RV dilation.     He was diuresed.  Dr. Farah saw him and recommended R/L heart catheterization after his INR was acceptable. Given recent ablation, he was started on heparin once INR <2.0.  R/L heart cath done 12/26 done via the R radial artery and R IJ, showed minimal nonobstructive CAD.  Mildly elevated left heart filling pressure (wedge 18) and moderate-severe elevation of right heart filling pressure (mean RA 14 mmHg).    He was treated for biventricular heart failure. He was discharged 12/27 on:    1. Lasix 40 mg BID (increased from PTA dose 20 mg every day)  2. Aldacatone 25 mg BID (increased from PTA dose 25 mg daily)   3. Lisinopril 2.5 mg (new)  4. Toprol  mg every day  5. Warfarin, with Lovenox 120 mg q12 hours for bridging. INR was 2.02 on 12/31, and Lovenox was DCd 1/1.  "INR dropped to 1.94 on 1/2 and was back to 2.1 on 1/4.  6. Colchicine 0.6 mg BID x 7 days    He remained in sinus rhythm throughout his hospitalization.    He saw IRMA Sullivan in [facility] on 1/3. Unfortunately, he was back in what looked like atypical atrial flutter @ 105 bpm, which he believed had started just 5 hours prior to his appointment. He noted increased dyspnea. Kayce noted that from a heart failure standpoint, he was doing OK, with a drop in weight. Given that he required more rate controlling medication but blood pressure was in the 90s, Kayce added an extra Toprol XL 25 mg, decreased spironolactone back to 25 mg daily and stopped lisinopril. He was continued on Lasix 40 mg BID. She also recommended to colchicine x 3 months. As his AFib had just recurred 5 hours prior to his appointment, we recommended an EKG the following day.    EKG on 1/4 continued to show atypical atrial flutter @ 107 bpm.  We added digoxin 0.125 mg daily for tighter rate control.    Yomi tells me that he feels \"better\" and that his breathing is easier than when he saw Kayce 1/3.  He was surprised to find out that he was still atypical atrial flutter. Heart rate improved to 96 bpm.     He denies edema, orthopnea or PND. He's using his CPAP. Weight was stable at 235 #. No complaints of chest pain or groin site discomfort.  No right radial artery discomfort or R IJ discomfort.  He denies fever/chills or trouble swallowing. Denies palpitations, lightheadedness or dizziness.  Still with some MENARD with the recurrent arrhyhtmia, though improved with heart rate control.    EKG today, which I overread, showed atypical atrial flutter at 96 bpm. 2 PVCs were noted.   R/L heart cath done 12/26 done showed <10% lesions. RA pressure (mean) 14 mmHg, RVP 36/8/13. PAP 38/20/26. PCWP mean 18. CO 8.5 L/min. Cardiac Index 3.95 L/min/m2. LVEDP 15 mmHg  Echo 12/23 showed EF 25-30% with moderate-severe global hypokinesis.  RV not well visualized but " likely moderate to severely dilated with moderately reduced RVSF.  RVSP 17 mmHg+ RAP. 2-3 + TR. Severely dilated RA.   CT Heart 12/21/2018 showed scattered mediastinal and right hilar lymph nodes. Calcifications scattered throughout the hilar lymph nodes bilaterally. No pleural  effusion. Liver hypodensities are only partially visualized and evaluated. Etiologies are vast and broad and essentially nothing excluded. One of the collections is negative 74 Hounsfield units suggestive of fat within this collection measuring up to 2.7 cm in length. Recommend dedicated CT exam of the liver. A dominant vein, which appears to communicate to the pulmonary venous system is noted at the medial aspect of the left hemithorax. Numerous serpiginous areas of hypodensity noted surrounding the distal esophagus and near the spleen. These were present on previous CT exam on April 6, 2006.  They may be dilated venous branches and varices. Recommend dedicated CT exam of the abdomen and pelvis. While etiology is uncertain, they were present on previous exam.     No acute osseous abnormality.   Nodules in the lingula are multiple, larger of which is image 3 series 8 measuring 6 mm. On image 2 series  8, smaller nodule adjacent to the major fissure. Multiple 4 mm nodules in a subpleural location in the lingula also noted, though unchanged from previous exam. Right lower lobe nodule is visible laterally measuring 3 mm on image 42 series 8. No pulmonary masses.    Componentconitnue       Latest Ref Rng & Units 5/10/2018 9/20/2018 12/22/2018 1/3/2019   Bilirubin Total      0.2 - 1.3 mg/dL 1.0 1.9 (H) 1.3 1.2   Albumin      3.4 - 5.0 g/dL 3.1 (L) 3.2 (L) 2.9 (L) 3.3 (L)   Protein Total      6.8 - 8.8 g/dL 6.0 (L) 6.0 (L) 5.7 (L) 6.7 (L)   Alkaline Phosphatase      40 - 150 U/L 82 74 73 85   ALT      0 - 70 U/L 51 44 45 39   AST      0 - 45 U/L 49 (H) 46 (H) 70 (H) 35   Bilirubin Direct      0.0 - 0.2 mg/dL  0.6 (H) 0.5 (H) 0.5 (H)      Component      Latest Ref Rng & Units 9/20/2018 12/21/2018 12/27/2018   WBC      4.0 - 11.0 10e9/L 2.8 (L) 4.0 4.1   RBC Count      4.4 - 5.9 10e12/L 4.18 (L) 3.76 (L) 3.68 (L)   Hemoglobin      13.3 - 17.7 g/dL 14.6 13.0 (L) 12.7 (L)   Hematocrit      40.0 - 53.0 % 42.9 37.5 (L) 37.0 (L)   MCV      78 - 100 fl 103 (H) 100 101 (H)   MCH      26.5 - 33.0 pg 34.9 (H) 34.6 (H) 34.5 (H)   MCHC      31.5 - 36.5 g/dL 34.0 34.7 34.3   RDW      10.0 - 15.0 % 13.3 13.8 14.0   Platelet Count      150 - 450 10e9/L 62 (L) 63 (L) 85 (L)     Assessment & Plan:    1. Recurrent atrial arrhythmias    AFib ablation as above. Significant LA scarring noted with low chance of long-term success.     AAD therapy limited given CM (no Class Ic), ineffectiveness of sotalol (and c/o pruritis) and liver disease with increase in AST following administration of Amiodarone x 1 dose after PVI 12/21     Had been in SR from 12/21-1/3, when EKG and symptoms correlated showed Atypical Atrial Flutter 1/3 and 1/4. Rate in low 100s; therapy limited by hypotension (See Natalia Manzo' note 1/3)    Tolerating digoxin and feels better though still in atypical atrial flutter with more controlled HR 90s    Remains on warfarin. INR 1/4 therapeutic. Stopped Lovenox 1/1    PLAN:    Discussed with Dr. Evans. Options limited for AAD and Yomi recognizes these limitations    Discussed options, including another attempt at DCCV (Dr. Evans to do) vs AVN ablation and attempt at BiV PPM.    Pt would like to discuss with his wife Violet and meet with me next week as originally scheduled to make final decision. Will move my appt to 2PM to improve his work schedule       Continue warfarin. INR planned for 1/15. As above, noted INR dropped to 1.94 when not on Lovenox (1/2), but had been >2.0 on 12/31 and 1/4    Continue Toprol  mg and digoxin 0.125 mg daily      2. BiVentricular HF    Echo 9/2017 showed normal LVEF and R sided function. Echo repeated due to c/o CP  "following ablation 12/21 showed EF down to 25-30% with moderate-severe dilation of RV and moderate reduction of RVSF.    Left hospital 12/27 on Lasix 40 mg BID (increased from PTA dose 20 daily), Aldactone 25 mg BID (increased from PTA dose 25 mg daily), lisinopril 2.5 daily (new) and continued high BB dose    Decreased Aldactone and held lisinopril to allow for more BP room with tighter rate control     Weight is stable, edema \"best it's been\" and no change in abdominal fullness    PLAN:    Continue Lasix 40 mg BID and spironolactone 25 daily with high dose BB    Explained rationale for resuming ACE when able, but not there yet    Sees Kayce Manzo in [facility] 1/21    3. Cirrhosis    Last appt with Dr. Roque 9/2018 noted well-compensated cirrhosis likely based on NAFLD    Hepatic panels and CBCs as above    Dr. Roque gave go-ahead to use AC in setting of AFib with plans for ablation    PLAN:    Dr. Roque with plans to see with testing 3/2019    Will fwd CT 12/2018 results to Dr. Roque as FYI    Dr. Roque OK'd use of AC. Using warfarin given reversibility    4. Minimal CAD    Stress test 10/2018 showed a small reversible mid anterolateral wall defect, but could not rule out artifact given body habitus    Initially treated medically, but given drop in EF, underwent heart cath 12/26 showing minimal non-obstructive CAD    PLAN:    Continue BB. No ASA due to warfarin. Avoiding statin given liver disease    5. Pulmonary Nodules    CT 12/21 showed nodules as above    Briefly reviewed 2006 CT chest showing what appear to be similar abnormalities, with \"irregular nodular parenchymal abnormality near R major fissure measuring 8-9 mm\" and \"irregular pleural scarring and calcified benign nodule at right major fissure.\" 2006 CT was used to compare 12/2018 CT scan to, and noted multiple 4 mm nodules in subpleural location in lingula also noted (though I don't see in 2006 report) that are unchanged.    No nodules >6 mm    PLAN:    Will let " PCP know of CT scan results as FYI, but appear to be similar to findings in 2006      More than 50% of this 50 minute visit was spent in reviewing extensive hospital records and reviewing options for AFib treatment with pt.    Nadege Montilla PA-C, MSPAS      No orders of the defined types were placed in this encounter.    Orders Placed This Encounter   Medications     spironolactone (ALDACTONE) 25 MG tablet     Sig: Take 1 tablet (25 mg) by mouth daily     Refill:  0     Medications Discontinued During This Encounter   Medication Reason     spironolactone (ALDACTONE) 25 MG tablet      colchicine (MITIGARE) 0.6 MG capsule Therapy completed         Encounter Diagnosis   Name Primary?     Portal hypertension (H)        CURRENT MEDICATIONS:  Current Outpatient Medications   Medication Sig Dispense Refill     albuterol (PROAIR HFA) 108 (90 Base) MCG/ACT inhaler Inhale 2 puffs into the lungs       calcium carb 1250 mg, 500 mg [place],/vitamin D 200 units (OSCAL WITH D) 500-200 MG-UNIT per tablet Take 2 tablets by mouth daily with food.       digoxin (LANOXIN) 125 MCG tablet Take 1 tablet (125 mcg) by mouth daily 30 tablet 3     furosemide (LASIX) 40 MG tablet Take 1 tablet (40 mg) by mouth 2 times daily 60 tablet 0     metoprolol succinate ER (TOPROL-XL) 100 MG 24 hr tablet Take 1 tablet (100 mg) by mouth daily 90 tablet 3     mometasone-formoterol (DULERA) 200-5 MCG/ACT inhaler Inhale 2 puffs into the lungs 2 times daily as needed Appt DUE Jan 2017 1 Inhaler 1     pantoprazole (PROTONIX) 40 MG EC tablet Take 1 tablet (40 mg) by mouth daily 30 tablet 0     spironolactone (ALDACTONE) 25 MG tablet Take 1 tablet (25 mg) by mouth daily  0     warfarin (COUMADIN) 2.5 MG tablet 1.25 mg Fridays; 2.5mg all other days or As directed by Anticoagulation Clinic 90 tablet 0     order for DME Open toe size large 30/40 [facility] Medical Compression socks Model 79347.    Fax to Fax [phone]. [facility] (Patient not taking:  Reported on 1/8/2019) 12 Device 0     order for DME Equipment being ordered:   New CPAP mask, tube, filters,water tray (Patient not taking: Reported on 1/8/2019) 1 Device 3     order for DME Open toe size large 30/40 Med[employer] Assure Medical Compression socks Model 31035. (Patient not taking: Reported on 1/8/2019) 4 Package 3     ORDER FOR DME Respironics RemStar 60 Series Auto AFlex 12-15 cm H2O with heated humidty and a modem.  Pt chose a Quattro Air FFM size medium. (Patient not taking: Reported on 1/8/2019)       ORDER FOR DME Juzo compression stockings, 30-40mm compression. Patient has portal hypertension and develops leg edema, which these control well. (Patient not taking: Reported on 1/8/2019) 2 Package 3       ALLERGIES     Allergies   Allergen Reactions     Avelox Other (See Comments) and GI Disturbance     portal hypertension     Moxifloxacin Nausea and Vomiting, Nausea and Other (See Comments)     portal hypertension       PAST MEDICAL HISTORY:  Past Medical History:   Diagnosis Date     Acute pulmonary edema (H)      Atrial fibrillation (H)      Atrial fibrillation with rapid ventricular response (H) 5/13/2013     CAD (coronary artery disease)     Cath 12/26/18- mild nonobstructive disease     Calculus of ureter 1993, 1997    history of     Cardiomyopathy (H)     12/23/18 Echo- EF 25-30%     Chronic systolic CHF (congestive heart failure) (H)      Cirrhosis of liver without mention of alcohol 8/22/2005    Cirrhosis, idiopathic     Edema 5/13/2013     Esophageal varices with bleeding(456.0)      Gallstones      Genital herpes, unspecified 3/20/1999    resolving herpes progenitalis     GERD (gastroesophageal reflux disease)      Hematuria      Hypertension      Hypochondriasis     problems in past     Obesity 11/24/2010     BRUCE (obstructive sleep apnea) 03/17/2009    Mild AHI 8.4  RDI 31 - Pt refuses to wear his CPAP     Pericarditis      Portal hypertension (H) 1/28/2010     Unspecified thrombocytopenia  8/22/2005    Thrombocytopenia associated with cirrhosis and portal hypertension       PAST SURGICAL HISTORY:  Past Surgical History:   Procedure Laterality Date     ANESTHESIA CARDIOVERSION N/A 1/19/2015    Procedure: ANESTHESIA CARDIOVERSION;  Surgeon: Generic Anesthesia Provider;  Location: [facility] OR     COLONOSCOPY N/A 8/14/2017    Procedure: COLONOSCOPY;  Colonoscopy Called will arrive appx 12:30 Per phone call;  Surgeon: Vincent Whittington MD;  Location:  GI     CV HEART CATHETERIZATION WITH POSSIBLE INTERVENTION N/A 12/26/2018    Procedure: Heart Catheterization with possible Intervention;  Surgeon: Brandon Arroyo MD;  Location:  HEART CARDIAC CATH LAB     EP ABLATION FOCAL AFIB N/A 12/21/2018    Procedure: EP Ablation Focal AFIB;  Surgeon: Kristofer Evans MD;  Location:  HEART CARDIAC CATH LAB     ESOPHAGOSCOPY, GASTROSCOPY, DUODENOSCOPY (EGD), COMBINED  7/11/2011    Procedure:COMBINED ESOPHAGOSCOPY, GASTROSCOPY, DUODENOSCOPY (EGD); Surgeon:LAURA LAMAS; Location:[facility]     ESOPHAGOSCOPY, GASTROSCOPY, DUODENOSCOPY (EGD), COMBINED  9/10/2012    Procedure: COMBINED ESOPHAGOSCOPY, GASTROSCOPY, DUODENOSCOPY (EGD);;  Surgeon: Hi Roque MD;  Location:  GI     ESOPHAGOSCOPY, GASTROSCOPY, DUODENOSCOPY (EGD), COMBINED  9/9/2013    Procedure: COMBINED ESOPHAGOSCOPY, GASTROSCOPY, DUODENOSCOPY (EGD);;  Surgeon: Hi Roque MD;  Location:  GI     ESOPHAGOSCOPY, GASTROSCOPY, DUODENOSCOPY (EGD), COMBINED N/A 9/15/2014    Procedure: COMBINED ESOPHAGOSCOPY, GASTROSCOPY, DUODENOSCOPY (EGD);  Surgeon: iH Roque MD;  Location:  GI     ESOPHAGOSCOPY, GASTROSCOPY, DUODENOSCOPY (EGD), COMBINED N/A 10/30/2015    Procedure: COMBINED ESOPHAGOSCOPY, GASTROSCOPY, DUODENOSCOPY (EGD);  Surgeon: Feliberto Fox MD;  Location:  GI     ESOPHAGOSCOPY, GASTROSCOPY, DUODENOSCOPY (EGD), COMBINED N/A 10/10/2016    Procedure: COMBINED ESOPHAGOSCOPY, GASTROSCOPY, DUODENOSCOPY (EGD);  Surgeon: Jose Nesbitt  MD MEAGHAN;  Location: [facility]     H ABLATION FOCAL AFIB  2018     HERNIA REPAIR       IRRIGATION AND DEBRIDEMENT ABSCESS SCROTUM, COMBINED  10/4/2012    Procedure: COMBINED IRRIGATION AND DEBRIDEMENT ABSCESS SCROTUM;  Irrigation and Debridement of Groin Abscess;  Surgeon: Benny Shoemaker MD;  Location: [facility] OR     SOFT TISSUE SURGERY       SURGICAL HISTORY OF -       rt elbow     SURGICAL HISTORY OF -   1/15/98    repair of ventral and umbilical hernia     SURGICAL HISTORY OF -       endoscopy       FAMILY HISTORY:  Family History   Problem Relation Age of Onset     Lipids Mother      Hypertension Mother      Eye Disorder Mother      Heart Disease Father         CHF     Lipids Father      Obesity Father      Heart Disease Brother         MI     Eye Disorder Brother      Hypertension Brother         portal HTN     Thrombosis Sister         in her lung     Cancer Other         maternal grandparent/throat cancer       SOCIAL HISTORY:  Social History     Socioeconomic History     Marital status:      Spouse name: None     Number of children: None     Years of education: None     Highest education level: None   Social Needs     Financial resource strain: None     Food insecurity - worry: None     Food insecurity - inability: None     Transportation needs - medical: None     Transportation needs - non-medical: None   Occupational History     None   Tobacco Use     Smoking status: Former Smoker     Packs/day: 0.80     Years: 6.00     Pack years: 4.80     Types: Cigarettes     Last attempt to quit: 2002     Years since quittin.0     Smokeless tobacco: Never Used   Substance and Sexual Activity     Alcohol use: No     Alcohol/week: 0.0 oz     Comment: quit in      Drug use: No     Sexual activity: Yes     Partners: Female   Other Topics Concern     Parent/sibling w/ CABG, MI or angioplasty before 65F 55M? Yes     Comment: BROTHER   - MI AT AGE 44      Service Not Asked     Blood  Transfusions Not Asked     Caffeine Concern Not Asked     Occupational Exposure Not Asked     Hobby Hazards Not Asked     Sleep Concern Not Asked     Stress Concern Not Asked     Weight Concern Not Asked     Special Diet Not Asked     Back Care Not Asked     Exercise No     Bike Helmet Not Asked     Seat Belt Not Asked     Self-Exams Not Asked   Social History Narrative     None       Review of Systems:  Skin:  Negative     Eyes:  Negative    ENT:  Negative    Respiratory:  Positive for sleep apnea;CPAP;cough  Cardiovascular:    Positive for;lower extremity symptoms;edema;lightheadedness  Gastroenterology: Positive for excessive gas or bloating;heartburn  Genitourinary:  Positive for urinary frequency  Musculoskeletal:  Negative    Neurologic:  Positive for numbness or tingling of feet  Psychiatric:  Negative    Heme/Lymph/Imm:  Positive for    Endocrine:  Negative      Physical Exam:  Vitals: /55 (BP Location: Right arm, Patient Position: Sitting, Cuff Size: Adult Regular)   Pulse 93   Wt 106.7 kg (235 lb 3.2 oz)   SpO2 97%   BMI 38.54 kg/m       Constitutional:           Skin:           Head:           Eyes:           ENT:           Neck:           Chest:           Cardiac:                    Abdomen:           Vascular:                                        Extremities and Back:           Neurological:             Recent Lab Results:  LIPID RESULTS:  Lab Results   Component Value Date    CHOL 127 02/27/2013    HDL 42 05/19/2018    LDL 50 05/19/2018    TRIG 63 05/19/2018    CHOLHDLRATIO 2.2 02/27/2013       LIVER ENZYME RESULTS:  Lab Results   Component Value Date    AST 35 01/03/2019    ALT 39 01/03/2019       CBC RESULTS:  Lab Results   Component Value Date    WBC 4.0 01/03/2019    RBC 4.24 (L) 01/03/2019    HGB 14.3 01/03/2019    HCT 42.2 01/03/2019     01/03/2019    MCH 33.7 (H) 01/03/2019    MCHC 33.9 01/03/2019    RDW 13.9 01/03/2019     (L) 01/03/2019       BMP RESULTS:  Lab Results    Component Value Date     (L) 01/03/2019    POTASSIUM 4.1 01/03/2019    CHLORIDE 103 01/03/2019    CO2 27 01/03/2019    ANIONGAP 6.1 01/03/2019     01/03/2019    BUN 16 01/03/2019    CR 0.87 01/03/2019    GFRESTIMATED >90 01/03/2019    GFRESTBLACK >90 01/03/2019    HALEY 9.3 01/03/2019        A1C RESULTS:  No results found for: A1C    INR RESULTS:  Lab Results   Component Value Date    INR 2.1 (A) 01/04/2019    INR 1.94 (H) 01/02/2019    INR 2.02 (H) 12/31/2018               ank you for allowing me to participate in the care of your patient.      Sincerely,     Virginia Montilla PA-C     [facility]    cc:   No referring provider defined for this encounter.

## 2019-01-08 NOTE — PROGRESS NOTES
HPI:   I had the pleasure of meeting Yomi when he came for follow up of persistent atrial fibrillation s/p PVI, LA posterior wall ablation, CTI and DCCV 12/21/2018.  He sees Dr. Evans for his history of:    1. Persistent AFib, first diagnosed 2013, with unsuccessful attempts at maintaining SR leading to persistent atrial fibrillation. He noted excessive fatigue, which Dr. Evans was unsure was related to metoprolol therapy vs atrial fibrillation itself and attempted rhythm control. Loaded with sotalol and underwent DCCV 10/31/2018, which maintained SR until 12/3. Developed terrible pruritis with sotalol, which resolved after this was discontinued. Underwent PVI 12/21 (full details below) complicated by pericarditis and biventricular heart failure, with EF down to 25-30% (previously normal 9/2017). Remained in SR until 1/3/19.    2. NAFLD with cirrhosis followed by Dr. Roque at [facility].  Complicated by portal vein thrombosis, portal hypertension, thrombocytopenia (though 118K most recently 1/3), splenomegaly, splenic vein thrombosis and known esophageal varices (Upper GI 10/2016)    3.  New onset BiV Heart Failure with EF down to 25-30% and moderate reduction in RV function and moderate-severe TR. Angiogram 12/26 with mild nonobstructive disease and mildly increased LV filling pressure (wedge 18) and moderate-severely increased RV filling pressure (RAP 14).    4. Pulmonary nodules noted on CTA Heart 12/21/2018 (multiple nodules in lingula, measuring 6 mm). Multiple 4 mm nodules in subpleural location in lingula (unchanged c/w 2006). RLL nodule 3 mm    I saw Yomi today in follow up for atrial fibrillation. As above, he was admitted 12/21 for elective AFib ablation. Dr. Evans noted a tremendous amount of scar in the left atrium. The entire LA had very low/absent voltage except on the floor and anterior aspect of the mitral annulus. No potentials were noted in the inferior PVs, with a few potentials noted in both  superior PVs. He performed PV isolation of the superior PVs, and due to significant scarring, isolation of the LA posterior wall by linear ablations at the roof of the LA connecting the superior PVcs and inferior PVCs. He also performed empiric CTI. He required DCCV at that time.     Following the procedure, Dr. Evans considered initiating antiarrhythmic therapy with amiodarone (for a short time, given his cirrhosis), but after one dose, AST increased and therefore he was to be discharged on his PTA dose of metoprolol  mg daily. INR was therapeutic at 2.35 on 12/21.    Before discharge on 12/22, he described chest discomfort. This improved some with colchicine, but recurred, along with complaints of respiratory distress and low grade fever. Echo was done to look for pericardial effusion 12/23, which surprisingly showed LVEF down to 25-30% and moderate RV systolic dysfunction with moderate-severe RV dilation.     He was diuresed.  Dr. Farah saw him and recommended R/L heart catheterization after his INR was acceptable. Given recent ablation, he was started on heparin once INR <2.0.  R/L heart cath done 12/26 done via the R radial artery and R IJ, showed minimal nonobstructive CAD.  Mildly elevated left heart filling pressure (wedge 18) and moderate-severe elevation of right heart filling pressure (mean RA 14 mmHg).    He was treated for biventricular heart failure. He was discharged 12/27 on:    1. Lasix 40 mg BID (increased from PTA dose 20 mg every day)  2. Aldacatone 25 mg BID (increased from PTA dose 25 mg daily)   3. Lisinopril 2.5 mg (new)  4. Toprol  mg every day  5. Warfarin, with Lovenox 120 mg q12 hours for bridging. INR was 2.02 on 12/31, and Lovenox was DCd 1/1. INR dropped to 1.94 on 1/2 and was back to 2.1 on 1/4.  6. Colchicine 0.6 mg BID x 7 days    He remained in sinus rhythm throughout his hospitalization.    He saw IRMA Sullivan in [facility] clinic on 1/3. Unfortunately, he was back in what  "looked like atypical atrial flutter @ 105 bpm, which he believed had started just 5 hours prior to his appointment. He noted increased dyspnea. Kayce noted that from a heart failure standpoint, he was doing OK, with a drop in weight. Given that he required more rate controlling medication but blood pressure was in the 90s, Kayce added an extra Toprol XL 25 mg, decreased spironolactone back to 25 mg daily and stopped lisinopril. He was continued on Lasix 40 mg BID. She also recommended to colchicine x 3 months. As his AFib had just recurred 5 hours prior to his appointment, we recommended an EKG the following day.    EKG on 1/4 continued to show atypical atrial flutter @ 107 bpm.  We added digoxin 0.125 mg daily for tighter rate control.    Yomi tells me that he feels \"better\" and that his breathing is easier than when he saw Kayce 1/3.  He was surprised to find out that he was still atypical atrial flutter. Heart rate improved to 96 bpm.     He denies edema, orthopnea or PND. He's using his CPAP. Weight was stable at 235 #. No complaints of chest pain or groin site discomfort.  No right radial artery discomfort or R IJ discomfort.  He denies fever/chills or trouble swallowing. Denies palpitations, lightheadedness or dizziness.  Still with some MENARD with the recurrent arrhyhtmia, though improved with heart rate control.    EKG today, which I overread, showed atypical atrial flutter at 96 bpm. 2 PVCs were noted.   R/L heart cath done 12/26 done showed <10% lesions. RA pressure (mean) 14 mmHg, RVP 36/8/13. PAP 38/20/26. PCWP mean 18. CO 8.5 L/min. Cardiac Index 3.95 L/min/m2. LVEDP 15 mmHg  Echo 12/23 showed EF 25-30% with moderate-severe global hypokinesis.  RV not well visualized but likely moderate to severely dilated with moderately reduced RVSF.  RVSP 17 mmHg+ RAP. 2-3 + TR. Severely dilated RA.   CT Heart 12/21/2018 showed scattered mediastinal and right hilar lymph nodes. Calcifications scattered throughout the " hilar lymph nodes bilaterally. No pleural  effusion. Liver hypodensities are only partially visualized and evaluated. Etiologies are vast and broad and essentially nothing excluded. One of the collections is negative 74 Hounsfield units suggestive of fat within this collection measuring up to 2.7 cm in length. Recommend dedicated CT exam of the liver. A dominant vein, which appears to communicate to the pulmonary venous system is noted at the medial aspect of the left hemithorax. Numerous serpiginous areas of hypodensity noted surrounding the distal esophagus and near the spleen. These were present on previous CT exam on April 6, 2006.  They may be dilated venous branches and varices. Recommend dedicated CT exam of the abdomen and pelvis. While etiology is uncertain, they were present on previous exam.     No acute osseous abnormality.   Nodules in the lingula are multiple, larger of which is image 3 series 8 measuring 6 mm. On image 2 series  8, smaller nodule adjacent to the major fissure. Multiple 4 mm nodules in a subpleural location in the lingula also noted, though unchanged from previous exam. Right lower lobe nodule is visible laterally measuring 3 mm on image 42 series 8. No pulmonary masses.    Componentconitnue       Latest Ref Rng & Units 5/10/2018 9/20/2018 12/22/2018 1/3/2019   Bilirubin Total      0.2 - 1.3 mg/dL 1.0 1.9 (H) 1.3 1.2   Albumin      3.4 - 5.0 g/dL 3.1 (L) 3.2 (L) 2.9 (L) 3.3 (L)   Protein Total      6.8 - 8.8 g/dL 6.0 (L) 6.0 (L) 5.7 (L) 6.7 (L)   Alkaline Phosphatase      40 - 150 U/L 82 74 73 85   ALT      0 - 70 U/L 51 44 45 39   AST      0 - 45 U/L 49 (H) 46 (H) 70 (H) 35   Bilirubin Direct      0.0 - 0.2 mg/dL  0.6 (H) 0.5 (H) 0.5 (H)     Component      Latest Ref Rng & Units 9/20/2018 12/21/2018 12/27/2018   WBC      4.0 - 11.0 10e9/L 2.8 (L) 4.0 4.1   RBC Count      4.4 - 5.9 10e12/L 4.18 (L) 3.76 (L) 3.68 (L)   Hemoglobin      13.3 - 17.7 g/dL 14.6 13.0 (L) 12.7 (L)    Hematocrit      40.0 - 53.0 % 42.9 37.5 (L) 37.0 (L)   MCV      78 - 100 fl 103 (H) 100 101 (H)   MCH      26.5 - 33.0 pg 34.9 (H) 34.6 (H) 34.5 (H)   MCHC      31.5 - 36.5 g/dL 34.0 34.7 34.3   RDW      10.0 - 15.0 % 13.3 13.8 14.0   Platelet Count      150 - 450 10e9/L 62 (L) 63 (L) 85 (L)     Assessment & Plan:    1. Recurrent atrial arrhythmias    AFib ablation as above. Significant LA scarring noted with low chance of long-term success.     AAD therapy limited given CM (no Class Ic), ineffectiveness of sotalol (and c/o pruritis) and liver disease with increase in AST following administration of Amiodarone x 1 dose after PVI 12/21     Had been in SR from 12/21-1/3, when EKG and symptoms correlated showed Atypical Atrial Flutter 1/3 and 1/4. Rate in low 100s; therapy limited by hypotension (See Natalia Manzo' note 1/3)    Tolerating digoxin and feels better though still in atypical atrial flutter with more controlled HR 90s    Remains on warfarin. INR 1/4 therapeutic. Stopped Lovenox 1/1    PLAN:    Discussed with Dr. Evans. Options limited for AAD and Yomi recognizes these limitations    Discussed options, including another attempt at DCCV (Dr. Evans to do) vs AVN ablation and attempt at BiV PPM.    Pt would like to discuss with his wife Violet and meet with me next week as originally scheduled to make final decision. Will move my appt to 2PM to improve his work schedule       Continue warfarin. INR planned for 1/15. As above, noted INR dropped to 1.94 when not on Lovenox (1/2), but had been >2.0 on 12/31 and 1/4    Continue Toprol  mg and digoxin 0.125 mg daily      2. BiVentricular HF    Echo 9/2017 showed normal LVEF and R sided function. Echo repeated due to c/o CP following ablation 12/21 showed EF down to 25-30% with moderate-severe dilation of RV and moderate reduction of RVSF.    Left hospital 12/27 on Lasix 40 mg BID (increased from PTA dose 20 daily), Aldactone 25 mg BID (increased from PTA dose  "25 mg daily), lisinopril 2.5 daily (new) and continued high BB dose    Decreased Aldactone and held lisinopril to allow for more BP room with tighter rate control     Weight is stable, edema \"best it's been\" and no change in abdominal fullness    PLAN:    Continue Lasix 40 mg BID and spironolactone 25 daily with high dose BB    Explained rationale for resuming ACE when able, but not there yet    Sees Kayce Manzo in [facility] 1/21    3. Cirrhosis    Last appt with Dr. Roque 9/2018 noted well-compensated cirrhosis likely based on NAFLD    Hepatic panels and CBCs as above    Dr. Roque gave go-ahead to use AC in setting of AFib with plans for ablation    PLAN:    Dr. Roque with plans to see with testing 3/2019    Will fwd CT 12/2018 results to Dr. Roque as FYI    Dr. Roque OK'd use of AC. Using warfarin given reversibility    4. Minimal CAD    Stress test 10/2018 showed a small reversible mid anterolateral wall defect, but could not rule out artifact given body habitus    Initially treated medically, but given drop in EF, underwent heart cath 12/26 showing minimal non-obstructive CAD    PLAN:    Continue BB. No ASA due to warfarin. Avoiding statin given liver disease    5. Pulmonary Nodules    CT 12/21 showed nodules as above    Briefly reviewed 2006 CT chest showing what appear to be similar abnormalities, with \"irregular nodular parenchymal abnormality near R major fissure measuring 8-9 mm\" and \"irregular pleural scarring and calcified benign nodule at right major fissure.\" 2006 CT was used to compare 12/2018 CT scan to, and noted multiple 4 mm nodules in subpleural location in lingula also noted (though I don't see in 2006 report) that are unchanged.    No nodules >6 mm    PLAN:    Will let PCP know of CT scan results as FYI, but appear to be similar to findings in 2006      More than 50% of this 50 minute visit was spent in reviewing extensive hospital records and reviewing options for AFib treatment with pt.    Nadege Montilla, " LOUISA, MSPAS      No orders of the defined types were placed in this encounter.    Orders Placed This Encounter   Medications     spironolactone (ALDACTONE) 25 MG tablet     Sig: Take 1 tablet (25 mg) by mouth daily     Refill:  0     Medications Discontinued During This Encounter   Medication Reason     spironolactone (ALDACTONE) 25 MG tablet      colchicine (MITIGARE) 0.6 MG capsule Therapy completed         Encounter Diagnosis   Name Primary?     Portal hypertension (H)        CURRENT MEDICATIONS:  Current Outpatient Medications   Medication Sig Dispense Refill     albuterol (PROAIR HFA) 108 (90 Base) MCG/ACT inhaler Inhale 2 puffs into the lungs       calcium carb 1250 mg, 500 mg [place],/vitamin D 200 units (OSCAL WITH D) 500-200 MG-UNIT per tablet Take 2 tablets by mouth daily with food.       digoxin (LANOXIN) 125 MCG tablet Take 1 tablet (125 mcg) by mouth daily 30 tablet 3     furosemide (LASIX) 40 MG tablet Take 1 tablet (40 mg) by mouth 2 times daily 60 tablet 0     metoprolol succinate ER (TOPROL-XL) 100 MG 24 hr tablet Take 1 tablet (100 mg) by mouth daily 90 tablet 3     mometasone-formoterol (DULERA) 200-5 MCG/ACT inhaler Inhale 2 puffs into the lungs 2 times daily as needed Appt DUE Jan 2017 1 Inhaler 1     pantoprazole (PROTONIX) 40 MG EC tablet Take 1 tablet (40 mg) by mouth daily 30 tablet 0     spironolactone (ALDACTONE) 25 MG tablet Take 1 tablet (25 mg) by mouth daily  0     warfarin (COUMADIN) 2.5 MG tablet 1.25 mg Fridays; 2.5mg all other days or As directed by Anticoagulation Clinic 90 tablet 0     order for DME Open toe size large 30/40 Yatedo Compression socks Model [id number].    Fax to Fax [phone]. [facility] (Patient not taking: Reported on 1/8/2019) 12 Device 0     order for DME Equipment being ordered:   New CPAP mask, tube, filters,water tray (Patient not taking: Reported on 1/8/2019) 1 Device 3     order for DME Open toe size large 30/40 Yatedo  Compression socks Model 97760. (Patient not taking: Reported on 1/8/2019) 4 Package 3     ORDER FOR DME Respironics RemStar 60 Series Auto AFlex 12-15 cm H2O with heated humidty and a modem.  Pt chose a Quattro Air FFM size medium. (Patient not taking: Reported on 1/8/2019)       ORDER FOR DME Juzo compression stockings, 30-40mm compression. Patient has portal hypertension and develops leg edema, which these control well. (Patient not taking: Reported on 1/8/2019) 2 Package 3       ALLERGIES     Allergies   Allergen Reactions     Avelox Other (See Comments) and GI Disturbance     portal hypertension     Moxifloxacin Nausea and Vomiting, Nausea and Other (See Comments)     portal hypertension       PAST MEDICAL HISTORY:  Past Medical History:   Diagnosis Date     Acute pulmonary edema (H)      Atrial fibrillation (H)      Atrial fibrillation with rapid ventricular response (H) 5/13/2013     CAD (coronary artery disease)     Cath 12/26/18- mild nonobstructive disease     Calculus of ureter 1993, 1997    history of     Cardiomyopathy (H)     12/23/18 Echo- EF 25-30%     Chronic systolic CHF (congestive heart failure) (H)      Cirrhosis of liver without mention of alcohol 8/22/2005    Cirrhosis, idiopathic     Edema 5/13/2013     Esophageal varices with bleeding(456.0)      Gallstones      Genital herpes, unspecified 3/20/1999    resolving herpes progenitalis     GERD (gastroesophageal reflux disease)      Hematuria      Hypertension      Hypochondriasis     problems in past     Obesity 11/24/2010     BRUCE (obstructive sleep apnea) 03/17/2009    Mild AHI 8.4  RDI 31 - Pt refuses to wear his CPAP     Pericarditis      Portal hypertension (H) 1/28/2010     Unspecified thrombocytopenia 8/22/2005    Thrombocytopenia associated with cirrhosis and portal hypertension       PAST SURGICAL HISTORY:  Past Surgical History:   Procedure Laterality Date     ANESTHESIA CARDIOVERSION N/A 1/19/2015    Procedure: ANESTHESIA  CARDIOVERSION;  Surgeon: Generic Anesthesia Provider;  Location: [facility]     COLONOSCOPY N/A 8/14/2017    Procedure: COLONOSCOPY;  Colonoscopy Called will arrive appx 12:30 Per phone call;  Surgeon: Vincent Whittington MD;  Location:  GI     CV HEART CATHETERIZATION WITH POSSIBLE INTERVENTION N/A 12/26/2018    Procedure: Heart Catheterization with possible Intervention;  Surgeon: Brandon Arroyo MD;  Location:  HEART CARDIAC CATH LAB     EP ABLATION FOCAL AFIB N/A 12/21/2018    Procedure: EP Ablation Focal AFIB;  Surgeon: Kristofer Evans MD;  Location: [facility] CARDIAC CATH LAB     ESOPHAGOSCOPY, GASTROSCOPY, DUODENOSCOPY (EGD), COMBINED  7/11/2011    Procedure:COMBINED ESOPHAGOSCOPY, GASTROSCOPY, DUODENOSCOPY (EGD); Surgeon:LAURA LAMAS; Location:[facility] GI     ESOPHAGOSCOPY, GASTROSCOPY, DUODENOSCOPY (EGD), COMBINED  9/10/2012    Procedure: COMBINED ESOPHAGOSCOPY, GASTROSCOPY, DUODENOSCOPY (EGD);;  Surgeon: Hi Roque MD;  Location:  GI     ESOPHAGOSCOPY, GASTROSCOPY, DUODENOSCOPY (EGD), COMBINED  9/9/2013    Procedure: COMBINED ESOPHAGOSCOPY, GASTROSCOPY, DUODENOSCOPY (EGD);;  Surgeon: Hi Roque MD;  Location:  GI     ESOPHAGOSCOPY, GASTROSCOPY, DUODENOSCOPY (EGD), COMBINED N/A 9/15/2014    Procedure: COMBINED ESOPHAGOSCOPY, GASTROSCOPY, DUODENOSCOPY (EGD);  Surgeon: Hi Roque MD;  Location:  GI     ESOPHAGOSCOPY, GASTROSCOPY, DUODENOSCOPY (EGD), COMBINED N/A 10/30/2015    Procedure: COMBINED ESOPHAGOSCOPY, GASTROSCOPY, DUODENOSCOPY (EGD);  Surgeon: Feliberto Fox MD;  Location:  GI     ESOPHAGOSCOPY, GASTROSCOPY, DUODENOSCOPY (EGD), COMBINED N/A 10/10/2016    Procedure: COMBINED ESOPHAGOSCOPY, GASTROSCOPY, DUODENOSCOPY (EGD);  Surgeon: Jose Nesbitt MD;  Location:  GI     H ABLATION FOCAL AFIB  12/21/2018     HERNIA REPAIR       IRRIGATION AND DEBRIDEMENT ABSCESS SCROTUM, COMBINED  10/4/2012    Procedure: COMBINED IRRIGATION AND DEBRIDEMENT ABSCESS SCROTUM;  Irrigation  and Debridement of Groin Abscess;  Surgeon: Benny Shoemaker MD;  Location: [facility] OR     SOFT TISSUE SURGERY       SURGICAL HISTORY OF -       rt elbow     SURGICAL HISTORY OF -   1/15/98    repair of ventral and umbilical hernia     SURGICAL HISTORY OF -       endoscopy       FAMILY HISTORY:  Family History   Problem Relation Age of Onset     Lipids Mother      Hypertension Mother      Eye Disorder Mother      Heart Disease Father         CHF     Lipids Father      Obesity Father      Heart Disease Brother         MI     Eye Disorder Brother      Hypertension Brother         portal HTN     Thrombosis Sister         in her lung     Cancer Other         maternal grandparent/throat cancer       SOCIAL HISTORY:  Social History     Socioeconomic History     Marital status:      Spouse name: None     Number of children: None     Years of education: None     Highest education level: None   Social Needs     Financial resource strain: None     Food insecurity - worry: None     Food insecurity - inability: None     Transportation needs - medical: None     Transportation needs - non-medical: None   Occupational History     None   Tobacco Use     Smoking status: Former Smoker     Packs/day: 0.80     Years: 6.00     Pack years: 4.80     Types: Cigarettes     Last attempt to quit: 2002     Years since quittin.0     Smokeless tobacco: Never Used   Substance and Sexual Activity     Alcohol use: No     Alcohol/week: 0.0 oz     Comment: quit in      Drug use: No     Sexual activity: Yes     Partners: Female   Other Topics Concern     Parent/sibling w/ CABG, MI or angioplasty before 65F 55M? Yes     Comment: BROTHER   - MI AT AGE 44      Service Not Asked     Blood Transfusions Not Asked     Caffeine Concern Not Asked     Occupational Exposure Not Asked     Hobby Hazards Not Asked     Sleep Concern Not Asked     Stress Concern Not Asked     Weight Concern Not Asked     Special Diet Not Asked      Back Care Not Asked     Exercise No     Bike Helmet Not Asked     Seat Belt Not Asked     Self-Exams Not Asked   Social History Narrative     None       Review of Systems:  Skin:  Negative     Eyes:  Negative    ENT:  Negative    Respiratory:  Positive for sleep apnea;CPAP;cough  Cardiovascular:    Positive for;lower extremity symptoms;edema;lightheadedness  Gastroenterology: Positive for excessive gas or bloating;heartburn  Genitourinary:  Positive for urinary frequency  Musculoskeletal:  Negative    Neurologic:  Positive for numbness or tingling of feet  Psychiatric:  Negative    Heme/Lymph/Imm:  Positive for    Endocrine:  Negative      Physical Exam:  Vitals: /55 (BP Location: Right arm, Patient Position: Sitting, Cuff Size: Adult Regular)   Pulse 93   Wt 106.7 kg (235 lb 3.2 oz)   SpO2 97%   BMI 38.54 kg/m      Constitutional:           Skin:           Head:           Eyes:           ENT:           Neck:           Chest:           Cardiac:                    Abdomen:           Vascular:                                        Extremities and Back:           Neurological:             Recent Lab Results:  LIPID RESULTS:  Lab Results   Component Value Date    CHOL 127 02/27/2013    HDL 42 05/19/2018    LDL 50 05/19/2018    TRIG 63 05/19/2018    CHOLHDLRATIO 2.2 02/27/2013       LIVER ENZYME RESULTS:  Lab Results   Component Value Date    AST 35 01/03/2019    ALT 39 01/03/2019       CBC RESULTS:  Lab Results   Component Value Date    WBC 4.0 01/03/2019    RBC 4.24 (L) 01/03/2019    HGB 14.3 01/03/2019    HCT 42.2 01/03/2019     01/03/2019    MCH 33.7 (H) 01/03/2019    MCHC 33.9 01/03/2019    RDW 13.9 01/03/2019     (L) 01/03/2019       BMP RESULTS:  Lab Results   Component Value Date     (L) 01/03/2019    POTASSIUM 4.1 01/03/2019    CHLORIDE 103 01/03/2019    CO2 27 01/03/2019    ANIONGAP 6.1 01/03/2019     01/03/2019    BUN 16 01/03/2019    CR 0.87 01/03/2019    GFRESTIMATED  >90 01/03/2019    GFRESTBLACK >90 01/03/2019    HALEY 9.3 01/03/2019        A1C RESULTS:  No results found for: A1C    INR RESULTS:  Lab Results   Component Value Date    INR 2.1 (A) 01/04/2019    INR 1.94 (H) 01/02/2019    INR 2.02 (H) 12/31/2018

## 2019-01-08 NOTE — PATIENT INSTRUCTIONS
"[facility]  [facility]~[address]~[place], [place]~[address]  Thank you for your [facility] visit today. If you have questions regarding your visit, please contact your cardiology RN's, Esme Hernandes or Melany Ewing, at [phone]. Your provider has recommended the following:    Medication Changes:  NO changes today    Recommendations:  EKG today continued to show atrial fibrillation but heart rate is a bit better on the combo of Metoprolol XL and digoxin.  Discussed cardioversion to see if Dr. Evans can get you back into normal rhythm for awhile OR AV Node ablation with Pacemaker implantation. Both would be done at [facility]  My nurses in [place] (Liat/Pat: [phone])    Follow-up:  See me next week for follow up as previously arranged but at 2 PM instead. CALL if you and Violet decide to go ahead with the cardioversion or AVN ablation/pacemaker before you see me, other wise we'll talk about it again next week.  [phone]    To schedule a future appointment, we kindly ask that you call cardiology scheduling at [phone] three months prior to requested revisit date.  [facility] cardiology clinic is staffed with \"Advance Practice Providers\". These are our cardiology Physician Assistants and Nurse Practitioners. Please call cardiology scheduling if you feel you need clinical evaluation with them at any time for any cardiac reason.                 Reminder:    For your safety, we ask that you bring in your current medication(s) or an updated list of your medications with you to EACH office visit. Include the medication name, dose of pill on bottle and how you are taking it. Include over-the-counter medications or supplements. Your provider will review this at each visit and plan your care based on your current information.       "

## 2019-01-08 NOTE — LETTER
1/8/2019    Kailey Ac, NP, APRN CNP  [address] [facility] [address]    RE: Maurice Jon       Dear Colleague,    I had the pleasure of seeing Maurice Jon in the [facility].        HPI:   I had the pleasure of meeting Yomi when he came for follow up of persistent atrial fibrillation s/p PVI, LA posterior wall ablation, CTI and DCCV 12/21/2018.  He sees Dr. Evans for his history of:    1. Persistent AFib, first diagnosed 2013, with unsuccessful attempts at maintaining SR leading to persistent atrial fibrillation. He noted excessive fatigue, which Dr. Evans was unsure was related to metoprolol therapy vs atrial fibrillation itself and attempted rhythm control. Loaded with sotalol and underwent DCCV 10/31/2018, which maintained SR until 12/3. Developed terrible pruritis with sotalol, which resolved after this was discontinued. Underwent PVI 12/21 (full details below) complicated by pericarditis and biventricular heart failure, with EF down to 25-30% (previously normal 9/2017). Remained in SR until 1/3/19.    2. NAFLD with cirrhosis followed by Dr. Roque at [facility].  Complicated by portal vein thrombosis, portal hypertension, thrombocytopenia (though 118K most recently 1/3), splenomegaly, splenic vein thrombosis and known esophageal varices (Upper GI 10/2016)    3.  New onset BiV Heart Failure with EF down to 25-30% and moderate reduction in RV function and moderate-severe TR. Angiogram 12/26 with mild nonobstructive disease and mildly increased LV filling pressure (wedge 18) and moderate-severely increased RV filling pressure (RAP 14).    4. Pulmonary nodules noted on CTA Heart 12/21/2018 (multiple nodules in lingula, measuring 6 mm). Multiple 4 mm nodules in subpleural location in lingula (unchanged c/w 2006). RLL nodule 3 mm    I saw Yomi today in follow up for atrial fibrillation. As above, he was admitted 12/21 for elective AFib ablation. Dr. Evans noted a  tremendous amount of scar in the left atrium. The entire LA had very low/absent voltage except on the floor and anterior aspect of the mitral annulus. No potentials were noted in the inferior PVs, with a few potentials noted in both superior PVs. He performed PV isolation of the superior PVs, and due to significant scarring, isolation of the LA posterior wall by linear ablations at the roof of the LA connecting the superior PVcs and inferior PVCs. He also performed empiric CTI. He required DCCV at that time.     Following the procedure, Dr. Evans considered initiating antiarrhythmic therapy with amiodarone (for a short time, given his cirrhosis), but after one dose, AST increased and therefore he was to be discharged on his PTA dose of metoprolol  mg daily. INR was therapeutic at 2.35 on 12/21.    Before discharge on 12/22, he described chest discomfort. This improved some with colchicine, but recurred, along with complaints of respiratory distress and low grade fever. Echo was done to look for pericardial effusion 12/23, which surprisingly showed LVEF down to 25-30% and moderate RV systolic dysfunction with moderate-severe RV dilation.     He was diuresed.  Dr. Farah saw him and recommended R/L heart catheterization after his INR was acceptable. Given recent ablation, he was started on heparin once INR <2.0.  R/L heart cath done 12/26 done via the R radial artery and R IJ, showed minimal nonobstructive CAD.  Mildly elevated left heart filling pressure (wedge 18) and moderate-severe elevation of right heart filling pressure (mean RA 14 mmHg).    He was treated for biventricular heart failure. He was discharged 12/27 on:    1. Lasix 40 mg BID (increased from PTA dose 20 mg every day)  2. Aldacatone 25 mg BID (increased from PTA dose 25 mg daily)   3. Lisinopril 2.5 mg (new)  4. Toprol  mg every day  5. Warfarin, with Lovenox 120 mg q12 hours for bridging. INR was 2.02 on 12/31, and Lovenox was DCd 1/1. INR  "dropped to 1.94 on 1/2 and was back to 2.1 on 1/4.  6. Colchicine 0.6 mg BID x 7 days    He remained in sinus rhythm throughout his hospitalization.    He saw IRMA Sullivan in [facility] on 1/3. Unfortunately, he was back in what looked like atypical atrial flutter @ 105 bpm, which he believed had started just 5 hours prior to his appointment. He noted increased dyspnea. Kayce noted that from a heart failure standpoint, he was doing OK, with a drop in weight. Given that he required more rate controlling medication but blood pressure was in the 90s, Kayce added an extra Toprol XL 25 mg, decreased spironolactone back to 25 mg daily and stopped lisinopril. He was continued on Lasix 40 mg BID. She also recommended to colchicine x 3 months. As his AFib had just recurred 5 hours prior to his appointment, we recommended an EKG the following day.    EKG on 1/4 continued to show atypical atrial flutter @ 107 bpm.  We added digoxin 0.125 mg daily for tighter rate control.    Yomi tells me that he feels \"better\" and that his breathing is easier than when he saw Kayce 1/3.  He was surprised to find out that he was still atypical atrial flutter. Heart rate improved to 96 bpm.     He denies edema, orthopnea or PND. He's using his CPAP. Weight was stable at 235 #. No complaints of chest pain or groin site discomfort.  No right radial artery discomfort or R IJ discomfort.  He denies fever/chills or trouble swallowing. Denies palpitations, lightheadedness or dizziness.  Still with some MENARD with the recurrent arrhyhtmia, though improved with heart rate control.    EKG today, which I overread, showed atypical atrial flutter at 96 bpm. 2 PVCs were noted.   R/L heart cath done 12/26 done showed <10% lesions. RA pressure (mean) 14 mmHg, RVP 36/8/13. PAP 38/20/26. PCWP mean 18. CO 8.5 L/min. Cardiac Index 3.95 L/min/m2. LVEDP 15 mmHg  Echo 12/23 showed EF 25-30% with moderate-severe global hypokinesis.  RV not well visualized but " likely moderate to severely dilated with moderately reduced RVSF.  RVSP 17 mmHg+ RAP. 2-3 + TR. Severely dilated RA.   CT Heart 12/21/2018 showed scattered mediastinal and right hilar lymph nodes. Calcifications scattered throughout the hilar lymph nodes bilaterally. No pleural  effusion. Liver hypodensities are only partially visualized and evaluated. Etiologies are vast and broad and essentially nothing excluded. One of the collections is negative 74 Hounsfield units suggestive of fat within this collection measuring up to 2.7 cm in length. Recommend dedicated CT exam of the liver. A dominant vein, which appears to communicate to the pulmonary venous system is noted at the medial aspect of the left hemithorax. Numerous serpiginous areas of hypodensity noted surrounding the distal esophagus and near the spleen. These were present on previous CT exam on April 6, 2006.  They may be dilated venous branches and varices. Recommend dedicated CT exam of the abdomen and pelvis. While etiology is uncertain, they were present on previous exam.     No acute osseous abnormality.   Nodules in the lingula are multiple, larger of which is image 3 series 8 measuring 6 mm. On image 2 series  8, smaller nodule adjacent to the major fissure. Multiple 4 mm nodules in a subpleural location in the lingula also noted, though unchanged from previous exam. Right lower lobe nodule is visible laterally measuring 3 mm on image 42 series 8. No pulmonary masses.    Componentconitnue       Latest Ref Rng & Units 5/10/2018 9/20/2018 12/22/2018 1/3/2019   Bilirubin Total      0.2 - 1.3 mg/dL 1.0 1.9 (H) 1.3 1.2   Albumin      3.4 - 5.0 g/dL 3.1 (L) 3.2 (L) 2.9 (L) 3.3 (L)   Protein Total      6.8 - 8.8 g/dL 6.0 (L) 6.0 (L) 5.7 (L) 6.7 (L)   Alkaline Phosphatase      40 - 150 U/L 82 74 73 85   ALT      0 - 70 U/L 51 44 45 39   AST      0 - 45 U/L 49 (H) 46 (H) 70 (H) 35   Bilirubin Direct      0.0 - 0.2 mg/dL  0.6 (H) 0.5 (H) 0.5 (H)      Component      Latest Ref Rng & Units 9/20/2018 12/21/2018 12/27/2018   WBC      4.0 - 11.0 10e9/L 2.8 (L) 4.0 4.1   RBC Count      4.4 - 5.9 10e12/L 4.18 (L) 3.76 (L) 3.68 (L)   Hemoglobin      13.3 - 17.7 g/dL 14.6 13.0 (L) 12.7 (L)   Hematocrit      40.0 - 53.0 % 42.9 37.5 (L) 37.0 (L)   MCV      78 - 100 fl 103 (H) 100 101 (H)   MCH      26.5 - 33.0 pg 34.9 (H) 34.6 (H) 34.5 (H)   MCHC      31.5 - 36.5 g/dL 34.0 34.7 34.3   RDW      10.0 - 15.0 % 13.3 13.8 14.0   Platelet Count      150 - 450 10e9/L 62 (L) 63 (L) 85 (L)     Assessment & Plan:    1. Recurrent atrial arrhythmias    AFib ablation as above. Significant LA scarring noted with low chance of long-term success.     AAD therapy limited given CM (no Class Ic), ineffectiveness of sotalol (and c/o pruritis) and liver disease with increase in AST following administration of Amiodarone x 1 dose after PVI 12/21     Had been in SR from 12/21-1/3, when EKG and symptoms correlated showed Atypical Atrial Flutter 1/3 and 1/4. Rate in low 100s; therapy limited by hypotension (See Natalia Manzo' note 1/3)    Tolerating digoxin and feels better though still in atypical atrial flutter with more controlled HR 90s    Remains on warfarin. INR 1/4 therapeutic. Stopped Lovenox 1/1    PLAN:    Discussed with Dr. Evans. Options limited for AAD and Yomi recognizes these limitations    Discussed options, including another attempt at DCCV (Dr. Evans to do) vs AVN ablation and attempt at BiV PPM.    Pt would like to discuss with his wife Violet and meet with me next week as originally scheduled to make final decision. Will move my appt to 2PM to improve his work schedule       Continue warfarin. INR planned for 1/15. As above, noted INR dropped to 1.94 when not on Lovenox (1/2), but had been >2.0 on 12/31 and 1/4    Continue Toprol  mg and digoxin 0.125 mg daily      2. BiVentricular HF    Echo 9/2017 showed normal LVEF and R sided function. Echo repeated due to c/o CP  "following ablation 12/21 showed EF down to 25-30% with moderate-severe dilation of RV and moderate reduction of RVSF.    Left hospital 12/27 on Lasix 40 mg BID (increased from PTA dose 20 daily), Aldactone 25 mg BID (increased from PTA dose 25 mg daily), lisinopril 2.5 daily (new) and continued high BB dose    Decreased Aldactone and held lisinopril to allow for more BP room with tighter rate control     Weight is stable, edema \"best it's been\" and no change in abdominal fullness    PLAN:    Continue Lasix 40 mg BID and spironolactone 25 daily with high dose BB    Explained rationale for resuming ACE when able, but not there yet    Sees Kayce Manzo in [facility] 1/21    3. Cirrhosis    Last appt with Dr. Roque 9/2018 noted well-compensated cirrhosis likely based on NAFLD    Hepatic panels and CBCs as above    Dr. Roque gave go-ahead to use AC in setting of AFib with plans for ablation    PLAN:    Dr. Roque with plans to see with testing 3/2019    Will fwd CT 12/2018 results to Dr. Roque as FYI    Dr. Roque OK'd use of AC. Using warfarin given reversibility    4. Minimal CAD    Stress test 10/2018 showed a small reversible mid anterolateral wall defect, but could not rule out artifact given body habitus    Initially treated medically, but given drop in EF, underwent heart cath 12/26 showing minimal non-obstructive CAD    PLAN:    Continue BB. No ASA due to warfarin. Avoiding statin given liver disease    5. Pulmonary Nodules    CT 12/21 showed nodules as above    Briefly reviewed 2006 CT chest showing what appear to be similar abnormalities, with \"irregular nodular parenchymal abnormality near R major fissure measuring 8-9 mm\" and \"irregular pleural scarring and calcified benign nodule at right major fissure.\" 2006 CT was used to compare 12/2018 CT scan to, and noted multiple 4 mm nodules in subpleural location in lingula also noted (though I don't see in 2006 report) that are unchanged.    No nodules >6 mm    PLAN:    Will let " PCP know of CT scan results as FYI, but appear to be similar to findings in 2006      More than 50% of this 50 minute visit was spent in reviewing extensive hospital records and reviewing options for AFib treatment with pt.    Nadege Montilla PA-C, MSPAS      No orders of the defined types were placed in this encounter.    Orders Placed This Encounter   Medications     spironolactone (ALDACTONE) 25 MG tablet     Sig: Take 1 tablet (25 mg) by mouth daily     Refill:  0     Medications Discontinued During This Encounter   Medication Reason     spironolactone (ALDACTONE) 25 MG tablet      colchicine (MITIGARE) 0.6 MG capsule Therapy completed         Encounter Diagnosis   Name Primary?     Portal hypertension (H)        CURRENT MEDICATIONS:  Current Outpatient Medications   Medication Sig Dispense Refill     albuterol (PROAIR HFA) 108 (90 Base) MCG/ACT inhaler Inhale 2 puffs into the lungs       calcium carb 1250 mg, 500 mg [place],/vitamin D 200 units (OSCAL WITH D) 500-200 MG-UNIT per tablet Take 2 tablets by mouth daily with food.       digoxin (LANOXIN) 125 MCG tablet Take 1 tablet (125 mcg) by mouth daily 30 tablet 3     furosemide (LASIX) 40 MG tablet Take 1 tablet (40 mg) by mouth 2 times daily 60 tablet 0     metoprolol succinate ER (TOPROL-XL) 100 MG 24 hr tablet Take 1 tablet (100 mg) by mouth daily 90 tablet 3     mometasone-formoterol (DULERA) 200-5 MCG/ACT inhaler Inhale 2 puffs into the lungs 2 times daily as needed Appt DUE Jan 2017 1 Inhaler 1     pantoprazole (PROTONIX) 40 MG EC tablet Take 1 tablet (40 mg) by mouth daily 30 tablet 0     spironolactone (ALDACTONE) 25 MG tablet Take 1 tablet (25 mg) by mouth daily  0     warfarin (COUMADIN) 2.5 MG tablet 1.25 mg Fridays; 2.5mg all other days or As directed by Anticoagulation Clinic 90 tablet 0     order for DME Open toe size large 30/40 [facility] Medical Compression socks Model 33876.    Fax to Fax [phone]. [facility] (Patient not taking:  Reported on 1/8/2019) 12 Device 0     order for DME Equipment being ordered:   New CPAP mask, tube, filters,water tray (Patient not taking: Reported on 1/8/2019) 1 Device 3     order for DME Open toe size large 30/40 Med[employer] Assure Medical Compression socks Model 17710. (Patient not taking: Reported on 1/8/2019) 4 Package 3     ORDER FOR DME Respironics RemStar 60 Series Auto AFlex 12-15 cm H2O with heated humidty and a modem.  Pt chose a Quattro Air FFM size medium. (Patient not taking: Reported on 1/8/2019)       ORDER FOR DME Juzo compression stockings, 30-40mm compression. Patient has portal hypertension and develops leg edema, which these control well. (Patient not taking: Reported on 1/8/2019) 2 Package 3       ALLERGIES     Allergies   Allergen Reactions     Avelox Other (See Comments) and GI Disturbance     portal hypertension     Moxifloxacin Nausea and Vomiting, Nausea and Other (See Comments)     portal hypertension       PAST MEDICAL HISTORY:  Past Medical History:   Diagnosis Date     Acute pulmonary edema (H)      Atrial fibrillation (H)      Atrial fibrillation with rapid ventricular response (H) 5/13/2013     CAD (coronary artery disease)     Cath 12/26/18- mild nonobstructive disease     Calculus of ureter 1993, 1997    history of     Cardiomyopathy (H)     12/23/18 Echo- EF 25-30%     Chronic systolic CHF (congestive heart failure) (H)      Cirrhosis of liver without mention of alcohol 8/22/2005    Cirrhosis, idiopathic     Edema 5/13/2013     Esophageal varices with bleeding(456.0)      Gallstones      Genital herpes, unspecified 3/20/1999    resolving herpes progenitalis     GERD (gastroesophageal reflux disease)      Hematuria      Hypertension      Hypochondriasis     problems in past     Obesity 11/24/2010     BRUCE (obstructive sleep apnea) 03/17/2009    Mild AHI 8.4  RDI 31 - Pt refuses to wear his CPAP     Pericarditis      Portal hypertension (H) 1/28/2010     Unspecified thrombocytopenia  8/22/2005    Thrombocytopenia associated with cirrhosis and portal hypertension       PAST SURGICAL HISTORY:  Past Surgical History:   Procedure Laterality Date     ANESTHESIA CARDIOVERSION N/A 1/19/2015    Procedure: ANESTHESIA CARDIOVERSION;  Surgeon: Generic Anesthesia Provider;  Location: [facility] OR     COLONOSCOPY N/A 8/14/2017    Procedure: COLONOSCOPY;  Colonoscopy Called will arrive appx 12:30 Per phone call;  Surgeon: Vincent Whittington MD;  Location:  GI     CV HEART CATHETERIZATION WITH POSSIBLE INTERVENTION N/A 12/26/2018    Procedure: Heart Catheterization with possible Intervention;  Surgeon: Brandon Arroyo MD;  Location:  HEART CARDIAC CATH LAB     EP ABLATION FOCAL AFIB N/A 12/21/2018    Procedure: EP Ablation Focal AFIB;  Surgeon: Kristofer Evans MD;  Location:  HEART CARDIAC CATH LAB     ESOPHAGOSCOPY, GASTROSCOPY, DUODENOSCOPY (EGD), COMBINED  7/11/2011    Procedure:COMBINED ESOPHAGOSCOPY, GASTROSCOPY, DUODENOSCOPY (EGD); Surgeon:LAURA LAMAS; Location:[facility] GI     ESOPHAGOSCOPY, GASTROSCOPY, DUODENOSCOPY (EGD), COMBINED  9/10/2012    Procedure: COMBINED ESOPHAGOSCOPY, GASTROSCOPY, DUODENOSCOPY (EGD);;  Surgeon: Hi Roque MD;  Location:  GI     ESOPHAGOSCOPY, GASTROSCOPY, DUODENOSCOPY (EGD), COMBINED  9/9/2013    Procedure: COMBINED ESOPHAGOSCOPY, GASTROSCOPY, DUODENOSCOPY (EGD);;  Surgeon: Hi Rqoue MD;  Location:  GI     ESOPHAGOSCOPY, GASTROSCOPY, DUODENOSCOPY (EGD), COMBINED N/A 9/15/2014    Procedure: COMBINED ESOPHAGOSCOPY, GASTROSCOPY, DUODENOSCOPY (EGD);  Surgeon: Hi Roque MD;  Location:  GI     ESOPHAGOSCOPY, GASTROSCOPY, DUODENOSCOPY (EGD), COMBINED N/A 10/30/2015    Procedure: COMBINED ESOPHAGOSCOPY, GASTROSCOPY, DUODENOSCOPY (EGD);  Surgeon: Feliberto Fox MD;  Location:  GI     ESOPHAGOSCOPY, GASTROSCOPY, DUODENOSCOPY (EGD), COMBINED N/A 10/10/2016    Procedure: COMBINED ESOPHAGOSCOPY, GASTROSCOPY, DUODENOSCOPY (EGD);  Surgeon: Jose Nesbitt  MD MEAGHAN;  Location: [facility] GI     H ABLATION FOCAL AFIB  2018     HERNIA REPAIR       IRRIGATION AND DEBRIDEMENT ABSCESS SCROTUM, COMBINED  10/4/2012    Procedure: COMBINED IRRIGATION AND DEBRIDEMENT ABSCESS SCROTUM;  Irrigation and Debridement of Groin Abscess;  Surgeon: Benny Shoemaker MD;  Location: [facility] OR     SOFT TISSUE SURGERY       SURGICAL HISTORY OF -       rt elbow     SURGICAL HISTORY OF -   1/15/98    repair of ventral and umbilical hernia     SURGICAL HISTORY OF -       endoscopy       FAMILY HISTORY:  Family History   Problem Relation Age of Onset     Lipids Mother      Hypertension Mother      Eye Disorder Mother      Heart Disease Father         CHF     Lipids Father      Obesity Father      Heart Disease Brother         MI     Eye Disorder Brother      Hypertension Brother         portal HTN     Thrombosis Sister         in her lung     Cancer Other         maternal grandparent/throat cancer       SOCIAL HISTORY:  Social History     Socioeconomic History     Marital status:      Spouse name: None     Number of children: None     Years of education: None     Highest education level: None   Social Needs     Financial resource strain: None     Food insecurity - worry: None     Food insecurity - inability: None     Transportation needs - medical: None     Transportation needs - non-medical: None   Occupational History     None   Tobacco Use     Smoking status: Former Smoker     Packs/day: 0.80     Years: 6.00     Pack years: 4.80     Types: Cigarettes     Last attempt to quit: 2002     Years since quittin.0     Smokeless tobacco: Never Used   Substance and Sexual Activity     Alcohol use: No     Alcohol/week: 0.0 oz     Comment: quit in      Drug use: No     Sexual activity: Yes     Partners: Female   Other Topics Concern     Parent/sibling w/ CABG, MI or angioplasty before 65F 55M? Yes     Comment: BROTHER   - MI AT AGE 44      Service Not Asked     Blood  Transfusions Not Asked     Caffeine Concern Not Asked     Occupational Exposure Not Asked     Hobby Hazards Not Asked     Sleep Concern Not Asked     Stress Concern Not Asked     Weight Concern Not Asked     Special Diet Not Asked     Back Care Not Asked     Exercise No     Bike Helmet Not Asked     Seat Belt Not Asked     Self-Exams Not Asked   Social History Narrative     None       Review of Systems:  Skin:  Negative     Eyes:  Negative    ENT:  Negative    Respiratory:  Positive for sleep apnea;CPAP;cough  Cardiovascular:    Positive for;lower extremity symptoms;edema;lightheadedness  Gastroenterology: Positive for excessive gas or bloating;heartburn  Genitourinary:  Positive for urinary frequency  Musculoskeletal:  Negative    Neurologic:  Positive for numbness or tingling of feet  Psychiatric:  Negative    Heme/Lymph/Imm:  Positive for    Endocrine:  Negative      Physical Exam:  Vitals: /55 (BP Location: Right arm, Patient Position: Sitting, Cuff Size: Adult Regular)   Pulse 93   Wt 106.7 kg (235 lb 3.2 oz)   SpO2 97%   BMI 38.54 kg/m       Constitutional:           Skin:           Head:           Eyes:           ENT:           Neck:           Chest:           Cardiac:                    Abdomen:           Vascular:                                        Extremities and Back:           Neurological:             Recent Lab Results:  LIPID RESULTS:  Lab Results   Component Value Date    CHOL 127 02/27/2013    HDL 42 05/19/2018    LDL 50 05/19/2018    TRIG 63 05/19/2018    CHOLHDLRATIO 2.2 02/27/2013       LIVER ENZYME RESULTS:  Lab Results   Component Value Date    AST 35 01/03/2019    ALT 39 01/03/2019       CBC RESULTS:  Lab Results   Component Value Date    WBC 4.0 01/03/2019    RBC 4.24 (L) 01/03/2019    HGB 14.3 01/03/2019    HCT 42.2 01/03/2019     01/03/2019    MCH 33.7 (H) 01/03/2019    MCHC 33.9 01/03/2019    RDW 13.9 01/03/2019     (L) 01/03/2019       BMP RESULTS:  Lab Results    Component Value Date     (L) 01/03/2019    POTASSIUM 4.1 01/03/2019    CHLORIDE 103 01/03/2019    CO2 27 01/03/2019    ANIONGAP 6.1 01/03/2019     01/03/2019    BUN 16 01/03/2019    CR 0.87 01/03/2019    GFRESTIMATED >90 01/03/2019    GFRESTBLACK >90 01/03/2019    HALEY 9.3 01/03/2019        A1C RESULTS:  No results found for: A1C    INR RESULTS:  Lab Results   Component Value Date    INR 2.1 (A) 01/04/2019    INR 1.94 (H) 01/02/2019    INR 2.02 (H) 12/31/2018       Thank you for allowing me to participate in the care of your patient.    Sincerely,     Virginia Montilla PA-C     [facility]

## 2019-01-09 ENCOUNTER — TELEPHONE (OUTPATIENT)
Dept: FAMILY MEDICINE | Facility: CLINIC | Age: 59
End: 2019-01-09

## 2019-01-09 RX ORDER — BUDESONIDE AND FORMOTEROL FUMARATE DIHYDRATE 80; 4.5 UG/1; UG/1
2 AEROSOL RESPIRATORY (INHALATION) 2 TIMES DAILY
Qty: 10.2 G | Refills: 3 | Status: SHIPPED | OUTPATIENT
Start: 2019-01-09 | End: 2020-01-01

## 2019-01-09 NOTE — PROGRESS NOTES
Dr. Roque and SARA Ac -     I met Yomi yesterday and reviewed prolonged hospitalization. Prior to AFib ablation, we obtained CT Heart 12/21/2018 (below).    Dr. Roque - you see him 3/2019 so wanted to let you know CT showed liver abnormalties for which dedicated scan was recommended (though reader noted some abnormalities had been seen on CT 2006).       SARA Ac - CT scan also showed multiple nodules, but when I looked back to CT 2006, didn't look like they were much different. To my untrained eye, it does not appear that repeat CT scan required in follow up but wanted to alert you to it in case I missed anything in report.    Thx! Nadege    RADIOLOGIST CONSULT FOR CARDIOLOGY 12/21/2018 9:55 AM     HISTORY: 58-year-old patient with persistent atrial fibrillation.     Scattered mediastinal and right hilar lymph nodes. Calcifications scattered throughout the hilar lymph nodes bilaterally. No pleural effusion. Liver hypodensities are only partially visualized and evaluated. Etiologies are vast and broad and essentially nothing excluded. One of the collections is negative 74 Hounsfield units suggestive of fat within this collection measuring up to 2.7 cm in length. Recommend dedicated CT exam of the liver. A dominant vein, which appears to communicate to the pulmonary venous system is noted at the medial aspect of the left hemithorax. Numerous serpiginous areas of hypodensity noted surrounding the distal esophagus and near the spleen. These were present on previous CT exam on April 6, 2006. They may be dilated venous branches and varices. Recommend dedicated CT exam of the abdomen and pelvis. While etiology is uncertain, they were present on previous exam.     No acute osseous abnormality. Nodules in the lingula are multiple, larger of which is image 3 series 8 measuring 6 mm. On image 2 series 8, smaller nodule adjacent to the major fissure. Multiple 4 mm nodules in a subpleural location in the lingula also noted,  though unchanged  from previous exam. Right lower lobe nodule is visible laterally measuring 3 mm on image 42 series 8. No pulmonary masses.     VISH KAUR MD

## 2019-01-09 NOTE — TELEPHONE ENCOUNTER
Reason for Call:  Medication Dulera was denied by insurance Patient would like to try Symbicort.    Do you use a [facility]?  Name of the pharmacy and phone number for the current request:  [facility] [place] [phone]    Name of the medication requested: Symbicort      Can we leave a detailed message on this number? YES    Phone number patient can be reached at: Cell number on file:    Telephone Information:   Mobile [phone]       Best Time: any      Call taken on 1/9/2019 at 3:37 PM by Karina Holbrook

## 2019-01-10 ENCOUNTER — HOSPITAL ENCOUNTER (OUTPATIENT)
Dept: CARDIAC REHAB | Facility: CLINIC | Age: 59
End: 2019-01-10
Attending: NURSE PRACTITIONER
Payer: COMMERCIAL

## 2019-01-10 DIAGNOSIS — I50.23 ACUTE ON CHRONIC SYSTOLIC CONGESTIVE HEART FAILURE (H): ICD-10-CM

## 2019-01-10 PROCEDURE — 40000116 ZZH STATISTIC OP CR VISIT

## 2019-01-10 PROCEDURE — 93798 PHYS/QHP OP CAR RHAB W/ECG: CPT

## 2019-01-10 PROCEDURE — 93797 PHYS/QHP OP CAR RHAB WO ECG: CPT

## 2019-01-10 PROCEDURE — 40000575 ZZH STATISTIC OP CARDIAC VISIT #2

## 2019-01-14 ENCOUNTER — HOSPITAL ENCOUNTER (OUTPATIENT)
Dept: CARDIAC REHAB | Facility: CLINIC | Age: 59
End: 2019-01-14
Attending: NURSE PRACTITIONER
Payer: COMMERCIAL

## 2019-01-14 PROCEDURE — 40000116 ZZH STATISTIC OP CR VISIT

## 2019-01-14 PROCEDURE — 93798 PHYS/QHP OP CAR RHAB W/ECG: CPT

## 2019-01-15 ENCOUNTER — ANTICOAGULATION THERAPY VISIT (OUTPATIENT)
Dept: ANTICOAGULATION | Facility: CLINIC | Age: 59
End: 2019-01-15
Payer: COMMERCIAL

## 2019-01-15 ENCOUNTER — HOSPITAL ENCOUNTER (OUTPATIENT)
Dept: CARDIOLOGY | Facility: CLINIC | Age: 59
Discharge: HOME | End: 2019-01-15
Attending: NURSE PRACTITIONER | Admitting: NURSE PRACTITIONER
Payer: COMMERCIAL

## 2019-01-15 ENCOUNTER — OFFICE VISIT (OUTPATIENT)
Dept: CARDIOLOGY | Facility: CLINIC | Age: 59
End: 2019-01-15
Attending: INTERNAL MEDICINE
Payer: COMMERCIAL

## 2019-01-15 VITALS
OXYGEN SATURATION: 95 % | SYSTOLIC BLOOD PRESSURE: 95 MMHG | BODY MASS INDEX: 38.74 KG/M2 | WEIGHT: 236.4 LBS | HEART RATE: 77 BPM | DIASTOLIC BLOOD PRESSURE: 61 MMHG

## 2019-01-15 DIAGNOSIS — I48.0 PAROXYSMAL ATRIAL FIBRILLATION (H): ICD-10-CM

## 2019-01-15 DIAGNOSIS — I42.9 SECONDARY CARDIOMYOPATHY (H): ICD-10-CM

## 2019-01-15 DIAGNOSIS — I48.20 CHRONIC A-FIB (H): ICD-10-CM

## 2019-01-15 DIAGNOSIS — I48.19 PERSISTENT ATRIAL FIBRILLATION (H): Primary | ICD-10-CM

## 2019-01-15 LAB — INR POINT OF CARE: 2.4 (ref 0.86–1.14)

## 2019-01-15 PROCEDURE — 99215 OFFICE O/P EST HI 40 MIN: CPT | Performed by: PHYSICIAN ASSISTANT

## 2019-01-15 PROCEDURE — 36416 COLLJ CAPILLARY BLOOD SPEC: CPT

## 2019-01-15 PROCEDURE — 85610 PROTHROMBIN TIME: CPT | Mod: QW

## 2019-01-15 PROCEDURE — 93005 ELECTROCARDIOGRAM TRACING: CPT

## 2019-01-15 PROCEDURE — 93010 ELECTROCARDIOGRAM REPORT: CPT | Performed by: PHYSICIAN ASSISTANT

## 2019-01-15 PROCEDURE — 99207 ZZC NO CHARGE NURSE ONLY: CPT

## 2019-01-15 RX ORDER — METOPROLOL SUCCINATE 25 MG/1
25 TABLET, EXTENDED RELEASE ORAL EVERY MORNING
Start: 2019-01-15 | End: 2019-01-16

## 2019-01-15 NOTE — LETTER
1/15/2019    Kailey Ac, SARA, APRN CNP  [address] [facility] [address]    RE: Maurice Jon       Dear Colleague,    I had the pleasure of seeing Maurice Jon in the [facility].        HPI:   I had the pleasure of meeting Yomi when he came for follow up of recurrent arrhythmias following PVI, LA posterior wall ablation, CTI and DCCV 12/21/2018.  He sees Dr. Evans for his history of:     1. Persistent AFib, first diagnosed 2013, with unsuccessful attempts at maintaining SR leading to persistent atrial fibrillation. He noted excessive fatigue, which Dr. Evans was unsure was related to metoprolol therapy vs atrial fibrillation itself and attempted rhythm control. Loaded with sotalol and underwent DCCV 10/31/2018, which maintained SR until 12/3. Developed terrible pruritis with sotalol, which resolved after this was discontinued. Underwent PVI 12/21 (full details below) complicated by pericarditis and biventricular heart failure, with EF down to 25-30% (previously normal 9/2017). Remained in SR until 1/3/19. Has been back in AFib vs atypical atrial flutter since 1/3.     2. NAFLD with cirrhosis followed by Dr. Roque at [facility].  Complicated by portal vein thrombosis, portal hypertension, thrombocytopenia (though 118K most recently 1/3), splenomegaly, splenic vein thrombosis and known esophageal varices (Upper GI 10/2016). Dr. Roque aware of CT findings (below).     3.  New onset BiV Heart Failure with EF down to 25-30% and moderate reduction in RV function and moderate-severe TR on echo done 12/23 in setting of chest discomfort post PVI . Angiogram 12/26 with mild nonobstructive disease and mildly increased LV filling pressure (wedge 18) and moderate-severely increased RV filling pressure (RAP 14).    4. Pulmonary nodules noted on CTA Heart 12/21/2018 (multiple nodules in lingula, measuring 6 mm). Multiple 4 mm nodules in subpleural location in lingula  "(unchanged c/w 2006). RLL nodule 3 mm    I last saw Yomi one week ago on 1/8 at which time we discussed continued atrial arrhythmias with slightly better rate control after adding digoxin.  We extensively reviewed his hospitalization, but were unable to go through all of his testing given how long was sent on his rhythm issues.  At that appointment, we discussed Dr. Evans's recommendations for either DC cardioversion vs AV node ablation/biventricular pacemaker implantation.  Yomi wanted to speak with his wife Violet, and we decided to keep this appointment today to further review this.    Unfortunately, Yomi' mother suffered a stroke since that appointment and he admits that he had Violet have not taken the time to go through his options for his heart.  Happily, he has not had too much trouble, noting no concerns of palpitations, lightheadedness or dizziness.  He states that his fluid level is \"great.\"  He has not gained any weight and his edema is down.  Overall, he is actually feeling pretty well.    EKG today, which I overread, showed atrial fibrillation @ 106 bpm  R/L heart cath done 12/26 done showed <10% lesions. RA pressure (mean) 14 mmHg, RVP 36/8/13. PAP 38/20/26. PCWP mean 18. CO 8.5 L/min. Cardiac Index 3.95 L/min/m2. LVEDP 15 mmHg  Echo 12/23 showed EF 25-30% with moderate-severe global hypokinesis.  RV not well visualized but likely moderate to severely dilated with moderately reduced RVSF.  RVSP 17 mmHg+ RAP. 2-3 + TR. Severely dilated RA.   CT Heart 12/21/2018 showed scattered mediastinal and right hilar lymph nodes. Calcifications scattered throughout the hilar lymph nodes bilaterally. No pleural effusion. Liver hypodensities are only partially visualized and evaluated. Etiologies are vast and broad and essentially nothing excluded. One of the collections is negative 74 Hounsfield units suggestive of fat within this collection measuring up to 2.7 cm in length. Recommend dedicated CT exam of the " liver. A dominant vein, which appears to communicate to the pulmonary venous system is noted at the medial aspect of the left hemithorax. Numerous serpiginous areas of hypodensity noted surrounding the distal esophagus and near the spleen. These were present on previous CT exam on April 6, 2006.  They may be dilated venous branches and varices. Recommend dedicated CT exam of the abdomen and pelvis. While etiology is uncertain, they were present on previous exam.     No acute osseous abnormality.   Nodules in the lingula are multiple, larger of which is image 3 series 8 measuring 6 mm. On image 2 series 8, smaller nodule adjacent to the major fissure. Multiple 4 mm nodules in a subpleural location in the lingula also noted, though unchanged from previous exam. Right lower lobe nodule is visible laterally measuring 3 mm on image 42 series 8. No pulmonary masses.      Assessment & Plan:    1. Recurrent atrial arrhythmias    AFib ablation as above, and maintained SR 12/21-1/3. Significant LA scarring noted with low chance of long-term success.     AAD therapy limited given CM (no Class Ic), ineffectiveness of sotalol (and c/o pruritis) and liver disease with increase in AST following administration of Amiodarone x 1 dose after PVI 12/21     Rates 90-100s despite addition of digoxin, and seem a bit higher today    Remains on warfarin. INR 1/4 therapeutic and today's is pending. Stopped Lovenox 1/1     PLAN:    Discussed with Dr. Evans. Options limited for AAD and Yomi recognizes these limitations    We again discussed options, including another attempt at DCCV (Dr. Evans to do) vs AVN ablation and attempt at BiV PPM.    Pt would like to discuss with his wife Violet and agrees that he can do this 1 of these evenings and will contact me by Friday 1/18 with his decision       Continue warfarin. INR planned for later today. Note his INR dropped to 1.94 when not on Lovenox (1/2), but had been >2.0 on 12/31 and again on 1/4.  "Last dose of Lovenox was 1/1.    Due to slightly rapid heart rate today, will increase Toprol-XL to 20 5 in the morning and 100 at night.  Continue digoxin 0.125 mg daily         2. BiVentricular HF    Echo 9/2017 showed normal LVEF and R sided function. Echo repeated due to c/o CP following ablation 12/21 showed EF down to 25-30% with moderate-severe dilation of RV and moderate reduction of RVSF.    Left hospital 12/27 on Lasix 40 mg BID (increased from PTA dose 20 daily), Aldactone 25 mg BID (increased from PTA dose 25 mg daily), lisinopril 2.5 daily (new) and continued high BB dose    Decreased Aldactone and held lisinopril to allow for more BP room with tighter rate control   Weight is stable, edema remains stable and he is really not having any issues with this    PLAN:    Continue Lasix 40 mg BID and spironolactone 25 daily with high dose BB    Explained rationale for resuming ACE when able, but not there yet, especially as increasing beta-blocker today for tighter rate control    Sees Kayce Manzo in [facility] 1/21     3. Cirrhosis    Last appt with Dr. Roque 9/2018 noted well-compensated cirrhosis likely based on NAFLD    Hepatic panels and CBCs as above    Dr. Roque gave go-ahead to use AC in setting of AFib with plans for ablation     PLAN:    Dr. Roque with plans to see with testing 3/2019. Sent him message re: CT scan above and he is aware of abnormalities/recommendations.    Using warfarin given reversibility     4. Minimal CAD    Stress test 10/2018 showed a small reversible mid anterolateral wall defect, but could not rule out artifact given body habitus    Initially treated medically, but given drop in EF, underwent heart cath 12/26 showing minimal non-obstructive CAD     PLAN:    Continue BB. No ASA due to warfarin. Avoiding statin given liver disease     5. Pulmonary Nodules    CT 12/21 showed nodules as above    Briefly reviewed 2006 CT chest showing what appear to be similar abnormalities, with \"irregular " "nodular parenchymal abnormality near R major fissure measuring 8-9 mm\" and \"irregular pleural scarring and calcified benign nodule at right major fissure.\" 2006 CT was used to compare 12/2018 CT scan to, and noted multiple 4 mm nodules in subpleural location in lingula also noted (though I don't see in 2006 report) that are unchanged.    No nodules >6 mm     PLAN:    Sent Epic message to PCP. Note that findings appear similar to 2006 to my eye.      Nadege Montilla PA-C, MSPAS      Orders Placed This Encounter   Procedures     EKG 12-LEAD CLINIC READ     Orders Placed This Encounter   Medications     metoprolol succinate ER (TOPROL-XL) 25 MG 24 hr tablet     Sig: Take 1 tablet (25 mg) by mouth every morning     Medications Discontinued During This Encounter   Medication Reason     albuterol (PROAIR HFA) 108 (90 Base) MCG/ACT inhaler          Encounter Diagnoses   Name Primary?     Paroxysmal atrial fibrillation (H)      Persistent atrial fibrillation (H) Yes     Secondary cardiomyopathy (H)      Chronic a-fib (H)        CURRENT MEDICATIONS:  Current Outpatient Medications   Medication Sig Dispense Refill     budesonide-formoterol (SYMBICORT) 80-4.5 MCG/ACT Inhaler Inhale 2 puffs into the lungs 2 times daily 10.2 g 3     calcium carb 1250 mg, 500 mg [place],/vitamin D 200 units (OSCAL WITH D) 500-200 MG-UNIT per tablet Take 2 tablets by mouth daily with food.       digoxin (LANOXIN) 125 MCG tablet Take 1 tablet (125 mcg) by mouth daily 30 tablet 3     furosemide (LASIX) 40 MG tablet Take 1 tablet (40 mg) by mouth 2 times daily 60 tablet 0     metoprolol succinate ER (TOPROL-XL) 100 MG 24 hr tablet Take 1 tablet (100 mg) by mouth daily 90 tablet 3     metoprolol succinate ER (TOPROL-XL) 25 MG 24 hr tablet Take 1 tablet (25 mg) by mouth every morning       order for DME Open toe size large 30/40 [facility] [employer] Compression socks Model 66655.    Fax to Fax [phone]. [facility] medical 12 Device 0     order for DME " Equipment being ordered:   New CPAP mask, tube, filters,water tray 1 Device 3     order for DME Open toe size large 30/40 [employer] Assure Medical Compression socks Model 81047. 4 Package 3     ORDER FOR DME Respironics RemStar 60 Series Auto AFlex 12-15 cm H2O with heated humidty and a modem.  Pt chose a Quattro Air FFM size medium.       ORDER FOR DME Juzo compression stockings, 30-40mm compression. Patient has portal hypertension and develops leg edema, which these control well. 2 Package 3     pantoprazole (PROTONIX) 40 MG EC tablet Take 1 tablet (40 mg) by mouth daily 30 tablet 0     spironolactone (ALDACTONE) 25 MG tablet Take 1 tablet (25 mg) by mouth daily  0     warfarin (COUMADIN) 2.5 MG tablet 1.25 mg Fridays; 2.5mg all other days or As directed by Anticoagulation Clinic 90 tablet 0     mometasone-formoterol (DULERA) 200-5 MCG/ACT inhaler Inhale 2 puffs into the lungs 2 times daily as needed Appt DUE Jan 2017 (Patient not taking: Reported on 1/15/2019) 1 Inhaler 1       ALLERGIES     Allergies   Allergen Reactions     Avelox Other (See Comments) and GI Disturbance     portal hypertension     Moxifloxacin Nausea and Vomiting, Nausea and Other (See Comments)     portal hypertension     Sotalol Itching       PAST MEDICAL HISTORY:  Past Medical History:   Diagnosis Date     Acute pulmonary edema (H)      Atrial fibrillation (H)      Atrial fibrillation with rapid ventricular response (H) 5/13/2013     CAD (coronary artery disease)     Cath 12/26/18- mild nonobstructive disease     Calculus of ureter 1993, 1997    history of     Cardiomyopathy (H)     12/23/18 Echo- EF 25-30%     Chronic systolic CHF (congestive heart failure) (H)      Cirrhosis of liver without mention of alcohol 8/22/2005    Cirrhosis, idiopathic     Edema 5/13/2013     Esophageal varices with bleeding(456.0)      Gallstones      Genital herpes, unspecified 3/20/1999    resolving herpes progenitalis     GERD (gastroesophageal reflux disease)       Hematuria      Hypertension      Hypochondriasis     problems in past     Obesity 11/24/2010     BRUCE (obstructive sleep apnea) 03/17/2009    Mild AHI 8.4  RDI 31 - Pt refuses to wear his CPAP     Pericarditis      Portal hypertension (H) 1/28/2010     Unspecified thrombocytopenia 8/22/2005    Thrombocytopenia associated with cirrhosis and portal hypertension       PAST SURGICAL HISTORY:  Past Surgical History:   Procedure Laterality Date     ANESTHESIA CARDIOVERSION N/A 1/19/2015    Procedure: ANESTHESIA CARDIOVERSION;  Surgeon: Generic Anesthesia Provider;  Location: [facility]     COLONOSCOPY N/A 8/14/2017    Procedure: COLONOSCOPY;  Colonoscopy Called will arrive appx 12:30 Per phone call;  Surgeon: Vincent Whittington MD;  Location:  GI     CV HEART CATHETERIZATION WITH POSSIBLE INTERVENTION N/A 12/26/2018    Procedure: Heart Catheterization with possible Intervention;  Surgeon: Brandon Arroyo MD;  Location:  HEART CARDIAC CATH LAB     EP ABLATION FOCAL AFIB N/A 12/21/2018    Procedure: EP Ablation Focal AFIB;  Surgeon: Kristofer Evans MD;  Location:  HEART CARDIAC CATH LAB     ESOPHAGOSCOPY, GASTROSCOPY, DUODENOSCOPY (EGD), COMBINED  7/11/2011    Procedure:COMBINED ESOPHAGOSCOPY, GASTROSCOPY, DUODENOSCOPY (EGD); Surgeon:LAURA LAMAS; Location:[facility]     ESOPHAGOSCOPY, GASTROSCOPY, DUODENOSCOPY (EGD), COMBINED  9/10/2012    Procedure: COMBINED ESOPHAGOSCOPY, GASTROSCOPY, DUODENOSCOPY (EGD);;  Surgeon: Hi Roque MD;  Location:  GI     ESOPHAGOSCOPY, GASTROSCOPY, DUODENOSCOPY (EGD), COMBINED  9/9/2013    Procedure: COMBINED ESOPHAGOSCOPY, GASTROSCOPY, DUODENOSCOPY (EGD);;  Surgeon: Hi Roque MD;  Location:  GI     ESOPHAGOSCOPY, GASTROSCOPY, DUODENOSCOPY (EGD), COMBINED N/A 9/15/2014    Procedure: COMBINED ESOPHAGOSCOPY, GASTROSCOPY, DUODENOSCOPY (EGD);  Surgeon: Hi Roque MD;  Location:  GI     ESOPHAGOSCOPY, GASTROSCOPY, DUODENOSCOPY (EGD), COMBINED N/A 10/30/2015    Procedure:  COMBINED ESOPHAGOSCOPY, GASTROSCOPY, DUODENOSCOPY (EGD);  Surgeon: Feliberto Fox MD;  Location: [facility] GI     ESOPHAGOSCOPY, GASTROSCOPY, DUODENOSCOPY (EGD), COMBINED N/A 10/10/2016    Procedure: COMBINED ESOPHAGOSCOPY, GASTROSCOPY, DUODENOSCOPY (EGD);  Surgeon: Jose Nesbitt MD;  Location: [facility] GI     H ABLATION FOCAL AFIB  2018     HERNIA REPAIR       IRRIGATION AND DEBRIDEMENT ABSCESS SCROTUM, COMBINED  10/4/2012    Procedure: COMBINED IRRIGATION AND DEBRIDEMENT ABSCESS SCROTUM;  Irrigation and Debridement of Groin Abscess;  Surgeon: Benny Shoemaker MD;  Location: [facility] OR     SOFT TISSUE SURGERY       SURGICAL HISTORY OF -       rt elbow     SURGICAL HISTORY OF -   1/15/98    repair of ventral and umbilical hernia     SURGICAL HISTORY OF -       endoscopy       FAMILY HISTORY:  Family History   Problem Relation Age of Onset     Lipids Mother      Hypertension Mother      Eye Disorder Mother      Heart Disease Father         CHF     Lipids Father      Obesity Father      Heart Disease Brother         MI     Eye Disorder Brother      Hypertension Brother         portal HTN     Thrombosis Sister         in her lung     Cancer Other         maternal grandparent/throat cancer       SOCIAL HISTORY:  Social History     Socioeconomic History     Marital status:      Spouse name: None     Number of children: None     Years of education: None     Highest education level: None   Social Needs     Financial resource strain: None     Food insecurity - worry: None     Food insecurity - inability: None     Transportation needs - medical: None     Transportation needs - non-medical: None   Occupational History     None   Tobacco Use     Smoking status: Former Smoker     Packs/day: 0.80     Years: 6.00     Pack years: 4.80     Types: Cigarettes     Last attempt to quit: 2002     Years since quittin.0     Smokeless tobacco: Never Used   Substance and Sexual Activity     Alcohol use: No      Alcohol/week: 0.0 oz     Comment: quit in 2007     Drug use: No     Sexual activity: Yes     Partners: Female   Other Topics Concern     Parent/sibling w/ CABG, MI or angioplasty before 65F 55M? Yes     Comment: BROTHER   - MI AT AGE 44      Service Not Asked     Blood Transfusions Not Asked     Caffeine Concern Not Asked     Occupational Exposure Not Asked     Hobby Hazards Not Asked     Sleep Concern Not Asked     Stress Concern Not Asked     Weight Concern Not Asked     Special Diet Not Asked     Back Care Not Asked     Exercise No     Bike Helmet Not Asked     Seat Belt Not Asked     Self-Exams Not Asked   Social History Narrative     None       Review of Systems:  Skin:  Negative rash   Eyes:  Negative    ENT:  Negative nasal congestion  Respiratory:  Positive for sleep apnea;CPAP;cough  Cardiovascular:  edema;lightheadedness;Negative for Positive for;lower extremity symptoms;fatigue  Gastroenterology: Positive for excessive gas or bloating;heartburn  Genitourinary:  Positive for urinary frequency  Musculoskeletal:  Negative joint pain;joint stiffness;nocturnal cramping  Neurologic:  Positive for numbness or tingling of feet  Psychiatric:  Negative    Heme/Lymph/Imm:  Positive for    Endocrine:  Negative      Physical Exam:  Vitals: BP 95/61   Pulse 77   Wt 107.2 kg (236 lb 6.4 oz)   SpO2 95%   BMI 38.74 kg/m       Constitutional:  cooperative, alert and oriented, well developed, well nourished, in no acute distress obese      Skin:  warm and dry to the touch, no apparent skin lesions or masses noted        Head:  normocephalic, no masses or lesions        Eyes:  pupils equal and round;conjunctivae and lids unremarkable;sclera white        ENT:  no pallor or cyanosis        Neck:  JVP normal;no carotid bruit        Chest:  normal breath sounds, clear to auscultation, normal A-P diameter, normal symmetry, normal respiratory excursion, no use of accessory muscles        Cardiac:                     Abdomen:  abdomen soft obese      Vascular:                                        Extremities and Back:  no deformities, clubbing, cyanosis, erythema observed;no edema        Neurological:  no gross motor deficits          Recent Lab Results:  LIPID RESULTS:  Lab Results   Component Value Date    CHOL 127 02/27/2013    HDL 42 05/19/2018    LDL 50 05/19/2018    TRIG 63 05/19/2018    CHOLHDLRATIO 2.2 02/27/2013       LIVER ENZYME RESULTS:  Lab Results   Component Value Date    AST 35 01/03/2019    ALT 39 01/03/2019       CBC RESULTS:  Lab Results   Component Value Date    WBC 4.0 01/03/2019    RBC 4.24 (L) 01/03/2019    HGB 14.3 01/03/2019    HCT 42.2 01/03/2019     01/03/2019    MCH 33.7 (H) 01/03/2019    MCHC 33.9 01/03/2019    RDW 13.9 01/03/2019     (L) 01/03/2019       BMP RESULTS:  Lab Results   Component Value Date     (L) 01/03/2019    POTASSIUM 4.1 01/03/2019    CHLORIDE 103 01/03/2019    CO2 27 01/03/2019    ANIONGAP 6.1 01/03/2019     01/03/2019    BUN 16 01/03/2019    CR 0.87 01/03/2019    GFRESTIMATED >90 01/03/2019    GFRESTBLACK >90 01/03/2019    HALEY 9.3 01/03/2019        A1C RESULTS:  No results found for: A1C    INR RESULTS:  Lab Results   Component Value Date    INR 2.1 (A) 01/04/2019    INR 1.94 (H) 01/02/2019    INR 2.02 (H) 12/31/2018                       ank you for allowing me to participate in the care of your patient.      Sincerely,     Virginia Montilla PA-C     [facility]    cc:   Kristofer Brown MD  [address]  [place] [address]

## 2019-01-15 NOTE — PROGRESS NOTES
ANTICOAGULATION FOLLOW-UP CLINIC VISIT    Patient Name:  Maurice Jon  Date:  1/15/2019  Contact Type:  Face to Face    SUBJECTIVE:     Patient Findings     Positives:   Change in medications (metoprolol increased)    Comments:   The patient saw cardiology today and is deciding if he wants to have a cardioversion or AV Node ablation/Biventricular pacemaker placed. He will be deciding by Friday. We will continue current dose of warfarin, which is keeping him in range. The patient knows if he decides to have the cardioversion, he should have INR checked next week. He will call and schedule INR accordingly. No other changes or concerns.            OBJECTIVE    INR Protime   Date Value Ref Range Status   01/15/2019 2.4 (A) 0.86 - 1.14 Final       ASSESSMENT / PLAN  INR assessment THER    Recheck INR In: 2 WEEKS sooner if cardioversion   INR Location Clinic      Anticoagulation Summary  As of 1/15/2019    INR goal:   2.0-3.0   TTR:   85.5 % (3.6 mo)   INR used for dosin.4 (1/15/2019)   Warfarin maintenance plan:   1.25 mg (2.5 mg x 0.5) every Fri; 2.5 mg (2.5 mg x 1) all other days   Full warfarin instructions:   1.25 mg every Fri; 2.5 mg all other days   Weekly warfarin total:   16.25 mg   No change documented:   Susan Beyer RN   Plan last modified:   Susan Beyer RN (2018)   Next INR check:   2019   Priority:   INR   Target end date:   10/4/2018    Indications    Paroxysmal atrial fibrillation (H) [I48.0]  Atrial fibrillation with rapid ventricular response (H) (Resolved) [I48.91]  Long-term (current) use of anticoagulants [Z79.01] [Z79.01]             Anticoagulation Episode Summary     INR check location:       Preferred lab:       Send INR reminders to:   [facility]    Comments:   * anticoagulation short period surrounding ablation on 18. Cardiology to decide when to stop warfarin. has well-compensated cirrhosis      Anticoagulation Care Providers     Provider Role  Specialty Phone number    Alon Pineda MD [facility] [phone]            See the Encounter Report to view Anticoagulation Flowsheet and Dosing Calendar (Go to Encounters tab in chart review, and find the Anticoagulation Therapy Visit)        Susan Beyer RN

## 2019-01-15 NOTE — PATIENT INSTRUCTIONS
Continue current warfarin dosing. Recheck INR next week if you decide to have a cardioversion or you can check INR in 2-3 weeks if you don't have the cardioversion.

## 2019-01-15 NOTE — PATIENT INSTRUCTIONS
"[facility]  [facility]~[address]~[place], [place]~[address]  Thank you for your [facility] visit today. If you have questions regarding your visit, please contact your cardiology RN's, Esme Hernandes or Melany Ewing, at [phone]. Your provider has recommended the following:    Medication Changes:  INCREASE metoprolol XL to 25 mg in AM and 100 mg in PM (Call if you need Rx sent ... [phone])    Recommendations:  * Your EKG today showed you're still in a funny heart rhythm (atrial fibrillation/atypical atrial flutter). Heart rate is a little fast at 106 bpm despite the digoxin and the metoprolol.  * Call me by Friday with your decision about trying to get back into normal rhythm with a cardioversion (Dr. Evans thinks this might work to get you back into normal rhythm now that you've had an AFib ablation) OR going ahead with the AV Node ablation/Biventricular pacemaker placed. This is also done at [facility] but you'd have to stay overnight. This procedure is to cut the connection between the top chamber of the heart and the bottom chamber of the heart. Your top heart might stay in atrial fibrillation but the bottom of the heart (ventricle) would ONLY listen to the pacemaker. [phone]  * Call earlier if any questions about the procedures (or if Violet has any questions about the two different procedures). [phone]  * Keep weighing self and wearing stockings if needed. Today, you do not look fluid overloaded despite the reduction in spironolactone to just once a day.     Follow-up:  Will be dependent on your decision by Friday      To schedule a future appointment, we kindly ask that you call cardiology scheduling at [phone] three months prior to requested revisit date.  [facility] cardiology clinic is staffed with \"Advance Practice Providers\". These are our cardiology Physician Assistants and Nurse Practitioners. Please call cardiology " scheduling if you feel you need clinical evaluation with them at any time for any cardiac reason.                 Reminder:    For your safety, we ask that you bring in your current medication(s) or an updated list of your medications with you to EACH office visit. Include the medication name, dose of pill on bottle and how you are taking it. Include over-the-counter medications or supplements. Your provider will review this at each visit and plan your care based on your current information.

## 2019-01-15 NOTE — PROGRESS NOTES
HPI:   I had the pleasure of meeting Yomi when he came for follow up of recurrent arrhythmias following PVI, LA posterior wall ablation, CTI and DCCV 12/21/2018.  He sees Dr. Evans for his history of:     1. Persistent AFib, first diagnosed 2013, with unsuccessful attempts at maintaining SR leading to persistent atrial fibrillation. He noted excessive fatigue, which Dr. Evans was unsure was related to metoprolol therapy vs atrial fibrillation itself and attempted rhythm control. Loaded with sotalol and underwent DCCV 10/31/2018, which maintained SR until 12/3. Developed terrible pruritis with sotalol, which resolved after this was discontinued. Underwent PVI 12/21 (full details below) complicated by pericarditis and biventricular heart failure, with EF down to 25-30% (previously normal 9/2017). Remained in SR until 1/3/19. Has been back in AFib vs atypical atrial flutter since 1/3.     2. NAFLD with cirrhosis followed by Dr. Roque at [facility].  Complicated by portal vein thrombosis, portal hypertension, thrombocytopenia (though 118K most recently 1/3), splenomegaly, splenic vein thrombosis and known esophageal varices (Upper GI 10/2016). Dr. Roque aware of CT findings (below).     3.  New onset BiV Heart Failure with EF down to 25-30% and moderate reduction in RV function and moderate-severe TR on echo done 12/23 in setting of chest discomfort post PVI . Angiogram 12/26 with mild nonobstructive disease and mildly increased LV filling pressure (wedge 18) and moderate-severely increased RV filling pressure (RAP 14).    4. Pulmonary nodules noted on CTA Heart 12/21/2018 (multiple nodules in lingula, measuring 6 mm). Multiple 4 mm nodules in subpleural location in lingula (unchanged c/w 2006). RLL nodule 3 mm    I last saw Yomi one week ago on 1/8 at which time we discussed continued atrial arrhythmias with slightly better rate control after adding digoxin.  We extensively reviewed his hospitalization, but were  "unable to go through all of his testing given how long was sent on his rhythm issues.  At that appointment, we discussed Dr. Evans's recommendations for either DC cardioversion vs AV node ablation/biventricular pacemaker implantation.  Yomi wanted to speak with his wife Violet, and we decided to keep this appointment today to further review this.    Unfortunately, Yomi' mother suffered a stroke since that appointment and he admits that he had Violet have not taken the time to go through his options for his heart.  Happily, he has not had too much trouble, noting no concerns of palpitations, lightheadedness or dizziness.  He states that his fluid level is \"great.\"  He has not gained any weight and his edema is down.  Overall, he is actually feeling pretty well.    EKG today, which I overread, showed atrial fibrillation @ 106 bpm  R/L heart cath done 12/26 done showed <10% lesions. RA pressure (mean) 14 mmHg, RVP 36/8/13. PAP 38/20/26. PCWP mean 18. CO 8.5 L/min. Cardiac Index 3.95 L/min/m2. LVEDP 15 mmHg  Echo 12/23 showed EF 25-30% with moderate-severe global hypokinesis.  RV not well visualized but likely moderate to severely dilated with moderately reduced RVSF.  RVSP 17 mmHg+ RAP. 2-3 + TR. Severely dilated RA.   CT Heart 12/21/2018 showed scattered mediastinal and right hilar lymph nodes. Calcifications scattered throughout the hilar lymph nodes bilaterally. No pleural effusion. Liver hypodensities are only partially visualized and evaluated. Etiologies are vast and broad and essentially nothing excluded. One of the collections is negative 74 Hounsfield units suggestive of fat within this collection measuring up to 2.7 cm in length. Recommend dedicated CT exam of the liver. A dominant vein, which appears to communicate to the pulmonary venous system is noted at the medial aspect of the left hemithorax. Numerous serpiginous areas of hypodensity noted surrounding the distal esophagus and near the spleen. These " were present on previous CT exam on April 6, 2006.  They may be dilated venous branches and varices. Recommend dedicated CT exam of the abdomen and pelvis. While etiology is uncertain, they were present on previous exam.     No acute osseous abnormality.   Nodules in the lingula are multiple, larger of which is image 3 series 8 measuring 6 mm. On image 2 series 8, smaller nodule adjacent to the major fissure. Multiple 4 mm nodules in a subpleural location in the lingula also noted, though unchanged from previous exam. Right lower lobe nodule is visible laterally measuring 3 mm on image 42 series 8. No pulmonary masses.      Assessment & Plan:    1. Recurrent atrial arrhythmias    AFib ablation as above, and maintained SR 12/21-1/3. Significant LA scarring noted with low chance of long-term success.     AAD therapy limited given CM (no Class Ic), ineffectiveness of sotalol (and c/o pruritis) and liver disease with increase in AST following administration of Amiodarone x 1 dose after PVI 12/21     Rates 90-100s despite addition of digoxin, and seem a bit higher today    Remains on warfarin. INR 1/4 therapeutic and today's is pending. Stopped Lovenox 1/1     PLAN:    Discussed with Dr. Evans. Options limited for AAD and Yomi recognizes these limitations    We again discussed options, including another attempt at DCCV (Dr. Evans to do) vs AVN ablation and attempt at BiV PPM.    Pt would like to discuss with his wife Violet and agrees that he can do this 1 of these evenings and will contact me by Friday 1/18 with his decision       Continue warfarin. INR planned for later today. Note his INR dropped to 1.94 when not on Lovenox (1/2), but had been >2.0 on 12/31 and again on 1/4. Last dose of Lovenox was 1/1.    Due to slightly rapid heart rate today, will increase Toprol-XL to 20 5 in the morning and 100 at night.  Continue digoxin 0.125 mg daily         2. BiVentricular HF    Echo 9/2017 showed normal LVEF and R sided  "function. Echo repeated due to c/o CP following ablation 12/21 showed EF down to 25-30% with moderate-severe dilation of RV and moderate reduction of RVSF.    Left hospital 12/27 on Lasix 40 mg BID (increased from PTA dose 20 daily), Aldactone 25 mg BID (increased from PTA dose 25 mg daily), lisinopril 2.5 daily (new) and continued high BB dose    Decreased Aldactone and held lisinopril to allow for more BP room with tighter rate control   Weight is stable, edema remains stable and he is really not having any issues with this    PLAN:    Continue Lasix 40 mg BID and spironolactone 25 daily with high dose BB    Explained rationale for resuming ACE when able, but not there yet, especially as increasing beta-blocker today for tighter rate control    Sees Kayce Manzo in [facility] 1/21     3. Cirrhosis    Last appt with Dr. Roque 9/2018 noted well-compensated cirrhosis likely based on NAFLD    Hepatic panels and CBCs as above    Dr. Roque gave go-ahead to use AC in setting of AFib with plans for ablation     PLAN:    Dr. Roque with plans to see with testing 3/2019. Sent him message re: CT scan above and he is aware of abnormalities/recommendations.    Using warfarin given reversibility     4. Minimal CAD    Stress test 10/2018 showed a small reversible mid anterolateral wall defect, but could not rule out artifact given body habitus    Initially treated medically, but given drop in EF, underwent heart cath 12/26 showing minimal non-obstructive CAD     PLAN:    Continue BB. No ASA due to warfarin. Avoiding statin given liver disease     5. Pulmonary Nodules    CT 12/21 showed nodules as above    Briefly reviewed 2006 CT chest showing what appear to be similar abnormalities, with \"irregular nodular parenchymal abnormality near R major fissure measuring 8-9 mm\" and \"irregular pleural scarring and calcified benign nodule at right major fissure.\" 2006 CT was used to compare 12/2018 CT scan to, and noted multiple 4 mm nodules in " subpleural location in lingula also noted (though I don't see in 2006 report) that are unchanged.    No nodules >6 mm     PLAN:    Sent Epic message to PCP. Note that findings appear similar to 2006 to my eye.      Nadege Montilla PA-C, MSPAS      Orders Placed This Encounter   Procedures     EKG 12-LEAD CLINIC READ     Orders Placed This Encounter   Medications     metoprolol succinate ER (TOPROL-XL) 25 MG 24 hr tablet     Sig: Take 1 tablet (25 mg) by mouth every morning     Medications Discontinued During This Encounter   Medication Reason     albuterol (PROAIR HFA) 108 (90 Base) MCG/ACT inhaler          Encounter Diagnoses   Name Primary?     Paroxysmal atrial fibrillation (H)      Persistent atrial fibrillation (H) Yes     Secondary cardiomyopathy (H)      Chronic a-fib (H)        CURRENT MEDICATIONS:  Current Outpatient Medications   Medication Sig Dispense Refill     budesonide-formoterol (SYMBICORT) 80-4.5 MCG/ACT Inhaler Inhale 2 puffs into the lungs 2 times daily 10.2 g 3     calcium carb 1250 mg, 500 mg Stebbins,/vitamin D 200 units (OSCAL WITH D) 500-200 MG-UNIT per tablet Take 2 tablets by mouth daily with food.       digoxin (LANOXIN) 125 MCG tablet Take 1 tablet (125 mcg) by mouth daily 30 tablet 3     furosemide (LASIX) 40 MG tablet Take 1 tablet (40 mg) by mouth 2 times daily 60 tablet 0     metoprolol succinate ER (TOPROL-XL) 100 MG 24 hr tablet Take 1 tablet (100 mg) by mouth daily 90 tablet 3     metoprolol succinate ER (TOPROL-XL) 25 MG 24 hr tablet Take 1 tablet (25 mg) by mouth every morning       order for DME Open toe size large 30/40 Mediven Assure Medical Compression socks Model 30965.    Fax to Fax [phone]. [facility] 12 Device 0     order for DME Equipment being ordered:   New CPAP mask, tube, filters,water tray 1 Device 3     order for DME Open toe size large 30/40 Mediven Assure Medical Compression socks Model 42082. 4 Package 3     ORDER FOR DME Respironics RemStar 60 Series  Auto AFlex 12-15 cm H2O with heated humidty and a modem.  Pt chose a Quattro Air FFM size medium.       ORDER FOR DME Juzo compression stockings, 30-40mm compression. Patient has portal hypertension and develops leg edema, which these control well. 2 Package 3     pantoprazole (PROTONIX) 40 MG EC tablet Take 1 tablet (40 mg) by mouth daily 30 tablet 0     spironolactone (ALDACTONE) 25 MG tablet Take 1 tablet (25 mg) by mouth daily  0     warfarin (COUMADIN) 2.5 MG tablet 1.25 mg Fridays; 2.5mg all other days or As directed by Anticoagulation Clinic 90 tablet 0     mometasone-formoterol (DULERA) 200-5 MCG/ACT inhaler Inhale 2 puffs into the lungs 2 times daily as needed Appt DUE Jan 2017 (Patient not taking: Reported on 1/15/2019) 1 Inhaler 1       ALLERGIES     Allergies   Allergen Reactions     Avelox Other (See Comments) and GI Disturbance     portal hypertension     Moxifloxacin Nausea and Vomiting, Nausea and Other (See Comments)     portal hypertension     Sotalol Itching       PAST MEDICAL HISTORY:  Past Medical History:   Diagnosis Date     Acute pulmonary edema (H)      Atrial fibrillation (H)      Atrial fibrillation with rapid ventricular response (H) 5/13/2013     CAD (coronary artery disease)     Cath 12/26/18- mild nonobstructive disease     Calculus of ureter 1993, 1997    history of     Cardiomyopathy (H)     12/23/18 Echo- EF 25-30%     Chronic systolic CHF (congestive heart failure) (H)      Cirrhosis of liver without mention of alcohol 8/22/2005    Cirrhosis, idiopathic     Edema 5/13/2013     Esophageal varices with bleeding(456.0)      Gallstones      Genital herpes, unspecified 3/20/1999    resolving herpes progenitalis     GERD (gastroesophageal reflux disease)      Hematuria      Hypertension      Hypochondriasis     problems in past     Obesity 11/24/2010     BRUCE (obstructive sleep apnea) 03/17/2009    Mild AHI 8.4  RDI 31 - Pt refuses to wear his CPAP     Pericarditis      Portal  hypertension (H) 1/28/2010     Unspecified thrombocytopenia 8/22/2005    Thrombocytopenia associated with cirrhosis and portal hypertension       PAST SURGICAL HISTORY:  Past Surgical History:   Procedure Laterality Date     ANESTHESIA CARDIOVERSION N/A 1/19/2015    Procedure: ANESTHESIA CARDIOVERSION;  Surgeon: Generic Anesthesia Provider;  Location: [facility]     COLONOSCOPY N/A 8/14/2017    Procedure: COLONOSCOPY;  Colonoscopy Called will arrive appx 12:30 Per phone call;  Surgeon: Vincent Whittington MD;  [facility]: [facility]     CV HEART CATHETERIZATION WITH POSSIBLE INTERVENTION N/A 12/26/2018    Procedure: Heart Catheterization with possible Intervention;  Surgeon: Brandon Arroyo MD;  Location:  HEART CARDIAC CATH LAB     EP ABLATION FOCAL AFIB N/A 12/21/2018    Procedure: EP Ablation Focal AFIB;  Surgeon: Kristofer Evans MD;  Location:  HEART CARDIAC CATH LAB     ESOPHAGOSCOPY, GASTROSCOPY, DUODENOSCOPY (EGD), COMBINED  7/11/2011    Procedure:COMBINED ESOPHAGOSCOPY, GASTROSCOPY, DUODENOSCOPY (EGD); Surgeon:LAURA LAMAS; Location:[facility]     ESOPHAGOSCOPY, GASTROSCOPY, DUODENOSCOPY (EGD), COMBINED  9/10/2012    Procedure: COMBINED ESOPHAGOSCOPY, GASTROSCOPY, DUODENOSCOPY (EGD);;  Surgeon: Hi Roque MD;  Location:  GI     ESOPHAGOSCOPY, GASTROSCOPY, DUODENOSCOPY (EGD), COMBINED  9/9/2013    Procedure: COMBINED ESOPHAGOSCOPY, GASTROSCOPY, DUODENOSCOPY (EGD);;  Surgeon: Hi Roque MD;  Location:  GI     ESOPHAGOSCOPY, GASTROSCOPY, DUODENOSCOPY (EGD), COMBINED N/A 9/15/2014    Procedure: COMBINED ESOPHAGOSCOPY, GASTROSCOPY, DUODENOSCOPY (EGD);  Surgeon: Hi Roque MD;  Location:  GI     ESOPHAGOSCOPY, GASTROSCOPY, DUODENOSCOPY (EGD), COMBINED N/A 10/30/2015    Procedure: COMBINED ESOPHAGOSCOPY, GASTROSCOPY, DUODENOSCOPY (EGD);  Surgeon: Feliberto Fox MD;  Location:  GI     ESOPHAGOSCOPY, GASTROSCOPY, DUODENOSCOPY (EGD), COMBINED N/A 10/10/2016    Procedure: COMBINED ESOPHAGOSCOPY,  GASTROSCOPY, DUODENOSCOPY (EGD);  Surgeon: Jose Nesbitt MD;  Location: [facility]     H ABLATION FOCAL AFIB  2018     HERNIA REPAIR       IRRIGATION AND DEBRIDEMENT ABSCESS SCROTUM, COMBINED  10/4/2012    Procedure: COMBINED IRRIGATION AND DEBRIDEMENT ABSCESS SCROTUM;  Irrigation and Debridement of Groin Abscess;  Surgeon: Benny Shoemaker MD;  Location: [facility] OR     SOFT TISSUE SURGERY       SURGICAL HISTORY OF -       rt elbow     SURGICAL HISTORY OF -   1/15/98    repair of ventral and umbilical hernia     SURGICAL HISTORY OF -       endoscopy       FAMILY HISTORY:  Family History   Problem Relation Age of Onset     Lipids Mother      Hypertension Mother      Eye Disorder Mother      Heart Disease Father         CHF     Lipids Father      Obesity Father      Heart Disease Brother         MI     Eye Disorder Brother      Hypertension Brother         portal HTN     Thrombosis Sister         in her lung     Cancer Other         maternal grandparent/throat cancer       SOCIAL HISTORY:  Social History     Socioeconomic History     Marital status:      Spouse name: None     Number of children: None     Years of education: None     Highest education level: None   Social Needs     Financial resource strain: None     Food insecurity - worry: None     Food insecurity - inability: None     Transportation needs - medical: None     Transportation needs - non-medical: None   Occupational History     None   Tobacco Use     Smoking status: Former Smoker     Packs/day: 0.80     Years: 6.00     Pack years: 4.80     Types: Cigarettes     Last attempt to quit: 2002     Years since quittin.0     Smokeless tobacco: Never Used   Substance and Sexual Activity     Alcohol use: No     Alcohol/week: 0.0 oz     Comment: quit in      Drug use: No     Sexual activity: Yes     Partners: Female   Other Topics Concern     Parent/sibling w/ CABG, MI or angioplasty before 65F 55M? Yes     Comment: BROTHER    - MI AT AGE 44      Service Not Asked     Blood Transfusions Not Asked     Caffeine Concern Not Asked     Occupational Exposure Not Asked     Hobby Hazards Not Asked     Sleep Concern Not Asked     Stress Concern Not Asked     Weight Concern Not Asked     Special Diet Not Asked     Back Care Not Asked     Exercise No     Bike Helmet Not Asked     Seat Belt Not Asked     Self-Exams Not Asked   Social History Narrative     None       Review of Systems:  Skin:  Negative rash   Eyes:  Negative    ENT:  Negative nasal congestion  Respiratory:  Positive for sleep apnea;CPAP;cough  Cardiovascular:  edema;lightheadedness;Negative for Positive for;lower extremity symptoms;fatigue  Gastroenterology: Positive for excessive gas or bloating;heartburn  Genitourinary:  Positive for urinary frequency  Musculoskeletal:  Negative joint pain;joint stiffness;nocturnal cramping  Neurologic:  Positive for numbness or tingling of feet  Psychiatric:  Negative    Heme/Lymph/Imm:  Positive for    Endocrine:  Negative      Physical Exam:  Vitals: BP 95/61   Pulse 77   Wt 107.2 kg (236 lb 6.4 oz)   SpO2 95%   BMI 38.74 kg/m      Constitutional:  cooperative, alert and oriented, well developed, well nourished, in no acute distress obese      Skin:  warm and dry to the touch, no apparent skin lesions or masses noted        Head:  normocephalic, no masses or lesions        Eyes:  pupils equal and round;conjunctivae and lids unremarkable;sclera white        ENT:  no pallor or cyanosis        Neck:  JVP normal;no carotid bruit        Chest:  normal breath sounds, clear to auscultation, normal A-P diameter, normal symmetry, normal respiratory excursion, no use of accessory muscles        Cardiac:                    Abdomen:  abdomen soft obese      Vascular:                                        Extremities and Back:  no deformities, clubbing, cyanosis, erythema observed;no edema        Neurological:  no gross motor deficits           Recent Lab Results:  LIPID RESULTS:  Lab Results   Component Value Date    CHOL 127 02/27/2013    HDL 42 05/19/2018    LDL 50 05/19/2018    TRIG 63 05/19/2018    CHOLHDLRATIO 2.2 02/27/2013       LIVER ENZYME RESULTS:  Lab Results   Component Value Date    AST 35 01/03/2019    ALT 39 01/03/2019       CBC RESULTS:  Lab Results   Component Value Date    WBC 4.0 01/03/2019    RBC 4.24 (L) 01/03/2019    HGB 14.3 01/03/2019    HCT 42.2 01/03/2019     01/03/2019    MCH 33.7 (H) 01/03/2019    MCHC 33.9 01/03/2019    RDW 13.9 01/03/2019     (L) 01/03/2019       BMP RESULTS:  Lab Results   Component Value Date     (L) 01/03/2019    POTASSIUM 4.1 01/03/2019    CHLORIDE 103 01/03/2019    CO2 27 01/03/2019    ANIONGAP 6.1 01/03/2019     01/03/2019    BUN 16 01/03/2019    CR 0.87 01/03/2019    GFRESTIMATED >90 01/03/2019    GFRESTBLACK >90 01/03/2019    HALEY 9.3 01/03/2019        A1C RESULTS:  No results found for: A1C    INR RESULTS:  Lab Results   Component Value Date    INR 2.1 (A) 01/04/2019    INR 1.94 (H) 01/02/2019    INR 2.02 (H) 12/31/2018

## 2019-01-15 NOTE — LETTER
1/15/2019    Kailey Ac, SARA, APRN CNP  [address] [facility] [address]    RE: Maurice Jon       Dear Colleague,    I had the pleasure of seeing Maurice Jon in the [facility].        HPI:   I had the pleasure of meeting Yomi when he came for follow up of recurrent arrhythmias following PVI, LA posterior wall ablation, CTI and DCCV 12/21/2018.  He sees Dr. Evans for his history of:     1. Persistent AFib, first diagnosed 2013, with unsuccessful attempts at maintaining SR leading to persistent atrial fibrillation. He noted excessive fatigue, which Dr. Evans was unsure was related to metoprolol therapy vs atrial fibrillation itself and attempted rhythm control. Loaded with sotalol and underwent DCCV 10/31/2018, which maintained SR until 12/3. Developed terrible pruritis with sotalol, which resolved after this was discontinued. Underwent PVI 12/21 (full details below) complicated by pericarditis and biventricular heart failure, with EF down to 25-30% (previously normal 9/2017). Remained in SR until 1/3/19. Has been back in AFib vs atypical atrial flutter since 1/3.     2. NAFLD with cirrhosis followed by Dr. Roque at [facility].  Complicated by portal vein thrombosis, portal hypertension, thrombocytopenia (though 118K most recently 1/3), splenomegaly, splenic vein thrombosis and known esophageal varices (Upper GI 10/2016). Dr. Roque aware of CT findings (below).     3.  New onset BiV Heart Failure with EF down to 25-30% and moderate reduction in RV function and moderate-severe TR on echo done 12/23 in setting of chest discomfort post PVI . Angiogram 12/26 with mild nonobstructive disease and mildly increased LV filling pressure (wedge 18) and moderate-severely increased RV filling pressure (RAP 14).    4. Pulmonary nodules noted on CTA Heart 12/21/2018 (multiple nodules in lingula, measuring 6 mm). Multiple 4 mm nodules in subpleural location in lingula (unchanged  "c/w 2006). RLL nodule 3 mm    I last saw Yomi one week ago on 1/8 at which time we discussed continued atrial arrhythmias with slightly better rate control after adding digoxin.  We extensively reviewed his hospitalization, but were unable to go through all of his testing given how long was sent on his rhythm issues.  At that appointment, we discussed Dr. Evans's recommendations for either DC cardioversion vs AV node ablation/biventricular pacemaker implantation.  Yomi wanted to speak with his wife Violet, and we decided to keep this appointment today to further review this.    Unfortunately, Yomi' mother suffered a stroke since that appointment and he admits that he had Violet have not taken the time to go through his options for his heart.  Happily, he has not had too much trouble, noting no concerns of palpitations, lightheadedness or dizziness.  He states that his fluid level is \"great.\"  He has not gained any weight and his edema is down.  Overall, he is actually feeling pretty well.    EKG today, which I overread, showed atrial fibrillation @ 106 bpm  R/L heart cath done 12/26 done showed <10% lesions. RA pressure (mean) 14 mmHg, RVP 36/8/13. PAP 38/20/26. PCWP mean 18. CO 8.5 L/min. Cardiac Index 3.95 L/min/m2. LVEDP 15 mmHg  Echo 12/23 showed EF 25-30% with moderate-severe global hypokinesis.  RV not well visualized but likely moderate to severely dilated with moderately reduced RVSF.  RVSP 17 mmHg+ RAP. 2-3 + TR. Severely dilated RA.   CT Heart 12/21/2018 showed scattered mediastinal and right hilar lymph nodes. Calcifications scattered throughout the hilar lymph nodes bilaterally. No pleural effusion. Liver hypodensities are only partially visualized and evaluated. Etiologies are vast and broad and essentially nothing excluded. One of the collections is negative 74 Hounsfield units suggestive of fat within this collection measuring up to 2.7 cm in length. Recommend dedicated CT exam of the liver. A " dominant vein, which appears to communicate to the pulmonary venous system is noted at the medial aspect of the left hemithorax. Numerous serpiginous areas of hypodensity noted surrounding the distal esophagus and near the spleen. These were present on previous CT exam on April 6, 2006.  They may be dilated venous branches and varices. Recommend dedicated CT exam of the abdomen and pelvis. While etiology is uncertain, they were present on previous exam.     No acute osseous abnormality.   Nodules in the lingula are multiple, larger of which is image 3 series 8 measuring 6 mm. On image 2 series 8, smaller nodule adjacent to the major fissure. Multiple 4 mm nodules in a subpleural location in the lingula also noted, though unchanged from previous exam. Right lower lobe nodule is visible laterally measuring 3 mm on image 42 series 8. No pulmonary masses.      Assessment & Plan:    1. Recurrent atrial arrhythmias    AFib ablation as above, and maintained SR 12/21-1/3. Significant LA scarring noted with low chance of long-term success.     AAD therapy limited given CM (no Class Ic), ineffectiveness of sotalol (and c/o pruritis) and liver disease with increase in AST following administration of Amiodarone x 1 dose after PVI 12/21     Rates 90-100s despite addition of digoxin, and seem a bit higher today    Remains on warfarin. INR 1/4 therapeutic and today's is pending. Stopped Lovenox 1/1     PLAN:    Discussed with Dr. Evans. Options limited for AAD and Yomi recognizes these limitations    We again discussed options, including another attempt at DCCV (Dr. Evans to do) vs AVN ablation and attempt at BiV PPM.    Pt would like to discuss with his wife Violet and agrees that he can do this 1 of these evenings and will contact me by Friday 1/18 with his decision       Continue warfarin. INR planned for later today. Note his INR dropped to 1.94 when not on Lovenox (1/2), but had been >2.0 on 12/31 and again on 1/4. Last dose  "of Lovenox was 1/1.    Due to slightly rapid heart rate today, will increase Toprol-XL to 20 5 in the morning and 100 at night.  Continue digoxin 0.125 mg daily         2. BiVentricular HF    Echo 9/2017 showed normal LVEF and R sided function. Echo repeated due to c/o CP following ablation 12/21 showed EF down to 25-30% with moderate-severe dilation of RV and moderate reduction of RVSF.    Left hospital 12/27 on Lasix 40 mg BID (increased from PTA dose 20 daily), Aldactone 25 mg BID (increased from PTA dose 25 mg daily), lisinopril 2.5 daily (new) and continued high BB dose    Decreased Aldactone and held lisinopril to allow for more BP room with tighter rate control   Weight is stable, edema remains stable and he is really not having any issues with this    PLAN:    Continue Lasix 40 mg BID and spironolactone 25 daily with high dose BB    Explained rationale for resuming ACE when able, but not there yet, especially as increasing beta-blocker today for tighter rate control    Sees Kayce Manzo in [facility] 1/21     3. Cirrhosis    Last appt with Dr. Roque 9/2018 noted well-compensated cirrhosis likely based on NAFLD    Hepatic panels and CBCs as above    Dr. Roque gave go-ahead to use AC in setting of AFib with plans for ablation     PLAN:    Dr. Roque with plans to see with testing 3/2019. Sent him message re: CT scan above and he is aware of abnormalities/recommendations.    Using warfarin given reversibility     4. Minimal CAD    Stress test 10/2018 showed a small reversible mid anterolateral wall defect, but could not rule out artifact given body habitus    Initially treated medically, but given drop in EF, underwent heart cath 12/26 showing minimal non-obstructive CAD     PLAN:    Continue BB. No ASA due to warfarin. Avoiding statin given liver disease     5. Pulmonary Nodules    CT 12/21 showed nodules as above    Briefly reviewed 2006 CT chest showing what appear to be similar abnormalities, with \"irregular nodular " "parenchymal abnormality near R major fissure measuring 8-9 mm\" and \"irregular pleural scarring and calcified benign nodule at right major fissure.\" 2006 CT was used to compare 12/2018 CT scan to, and noted multiple 4 mm nodules in subpleural location in lingula also noted (though I don't see in 2006 report) that are unchanged.    No nodules >6 mm     PLAN:    Sent Epic message to PCP. Note that findings appear similar to 2006 to my eye.      Nadege Montilla PA-C, MSPAS      Orders Placed This Encounter   Procedures     EKG 12-[facility]AD CLINIC READ     Orders Placed This Encounter   Medications     metoprolol succinate ER (TOPROL-XL) 25 MG 24 hr tablet     Sig: Take 1 tablet (25 mg) by mouth every morning     Medications Discontinued During This Encounter   Medication Reason     albuterol (PROAIR HFA) 108 (90 Base) MCG/ACT inhaler          Encounter Diagnoses   Name Primary?     Paroxysmal atrial fibrillation (H)      Persistent atrial fibrillation (H) Yes     Secondary cardiomyopathy (H)      Chronic a-fib (H)        CURRENT MEDICATIONS:  Current Outpatient Medications   Medication Sig Dispense Refill     budesonide-formoterol (SYMBICORT) 80-4.5 MCG/ACT Inhaler Inhale 2 puffs into the lungs 2 times daily 10.2 g 3     calcium carb 1250 mg, 500 mg [place],/vitamin D 200 units (OSCAL WITH D) 500-200 MG-UNIT per tablet Take 2 tablets by mouth daily with food.       digoxin (LANOXIN) 125 MCG tablet Take 1 tablet (125 mcg) by mouth daily 30 tablet 3     furosemide (LASIX) 40 MG tablet Take 1 tablet (40 mg) by mouth 2 times daily 60 tablet 0     metoprolol succinate ER (TOPROL-XL) 100 MG 24 hr tablet Take 1 tablet (100 mg) by mouth daily 90 tablet 3     metoprolol succinate ER (TOPROL-XL) 25 MG 24 hr tablet Take 1 tablet (25 mg) by mouth every morning       order for DME Open toe size large 30/40 Mediven Assure Medical Compression socks Model 54765.    Fax to Fax [phone]. [facility] medical 12 Device 0     order for DME " Equipment being ordered:   New CPAP mask, tube, filters,water tray 1 Device 3     order for DME Open toe size large 30/40 Mediven Assure Medical Compression socks Model [id number]. 4 Package 3     ORDER FOR DME Respironics RemStar 60 Series Auto AFlex 12-15 cm H2O with heated humidty and a modem.  Pt chose a Quattro Air FFM size medium.       ORDER FOR DME Juzo compression stockings, 30-40mm compression. Patient has portal hypertension and develops leg edema, which these control well. 2 Package 3     pantoprazole (PROTONIX) 40 MG EC tablet Take 1 tablet (40 mg) by mouth daily 30 tablet 0     spironolactone (ALDACTONE) 25 MG tablet Take 1 tablet (25 mg) by mouth daily  0     warfarin (COUMADIN) 2.5 MG tablet 1.25 mg Fridays; 2.5mg all other days or As directed by Anticoagulation Clinic 90 tablet 0     mometasone-formoterol (DULERA) 200-5 MCG/ACT inhaler Inhale 2 puffs into the lungs 2 times daily as needed Appt DUE Jan 2017 (Patient not taking: Reported on 1/15/2019) 1 Inhaler 1       ALLERGIES     Allergies   Allergen Reactions     Avelox Other (See Comments) and GI Disturbance     portal hypertension     Moxifloxacin Nausea and Vomiting, Nausea and Other (See Comments)     portal hypertension     Sotalol Itching       PAST MEDICAL HISTORY:  Past Medical History:   Diagnosis Date     Acute pulmonary edema (H)      Atrial fibrillation (H)      Atrial fibrillation with rapid ventricular response (H) 5/13/2013     CAD (coronary artery disease)     Cath 12/26/18- mild nonobstructive disease     Calculus of ureter 1993, 1997    history of     Cardiomyopathy (H)     12/23/18 Echo- EF 25-30%     Chronic systolic CHF (congestive heart failure) (H)      Cirrhosis of liver without mention of alcohol 8/22/2005    Cirrhosis, idiopathic     Edema 5/13/2013     Esophageal varices with bleeding(456.0)      Gallstones      Genital herpes, unspecified 3/20/1999    resolving herpes progenitalis     GERD (gastroesophageal reflux disease)       Hematuria      Hypertension      Hypochondriasis     problems in past     Obesity 11/24/2010     BRUCE (obstructive sleep apnea) 03/17/2009    Mild AHI 8.4  RDI 31 - Pt refuses to wear his CPAP     Pericarditis      Portal hypertension (H) 1/28/2010     Unspecified thrombocytopenia 8/22/2005    Thrombocytopenia associated with cirrhosis and portal hypertension       PAST SURGICAL HISTORY:  Past Surgical History:   Procedure Laterality Date     ANESTHESIA CARDIOVERSION N/A 1/19/2015    Procedure: ANESTHESIA CARDIOVERSION;  Surgeon: Generic Anesthesia Provider;  Location: [facility] OR     COLONOSCOPY N/A 8/14/2017    Procedure: COLONOSCOPY;  Colonoscopy Called will arrive appx 12:30 Per phone call;  Surgeon: Vincent Whittington MD;  Location:  GI     CV HEART CATHETERIZATION WITH POSSIBLE INTERVENTION N/A 12/26/2018    Procedure: Heart Catheterization with possible Intervention;  Surgeon: Brandon Arroyo MD;  Location:  HEART CARDIAC CATH LAB     EP ABLATION FOCAL AFIB N/A 12/21/2018    Procedure: EP Ablation Focal AFIB;  Surgeon: Kristofer Evans MD;  Location:  HEART CARDIAC CATH LAB     ESOPHAGOSCOPY, GASTROSCOPY, DUODENOSCOPY (EGD), COMBINED  7/11/2011    Procedure:COMBINED ESOPHAGOSCOPY, GASTROSCOPY, DUODENOSCOPY (EGD); Surgeon:LAURA LAMAS; Location:[facility] GI     ESOPHAGOSCOPY, GASTROSCOPY, DUODENOSCOPY (EGD), COMBINED  9/10/2012    Procedure: COMBINED ESOPHAGOSCOPY, GASTROSCOPY, DUODENOSCOPY (EGD);;  Surgeon: Hi Roque MD;  Location:  GI     ESOPHAGOSCOPY, GASTROSCOPY, DUODENOSCOPY (EGD), COMBINED  9/9/2013    Procedure: COMBINED ESOPHAGOSCOPY, GASTROSCOPY, DUODENOSCOPY (EGD);;  Surgeon: Hi Roque MD;  Location:  GI     ESOPHAGOSCOPY, GASTROSCOPY, DUODENOSCOPY (EGD), COMBINED N/A 9/15/2014    Procedure: COMBINED ESOPHAGOSCOPY, GASTROSCOPY, DUODENOSCOPY (EGD);  Surgeon: Hi Roque MD;  Location:  GI     ESOPHAGOSCOPY, GASTROSCOPY, DUODENOSCOPY (EGD), COMBINED N/A 10/30/2015    Procedure:  COMBINED ESOPHAGOSCOPY, GASTROSCOPY, DUODENOSCOPY (EGD);  Surgeon: Feliberto Fox MD;  Location: [facility]     ESOPHAGOSCOPY, GASTROSCOPY, DUODENOSCOPY (EGD), COMBINED N/A 10/10/2016    Procedure: COMBINED ESOPHAGOSCOPY, GASTROSCOPY, DUODENOSCOPY (EGD);  Surgeon: Jose Nesbitt MD;  Location: [facility] GI     H ABLATION FOCAL AFIB  2018     HERNIA REPAIR       IRRIGATION AND DEBRIDEMENT ABSCESS SCROTUM, COMBINED  10/4/2012    Procedure: COMBINED IRRIGATION AND DEBRIDEMENT ABSCESS SCROTUM;  Irrigation and Debridement of Groin Abscess;  Surgeon: Benny Shoemaker MD;  Location: [facility] OR     SOFT TISSUE SURGERY       SURGICAL HISTORY OF -       rt elbow     SURGICAL HISTORY OF -   1/15/98    repair of ventral and umbilical hernia     SURGICAL HISTORY OF -       endoscopy       FAMILY HISTORY:  Family History   Problem Relation Age of Onset     Lipids Mother      Hypertension Mother      Eye Disorder Mother      Heart Disease Father         CHF     Lipids Father      Obesity Father      Heart Disease Brother         MI     Eye Disorder Brother      Hypertension Brother         portal HTN     Thrombosis Sister         in her lung     Cancer Other         maternal grandparent/throat cancer       SOCIAL HISTORY:  Social History     Socioeconomic History     Marital status:      Spouse name: None     Number of children: None     Years of education: None     Highest education level: None   Social Needs     Financial resource strain: None     Food insecurity - worry: None     Food insecurity - inability: None     Transportation needs - medical: None     Transportation needs - non-medical: None   Occupational History     None   Tobacco Use     Smoking status: Former Smoker     Packs/day: 0.80     Years: 6.00     Pack years: 4.80     Types: Cigarettes     Last attempt to quit: 2002     Years since quittin.0     Smokeless tobacco: Never Used   Substance and Sexual Activity     Alcohol use: No      Alcohol/week: 0.0 oz     Comment: quit in 2007     Drug use: No     Sexual activity: Yes     Partners: Female   Other Topics Concern     Parent/sibling w/ CABG, MI or angioplasty before 65F 55M? Yes     Comment: BROTHER   - MI AT AGE 44      Service Not Asked     Blood Transfusions Not Asked     Caffeine Concern Not Asked     Occupational Exposure Not Asked     Hobby Hazards Not Asked     Sleep Concern Not Asked     Stress Concern Not Asked     Weight Concern Not Asked     Special Diet Not Asked     Back Care Not Asked     Exercise No     Bike Helmet Not Asked     Seat Belt Not Asked     Self-Exams Not Asked   Social History Narrative     None       Review of Systems:  Skin:  Negative rash   Eyes:  Negative    ENT:  Negative nasal congestion  Respiratory:  Positive for sleep apnea;CPAP;cough  Cardiovascular:  edema;lightheadedness;Negative for Positive for;lower extremity symptoms;fatigue  Gastroenterology: Positive for excessive gas or bloating;heartburn  Genitourinary:  Positive for urinary frequency  Musculoskeletal:  Negative joint pain;joint stiffness;nocturnal cramping  Neurologic:  Positive for numbness or tingling of feet  Psychiatric:  Negative    Heme/Lymph/Imm:  Positive for    Endocrine:  Negative      Physical Exam:  Vitals: BP 95/61   Pulse 77   Wt 107.2 kg (236 lb 6.4 oz)   SpO2 95%   BMI 38.74 kg/m       Constitutional:  cooperative, alert and oriented, well developed, well nourished, in no acute distress obese      Skin:  warm and dry to the touch, no apparent skin lesions or masses noted        Head:  normocephalic, no masses or lesions        Eyes:  pupils equal and round;conjunctivae and lids unremarkable;sclera white        ENT:  no pallor or cyanosis        Neck:  JVP normal;no carotid bruit        Chest:  normal breath sounds, clear to auscultation, normal A-P diameter, normal symmetry, normal respiratory excursion, no use of accessory muscles        Cardiac:                     Abdomen:  abdomen soft obese      Vascular:                                        Extremities and Back:  no deformities, clubbing, cyanosis, erythema observed;no edema        Neurological:  no gross motor deficits          Recent Lab Results:  LIPID RESULTS:  Lab Results   Component Value Date    CHOL 127 02/27/2013    HDL 42 05/19/2018    LDL 50 05/19/2018    TRIG 63 05/19/2018    CHOLHDLRATIO 2.2 02/27/2013       LIVER ENZYME RESULTS:  Lab Results   Component Value Date    AST 35 01/03/2019    ALT 39 01/03/2019       CBC RESULTS:  Lab Results   Component Value Date    WBC 4.0 01/03/2019    RBC 4.24 (L) 01/03/2019    HGB 14.3 01/03/2019    HCT 42.2 01/03/2019     01/03/2019    MCH 33.7 (H) 01/03/2019    MCHC 33.9 01/03/2019    RDW 13.9 01/03/2019     (L) 01/03/2019       BMP RESULTS:  Lab Results   Component Value Date     (L) 01/03/2019    POTASSIUM 4.1 01/03/2019    CHLORIDE 103 01/03/2019    CO2 27 01/03/2019    ANIONGAP 6.1 01/03/2019     01/03/2019    BUN 16 01/03/2019    CR 0.87 01/03/2019    GFRESTIMATED >90 01/03/2019    GFRESTBLACK >90 01/03/2019    HALEY 9.3 01/03/2019        A1C RESULTS:  No results found for: A1C    INR RESULTS:  Lab Results   Component Value Date    INR 2.1 (A) 01/04/2019    INR 1.94 (H) 01/02/2019    INR 2.02 (H) 12/31/2018       Thank you for allowing me to participate in the care of your patient.    Sincerely,     Virginia Montilla PA-C     [facility]

## 2019-01-16 ENCOUNTER — HOSPITAL ENCOUNTER (OUTPATIENT)
Dept: CARDIAC REHAB | Facility: CLINIC | Age: 59
End: 2019-01-16
Attending: NURSE PRACTITIONER
Payer: COMMERCIAL

## 2019-01-16 DIAGNOSIS — I48.19 PERSISTENT ATRIAL FIBRILLATION (H): ICD-10-CM

## 2019-01-16 DIAGNOSIS — K76.6 PORTAL HYPERTENSION (H): ICD-10-CM

## 2019-01-16 PROCEDURE — 40000116 ZZH STATISTIC OP CR VISIT

## 2019-01-16 PROCEDURE — 93798 PHYS/QHP OP CAR RHAB W/ECG: CPT

## 2019-01-16 RX ORDER — METOPROLOL SUCCINATE 25 MG/1
25 TABLET, EXTENDED RELEASE ORAL EVERY MORNING
Qty: 90 TABLET | Refills: 3 | Status: ON HOLD | OUTPATIENT
Start: 2019-01-16 | End: 2019-11-16

## 2019-01-16 RX ORDER — FUROSEMIDE 40 MG
40 TABLET ORAL 2 TIMES DAILY
Qty: 60 TABLET | Refills: 1 | Status: SHIPPED | OUTPATIENT
Start: 2019-01-16 | End: 2019-05-06

## 2019-01-17 DIAGNOSIS — I50.9 CHF (CONGESTIVE HEART FAILURE) (H): Primary | ICD-10-CM

## 2019-01-18 ENCOUNTER — HOSPITAL ENCOUNTER (OUTPATIENT)
Dept: CARDIAC REHAB | Facility: CLINIC | Age: 59
End: 2019-01-18
Attending: NURSE PRACTITIONER
Payer: COMMERCIAL

## 2019-01-18 ENCOUNTER — TELEPHONE (OUTPATIENT)
Dept: CARDIOLOGY | Facility: CLINIC | Age: 59
End: 2019-01-18

## 2019-01-18 DIAGNOSIS — R91.8 PULMONARY NODULES: Primary | ICD-10-CM

## 2019-01-18 DIAGNOSIS — I48.4 ATYPICAL ATRIAL FLUTTER (H): Primary | ICD-10-CM

## 2019-01-18 PROCEDURE — 93798 PHYS/QHP OP CAR RHAB W/ECG: CPT

## 2019-01-18 PROCEDURE — 40000116 ZZH STATISTIC OP CR VISIT

## 2019-01-18 NOTE — TELEPHONE ENCOUNTER
Called pt to review plan for DCCV.  Sandra will call him Monday with instructions.    * Asked him to get INR check ~weekly before DCCV (tentatively scheduled for 2.6 - check in 830)   * NO DIGOXIN morning of procedure      Weights are stable, not feeling fluid overloaded. Tolerating medications with issues    Will CANCEL appt with Kayce Manzo with BMP on Monday 1.21.2019.  He'll continue to weigh self and let us know if any issues.     Sees QP 3.5.2019

## 2019-01-18 NOTE — TELEPHONE ENCOUNTER
I called Yomi and left VM.    Will have Sandra call him to set up DCCV with Dr. Evans.    Continue warfarin, keeping INR >2.0 (was 2.4 on 1/15)    Asked him to call me to get update on H/P for DCCV as well as see how he's doing from fluid standpoint.  I think we could likely cancel his appt with Kayce Manzo in [facility], which is scheduled for 1.21.  Would set him up for EP Follow-up shortly after his DCCV.

## 2019-01-18 NOTE — TELEPHONE ENCOUNTER
Pt called in to say that he talked things over with his wife and he would like to pursue cardioversion first before thinking about AVNA/PPM. Will discuss with Nadege SOLIS. Melany Ewing RN Cardiology January 18, 2019, 10:59 AM

## 2019-01-21 ENCOUNTER — HOSPITAL ENCOUNTER (OUTPATIENT)
Dept: CT IMAGING | Facility: CLINIC | Age: 59
Discharge: HOME | End: 2019-01-21
Attending: NURSE PRACTITIONER | Admitting: NURSE PRACTITIONER
Payer: COMMERCIAL

## 2019-01-21 ENCOUNTER — HOSPITAL ENCOUNTER (OUTPATIENT)
Dept: CARDIAC REHAB | Facility: CLINIC | Age: 59
End: 2019-01-21
Attending: NURSE PRACTITIONER
Payer: COMMERCIAL

## 2019-01-21 DIAGNOSIS — R91.8 PULMONARY NODULES: ICD-10-CM

## 2019-01-21 PROCEDURE — 93798 PHYS/QHP OP CAR RHAB W/ECG: CPT

## 2019-01-21 PROCEDURE — 71250 CT THORAX DX C-: CPT

## 2019-01-21 PROCEDURE — 40000116 ZZH STATISTIC OP CR VISIT

## 2019-01-22 ENCOUNTER — ANTICOAGULATION THERAPY VISIT (OUTPATIENT)
Dept: ANTICOAGULATION | Facility: CLINIC | Age: 59
End: 2019-01-22
Payer: COMMERCIAL

## 2019-01-22 DIAGNOSIS — I48.0 PAROXYSMAL ATRIAL FIBRILLATION (H): ICD-10-CM

## 2019-01-22 LAB — INR POINT OF CARE: 2.5 (ref 0.86–1.14)

## 2019-01-22 PROCEDURE — 99207 ZZC NO CHARGE NURSE ONLY: CPT

## 2019-01-22 PROCEDURE — 36416 COLLJ CAPILLARY BLOOD SPEC: CPT

## 2019-01-22 PROCEDURE — 85610 PROTHROMBIN TIME: CPT | Mod: QW

## 2019-01-22 NOTE — PROGRESS NOTES
ANTICOAGULATION FOLLOW-UP CLINIC VISIT    Patient Name:  Maurice Jon  Date:  2019  Contact Type:  Face to Face    SUBJECTIVE:     Patient Findings     Positives:   No Problem Findings    Comments:   No changes in medications, diet, or activity noted. Took warfarin as instructed, denies any missed doses.    Patient is working towards cardioversion and needs weekly INRs.            OBJECTIVE    INR Protime   Date Value Ref Range Status   2019 2.5 (A) 0.86 - 1.14 Final       ASSESSMENT / PLAN  No question data found.  Anticoagulation Summary  As of 2019    INR goal:   2.0-3.0   TTR:   86.4 % (3.8 mo)   INR used for dosin.5 (2019)   Warfarin maintenance plan:   1.25 mg (2.5 mg x 0.5) every Fri; 2.5 mg (2.5 mg x 1) all other days   Full warfarin instructions:   1.25 mg every Fri; 2.5 mg all other days   Weekly warfarin total:   16.25 mg   No change documented:   Deisy Reza RN   Plan last modified:   Susan Beyer RN (2018)   Next INR check:   2019   Priority:   INR   Target end date:   10/4/2018    Indications    Paroxysmal atrial fibrillation (H) [I48.0]  Atrial fibrillation with rapid ventricular response (H) (Resolved) [I48.91]  Long-term (current) use of anticoagulants [Z79.01] [Z79.01]             Anticoagulation Episode Summary     INR check location:       Preferred lab:       Send INR reminders to:   [facility]    Comments:   * anticoagulation short period surrounding ablation on 18. Cardiology to decide when to stop warfarin. has well-compensated cirrhosis      Anticoagulation Care Providers     Provider Role Specialty Phone number    Alon Pineda MD [facility] [phone]            See the Encounter Report to view Anticoagulation Flowsheet and Dosing Calendar (Go to Encounters tab in chart review, and find the Anticoagulation Therapy Visit)        Deisy Reza RN CACP

## 2019-01-23 ENCOUNTER — HOSPITAL ENCOUNTER (OUTPATIENT)
Dept: CARDIAC REHAB | Facility: CLINIC | Age: 59
End: 2019-01-23
Attending: NURSE PRACTITIONER
Payer: COMMERCIAL

## 2019-01-23 PROCEDURE — 40000116 ZZH STATISTIC OP CR VISIT: Performed by: REHABILITATION PRACTITIONER

## 2019-01-23 PROCEDURE — 93798 PHYS/QHP OP CAR RHAB W/ECG: CPT | Performed by: REHABILITATION PRACTITIONER

## 2019-01-25 ENCOUNTER — HOSPITAL ENCOUNTER (OUTPATIENT)
Dept: CARDIAC REHAB | Facility: CLINIC | Age: 59
End: 2019-01-25
Attending: NURSE PRACTITIONER
Payer: COMMERCIAL

## 2019-01-25 DIAGNOSIS — R91.8 PULMONARY NODULES: Primary | ICD-10-CM

## 2019-01-25 PROCEDURE — 93798 PHYS/QHP OP CAR RHAB W/ECG: CPT

## 2019-01-25 PROCEDURE — 40000116 ZZH STATISTIC OP CR VISIT

## 2019-01-29 ENCOUNTER — ANTICOAGULATION THERAPY VISIT (OUTPATIENT)
Dept: ANTICOAGULATION | Facility: CLINIC | Age: 59
End: 2019-01-29
Payer: COMMERCIAL

## 2019-01-29 DIAGNOSIS — I48.0 PAROXYSMAL ATRIAL FIBRILLATION (H): ICD-10-CM

## 2019-01-29 LAB — INR POINT OF CARE: 3 (ref 0.86–1.14)

## 2019-01-29 PROCEDURE — 99207 ZZC NO CHARGE NURSE ONLY: CPT

## 2019-01-29 PROCEDURE — 36416 COLLJ CAPILLARY BLOOD SPEC: CPT

## 2019-01-29 PROCEDURE — 85610 PROTHROMBIN TIME: CPT | Mod: QW

## 2019-01-29 NOTE — PROGRESS NOTES
ANTICOAGULATION FOLLOW-UP CLINIC VISIT    Patient Name:  Maurice Jon  Date:  1/29/2019  Contact Type:  Face to Face    SUBJECTIVE:     Patient Findings     Positives:   No Problem Findings    Comments:   Patient has been having weekly INRs for his upcoming cardioversion on 2/6/18. INRs have been therapeutic at his maintenance dose, will continue that and recheck INR in one week as scheduled. Patient reports no changes in medication, activity, or diet. Patient reports has taken warfarin as instructed, no missed doses. Patient reports no abnormal bruising or bleeding and no signs or symptoms of a blood clot.   Patient to call [facility] with any changes or concerns. Patient in agreement to plan. Patient verbalized understanding of all instructions, denies questions or concerns at this time.              OBJECTIVE    INR Protime   Date Value Ref Range Status   01/29/2019 3.0 (A) 0.86 - 1.14 Final       ASSESSMENT / PLAN  INR assessment THER    Recheck INR In: 1 WEEK    INR Location Clinic      Anticoagulation Summary  As of 1/29/2019    INR goal:   2.0-3.0   TTR:   87.2 % (4.1 mo)   INR used for dosing:   3.0 (1/29/2019)   Warfarin maintenance plan:   1.25 mg (2.5 mg x 0.5) every Fri; 2.5 mg (2.5 mg x 1) all other days   Full warfarin instructions:   1.25 mg every Fri; 2.5 mg all other days   Weekly warfarin total:   16.25 mg   No change documented:   Christina Singer RN   Plan last modified:   Susan Beyer RN (9/28/2018)   Next INR check:   2/5/2019   Priority:   INR   Target end date:   10/4/2018    Indications    Paroxysmal atrial fibrillation (H) [I48.0]  Atrial fibrillation with rapid ventricular response (H) (Resolved) [I48.91]  Long-term (current) use of anticoagulants [Z79.01] [Z79.01]             Anticoagulation Episode Summary     INR check location:       Preferred lab:       Send INR reminders to:   [facility] ANTICOAG POOL    Comments:   * anticoagulation short period surrounding ablation on 12/21/18.  Cardiology to decide when to stop warfarin. has well-compensated cirrhosis      Anticoagulation Care Providers     Provider Role Specialty Phone number    Alon Pineda MD [facility] [phone]            See the Encounter Report to view Anticoagulation Flowsheet and Dosing Calendar (Go to Encounters tab in chart review, and find the Anticoagulation Therapy Visit)        Christina Singer RN

## 2019-01-30 ENCOUNTER — HOSPITAL ENCOUNTER (OUTPATIENT)
Dept: CARDIAC REHAB | Facility: CLINIC | Age: 59
End: 2019-01-30
Attending: NURSE PRACTITIONER
Payer: COMMERCIAL

## 2019-01-30 PROCEDURE — 93798 PHYS/QHP OP CAR RHAB W/ECG: CPT | Performed by: REHABILITATION PRACTITIONER

## 2019-01-30 PROCEDURE — 40000116 ZZH STATISTIC OP CR VISIT: Performed by: REHABILITATION PRACTITIONER

## 2019-01-31 ENCOUNTER — TELEPHONE (OUTPATIENT)
Dept: FAMILY MEDICINE | Facility: CLINIC | Age: 59
End: 2019-01-31

## 2019-01-31 NOTE — TELEPHONE ENCOUNTER
Patient was given z-pack back in December which helped his symptoms however he states they have returned.  RN advised appt for symptom evaluation.    Patient was unable to make the times offered to patient so he was transferred to the appt line to check other clinics.  Vera HOYOS RN

## 2019-01-31 NOTE — TELEPHONE ENCOUNTER
Reason for call:  Patient reporting a symptom    Symptom or request: pt has been coughing and been congested again with green drainage, pt was prescribed a zpac and has been using symbicort inhaler as well but has a cardioversion scheduled next week and would like something to help clear this.    Duration (how long have symptoms been present): the cough never really went away     Have you been treated for this before? Yes appt 1/2/19    Additional comments:     Phone Number patient can be reached at:  Home number on file [phone] (home)    Best Time:  any    Can we leave a detailed message on this number:  YES    Call taken on 1/31/2019 at 3:14 PM by Paulina Greer

## 2019-02-01 ENCOUNTER — OFFICE VISIT (OUTPATIENT)
Dept: FAMILY MEDICINE | Facility: CLINIC | Age: 59
End: 2019-02-01
Payer: COMMERCIAL

## 2019-02-01 ENCOUNTER — HOSPITAL ENCOUNTER (OUTPATIENT)
Dept: CARDIAC REHAB | Facility: CLINIC | Age: 59
End: 2019-02-01
Attending: NURSE PRACTITIONER
Payer: COMMERCIAL

## 2019-02-01 VITALS
TEMPERATURE: 98.2 F | BODY MASS INDEX: 39.49 KG/M2 | SYSTOLIC BLOOD PRESSURE: 114 MMHG | HEIGHT: 65 IN | RESPIRATION RATE: 16 BRPM | OXYGEN SATURATION: 96 % | WEIGHT: 237 LBS | DIASTOLIC BLOOD PRESSURE: 66 MMHG | HEART RATE: 100 BPM

## 2019-02-01 DIAGNOSIS — J20.9 ACUTE BRONCHITIS WITH SYMPTOMS > 10 DAYS: Primary | ICD-10-CM

## 2019-02-01 PROCEDURE — 40000116 ZZH STATISTIC OP CR VISIT

## 2019-02-01 PROCEDURE — 93798 PHYS/QHP OP CAR RHAB W/ECG: CPT

## 2019-02-01 PROCEDURE — 99213 OFFICE O/P EST LOW 20 MIN: CPT | Performed by: NURSE PRACTITIONER

## 2019-02-01 RX ORDER — AZITHROMYCIN 250 MG/1
TABLET, FILM COATED ORAL
Qty: 6 TABLET | Refills: 0 | Status: ON HOLD | OUTPATIENT
Start: 2019-02-01 | End: 2019-02-06

## 2019-02-01 ASSESSMENT — MIFFLIN-ST. JEOR: SCORE: 1821.9

## 2019-02-01 NOTE — PROGRESS NOTES
"  SUBJECTIVE:   Maurice Jon is a 58 year old male who presents to clinic today for the following health issues: productive cough, runny nose for over 2 weeks, he had similar symptoms in December and was treated with Z-pack, states his cough almost went away at that time. He is scheduled for cardioversion procedure on 2/6.  Denies chest pain and shortness of breath.  He is on Coumadin, states when he was prescribed Z-pack it did not affect his INR. He is going to recheck his INR on Tuesday before cardioversion.     ENT Symptoms             Symptoms: cc Present Absent Comment   Fever/Chills   x    Fatigue  x     Muscle Aches   x    Eye Irritation   x    Sneezing   x    Nasal Leroy/Drg  x     Sinus Pressure/Pain   x    Loss of smell   x    Dental pain   x    Sore Throat   x    Swollen Glands   x    Ear Pain/Fullness   x    Cough  x  Productive green mucus   Wheeze   x    Chest Pain   x    Shortness of breath   x    Rash   x    Other   x      Symptom duration:  intermittent since mid december   Symptom severity:  moderate   Treatments tried:  z pack   Contacts:  non known     Problem list and histories reviewed & adjusted, as indicated.  Additional history: as documented    Labs reviewed in EPIC    Reviewed and updated as needed this visit by clinical staff  Tobacco  Allergies  Meds  Med Hx  Surg Hx  Fam Hx  Soc Hx      Reviewed and updated as needed this visit by Provider         ROS:  Constitutional, HEENT, cardiovascular, pulmonary, gi and gu systems are negative, except as otherwise noted.    OBJECTIVE:     /66   Pulse 100   Temp 98.2  F (36.8  C) (Tympanic)   Resp 16   Ht 1.651 m (5' 5\")   Wt 107.5 kg (237 lb)   SpO2 96%   BMI 39.44 kg/m    Body mass index is 39.44 kg/m .  GENERAL: healthy, alert and no distress  HENT: normal cephalic/atraumatic, nasal mucosa edematous , oropharynx clear and sinuses: not tender  RESP: lungs clear to auscultation - no rales, rhonchi or wheezes  CV: " irregularly irregular rhythm and no peripheral edema  PSYCH: mentation appears normal, affect normal/bright    Diagnostic Test Results:  none     ASSESSMENT/PLAN:     1. Acute bronchitis with symptoms > 10 days  - azithromycin (ZITHROMAX) 250 MG tablet; Two tablets first day, then one tablet daily for four days.  Dispense: 6 tablet; Refill: 0  -recheck INR in 3 days after starting abx  -Mucinex  mg twice daily as needed    See Patient Instructions    JANETTE Arriola [facility]

## 2019-02-01 NOTE — NURSING NOTE
"Initial /66   Pulse 100   Temp 98.2  F (36.8  C) (Tympanic)   Resp 16   Ht 1.651 m (5' 5\")   Wt 107.5 kg (237 lb)   SpO2 96%   BMI 39.44 kg/m   Estimated body mass index is 39.44 kg/m  as calculated from the following:    Height as of this encounter: 1.651 m (5' 5\").    Weight as of this encounter: 107.5 kg (237 lb). .      "

## 2019-02-04 ENCOUNTER — HOSPITAL ENCOUNTER (OUTPATIENT)
Dept: CARDIAC REHAB | Facility: CLINIC | Age: 59
End: 2019-02-04
Attending: NURSE PRACTITIONER
Payer: COMMERCIAL

## 2019-02-04 PROCEDURE — 40000116 ZZH STATISTIC OP CR VISIT: Performed by: REHABILITATION PRACTITIONER

## 2019-02-04 PROCEDURE — 93798 PHYS/QHP OP CAR RHAB W/ECG: CPT | Performed by: REHABILITATION PRACTITIONER

## 2019-02-05 ENCOUNTER — ANTICOAGULATION THERAPY VISIT (OUTPATIENT)
Dept: ANTICOAGULATION | Facility: CLINIC | Age: 59
End: 2019-02-05
Payer: COMMERCIAL

## 2019-02-05 DIAGNOSIS — I48.0 PAROXYSMAL ATRIAL FIBRILLATION (H): ICD-10-CM

## 2019-02-05 LAB — INR POINT OF CARE: 2.4 (ref 0.86–1.14)

## 2019-02-05 PROCEDURE — 99207 ZZC NO CHARGE NURSE ONLY: CPT

## 2019-02-05 PROCEDURE — 36416 COLLJ CAPILLARY BLOOD SPEC: CPT

## 2019-02-05 PROCEDURE — 85610 PROTHROMBIN TIME: CPT | Mod: QW

## 2019-02-05 NOTE — PROGRESS NOTES
ANTICOAGULATION FOLLOW-UP CLINIC VISIT    Patient Name:  Maurice Jon  Date:  2019  Contact Type:  Face to Face    SUBJECTIVE:     Patient Findings     Positives:   No Problem Findings    Comments:   Patient reports no changes in medication, activity, or diet. Patient reports has taken warfarin as instructed, no missed doses. Patient reports no abnormal bruising or bleeding and no signs or symptoms of a blood clot. Patient is scheduled for cardioversion tomorrow. Will plan to continue maintenance dose and recheck INR in 2 weeks. Patient to call [facility] with any changes or concerns. Patient in agreement to plan. Patient verbalized understanding of all instructions, denies questions or concerns at this time.              OBJECTIVE    INR Protime   Date Value Ref Range Status   2019 2.4 (A) 0.86 - 1.14 Final       ASSESSMENT / PLAN  INR assessment THER    Recheck INR In: 2 WEEKS    INR Location Clinic      Anticoagulation Summary  As of 2019    INR goal:   2.0-3.0   TTR:   87.9 % (4.3 mo)   INR used for dosin.4 (2019)   Warfarin maintenance plan:   1.25 mg (2.5 mg x 0.5) every Fri; 2.5 mg (2.5 mg x 1) all other days   Full warfarin instructions:   1.25 mg every Fri; 2.5 mg all other days   Weekly warfarin total:   16.25 mg   No change documented:   Christina Singer RN   Plan last modified:   Susan Beyer RN (2018)   Next INR check:   2019   Priority:   INR   Target end date:   10/4/2018    Indications    Paroxysmal atrial fibrillation (H) [I48.0]  Atrial fibrillation with rapid ventricular response (H) (Resolved) [I48.91]  Long-term (current) use of anticoagulants [Z79.01] [Z79.01]             Anticoagulation Episode Summary     INR check location:       Preferred lab:       Send INR reminders to:   [facility] ANTICOAG POOL    Comments:   * anticoagulation short period surrounding ablation on 18. Cardiology to decide when to stop warfarin. has well-compensated cirrhosis       Anticoagulation Care Providers     Provider Role Specialty Phone number    Alon Pineda MD [facility] [phone]            See the Encounter Report to view Anticoagulation Flowsheet and Dosing Calendar (Go to Encounters tab in chart review, and find the Anticoagulation Therapy Visit)        Christina Singer RN

## 2019-02-06 ENCOUNTER — HOSPITAL ENCOUNTER (OUTPATIENT)
Dept: MEDSURG UNIT | Facility: CLINIC | Age: 59
End: 2019-02-06
Attending: INTERNAL MEDICINE | Admitting: INTERNAL MEDICINE
Payer: COMMERCIAL

## 2019-02-06 ENCOUNTER — HOSPITAL ENCOUNTER (OUTPATIENT)
Facility: CLINIC | Age: 59
Discharge: HOME | End: 2019-02-06
Attending: INTERNAL MEDICINE | Admitting: INTERNAL MEDICINE
Payer: COMMERCIAL

## 2019-02-06 ENCOUNTER — ANESTHESIA (OUTPATIENT)
Dept: SURGERY | Facility: CLINIC | Age: 59
End: 2019-02-06
Payer: COMMERCIAL

## 2019-02-06 ENCOUNTER — ANESTHESIA EVENT (OUTPATIENT)
Dept: SURGERY | Facility: CLINIC | Age: 59
End: 2019-02-06
Payer: COMMERCIAL

## 2019-02-06 VITALS
TEMPERATURE: 97.4 F | DIASTOLIC BLOOD PRESSURE: 61 MMHG | BODY MASS INDEX: 39.2 KG/M2 | HEART RATE: 65 BPM | SYSTOLIC BLOOD PRESSURE: 98 MMHG | RESPIRATION RATE: 17 BRPM | WEIGHT: 235.3 LBS | OXYGEN SATURATION: 96 % | HEIGHT: 65 IN

## 2019-02-06 DIAGNOSIS — I48.4 ATYPICAL ATRIAL FLUTTER (H): ICD-10-CM

## 2019-02-06 DIAGNOSIS — I48.0 PAROXYSMAL ATRIAL FIBRILLATION (H): Primary | ICD-10-CM

## 2019-02-06 LAB
DIGOXIN SERPL-MCNC: 0.5 UG/L (ref 0.5–2)
INR BLD: 2.2 (ref 0.86–1.14)
MAGNESIUM SERPL-MCNC: 1.9 MG/DL (ref 1.6–2.3)
POTASSIUM SERPL-SCNC: 4 MMOL/L (ref 3.4–5.3)

## 2019-02-06 PROCEDURE — 40000010 ZZH STATISTIC ANES STAT CODE-CRNA PER MINUTE

## 2019-02-06 PROCEDURE — 83735 ASSAY OF MAGNESIUM: CPT | Performed by: INTERNAL MEDICINE

## 2019-02-06 PROCEDURE — 36415 COLL VENOUS BLD VENIPUNCTURE: CPT

## 2019-02-06 PROCEDURE — 80162 ASSAY OF DIGOXIN TOTAL: CPT | Performed by: INTERNAL MEDICINE

## 2019-02-06 PROCEDURE — 25000128 H RX IP 250 OP 636: Performed by: INTERNAL MEDICINE

## 2019-02-06 PROCEDURE — 37000008 ZZH ANESTHESIA TECHNICAL FEE, 1ST 30 MIN

## 2019-02-06 PROCEDURE — 85610 PROTHROMBIN TIME: CPT | Mod: QW | Performed by: INTERNAL MEDICINE

## 2019-02-06 PROCEDURE — 25000128 H RX IP 250 OP 636: Performed by: NURSE ANESTHETIST, CERTIFIED REGISTERED

## 2019-02-06 PROCEDURE — 40000235 ZZH STATISTIC TELEMETRY

## 2019-02-06 PROCEDURE — 92960 CARDIOVERSION ELECTRIC EXT: CPT | Performed by: INTERNAL MEDICINE

## 2019-02-06 PROCEDURE — 93005 ELECTROCARDIOGRAM TRACING: CPT

## 2019-02-06 PROCEDURE — 93010 ELECTROCARDIOGRAM REPORT: CPT | Performed by: INTERNAL MEDICINE

## 2019-02-06 PROCEDURE — 84132 ASSAY OF SERUM POTASSIUM: CPT | Performed by: INTERNAL MEDICINE

## 2019-02-06 PROCEDURE — 92960 CARDIOVERSION ELECTRIC EXT: CPT

## 2019-02-06 RX ORDER — SODIUM CHLORIDE 9 MG/ML
INJECTION, SOLUTION INTRAVENOUS CONTINUOUS
Status: DISCONTINUED | OUTPATIENT
Start: 2019-02-06 | End: 2019-02-06 | Stop reason: HOSPADM

## 2019-02-06 RX ORDER — PROPOFOL 10 MG/ML
INJECTION, EMULSION INTRAVENOUS PRN
Status: DISCONTINUED | OUTPATIENT
Start: 2019-02-06 | End: 2019-02-06

## 2019-02-06 RX ORDER — NALOXONE HYDROCHLORIDE 0.4 MG/ML
.1-.4 INJECTION, SOLUTION INTRAMUSCULAR; INTRAVENOUS; SUBCUTANEOUS
Status: DISCONTINUED | OUTPATIENT
Start: 2019-02-06 | End: 2019-02-06 | Stop reason: HOSPADM

## 2019-02-06 RX ORDER — POTASSIUM CHLORIDE 1500 MG/1
40 TABLET, EXTENDED RELEASE ORAL
Status: DISCONTINUED | OUTPATIENT
Start: 2019-02-06 | End: 2019-02-06 | Stop reason: HOSPADM

## 2019-02-06 RX ORDER — FLUMAZENIL 0.1 MG/ML
0.2 INJECTION, SOLUTION INTRAVENOUS
Status: DISCONTINUED | OUTPATIENT
Start: 2019-02-06 | End: 2019-02-06 | Stop reason: HOSPADM

## 2019-02-06 RX ORDER — POTASSIUM CHLORIDE 1500 MG/1
20 TABLET, EXTENDED RELEASE ORAL
Status: DISCONTINUED | OUTPATIENT
Start: 2019-02-06 | End: 2019-02-06 | Stop reason: HOSPADM

## 2019-02-06 RX ORDER — PANTOPRAZOLE SODIUM 40 MG/1
40 TABLET, DELAYED RELEASE ORAL DAILY
COMMUNITY
End: 2019-04-22

## 2019-02-06 RX ORDER — MAGNESIUM SULFATE HEPTAHYDRATE 40 MG/ML
2 INJECTION, SOLUTION INTRAVENOUS
Status: DISCONTINUED | OUTPATIENT
Start: 2019-02-06 | End: 2019-02-06 | Stop reason: HOSPADM

## 2019-02-06 RX ORDER — ATROPINE SULFATE 0.1 MG/ML
.5-1 INJECTION INTRAVENOUS
Status: DISCONTINUED | OUTPATIENT
Start: 2019-02-06 | End: 2019-02-06 | Stop reason: HOSPADM

## 2019-02-06 RX ADMIN — SODIUM CHLORIDE: 9 INJECTION, SOLUTION INTRAVENOUS at 10:50

## 2019-02-06 RX ADMIN — MAGNESIUM SULFATE HEPTAHYDRATE 2 G: 40 INJECTION, SOLUTION INTRAVENOUS at 11:27

## 2019-02-06 RX ADMIN — PROPOFOL 80 MG: 10 INJECTION, EMULSION INTRAVENOUS at 12:04

## 2019-02-06 ASSESSMENT — ENCOUNTER SYMPTOMS: DYSRHYTHMIAS: 1

## 2019-02-06 ASSESSMENT — MIFFLIN-ST. JEOR: SCORE: 1814.19

## 2019-02-06 NOTE — DISCHARGE INSTRUCTIONS
Cardioversion Discharge Instructions    After you go home:       For 24 hours - due to the sedation you received:      Have an adult stay with you for 24 hours.     Relax and take it easy.    Do NOT make any important or legal decisions.    Do NOT drive or operate machines at home or at work.    Do NOT drink alcohol.    Diet:      Start with clear liquids and progress to your normal diet as you feel able.    Medicines:      Take your medications, including blood thinners, unless your provider tells you not to.    If you have stopped any medications, check with your provider about when to restart them.    Follow Up Appointments:      Follow up with your cardiologist at [facility] of patient preference as instructed.    Follow up with your primary care provider as needed.    Post cardioversion:    The skin on your chest or back may feel tender for 48 hours.  If your skin is tender, you may:      Use a cold pack on the site. Never use ice directly on your skin. Use the cold pack for 20 minutes. Remove it for at least 30 minutes before re-using.    Apply 1% hydrocortisone cream to the skin (sold at drug stores)    Take Advil (Ibuprofen) or Tylenol (Acetaminophen) per your provider's recommendations.      Call your provider if you have:      Weakness, dizziness, lightheadedness, or fainting.    Shortness of breath.    Irregular heartbeat, feelings of your heart fluttering or beating fast, hard or palpitations.     More than minor skin discomfort or redness where the cardioversion pads were placed.    Questions or concerns.      Call 911 if you have:      Pain in your chest, arm, shoulder, neck, or upper back.    You have problems speaking or seeing.    Weakness in your arm or leg.    You are unable to move your arm or leg.    You have uncontrolled bleeding.         [facility] [place]:    [phone] [facility] (7 days a week)

## 2019-02-06 NOTE — PROCEDURES
Maurice Jon  [id number]  1960    Procedures:   1. Direct Current Cardioversion    Indication: Atrial fibrillation    HPI: This is a 58 year old patient with a history of atrial fibrillation and has been on uninterrupted oral anticoagulation for at least the past 4 weeks. Patient was referred for direct current cardioversion. Risks of procedure was discussed including but not limited to respiratory distress, skin rash and stroke. Patient understood and accepted these risks. Informed consent was obtained.    Methods: Defibrillation patches were placed in the right anterior and left apical position. General anesthesia was administered by the anesthesiology service. 1 application of synchronized in a biphasic fashion at 150 joules were delivered and successfully converted to normal sinus rhythm.     Complications:  None    Conclusion: Successful direct current cardioversion of atrial fibrillation into a normal sinus rhythm.    Kristofer Evans MD

## 2019-02-06 NOTE — ANESTHESIA POSTPROCEDURE EVALUATION
Patient: Maurice Jon    Procedure(s):  ANESTHESIA CARDIOVERSION    Diagnosis:AFIB  Diagnosis Additional Information: No value filed.    Anesthesia Type:  General    Note:  Anesthesia Post Evaluation    Patient location: cardiac care suites.  Patient participation: Able to fully participate in evaluation  Level of consciousness: awake  Pain management: adequate  Airway patency: patent  Cardiovascular status: acceptable and hemodynamically stable  Respiratory status: acceptable and room air  Hydration status: euvolemic  PONV: none       Comments: Well tolerated.        Last vitals:  Vitals:    02/06/19 1255 02/06/19 1300 02/06/19 1305   BP:      Pulse:      Resp: 14 12 17   Temp:      SpO2: 96% 96% 96%         Electronically Signed By: Edmond Deshpande MD  February 6, 2019  2:44 PM

## 2019-02-06 NOTE — ANESTHESIA CARE TRANSFER NOTE
Patient: Maurice Jon    Procedure(s):  ANESTHESIA CARDIOVERSION    Diagnosis: AFIB  Diagnosis Additional Information: No value filed.    Anesthesia Type:   General     Note:  Airway :Room Air  Patient transferred to:Telemetry/Step Down Unit  Comments: Diagnosis: A Fib  Procedure: Cardioversion  Cardiologist: Cristina  Location: Care Suites - 20    At end of procedure, spontaneous respirations, patient alert to voice, able to follow commands. Patient breathing room air in step down unit. Oxygen tubing connected to wall O2 in step down, SpO2, NiBP, and EKG monitors and alarms on and functioning, Nai Hugger warmer connected to patient gown, report on patient's clinical status given to step down RN, RN questions answered.Handoff Report: Identifed the Patient, Identified the Reponsible Provider, Reviewed the pertinent medical history, Discussed the surgical course, Reviewed Intra-OP anesthesia mangement and issues during anesthesia, Set expectations for post-procedure period and Allowed opportunity for questions and acknowledgement of understanding      Vitals: (Last set prior to Anesthesia Care Transfer)              Electronically Signed By: JANETTE Goncalves CRNA  February 6, 2019  12:10 PM

## 2019-02-06 NOTE — PROGRESS NOTES
1030 denies pain. Pt arrived to care suites about 0955. Denies loose teeth or dentures, npo since last night, yes has sleep apnea and uses cpap, no alcohol use. Pt in a fib. Did not take digoxin this am or his diuretics as directed. Pt did take his warfarin this am and says he has not missed any doses int he last month.await labs.   1043 reviewed avs discharge instructions with pt and copy given. Wife is here,  and will be with him after discharge. inr pt of care is 2.2 today.  1130 mag 1.9, iv mag replacement started. k 4.0 will check with dr wilson. Await dig level.  1137 no need to give kcl per dr wilson. Dr wilson here now to consent and assess pt.  1144 dig level 0.5.  1214 pt sedated per anesthesia, pt given one shock at 150 joules at 1203 and converted from atrial fib to NSR. Pt sleepy, denies pain. sao2 92-96% with out o2.   1215 dr wilson did go to speak with wife and when not found, he called her on her cell phone.

## 2019-02-06 NOTE — PROGRESS NOTES
Successful CVN of AF. Home after recovering from sedation. No driving today. Will arrange for f/u. Continue with current meds.

## 2019-02-06 NOTE — IP AVS SNAPSHOT
MRN:[id number]                      After Visit Summary   2/6/2019    Maurice Jon    MRN: [id number]           Visit Information        Department      2/6/2019  9:38 AM [facility]          Review of your medicines      UNREVIEWED medicines. Ask your doctor about these medicines       Dose / Directions   budesonide-formoterol 80-4.5 MCG/ACT Inhaler  Commonly known as:  SYMBICORT  Used for:  Shortness of breath      Dose:  2 puff  Inhale 2 puffs into the lungs 2 times daily  Quantity:  10.2 g  Refills:  3     calcium carbonate-vitamin D 500-200 MG-UNIT tablet  Commonly known as:  OSCAL w/D      Dose:  2 tablet  Take 2 tablets by mouth daily with food.  Refills:  0     digoxin 125 MCG tablet  Commonly known as:  LANOXIN  Used for:  Atrial flutter (H)      Dose:  125 mcg  Take 1 tablet (125 mcg) by mouth daily  Quantity:  30 tablet  Refills:  3     furosemide 40 MG tablet  Commonly known as:  LASIX  Used for:  Portal hypertension (H)      Dose:  40 mg  Take 1 tablet (40 mg) by mouth 2 times daily  Quantity:  60 tablet  Refills:  1     * metoprolol succinate  MG 24 hr tablet  Commonly known as:  TOPROL-XL  Used for:  Chronic a-fib (H)      Dose:  100 mg  Take 1 tablet (100 mg) by mouth daily  Quantity:  90 tablet  Refills:  3     * metoprolol succinate ER 25 MG 24 hr tablet  Commonly known as:  TOPROL-XL  Used for:  Persistent atrial fibrillation (H)      Dose:  25 mg  Take 1 tablet (25 mg) by mouth every morning  Quantity:  90 tablet  Refills:  3     mometasone-formoterol 200-5 MCG/ACT inhaler  Commonly known as:  DULERA  Used for:  Bronchospasm      Dose:  2 puff  Inhale 2 puffs into the lungs 2 times daily as needed Appt DUE Jan 2017  Quantity:  1 Inhaler  Refills:  1     pantoprazole 40 MG EC tablet  Commonly known as:  PROTONIX      Dose:  40 mg  Take 40 mg by mouth daily  Refills:  0     spironolactone 25 MG tablet  Commonly known as:  ALDACTONE  Used for:  Portal  hypertension (H)      Dose:  25 mg  Take 1 tablet (25 mg) by mouth daily  Refills:  0     warfarin 2.5 MG tablet  Commonly known as:  COUMADIN  Used for:  Long term current use of anticoagulant therapy, Paroxysmal atrial fibrillation (H), Atrial fibrillation with rapid ventricular response (H)      Take as directed. If you are unsure how to take this medication, talk to your nurse or doctor.  Original instructions:  1.25 mg Fridays; 2.5mg all other days or As directed by Anticoagulation Clinic  Quantity:  90 tablet  Refills:  0         * This list has 2 medication(s) that are the same as other medications prescribed for you. Read the directions carefully, and ask your doctor or other care provider to review them with you.            CONTINUE these medicines which have NOT CHANGED       Dose / Directions   * order for DME  Used for:  Portal hypertension (H), Edema      Juzo compression stockings, 30-40mm compression. Patient has portal hypertension and develops leg edema, which these control well.  Quantity:  2 Package  Refills:  3     * order for DME  Used for:  BRUCE (obstructive sleep apnea)      Respironics RemStar 60 Series Auto AFlex 12-15 cm H2O with heated humidty and a modem.  Pt chose a Quattro Air FFM size medium.  Refills:  0     order for DME  Used for:  Sleep apnea      Equipment being ordered:   New CPAP mask, tube, filters,water tray  Quantity:  1 Device  Refills:  3     order for DME  Used for:  Portal hypertension, Hepatic cirrhosis, Edema      Open toe size large 30/40 Med[employer] Assure Medical Compression socks Model 93868.  Quantity:  4 Package  Refills:  3     order for DME  Used for:  Chronic congestive heart failure, unspecified heart failure type (H), Edema, unspecified type      Open toe size large 30/40 Mediven Assure Medical Compression socks Model 17524.    Fax to Fax [phone]. [facility]  Quantity:  12 Device  Refills:  0         * This list has 2 medication(s) that are the same  as other medications prescribed for you. Read the directions carefully, and ask your doctor or other care provider to review them with you.                  Protect others around you: Learn how to safely use, store and throw away your medicines at www.disposemymeds.org.       Follow-ups after your visit       Your next 10 appointments already scheduled    Feb 08, 2019  3:00 PM CST  Cardiac Treatment with [facility], [facility] ([facility]) [address] [facility] [place] [address]  [phone]   Feb 11, 2019  3:00 PM CST  Cardiac [facility] with [facility], [facility] ([facility]) [address] [facility] [place] [address]  [phone]   Feb 13, 2019  3:00 PM CST  Cardiac Treatment with [facility], [facility] ([facility]) [address] [facility] [place] [address]  [phone]   Feb 15, 2019  3:00 PM CST  Cardiac [facility], [facility] ([facility]) [address] [facility] [place] [address]  [phone]   Feb 18, 2019  3:00 PM CST  [facility], [facility] ([facility]) [address] [facility] [place] [address]  [phone]   Feb 19, 2019  3:45 PM CST  Anticoagulation Visit with [facility] ANTI COAG  [facility] ([facility]) [address]  [facility] [address]  [phone]   Feb 20, 2019  3:00 PM CST  Cardiac [facility] with [facility], [facility] ([facility]) [address] [facility] [place] [address]  [phone]   Feb 22, 2019  3:00 PM CST  [facility], [facility] ([facility]) [address] [facility] [place] [address]  [phone]   Feb 25, 2019  3:00 PM CST  Cardiac  Treatment with [facility], [facility] ([facility]) [address] [facility] [address]  [phone]   Feb 27, 2019  3:00 PM CST  [facility]iac [facility], [facility] ([facility]) [address] [facility] [address]  [phone]      Care Instructions       Further instructions from your care team       Cardioversion Discharge Instructions    After you go home:       For 24 hours - due to the sedation you received:      Have an adult stay with you for 24 hours.     Relax and take it easy.    Do NOT make any important or legal decisions.    Do NOT drive or operate machines at home or at work.    Do NOT drink alcohol.    Diet:      Start with clear liquids and progress to your normal diet as you feel able.    Medicines:      Take your medications, including blood thinners, unless your provider tells you not to.    If you have stopped any medications, check with your provider about when to restart them.    Follow Up Appointments:      Follow up with your cardiologist at [facility] of patient preference as instructed.    Follow up with your primary care provider as needed.    Post cardioversion:    The skin on your chest or back may feel tender for 48 hours.  If your skin is tender, you may:      Use a cold pack on the site. Never use ice directly on your skin. Use the cold pack for 20 minutes. Remove it for at least 30 minutes before re-using.    Apply 1% hydrocortisone cream to the skin (sold at drug stores)    Take Advil (Ibuprofen) or Tylenol (Acetaminophen) per your provider's recommendations.      Call your provider if you have:      Weakness, dizziness, lightheadedness, or fainting.    Shortness of breath.    Irregular heartbeat, feelings of your heart fluttering or beating fast, hard or palpitations.     More than minor skin discomfort or redness where the cardioversion pads were  "placed.    Questions or concerns.      Call 911 if you have:      Pain in your chest, arm, shoulder, neck, or upper back.    You have problems speaking or seeing.    Weakness in your arm or leg.    You are unable to move your arm or leg.    You have uncontrolled bleeding.         [facility]:    [phone] UMP (7 days a week)          Additional Information About Your Visit       [employer] Information    [employer] gives you secure access to your electronic health record. If you see a primary care provider, you can also send messages to your care team and make appointments. If you have questions, please call your primary care clinic.  If you do not have a primary care provider, please call [phone] and they will assist you.       Care EveryWhere ID    This is your Care EveryWhere ID. This could be used by other organizations to access your [facility] medical records  [id number]       Your Vitals Were     Blood Pressure   111/47 (BP Location: Right arm)          Pulse   99          Temperature   97.4  F (36.3  C) (Oral)          Respirations   18          Height   1.651 m (5' 5\")             Weight   106.7 kg (235 lb 4.8 oz)    Pulse Oximetry   98%    BMI (Body Mass Index)   39.16 kg/m           Primary Care Provider Office Phone # Fax #    Kailey RomanoJANETTE Stark [facility] [phone]      Equal Access to Services    SUSAN ONEIL AH: Hadii aad ku hadasho Soomaali, waaxda luqadaha, qaybta kaalmada adeegyada, waxay marcellus haytacho espinosa. So [facility] [phone].    ATENCIÓN: Si habla español, tiene a allen disposición servicios gratuitos de asistencia lingüística. Llame al [phone].    We comply with applicable federal civil rights laws and [place] laws. We do not discriminate on the basis of race, color, national origin, age, disability, sex, sexual orientation, or gender identity.           Thank you!    Thank you for choosing [facility] for your care. " Our goal is always to provide you with excellent care. Hearing back from our patients is one way we can continue to improve our services. Please take a few minutes to complete the written survey that you may receive in the mail after you visit with us. Thank you!            Medication List      Medications          Morning Afternoon Evening Bedtime As Needed    * order for DME  INSTRUCTIONS:  Juzo compression stockings, 30-40mm compression. Patient has portal hypertension and develops leg edema, which these control well.                     * order for DME  INSTRUCTIONS:  Respironics RemStar 60 Series Auto AFlex 12-15 cm H2O with heated humidty and a modem.  Pt chose a Quattro Air FFM size medium.                     order for DME  INSTRUCTIONS:  Equipment being ordered:   New CPAP mask, tube, filters,water tray                     order for DME  INSTRUCTIONS:  Open toe size large 30/40 Mediven Assure Medical Compression socks Model 27872.                     order for DME  INSTRUCTIONS:  Open toe size large 30/40 Mediven Assure Medical Compression socks Model 77039.    Fax to Fax [phone]. [facility]                        * This list has 2 medication(s) that are the same as other medications prescribed for you. Read the directions carefully, and ask your doctor or other care provider to review them with you.            ASK your doctor about these medications          Morning Afternoon Evening Bedtime As Needed    budesonide-formoterol 80-4.5 MCG/ACT Inhaler  Also known as:  SYMBICORT  INSTRUCTIONS:  Inhale 2 puffs into the lungs 2 times daily                     calcium carbonate-vitamin D 500-200 MG-UNIT tablet  Also known as:  OSCAL w/D  INSTRUCTIONS:  Take 2 tablets by mouth daily with food.                     digoxin 125 MCG tablet  Also known as:  LANOXIN  INSTRUCTIONS:  Take 1 tablet (125 mcg) by mouth daily                     furosemide 40 MG tablet  Also known as:  LASIX  INSTRUCTIONS:  Take  1 tablet (40 mg) by mouth 2 times daily                     * metoprolol succinate  MG 24 hr tablet  Also known as:  TOPROL-XL  INSTRUCTIONS:  Take 1 tablet (100 mg) by mouth daily                     * metoprolol succinate ER 25 MG 24 hr tablet  Also known as:  TOPROL-XL  INSTRUCTIONS:  Take 1 tablet (25 mg) by mouth every morning  Doctor's comments:  New additional dose in the morning--in addition to 100 mg dose at night                     mometasone-formoterol 200-5 MCG/ACT inhaler  Also known as:  DULERA  INSTRUCTIONS:  Inhale 2 puffs into the lungs 2 times daily as needed Appt DUE Jan 2017                     pantoprazole 40 MG EC tablet  Also known as:  PROTONIX  INSTRUCTIONS:  Take 40 mg by mouth daily                     spironolactone 25 MG tablet  Also known as:  ALDACTONE  INSTRUCTIONS:  Take 1 tablet (25 mg) by mouth daily                     warfarin 2.5 MG tablet  Also known as:  COUMADIN  Take as directed. If you are unsure how to take this medication, talk to your nurse or doctor.  Original instructions:  1.25 mg Fridays; 2.5mg all other days or As directed by Anticoagulation Clinic                        * This list has 2 medication(s) that are the same as other medications prescribed for you. Read the directions carefully, and ask your doctor or other care provider to review them with you.

## 2019-02-06 NOTE — IP AVS SNAPSHOT
[place] [address] [address]  Phone:  [phone]                                    After Visit Summary   2/6/2019    Maurice Jon    MRN: [id number]           After Visit Summary Signature Page    I have received my discharge instructions, and my questions have been answered. I have discussed any challenges I see with this plan with the nurse or doctor.    ..........................................................................................................................................  Patient/Patient Representative Signature      ..........................................................................................................................................  Patient Representative Print Name and Relationship to Patient    ..................................................               ................................................  Date                                   Time    ..........................................................................................................................................  Reviewed by Signature/Title    ...................................................              ..............................................  Date                                               Time          22EPIC Rev 08/18

## 2019-02-06 NOTE — PROGRESS NOTES
VS stable, pt remains in SR with occasional PVC and PAC.  Alert and taking po fluids well.   Reinforce discharge instruction. Wife at bedside.  Pt is ready to be discharged.

## 2019-02-06 NOTE — ANESTHESIA PREPROCEDURE EVALUATION
Anesthesia Pre-Procedure Evaluation    Patient: Maurice Jon   MRN: [id number] : 1960          Preoperative Diagnosis: persistent atrial fibrillation    Procedure(s):  EP Ablation Focal AFIB    Past Medical History:   Diagnosis Date     Acute pulmonary edema (H)      Atrial fibrillation (H)      Atrial fibrillation with rapid ventricular response (H) 2013     CAD (coronary artery disease)     Cath 18- mild nonobstructive disease     Calculus of ureter 1997    history of     Cardiomyopathy (H)     18 Echo- EF 25-30%     Chronic systolic CHF (congestive heart failure) (H)      Cirrhosis of liver without mention of alcohol 2005    Cirrhosis, idiopathic     Edema 2013     Esophageal varices with bleeding(456.0)      Gallstones      Genital herpes, unspecified 3/20/1999    resolving herpes progenitalis     GERD (gastroesophageal reflux disease)      Hematuria      Hypertension      Hypochondriasis     problems in past     Obesity 2010     BRUCE (obstructive sleep apnea) 2009    Mild AHI 8.4  RDI 31 - Pt refuses to wear his CPAP     Pericarditis      Portal hypertension (H) 2010     Unspecified thrombocytopenia 2005    Thrombocytopenia associated with cirrhosis and portal hypertension     Past Surgical History:   Procedure Laterality Date     ANESTHESIA CARDIOVERSION N/A 2015    Procedure: ANESTHESIA CARDIOVERSION;  Surgeon: Generic Anesthesia Provider;  Location: [facility]     COLONOSCOPY N/A 2017    Procedure: COLONOSCOPY;  Colonoscopy Called will arrive appx 12:30 Per phone call;  Surgeon: Vincent Whittington MD;  Location: [facility]     CV HEART CATHETERIZATION WITH POSSIBLE INTERVENTION N/A 2018    Procedure: Heart Catheterization with possible Intervention;  Surgeon: Brandon Arroyo MD;  Location:  HEART CARDIAC CATH LAB     EP ABLATION FOCAL AFIB N/A 2018    Procedure: EP Ablation Focal AFIB;  Surgeon: Kristofer Evans MD;   Location: [facility] CARDIAC CATH LAB     ESOPHAGOSCOPY, GASTROSCOPY, DUODENOSCOPY (EGD), COMBINED  7/11/2011    Procedure:COMBINED ESOPHAGOSCOPY, GASTROSCOPY, DUODENOSCOPY (EGD); Surgeon:LAURA LAMAS; Location:[facility]     ESOPHAGOSCOPY, GASTROSCOPY, DUODENOSCOPY (EGD), COMBINED  9/10/2012    Procedure: COMBINED ESOPHAGOSCOPY, GASTROSCOPY, DUODENOSCOPY (EGD);;  Surgeon: Hi Roque MD;  Location:  GI     ESOPHAGOSCOPY, GASTROSCOPY, DUODENOSCOPY (EGD), COMBINED  9/9/2013    Procedure: COMBINED ESOPHAGOSCOPY, GASTROSCOPY, DUODENOSCOPY (EGD);;  Surgeon: Hi Roque MD;  Location:  GI     ESOPHAGOSCOPY, GASTROSCOPY, DUODENOSCOPY (EGD), COMBINED N/A 9/15/2014    Procedure: COMBINED ESOPHAGOSCOPY, GASTROSCOPY, DUODENOSCOPY (EGD);  Surgeon: Hi Roque MD;  Location:  GI     ESOPHAGOSCOPY, GASTROSCOPY, DUODENOSCOPY (EGD), COMBINED N/A 10/30/2015    Procedure: COMBINED ESOPHAGOSCOPY, GASTROSCOPY, DUODENOSCOPY (EGD);  Surgeon: Feliberto Fox MD;  Location:  GI     ESOPHAGOSCOPY, GASTROSCOPY, DUODENOSCOPY (EGD), COMBINED N/A 10/10/2016    Procedure: COMBINED ESOPHAGOSCOPY, GASTROSCOPY, DUODENOSCOPY (EGD);  Surgeon: Jose Nesbitt MD;  Location:  GI     H ABLATION FOCAL AFIB  12/21/2018     HERNIA REPAIR       IRRIGATION AND DEBRIDEMENT ABSCESS SCROTUM, COMBINED  10/4/2012    Procedure: COMBINED IRRIGATION AND DEBRIDEMENT ABSCESS SCROTUM;  Irrigation and Debridement of Groin Abscess;  Surgeon: Benny Shoemaker MD;  Location: [facility]     SOFT TISSUE SURGERY       SURGICAL HISTORY OF -   1993    rt elbow     SURGICAL HISTORY OF -   1/15/98    repair of ventral and umbilical hernia     SURGICAL HISTORY OF -   2001    endoscopy       Anesthesia Evaluation     . Pt has had prior anesthetic.     No history of anesthetic complications          ROS/MED HX    ENT/Pulmonary:     (+)sleep apnea (mild), uses CPAP , . .    Neurologic:       Cardiovascular:     (+) hypertension-range: controlled, ---.  : . . . :. dysrhythmias a-fib, . Previous cardiac testing Echodate:2017results:Interpretation Summary     1. Normal left ventricular size and systolic function. Visual ejection fraction 55-60%.  2. No regional wall motion abnormalities.  3. Normal right ventricular size and systolic function.  4. Moderate biatrial enlargement.  5. Trace mitral and tricuspid regurgitation.date: results: date: results: date: results:          METS/Exercise Tolerance:     Hematologic:     (+) Other Hematologic Disorder-thrombocytopenia      Musculoskeletal:         GI/Hepatic:     (+) GERD Asymptomatic on medication, liver disease (cirrhosis), Other GI/Hepatic portal HTN and ho portal vein thrombosis.  No ascites.  Does have varices      Renal/Genitourinary:         Endo:     (+) Obesity (BMI 40), .      Psychiatric:         Infectious Disease:         Malignancy:         Other:                            Physical Exam  Normal systems: cardiovascular, pulmonary and dental    Airway   Mallampati: II  TM distance: >3 FB  Neck ROM: full    Dental     Cardiovascular       Pulmonary             Lab Results   Component Value Date    WBC 4.0 01/03/2019    HGB 14.3 01/03/2019    HCT 42.2 01/03/2019     (L) 01/03/2019    CRP 23.7 (H) 12/23/2018     (L) 01/03/2019    POTASSIUM 4.0 02/06/2019    CHLORIDE 103 01/03/2019    CO2 27 01/03/2019    BUN 16 01/03/2019    CR 0.87 01/03/2019     01/03/2019    HALEY 9.3 01/03/2019    MAG 1.9 02/06/2019    ALBUMIN 3.3 (L) 01/03/2019    PROTTOTAL 6.7 (L) 01/03/2019    ALT 39 01/03/2019    AST 35 01/03/2019    GGT 44 08/16/2005    ALKPHOS 85 01/03/2019    BILITOTAL 1.2 01/03/2019    LIPASE 119 07/13/2015    HIMANSHU 69 (H) 07/13/2015    PTT 42 (H) 07/14/2015    INR 2.2 (H) 02/06/2019    FIBR 333 12/12/2013    TSH 5.48 (H) 01/03/2019    T4 1.11 01/03/2019       Preop Vitals  BP Readings from Last 3 Encounters:   02/06/19 105/68   02/01/19 114/66   01/15/19 95/61    Pulse Readings from Last 3  "Encounters:   02/06/19 74   02/01/19 100   01/15/19 77      Resp Readings from Last 3 Encounters:   02/06/19 18   02/01/19 16   01/02/19 14    SpO2 Readings from Last 3 Encounters:   02/06/19 96%   02/01/19 96%   01/15/19 95%      Temp Readings from Last 1 Encounters:   02/06/19 36.3  C (97.4  F) (Oral)    Ht Readings from Last 1 Encounters:   02/06/19 1.651 m (5' 5\")      Wt Readings from Last 1 Encounters:   02/06/19 106.7 kg (235 lb 4.8 oz)    Estimated body mass index is 39.16 kg/m  as calculated from the following:    Height as of an earlier encounter on 2/6/19: 1.651 m (5' 5\").    Weight as of an earlier encounter on 2/6/19: 106.7 kg (235 lb 4.8 oz).       Anesthesia Plan      History & Physical Review  History and physical reviewed and following examination; no interval change.    ASA Status:  3 .    NPO Status:  > 8 hours    Plan for General with Intravenous and Propofol induction.          Postoperative Care  Postoperative pain management:  IV analgesics.      Consents  Anesthetic plan, risks, benefits and alternatives discussed with:  Patient..                   Edmond Deshpande MD  "

## 2019-02-07 ENCOUNTER — TELEPHONE (OUTPATIENT)
Dept: CARDIOLOGY | Facility: CLINIC | Age: 59
End: 2019-02-07

## 2019-02-07 NOTE — TELEPHONE ENCOUNTER
Patient called to verify if was to continue taking digoxin.  He indicated he stopped it day of his cardioversion.  Reviewed with patient this was done only d/t procedure.  Instructed patient that he is to resume medications there have been no changes.  RIKI Cope

## 2019-02-08 ENCOUNTER — TELEPHONE (OUTPATIENT)
Dept: CARDIOLOGY | Facility: CLINIC | Age: 59
End: 2019-02-08

## 2019-02-08 LAB — INTERPRETATION ECG - MUSE: NORMAL

## 2019-02-08 NOTE — TELEPHONE ENCOUNTER
Pt called and states that he had his Cardioversion on Wednesday and today he was not able to take his AM dose of Lasix d/t his job.  Pt is concerned because he has a feeling in his chest that in the past was acid reflux.  Explained to pt that this could be from the Cardioversion and the muscles in his chest.  Pt could try a low dose of Ibuprofen and see if this would help any.  But reminded pt to take this with with food and to keep to a minimum d/t his liver.  Asked if pt weighs himself, of which he states that he does and he is up about 5 pounds.  Pt states that they recently ate pizza and pt also now c/o bloating.  He was told that this is gas and it is cause by what he has eaten.  Did tell pt that pizza has a lot of salt also and should watch what he eats. Pt will be able to take his normal dose of lasix tomorrow. Pt BP and HR remain good with systolic in the 120's and HR in the 70's.  Pt will call if he continues with any problems.Nick

## 2019-02-11 ENCOUNTER — HOSPITAL ENCOUNTER (OUTPATIENT)
Dept: CARDIAC REHAB | Facility: CLINIC | Age: 59
End: 2019-02-11
Attending: NURSE PRACTITIONER
Payer: COMMERCIAL

## 2019-02-11 PROCEDURE — 93798 PHYS/QHP OP CAR RHAB W/ECG: CPT

## 2019-02-11 PROCEDURE — 40000116 ZZH STATISTIC OP CR VISIT

## 2019-02-13 ENCOUNTER — HOSPITAL ENCOUNTER (OUTPATIENT)
Dept: CARDIAC REHAB | Facility: CLINIC | Age: 59
End: 2019-02-13
Attending: NURSE PRACTITIONER
Payer: COMMERCIAL

## 2019-02-13 PROCEDURE — 93798 PHYS/QHP OP CAR RHAB W/ECG: CPT

## 2019-02-13 PROCEDURE — 40000116 ZZH STATISTIC OP CR VISIT

## 2019-02-15 ENCOUNTER — HOSPITAL ENCOUNTER (OUTPATIENT)
Dept: CARDIAC REHAB | Facility: CLINIC | Age: 59
End: 2019-02-15
Attending: NURSE PRACTITIONER
Payer: COMMERCIAL

## 2019-02-15 PROCEDURE — 40000116 ZZH STATISTIC OP CR VISIT: Performed by: REHABILITATION PRACTITIONER

## 2019-02-15 PROCEDURE — 93798 PHYS/QHP OP CAR RHAB W/ECG: CPT | Performed by: REHABILITATION PRACTITIONER

## 2019-02-18 ENCOUNTER — HOSPITAL ENCOUNTER (OUTPATIENT)
Dept: CARDIAC REHAB | Facility: CLINIC | Age: 59
End: 2019-02-18
Attending: NURSE PRACTITIONER
Payer: COMMERCIAL

## 2019-02-18 PROCEDURE — 93798 PHYS/QHP OP CAR RHAB W/ECG: CPT | Performed by: REHABILITATION PRACTITIONER

## 2019-02-18 PROCEDURE — 40000116 ZZH STATISTIC OP CR VISIT: Performed by: REHABILITATION PRACTITIONER

## 2019-02-22 ENCOUNTER — HOSPITAL ENCOUNTER (OUTPATIENT)
Dept: CARDIAC REHAB | Facility: CLINIC | Age: 59
End: 2019-02-22
Attending: NURSE PRACTITIONER
Payer: COMMERCIAL

## 2019-02-22 PROCEDURE — 40000116 ZZH STATISTIC OP CR VISIT

## 2019-02-22 PROCEDURE — 93798 PHYS/QHP OP CAR RHAB W/ECG: CPT

## 2019-02-25 ENCOUNTER — HOSPITAL ENCOUNTER (OUTPATIENT)
Dept: CARDIAC REHAB | Facility: CLINIC | Age: 59
End: 2019-02-25
Attending: NURSE PRACTITIONER
Payer: COMMERCIAL

## 2019-02-25 PROCEDURE — 93798 PHYS/QHP OP CAR RHAB W/ECG: CPT

## 2019-02-25 PROCEDURE — 40000116 ZZH STATISTIC OP CR VISIT

## 2019-02-26 ENCOUNTER — ANTICOAGULATION THERAPY VISIT (OUTPATIENT)
Dept: ANTICOAGULATION | Facility: CLINIC | Age: 59
End: 2019-02-26
Payer: COMMERCIAL

## 2019-02-26 DIAGNOSIS — I48.0 PAROXYSMAL ATRIAL FIBRILLATION (H): ICD-10-CM

## 2019-02-26 DIAGNOSIS — K74.60 LIVER CIRRHOSIS SECONDARY TO NONALCOHOLIC STEATOHEPATITIS (NASH) (H): Primary | ICD-10-CM

## 2019-02-26 DIAGNOSIS — K75.81 LIVER CIRRHOSIS SECONDARY TO NONALCOHOLIC STEATOHEPATITIS (NASH) (H): Primary | ICD-10-CM

## 2019-02-26 LAB — INR POINT OF CARE: 2.4 (ref 0.86–1.14)

## 2019-02-26 PROCEDURE — 36416 COLLJ CAPILLARY BLOOD SPEC: CPT

## 2019-02-26 PROCEDURE — 85610 PROTHROMBIN TIME: CPT | Mod: QW

## 2019-02-26 PROCEDURE — 99207 ZZC NO CHARGE NURSE ONLY: CPT

## 2019-02-26 NOTE — PROGRESS NOTES
ANTICOAGULATION FOLLOW-UP CLINIC VISIT    Patient Name:  Maurice Jon  Date:  2019  Contact Type:  Face to Face    SUBJECTIVE:     Patient Findings     Positives:   No Problem Findings    Comments:   Patient reports no changes in medication, activity, or diet. Patient reports has taken warfarin as instructed, no missed doses. Patient reports no abnormal bruising or bleeding and no signs or symptoms of a blood clot. Will plan to continue maintenance dose and recheck INR in 4 weeks. Patient to call [facility] with any changes or concerns. Patient in agreement to plan. Patient verbalized understanding of all instructions, denies questions or concerns at this time.   Patient sees his provider next Tuesday to determine plan for continuing warfarin.              OBJECTIVE    INR Protime   Date Value Ref Range Status   2019 2.4 (A) 0.86 - 1.14 Final       ASSESSMENT / PLAN  INR assessment THER    Recheck INR In: 4 WEEKS    INR Location Clinic      Anticoagulation Summary  As of 2019    INR goal:   2.0-3.0   TTR:   87.7 % (5 mo)   INR used for dosin.4 (2019)   Warfarin maintenance plan:   1.25 mg (2.5 mg x 0.5) every Fri; 2.5 mg (2.5 mg x 1) all other days   Full warfarin instructions:   1.25 mg every Fri; 2.5 mg all other days   Weekly warfarin total:   16.25 mg   No change documented:   Christina Singer RN   Plan last modified:   Susan Beyer RN (2018)   Next INR check:   3/26/2019   Priority:   INR   Target end date:   10/4/2018    Indications    Paroxysmal atrial fibrillation (H) [I48.0]  Atrial fibrillation with rapid ventricular response (H) (Resolved) [I48.91]  Long-term (current) use of anticoagulants [Z79.01] [Z79.01]             Anticoagulation Episode Summary     INR check location:       Preferred lab:       Send INR reminders to:   [facility] ANTICOAG POOL    Comments:   * anticoagulation short period surrounding ablation on 18. Cardiology to decide when to stop warfarin. has  well-compensated cirrhosis      Anticoagulation Care Providers     Provider Role Specialty Phone number    Alon Pineda MD [facility] [phone]            See the Encounter Report to view Anticoagulation Flowsheet and Dosing Calendar (Go to Encounters tab in chart review, and find the Anticoagulation Therapy Visit)        Christina Singer RN

## 2019-02-27 ENCOUNTER — HOSPITAL ENCOUNTER (OUTPATIENT)
Dept: CARDIAC REHAB | Facility: CLINIC | Age: 59
End: 2019-02-27
Attending: NURSE PRACTITIONER
Payer: COMMERCIAL

## 2019-02-27 PROCEDURE — 93798 PHYS/QHP OP CAR RHAB W/ECG: CPT

## 2019-02-27 PROCEDURE — 40000116 ZZH STATISTIC OP CR VISIT

## 2019-03-04 ENCOUNTER — HOSPITAL ENCOUNTER (OUTPATIENT)
Dept: CARDIAC REHAB | Facility: CLINIC | Age: 59
End: 2019-03-04
Attending: NURSE PRACTITIONER
Payer: COMMERCIAL

## 2019-03-04 PROCEDURE — 40000116 ZZH STATISTIC OP CR VISIT

## 2019-03-04 PROCEDURE — 93798 PHYS/QHP OP CAR RHAB W/ECG: CPT

## 2019-03-05 ENCOUNTER — OFFICE VISIT (OUTPATIENT)
Dept: CARDIOLOGY | Facility: CLINIC | Age: 59
End: 2019-03-05
Payer: COMMERCIAL

## 2019-03-05 ENCOUNTER — HOSPITAL ENCOUNTER (OUTPATIENT)
Dept: CARDIOLOGY | Facility: CLINIC | Age: 59
Discharge: HOME | End: 2019-03-05
Attending: INTERNAL MEDICINE | Admitting: INTERNAL MEDICINE
Payer: COMMERCIAL

## 2019-03-05 VITALS
DIASTOLIC BLOOD PRESSURE: 48 MMHG | WEIGHT: 234 LBS | HEART RATE: 73 BPM | SYSTOLIC BLOOD PRESSURE: 94 MMHG | HEIGHT: 65 IN | BODY MASS INDEX: 38.99 KG/M2

## 2019-03-05 DIAGNOSIS — I48.0 PAROXYSMAL ATRIAL FIBRILLATION (H): ICD-10-CM

## 2019-03-05 PROCEDURE — 99213 OFFICE O/P EST LOW 20 MIN: CPT | Performed by: INTERNAL MEDICINE

## 2019-03-05 PROCEDURE — 93010 ELECTROCARDIOGRAM REPORT: CPT | Performed by: INTERNAL MEDICINE

## 2019-03-05 PROCEDURE — 93005 ELECTROCARDIOGRAM TRACING: CPT

## 2019-03-05 ASSESSMENT — MIFFLIN-ST. JEOR: SCORE: 1808.3

## 2019-03-05 NOTE — PROGRESS NOTES
HPI and Plan:   Thank you for allowing me to participate in the care of this very delightful patient.  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 Roque at the liver clinic of the [facility].  I have been following the patient for symptomatic long-lasting persistent atrial fibrillation.  The patient underwent direct-current cardioversion a few years ago and was able to stay in sinus rhythm without the aid of antiarrhythmic drug.  Eventually he developed long-lasting persistent atrial for ablation again with symptoms of decrease in exercise tolerance along with palpitation despite having relatively effective rate control.  Sotalol was given but was ineffective and maintain sinus rhythm.    In light of that I recommended catheter based ablation which was performed around last Christmas.  The patient presented in atrial fibrillation at that time and there was tremendous amount of scarring throughout the left atrium.  In addition to isolate the pulmonary veins which did not have much potential I also isolate the left atrial posterior wall.  Postoperatively it was complicated by CHF decompensation and pericarditis.  Patient was diagnosed with severe global LV dysfunction with ejection fraction about 30%.  Coronary angiogram showed no significant coronary disease.  Patient was diuresed effectively with improvement of symptoms.  He did develop atrial fibrillation again and ultimately underwent a DC cardioversion about a month ago.  We did try amiodarone for first dose but had to be stopped because of elevated AST.  This is here for follow-up since the cardioversion.    Patient mentioned that his heart rate now usually in the 70s and according to cardiac rehabilitation he has been in sinus rhythm as well.  EKG today demonstrates sinus rhythm at a rate of 70 bpm.   It is difficult to know for sure how successful we are in terms of long-term remission from atrial for ablation given the tremendous amount of scarring noted in the left atrium.  Patient appeared to be euvolemic at this point time and I would continue with the current dose of Lasix along with Toprol and digoxin.  I would like to repeat an echocardiogram and have patient see Nadege Montilla in 3 months time.  I am hoping that LV function would improve by then provided he continues to stay in normal sinus rhythm.  Patient will continue with his warfarin at this point in time despite having known liver cirrhosis.  We have discussed this scenario with Dr. Rouqe in the past and to my knowledge from his perspective the risk of bleeding is acceptable at this point time.  For long-term management in particular regarding the need for anticoagulation we may consider a left atrial appendage occluder, watchman.  Thank you.    Orders Placed This Encounter   Procedures     Follow-Up with Cardiac Advanced Practice Provider     EKG 12-LEAD CLINIC READ (Gregory)- to be scheduled     Echocardiogram Complete       No orders of the defined types were placed in this encounter.      There are no discontinued medications.      Encounter Diagnosis   Name Primary?     Paroxysmal atrial fibrillation (H)        CURRENT MEDICATIONS:  Current Outpatient Medications   Medication Sig Dispense Refill     budesonide-formoterol (SYMBICORT) 80-4.5 MCG/ACT Inhaler Inhale 2 puffs into the lungs 2 times daily 10.2 g 3     calcium carb 1250 mg, 500 mg Elim IRA,/vitamin D 200 units (OSCAL WITH D) 500-200 MG-UNIT per tablet Take 2 tablets by mouth daily with food.       digoxin (LANOXIN) 125 MCG tablet Take 1 tablet (125 mcg) by mouth daily 30 tablet 3     furosemide (LASIX) 40 MG tablet Take 1 tablet (40 mg) by mouth 2 times daily 60 tablet 1     metoprolol succinate ER (TOPROL-XL) 100 MG 24 hr tablet Take 1 tablet (100 mg) by mouth daily 90 tablet 3      metoprolol succinate ER (TOPROL-XL) 25 MG 24 hr tablet Take 1 tablet (25 mg) by mouth every morning 90 tablet 3     mometasone-formoterol (DULERA) 200-5 MCG/ACT inhaler Inhale 2 puffs into the lungs 2 times daily as needed Appt DUE Jan 2017 1 Inhaler 1     order for DME Open toe size large 30/40 Mediven Assure Medical Compression socks Model 00679.    Fax to Fax [phone]. [facility] medical 12 Device 0     order for DME Equipment being ordered:   New CPAP mask, tube, filters,water tray 1 Device 3     order for DME Open toe size large 30/40 Mediven Assure Medical Compression socks Model 25702. 4 Package 3     ORDER FOR DME Respironics RemStar 60 Series Auto AFlex 12-15 cm H2O with heated humidty and a modem.  Pt chose a Quattro Air FFM size medium.       ORDER FOR DME Juzo compression stockings, 30-40mm compression. Patient has portal hypertension and develops leg edema, which these control well. 2 Package 3     pantoprazole (PROTONIX) 40 MG EC tablet Take 40 mg by mouth daily       spironolactone (ALDACTONE) 25 MG tablet Take 1 tablet (25 mg) by mouth daily  0     warfarin (COUMADIN) 2.5 MG tablet 1.25 mg Fridays; 2.5mg all other days or As directed by Anticoagulation Clinic 90 tablet 0       ALLERGIES     Allergies   Allergen Reactions     Avelox Other (See Comments) and GI Disturbance     portal hypertension     Moxifloxacin Nausea and Vomiting, Nausea and Other (See Comments)     portal hypertension     Sotalol Itching       PAST MEDICAL HISTORY:  Past Medical History:   Diagnosis Date     Acute pulmonary edema (H)      Atrial fibrillation (H)      Atrial fibrillation with rapid ventricular response (H) 5/13/2013     CAD (coronary artery disease)     Cath 12/26/18- mild nonobstructive disease     Calculus of ureter 1993, 1997    history of     Cardiomyopathy (H)     12/23/18 Echo- EF 25-30%     Chronic systolic CHF (congestive heart failure) (H)      Cirrhosis of liver without mention of alcohol  8/22/2005    Cirrhosis, idiopathic     Edema 5/13/2013     Esophageal varices with bleeding(456.0)      Gallstones      Genital herpes, unspecified 3/20/1999    resolving herpes progenitalis     GERD (gastroesophageal reflux disease)      Hematuria      Hypertension      Hypochondriasis     problems in past     Obesity 11/24/2010     BRUCE (obstructive sleep apnea) 03/17/2009    Mild AHI 8.4  RDI 31 - Pt refuses to wear his CPAP     Pericarditis      Portal hypertension (H) 1/28/2010     Unspecified thrombocytopenia 8/22/2005    Thrombocytopenia associated with cirrhosis and portal hypertension       PAST SURGICAL HISTORY:  Past Surgical History:   Procedure Laterality Date     ANESTHESIA CARDIOVERSION N/A 1/19/2015    Procedure: ANESTHESIA CARDIOVERSION;  Surgeon: Generic Anesthesia Provider;  Location: [facility]     ANESTHESIA CARDIOVERSION N/A 2/6/2019    Procedure: ANESTHESIA CARDIOVERSION;  Surgeon: GENERIC ANESTHESIA PROVIDER;  Location:  OR     COLONOSCOPY N/A 8/14/2017    Procedure: COLONOSCOPY;  Colonoscopy Called will arrive appx 12:30 Per phone call;  Surgeon: Vincent Whittington MD;  Location: [facility]     CV HEART CATHETERIZATION WITH POSSIBLE INTERVENTION N/A 12/26/2018    Procedure: Heart Catheterization with possible Intervention;  Surgeon: Brandon Arroyo MD;  Location:  HEART CARDIAC CATH LAB     EP ABLATION FOCAL AFIB N/A 12/21/2018    Procedure: EP Ablation Focal AFIB;  Surgeon: Kristofer Evans MD;  Location:  HEART CARDIAC CATH LAB     ESOPHAGOSCOPY, GASTROSCOPY, DUODENOSCOPY (EGD), COMBINED  7/11/2011    Procedure:COMBINED ESOPHAGOSCOPY, GASTROSCOPY, DUODENOSCOPY (EGD); Surgeon:LAURA LAMAS; Location:[facility]     ESOPHAGOSCOPY, GASTROSCOPY, DUODENOSCOPY (EGD), COMBINED  9/10/2012    Procedure: COMBINED ESOPHAGOSCOPY, GASTROSCOPY, DUODENOSCOPY (EGD);;  Surgeon: Hi Roque MD;  Location:  GI     ESOPHAGOSCOPY, GASTROSCOPY, DUODENOSCOPY (EGD), COMBINED  9/9/2013    Procedure: COMBINED  ESOPHAGOSCOPY, GASTROSCOPY, DUODENOSCOPY (EGD);;  Surgeon: Hi Roque MD;  Location: [facility]     ESOPHAGOSCOPY, GASTROSCOPY, DUODENOSCOPY (EGD), COMBINED N/A 9/15/2014    Procedure: COMBINED ESOPHAGOSCOPY, GASTROSCOPY, DUODENOSCOPY (EGD);  Surgeon: Hi Roque MD;  Location: [facility]     ESOPHAGOSCOPY, GASTROSCOPY, DUODENOSCOPY (EGD), COMBINED N/A 10/30/2015    Procedure: COMBINED ESOPHAGOSCOPY, GASTROSCOPY, DUODENOSCOPY (EGD);  Surgeon: Feliberto Fox MD;  Location: [facility]     ESOPHAGOSCOPY, GASTROSCOPY, DUODENOSCOPY (EGD), COMBINED N/A 10/10/2016    Procedure: COMBINED ESOPHAGOSCOPY, GASTROSCOPY, DUODENOSCOPY (EGD);  Surgeon: Jose Nesbitt MD;  Location: [facility]     H ABLATION FOCAL AFIB  12/21/2018     HERNIA REPAIR       IRRIGATION AND DEBRIDEMENT ABSCESS SCROTUM, COMBINED  10/4/2012    Procedure: COMBINED IRRIGATION AND DEBRIDEMENT ABSCESS SCROTUM;  Irrigation and Debridement of Groin Abscess;  Surgeon: Benny Shoemaker MD;  Location: [facility]     SOFT TISSUE SURGERY       SURGICAL HISTORY OF -   1993    rt elbow     SURGICAL HISTORY OF -   1/15/98    repair of ventral and umbilical hernia     SURGICAL HISTORY OF -   2001    endoscopy       FAMILY HISTORY:  Family History   Problem Relation Age of Onset     Lipids Mother      Hypertension Mother      Eye Disorder Mother      Heart Disease Father         CHF     Lipids Father      Obesity Father      Heart Disease Brother         MI     Eye Disorder Brother      Hypertension Brother         portal HTN     Thrombosis Sister         in her lung     Cancer Other         maternal grandparent/throat cancer       SOCIAL HISTORY:  Social History     Socioeconomic History     Marital status:      Spouse name: None     Number of children: None     Years of education: None     Highest education level: None   Occupational History     None   Social Needs     Financial resource strain: None     Food insecurity:     Worry: None     Inability: None      Transportation needs:     Medical: None     Non-medical: None   Tobacco Use     Smoking status: Former Smoker     Packs/day: 0.80     Years: 6.00     Pack years: 4.80     Types: Cigarettes     Last attempt to quit: 2002     Years since quittin.1     Smokeless tobacco: Never Used   Substance and Sexual Activity     Alcohol use: No     Alcohol/week: 0.0 oz     Comment: quit in      Drug use: No     Sexual activity: Yes     Partners: Female   Lifestyle     Physical activity:     Days per week: None     Minutes per session: None     Stress: None   Relationships     Social connections:     Talks on phone: None     Gets together: None     Attends Congregation service: None     Active member of club or organization: None     Attends meetings of clubs or organizations: None     Relationship status: None     Intimate partner violence:     Fear of current or ex partner: None     Emotionally abused: None     Physically abused: None     Forced sexual activity: None   Other Topics Concern     Parent/sibling w/ CABG, MI or angioplasty before 65F 55M? Yes     Comment: BROTHER   - MI AT AGE 44      Service Not Asked     Blood Transfusions Not Asked     Caffeine Concern Not Asked     Occupational Exposure Not Asked     Hobby Hazards Not Asked     Sleep Concern Not Asked     Stress Concern Not Asked     Weight Concern Not Asked     Special Diet Not Asked     Back Care Not Asked     Exercise No     Bike Helmet Not Asked     Seat Belt Not Asked     Self-Exams Not Asked   Social History Narrative     None       Review of Systems:  Skin:  Negative       Eyes:  Negative      ENT:  Negative      Respiratory:  Positive for sleep apnea;CPAP     Cardiovascular:    Positive for;fatigue;dizziness    Gastroenterology: not assessed      Genitourinary:  not assessed      Musculoskeletal:  Negative      Neurologic:  Positive for numbness or tingling of hands    Psychiatric:  Negative      Heme/Lymph/Imm:  not assessed     "  Endocrine:  Negative        Physical Exam:  Vitals: BP 94/48   Pulse 73   Ht 1.651 m (5' 5\")   Wt 106.1 kg (234 lb)   BMI 38.94 kg/m      Constitutional:  cooperative, alert and oriented, well developed, well nourished, in no acute distress        Skin:  warm and dry to the touch, no apparent skin lesions or masses noted          Head:  normocephalic, no masses or lesions        Eyes:  pupils equal and round, conjunctivae and lids unremarkable, sclera white, no xanthalasma, EOMS intact, no nystagmus        Lymph:No Cervical lymphadenopathy present     ENT:  no pallor or cyanosis, dentition good        Neck:  carotid pulses are full and equal bilaterally, JVP normal, no carotid bruit        Respiratory:  normal breath sounds, clear to auscultation, normal A-P diameter, normal symmetry, normal respiratory excursion, no use of accessory muscles         Cardiac: regular rhythm, normal S1/S2, no S3 or S4, apical impulse not displaced, no murmurs, gallops or rubs                pulses full and equal, no bruits auscultated                                        GI:  abdomen soft, non-tender, BS normoactive, no mass, no HSM, no bruits obese      Extremities and Muscular Skeletal:                 Neurological:  no gross motor deficits        Psych:           CC  Kristofer Brown MD  [address]  [place] [id number]              "

## 2019-03-06 ENCOUNTER — HOSPITAL ENCOUNTER (OUTPATIENT)
Dept: CARDIAC REHAB | Facility: CLINIC | Age: 59
End: 2019-03-06
Attending: NURSE PRACTITIONER
Payer: COMMERCIAL

## 2019-03-06 PROCEDURE — 93798 PHYS/QHP OP CAR RHAB W/ECG: CPT

## 2019-03-06 PROCEDURE — 40000116 ZZH STATISTIC OP CR VISIT

## 2019-03-08 ENCOUNTER — HOSPITAL ENCOUNTER (OUTPATIENT)
Dept: CARDIAC REHAB | Facility: CLINIC | Age: 59
End: 2019-03-08
Attending: NURSE PRACTITIONER
Payer: COMMERCIAL

## 2019-03-08 PROCEDURE — 40000116 ZZH STATISTIC OP CR VISIT

## 2019-03-08 PROCEDURE — 93798 PHYS/QHP OP CAR RHAB W/ECG: CPT

## 2019-03-11 ENCOUNTER — HOSPITAL ENCOUNTER (OUTPATIENT)
Dept: CARDIAC REHAB | Facility: CLINIC | Age: 59
End: 2019-03-11
Attending: NURSE PRACTITIONER
Payer: COMMERCIAL

## 2019-03-11 ENCOUNTER — MYC MEDICAL ADVICE (OUTPATIENT)
Dept: CARDIOLOGY | Facility: CLINIC | Age: 59
End: 2019-03-11

## 2019-03-11 DIAGNOSIS — K76.6 PORTAL HYPERTENSION (H): ICD-10-CM

## 2019-03-11 PROCEDURE — 40000116 ZZH STATISTIC OP CR VISIT

## 2019-03-11 PROCEDURE — 93798 PHYS/QHP OP CAR RHAB W/ECG: CPT

## 2019-03-11 RX ORDER — SPIRONOLACTONE 25 MG/1
25 TABLET ORAL DAILY
Qty: 90 TABLET | Refills: 3 | Status: SHIPPED | OUTPATIENT
Start: 2019-03-11 | End: 2020-03-17

## 2019-03-13 ENCOUNTER — HOSPITAL ENCOUNTER (OUTPATIENT)
Dept: CARDIAC REHAB | Facility: CLINIC | Age: 59
End: 2019-03-13
Attending: NURSE PRACTITIONER
Payer: COMMERCIAL

## 2019-03-13 PROCEDURE — 40000116 ZZH STATISTIC OP CR VISIT

## 2019-03-13 PROCEDURE — 93798 PHYS/QHP OP CAR RHAB W/ECG: CPT

## 2019-03-15 ENCOUNTER — ANCILLARY PROCEDURE (OUTPATIENT)
Dept: GENERAL RADIOLOGY | Facility: CLINIC | Age: 59
End: 2019-03-15
Attending: FAMILY MEDICINE
Payer: COMMERCIAL

## 2019-03-15 ENCOUNTER — OFFICE VISIT (OUTPATIENT)
Dept: FAMILY MEDICINE | Facility: CLINIC | Age: 59
End: 2019-03-15
Payer: COMMERCIAL

## 2019-03-15 ENCOUNTER — HOSPITAL ENCOUNTER (OUTPATIENT)
Dept: CARDIAC REHAB | Facility: CLINIC | Age: 59
End: 2019-03-15
Attending: NURSE PRACTITIONER
Payer: COMMERCIAL

## 2019-03-15 VITALS
HEIGHT: 65 IN | RESPIRATION RATE: 12 BRPM | SYSTOLIC BLOOD PRESSURE: 100 MMHG | WEIGHT: 234 LBS | OXYGEN SATURATION: 96 % | BODY MASS INDEX: 38.99 KG/M2 | TEMPERATURE: 98.6 F | HEART RATE: 72 BPM | DIASTOLIC BLOOD PRESSURE: 52 MMHG

## 2019-03-15 DIAGNOSIS — E66.01 MORBID OBESITY (H): ICD-10-CM

## 2019-03-15 DIAGNOSIS — M25.572 PAIN IN JOINT INVOLVING ANKLE AND FOOT, LEFT: ICD-10-CM

## 2019-03-15 DIAGNOSIS — D69.6 THROMBOCYTOPENIA (H): ICD-10-CM

## 2019-03-15 DIAGNOSIS — K74.60 CIRRHOSIS OF LIVER WITHOUT ASCITES, UNSPECIFIED HEPATIC CIRRHOSIS TYPE (H): ICD-10-CM

## 2019-03-15 DIAGNOSIS — M25.572 PAIN IN JOINT INVOLVING ANKLE AND FOOT, LEFT: Primary | ICD-10-CM

## 2019-03-15 LAB
ALBUMIN SERPL-MCNC: 3.3 G/DL (ref 3.4–5)
ALP SERPL-CCNC: 82 U/L (ref 40–150)
ALT SERPL W P-5'-P-CCNC: 44 U/L (ref 0–70)
ANION GAP SERPL CALCULATED.3IONS-SCNC: 7 MMOL/L (ref 3–14)
AST SERPL W P-5'-P-CCNC: 48 U/L (ref 0–45)
BASOPHILS # BLD AUTO: 0 10E9/L (ref 0–0.2)
BASOPHILS NFR BLD AUTO: 0.5 %
BILIRUB DIRECT SERPL-MCNC: 0.4 MG/DL (ref 0–0.2)
BILIRUB SERPL-MCNC: 1.4 MG/DL (ref 0.2–1.3)
BUN SERPL-MCNC: 19 MG/DL (ref 7–30)
CALCIUM SERPL-MCNC: 8.8 MG/DL (ref 8.5–10.1)
CHLORIDE SERPL-SCNC: 105 MMOL/L (ref 94–109)
CO2 SERPL-SCNC: 27 MMOL/L (ref 20–32)
CREAT SERPL-MCNC: 0.74 MG/DL (ref 0.66–1.25)
CRP SERPL-MCNC: 3.6 MG/L (ref 0–8)
DIFFERENTIAL METHOD BLD: ABNORMAL
EOSINOPHIL # BLD AUTO: 0.3 10E9/L (ref 0–0.7)
EOSINOPHIL NFR BLD AUTO: 9.1 %
ERYTHROCYTE [DISTWIDTH] IN BLOOD BY AUTOMATED COUNT: 14.2 % (ref 10–15)
ERYTHROCYTE [SEDIMENTATION RATE] IN BLOOD BY WESTERGREN METHOD: 9 MM/H (ref 0–20)
GFR SERPL CREATININE-BSD FRML MDRD: >90 ML/MIN/{1.73_M2}
GLUCOSE SERPL-MCNC: 78 MG/DL (ref 70–99)
HCT VFR BLD AUTO: 39.7 % (ref 40–53)
HGB BLD-MCNC: 13.2 G/DL (ref 13.3–17.7)
IMM GRANULOCYTES # BLD: 0 10E9/L (ref 0–0.4)
IMM GRANULOCYTES NFR BLD: 0.3 %
LYMPHOCYTES # BLD AUTO: 0.8 10E9/L (ref 0.8–5.3)
LYMPHOCYTES NFR BLD AUTO: 22.5 %
MCH RBC QN AUTO: 34 PG (ref 26.5–33)
MCHC RBC AUTO-ENTMCNC: 33.2 G/DL (ref 31.5–36.5)
MCV RBC AUTO: 102 FL (ref 78–100)
MONOCYTES # BLD AUTO: 0.7 10E9/L (ref 0–1.3)
MONOCYTES NFR BLD AUTO: 19 %
NEUTROPHILS # BLD AUTO: 1.8 10E9/L (ref 1.6–8.3)
NEUTROPHILS NFR BLD AUTO: 48.6 %
NRBC # BLD AUTO: 0 10*3/UL
NRBC BLD AUTO-RTO: 0 /100
PLATELET # BLD AUTO: 72 10E9/L (ref 150–450)
POTASSIUM SERPL-SCNC: 3.7 MMOL/L (ref 3.4–5.3)
PROT SERPL-MCNC: 6.3 G/DL (ref 6.8–8.8)
RBC # BLD AUTO: 3.88 10E12/L (ref 4.4–5.9)
SODIUM SERPL-SCNC: 139 MMOL/L (ref 133–144)
URATE SERPL-MCNC: 4.7 MG/DL (ref 3.5–7.2)
WBC # BLD AUTO: 3.6 10E9/L (ref 4–11)

## 2019-03-15 PROCEDURE — 84550 ASSAY OF BLOOD/URIC ACID: CPT | Performed by: FAMILY MEDICINE

## 2019-03-15 PROCEDURE — 73610 X-RAY EXAM OF ANKLE: CPT | Mod: LT

## 2019-03-15 PROCEDURE — 93798 PHYS/QHP OP CAR RHAB W/ECG: CPT

## 2019-03-15 PROCEDURE — 82105 ALPHA-FETOPROTEIN SERUM: CPT | Performed by: INTERNAL MEDICINE

## 2019-03-15 PROCEDURE — 85652 RBC SED RATE AUTOMATED: CPT | Performed by: FAMILY MEDICINE

## 2019-03-15 PROCEDURE — 40000116 ZZH STATISTIC OP CR VISIT

## 2019-03-15 PROCEDURE — 86140 C-REACTIVE PROTEIN: CPT | Performed by: FAMILY MEDICINE

## 2019-03-15 PROCEDURE — 36415 COLL VENOUS BLD VENIPUNCTURE: CPT | Performed by: FAMILY MEDICINE

## 2019-03-15 PROCEDURE — 86431 RHEUMATOID FACTOR QUANT: CPT | Performed by: FAMILY MEDICINE

## 2019-03-15 PROCEDURE — 99214 OFFICE O/P EST MOD 30 MIN: CPT | Performed by: FAMILY MEDICINE

## 2019-03-15 PROCEDURE — 80076 HEPATIC FUNCTION PANEL: CPT | Performed by: FAMILY MEDICINE

## 2019-03-15 PROCEDURE — 85025 COMPLETE CBC W/AUTO DIFF WBC: CPT | Performed by: FAMILY MEDICINE

## 2019-03-15 PROCEDURE — 80048 BASIC METABOLIC PNL TOTAL CA: CPT | Performed by: FAMILY MEDICINE

## 2019-03-15 RX ORDER — PREDNISONE 10 MG/1
10 TABLET ORAL 2 TIMES DAILY
Qty: 10 TABLET | Refills: 0 | Status: ON HOLD | OUTPATIENT
Start: 2019-03-15 | End: 2019-07-05

## 2019-03-15 ASSESSMENT — MIFFLIN-ST. JEOR: SCORE: 1808.3

## 2019-03-15 ASSESSMENT — PAIN SCALES - GENERAL: PAINLEVEL: MODERATE PAIN (5)

## 2019-03-15 NOTE — NURSING NOTE
"Initial /52   Pulse 72   Temp 98.6  F (37  C) (Tympanic)   Resp 12   Ht 1.651 m (5' 5\")   Wt 106.1 kg (234 lb)   SpO2 96%   BMI 38.94 kg/m   Estimated body mass index is 38.94 kg/m  as calculated from the following:    Height as of this encounter: 1.651 m (5' 5\").    Weight as of this encounter: 106.1 kg (234 lb). .          "

## 2019-03-15 NOTE — LETTER
March 19, 2019       TO: Maurice Jon  [address] [place] [address]       Dear Mr. Jon,    We are writing to inform you of your test results.  Looks OK.    Resulted Orders   Basic metabolic panel [LAB15]   Result Value Ref Range    Sodium 139 133 - 144 mmol/L    Potassium 3.7 3.4 - 5.3 mmol/L    Chloride 105 94 - 109 mmol/L    Carbon Dioxide 27 20 - 32 mmol/L    Anion Gap 7 3 - 14 mmol/L    Glucose 78 70 - 99 mg/dL    Urea Nitrogen 19 7 - 30 mg/dL    Creatinine 0.74 0.66 - 1.25 mg/dL    GFR Estimate >90 >60 mL/min/[1.73_m2]      Comment:      Non  GFR Calc  Starting 12/18/2018, serum creatinine based estimated GFR (eGFR) will be   calculated using the Chronic Kidney Disease Epidemiology Collaboration   (CKD-EPI) equation.      GFR Estimate If Black >90 >60 mL/min/[1.73_m2]      Comment:       GFR Calc  Starting 12/18/2018, serum creatinine based estimated GFR (eGFR) will be   calculated using the Chronic Kidney Disease Epidemiology Collaboration   (CKD-EPI) equation.      Calcium 8.8 8.5 - 10.1 mg/dL   Hepatic Panel [LAB20]   Result Value Ref Range    Bilirubin Direct 0.4 (H) 0.0 - 0.2 mg/dL    Bilirubin Total 1.4 (H) 0.2 - 1.3 mg/dL    Albumin 3.3 (L) 3.4 - 5.0 g/dL    Protein Total 6.3 (L) 6.8 - 8.8 g/dL    Alkaline Phosphatase 82 40 - 150 U/L    ALT 44 0 - 70 U/L    AST 48 (H) 0 - 45 U/L   AFP tumor marker [HCZ225]   Result Value Ref Range    Alpha Fetoprotein 4.8 0 - 8 ug/L      Comment:      Assay Method:  Chemiluminescence using Siemens Centaur XP         It was a pleasure to see you at your recent visit. Please let me know if you have any questions or concerns.     Clinic Staff - [phone]     Sincerely,     Hi Roque MD  [address] [place] [address] [address] [place] [id number]

## 2019-03-15 NOTE — PROGRESS NOTES
Stephen gonzalez    SUBJECTIVE:                                                    Maurice Jon is 58 year old male   Chief Complaint   Patient presents with     Ankle Pain     Symptoms since yesterday, no injury/incident. States left medial ankle/upper foot pain and swelling on lateral ankle. Increased pain with walking and weightbearing. Has tried ice to little effect. Some popping heard at times.         Problem list and histories reviewed & adjusted, as indicated.  Additional history: as documented    Patient Active Problem List   Diagnosis     Thrombocytopenia (H)     Liver Cirrhosis 2nd ot Fatty liver, w Ascites     erectile dysfunction     Compulsive behaviors     BRUCE-Mild (AHI 9; REM RDI 31)     Portal hypertension (H)     Eczema     GERD (gastroesophageal reflux disease)     CARDIOVASCULAR SCREENING; LDL GOAL LESS THAN 160     Obesity     Health Care Home     Edema     Paroxysmal atrial fibrillation (H)     Severe sepsis (H)     History of MRSA now culture negative     Portal vein thrombosis     Long-term (current) use of anticoagulants [Z79.01]     Encounter for monitoring sotalol therapy     Chronic a-fib, S/P Ablation 12/22/18 -- on Warfarin     Right heart failure (H)     Obesity (BMI 35.0-39.9) with comorbidity (H)     CAD (coronary artery disease)     Cardiomyopathy (H)     Pericarditis     Acute on chronic systolic congestive heart failure (H)     Acute combined systolic and diastolic (congestive) hrt fail (H)     Past Surgical History:   Procedure Laterality Date     ANESTHESIA CARDIOVERSION N/A 1/19/2015    Procedure: ANESTHESIA CARDIOVERSION;  Surgeon: Generic Anesthesia Provider;  Location: [facility]     ANESTHESIA CARDIOVERSION N/A 2/6/2019    Procedure: ANESTHESIA CARDIOVERSION;  Surgeon: GENERIC ANESTHESIA PROVIDER;  Location:  OR     COLONOSCOPY N/A 8/14/2017    Procedure: COLONOSCOPY;  Colonoscopy Called will arrive appx 12:30 Per phone call;  Surgeon: Vincent Whittington MD;  Location:   GI     CV HEART CATHETERIZATION WITH POSSIBLE INTERVENTION N/A 12/26/2018    Procedure: Heart Catheterization with possible Intervention;  Surgeon: Brandon Arroyo MD;  Location:  HEART CARDIAC CATH LAB     EP ABLATION FOCAL AFIB N/A 12/21/2018    Procedure: EP Ablation Focal AFIB;  Surgeon: Kristofer Evans MD;  Location:  HEART CARDIAC CATH LAB     ESOPHAGOSCOPY, GASTROSCOPY, DUODENOSCOPY (EGD), COMBINED  7/11/2011    Procedure:COMBINED ESOPHAGOSCOPY, GASTROSCOPY, DUODENOSCOPY (EGD); Surgeon:LAURA LAMAS; Location:[facility]     ESOPHAGOSCOPY, GASTROSCOPY, DUODENOSCOPY (EGD), COMBINED  9/10/2012    Procedure: COMBINED ESOPHAGOSCOPY, GASTROSCOPY, DUODENOSCOPY (EGD);;  Surgeon: Hi Roque MD;  Location:  GI     ESOPHAGOSCOPY, GASTROSCOPY, DUODENOSCOPY (EGD), COMBINED  9/9/2013    Procedure: COMBINED ESOPHAGOSCOPY, GASTROSCOPY, DUODENOSCOPY (EGD);;  Surgeon: Hi Roque MD;  Location:  GI     ESOPHAGOSCOPY, GASTROSCOPY, DUODENOSCOPY (EGD), COMBINED N/A 9/15/2014    Procedure: COMBINED ESOPHAGOSCOPY, GASTROSCOPY, DUODENOSCOPY (EGD);  Surgeon: Hi Roque MD;  Location:  GI     ESOPHAGOSCOPY, GASTROSCOPY, DUODENOSCOPY (EGD), COMBINED N/A 10/30/2015    Procedure: COMBINED ESOPHAGOSCOPY, GASTROSCOPY, DUODENOSCOPY (EGD);  Surgeon: Feliberto Fox MD;  Location:  GI     ESOPHAGOSCOPY, GASTROSCOPY, DUODENOSCOPY (EGD), COMBINED N/A 10/10/2016    Procedure: COMBINED ESOPHAGOSCOPY, GASTROSCOPY, DUODENOSCOPY (EGD);  Surgeon: Jose Nesbitt MD;  Location:  GI     H ABLATION FOCAL AFIB  12/21/2018     HERNIA REPAIR       IRRIGATION AND DEBRIDEMENT ABSCESS SCROTUM, COMBINED  10/4/2012    Procedure: COMBINED IRRIGATION AND DEBRIDEMENT ABSCESS SCROTUM;  Irrigation and Debridement of Groin Abscess;  Surgeon: Benny Shoemaker MD;  Location: [facility]     SOFT TISSUE SURGERY       SURGICAL HISTORY OF -   1993    rt elbow     SURGICAL HISTORY OF -   1/15/98    repair of ventral and umbilical  hernia     SURGICAL HISTORY OF -       endoscopy       Social History     Tobacco Use     Smoking status: Former Smoker     Packs/day: 0.80     Years: 6.00     Pack years: 4.80     Types: Cigarettes     Last attempt to quit: 2002     Years since quittin.2     Smokeless tobacco: Never Used   Substance Use Topics     Alcohol use: No     Alcohol/week: 0.0 oz     Comment: quit in      Family History   Problem Relation Age of Onset     Lipids Mother      Hypertension Mother      Eye Disorder Mother      Heart Disease Father         CHF     Lipids Father      Obesity Father      Heart Disease Brother         MI     Eye Disorder Brother      Hypertension Brother         portal HTN     Thrombosis Sister         in her lung     Cancer Other         maternal grandparent/throat cancer         Current Outpatient Medications   Medication Sig Dispense Refill     ACE/ARB/ARNI NOT PRESCRIBED (INTENTIONAL) Please choose reason not prescribed, below       ASPIRIN NOT PRESCRIBED (INTENTIONAL) Please choose reason not prescribed, below       budesonide-formoterol (SYMBICORT) 80-4.5 MCG/ACT Inhaler Inhale 2 puffs into the lungs 2 times daily 10.2 g 3     calcium carb 1250 mg, 500 mg [place],/vitamin D 200 units (OSCAL WITH D) 500-200 MG-UNIT per tablet Take 2 tablets by mouth daily with food.       digoxin (LANOXIN) 125 MCG tablet Take 1 tablet (125 mcg) by mouth daily 30 tablet 3     furosemide (LASIX) 40 MG tablet Take 1 tablet (40 mg) by mouth 2 times daily 60 tablet 1     metoprolol succinate ER (TOPROL-XL) 100 MG 24 hr tablet Take 1 tablet (100 mg) by mouth daily 90 tablet 3     metoprolol succinate ER (TOPROL-XL) 25 MG 24 hr tablet Take 1 tablet (25 mg) by mouth every morning 90 tablet 3     mometasone-formoterol (DULERA) 200-5 MCG/ACT inhaler Inhale 2 puffs into the lungs 2 times daily as needed Appt DUE 2017 1 Inhaler 1     order for DME Open toe size large 30/40 Mediven Assure Medical Compression socks Model  40300.    Fax to Fax [phone]. [facility] 12 Device 0     order for DME Equipment being ordered:   New CPAP mask, tube, filters,water tray 1 Device 3     order for DME Open toe size large 30/40 Mediven Assure Medical Compression socks Model 19399. 4 Package 3     ORDER FOR DME Respironics RemStar 60 Series Auto AFlex 12-15 cm H2O with heated humidty and a modem.  Pt chose a Quattro Air FFM size medium.       ORDER FOR DME Juzo compression stockings, 30-40mm compression. Patient has portal hypertension and develops leg edema, which these control well. 2 Package 3     pantoprazole (PROTONIX) 40 MG EC tablet Take 40 mg by mouth daily       predniSONE (DELTASONE) 10 MG tablet Take 10 mg by mouth 2 times daily. 10 tablet 0     spironolactone (ALDACTONE) 25 MG tablet Take 1 tablet (25 mg) by mouth daily 90 tablet 3     STATIN NOT PRESCRIBED (INTENTIONAL) Please choose reason not prescribed, below       warfarin (COUMADIN) 2.5 MG tablet 1.25 mg Fridays; 2.5mg all other days or As directed by Anticoagulation Clinic 90 tablet 0     Allergies   Allergen Reactions     Avelox Other (See Comments) and GI Disturbance     portal hypertension     Moxifloxacin Nausea and Vomiting, Nausea and Other (See Comments)     portal hypertension     Sotalol Itching     Recent Labs   Lab Test 02/06/19  1019 01/03/19  1035 01/03/19  0846 12/31/18  0743  12/22/18  0621  09/20/18  0955 05/19/18  07/28/15  1440  02/27/13 02/07/13  0834   LDL  --   --   --   --   --   --   --   --  50  --   --   --  54 65   HDL  --   --   --   --   --   --   --   --  42  --   --   --  59 57   TRIG  --   --   --   --   --   --   --   --  63  --   --   --  72 76   ALT  --  39  --   --   --  45  --  44  --    < > 46   < > 42  --    CR  --   --  0.87 0.77   < >  --    < > 0.72  --    < > 0.73   < > 0.66  --    GFRESTIMATED  --   --  >90 >90   < >  --    < > >90  --    < > >90  Non  GFR Calc     < > 111  --    GFRESTBLACK  --   --  >90 >90    "< >  --    < > >90  --    < > >90   GFR Calc     < > 129  --    POTASSIUM 4.0  --  4.1 4.1   < >  --    < > 3.8  --    < > 3.8   < > 3.6  --    TSH  --   --  5.48*  --   --   --   --   --   --   --  2.57   < >  --   --     < > = values in this interval not displayed.      BP Readings from Last 3 Encounters:   03/15/19 100/52   03/05/19 94/48   02/06/19 98/61    Wt Readings from Last 3 Encounters:   03/15/19 106.1 kg (234 lb)   03/05/19 106.1 kg (234 lb)   02/06/19 106.7 kg (235 lb 4.8 oz)         ROS:  Constitutional, HEENT, cardiovascular, pulmonary, gi and gu systems are negative, except as otherwise noted.    OBJECTIVE:                                                    /52   Pulse 72   Temp 98.6  F (37  C) (Tympanic)   Resp 12   Ht 1.651 m (5' 5\")   Wt 106.1 kg (234 lb)   SpO2 96%   BMI 38.94 kg/m    GENERAL APPEARANCE ADULT: Alert, no acute distress, morbidly obese    MS: ankle exam: swelling around the medial malleolus and lateral malleolus, erythema around the medial malleolus and lateral malleolus, tenderness at the medial malleolus and lateral malleolus  foot exam normal DP and PT pulses, no trophic changes or ulcerative lesions and normal monofilament exam  SKIN: no suspicious lesions or rashes  NEURO: Alert, oriented, speech and mentation normal  PSYCH: mentation appears normal., affect and mood normal  Diagnostic Test Results:  none      ASSESSMENT/PLAN:                                                    1. Pain in joint involving ankle and foot, left  Possible gout, on medications and chronic medical problems that would cause these symptoms  - XR Ankle Left G/E 3 Views; Future  - Erythrocyte sedimentation rate auto  - CRP inflammation  - CBC with platelets differential  - Uric acid  - Rheumatoid factor  - predniSONE (DELTASONE) 10 MG tablet; Take 10 mg by mouth 2 times daily.  Dispense: 10 tablet; Refill: 0    2. Morbid obesity (H)  Ideal weight for height is 140, realistic " weight 150.  Need to lose at least 10%, 23 pounds in 23 weeks.      3. Thrombocytopenia (H)  Associated with liver disease, fatty liver cirrhosis  - ASPIRIN NOT PRESCRIBED (INTENTIONAL); Please choose reason not prescribed, below  - STATIN NOT PRESCRIBED (INTENTIONAL); Please choose reason not prescribed, below  - ACE/ARB/ARNI NOT PRESCRIBED (INTENTIONAL); Please choose reason not prescribed, below    4. Cirrhosis of liver without ascites, unspecified hepatic cirrhosis type (H)  - STATIN NOT PRESCRIBED (INTENTIONAL); Please choose reason not prescribed, below  - ACE/ARB/ARNI NOT PRESCRIBED (INTENTIONAL); Please choose reason not prescribed, below    Anai Jacobs MD  [facility] [place]

## 2019-03-16 LAB — AFP SERPL-MCNC: 4.8 UG/L (ref 0–8)

## 2019-03-18 ENCOUNTER — HOSPITAL ENCOUNTER (OUTPATIENT)
Dept: CARDIAC REHAB | Facility: CLINIC | Age: 59
End: 2019-03-18
Attending: NURSE PRACTITIONER
Payer: COMMERCIAL

## 2019-03-18 PROCEDURE — 40000116 ZZH STATISTIC OP CR VISIT

## 2019-03-18 PROCEDURE — 93798 PHYS/QHP OP CAR RHAB W/ECG: CPT

## 2019-03-19 LAB — RHEUMATOID FACT SER NEPH-ACNC: <20 IU/ML (ref 0–20)

## 2019-03-20 ENCOUNTER — HOSPITAL ENCOUNTER (OUTPATIENT)
Dept: CARDIAC REHAB | Facility: CLINIC | Age: 59
End: 2019-03-20
Attending: NURSE PRACTITIONER
Payer: COMMERCIAL

## 2019-03-20 PROCEDURE — 40000116 ZZH STATISTIC OP CR VISIT: Performed by: REHABILITATION PRACTITIONER

## 2019-03-20 PROCEDURE — 93798 PHYS/QHP OP CAR RHAB W/ECG: CPT | Performed by: REHABILITATION PRACTITIONER

## 2019-03-21 ENCOUNTER — ANCILLARY PROCEDURE (OUTPATIENT)
Dept: ULTRASOUND IMAGING | Facility: CLINIC | Age: 59
End: 2019-03-21
Attending: INTERNAL MEDICINE
Payer: COMMERCIAL

## 2019-03-21 ENCOUNTER — OFFICE VISIT (OUTPATIENT)
Dept: GASTROENTEROLOGY | Facility: CLINIC | Age: 59
End: 2019-03-21
Attending: INTERNAL MEDICINE
Payer: COMMERCIAL

## 2019-03-21 VITALS
OXYGEN SATURATION: 97 % | WEIGHT: 235.6 LBS | TEMPERATURE: 97.9 F | SYSTOLIC BLOOD PRESSURE: 102 MMHG | HEART RATE: 62 BPM | BODY MASS INDEX: 39.21 KG/M2 | DIASTOLIC BLOOD PRESSURE: 63 MMHG

## 2019-03-21 DIAGNOSIS — K74.60 LIVER CIRRHOSIS SECONDARY TO NONALCOHOLIC STEATOHEPATITIS (NASH) (H): ICD-10-CM

## 2019-03-21 DIAGNOSIS — K74.60 CIRRHOSIS OF LIVER WITHOUT ASCITES, UNSPECIFIED HEPATIC CIRRHOSIS TYPE (H): ICD-10-CM

## 2019-03-21 DIAGNOSIS — K75.81 LIVER CIRRHOSIS SECONDARY TO NONALCOHOLIC STEATOHEPATITIS (NASH) (H): ICD-10-CM

## 2019-03-21 DIAGNOSIS — K74.60 CIRRHOSIS OF LIVER WITHOUT ASCITES, UNSPECIFIED HEPATIC CIRRHOSIS TYPE (H): Primary | ICD-10-CM

## 2019-03-21 LAB — INR PPP: 2.29 (ref 0.86–1.14)

## 2019-03-21 PROCEDURE — G0463 HOSPITAL OUTPT CLINIC VISIT: HCPCS | Mod: ZF

## 2019-03-21 PROCEDURE — 36415 COLL VENOUS BLD VENIPUNCTURE: CPT | Performed by: INTERNAL MEDICINE

## 2019-03-21 PROCEDURE — 85610 PROTHROMBIN TIME: CPT | Performed by: INTERNAL MEDICINE

## 2019-03-21 ASSESSMENT — PAIN SCALES - GENERAL: PAINLEVEL: NO PAIN (0)

## 2019-03-21 NOTE — LETTER
3/21/2019      RE: Maurice Jon  [id number]  [facility] [place] [address]       HISTORY OF PRESENT ILLNESS:  I had the pleasure of seeing Maurice Jon for followup in the Liver Clinic at the [facility] on 03/21/2019.  Mr. Jon returns for followup of cryptogenic cirrhosis and nonocclusive portal vein thrombosis.        He finally had his ablation done towards the end of December.  Surprisingly, he developed some evidence of heart failure on echocardiogram that showed that his ejection fraction was low at 30%.  He underwent a coronary angiogram which showed that he had clean coronaries and the etiology of his apparent cardiomyopathy is unclear.  He had his diuretics boosted up and at present certainly denies any shortness of breath and has a fairly minimal lower extremity edema.      From his liver standpoint, he denies any abdominal pain, itching, or skin rash.  He still has a moderate amount of fatigue, although is working full time.  He denies any increased abdominal girth, and as mentioned he has mild lower extremity edema.  He has not had any gastrointestinal bleeding or any overt signs of hepatic encephalopathy.  He denies any fevers or chills, cough or shortness of breath.  He denies any nausea or vomiting, diarrhea or constipation.  His appetite has been good, and his weight has been stable, although it certainly would be nice to see him losing some weight.      The only other problem he had was about a week ago where he developed left ankle pain.  He saw his primary physician who did a very appropriate workup and interesting his uric acid level is quite low and his inflammatory markers were not elevated either in terms of his CRP or sedimentation rate.     Current Outpatient Medications   Medication     ACE/ARB/ARNI NOT PRESCRIBED (INTENTIONAL)     budesonide-formoterol (SYMBICORT) 80-4.5 MCG/ACT Inhaler     calcium carb 1250 mg, 500 mg [place],/vitamin D 200 units (OSCAL  WITH D) 500-200 MG-UNIT per tablet     digoxin (LANOXIN) 125 MCG tablet     furosemide (LASIX) 40 MG tablet     metoprolol succinate ER (TOPROL-XL) 100 MG 24 hr tablet     metoprolol succinate ER (TOPROL-XL) 25 MG 24 hr tablet     order for DME     order for DME     order for DME     ORDER FOR DME     ORDER FOR DME     pantoprazole (PROTONIX) 40 MG EC tablet     spironolactone (ALDACTONE) 25 MG tablet     warfarin (COUMADIN) 2.5 MG tablet     ASPIRIN NOT PRESCRIBED (INTENTIONAL)     mometasone-formoterol (DULERA) 200-5 MCG/ACT inhaler     predniSONE (DELTASONE) 10 MG tablet     STATIN NOT PRESCRIBED (INTENTIONAL)     No current facility-administered medications for this visit.      Vital signs:  Temp: 97.9  F (36.6  C) Temp src: Oral BP: 102/63 Pulse: 62     SpO2: 97 %       Weight: 106.9 kg (235 lb 9.6 oz)    In general he looks well.  HEENT exam shows no scleral icterus or temporal muscle wasting.  His chest is clear.  His abdominal exam shows him to be obese.  No masses or tenderness to palpation are present.  His liver is 10 cm in span without left lobe enlargement.  No spleen tip is palpable.  Extremity exam shows just a trace edema.  Skin exam shows no stigmata of chronic liver disease.  Neurologic exam shows no asterixis.     Recent Results (from the past 168 hour(s))   AFP tumor marker [[id number]]    Collection Time: 03/15/19  2:17 PM   Result Value Ref Range    Alpha Fetoprotein 4.8 0 - 8 ug/L   CBC with platelets and differential    Collection Time: 03/15/19  4:18 PM   Result Value Ref Range    WBC 3.6 (L) 4.0 - 11.0 10e9/L    RBC Count 3.88 (L) 4.4 - 5.9 10e12/L    Hemoglobin 13.2 (L) 13.3 - 17.7 g/dL    Hematocrit 39.7 (L) 40.0 - 53.0 %     (H) 78 - 100 fl    MCH 34.0 (H) 26.5 - 33.0 pg    MCHC 33.2 31.5 - 36.5 g/dL    RDW 14.2 10.0 - 15.0 %    Platelet Count 72 (L) 150 - 450 10e9/L    Diff Method Automated Method     % Neutrophils 48.6 %    % Lymphocytes 22.5 %    % Monocytes 19.0 %    % Eosinophils  9.1 %    % Basophils 0.5 %    % Immature Granulocytes 0.3 %    Nucleated RBCs 0 0 /100    Absolute Neutrophil 1.8 1.6 - 8.3 10e9/L    Absolute Lymphocytes 0.8 0.8 - 5.3 10e9/L    Absolute Monocytes 0.7 0.0 - 1.3 10e9/L    Absolute Eosinophils 0.3 0.0 - 0.7 10e9/L    Absolute Basophils 0.0 0.0 - 0.2 10e9/L    Abs Immature Granulocytes 0.0 0 - 0.4 10e9/L    Absolute Nucleated RBC 0.0    Uric acid    Collection Time: 03/15/19  4:18 PM   Result Value Ref Range    Uric Acid 4.7 3.5 - 7.2 mg/dL   CRP, inflammation    Collection Time: 03/15/19  4:18 PM   Result Value Ref Range    CRP Inflammation 3.6 0.0 - 8.0 mg/L   **ESR FUTURE anytime    Collection Time: 03/15/19  4:18 PM   Result Value Ref Range    Sed Rate 9 0 - 20 mm/h   Rheumatoid factor    Collection Time: 03/15/19  4:18 PM   Result Value Ref Range    Rheumatoid Factor <20 <20 IU/mL   Basic metabolic panel [LAB15]    Collection Time: 03/15/19  4:18 PM   Result Value Ref Range    Sodium 139 133 - 144 mmol/L    Potassium 3.7 3.4 - 5.3 mmol/L    Chloride 105 94 - 109 mmol/L    Carbon Dioxide 27 20 - 32 mmol/L    Anion Gap 7 3 - 14 mmol/L    Glucose 78 70 - 99 mg/dL    Urea Nitrogen 19 7 - 30 mg/dL    Creatinine 0.74 0.66 - 1.25 mg/dL    GFR Estimate >90 >60 mL/min/[1.73_m2]    GFR Estimate If Black >90 >60 mL/min/[1.73_m2]    Calcium 8.8 8.5 - 10.1 mg/dL   Hepatic Panel [LAB20]    Collection Time: 03/15/19  4:18 PM   Result Value Ref Range    Bilirubin Direct 0.4 (H) 0.0 - 0.2 mg/dL    Bilirubin Total 1.4 (H) 0.2 - 1.3 mg/dL    Albumin 3.3 (L) 3.4 - 5.0 g/dL    Protein Total 6.3 (L) 6.8 - 8.8 g/dL    Alkaline Phosphatase 82 40 - 150 U/L    ALT 44 0 - 70 U/L    AST 48 (H) 0 - 45 U/L   INR    Collection Time: 03/21/19  6:08 AM   Result Value Ref Range    INR 2.29 (H) 0.86 - 1.14      His ultrasound from today shows a cirrhotic-appearing liver, no ascites is present and there are no mass lesions within the liver.  He still has a nonocclusive portal vein thrombosis that  has not changed in size.      IMPRESSION:  My impression is that Mr. Jon has cirrhosis which is well compensated at this point in time.  He is up-to-date with regard to cancer screening and vaccinations.  He is due for an upper GI endoscopy.  He asked if it can be scheduled after August, which I will accommodate him for as he is running out of sick days at work.  I will not be making any change to his medical regimen.  I will see him back in clinic at that time for repeat bloodwork and ultrasound.      Thank you very much for allowing me to participate in the care of this patient.  If you have any questions regarding my recommendations, please do not hesitate to contact me.       Hi Roque MD      Professor of Medicine  [facility] [employer]      Executive Medical Director, Solid Organ Transplant Program  [facility]

## 2019-03-21 NOTE — PROGRESS NOTES
HISTORY OF PRESENT ILLNESS:  I had the pleasure of seeing Maurice Jon for followup in the Liver Clinic at the [facility] on 03/21/2019.  Mr. Jon returns for followup of cryptogenic cirrhosis and nonocclusive portal vein thrombosis.        He finally had his ablation done towards the end of December.  Surprisingly, he developed some evidence of heart failure on echocardiogram that showed that his ejection fraction was low at 30%.  He underwent a coronary angiogram which showed that he had clean coronaries and the etiology of his apparent cardiomyopathy is unclear.  He had his diuretics boosted up and at present certainly denies any shortness of breath and has a fairly minimal lower extremity edema.      From his liver standpoint, he denies any abdominal pain, itching, or skin rash.  He still has a moderate amount of fatigue, although is working full time.  He denies any increased abdominal girth, and as mentioned he has mild lower extremity edema.  He has not had any gastrointestinal bleeding or any overt signs of hepatic encephalopathy.  He denies any fevers or chills, cough or shortness of breath.  He denies any nausea or vomiting, diarrhea or constipation.  His appetite has been good, and his weight has been stable, although it certainly would be nice to see him losing some weight.      The only other problem he had was about a week ago where he developed left ankle pain.  He saw his primary physician who did a very appropriate workup and interesting his uric acid level is quite low and his inflammatory markers were not elevated either in terms of his CRP or sedimentation rate.     Current Outpatient Medications   Medication     ACE/ARB/ARNI NOT PRESCRIBED (INTENTIONAL)     budesonide-formoterol (SYMBICORT) 80-4.5 MCG/ACT Inhaler     calcium carb 1250 mg, 500 mg [place],/vitamin D 200 units (OSCAL WITH D) 500-200 MG-UNIT per tablet     digoxin (LANOXIN) 125 MCG tablet     furosemide  (LASIX) 40 MG tablet     metoprolol succinate ER (TOPROL-XL) 100 MG 24 hr tablet     metoprolol succinate ER (TOPROL-XL) 25 MG 24 hr tablet     order for DME     order for DME     order for DME     ORDER FOR DME     ORDER FOR DME     pantoprazole (PROTONIX) 40 MG EC tablet     spironolactone (ALDACTONE) 25 MG tablet     warfarin (COUMADIN) 2.5 MG tablet     ASPIRIN NOT PRESCRIBED (INTENTIONAL)     mometasone-formoterol (DULERA) 200-5 MCG/ACT inhaler     predniSONE (DELTASONE) 10 MG tablet     STATIN NOT PRESCRIBED (INTENTIONAL)     No current facility-administered medications for this visit.      Vital signs:  Temp: 97.9  F (36.6  C) Temp src: Oral BP: 102/63 Pulse: 62     SpO2: 97 %       Weight: 106.9 kg (235 lb 9.6 oz)    In general he looks well.  HEENT exam shows no scleral icterus or temporal muscle wasting.  His chest is clear.  His abdominal exam shows him to be obese.  No masses or tenderness to palpation are present.  His liver is 10 cm in span without left lobe enlargement.  No spleen tip is palpable.  Extremity exam shows just a trace edema.  Skin exam shows no stigmata of chronic liver disease.  Neurologic exam shows no asterixis.     Recent Results (from the past 168 hour(s))   AFP tumor marker [[id number]]    Collection Time: 03/15/19  2:17 PM   Result Value Ref Range    Alpha Fetoprotein 4.8 0 - 8 ug/L   CBC with platelets and differential    Collection Time: 03/15/19  4:18 PM   Result Value Ref Range    WBC 3.6 (L) 4.0 - 11.0 10e9/L    RBC Count 3.88 (L) 4.4 - 5.9 10e12/L    Hemoglobin 13.2 (L) 13.3 - 17.7 g/dL    Hematocrit 39.7 (L) 40.0 - 53.0 %     (H) 78 - 100 fl    MCH 34.0 (H) 26.5 - 33.0 pg    MCHC 33.2 31.5 - 36.5 g/dL    RDW 14.2 10.0 - 15.0 %    Platelet Count 72 (L) 150 - 450 10e9/L    Diff Method Automated Method     % Neutrophils 48.6 %    % Lymphocytes 22.5 %    % Monocytes 19.0 %    % Eosinophils 9.1 %    % Basophils 0.5 %    % Immature Granulocytes 0.3 %    Nucleated RBCs 0 0 /100     Absolute Neutrophil 1.8 1.6 - 8.3 10e9/L    Absolute Lymphocytes 0.8 0.8 - 5.3 10e9/L    Absolute Monocytes 0.7 0.0 - 1.3 10e9/L    Absolute Eosinophils 0.3 0.0 - 0.7 10e9/L    Absolute Basophils 0.0 0.0 - 0.2 10e9/L    Abs Immature Granulocytes 0.0 0 - 0.4 10e9/L    Absolute Nucleated RBC 0.0    Uric acid    Collection Time: 03/15/19  4:18 PM   Result Value Ref Range    Uric Acid 4.7 3.5 - 7.2 mg/dL   CRP, inflammation    Collection Time: 03/15/19  4:18 PM   Result Value Ref Range    CRP Inflammation 3.6 0.0 - 8.0 mg/L   **ESR FUTURE anytime    Collection Time: 03/15/19  4:18 PM   Result Value Ref Range    Sed Rate 9 0 - 20 mm/h   Rheumatoid factor    Collection Time: 03/15/19  4:18 PM   Result Value Ref Range    Rheumatoid Factor <20 <20 IU/mL   Basic metabolic panel [LAB15]    Collection Time: 03/15/19  4:18 PM   Result Value Ref Range    Sodium 139 133 - 144 mmol/L    Potassium 3.7 3.4 - 5.3 mmol/L    Chloride 105 94 - 109 mmol/L    Carbon Dioxide 27 20 - 32 mmol/L    Anion Gap 7 3 - 14 mmol/L    Glucose 78 70 - 99 mg/dL    Urea Nitrogen 19 7 - 30 mg/dL    Creatinine 0.74 0.66 - 1.25 mg/dL    GFR Estimate >90 >60 mL/min/[1.73_m2]    GFR Estimate If Black >90 >60 mL/min/[1.73_m2]    Calcium 8.8 8.5 - 10.1 mg/dL   Hepatic Panel [LAB20]    Collection Time: 03/15/19  4:18 PM   Result Value Ref Range    Bilirubin Direct 0.4 (H) 0.0 - 0.2 mg/dL    Bilirubin Total 1.4 (H) 0.2 - 1.3 mg/dL    Albumin 3.3 (L) 3.4 - 5.0 g/dL    Protein Total 6.3 (L) 6.8 - 8.8 g/dL    Alkaline Phosphatase 82 40 - 150 U/L    ALT 44 0 - 70 U/L    AST 48 (H) 0 - 45 U/L   INR    Collection Time: 03/21/19  6:08 AM   Result Value Ref Range    INR 2.29 (H) 0.86 - 1.14      His ultrasound from today shows a cirrhotic-appearing liver, no ascites is present and there are no mass lesions within the liver.  He still has a nonocclusive portal vein thrombosis that has not changed in size.      IMPRESSION:  My impression is that Mr. Jon has cirrhosis  which is well compensated at this point in time.  He is up-to-date with regard to cancer screening and vaccinations.  He is due for an upper GI endoscopy.  He asked if it can be scheduled after August, which I will accommodate him for as he is running out of sick days at work.  I will not be making any change to his medical regimen.  I will see him back in clinic at that time for repeat bloodwork and ultrasound.      Thank you very much for allowing me to participate in the care of this patient.  If you have any questions regarding my recommendations, please do not hesitate to contact me.       Hi Roque MD      Professor of Medicine  [employer]      Executive Medical Director, Solid Organ Transplant Program  [facility]

## 2019-03-21 NOTE — NURSING NOTE
"Chief Complaint   Patient presents with     RECHECK     6 month follow up Cirrhosis     Vital signs:  Temp: 97.9  F (36.6  C) Temp src: Oral BP: 102/63 Pulse: 62     SpO2: 97 %       Weight: 106.9 kg (235 lb 9.6 oz)  Estimated body mass index is 39.21 kg/m  as calculated from the following:    Height as of 3/15/19: 1.651 m (5' 5\").    Weight as of this encounter: 106.9 kg (235 lb 9.6 oz).        Anna Rivas    "

## 2019-03-22 ENCOUNTER — HOSPITAL ENCOUNTER (OUTPATIENT)
Dept: CARDIAC REHAB | Facility: CLINIC | Age: 59
End: 2019-03-22
Attending: NURSE PRACTITIONER
Payer: COMMERCIAL

## 2019-03-22 PROCEDURE — 40000116 ZZH STATISTIC OP CR VISIT

## 2019-03-22 PROCEDURE — 93798 PHYS/QHP OP CAR RHAB W/ECG: CPT

## 2019-03-25 ENCOUNTER — HOSPITAL ENCOUNTER (OUTPATIENT)
Dept: CARDIAC REHAB | Facility: CLINIC | Age: 59
End: 2019-03-25
Attending: NURSE PRACTITIONER
Payer: COMMERCIAL

## 2019-03-25 PROCEDURE — 93798 PHYS/QHP OP CAR RHAB W/ECG: CPT | Performed by: REHABILITATION PRACTITIONER

## 2019-03-25 PROCEDURE — 40000116 ZZH STATISTIC OP CR VISIT: Performed by: REHABILITATION PRACTITIONER

## 2019-03-26 ENCOUNTER — MYC MEDICAL ADVICE (OUTPATIENT)
Dept: ANTICOAGULATION | Facility: CLINIC | Age: 59
End: 2019-03-26

## 2019-03-26 NOTE — TELEPHONE ENCOUNTER
Pt wants to continue Cardiac Rehab per a my chart message. Will review with Dr Eavns and get his OK to order.

## 2019-03-27 ENCOUNTER — HOSPITAL ENCOUNTER (OUTPATIENT)
Dept: CARDIAC REHAB | Facility: CLINIC | Age: 59
End: 2019-03-27
Attending: NURSE PRACTITIONER
Payer: COMMERCIAL

## 2019-03-27 DIAGNOSIS — I50.9 CHF (CONGESTIVE HEART FAILURE) (H): Primary | ICD-10-CM

## 2019-03-27 PROCEDURE — 93798 PHYS/QHP OP CAR RHAB W/ECG: CPT | Performed by: REHABILITATION PRACTITIONER

## 2019-03-27 PROCEDURE — 40000116 ZZH STATISTIC OP CR VISIT: Performed by: REHABILITATION PRACTITIONER

## 2019-03-29 ENCOUNTER — HOSPITAL ENCOUNTER (OUTPATIENT)
Dept: CARDIAC REHAB | Facility: CLINIC | Age: 59
End: 2019-03-29
Attending: NURSE PRACTITIONER
Payer: COMMERCIAL

## 2019-03-29 PROCEDURE — 93798 PHYS/QHP OP CAR RHAB W/ECG: CPT

## 2019-03-29 PROCEDURE — 40000116 ZZH STATISTIC OP CR VISIT

## 2019-04-01 ENCOUNTER — HOSPITAL ENCOUNTER (OUTPATIENT)
Dept: CARDIAC REHAB | Facility: CLINIC | Age: 59
End: 2019-04-01
Attending: NURSE PRACTITIONER
Payer: COMMERCIAL

## 2019-04-01 PROCEDURE — 40000116 ZZH STATISTIC OP CR VISIT: Performed by: REHABILITATION PRACTITIONER

## 2019-04-01 PROCEDURE — 93798 PHYS/QHP OP CAR RHAB W/ECG: CPT | Performed by: REHABILITATION PRACTITIONER

## 2019-04-03 ENCOUNTER — HOSPITAL ENCOUNTER (OUTPATIENT)
Dept: CARDIAC REHAB | Facility: CLINIC | Age: 59
End: 2019-04-03
Attending: NURSE PRACTITIONER
Payer: COMMERCIAL

## 2019-04-03 PROCEDURE — 40000116 ZZH STATISTIC OP CR VISIT: Performed by: REHABILITATION PRACTITIONER

## 2019-04-03 PROCEDURE — 93798 PHYS/QHP OP CAR RHAB W/ECG: CPT | Performed by: REHABILITATION PRACTITIONER

## 2019-04-05 ENCOUNTER — HOSPITAL ENCOUNTER (OUTPATIENT)
Dept: CARDIAC REHAB | Facility: CLINIC | Age: 59
End: 2019-04-05
Attending: NURSE PRACTITIONER
Payer: COMMERCIAL

## 2019-04-05 PROCEDURE — 93798 PHYS/QHP OP CAR RHAB W/ECG: CPT

## 2019-04-05 PROCEDURE — 40000116 ZZH STATISTIC OP CR VISIT

## 2019-04-08 ENCOUNTER — HOSPITAL ENCOUNTER (OUTPATIENT)
Dept: CARDIAC REHAB | Facility: CLINIC | Age: 59
End: 2019-04-08
Attending: NURSE PRACTITIONER
Payer: COMMERCIAL

## 2019-04-08 PROCEDURE — 40000116 ZZH STATISTIC OP CR VISIT

## 2019-04-08 PROCEDURE — 93798 PHYS/QHP OP CAR RHAB W/ECG: CPT

## 2019-04-10 ENCOUNTER — HOSPITAL ENCOUNTER (OUTPATIENT)
Dept: CARDIAC REHAB | Facility: CLINIC | Age: 59
End: 2019-04-10
Attending: NURSE PRACTITIONER
Payer: COMMERCIAL

## 2019-04-10 PROCEDURE — 40000116 ZZH STATISTIC OP CR VISIT

## 2019-04-10 PROCEDURE — 93798 PHYS/QHP OP CAR RHAB W/ECG: CPT

## 2019-04-12 ENCOUNTER — HOSPITAL ENCOUNTER (OUTPATIENT)
Dept: CARDIAC REHAB | Facility: CLINIC | Age: 59
End: 2019-04-12
Attending: NURSE PRACTITIONER
Payer: COMMERCIAL

## 2019-04-12 PROCEDURE — 93798 PHYS/QHP OP CAR RHAB W/ECG: CPT

## 2019-04-12 PROCEDURE — 40000116 ZZH STATISTIC OP CR VISIT

## 2019-04-15 ENCOUNTER — HOSPITAL ENCOUNTER (OUTPATIENT)
Dept: CARDIAC REHAB | Facility: CLINIC | Age: 59
End: 2019-04-15
Attending: NURSE PRACTITIONER
Payer: COMMERCIAL

## 2019-04-15 PROCEDURE — 93798 PHYS/QHP OP CAR RHAB W/ECG: CPT | Performed by: REHABILITATION PRACTITIONER

## 2019-04-15 PROCEDURE — 40000116 ZZH STATISTIC OP CR VISIT: Performed by: REHABILITATION PRACTITIONER

## 2019-04-17 ENCOUNTER — HOSPITAL ENCOUNTER (OUTPATIENT)
Dept: CARDIAC REHAB | Facility: CLINIC | Age: 59
End: 2019-04-17
Attending: NURSE PRACTITIONER
Payer: COMMERCIAL

## 2019-04-17 PROCEDURE — 40000116 ZZH STATISTIC OP CR VISIT

## 2019-04-17 PROCEDURE — 93798 PHYS/QHP OP CAR RHAB W/ECG: CPT

## 2019-04-18 ENCOUNTER — HOSPITAL ENCOUNTER (OUTPATIENT)
Dept: CT IMAGING | Facility: CLINIC | Age: 59
Discharge: HOME | End: 2019-04-18
Attending: INTERNAL MEDICINE | Admitting: INTERNAL MEDICINE
Payer: COMMERCIAL

## 2019-04-18 DIAGNOSIS — R91.8 PULMONARY NODULES: ICD-10-CM

## 2019-04-18 PROCEDURE — 71250 CT THORAX DX C-: CPT

## 2019-04-22 ENCOUNTER — HOSPITAL ENCOUNTER (OUTPATIENT)
Dept: CARDIAC REHAB | Facility: CLINIC | Age: 59
End: 2019-04-22
Attending: NURSE PRACTITIONER
Payer: COMMERCIAL

## 2019-04-22 DIAGNOSIS — K21.9 GASTROESOPHAGEAL REFLUX DISEASE, ESOPHAGITIS PRESENCE NOT SPECIFIED: Primary | ICD-10-CM

## 2019-04-22 DIAGNOSIS — I48.92 ATRIAL FLUTTER (H): ICD-10-CM

## 2019-04-22 PROCEDURE — 93798 PHYS/QHP OP CAR RHAB W/ECG: CPT

## 2019-04-22 PROCEDURE — 40000116 ZZH STATISTIC OP CR VISIT

## 2019-04-22 RX ORDER — DIGOXIN 125 MCG
125 TABLET ORAL DAILY
Qty: 30 TABLET | Refills: 3 | Status: SHIPPED | OUTPATIENT
Start: 2019-04-22 | End: 2019-06-25

## 2019-04-22 NOTE — TELEPHONE ENCOUNTER
"Requested Prescriptions   Pending Prescriptions Disp Refills     pantoprazole (PROTONIX) 40 MG EC tablet       Sig: Take 1 tablet (40 mg) by mouth daily  Medication is Historical   LOV: 3/15/2019 with [facility]        PPI Protocol Failed - 4/22/2019  9:55 AM        Failed - Recent (12 mo) or future (30 days) visit within the authorizing provider's specialty     Patient had office visit in the last 12 months or has a visit in the next 30 days with authorizing provider or within the authorizing provider's specialty.  See \"Patient Info\" tab in inbasket, or \"Choose Columns\" in Meds & Orders section of the refill encounter.              Passed - Not on Clopidogrel (unless Pantoprazole ordered)        Passed - No diagnosis of osteoporosis on record        Passed - Medication is active on med list        Passed - Patient is age 18 or older          "

## 2019-04-23 RX ORDER — PANTOPRAZOLE SODIUM 40 MG/1
40 TABLET, DELAYED RELEASE ORAL DAILY
Qty: 90 TABLET | Refills: 3 | Status: ON HOLD | OUTPATIENT
Start: 2019-04-23 | End: 2019-11-16

## 2019-04-24 ENCOUNTER — HOSPITAL ENCOUNTER (OUTPATIENT)
Dept: CARDIAC REHAB | Facility: CLINIC | Age: 59
End: 2019-04-24
Attending: NURSE PRACTITIONER
Payer: COMMERCIAL

## 2019-04-24 PROCEDURE — 93798 PHYS/QHP OP CAR RHAB W/ECG: CPT

## 2019-04-24 PROCEDURE — 40000116 ZZH STATISTIC OP CR VISIT

## 2019-04-26 ENCOUNTER — ANTICOAGULATION THERAPY VISIT (OUTPATIENT)
Dept: ANTICOAGULATION | Facility: CLINIC | Age: 59
End: 2019-04-26
Payer: COMMERCIAL

## 2019-04-26 DIAGNOSIS — I48.0 PAROXYSMAL ATRIAL FIBRILLATION (H): ICD-10-CM

## 2019-04-26 LAB — INR POINT OF CARE: 2.6 (ref 0.86–1.14)

## 2019-04-26 PROCEDURE — 85610 PROTHROMBIN TIME: CPT | Mod: QW

## 2019-04-26 PROCEDURE — 36416 COLLJ CAPILLARY BLOOD SPEC: CPT

## 2019-04-26 PROCEDURE — 99207 ZZC NO CHARGE NURSE ONLY: CPT

## 2019-04-26 NOTE — PROGRESS NOTES
ANTICOAGULATION FOLLOW-UP CLINIC VISIT    Patient Name:  Maurice Jon  Date:  2019  Contact Type:  Face to Face    SUBJECTIVE:     Patient Findings     Comments:   Patient reports no changes in medication, activity, or diet. Patient reports has taken warfarin as instructed, no missed doses. Patient reports no abnormal bruising or bleeding and no signs or symptoms of a blood clot. Will plan to continue maintenance dose and recheck INR in 6 weeks. Patient to call [facility] with any changes or concerns. Patient in agreement to plan. Patient verbalized understanding of all instructions, denies questions or concerns at this time.              OBJECTIVE    INR Protime   Date Value Ref Range Status   2019 2.6 (A) 0.86 - 1.14 Final       ASSESSMENT / PLAN  INR assessment THER    Recheck INR In: 6 WEEKS    INR Location Clinic      Anticoagulation Summary  As of 2019    INR goal:   2.0-3.0   TTR:   91.2 % (7 mo)   INR used for dosin.6 (2019)   Warfarin maintenance plan:   1.25 mg (2.5 mg x 0.5) every Fri; 2.5 mg (2.5 mg x 1) all other days   Full warfarin instructions:   1.25 mg every Fri; 2.5 mg all other days   Weekly warfarin total:   16.25 mg   No change documented:   Christina Singer RN   Plan last modified:   Susan Beyer RN (2018)   Next INR check:   2019   Priority:   INR   Target end date:   10/4/2018    Indications    Paroxysmal atrial fibrillation (H) [I48.0]  Atrial fibrillation with rapid ventricular response (H) (Resolved) [I48.91]  Long-term (current) use of anticoagulants [Z79.01] [Z79.01]             Anticoagulation Episode Summary     INR check location:       Preferred lab:       Send INR reminders to:   [facility] ANTICOAG PO[facility]    Comments:   * anticoagulation short period surrounding ablation on 18. Cardiology to decide when to stop warfarin. has well-compensated cirrhosis      Anticoagulation Care Providers     Provider Role Specialty Phone number    Pineda  Alon Lauren MD [facility] [phone]            See the Encounter Report to view Anticoagulation Flowsheet and Dosing Calendar (Go to Encounters tab in chart review, and find the Anticoagulation Therapy Visit)    Christina Singer RN

## 2019-04-29 ENCOUNTER — HOSPITAL ENCOUNTER (OUTPATIENT)
Dept: CARDIAC REHAB | Facility: CLINIC | Age: 59
End: 2019-04-29
Attending: NURSE PRACTITIONER
Payer: COMMERCIAL

## 2019-04-29 PROCEDURE — 40000116 ZZH STATISTIC OP CR VISIT

## 2019-04-29 PROCEDURE — 93798 PHYS/QHP OP CAR RHAB W/ECG: CPT

## 2019-04-29 ASSESSMENT — 6 MINUTE WALK TEST (6MWT): GENDER SELECTION: MALE

## 2019-05-01 ENCOUNTER — TELEPHONE (OUTPATIENT)
Dept: FAMILY MEDICINE | Facility: CLINIC | Age: 59
End: 2019-05-01

## 2019-05-01 DIAGNOSIS — I48.0 PAROXYSMAL ATRIAL FIBRILLATION (H): ICD-10-CM

## 2019-05-01 DIAGNOSIS — Z79.01 LONG TERM CURRENT USE OF ANTICOAGULANT THERAPY: ICD-10-CM

## 2019-05-01 DIAGNOSIS — I48.91 ATRIAL FIBRILLATION WITH RAPID VENTRICULAR RESPONSE (H): ICD-10-CM

## 2019-05-01 NOTE — TELEPHONE ENCOUNTER
"Requested Prescriptions   Pending Prescriptions Disp Refills     warfarin (JANTOVEN) 2.5 MG tablet [Pharmacy Med Name: JANTOVEN 2.5 MG TABLET] 26 tablet 0     Si.25 mg ; 2.5mg all other days or As directed by Anticoagulation Clinic. INR DUE FOR FURTHER REFILLS   Last Written Prescription Date:  19  Last Fill Quantity: 26 tab,  # refills: 0   Last office visit: 3/15/2019 with prescribing provider:  MELISSA Jacobs   Future Office Visit:   Next 5 appointments (look out 90 days)    2019  3:00 PM CDT  Return Visit with Virginia Montilla PA-C  [facility] ([facility] [address] [place] [id number]  [phone]             Vitamin K Antagonists Failed - 2019  8:00 AM        Failed - Recent (12 mo) or future (30 days) visit within the authorizing provider's specialty     Patient had office visit in the last 12 months or has a visit in the next 30 days with authorizing provider or within the authorizing provider's specialty.  See \"Patient Info\" tab in inbasket, or \"Choose Columns\" in Meds & Orders section of the refill encounter.              Failed - INR is within goal in the past 6 weeks     Confirm INR is within goal in the past 6 weeks.     Recent Labs   Lab Test 19   INR 2.6*                       Passed - Medication is active on med list        Passed - Patient is 18 years of age or older          "

## 2019-05-02 RX ORDER — WARFARIN SODIUM 2.5 MG/1
TABLET ORAL
Qty: 50 TABLET | Refills: 0 | Status: SHIPPED | OUTPATIENT
Start: 2019-05-02 | End: 2019-06-25

## 2019-05-06 DIAGNOSIS — K76.6 PORTAL HYPERTENSION (H): ICD-10-CM

## 2019-05-06 RX ORDER — FUROSEMIDE 40 MG
40 TABLET ORAL 2 TIMES DAILY
Qty: 60 TABLET | Refills: 1 | Status: SHIPPED | OUTPATIENT
Start: 2019-05-06 | End: 2019-11-20

## 2019-06-11 ENCOUNTER — ANTICOAGULATION THERAPY VISIT (OUTPATIENT)
Dept: ANTICOAGULATION | Facility: CLINIC | Age: 59
End: 2019-06-11
Payer: COMMERCIAL

## 2019-06-11 DIAGNOSIS — I48.0 PAROXYSMAL ATRIAL FIBRILLATION (H): ICD-10-CM

## 2019-06-11 LAB — INR POINT OF CARE: 2.4 (ref 0.86–1.14)

## 2019-06-11 PROCEDURE — 36416 COLLJ CAPILLARY BLOOD SPEC: CPT

## 2019-06-11 PROCEDURE — 85610 PROTHROMBIN TIME: CPT | Mod: QW

## 2019-06-11 PROCEDURE — 99207 ZZC NO CHARGE NURSE ONLY: CPT

## 2019-06-11 NOTE — PROGRESS NOTES
ANTICOAGULATION FOLLOW-UP CLINIC VISIT    Patient Name:  Maurice Jon  Date:  2019  Contact Type:  Face to Face    SUBJECTIVE:  Patient Findings     Comments:   Patient reports no changes in medication, activity, or diet. Patient reports has taken warfarin as instructed, no missed doses. Patient reports no abnormal bruising or bleeding and no signs or symptoms of a blood clot. Will plan to continue maintenance dose and recheck INR in 6 weeks. Patient to call [facility] with any changes or concerns. Patient verbalized understanding of all instructions, denies questions or concerns at this time.             Clinical Outcomes     Negatives:   Major bleeding event, Thromboembolic event, Anticoagulation-related hospital admission, Anticoagulation-related ED visit, Anticoagulation-related fatality    Comments:   Patient reports no changes in medication, activity, or diet. Patient reports has taken warfarin as instructed, no missed doses. Patient reports no abnormal bruising or bleeding and no signs or symptoms of a blood clot. Will plan to continue maintenance dose and recheck INR in 6 weeks. Patient to call [facility] with any changes or concerns. Patient verbalized understanding of all instructions, denies questions or concerns at this time.                OBJECTIVE    INR Protime   Date Value Ref Range Status   2019 2.4 (A) 0.86 - 1.14 Final       ASSESSMENT / PLAN  INR assessment THER    Recheck INR In: 6 WEEKS    INR Location Clinic      Anticoagulation Summary  As of 2019    INR goal:   2.0-3.0   TTR:   92.8 % (8.5 mo)   INR used for dosin.4 (2019)   Warfarin maintenance plan:   1.25 mg (2.5 mg x 0.5) every Fri; 2.5 mg (2.5 mg x 1) all other days   Full warfarin instructions:   1.25 mg every Fri; 2.5 mg all other days   Weekly warfarin total:   16.25 mg   No change documented:   Christina Singer RN   Plan last modified:   Susan Beyer RN (2018)   Next INR check:   2019   Priority:    INR   Target end date:   10/4/2018    Indications    Paroxysmal atrial fibrillation (H) [I48.0]  Atrial fibrillation with rapid ventricular response (H) (Resolved) [I48.91]  Long-term (current) use of anticoagulants [Z79.01] [Z79.01]             Anticoagulation Episode Summary     INR check location:       Preferred lab:       Send INR reminders to:   [facility] ANTICOAG POOL    Comments:   * anticoagulation short period surrounding ablation on 12/21/18. Cardiology to decide when to stop warfarin. has well-compensated cirrhosis      Anticoagulation Care Providers     Provider Role Specialty Phone number    Alon Pineda MD [facility] [phone]            See the Encounter Report to view Anticoagulation Flowsheet and Dosing Calendar (Go to Encounters tab in chart review, and find the Anticoagulation Therapy Visit)      Christina Singer RN

## 2019-06-21 ENCOUNTER — HOSPITAL ENCOUNTER (OUTPATIENT)
Dept: CARDIOLOGY | Facility: CLINIC | Age: 59
Discharge: HOME | End: 2019-06-21
Attending: INTERNAL MEDICINE | Admitting: INTERNAL MEDICINE
Payer: COMMERCIAL

## 2019-06-21 DIAGNOSIS — I48.0 PAROXYSMAL ATRIAL FIBRILLATION (H): ICD-10-CM

## 2019-06-21 PROCEDURE — 93306 TTE W/DOPPLER COMPLETE: CPT | Mod: 26 | Performed by: INTERNAL MEDICINE

## 2019-06-21 PROCEDURE — 93306 TTE W/DOPPLER COMPLETE: CPT

## 2019-06-25 ENCOUNTER — OFFICE VISIT (OUTPATIENT)
Dept: CARDIOLOGY | Facility: CLINIC | Age: 59
End: 2019-06-25
Attending: INTERNAL MEDICINE
Payer: COMMERCIAL

## 2019-06-25 ENCOUNTER — HOSPITAL ENCOUNTER (OUTPATIENT)
Dept: CARDIOLOGY | Facility: CLINIC | Age: 59
Discharge: HOME | End: 2019-06-25
Attending: PHYSICIAN ASSISTANT | Admitting: PHYSICIAN ASSISTANT
Payer: COMMERCIAL

## 2019-06-25 DIAGNOSIS — I48.20 CHRONIC A-FIB (H): ICD-10-CM

## 2019-06-25 DIAGNOSIS — I48.0 PAROXYSMAL ATRIAL FIBRILLATION (H): ICD-10-CM

## 2019-06-25 DIAGNOSIS — I48.19 PERSISTENT ATRIAL FIBRILLATION (H): ICD-10-CM

## 2019-06-25 DIAGNOSIS — I48.0 PAROXYSMAL ATRIAL FIBRILLATION (H): Primary | ICD-10-CM

## 2019-06-25 PROCEDURE — 99214 OFFICE O/P EST MOD 30 MIN: CPT | Performed by: PHYSICIAN ASSISTANT

## 2019-06-25 PROCEDURE — 93005 ELECTROCARDIOGRAM TRACING: CPT

## 2019-06-25 PROCEDURE — 93010 ELECTROCARDIOGRAM REPORT: CPT | Performed by: PHYSICIAN ASSISTANT

## 2019-06-25 RX ORDER — FLECAINIDE ACETATE 100 MG/1
100 TABLET ORAL 2 TIMES DAILY
Qty: 60 TABLET | Refills: 3 | Status: SHIPPED | OUTPATIENT
Start: 2019-06-25 | End: 2019-07-23

## 2019-06-25 NOTE — PROGRESS NOTES
HPI:   I had the pleasure of seeing Yomi when he came for follow up of Atrial Fibrillation and heart failure.  This 58 year old sees Dr. Evans for his history of:     1. Persistent AFib, first diagnosed 2013. He noted excessive fatigue, which Dr. Evans was unsure was related to metoprolol therapy vs atrial fibrillation itself and attempted rhythm control. Loaded with sotalol and underwent DCCV 10/31/2018, which maintained SR until 12/3. Developed terrible pruritis with sotalol, which resolved after this was discontinued. Underwent PVI 12/21/2018 (full details below) complicated by pericarditis and biventricular heart failure, with EF down to 25-30% (previously normal 9/2017). Signficant FACM noted.  Cath done for low EF without significant disease.  Remained in SR until 1/3/19. Underwent DCCV 2/6/2019, which was successful. No amiodarone due to elevated AST. Now back in AFib which he thinks he's been in x 2 weeks given lack of stamina and decreased exercise tolerance.     2. NAFLD with cirrhosis followed by Dr. Roque at [facility].  Complicated by portal vein nonocclusive thrombosis, portal hypertension, thrombocytopenia (72K most recently 3/15/2019), splenomegaly, splenic vein thrombosis and known esophageal varices (Upper GI 10/2016). Saw Dr. Roque 3/2019. Routine EGD scheduled for 9/2019     3.  BiV Heart Failure with EF down to 25-30% and moderate reduction in RV function and moderate-severe TR on echo done 12/23 in setting of chest discomfort post PVI . Angiogram 12/26 with mild nonobstructive disease and mildly increased LV filling pressure (wedge 18) and moderate-severely increased RV filling pressure (RAP 14). Echo 6/2019 showed EF back to normal 55%     4. Pulmonary nodules noted on CTA Heart 12/21/2018. Repeat CT 4/2019 showed JYOTSNA nodule resolved. Others were stable, indicating benign etiology    Yomi saw Dr. Evans 3/2019 at which time he was still in SR after his DCCV 2/2019. Dr. Evans noted that with  the inability to use AADs (previously noted EF 30%, itching with sotalol and elevated liver enzymes before starting amiodarone), rhythm control would be difficult.  He previously noted that AVN ablation/BiV PPM may be an option.    Unfortunately, Ymoi thinks he went back into AFib about 2 weeks ago. He noted increased difficultly doing much activity (even at work). He denies palpitations or SOB.    No complaints of chest pain, pressure or tightness.  No orthopnea, PND or change in mild LE edema.     Echo, below, showed that EF back to 55% but back in Afib with RVR.      EKG today, which I overread, showed atrial fibrillation 86 bpm on Metoprolol  mg daily and digoxin 0.125 mg daily    Echo 6/21/2019 showed EF improved back to 55% with mild LVH. No RWMAs.  RV mildly dilated. 1+ TR. RVSP 25+ RAP (elevated at 15 mmHg). Mild pHTN. No significant valvular abnormalities.  LA size mod-severely enlarged @ 5.3 cm. No volume index given. Back in rapid AFib on this study    R/L heart cath done 12/26 done showed <10% lesions throughout coronary system. RA pressure (mean) 14 mmHg, RVP 36/8/13. PAP 38/20/26. PCWP mean 18. CO 8.5 L/min. Cardiac Index 3.95 L/min/m2. LVEDP 15 mmHg    Assessment & Plan:    1. Recurrent Atrial Fibrillation    Noted x 2 weeks given sxs.    No palpitations or recurrent HF    Echo now with normal EF    Cath 12/2018 with <10% lesions.    PLAN:    D/w Dr. Evans. Will continue to attempt rhythm control.     Now that EF has improved, will start Flecainide 100 mg q 12 hours. EKG Monday 7/1    INR Monday 7/1. This was 2.4 on 6/11 (typically checked q 6 weeks). Dr. Evans notes that if INR still >2.0, he'd perform DCCV.     Hoping to do DCCV on a day that he is already off from work given that he's running out of sick time    Stop digoxin 0.125 mg daily. Continue Toprol XL 25 in AM and 100 in PM      2. BiVentricular HR    Echo 9/2017 with normal EF. Echo done due to CP after PVI showed Ef down to 25-30%  with RV dysfunction! Cath negative.     EF, as above, has normalized 6/21/2019    No evidence of fluid overload on exam    PLAN:    Continue furosemide 40 mg BID, spironolactone 25 mg daily (also for NAFLD), Metoprolol Xl 120 mg daily    Last BMP 3/2019 wnl    3. Cirrhosis    Last appt with Dr. Roque 3/2019 noted well-compensated cirrhosis likely based on NAFLD    Note plan for routine EGD 9/2019    Dr. Roque gave go-ahead to use AC in setting of AFib (warfarin, given reversibility)     4. Minimal CAD    Stress test 10/2018 showed a small reversible mid anterolateral wall defect, but could not rule out artifact given body habitus    Initially treated medically, but given drop in EF, underwent heart cath 12/26 showing minimal non-obstructive CAD     PLAN:    Continue BB. No ASA due to warfarin. Avoiding statin given liver disease      Nadege Montilla PA-C, MSPAS      Orders Placed This Encounter   Procedures     EKG [facility])- to be scheduled     Orders Placed This Encounter   Medications     flecainide (TAMBOCOR) 100 MG tablet     Sig: Take 1 tablet (100 mg) by mouth 2 times daily     Dispense:  60 tablet     Refill:  3     Stopping digoxin. Continue metoprolol     Medications Discontinued During This Encounter   Medication Reason     digoxin (LANOXIN) 125 MCG tablet          Encounter Diagnosis   Name Primary?     Persistent atrial fibrillation (H)        CURRENT MEDICATIONS:  Current Outpatient Medications   Medication Sig Dispense Refill     budesonide-formoterol (SYMBICORT) 80-4.5 MCG/ACT Inhaler Inhale 2 puffs into the lungs 2 times daily 10.2 g 3     calcium carb 1250 mg, 500 mg [place],/vitamin D 200 units (OSCAL WITH D) 500-200 MG-UNIT per tablet Take 2 tablets by mouth daily with food.       flecainide (TAMBOCOR) 100 MG tablet Take 1 tablet (100 mg) by mouth 2 times daily 60 tablet 3     furosemide (LASIX) 40 MG tablet Take 1 tablet (40 mg) by mouth 2 times daily 60 tablet 1      metoprolol succinate ER (TOPROL-XL) 100 MG 24 hr tablet Take 1 tablet (100 mg) by mouth daily 90 tablet 3     metoprolol succinate ER (TOPROL-XL) 25 MG 24 hr tablet Take 1 tablet (25 mg) by mouth every morning 90 tablet 3     pantoprazole (PROTONIX) 40 MG EC tablet Take 1 tablet (40 mg) by mouth daily 90 tablet 3     spironolactone (ALDACTONE) 25 MG tablet Take 1 tablet (25 mg) by mouth daily 90 tablet 3     warfarin (JANTOVEN) 2.5 MG tablet 1.25 mg Fridays; 2.5mg all other days or As directed by Anticoagulation Clinic. INR DUE FOR FURTHER REFILLS 80 tablet 0     ACE/ARB/ARNI NOT PRESCRIBED (INTENTIONAL) Please choose reason not prescribed, below (Patient not taking: Reported on 6/25/2019)       ASPIRIN NOT PRESCRIBED (INTENTIONAL) Please choose reason not prescribed, below       mometasone-formoterol (DULERA) 200-5 MCG/ACT inhaler Inhale 2 puffs into the lungs 2 times daily as needed Appt DUE Jan 2017 (Patient not taking: Reported on 3/21/2019) 1 Inhaler 1     order for DME Open toe size large 30/40 Mediven Assure Medical Compression socks Model [id number].    Fax to Fax [phone]. [facility] (Patient not taking: Reported on 6/25/2019) 12 Device 0     order for DME Equipment being ordered:   New CPAP mask, tube, filters,water tray (Patient not taking: Reported on 6/25/2019) 1 Device 3     order for DME Open toe size large 30/40 Mediven Assure Medical Compression socks Model [id number]. (Patient not taking: Reported on 6/25/2019) 4 Package 3     ORDER FOR DME Respironics RemStar 60 Series Auto AFlex 12-15 cm H2O with heated humidty and a modem.  Pt chose a Quattro Air FFM size medium. (Patient not taking: Reported on 6/25/2019)       ORDER FOR DME Juzo compression stockings, 30-40mm compression. Patient has portal hypertension and develops leg edema, which these control well. (Patient not taking: Reported on 6/25/2019) 2 Package 3     predniSONE (DELTASONE) 10 MG tablet Take 10 mg by mouth 2 times daily. (Patient  not taking: Reported on 6/25/2019) 10 tablet 0     STATIN NOT PRESCRIBED (INTENTIONAL) Please choose reason not prescribed, below (Patient not taking: Reported on 3/21/2019)         ALLERGIES     Allergies   Allergen Reactions     Avelox Other (See Comments) and GI Disturbance     portal hypertension     Moxifloxacin Nausea and Vomiting, Nausea and Other (See Comments)     portal hypertension     Sotalol Itching       PAST MEDICAL HISTORY:  Past Medical History:   Diagnosis Date     Acute pulmonary edema (H)      Atrial fibrillation (H)      Atrial fibrillation with rapid ventricular response (H) 5/13/2013     CAD (coronary artery disease)     Cath 12/26/18- mild nonobstructive disease     Calculus of ureter 1993, 1997    history of     Cardiomyopathy (H)     12/23/18 Echo- EF 25-30%     Chronic systolic CHF (congestive heart failure) (H)      Cirrhosis of liver without mention of alcohol 8/22/2005    Cirrhosis, idiopathic     Edema 5/13/2013     Esophageal varices with bleeding(456.0)      Gallstones      Genital herpes, unspecified 3/20/1999    resolving herpes progenitalis     GERD (gastroesophageal reflux disease)      Hematuria      Hypertension      Hypochondriasis     problems in past     Obesity 11/24/2010     BRUCE (obstructive sleep apnea) 03/17/2009    Mild AHI 8.4  RDI 31 - Pt refuses to wear his CPAP     Pericarditis      Portal hypertension (H) 1/28/2010     Unspecified thrombocytopenia 8/22/2005    Thrombocytopenia associated with cirrhosis and portal hypertension       PAST SURGICAL HISTORY:  Past Surgical History:   Procedure Laterality Date     ANESTHESIA CARDIOVERSION N/A 1/19/2015    Procedure: ANESTHESIA CARDIOVERSION;  Surgeon: Generic Anesthesia Provider;  Location: [facility] OR     ANESTHESIA CARDIOVERSION N/A 2/6/2019    Procedure: ANESTHESIA CARDIOVERSION;  Surgeon: GENERIC ANESTHESIA PROVIDER;  Location:  OR     COLONOSCOPY N/A 8/14/2017    Procedure: COLONOSCOPY;  Colonoscopy Called will arrive  appx 12:30 Per phone call;  Surgeon: Vincent Whittington MD;  Location:  GI     CV HEART CATHETERIZATION WITH POSSIBLE INTERVENTION N/A 12/26/2018    Procedure: Heart Catheterization with possible Intervention;  Surgeon: Brandon Arroyo MD;  Location:  HEART CARDIAC CATH LAB     EP ABLATION FOCAL AFIB N/A 12/21/2018    Procedure: EP Ablation Focal AFIB;  Surgeon: Kristofer Evans MD;  Location:  HEART CARDIAC CATH LAB     ESOPHAGOSCOPY, GASTROSCOPY, DUODENOSCOPY (EGD), COMBINED  7/11/2011    Procedure:COMBINED ESOPHAGOSCOPY, GASTROSCOPY, DUODENOSCOPY (EGD); Surgeon:LAURA LAMAS; Location:[facility] GI     ESOPHAGOSCOPY, GASTROSCOPY, DUODENOSCOPY (EGD), COMBINED  9/10/2012    Procedure: COMBINED ESOPHAGOSCOPY, GASTROSCOPY, DUODENOSCOPY (EGD);;  Surgeon: Hi Roque MD;  Location:  GI     ESOPHAGOSCOPY, GASTROSCOPY, DUODENOSCOPY (EGD), COMBINED  9/9/2013    Procedure: COMBINED ESOPHAGOSCOPY, GASTROSCOPY, DUODENOSCOPY (EGD);;  Surgeon: Hi Roque MD;  Location:  GI     ESOPHAGOSCOPY, GASTROSCOPY, DUODENOSCOPY (EGD), COMBINED N/A 9/15/2014    Procedure: COMBINED ESOPHAGOSCOPY, GASTROSCOPY, DUODENOSCOPY (EGD);  Surgeon: Hi Roque MD;  Location:  GI     ESOPHAGOSCOPY, GASTROSCOPY, DUODENOSCOPY (EGD), COMBINED N/A 10/30/2015    Procedure: COMBINED ESOPHAGOSCOPY, GASTROSCOPY, DUODENOSCOPY (EGD);  Surgeon: Feliberto Fox MD;  Location:  GI     ESOPHAGOSCOPY, GASTROSCOPY, DUODENOSCOPY (EGD), COMBINED N/A 10/10/2016    Procedure: COMBINED ESOPHAGOSCOPY, GASTROSCOPY, DUODENOSCOPY (EGD);  Surgeon: Jose Nesbitt MD;  [facility]     H ABLATION FOCAL AFIB  12/21/2018     HERNIA REPAIR       IRRIGATION AND DEBRIDEMENT ABSCESS SCROTUM, COMBINED  10/4/2012    Procedure: COMBINED IRRIGATION AND DEBRIDEMENT ABSCESS SCROTUM;  Irrigation and Debridement of Groin Abscess;  Surgeon: Benny Shoemaker MD;  Location: [facility] OR     SOFT TISSUE SURGERY       SURGICAL HISTORY OF -   1993    rt elbow      SURGICAL HISTORY OF -   1/15/98    repair of ventral and umbilical hernia     SURGICAL HISTORY OF -       endoscopy       FAMILY HISTORY:  Family History   Problem Relation Age of Onset     Lipids Mother      Hypertension Mother      Eye Disorder Mother      Heart Disease Father         CHF     Lipids Father      Obesity Father      Heart Disease Brother         MI     Eye Disorder Brother      Hypertension Brother         portal HTN     Thrombosis Sister         in her lung     Cancer Other         maternal grandparent/throat cancer       SOCIAL HISTORY:  Social History     Socioeconomic History     Marital status:      Spouse name: None     Number of children: None     Years of education: None     Highest education level: None   Occupational History     None   Social Needs     Financial resource strain: None     Food insecurity:     Worry: None     Inability: None     Transportation needs:     Medical: None     Non-medical: None   Tobacco Use     Smoking status: Former Smoker     Packs/day: 0.80     Years: 6.00     Pack years: 4.80     Types: Cigarettes     Last attempt to quit: 2002     Years since quittin.4     Smokeless tobacco: Never Used   Substance and Sexual Activity     Alcohol use: No     Alcohol/week: 0.0 oz     Comment: quit in      Drug use: No     Sexual activity: Yes     Partners: Female   Lifestyle     Physical activity:     Days per week: None     Minutes per session: None     Stress: None   Relationships     Social connections:     Talks on phone: None     Gets together: None     Attends Worship service: None     Active member of club or organization: None     Attends meetings of clubs or organizations: None     Relationship status: None     Intimate partner violence:     Fear of current or ex partner: None     Emotionally abused: None     Physically abused: None     Forced sexual activity: None   Other Topics Concern     Parent/sibling w/ CABG, MI or angioplasty  before 65F 55M? Yes     Comment: BROTHER   - MI AT AGE 44      Service Not Asked     Blood Transfusions Not Asked     Caffeine Concern Not Asked     Occupational Exposure Not Asked     Hobby Hazards Not Asked     Sleep Concern Not Asked     Stress Concern Not Asked     Weight Concern Not Asked     Special Diet Not Asked     Back Care Not Asked     Exercise No     Bike Helmet Not Asked     Seat Belt Not Asked     Self-Exams Not Asked   Social History Narrative     None       Review of Systems:  Skin:  Positive for itching   Eyes:  Negative    ENT:  Negative    Respiratory:  Positive for sleep apnea;CPAP;dyspnea on exertion;dyspnea at rest  Cardiovascular:  Negative for;palpitations Positive for;edema;fatigue;lightheadedness;dizziness  Gastroenterology: Positive for reflux;heartburn;excessive gas or bloating  Genitourinary:  Positive for urinary frequency;urgency  Musculoskeletal:  Negative    Neurologic:  Negative    Psychiatric:  Negative    Heme/Lymph/Imm:  Negative    Endocrine:  Negative      Physical Exam:  Vitals: BP (P) 113/57 (BP Location: Right arm, Patient Position: Sitting, Cuff Size: Adult Regular)   Pulse (P) 85   Wt (P) 105 kg (231 lb 6.4 oz)   SpO2 (P) 96%   BMI (P) 38.51 kg/m      Constitutional:  cooperative, alert and oriented, well developed, well nourished, in no acute distress        Skin:  warm and dry to the touch, no apparent skin lesions or masses noted        Head:  normocephalic, no masses or lesions        Eyes:  pupils equal and round;conjunctivae and lids unremarkable;sclera white        ENT:  no pallor or cyanosis, dentition good        Neck:  JVP normal        Chest:  normal breath sounds, clear to auscultation, normal A-P diameter, normal symmetry, normal respiratory excursion, no use of accessory muscles        Cardiac:   irregularly irregular rhythm                Abdomen:    obese;hepatomegaly      Vascular: pulses full and equal                                       Extremities and Back:  no deformities, clubbing, cyanosis, erythema observed        Neurological:  no gross motor deficits          Recent Lab Results:  LIPID RESULTS:  Lab Results   Component Value Date    CHOL 127 02/27/2013    HDL 42 05/19/2018    LDL 50 05/19/2018    TRIG 63 05/19/2018    CHOLHDLRATIO 2.2 02/27/2013       LIVER ENZYME RESULTS:  Lab Results   Component Value Date    AST 48 (H) 03/15/2019    ALT 44 03/15/2019       CBC RESULTS:  Lab Results   Component Value Date    WBC 3.6 (L) 03/15/2019    RBC 3.88 (L) 03/15/2019    HGB 13.2 (L) 03/15/2019    HCT 39.7 (L) 03/15/2019     (H) 03/15/2019    MCH 34.0 (H) 03/15/2019    MCHC 33.2 03/15/2019    RDW 14.2 03/15/2019    PLT 72 (L) 03/15/2019       BMP RESULTS:  Lab Results   Component Value Date     03/15/2019    POTASSIUM 3.7 03/15/2019    CHLORIDE 105 03/15/2019    CO2 27 03/15/2019    ANIONGAP 7 03/15/2019    GLC 78 03/15/2019    BUN 19 03/15/2019    CR 0.74 03/15/2019    GFRESTIMATED >90 03/15/2019    GFRESTBLACK >90 03/15/2019    HALEY 8.8 03/15/2019        INR RESULTS:  Lab Results   Component Value Date    INR 2.4 (A) 06/11/2019    INR 2.6 (A) 04/26/2019    INR 2.29 (H) 03/21/2019    INR 1.94 (H) 01/02/2019

## 2019-06-25 NOTE — PATIENT INSTRUCTIONS
At your visit today we reviewed that you're back in atrial fibrillation. Your rate right now is OK but it was fast when you got the echocardiogram on . The pumping function of your heart is back to normal, though!       Medication Changes:    STOP digoxin 0.125 mg daily  START flecainide 100 mg twice a day. This is a medicine that may get you back into normal rhythm, but if we end up doing another cardioversion, it will help keep you in normal rhythm!    Recommendations:    Get EKG and INR on 2019.   We'll contact you with results of them.   We'll set up a cardioversion with Dr. Evans (but this may not be needed if the flecainide converts you back to normal rhythm!)    Follow-up:    See Nadege for cardiology follow up in 1-2 weeks after the cardioversion but CALL Cardiology nurses Esme & Melany @ [phone] for any issues, questions or concerns in the interim.      To schedule a future appointment, we kindly ask that you call cardiology scheduling at [phone] three months prior to requested visit date.    Important Phone Numbers for [facility]):    Cardiology [phone]  Lab [phone]  INR Clinic: [phone]  Nurses Esme & Melany: [phone]

## 2019-06-25 NOTE — LETTER
6/25/2019    Physician No Ref-Primary  No address on file    RE: Maurice Jon       Dear Colleague,    I had the pleasure of seeing Maurice LISBETH Jon in the [facility].    HPI:   I had the pleasure of seeing Yomi when he came for follow up of Atrial Fibrillation and heart failure.  This 58 year old sees Dr. Evans for his history of:     1. Persistent AFib, first diagnosed 2013. He noted excessive fatigue, which Dr. Evans was unsure was related to metoprolol therapy vs atrial fibrillation itself and attempted rhythm control. Loaded with sotalol and underwent DCCV 10/31/2018, which maintained SR until 12/3. Developed terrible pruritis with sotalol, which resolved after this was discontinued. Underwent PVI 12/21/2018 (full details below) complicated by pericarditis and biventricular heart failure, with EF down to 25-30% (previously normal 9/2017). Signficant FACM noted.  Cath done for low EF without significant disease.  Remained in SR until 1/3/19. Underwent DCCV 2/6/2019, which was successful. No amiodarone due to elevated AST. Now back in AFib which he thinks he's been in x 2 weeks given lack of stamina and decreased exercise tolerance.     2. NAFLD with cirrhosis followed by Dr. Roque at [facility].  Complicated by portal vein nonocclusive thrombosis, portal hypertension, thrombocytopenia (72K most recently 3/15/2019), splenomegaly, splenic vein thrombosis and known esophageal varices (Upper GI 10/2016). Saw Dr. Roque 3/2019. Routine EGD scheduled for 9/2019     3.  BiV Heart Failure with EF down to 25-30% and moderate reduction in RV function and moderate-severe TR on echo done 12/23 in setting of chest discomfort post PVI . Angiogram 12/26 with mild nonobstructive disease and mildly increased LV filling pressure (wedge 18) and moderate-severely increased RV filling pressure (RAP 14). Echo 6/2019 showed EF back to normal 55%     4. Pulmonary nodules noted on CTA Heart  12/21/2018. Repeat CT 4/2019 showed JYOTSNA nodule resolved. Others were stable, indicating benign etiology    Yomi saw Dr. Evans 3/2019 at which time he was still in SR after his DCCV 2/2019. Dr. Evans noted that with the inability to use AADs (previously noted EF 30%, itching with sotalol and elevated liver enzymes before starting amiodarone), rhythm control would be difficult.  He previously noted that AVN ablation/BiV PPM may be an option.    Unfortunately, Yomi thinks he went back into AFib about 2 weeks ago. He noted increased difficultly doing much activity (even at work). He denies palpitations or SOB.    No complaints of chest pain, pressure or tightness.  No orthopnea, PND or change in mild LE edema.     Echo, below, showed that EF back to 55% but back in Afib with RVR.      EKG today, which I overread, showed atrial fibrillation 86 bpm on Metoprolol  mg daily and digoxin 0.125 mg daily    Echo 6/21/2019 showed EF improved back to 55% with mild LVH. No RWMAs.  RV mildly dilated. 1+ TR. RVSP 25+ RAP (elevated at 15 mmHg). Mild pHTN. No significant valvular abnormalities.  LA size mod-severely enlarged @ 5.3 cm. No volume index given. Back in rapid AFib on this study    R/L heart cath done 12/26 done showed <10% lesions throughout coronary system. RA pressure (mean) 14 mmHg, RVP 36/8/13. PAP 38/20/26. PCWP mean 18. CO 8.5 L/min. Cardiac Index 3.95 L/min/m2. LVEDP 15 mmHg    Assessment & Plan:    1. Recurrent Atrial Fibrillation    Noted x 2 weeks given sxs.    No palpitations or recurrent HF    Echo now with normal EF    Cath 12/2018 with <10% lesions.    PLAN:    D/w Dr. Evans. Will continue to attempt rhythm control.     Now that EF has improved, will start Flecainide 100 mg q 12 hours. EKG Monday 7/1    INR Monday 7/1. This was 2.4 on 6/11 (typically checked q 6 weeks). Dr. Evans notes that if INR still >2.0, he'd perform DCCV.     Hoping to do DCCV on a day that he is already off from work given that he's  running out of sick time    Stop digoxin 0.125 mg daily. Continue Toprol XL 25 in AM and 100 in PM      2. BiVentricular HR    Echo 9/2017 with normal EF. Echo done due to CP after PVI showed Ef down to 25-30% with RV dysfunction! Cath negative.     EF, as above, has normalized 6/21/2019    No evidence of fluid overload on exam    PLAN:    Continue furosemide 40 mg BID, spironolactone 25 mg daily (also for NAFLD), Metoprolol Xl 120 mg daily    Last BMP 3/2019 wnl    3. Cirrhosis    Last appt with Dr. Roque 3/2019 noted well-compensated cirrhosis likely based on NAFLD    Note plan for routine EGD 9/2019    Dr. Roque gave go-ahead to use AC in setting of AFib (warfarin, given reversibility)     4. Minimal CAD    Stress test 10/2018 showed a small reversible mid anterolateral wall defect, but could not rule out artifact given body habitus    Initially treated medically, but given drop in EF, underwent heart cath 12/26 showing minimal non-obstructive CAD     PLAN:    Continue BB. No ASA due to warfarin. Avoiding statin given liver disease      Nadege Montilla PA-C, MSPAS      Orders Placed This Encounter   Procedures     EKG [facility] ([facility])- to be scheduled     Orders Placed This Encounter   Medications     flecainide (TAMBOCOR) 100 MG tablet     Sig: Take 1 tablet (100 mg) by mouth 2 times daily     Dispense:  60 tablet     Refill:  3     Stopping digoxin. Continue metoprolol     Medications Discontinued During This Encounter   Medication Reason     digoxin (LANOXIN) 125 MCG tablet          Encounter Diagnosis   Name Primary?     Persistent atrial fibrillation (H)        CURRENT MEDICATIONS:  Current Outpatient Medications   Medication Sig Dispense Refill     budesonide-formoterol (SYMBICORT) 80-4.5 MCG/ACT Inhaler Inhale 2 puffs into the lungs 2 times daily 10.2 g 3     calcium carb 1250 mg, 500 mg [place],/vitamin D 200 units (OSCAL WITH D) 500-200 MG-UNIT per tablet Take 2 tablets by mouth daily  with food.       flecainide (TAMBOCOR) 100 MG tablet Take 1 tablet (100 mg) by mouth 2 times daily 60 tablet 3     furosemide (LASIX) 40 MG tablet Take 1 tablet (40 mg) by mouth 2 times daily 60 tablet 1     metoprolol succinate ER (TOPROL-XL) 100 MG 24 hr tablet Take 1 tablet (100 mg) by mouth daily 90 tablet 3     metoprolol succinate ER (TOPROL-XL) 25 MG 24 hr tablet Take 1 tablet (25 mg) by mouth every morning 90 tablet 3     pantoprazole (PROTONIX) 40 MG EC tablet Take 1 tablet (40 mg) by mouth daily 90 tablet 3     spironolactone (ALDACTONE) 25 MG tablet Take 1 tablet (25 mg) by mouth daily 90 tablet 3     warfarin (JANTOVEN) 2.5 MG tablet 1.25 mg Fridays; 2.5mg all other days or As directed by Anticoagulation Clinic. INR DUE FOR FURTHER REFILLS 80 tablet 0     ACE/ARB/ARNI NOT PRESCRIBED (INTENTIONAL) Please choose reason not prescribed, below (Patient not taking: Reported on 6/25/2019)       ASPIRIN NOT PRESCRIBED (INTENTIONAL) Please choose reason not prescribed, below       mometasone-formoterol (DULERA) 200-5 MCG/ACT inhaler Inhale 2 puffs into the lungs 2 times daily as needed Appt DUE Jan 2017 (Patient not taking: Reported on 3/21/2019) 1 Inhaler 1     order for DME Open toe size large 30/40 Mediven Assure Medical Compression socks Model [id number].    Fax to Fax [phone]. [facility] (Patient not taking: Reported on 6/25/2019) 12 Device 0     order for DME Equipment being ordered:   New CPAP mask, tube, filters,water tray (Patient not taking: Reported on 6/25/2019) 1 Device 3     order for DME Open toe size large 30/40 Mediven Assure Medical Compression socks Model [id number]. (Patient not taking: Reported on 6/25/2019) 4 Package 3     ORDER FOR DME Respironics RemStar 60 Series Auto AFlex 12-15 cm H2O with heated humidty and a modem.  Pt chose a Quattro Air FFM size medium. (Patient not taking: Reported on 6/25/2019)       ORDER FOR DME Juzo compression stockings, 30-40mm compression. Patient has  portal hypertension and develops leg edema, which these control well. (Patient not taking: Reported on 6/25/2019) 2 Package 3     predniSONE (DELTASONE) 10 MG tablet Take 10 mg by mouth 2 times daily. (Patient not taking: Reported on 6/25/2019) 10 tablet 0     STATIN NOT PRESCRIBED (INTENTIONAL) Please choose reason not prescribed, below (Patient not taking: Reported on 3/21/2019)         ALLERGIES     Allergies   Allergen Reactions     Avelox Other (See Comments) and GI Disturbance     portal hypertension     Moxifloxacin Nausea and Vomiting, Nausea and Other (See Comments)     portal hypertension     Sotalol Itching       PAST MEDICAL HISTORY:  Past Medical History:   Diagnosis Date     Acute pulmonary edema (H)      Atrial fibrillation (H)      Atrial fibrillation with rapid ventricular response (H) 5/13/2013     CAD (coronary artery disease)     Cath 12/26/18- mild nonobstructive disease     Calculus of ureter 1993, 1997    history of     Cardiomyopathy (H)     12/23/18 Echo- EF 25-30%     Chronic systolic CHF (congestive heart failure) (H)      Cirrhosis of liver without mention of alcohol 8/22/2005    Cirrhosis, idiopathic     Edema 5/13/2013     Esophageal varices with bleeding(456.0)      Gallstones      Genital herpes, unspecified 3/20/1999    resolving herpes progenitalis     GERD (gastroesophageal reflux disease)      Hematuria      Hypertension      Hypochondriasis     problems in past     Obesity 11/24/2010     BRUCE (obstructive sleep apnea) 03/17/2009    Mild AHI 8.4  RDI 31 - Pt refuses to wear his CPAP     Pericarditis      Portal hypertension (H) 1/28/2010     Unspecified thrombocytopenia 8/22/2005    Thrombocytopenia associated with cirrhosis and portal hypertension       PAST SURGICAL HISTORY:  Past Surgical History:   Procedure Laterality Date     ANESTHESIA CARDIOVERSION N/A 1/19/2015    Procedure: ANESTHESIA CARDIOVERSION;  Surgeon: Generic Anesthesia Provider;  Location: [facility] OR     ANESTHESIA  CARDIOVERSION N/A 2/6/2019    Procedure: ANESTHESIA CARDIOVERSION;  Surgeon: GENERIC ANESTHESIA PROVIDER;  Location:  OR     COLONOSCOPY N/A 8/14/2017    Procedure: COLONOSCOPY;  Colonoscopy Called will arrive appx 12:30 Per phone call;  Surgeon: Vincent Whittington MD;  Location: [facility]     CV HEART CATHETERIZATION WITH POSSIBLE INTERVENTION N/A 12/26/2018    Procedure: Heart Catheterization with possible Intervention;  Surgeon: Brandon Arroyo MD;  Location:  HEART CARDIAC CATH LAB     EP ABLATION FOCAL AFIB N/A 12/21/2018    Procedure: EP Ablation Focal AFIB;  Surgeon: Kristofer Evans MD;  Location:  HEART CARDIAC CATH LAB     ESOPHAGOSCOPY, GASTROSCOPY, DUODENOSCOPY (EGD), COMBINED  7/11/2011    Procedure:COMBINED ESOPHAGOSCOPY, GASTROSCOPY, DUODENOSCOPY (EGD); Surgeon:LAURA LAMAS; [facility]     ESOPHAGOSCOPY, GASTROSCOPY, DUODENOSCOPY (EGD), COMBINED  9/10/2012    Procedure: COMBINED ESOPHAGOSCOPY, GASTROSCOPY, DUODENOSCOPY (EGD);;  Surgeon: Hi Roque MD;  Location:  GI     ESOPHAGOSCOPY, GASTROSCOPY, DUODENOSCOPY (EGD), COMBINED  9/9/2013    Procedure: COMBINED ESOPHAGOSCOPY, GASTROSCOPY, DUODENOSCOPY (EGD);;  Surgeon: Hi Roque MD;  Location:  GI     ESOPHAGOSCOPY, GASTROSCOPY, DUODENOSCOPY (EGD), COMBINED N/A 9/15/2014    Procedure: COMBINED ESOPHAGOSCOPY, GASTROSCOPY, DUODENOSCOPY (EGD);  Surgeon: Hi Roque MD;  Location:  GI     ESOPHAGOSCOPY, GASTROSCOPY, DUODENOSCOPY (EGD), COMBINED N/A 10/30/2015    Procedure: COMBINED ESOPHAGOSCOPY, GASTROSCOPY, DUODENOSCOPY (EGD);  Surgeon: Feliberto Fox MD;  Location:  GI     ESOPHAGOSCOPY, GASTROSCOPY, DUODENOSCOPY (EGD), COMBINED N/A 10/10/2016    Procedure: COMBINED ESOPHAGOSCOPY, GASTROSCOPY, DUODENOSCOPY (EGD);  Surgeon: Jose Nesbitt MD;  Location:  GI     H ABLATION FOCAL AFIB  12/21/2018     HERNIA REPAIR       IRRIGATION AND DEBRIDEMENT ABSCESS SCROTUM, COMBINED  10/4/2012    Procedure: COMBINED  IRRIGATION AND DEBRIDEMENT ABSCESS SCROTUM;  Irrigation and Debridement of Groin Abscess;  Surgeon: Benny Shoemaker MD;  Location: [facility] OR     SOFT TISSUE SURGERY       SURGICAL HISTORY OF -       rt elbow     SURGICAL HISTORY OF -   1/15/98    repair of ventral and umbilical hernia     SURGICAL HISTORY OF -       endoscopy       FAMILY HISTORY:  Family History   Problem Relation Age of Onset     Lipids Mother      Hypertension Mother      Eye Disorder Mother      Heart Disease Father         CHF     Lipids Father      Obesity Father      Heart Disease Brother         MI     Eye Disorder Brother      Hypertension Brother         portal HTN     Thrombosis Sister         in her lung     Cancer Other         maternal grandparent/throat cancer       SOCIAL HISTORY:  Social History     Socioeconomic History     Marital status:      Spouse name: None     Number of children: None     Years of education: None     Highest education level: None   Occupational History     None   Social Needs     Financial resource strain: None     Food insecurity:     Worry: None     Inability: None     Transportation needs:     Medical: None     Non-medical: None   Tobacco Use     Smoking status: Former Smoker     Packs/day: 0.80     Years: 6.00     Pack years: 4.80     Types: Cigarettes     Last attempt to quit: 2002     Years since quittin.4     Smokeless tobacco: Never Used   Substance and Sexual Activity     Alcohol use: No     Alcohol/week: 0.0 oz     Comment: quit in      Drug use: No     Sexual activity: Yes     Partners: Female   Lifestyle     Physical activity:     Days per week: None     Minutes per session: None     Stress: None   Relationships     Social connections:     Talks on phone: None     Gets together: None     Attends Oriental orthodox service: None     Active member of club or organization: None     Attends meetings of clubs or organizations: None     Relationship status: None     Intimate  partner violence:     Fear of current or ex partner: None     Emotionally abused: None     Physically abused: None     Forced sexual activity: None   Other Topics Concern     Parent/sibling w/ CABG, MI or angioplasty before 65F 55M? Yes     Comment: BROTHER   - MI AT AGE 44      Service Not Asked     Blood Transfusions Not Asked     Caffeine Concern Not Asked     Occupational Exposure Not Asked     Hobby Hazards Not Asked     Sleep Concern Not Asked     Stress Concern Not Asked     Weight Concern Not Asked     Special Diet Not Asked     Back Care Not Asked     Exercise No     Bike Helmet Not Asked     Seat Belt Not Asked     Self-Exams Not Asked   Social History Narrative     None       Review of Systems:  Skin:  Positive for itching   Eyes:  Negative    ENT:  Negative    Respiratory:  Positive for sleep apnea;CPAP;dyspnea on exertion;dyspnea at rest  Cardiovascular:  Negative for;palpitations Positive for;edema;fatigue;lightheadedness;dizziness  Gastroenterology: Positive for reflux;heartburn;excessive gas or bloating  Genitourinary:  Positive for urinary frequency;urgency  Musculoskeletal:  Negative    Neurologic:  Negative    Psychiatric:  Negative    Heme/Lymph/Imm:  Negative    Endocrine:  Negative      Physical Exam:  Vitals: BP (P) 113/57 (BP Location: Right arm, Patient Position: Sitting, Cuff Size: Adult Regular)   Pulse (P) 85   Wt (P) 105 kg (231 lb 6.4 oz)   SpO2 (P) 96%   BMI (P) 38.51 kg/m       Constitutional:  cooperative, alert and oriented, well developed, well nourished, in no acute distress        Skin:  warm and dry to the touch, no apparent skin lesions or masses noted        Head:  normocephalic, no masses or lesions        Eyes:  pupils equal and round;conjunctivae and lids unremarkable;sclera white        ENT:  no pallor or cyanosis, dentition good        Neck:  JVP normal        Chest:  normal breath sounds, clear to auscultation, normal A-P diameter, normal symmetry, normal  respiratory excursion, no use of accessory muscles        Cardiac:   irregularly irregular rhythm                Abdomen:    obese;hepatomegaly      Vascular: pulses full and equal                                      Extremities and Back:  no deformities, clubbing, cyanosis, erythema observed        Neurological:  no gross motor deficits          Recent Lab Results:  LIPID RESULTS:  Lab Results   Component Value Date    CHOL 127 02/27/2013    HDL 42 05/19/2018    LDL 50 05/19/2018    TRIG 63 05/19/2018    CHOLHDLRATIO 2.2 02/27/2013       LIVER ENZYME RESULTS:  Lab Results   Component Value Date    AST 48 (H) 03/15/2019    ALT 44 03/15/2019       CBC RESULTS:  Lab Results   Component Value Date    WBC 3.6 (L) 03/15/2019    RBC 3.88 (L) 03/15/2019    HGB 13.2 (L) 03/15/2019    HCT 39.7 (L) 03/15/2019     (H) 03/15/2019    MCH 34.0 (H) 03/15/2019    MCHC 33.2 03/15/2019    RDW 14.2 03/15/2019    PLT 72 (L) 03/15/2019       BMP RESULTS:  Lab Results   Component Value Date     03/15/2019    POTASSIUM 3.7 03/15/2019    CHLORIDE 105 03/15/2019    CO2 27 03/15/2019    ANIONGAP 7 03/15/2019    GLC 78 03/15/2019    BUN 19 03/15/2019    CR 0.74 03/15/2019    GFRESTIMATED >90 03/15/2019    GFRESTBLACK >90 03/15/2019    HALEY 8.8 03/15/2019        INR RESULTS:  Lab Results   Component Value Date    INR 2.4 (A) 06/11/2019    INR 2.6 (A) 04/26/2019    INR 2.29 (H) 03/21/2019    INR 1.94 (H) 01/02/2019             Thank you for allowing me to participate in the care of your patient.      Sincerely,     IRMA Friedman-C     [facility]    cc:   Kristofer Brown MD  [address]  [place] [id number]

## 2019-07-01 ENCOUNTER — DOCUMENTATION ONLY (OUTPATIENT)
Dept: CARDIOLOGY | Facility: CLINIC | Age: 59
End: 2019-07-01

## 2019-07-01 ENCOUNTER — ANTICOAGULATION THERAPY VISIT (OUTPATIENT)
Dept: ANTICOAGULATION | Facility: CLINIC | Age: 59
End: 2019-07-01
Payer: COMMERCIAL

## 2019-07-01 ENCOUNTER — HOSPITAL ENCOUNTER (OUTPATIENT)
Dept: CARDIOLOGY | Facility: CLINIC | Age: 59
Discharge: HOME | End: 2019-07-01
Attending: PHYSICIAN ASSISTANT | Admitting: PHYSICIAN ASSISTANT
Payer: COMMERCIAL

## 2019-07-01 DIAGNOSIS — I48.19 PERSISTENT ATRIAL FIBRILLATION (H): ICD-10-CM

## 2019-07-01 DIAGNOSIS — I48.0 PAROXYSMAL ATRIAL FIBRILLATION (H): ICD-10-CM

## 2019-07-01 DIAGNOSIS — Z79.01 LONG TERM CURRENT USE OF ANTICOAGULANT THERAPY: ICD-10-CM

## 2019-07-01 DIAGNOSIS — I48.19 PERSISTENT ATRIAL FIBRILLATION (H): Primary | ICD-10-CM

## 2019-07-01 LAB — INR POINT OF CARE: 2.3 (ref 0.86–1.14)

## 2019-07-01 PROCEDURE — 99207 ZZC NO CHARGE NURSE ONLY: CPT

## 2019-07-01 PROCEDURE — 85610 PROTHROMBIN TIME: CPT | Mod: QW

## 2019-07-01 PROCEDURE — 93005 ELECTROCARDIOGRAM TRACING: CPT

## 2019-07-01 PROCEDURE — 36416 COLLJ CAPILLARY BLOOD SPEC: CPT

## 2019-07-01 NOTE — PROGRESS NOTES
ANTICOAGULATION FOLLOW-UP CLINIC VISIT    Patient Name:  Maurice Jon  Date:  2019  Contact Type:  Face to Face    SUBJECTIVE:  Patient Findings     Comments:   Patient was in today to have an EKG completed. He is waiting to hear back from cardiology the next steps. Patient will plan to keep his appointment for 3 weeks out. If he needs to have weekly INRs again for a repeat cardioversion he will call our clinic to let us know.        Clinical Outcomes     Negatives:   Major bleeding event, Thromboembolic event, Anticoagulation-related hospital admission, Anticoagulation-related ED visit, Anticoagulation-related fatality    Comments:   Patient was in today to have an EKG completed. He is waiting to hear back from cardiology the next steps. Patient will plan to keep his appointment for 3 weeks out. If he needs to have weekly INRs again for a repeat cardioversion he will call our clinic to let us know.           OBJECTIVE    INR Protime   Date Value Ref Range Status   2019 2.3 (A) 0.86 - 1.14 Final       ASSESSMENT / PLAN  INR assessment THER    Recheck INR In: 3 WEEKS    INR Location Clinic      Anticoagulation Summary  As of 2019    INR goal:   2.0-3.0   TTR:   93.3 % (9.2 mo)   INR used for dosin.3 (2019)   Warfarin maintenance plan:   1.25 mg (2.5 mg x 0.5) every Fri; 2.5 mg (2.5 mg x 1) all other days   Full warfarin instructions:   1.25 mg every Fri; 2.5 mg all other days   Weekly warfarin total:   16.25 mg   No change documented:   Deisy Reza RN   Plan last modified:   Susan Beyer RN (2018)   Next INR check:   2019   Priority:   INR   Target end date:   10/4/2018    Indications    Paroxysmal atrial fibrillation (H) [I48.0]  Atrial fibrillation with rapid ventricular response (H) (Resolved) [I48.91]  Long-term (current) use of anticoagulants [Z79.01] [Z79.01]             Anticoagulation Episode Summary     INR check location:       Preferred lab:       Send  INR reminders to:   [facility]    Comments:   * anticoagulation short period surrounding ablation on 12/21/18. Cardiology to decide when to stop warfarin. has well-compensated cirrhosis      Anticoagulation Care Providers     Provider Role Specialty Phone number    Alon Pineda MD [facility] [phone]            See the Encounter Report to view Anticoagulation Flowsheet and Dosing Calendar (Go to Encounters tab in chart review, and find the Anticoagulation Therapy Visit)        [facility]za [facility]

## 2019-07-01 NOTE — PROGRESS NOTES
Pls let Yomi know that EKG continued to show AFib despite starting flecainide 100 mg BID. QRS duration OK.    His INR is still >2.0    -- AFIB RNs: Pls contact pt and let him know that he's still in AFib. HR is fine.  INR is >2.0. We will proceed with DCCV.      --DANA: Pls contact Yomi for the prep for   his DCCV you saved a spot for on Friday. Dr. Evans to do.    Make sure he continues his warfarin and flecainide therapy

## 2019-07-02 NOTE — PROGRESS NOTES
Message left for patient to review results of EKG and recommendations per IRMA Dinh.  Awaiting return call.  RIKI Cope

## 2019-07-03 NOTE — PROGRESS NOTES
Spoke to patient to review results of EKG and  recommendations per IRMA Dinh.  Recommended DCCV which is scheduled for 7/5.  Reviewed procedure instructions and arrival times with patient.  No lasix or spironolactone day of procedure.  Arrival time is 6:30am in [place].  Patient has arranged for a ride and someone to be with him for 24 hours after cardioversion.  Patient provided verbal understanding.  RIKI Cope

## 2019-07-05 ENCOUNTER — ANESTHESIA (OUTPATIENT)
Dept: SURGERY | Facility: CLINIC | Age: 59
End: 2019-07-05

## 2019-07-05 ENCOUNTER — ANESTHESIA EVENT (OUTPATIENT)
Dept: MEDSURG UNIT | Facility: CLINIC | Age: 59
End: 2019-07-05
Payer: COMMERCIAL

## 2019-07-05 ENCOUNTER — HOSPITAL ENCOUNTER (OUTPATIENT)
Facility: CLINIC | Age: 59
Discharge: HOME | End: 2019-07-05
Attending: INTERNAL MEDICINE | Admitting: INTERNAL MEDICINE
Payer: COMMERCIAL

## 2019-07-05 ENCOUNTER — ANESTHESIA (OUTPATIENT)
Dept: MEDSURG UNIT | Facility: CLINIC | Age: 59
End: 2019-07-05
Payer: COMMERCIAL

## 2019-07-05 ENCOUNTER — ANESTHESIA EVENT (OUTPATIENT)
Dept: SURGERY | Facility: CLINIC | Age: 59
End: 2019-07-05

## 2019-07-05 ENCOUNTER — HOSPITAL ENCOUNTER (OUTPATIENT)
Dept: MEDSURG UNIT | Facility: CLINIC | Age: 59
End: 2019-07-05
Attending: PHYSICIAN ASSISTANT | Admitting: INTERNAL MEDICINE
Payer: COMMERCIAL

## 2019-07-05 VITALS
BODY MASS INDEX: 38.89 KG/M2 | RESPIRATION RATE: 19 BRPM | DIASTOLIC BLOOD PRESSURE: 68 MMHG | HEART RATE: 97 BPM | TEMPERATURE: 97.8 F | HEIGHT: 65 IN | SYSTOLIC BLOOD PRESSURE: 100 MMHG | WEIGHT: 233.4 LBS | OXYGEN SATURATION: 97 %

## 2019-07-05 DIAGNOSIS — I48.19 PERSISTENT ATRIAL FIBRILLATION (H): ICD-10-CM

## 2019-07-05 LAB
INR BLD: 2.6 (ref 0.86–1.14)
MAGNESIUM SERPL-MCNC: 1.8 MG/DL (ref 1.6–2.3)
POTASSIUM SERPL-SCNC: 3.9 MMOL/L (ref 3.4–5.3)

## 2019-07-05 PROCEDURE — 37000008 ZZH ANESTHESIA TECHNICAL FEE, 1ST 30 MIN

## 2019-07-05 PROCEDURE — 25800030 ZZH RX IP 258 OP 636: Performed by: INTERNAL MEDICINE

## 2019-07-05 PROCEDURE — 85610 PROTHROMBIN TIME: CPT | Mod: QW | Performed by: INTERNAL MEDICINE

## 2019-07-05 PROCEDURE — 25000132 ZZH RX MED GY IP 250 OP 250 PS 637: Performed by: INTERNAL MEDICINE

## 2019-07-05 PROCEDURE — 36415 COLL VENOUS BLD VENIPUNCTURE: CPT | Performed by: INTERNAL MEDICINE

## 2019-07-05 PROCEDURE — 40000010 ZZH STATISTIC ANES STAT CODE-CRNA PER MINUTE

## 2019-07-05 PROCEDURE — 93010 ELECTROCARDIOGRAM REPORT: CPT | Performed by: INTERNAL MEDICINE

## 2019-07-05 PROCEDURE — 40000235 ZZH STATISTIC TELEMETRY

## 2019-07-05 PROCEDURE — 83735 ASSAY OF MAGNESIUM: CPT | Performed by: INTERNAL MEDICINE

## 2019-07-05 PROCEDURE — 92960 CARDIOVERSION ELECTRIC EXT: CPT | Performed by: INTERNAL MEDICINE

## 2019-07-05 PROCEDURE — 84132 ASSAY OF SERUM POTASSIUM: CPT | Performed by: INTERNAL MEDICINE

## 2019-07-05 PROCEDURE — 92960 CARDIOVERSION ELECTRIC EXT: CPT

## 2019-07-05 PROCEDURE — 25000128 H RX IP 250 OP 636: Performed by: NURSE ANESTHETIST, CERTIFIED REGISTERED

## 2019-07-05 PROCEDURE — 93005 ELECTROCARDIOGRAM TRACING: CPT

## 2019-07-05 PROCEDURE — 25000128 H RX IP 250 OP 636: Performed by: INTERNAL MEDICINE

## 2019-07-05 RX ORDER — POTASSIUM CHLORIDE 1500 MG/1
20 TABLET, EXTENDED RELEASE ORAL
Status: DISCONTINUED | OUTPATIENT
Start: 2019-07-05 | End: 2019-07-05 | Stop reason: HOSPADM

## 2019-07-05 RX ORDER — SODIUM CHLORIDE 9 MG/ML
INJECTION, SOLUTION INTRAVENOUS CONTINUOUS
Status: DISCONTINUED | OUTPATIENT
Start: 2019-07-05 | End: 2019-07-05 | Stop reason: HOSPADM

## 2019-07-05 RX ORDER — MAGNESIUM SULFATE HEPTAHYDRATE 40 MG/ML
2 INJECTION, SOLUTION INTRAVENOUS
Status: DISCONTINUED | OUTPATIENT
Start: 2019-07-05 | End: 2019-07-05 | Stop reason: HOSPADM

## 2019-07-05 RX ORDER — NALOXONE HYDROCHLORIDE 0.4 MG/ML
.1-.4 INJECTION, SOLUTION INTRAMUSCULAR; INTRAVENOUS; SUBCUTANEOUS
Status: DISCONTINUED | OUTPATIENT
Start: 2019-07-05 | End: 2019-07-05 | Stop reason: HOSPADM

## 2019-07-05 RX ORDER — POTASSIUM CHLORIDE 1500 MG/1
40 TABLET, EXTENDED RELEASE ORAL
Status: DISCONTINUED | OUTPATIENT
Start: 2019-07-05 | End: 2019-07-05 | Stop reason: HOSPADM

## 2019-07-05 RX ORDER — FLUMAZENIL 0.1 MG/ML
0.2 INJECTION, SOLUTION INTRAVENOUS
Status: DISCONTINUED | OUTPATIENT
Start: 2019-07-05 | End: 2019-07-05 | Stop reason: HOSPADM

## 2019-07-05 RX ORDER — PROPOFOL 10 MG/ML
INJECTION, EMULSION INTRAVENOUS PRN
Status: DISCONTINUED | OUTPATIENT
Start: 2019-07-05 | End: 2019-07-05

## 2019-07-05 RX ORDER — ATROPINE SULFATE 0.1 MG/ML
.5-1 INJECTION INTRAVENOUS
Status: DISCONTINUED | OUTPATIENT
Start: 2019-07-05 | End: 2019-07-05 | Stop reason: HOSPADM

## 2019-07-05 RX ADMIN — SODIUM CHLORIDE: 9 INJECTION, SOLUTION INTRAVENOUS at 10:24

## 2019-07-05 RX ADMIN — MAGNESIUM SULFATE HEPTAHYDRATE 2 G: 40 INJECTION, SOLUTION INTRAVENOUS at 09:54

## 2019-07-05 RX ADMIN — PROPOFOL 50 MG: 10 INJECTION, EMULSION INTRAVENOUS at 10:42

## 2019-07-05 RX ADMIN — POTASSIUM CHLORIDE 20 MEQ: 1500 TABLET, EXTENDED RELEASE ORAL at 09:57

## 2019-07-05 RX ADMIN — PROPOFOL 50 MG: 10 INJECTION, EMULSION INTRAVENOUS at 10:38

## 2019-07-05 RX ADMIN — PROPOFOL 70 MG: 10 INJECTION, EMULSION INTRAVENOUS at 10:32

## 2019-07-05 ASSESSMENT — ENCOUNTER SYMPTOMS
DYSRHYTHMIAS: 1
DYSRHYTHMIAS: 1

## 2019-07-05 ASSESSMENT — MIFFLIN-ST. JEOR: SCORE: 1805.58

## 2019-07-05 NOTE — PROGRESS NOTES
PT states he is having some aching to left chest from DCCV. Encouraged to use ice for 20 min not directly on skin and wait 30 min between applications. Also tylenol as the pt can take. PT states unable to take ibuprofen. Tolerated PO. Feeling more awake and states ready for discharge. Discharged to home at 1200.

## 2019-07-05 NOTE — PROGRESS NOTES
Care Suites Admission Nursing Note    Reason for admission: DCCV  CS arrival time: 0840  Accompanied by: wife  Name/phone of DC : wife is here  Medications held: na-see PTA meds  Consent signed: will be done with MD  Abnormal assessment/labs: K+3.9, Mag 1.8  If abnormal, provider notified: See MAR for standing orders, will update cardiology on arrival  Education/questions answered: yes, AVS given with verbalized understanding.  Plan: DCCV at 1030

## 2019-07-05 NOTE — ANESTHESIA POSTPROCEDURE EVALUATION
Patient: Maurice Jon    * No procedures listed *    Diagnosis:* No pre-op diagnosis entered *  Diagnosis Additional Information: No value filed.    Anesthesia Type:  General    Note:  Anesthesia Post Evaluation    Patient location during evaluation: PACU  Patient participation: Able to fully participate in evaluation  Level of consciousness: awake and alert  Pain management: adequate  Airway patency: patent  Cardiovascular status: acceptable  Respiratory status: acceptable and unassisted  Hydration status: acceptable  PONV: none             Last vitals:  There were no vitals filed for this visit.      Electronically Signed By: Jose Alberto Garcia MD  July 5, 2019  11:41 AM

## 2019-07-05 NOTE — ANESTHESIA PREPROCEDURE EVALUATION
Anesthesia Pre-Procedure Evaluation    Patient: Maurice Jon   MRN: [id number] : 1960          Preoperative Diagnosis: * No pre-op diagnosis entered *    * No procedures listed *    Past Medical History:   Diagnosis Date     Acute pulmonary edema (H)      Atrial fibrillation (H)      Atrial fibrillation with rapid ventricular response (H) 2013     CAD (coronary artery disease)     Cath 18- mild nonobstructive disease     Calculus of ureter 1997    history of     Cardiomyopathy (H)     18 Echo- EF 25-30%     Chronic systolic CHF (congestive heart failure) (H)      Cirrhosis of liver without mention of alcohol 2005    Cirrhosis, idiopathic     Edema 2013     Esophageal varices with bleeding(456.0)     Pt denies     Gallstones      Genital herpes, unspecified 3/20/1999    resolving herpes progenitalis     GERD (gastroesophageal reflux disease)      Hematuria      Hypertension      Hypochondriasis     problems in past     Obesity 2010     BRUCE (obstructive sleep apnea) 2009    Mild AHI 8.4  RDI 31 - Pt refuses to wear his CPAP     Pericarditis      Portal hypertension (H) 2010     Unspecified thrombocytopenia 2005    Thrombocytopenia associated with cirrhosis and portal hypertension     Past Surgical History:   Procedure Laterality Date     ANESTHESIA CARDIOVERSION N/A 2015    Procedure: ANESTHESIA CARDIOVERSION;  Surgeon: Generic Anesthesia Provider;  Location: [facility]     ANESTHESIA CARDIOVERSION N/A 2019    Procedure: ANESTHESIA CARDIOVERSION;  Surgeon: GENERIC ANESTHESIA PROVIDER;  Location:  OR     COLONOSCOPY N/A 2017    Procedure: COLONOSCOPY;  Colonoscopy Called will arrive appx 12:30 Per phone call;  Surgeon: Vincent Whittington MD;  [facility]: [facility]     CV HEART CATHETERIZATION WITH POSSIBLE INTERVENTION N/A 2018    Procedure: Heart Catheterization with possible Intervention;  Surgeon: Brandon Arroyo MD;  Location:   HEART CARDIAC CATH LAB     EP ABLATION FOCAL AFIB N/A 12/21/2018    Procedure: EP Ablation Focal AFIB;  Surgeon: Kristofer Evans MD;  Location:  HEART CARDIAC CATH LAB     ESOPHAGOSCOPY, GASTROSCOPY, DUODENOSCOPY (EGD), COMBINED  7/11/2011    Procedure:COMBINED ESOPHAGOSCOPY, GASTROSCOPY, DUODENOSCOPY (EGD); Surgeon:LAURA LAMAS; Location:[facility]     ESOPHAGOSCOPY, GASTROSCOPY, DUODENOSCOPY (EGD), COMBINED  9/10/2012    Procedure: COMBINED ESOPHAGOSCOPY, GASTROSCOPY, DUODENOSCOPY (EGD);;  Surgeon: Hi Roque MD;  Location:  GI     ESOPHAGOSCOPY, GASTROSCOPY, DUODENOSCOPY (EGD), COMBINED  9/9/2013    Procedure: COMBINED ESOPHAGOSCOPY, GASTROSCOPY, DUODENOSCOPY (EGD);;  Surgeon: Hi Roque MD;  Location:  GI     ESOPHAGOSCOPY, GASTROSCOPY, DUODENOSCOPY (EGD), COMBINED N/A 9/15/2014    Procedure: COMBINED ESOPHAGOSCOPY, GASTROSCOPY, DUODENOSCOPY (EGD);  Surgeon: Hi Roque MD;  Location:  GI     ESOPHAGOSCOPY, GASTROSCOPY, DUODENOSCOPY (EGD), COMBINED N/A 10/30/2015    Procedure: COMBINED ESOPHAGOSCOPY, GASTROSCOPY, DUODENOSCOPY (EGD);  Surgeon: Feliberto Fox MD;  Location:  GI     ESOPHAGOSCOPY, GASTROSCOPY, DUODENOSCOPY (EGD), COMBINED N/A 10/10/2016    Procedure: COMBINED ESOPHAGOSCOPY, GASTROSCOPY, DUODENOSCOPY (EGD);  Surgeon: Jose Nesbitt MD;  Location:  GI     H ABLATION FOCAL AFIB  12/21/2018     HERNIA REPAIR       IRRIGATION AND DEBRIDEMENT ABSCESS SCROTUM, COMBINED  10/4/2012    Procedure: COMBINED IRRIGATION AND DEBRIDEMENT ABSCESS SCROTUM;  Irrigation and Debridement of Groin Abscess;  Surgeon: Benny Shoemaker MD;  Location: [facility]     SOFT TISSUE SURGERY       SURGICAL HISTORY OF -   1993    rt elbow     SURGICAL HISTORY OF -   1/15/98    repair of ventral and umbilical hernia     SURGICAL HISTORY OF -   2001    endoscopy       Anesthesia Evaluation     . Pt has had prior anesthetic.     No history of anesthetic complications          ROS/MED  HX    ENT/Pulmonary:     (+)sleep apnea (mild), uses CPAP , . .    Neurologic:       Cardiovascular:     (+) hypertension-range: controlled, ---. : . CHF etiology: post PVI complication . . :. dysrhythmias a-fib, . Previous cardiac testing Echodate:2019results:Left ventricular systolic function is low normal. The visual ejection fraction  is estimated at 55% (biplane 54%).  There is mild concentric left ventricular hypertrophy.  There is mod-severe biatrial enlargement.  Mild (35-45mmHg) pulmonary hypertension is present.  The rhythm was rapid atrial fibrillation.  Compared to the prior echo, LV function has improved significantly.date: results: date: results: date: results:          METS/Exercise Tolerance:     Hematologic:     (+) Other Hematologic Disorder-thrombocytopenia      Musculoskeletal:         GI/Hepatic:     (+) GERD Asymptomatic on medication, cholecystitis/cholelithiasis, liver disease (cirrhosis), Other GI/Hepatic portal HTN and ho portal vein thrombosis.   ascites.  Does have varices      Renal/Genitourinary:     (+) Nephrolithiasis ,       Endo:     (+) Obesity (BMI 40), .   (-) Type II DM   Psychiatric:         Infectious Disease:         Malignancy:         Other:                          Physical Exam  Normal systems: pulmonary and dental    Airway   Mallampati: II  TM distance: >3 FB  Neck ROM: full    Dental     Cardiovascular   Rhythm and rate: irregular      Pulmonary             Lab Results   Component Value Date    WBC 3.6 (L) 03/15/2019    HGB 13.2 (L) 03/15/2019    HCT 39.7 (L) 03/15/2019    PLT 72 (L) 03/15/2019    CRP 3.6 03/15/2019    SED 9 03/15/2019     03/15/2019    POTASSIUM 3.9 07/05/2019    CHLORIDE 105 03/15/2019    CO2 27 03/15/2019    BUN 19 03/15/2019    CR 0.74 03/15/2019    GLC 78 03/15/2019    HALEY 8.8 03/15/2019    MAG 1.8 07/05/2019    ALBUMIN 3.3 (L) 03/15/2019    PROTTOTAL 6.3 (L) 03/15/2019    ALT 44 03/15/2019    AST 48 (H) 03/15/2019    GGT 44 08/16/2005     "ALKPHOS 82 03/15/2019    BILITOTAL 1.4 (H) 03/15/2019    LIPASE 119 07/13/2015    HIMANSHU 69 (H) 07/13/2015    PTT 42 (H) 07/14/2015    INR 2.6 (H) 07/05/2019    FIBR 333 12/12/2013    TSH 5.48 (H) 01/03/2019    T4 1.11 01/03/2019       Preop Vitals  BP Readings from Last 3 Encounters:   07/05/19 113/69   06/25/19 (P) 113/57   03/21/19 102/63    Pulse Readings from Last 3 Encounters:   07/05/19 (P) 83   06/25/19 (P) 85   03/21/19 62      Resp Readings from Last 3 Encounters:   07/05/19 (!) (P) 5   03/15/19 12   02/06/19 17    SpO2 Readings from Last 3 Encounters:   07/05/19 98%   06/25/19 (P) 96%   03/21/19 97%      Temp Readings from Last 1 Encounters:   07/05/19 36.6  C (97.8  F) (Oral)    Ht Readings from Last 1 Encounters:   07/05/19 1.651 m (5' 5\")      Wt Readings from Last 1 Encounters:   07/05/19 105.9 kg (233 lb 6.4 oz)    Estimated body mass index is 38.84 kg/m  as calculated from the following:    Height as of an earlier encounter on 7/5/19: 1.651 m (5' 5\").    Weight as of an earlier encounter on 7/5/19: 105.9 kg (233 lb 6.4 oz).       Anesthesia Plan      History & Physical Review  History and physical reviewed and following examination; no interval change.    ASA Status:  3 .    NPO Status:  > 8 hours    Plan for General with Intravenous and Propofol induction.   PONV prophylaxis:  Ondansetron (or other 5HT-3)       Postoperative Care  Postoperative pain management:  Multi-modal analgesia.      Consents  Anesthetic plan, risks, benefits and alternatives discussed with:  Patient..                 Jose Alberto Garcia MD  "

## 2019-07-05 NOTE — DISCHARGE INSTRUCTIONS
Cardioversion Discharge Instructions    After you go home:       For 24 hours - due to the sedation you received:      Have an adult stay with you for 24 hours.     Relax and take it easy.    Do NOT make any important or legal decisions.    Do NOT drive or operate machines at home or at work.    Do NOT drink alcohol.    Diet:      Start with clear liquids and progress to your normal diet as you feel able.    Medicines:      Take your medications, including blood thinners, unless your provider tells you not to.    If you have stopped any medications, check with your provider about when to restart them.    Follow Up Appointments:      Follow up with your cardiologist at [facility] of patient preference as instructed.    Follow up with your primary care provider as needed.    Post cardioversion:    The skin on your chest or back may feel tender for 48 hours.  If your skin is tender, you may:      Use a cold pack on the site. Never use ice directly on your skin. Use the cold pack for 20 minutes. Remove it for at least 30 minutes before re-using.    Apply 1% hydrocortisone cream to the skin (sold at drug stores)    Take Advil (Ibuprofen) or Tylenol (Acetaminophen) per your provider's recommendations.      Call your provider if you have:      Weakness, dizziness, lightheadedness, or fainting.    Shortness of breath.    Irregular heartbeat, feelings of your heart fluttering or beating fast, hard or palpitations.     More than minor skin discomfort or redness where the cardioversion pads were placed.    Questions or concerns.      Call 911 if you have:      Pain in your chest, arm, shoulder, neck, or upper back.    You have problems speaking or seeing.    Weakness in your arm or leg.    You are unable to move your arm or leg.    You have uncontrolled bleeding.         [facility] [place]:    [phone] [facility] (7 days a week)

## 2019-07-05 NOTE — ADDENDUM NOTE
Addendum  created 07/05/19 1139 by Jose Alberto Garcia MD    Review and Sign - Ready for Procedure, Review and Sign - Signed, Sign clinical note

## 2019-07-05 NOTE — PROGRESS NOTES
Care Suites Procedure Nursing Note    Procedure: DCCV  Procedure started time: See ELTON flow sheet  Pt Cardioverted x3--see ELTON flow sheet.  Did not convert out of AFib.  Wife is here.  Pt teary eyed--offered support  Procedure completed time: 1042  Concerns/abnormal assessment: See Above  Plan: Pt to stop Flecainide--wrote on AVS and wife verbalizes understanding.  Report given to Pamlela LYN

## 2019-07-05 NOTE — ANESTHESIA CARE TRANSFER NOTE
Patient: Maurice Jon    * No procedures listed *    Diagnosis: * No pre-op diagnosis entered *  Diagnosis Additional Information: No value filed.    Anesthesia Type:   No value filed.     Note:  Airway :Nasal Cannula  Patient transferred to:Telemetry/Step Down Unit  Comments: Diagnosis: Afib  Procedure: Cardioversion  Cardiologist Dr. Evans  Location: [facility] care suite #20Handoff Report: Identifed the Patient, Identified the Reponsible Provider, Reviewed the pertinent medical history, Discussed the surgical course, Reviewed Intra-OP anesthesia mangement and issues during anesthesia, Set expectations for post-procedure period and Allowed opportunity for questions and acknowledgement of understanding      Vitals: (Last set prior to Anesthesia Care Transfer)    CRNA VITALS  7/5/2019 1016 - 7/5/2019 1103      7/5/2019             NIBP:  99/61    Ht Rate:  94                Electronically Signed By: JANETTE Corral CRNA  July 5, 2019  11:03 AM

## 2019-07-05 NOTE — PROGRESS NOTES
Maurice Jon  [id number]  1960    Procedures:   1. Direct Current Cardioversion    Indication: Atrial fibrillation    HPI: This is a 58 year old patient with a history of atrial fibrillation and has been on uninterrupted oral anticoagulation for at least the past 4 weeks. Patient was referred for direct current cardioversion. Risks of procedure was discussed including but not limited to respiratory distress, skin rash and stroke. Patient understood and accepted these risks. Informed consent was obtained.    Methods: Defibrillation patches were placed in the right anterior and left apical position. General anesthesia was administered by the anesthesiology service. 3 applications of synchronized in a biphasic fashion at 150, 200, 200 joules were delivered and usuccessfully converted to normal sinus rhythm.     Complications:  None    Conclusion: Unsuccessful direct current cardioversion of atrial fibrillation into a normal sinus rhythm, not even for a single beat  Stop Flecainide and consider Dofetilide or repeat ablation.    Kristofer Evans MD

## 2019-07-05 NOTE — ADDENDUM NOTE
Addendum  created 07/05/19 1141 by Jose Alberto Garcia MD    Attestation recorded in Intraprocedure, Intraprocedure Attestations filed, Intraprocedure Event edited, Sign clinical note

## 2019-07-10 LAB — INTERPRETATION ECG - MUSE: NORMAL

## 2019-07-11 ENCOUNTER — TELEPHONE (OUTPATIENT)
Dept: CARDIOLOGY | Facility: CLINIC | Age: 59
End: 2019-07-11

## 2019-07-11 NOTE — TELEPHONE ENCOUNTER
Spoke to patient regarding follow up appt d/t failed DCCV offered appointment in [place] and patient not able to get off of work.  Requesting that he be seen in [place].  Reviewed schedule with Dr Evans as he only had 1pm available on hold.  Dr Evans wants to see patient at that time.  Spoke to patient and he agreed with plan.  Instructed patient if his symptoms are persistent to call us and we would work him in at the [place] clinic.  Patient provided verbal understanding.  RIKI Cope

## 2019-07-11 NOTE — TELEPHONE ENCOUNTER
Message left with patient in follow up to unsuccessful cardioversion on 7/5.  Dr Evans would like to see patient in clinic to discuss treatment plan.  Awaiting return call.  RIKI Cope

## 2019-07-23 ENCOUNTER — OFFICE VISIT (OUTPATIENT)
Dept: CARDIOLOGY | Facility: CLINIC | Age: 59
End: 2019-07-23
Payer: COMMERCIAL

## 2019-07-23 VITALS
BODY MASS INDEX: 39.61 KG/M2 | WEIGHT: 238 LBS | DIASTOLIC BLOOD PRESSURE: 52 MMHG | HEART RATE: 92 BPM | SYSTOLIC BLOOD PRESSURE: 101 MMHG

## 2019-07-23 DIAGNOSIS — I48.19 PERSISTENT ATRIAL FIBRILLATION (H): Primary | ICD-10-CM

## 2019-07-23 PROCEDURE — 99215 OFFICE O/P EST HI 40 MIN: CPT | Performed by: INTERNAL MEDICINE

## 2019-07-23 NOTE — LETTER
7/23/2019    Physician No Ref-Primary  No address on file    RE: Maurice Jon       Dear Colleague,    I had the pleasure of seeing Maurice Jon in the [facility].    HPI and Plan:   See dictation  [id number]  No orders of the defined types were placed in this encounter.      No orders of the defined types were placed in this encounter.      Medications Discontinued During This Encounter   Medication Reason     flecainide (TAMBOCOR) 100 MG tablet Stopped by Patient         No diagnosis found.    CURRENT MEDICATIONS:  Current Outpatient Medications   Medication Sig Dispense Refill     ACE/ARB/ARNI NOT PRESCRIBED (INTENTIONAL) Please choose reason not prescribed, below       ASPIRIN NOT PRESCRIBED (INTENTIONAL) Please choose reason not prescribed, below       budesonide-formoterol (SYMBICORT) 80-4.5 MCG/ACT Inhaler Inhale 2 puffs into the lungs 2 times daily 10.2 g 3     calcium carb 1250 mg, 500 mg [place],/vitamin D 200 units (OSCAL WITH D) 500-200 MG-UNIT per tablet Take 2 tablets by mouth daily with food.       furosemide (LASIX) 40 MG tablet Take 1 tablet (40 mg) by mouth 2 times daily 60 tablet 1     metoprolol succinate ER (TOPROL-XL) 100 MG 24 hr tablet Take 1 tablet (100 mg) by mouth daily (Patient taking differently: Take 100 mg by mouth At Bedtime ) 90 tablet 3     metoprolol succinate ER (TOPROL-XL) 25 MG 24 hr tablet Take 1 tablet (25 mg) by mouth every morning 90 tablet 3     order for DME Open toe size large 30/40 Mediven Assure Medical Compression socks Model 38670.    Fax to Fax [phone]. [facility] 12 Device 0     order for DME Equipment being ordered:   New CPAP mask, tube, filters,water tray 1 Device 3     order for DME Open toe size large 30/40 Mediven Assure Medical Compression socks Model 49500. 4 Package 3     ORDER FOR DME Respironics RemStar 60 Series Auto AFlex 12-15 cm H2O with heated humidty and a modem.  Pt chose a Quattro Air FFM size  medium.       ORDER FOR DME Juzo compression stockings, 30-40mm compression. Patient has portal hypertension and develops leg edema, which these control well. 2 Package 3     pantoprazole (PROTONIX) 40 MG EC tablet Take 1 tablet (40 mg) by mouth daily 90 tablet 3     spironolactone (ALDACTONE) 25 MG tablet Take 1 tablet (25 mg) by mouth daily 90 tablet 3     STATIN NOT PRESCRIBED (INTENTIONAL) Please choose reason not prescribed, below       warfarin (JANTOVEN) 2.5 MG tablet 1.25 mg Fridays; 2.5mg all other days or As directed by Anticoagulation Clinic. INR DUE FOR FURTHER REFILLS 80 tablet 0       ALLERGIES     Allergies   Allergen Reactions     Avelox Other (See Comments) and GI Disturbance     portal hypertension     Moxifloxacin Nausea and Vomiting, Nausea and Other (See Comments)     portal hypertension     Sotalol Itching       PAST MEDICAL HISTORY:  Past Medical History:   Diagnosis Date     Acute pulmonary edema (H)      Atrial fibrillation (H)      Atrial fibrillation with rapid ventricular response (H) 5/13/2013     CAD (coronary artery disease)     Cath 12/26/18- mild nonobstructive disease     Calculus of ureter 1993, 1997    history of     Cardiomyopathy (H)     12/23/18 Echo- EF 25-30%     Chronic systolic CHF (congestive heart failure) (H)      Cirrhosis of liver without mention of alcohol 8/22/2005    Cirrhosis, idiopathic     Edema 5/13/2013     Esophageal varices with bleeding(456.0)     Pt denies     Gallstones      Genital herpes, unspecified 3/20/1999    resolving herpes progenitalis     GERD (gastroesophageal reflux disease)      Hematuria      Hypertension      Hypochondriasis     problems in past     Obesity 11/24/2010     BRUCE (obstructive sleep apnea) 03/17/2009    Mild AHI 8.4  RDI 31 - Pt refuses to wear his CPAP     Pericarditis      Portal hypertension (H) 1/28/2010     Unspecified thrombocytopenia 8/22/2005    Thrombocytopenia associated with cirrhosis and portal hypertension       PAST  SURGICAL HISTORY:  Past Surgical History:   Procedure Laterality Date     ANESTHESIA CARDIOVERSION N/A 1/19/2015    Procedure: ANESTHESIA CARDIOVERSION;  Surgeon: Generic Anesthesia Provider;  Location: [facility] OR     ANESTHESIA CARDIOVERSION N/A 2/6/2019    Procedure: ANESTHESIA CARDIOVERSION;  Surgeon: GENERIC ANESTHESIA PROVIDER;  Location:  OR     ANESTHESIA CARDIOVERSION N/A 7/5/2019    Procedure: ANESTHESIA FOR CARDIOVERSION  DR. MURGUIA);  Surgeon: GENERIC ANESTHESIA PROVIDER;  Location:  OR     COLONOSCOPY N/A 8/14/2017    Procedure: COLONOSCOPY;  Colonoscopy Called will arrive appx 12:30 Per phone call;  Surgeon: Vincent Whittington MD;  Location:  GI     CV HEART CATHETERIZATION WITH POSSIBLE INTERVENTION N/A 12/26/2018    Procedure: Heart Catheterization with possible Intervention;  Surgeon: Brandon Arroyo MD;  Location:  HEART CARDIAC CATH LAB     EP ABLATION FOCAL AFIB N/A 12/21/2018    Procedure: EP Ablation Focal AFIB;  Surgeon: Kristofer Murguia MD;  Location:  HEART CARDIAC CATH LAB     ESOPHAGOSCOPY, GASTROSCOPY, DUODENOSCOPY (EGD), COMBINED  7/11/2011    Procedure:COMBINED ESOPHAGOSCOPY, GASTROSCOPY, DUODENOSCOPY (EGD); Surgeon:LAURA LAMAS; Location:[facility]     ESOPHAGOSCOPY, GASTROSCOPY, DUODENOSCOPY (EGD), COMBINED  9/10/2012    Procedure: COMBINED ESOPHAGOSCOPY, GASTROSCOPY, DUODENOSCOPY (EGD);;  Surgeon: Hi Roque MD;  Location:  GI     ESOPHAGOSCOPY, GASTROSCOPY, DUODENOSCOPY (EGD), COMBINED  9/9/2013    Procedure: COMBINED ESOPHAGOSCOPY, GASTROSCOPY, DUODENOSCOPY (EGD);;  Surgeon: Hi Roque MD;  Location:  GI     ESOPHAGOSCOPY, GASTROSCOPY, DUODENOSCOPY (EGD), COMBINED N/A 9/15/2014    Procedure: COMBINED ESOPHAGOSCOPY, GASTROSCOPY, DUODENOSCOPY (EGD);  Surgeon: Hi Roque MD;  Location:  GI     ESOPHAGOSCOPY, GASTROSCOPY, DUODENOSCOPY (EGD), COMBINED N/A 10/30/2015    Procedure: COMBINED ESOPHAGOSCOPY, GASTROSCOPY, DUODENOSCOPY (EGD);  Surgeon: Feliberto Fox  MD Arnoldo;  Location: [facility]     ESOPHAGOSCOPY, GASTROSCOPY, DUODENOSCOPY (EGD), COMBINED N/A 10/10/2016    Procedure: COMBINED ESOPHAGOSCOPY, GASTROSCOPY, DUODENOSCOPY (EGD);  Surgeon: Jose Nesbitt MD;  Location: [facility] GI     H ABLATION FOCAL AFIB  2018     HERNIA REPAIR       IRRIGATION AND DEBRIDEMENT ABSCESS SCROTUM, COMBINED  10/4/2012    Procedure: COMBINED IRRIGATION AND DEBRIDEMENT ABSCESS SCROTUM;  Irrigation and Debridement of Groin Abscess;  Surgeon: Benny Shoemaker MD;  Location: [facility] OR     SOFT TISSUE SURGERY       SURGICAL HISTORY OF -       rt elbow     SURGICAL HISTORY OF -   1/15/98    repair of ventral and umbilical hernia     SURGICAL HISTORY OF -       endoscopy       FAMILY HISTORY:  Family History   Problem Relation Age of Onset     Lipids Mother      Hypertension Mother      Eye Disorder Mother      Heart Disease Father         CHF     Lipids Father      Obesity Father      Heart Disease Brother         MI     Eye Disorder Brother      Hypertension Brother         portal HTN     Thrombosis Sister         in her lung     Cancer Other         maternal grandparent/throat cancer       SOCIAL HISTORY:  Social History     Socioeconomic History     Marital status:      Spouse name: None     Number of children: None     Years of education: None     Highest education level: None   Occupational History     None   Social Needs     Financial resource strain: None     Food insecurity:     Worry: None     Inability: None     Transportation needs:     Medical: None     Non-medical: None   Tobacco Use     Smoking status: Former Smoker     Packs/day: 0.80     Years: 6.00     Pack years: 4.80     Types: Cigarettes     Last attempt to quit: 2002     Years since quittin.5     Smokeless tobacco: Never Used   Substance and Sexual Activity     Alcohol use: No     Alcohol/week: 0.0 oz     Comment: quit in      Drug use: No     Sexual activity: Yes     Partners: Female    Lifestyle     Physical activity:     Days per week: None     Minutes per session: None     Stress: None   Relationships     Social connections:     Talks on phone: None     Gets together: None     Attends Oriental orthodox service: None     Active member of club or organization: None     Attends meetings of clubs or organizations: None     Relationship status: None     Intimate partner violence:     Fear of current or ex partner: None     Emotionally abused: None     Physically abused: None     Forced sexual activity: None   Other Topics Concern     Parent/sibling w/ CABG, MI or angioplasty before 65F 55M? Yes     Comment: BROTHER   - MI AT AGE 44      Service Not Asked     Blood Transfusions Not Asked     Caffeine Concern Not Asked     Occupational Exposure Not Asked     Hobby Hazards Not Asked     Sleep Concern Not Asked     Stress Concern Not Asked     Weight Concern Not Asked     Special Diet Not Asked     Back Care Not Asked     Exercise No     Bike Helmet Not Asked     Seat Belt Not Asked     Self-Exams Not Asked   Social History Narrative     None       Review of Systems:  Skin:  not assessed       Eyes:  Negative      ENT:  Negative      Respiratory:  Positive for sleep apnea;CPAP;dyspnea on exertion;dyspnea at rest     Cardiovascular:    Positive for;fatigue;palpitations;lightheadedness;chest pain    Gastroenterology: Positive for heartburn;reflux    Genitourinary:  Positive for   cirrohsis of the liver  Musculoskeletal:  Negative      Neurologic:  Negative      Psychiatric:  Negative      Heme/Lymph/Imm:  Negative      Endocrine:  Negative        Physical Exam:  Vitals: /52   Pulse 92   Wt 108 kg (238 lb)   BMI 39.61 kg/m       Constitutional:  cooperative, alert and oriented, well developed, well nourished, in no acute distress obese      Skin:  warm and dry to the touch, no apparent skin lesions or masses noted          Head:  normocephalic, no masses or lesions        Eyes:  pupils equal and  round, conjunctivae and lids unremarkable, sclera white, no xanthalasma, EOMS intact, no nystagmus        Lymph:No Cervical lymphadenopathy present     ENT:  no pallor or cyanosis, dentition good        Neck:  carotid pulses are full and equal bilaterally, JVP normal, no carotid bruit        Respiratory:  normal breath sounds, clear to auscultation, normal A-P diameter, normal symmetry, normal respiratory excursion, no use of accessory muscles         Cardiac:   irregularly irregular rhythm                                                       GI:  abdomen soft, non-tender, BS normoactive, no mass, no HSM, no bruits obese      Extremities and Muscular Skeletal:                 Neurological:  no gross motor deficits        Psych:           CC  No referring provider defined for this encounter.                Service Date: 07/23/2019      HISTORY OF PRESENT ILLNESS:  It was my pleasure to see Maurice back today for followup of recent failed cardioversion.  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 Roque at the [facility] at the [facility].  The patient has been seeing me for long-lasting persistent atrial fibrillation with reduction of exercise tolerance along with palpitations.  He was on sotalol in the past but it was unsuccessful.  Therefore, I recommended catheter-based ablation which was performed around last [place].      The patient was found to have a tremendous amount of scarring in the left atrium; in particular, the posterior side and the pulmonary veins as well.  I decided to isolate the left atrial posterior wall in addition to isolating the pulmonary veins but I was not optimistic about having a long-term remission.      Fortunately, the next day, the patient went into heart failure and was found to have LV dysfunction with ejection  fraction of 30%.  Coronary angiogram was unremarkable.  It was felt that the LV dysfunction was tachycardia-induced from his atrial fibrillation.      Over the past several months, he required a few more cardioversions with the last one being a few weeks ago.  It was unsuccessful despite having him on flecainide.  Flecainide was started once his LV function normalized.  I asked him to discuss options going forward.      Marily mentioned that he felt his best when he was in sinus rhythm with less shortness of breath and just feeling well overall.  Since he has been in atrial fibrillation, he is not able to do as much as he wants to, although some of it is the lack of motivation as well.  He is somewhat sedentary.  His liver cirrhosis seems to be stable.  He is scheduled for an upcoming elective surveillance endoscopy.  He has been on warfarin with Dr. Roque's blessing without any bleeding complications so far.      We discussed at length about options going forward includin.  Repeat EP study and ablation without any guarantee that he would achieve long-term remission plus starting dofetilide.    2.  AV jerry ablation and CRT *** pacing.   3.  Increasing physical therapy on his own and reassess in a month.  The patient *** would like go home and think about all these options and let us know.         CIRILO MURGUIA MD             D: 2019   T: 2019   MT: ELISA      Name:     MARILY NAVARRO   MRN:      [id number]        Account:      [id number]   :      1960           Service Date: 2019      Document: [id number]       Thank you for allowing me to participate in the care of your patient.      Sincerely,     Cirilo Brown MD     [facility]    cc:   No referring provider defined for this encounter.

## 2019-07-23 NOTE — PROGRESS NOTES
Service Date: 07/23/2019      HISTORY OF PRESENT ILLNESS:  It was my pleasure to see Maurice back today for followup of recent failed cardioversion.  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 Roque at the [facility] Clinic at the [facility].  The patient has been seeing me for long-lasting persistent atrial fibrillation with reduction of exercise tolerance along with palpitations.  He was on sotalol in the past but it was unsuccessful.  Therefore, I recommended catheter-based ablation which was performed around last [place].      The patient was found to have a tremendous amount of scarring in the left atrium; in particular, the posterior side and the pulmonary veins as well.  I decided to isolate the left atrial posterior wall in addition to isolating the pulmonary veins but I was not optimistic about having a long-term remission.      Unfortunately, the next day, the patient went into heart failure and was found to have LV dysfunction with ejection fraction of 30%.  Coronary angiogram was unremarkable.  It was felt that the LV dysfunction was tachycardia-induced from his atrial fibrillation.      Over the past several months, he required a few more cardioversions with the last one being a few weeks ago.  It was unsuccessful despite having him on flecainide.  Flecainide was started once his LV function normalized.  I asked him to discuss options going forward.      Maurice mentioned that he felt his best when he was in sinus rhythm with less shortness of breath and just feeling well overall.  Since he has been in atrial fibrillation, he is not able to do as much as he wants to, although some of it is the lack of motivation as well.  He is somewhat sedentary.  His liver cirrhosis seems to be stable.  He is scheduled for an upcoming surveillance  endoscopy.  He has been on warfarin with Dr. Roque's blessing without any bleeding complications so far.      We discussed at length about options going forward includin.  Repeat EP study and ablation without any guarantee that he would achieve long-term remission plus starting dofetilide.    2.  AV jerry ablation and CRT  pacing.   3.  Increasing physical therapy on his own and reassess in a month.  The patient  would like go home and think about all these options and let us know.         CIRILO MURGUIA MD             D: 2019   T: 2019   MT: ELISA      Name:     MARILY NAVARRO   MRN:      [id number]        Account:      [id number]   :      1960           Service Date: 2019      Document: [id number]

## 2019-07-23 NOTE — PROGRESS NOTES
HPI and Plan:   See dictation  [id number]  No orders of the defined types were placed in this encounter.      No orders of the defined types were placed in this encounter.      Medications Discontinued During This Encounter   Medication Reason     flecainide (TAMBOCOR) 100 MG tablet Stopped by Patient         No diagnosis found.    CURRENT MEDICATIONS:  Current Outpatient Medications   Medication Sig Dispense Refill     ACE/ARB/ARNI NOT PRESCRIBED (INTENTIONAL) Please choose reason not prescribed, below       ASPIRIN NOT PRESCRIBED (INTENTIONAL) Please choose reason not prescribed, below       budesonide-formoterol (SYMBICORT) 80-4.5 MCG/ACT Inhaler Inhale 2 puffs into the lungs 2 times daily 10.2 g 3     calcium carb 1250 mg, 500 mg [place],/vitamin D 200 units (OSCAL WITH D) 500-200 MG-UNIT per tablet Take 2 tablets by mouth daily with food.       furosemide (LASIX) 40 MG tablet Take 1 tablet (40 mg) by mouth 2 times daily 60 tablet 1     metoprolol succinate ER (TOPROL-XL) 100 MG 24 hr tablet Take 1 tablet (100 mg) by mouth daily (Patient taking differently: Take 100 mg by mouth At Bedtime ) 90 tablet 3     metoprolol succinate ER (TOPROL-XL) 25 MG 24 hr tablet Take 1 tablet (25 mg) by mouth every morning 90 tablet 3     order for DME Open toe size large 30/40 Mediven Assure Medical Compression socks Model 77629.    Fax to Fax [phone]. [facility] 12 Device 0     order for DME Equipment being ordered:   New CPAP mask, tube, filters,water tray 1 Device 3     order for DME Open toe size large 30/40 Mediven Assure Medical Compression socks Model 58809. 4 Package 3     ORDER FOR DME Respironics RemStar 60 Series Auto AFlex 12-15 cm H2O with heated humidty and a modem.  Pt chose a Quattro Air FFM size medium.       ORDER FOR DME Juzo compression stockings, 30-40mm compression. Patient has portal hypertension and develops leg edema, which these control well. 2 Package 3     pantoprazole (PROTONIX) 40 MG EC tablet  Take 1 tablet (40 mg) by mouth daily 90 tablet 3     spironolactone (ALDACTONE) 25 MG tablet Take 1 tablet (25 mg) by mouth daily 90 tablet 3     STATIN NOT PRESCRIBED (INTENTIONAL) Please choose reason not prescribed, below       warfarin (JANTOVEN) 2.5 MG tablet 1.25 mg Fridays; 2.5mg all other days or As directed by Anticoagulation Clinic. INR DUE FOR FURTHER REFILLS 80 tablet 0       ALLERGIES     Allergies   Allergen Reactions     Avelox Other (See Comments) and GI Disturbance     portal hypertension     Moxifloxacin Nausea and Vomiting, Nausea and Other (See Comments)     portal hypertension     Sotalol Itching       PAST MEDICAL HISTORY:  Past Medical History:   Diagnosis Date     Acute pulmonary edema (H)      Atrial fibrillation (H)      Atrial fibrillation with rapid ventricular response (H) 5/13/2013     CAD (coronary artery disease)     Cath 12/26/18- mild nonobstructive disease     Calculus of ureter 1993, 1997    history of     Cardiomyopathy (H)     12/23/18 Echo- EF 25-30%     Chronic systolic CHF (congestive heart failure) (H)      Cirrhosis of liver without mention of alcohol 8/22/2005    Cirrhosis, idiopathic     Edema 5/13/2013     Esophageal varices with bleeding(456.0)     Pt denies     Gallstones      Genital herpes, unspecified 3/20/1999    resolving herpes progenitalis     GERD (gastroesophageal reflux disease)      Hematuria      Hypertension      Hypochondriasis     problems in past     Obesity 11/24/2010     BRUCE (obstructive sleep apnea) 03/17/2009    Mild AHI 8.4  RDI 31 - Pt refuses to wear his CPAP     Pericarditis      Portal hypertension (H) 1/28/2010     Unspecified thrombocytopenia 8/22/2005    Thrombocytopenia associated with cirrhosis and portal hypertension       PAST SURGICAL HISTORY:  Past Surgical History:   Procedure Laterality Date     ANESTHESIA CARDIOVERSION N/A 1/19/2015    Procedure: ANESTHESIA CARDIOVERSION;  Surgeon: Generic Anesthesia Provider;  Location: [facility]      ANESTHESIA CARDIOVERSION N/A 2/6/2019    Procedure: ANESTHESIA CARDIOVERSION;  Surgeon: GENERIC ANESTHESIA PROVIDER;  Location:  OR     ANESTHESIA CARDIOVERSION N/A 7/5/2019    Procedure: ANESTHESIA FOR CARDIOVERSION  DR. MURGUIA);  Surgeon: GENERIC ANESTHESIA PROVIDER;  Location:  OR     COLONOSCOPY N/A 8/14/2017    Procedure: COLONOSCOPY;  Colonoscopy Called will arrive appx 12:30 Per phone call;  Surgeon: Vincent Whittington MD;  [facility]     CV HEART CATHETERIZATION WITH POSSIBLE INTERVENTION N/A 12/26/2018    Procedure: Heart Catheterization with possible Intervention;  Surgeon: Brandon Arroyo MD;  Location:  HEART CARDIAC CATH LAB     EP ABLATION FOCAL AFIB N/A 12/21/2018    Procedure: EP Ablation Focal AFIB;  Surgeon: Kristofer Murguia MD;  Location:  HEART CARDIAC CATH LAB     ESOPHAGOSCOPY, GASTROSCOPY, DUODENOSCOPY (EGD), COMBINED  7/11/2011    Procedure:COMBINED ESOPHAGOSCOPY, GASTROSCOPY, DUODENOSCOPY (EGD); Surgeon:LAURA LAMAS; Location:[facility]     ESOPHAGOSCOPY, GASTROSCOPY, DUODENOSCOPY (EGD), COMBINED  9/10/2012    Procedure: COMBINED ESOPHAGOSCOPY, GASTROSCOPY, DUODENOSCOPY (EGD);;  Surgeon: Hi Roque MD;  Location:  GI     ESOPHAGOSCOPY, GASTROSCOPY, DUODENOSCOPY (EGD), COMBINED  9/9/2013    Procedure: COMBINED ESOPHAGOSCOPY, GASTROSCOPY, DUODENOSCOPY (EGD);;  Surgeon: Hi Roque MD;  [facility]     ESOPHAGOSCOPY, GASTROSCOPY, DUODENOSCOPY (EGD), COMBINED N/A 9/15/2014    Procedure: COMBINED ESOPHAGOSCOPY, GASTROSCOPY, DUODENOSCOPY (EGD);  Surgeon: Hi Roque MD;  [facility]     ESOPHAGOSCOPY, GASTROSCOPY, DUODENOSCOPY (EGD), COMBINED N/A 10/30/2015    Procedure: COMBINED ESOPHAGOSCOPY, GASTROSCOPY, DUODENOSCOPY (EGD);  Surgeon: Feliberto Fox MD;  [facility]     ESOPHAGOSCOPY, GASTROSCOPY, DUODENOSCOPY (EGD), COMBINED N/A 10/10/2016    Procedure: COMBINED ESOPHAGOSCOPY, GASTROSCOPY, DUODENOSCOPY (EGD);  Surgeon: Jose Nesbitt MD;   [facility]     H ABLATION FOCAL AFIB  2018     HERNIA REPAIR       IRRIGATION AND DEBRIDEMENT ABSCESS SCROTUM, COMBINED  10/4/2012    Procedure: COMBINED IRRIGATION AND DEBRIDEMENT ABSCESS SCROTUM;  Irrigation and Debridement of Groin Abscess;  Surgeon: Benny Shoemaker MD;  Location: [facility]     SOFT TISSUE SURGERY       SURGICAL HISTORY OF -       rt elbow     SURGICAL HISTORY OF -   1/15/98    repair of ventral and umbilical hernia     SURGICAL HISTORY OF -       endoscopy       FAMILY HISTORY:  Family History   Problem Relation Age of Onset     Lipids Mother      Hypertension Mother      Eye Disorder Mother      Heart Disease Father         CHF     Lipids Father      Obesity Father      Heart Disease Brother         MI     Eye Disorder Brother      Hypertension Brother         portal HTN     Thrombosis Sister         in her lung     Cancer Other         maternal grandparent/throat cancer       SOCIAL HISTORY:  Social History     Socioeconomic History     Marital status:      Spouse name: None     Number of children: None     Years of education: None     Highest education level: None   Occupational History     None   Social Needs     Financial resource strain: None     Food insecurity:     Worry: None     Inability: None     Transportation needs:     Medical: None     Non-medical: None   Tobacco Use     Smoking status: Former Smoker     Packs/day: 0.80     Years: 6.00     Pack years: 4.80     Types: Cigarettes     Last attempt to quit: 2002     Years since quittin.5     Smokeless tobacco: Never Used   Substance and Sexual Activity     Alcohol use: No     Alcohol/week: 0.0 oz     Comment: quit in      Drug use: No     Sexual activity: Yes     Partners: Female   Lifestyle     Physical activity:     Days per week: None     Minutes per session: None     Stress: None   Relationships     Social connections:     Talks on phone: None     Gets together: None     Attends  Spiritism service: None     Active member of club or organization: None     Attends meetings of clubs or organizations: None     Relationship status: None     Intimate partner violence:     Fear of current or ex partner: None     Emotionally abused: None     Physically abused: None     Forced sexual activity: None   Other Topics Concern     Parent/sibling w/ CABG, MI or angioplasty before 65F 55M? Yes     Comment: BROTHER   - MI AT AGE 44      Service Not Asked     Blood Transfusions Not Asked     Caffeine Concern Not Asked     Occupational Exposure Not Asked     Hobby Hazards Not Asked     Sleep Concern Not Asked     Stress Concern Not Asked     Weight Concern Not Asked     Special Diet Not Asked     Back Care Not Asked     Exercise No     Bike Helmet Not Asked     Seat Belt Not Asked     Self-Exams Not Asked   Social History Narrative     None       Review of Systems:  Skin:  not assessed       Eyes:  Negative      ENT:  Negative      Respiratory:  Positive for sleep apnea;CPAP;dyspnea on exertion;dyspnea at rest     Cardiovascular:    Positive for;fatigue;palpitations;lightheadedness;chest pain    Gastroenterology: Positive for heartburn;reflux    Genitourinary:  Positive for   cirrohsis of the liver  Musculoskeletal:  Negative      Neurologic:  Negative      Psychiatric:  Negative      Heme/Lymph/Imm:  Negative      Endocrine:  Negative        Physical Exam:  Vitals: /52   Pulse 92   Wt 108 kg (238 lb)   BMI 39.61 kg/m      Constitutional:  cooperative, alert and oriented, well developed, well nourished, in no acute distress obese      Skin:  warm and dry to the touch, no apparent skin lesions or masses noted          Head:  normocephalic, no masses or lesions        Eyes:  pupils equal and round, conjunctivae and lids unremarkable, sclera white, no xanthalasma, EOMS intact, no nystagmus        Lymph:No Cervical lymphadenopathy present     ENT:  no pallor or cyanosis, dentition good        Neck:   carotid pulses are full and equal bilaterally, JVP normal, no carotid bruit        Respiratory:  normal breath sounds, clear to auscultation, normal A-P diameter, normal symmetry, normal respiratory excursion, no use of accessory muscles         Cardiac:   irregularly irregular rhythm                                                       GI:  abdomen soft, non-tender, BS normoactive, no mass, no HSM, no bruits obese      Extremities and Muscular Skeletal:                 Neurological:  no gross motor deficits        Psych:           CC  No referring provider defined for this encounter.

## 2019-08-08 ENCOUNTER — TELEPHONE (OUTPATIENT)
Dept: SLEEP MEDICINE | Facility: CLINIC | Age: 59
End: 2019-08-08

## 2019-08-09 NOTE — TELEPHONE ENCOUNTER
Pt called [facility], left a message, states needs supplies and possibly a machine. Would like to further discuss. Would like a call back at  [phone]

## 2019-08-16 ENCOUNTER — ANTICOAGULATION THERAPY VISIT (OUTPATIENT)
Dept: ANTICOAGULATION | Facility: CLINIC | Age: 59
End: 2019-08-16
Payer: COMMERCIAL

## 2019-08-16 DIAGNOSIS — I48.0 PAROXYSMAL ATRIAL FIBRILLATION (H): ICD-10-CM

## 2019-08-16 DIAGNOSIS — Z79.01 LONG TERM CURRENT USE OF ANTICOAGULANT THERAPY: ICD-10-CM

## 2019-08-16 LAB — INR POINT OF CARE: 2.4 (ref 0.86–1.14)

## 2019-08-16 PROCEDURE — 36416 COLLJ CAPILLARY BLOOD SPEC: CPT

## 2019-08-16 PROCEDURE — 99207 ZZC NO CHARGE NURSE ONLY: CPT

## 2019-08-16 PROCEDURE — 85610 PROTHROMBIN TIME: CPT | Mod: QW

## 2019-08-16 NOTE — PROGRESS NOTES
ANTICOAGULATION FOLLOW-UP CLINIC VISIT    Patient Name:  Maurice Jon  Date:  8/16/2019  Contact Type:  Face to Face    SUBJECTIVE:  Patient Findings     Comments:   Patient reports no changes in medication, activity, or diet. Patient reports no changes in health. Patient reports has taken warfarin as instructed. Patient reports no increased bruising or bleeding and no signs or symptoms of a blood clot. Will plan to continue maintenance dose and recheck INR in 6 weeks. Patient to call [facility] with any changes or concerns. Patient verbalized understanding of all instructions, denies questions or concerns at this time. Patient has an EGD scheduled 9/26/19, he has not been told how long he will need to hold his warfarin. Will check his INR with his scheduled labs on 9/19/19 and patient to notify [facility] when he knows how long he will need to hold his warfarin for his EGD.             Clinical Outcomes     Negatives:   Major bleeding event, Thromboembolic event, Anticoagulation-related hospital admission, Anticoagulation-related ED visit, Anticoagulation-related fatality    Comments:   Patient reports no changes in medication, activity, or diet. Patient reports no changes in health. Patient reports has taken warfarin as instructed. Patient reports no increased bruising or bleeding and no signs or symptoms of a blood clot. Will plan to continue maintenance dose and recheck INR in 6 weeks. Patient to call [facility] with any changes or concerns. Patient verbalized understanding of all instructions, denies questions or concerns at this time. Patient has an EGD scheduled 9/26/19, he has not been told how long he will need to hold his warfarin. Will check his INR with his scheduled labs on 9/19/19 and patient to notify [facility] when he knows how long he will need to hold his warfarin for his EGD.                OBJECTIVE    INR Protime   Date Value Ref Range Status   08/16/2019 2.4 (A) 0.86 - 1.14 Final       ASSESSMENT / PLAN  INR  assessment THER    Recheck INR In: 6 WEEKS    INR Location Clinic      Anticoagulation Summary  As of 2019    INR goal:   2.0-3.0   TTR:   94.3 % (10.7 mo)   INR used for dosin.4 (2019)   Warfarin maintenance plan:   1.25 mg (2.5 mg x 0.5) every Fri; 2.5 mg (2.5 mg x 1) all other days   Full warfarin instructions:   1.25 mg every Fri; 2.5 mg all other days   Weekly warfarin total:   16.25 mg   No change documented:   Christina Singer RN   Plan last modified:   Susan Beyer RN (2018)   Next INR check:   2019   Priority:   INR   Target end date:   10/4/2018    Indications    Paroxysmal atrial fibrillation (H) [I48.0]  Atrial fibrillation with rapid ventricular response (H) (Resolved) [I48.91]  Long-term (current) use of anticoagulants [Z79.01] [Z79.01]             Anticoagulation Episode Summary     INR check location:       Preferred lab:       Send INR reminders to:   [facility]    Comments:   * anticoagulation short period surrounding ablation on 18. Cardiology to decide when to stop warfarin. has well-compensated cirrhosis      Anticoagulation Care Providers     Provider Role Specialty Phone number    Alon Pineda MD [facility] [phone]            See the Encounter Report to view Anticoagulation Flowsheet and Dosing Calendar (Go to Encounters tab in chart review, and find the Anticoagulation Therapy Visit)        Christina Sinegr, RN

## 2019-09-09 ENCOUNTER — DOCUMENTATION ONLY (OUTPATIENT)
Dept: CARE COORDINATION | Facility: CLINIC | Age: 59
End: 2019-09-09

## 2019-09-16 ENCOUNTER — TELEPHONE (OUTPATIENT)
Dept: GASTROENTEROLOGY | Facility: CLINIC | Age: 59
End: 2019-09-16

## 2019-09-16 DIAGNOSIS — Z79.01 LONG TERM CURRENT USE OF ANTICOAGULANT THERAPY: Primary | ICD-10-CM

## 2019-09-16 NOTE — TELEPHONE ENCOUNTER
Patient Name: Maurice Jon   : 1960  MRN: [id number]       : [x] N/A   [] Yes:  Language  /  ID:          Additional Information regarding appointment:      Patient scheduled for:  [x] EGD    Indication for procedure.   [x] Cirrhosis of liver without ascites, unspecified hepatic cirrhosis type    Sedation Type: [x] Conscious Sedation      Procedure Provider:  Dr. Lu     Referring Provider. Dr. Hi Roque    Arrival time verified: 8:40 am / Thurs / 19    Facility location verified:   [x][facility] - [address], [address]st Floor, [address]    Pt meets medical necessity for outpatient procedure in hospital Endoscopy Unit:    [x] N/A for this Payor (non-BCBS)    [x]NPO /p midnoc, No solid food /p 2200 the night before    Anticoagulants or blood thinners:        [x] Warfarin   [x] Warfarin + Lovenox bridge  [x] LAST anticoagulant dose: Date/Time:   INR: Lab draw @ 8:15 am  [facility] waiting room    Electronic implanted devices: [x] No     H&P / Pre op physical completed: [x] N/A,      Additional Information: Will discuss with cardiologist, Dr. Evans to determine if he needs to bridge warfarin.     _______________________________________________      Instructions given:    [x] Reviewed             Pre procedure teaching completed: [x] Yes - Reviewed,     [x] No questions regarding Sedation as ordered, [x]     Transportation from procedure & responsible adult to be with patient following procedure for a minimum of 6 hrs (Conscious Sedation) 24 hrs (MAC): [x] Yes, confirmed will have post-procedure companionship as required, [] Pending  , [] No      Cathy Garcia RN,   [facility]

## 2019-09-19 ENCOUNTER — ANCILLARY PROCEDURE (OUTPATIENT)
Dept: ULTRASOUND IMAGING | Facility: CLINIC | Age: 59
End: 2019-09-19
Attending: INTERNAL MEDICINE
Payer: COMMERCIAL

## 2019-09-19 ENCOUNTER — TELEPHONE (OUTPATIENT)
Dept: GASTROENTEROLOGY | Facility: CLINIC | Age: 59
End: 2019-09-19

## 2019-09-19 ENCOUNTER — TELEPHONE (OUTPATIENT)
Dept: ANTICOAGULATION | Facility: CLINIC | Age: 59
End: 2019-09-19

## 2019-09-19 ENCOUNTER — OFFICE VISIT (OUTPATIENT)
Dept: GASTROENTEROLOGY | Facility: CLINIC | Age: 59
End: 2019-09-19
Attending: INTERNAL MEDICINE
Payer: COMMERCIAL

## 2019-09-19 ENCOUNTER — ANTICOAGULATION THERAPY VISIT (OUTPATIENT)
Dept: ANTICOAGULATION | Facility: CLINIC | Age: 59
End: 2019-09-19
Payer: COMMERCIAL

## 2019-09-19 VITALS
BODY MASS INDEX: 40.27 KG/M2 | OXYGEN SATURATION: 98 % | HEART RATE: 99 BPM | TEMPERATURE: 97.7 F | DIASTOLIC BLOOD PRESSURE: 75 MMHG | WEIGHT: 242 LBS | SYSTOLIC BLOOD PRESSURE: 117 MMHG

## 2019-09-19 DIAGNOSIS — I48.0 PAROXYSMAL ATRIAL FIBRILLATION (H): ICD-10-CM

## 2019-09-19 DIAGNOSIS — K74.60 CIRRHOSIS OF LIVER WITHOUT ASCITES, UNSPECIFIED HEPATIC CIRRHOSIS TYPE (H): ICD-10-CM

## 2019-09-19 DIAGNOSIS — K74.60 LIVER CIRRHOSIS SECONDARY TO NONALCOHOLIC STEATOHEPATITIS (NASH) (H): Primary | ICD-10-CM

## 2019-09-19 DIAGNOSIS — Z79.01 LONG TERM CURRENT USE OF ANTICOAGULANT THERAPY: ICD-10-CM

## 2019-09-19 DIAGNOSIS — K75.81 LIVER CIRRHOSIS SECONDARY TO NONALCOHOLIC STEATOHEPATITIS (NASH) (H): Primary | ICD-10-CM

## 2019-09-19 DIAGNOSIS — K74.60 CIRRHOSIS OF LIVER WITHOUT ASCITES, UNSPECIFIED HEPATIC CIRRHOSIS TYPE (H): Primary | ICD-10-CM

## 2019-09-19 LAB
AFP SERPL-MCNC: 2.8 UG/L (ref 0–8)
ALBUMIN SERPL-MCNC: 3.2 G/DL (ref 3.4–5)
ALP SERPL-CCNC: 74 U/L (ref 40–150)
ALT SERPL W P-5'-P-CCNC: 40 U/L (ref 0–70)
ANION GAP SERPL CALCULATED.3IONS-SCNC: 3 MMOL/L (ref 3–14)
AST SERPL W P-5'-P-CCNC: 41 U/L (ref 0–45)
BILIRUB DIRECT SERPL-MCNC: 0.3 MG/DL (ref 0–0.2)
BILIRUB SERPL-MCNC: 0.9 MG/DL (ref 0.2–1.3)
BUN SERPL-MCNC: 16 MG/DL (ref 7–30)
CALCIUM SERPL-MCNC: 8.3 MG/DL (ref 8.5–10.1)
CHLORIDE SERPL-SCNC: 111 MMOL/L (ref 94–109)
CHOLEST SERPL-MCNC: 128 MG/DL
CO2 SERPL-SCNC: 28 MMOL/L (ref 20–32)
CREAT SERPL-MCNC: 0.7 MG/DL (ref 0.66–1.25)
ERYTHROCYTE [DISTWIDTH] IN BLOOD BY AUTOMATED COUNT: 14.6 % (ref 10–15)
GFR SERPL CREATININE-BSD FRML MDRD: >90 ML/MIN/{1.73_M2}
GLUCOSE SERPL-MCNC: 99 MG/DL (ref 70–99)
HCT VFR BLD AUTO: 34.6 % (ref 40–53)
HDLC SERPL-MCNC: 54 MG/DL
HGB BLD-MCNC: 11.2 G/DL (ref 13.3–17.7)
INR PPP: 2.18 (ref 0.86–1.14)
LDLC SERPL CALC-MCNC: 58 MG/DL
MCH RBC QN AUTO: 30.8 PG (ref 26.5–33)
MCHC RBC AUTO-ENTMCNC: 32.4 G/DL (ref 31.5–36.5)
MCV RBC AUTO: 95 FL (ref 78–100)
NONHDLC SERPL-MCNC: 74 MG/DL
PLATELET # BLD AUTO: 65 10E9/L (ref 150–450)
POTASSIUM SERPL-SCNC: 3.7 MMOL/L (ref 3.4–5.3)
PROT SERPL-MCNC: 5.9 G/DL (ref 6.8–8.8)
RBC # BLD AUTO: 3.64 10E12/L (ref 4.4–5.9)
SODIUM SERPL-SCNC: 142 MMOL/L (ref 133–144)
TRIGL SERPL-MCNC: 80 MG/DL
WBC # BLD AUTO: 2.4 10E9/L (ref 4–11)

## 2019-09-19 PROCEDURE — 85027 COMPLETE CBC AUTOMATED: CPT | Performed by: INTERNAL MEDICINE

## 2019-09-19 PROCEDURE — 82105 ALPHA-FETOPROTEIN SERUM: CPT | Performed by: INTERNAL MEDICINE

## 2019-09-19 PROCEDURE — 80061 LIPID PANEL: CPT | Performed by: INTERNAL MEDICINE

## 2019-09-19 PROCEDURE — 36415 COLL VENOUS BLD VENIPUNCTURE: CPT | Performed by: INTERNAL MEDICINE

## 2019-09-19 PROCEDURE — 80076 HEPATIC FUNCTION PANEL: CPT | Performed by: INTERNAL MEDICINE

## 2019-09-19 PROCEDURE — 90686 IIV4 VACC NO PRSV 0.5 ML IM: CPT | Mod: ZF | Performed by: INTERNAL MEDICINE

## 2019-09-19 PROCEDURE — 25000128 H RX IP 250 OP 636: Mod: ZF | Performed by: INTERNAL MEDICINE

## 2019-09-19 PROCEDURE — G0463 HOSPITAL OUTPT CLINIC VISIT: HCPCS | Mod: 25,ZF

## 2019-09-19 PROCEDURE — 85610 PROTHROMBIN TIME: CPT | Performed by: INTERNAL MEDICINE

## 2019-09-19 PROCEDURE — 80048 BASIC METABOLIC PNL TOTAL CA: CPT | Performed by: INTERNAL MEDICINE

## 2019-09-19 PROCEDURE — G0008 ADMIN INFLUENZA VIRUS VAC: HCPCS | Mod: ZF

## 2019-09-19 PROCEDURE — G0008 ADMIN INFLUENZA VIRUS VAC: HCPCS

## 2019-09-19 PROCEDURE — 99207 ZZC NO CHARGE NURSE ONLY: CPT

## 2019-09-19 RX ADMIN — INFLUENZA A VIRUS A/BRISBANE/02/2018 IVR-190 (H1N1) ANTIGEN (FORMALDEHYDE INACTIVATED), INFLUENZA A VIRUS A/KANSAS/14/2017 X-327 (H3N2) ANTIGEN (FORMALDEHYDE INACTIVATED), INFLUENZA B VIRUS B/PHUKET/3073/2013 ANTIGEN (FORMALDEHYDE INACTIVATED), AND INFLUENZA B VIRUS B/MARYLAND/15/2016 BX-69A ANTIGEN (FORMALDEHYDE INACTIVATED) 0.5 ML: 15; 15; 15; 15 INJECTION, SUSPENSION INTRAMUSCULAR at 08:39

## 2019-09-19 ASSESSMENT — PAIN SCALES - GENERAL: PAINLEVEL: MILD PAIN (2)

## 2019-09-19 NOTE — LETTER
9/19/2019       RE: Maurice Jon  [address] [place] [address]     Dear Colleague,    Thank you for referring your patient, Maurice Jon, to the [facility]PAT[facility] at [facility]. Please see a copy of my visit note below.    HISTORY OF PRESENT ILLNESS:  I had the pleasure of seeing Maurice Jon for followup in the Liver Clinic at the [facility] on 09/19/2019.  Mr. Jon returns for followup of cirrhosis complicated by a nonocclusive portal vein thrombosis.      He again had a try at ablation which seemed like it produced a partial result in that after cardioversion he remained in sinus rhythm for 4 months.  He feels much better when he is in sinus rhythm.  Unfortunately, he went back into atrial fibrillation in spite of 3 additional attempts at cardioversion were not successful.  They are either now considering another ablation or possibly a pacemaker.      He denies any abdominal pain, itching or skin rash.  He has mild fatigue.  He continues to work full time.  He denies any increased abdominal girth or lower extremity edema.  He is wearing his pressure stockings at work.      He denies any fevers or chills, cough or shortness of breath.  He denies any nausea or vomiting and is somewhat prone to constipation.  His appetite has been good, and unfortunately, he has not lost any weight.  In fact, his weight is up 11 pounds over the past 3 months.  He has not had any gastrointestinal bleeding or any overt signs of hepatic encephalopathy.      One unusual symptom he reports is that occasionally he gets bleeding from his scrotum.  He wondered whether they were not esophageal varices which they would not be.     Current Outpatient Medications   Medication     budesonide-formoterol (SYMBICORT) 80-4.5 MCG/ACT Inhaler     calcium carb 1250 mg, 500 mg [place],/vitamin D 200 units (OSCAL WITH D) 500-200 MG-UNIT per tablet     furosemide  (LASIX) 40 MG tablet     metoprolol succinate ER (TOPROL-XL) 100 MG 24 hr tablet     metoprolol succinate ER (TOPROL-XL) 25 MG 24 hr tablet     order for DME     order for DME     order for DME     ORDER FOR DME     pantoprazole (PROTONIX) 40 MG EC tablet     spironolactone (ALDACTONE) 25 MG tablet     warfarin (JANTOVEN) 2.5 MG tablet     ACE/ARB/ARNI NOT PRESCRIBED (INTENTIONAL)     ASPIRIN NOT PRESCRIBED (INTENTIONAL)     ORDER FOR DME     STATIN NOT PRESCRIBED (INTENTIONAL)     Current Facility-Administered Medications   Medication     influenza quadrivalent (PF) vacc (FLUZONE) injection 0.5 mL     /75   Pulse 99   Temp 97.7  F (36.5  C) (Oral)   Wt 109.8 kg (242 lb)   SpO2 98%   BMI 40.27 kg/m       PHYSICAL EXAMINATION:  In general, he looks well.  HEENT exam shows no scleral icterus or temporal muscle wasting.  Chest is clear.  Abdominal exam shows no increase in girth.  No masses or tenderness to palpation are present.  His liver is 10 cm in span without left lobe enlargement.  No spleen tip is palpable, and extremity exam shows no edema.  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  I did examine his scrotum which shows a number of pinpoint, somewhat raised, red lesions.  It is really unclear what they are.     Recent Results (from the past 168 hour(s))   INR [[id number]]    Collection Time: 09/19/19  5:52 AM   Result Value Ref Range    INR 2.18 (H) 0.86 - 1.14   CBC with platelets [[id number]]    Collection Time: 09/19/19  5:52 AM   Result Value Ref Range    WBC 2.4 (L) 4.0 - 11.0 10e9/L    RBC Count 3.64 (L) 4.4 - 5.9 10e12/L    Hemoglobin 11.2 (L) 13.3 - 17.7 g/dL    Hematocrit 34.6 (L) 40.0 - 53.0 %    MCV 95 78 - 100 fl    MCH 30.8 26.5 - 33.0 pg    MCHC 32.4 31.5 - 36.5 g/dL    RDW 14.6 10.0 - 15.0 %    Platelet Count 65 (L) 150 - 450 10e9/L   Basic metabolic panel [LAB15]    Collection Time: 09/19/19  5:52 AM   Result Value Ref Range    Sodium 142  133 - 144 mmol/L    Potassium 3.7 3.4 - 5.3 mmol/L    Chloride 111 (H) 94 - 109 mmol/L    Carbon Dioxide 28 20 - 32 mmol/L    Anion Gap 3 3 - 14 mmol/L    Glucose 99 70 - 99 mg/dL    Urea Nitrogen 16 7 - 30 mg/dL    Creatinine 0.70 0.66 - 1.25 mg/dL    GFR Estimate >90 >60 mL/min/[1.73_m2]    GFR Estimate If Black >90 >60 mL/min/[1.73_m2]    Calcium 8.3 (L) 8.5 - 10.1 mg/dL   Hepatic Panel [LAB20]    Collection Time: 09/19/19  5:52 AM   Result Value Ref Range    Bilirubin Direct 0.3 (H) 0.0 - 0.2 mg/dL    Bilirubin Total 0.9 0.2 - 1.3 mg/dL    Albumin 3.2 (L) 3.4 - 5.0 g/dL    Protein Total 5.9 (L) 6.8 - 8.8 g/dL    Alkaline Phosphatase 74 40 - 150 U/L    ALT 40 0 - 70 U/L    AST 41 0 - 45 U/L      He did have an ultrasound which again shows the nonocclusive portal vein thrombosis.  It has not yet been read officially.  I do not see any major changes.      IMPRESSION:  My impression is Mr. Jon has well-compensated cirrhosis at this point in time.  His liver function is excellent.  He is getting an endoscopy in about 1 week to follow up on his varices.  His ultrasound does not look particularly alarming.  I have recommended that he see a urologist about his scrotum, at least since he has bleeding from that.  I think it is more appropriate he see a urologist than a dermatologist.      I also did go over with him the fact that he really is a reasonable candidate either for another ablation or a pacemaker from the standpoint of his liver disease, and I will see him back in the clinic in 6 months for repeat imaging and blood work.      Thank you very much for allowing me to participate in the care of this patient.  If you have any questions regarding my recommendations, please do not hesitate to contact me.       Hi Roque MD      Professor of Medicine  [employer]      Executive Medical Director, Solid Organ Transplant Program  [facility]      Again, thank you  for allowing me to participate in the care of your patient.      Sincerely,    Hi Roque MD

## 2019-09-19 NOTE — PROGRESS NOTES
19 ADDENDUM:   Anticoag chart review after hospitalization or ED visit:    Visit date(s): 19    Reason for visit: Endoscopy    New medications: n/a    Next INR check date: 10/4/19   This recheck date is an appropriate timeframe given the changes noted during the ED/hospitalization.     ** Planned procedure that [facility] was aware of, no changes needed based on the procedure notes.      Dheeraj PANIAGUA RN, CACP        ANTICOAGULATION FOLLOW-UP CLINIC VISIT    Patient Name:  Maurice Jon  Date:  2019  Contact Type:  Telephone - AVS sent in mail and sent Xradia message    SUBJECTIVE:  Patient Findings     Comments:   No changes in medications, activity, or diet noted. No concerns with clotting, bleeding, or increased bruising noted. Took warfarin as prescribed.  Pt is going to have a procedure - does not need to bridge with Lovenox. Pt will hold coumadin for 5 days. Recheck INR at [facility].  Patient verbalizes understanding and agrees to plan. No further questions or concerns.        Clinical Outcomes     Negatives:   Major bleeding event, Thromboembolic event, Anticoagulation-related hospital admission, Anticoagulation-related ED visit, Anticoagulation-related fatality    Comments:   No changes in medications, activity, or diet noted. No concerns with clotting, bleeding, or increased bruising noted. Took warfarin as prescribed.  Pt is going to have a procedure - does not need to bridge with Lovenox. Pt will hold coumadin for 5 days. Recheck INR at [facility].  Patient verbalizes understanding and agrees to plan. No further questions or concerns.           OBJECTIVE    INR   Date Value Ref Range Status   2019 2.18 (H) 0.86 - 1.14 Final       ASSESSMENT / PLAN  INR assessment THER    Recheck INR In: 2 WEEKS    INR Location Outside lab      Anticoagulation Summary  As of 2019    INR goal:   2.0-3.0   TTR:   94.8 % (11.9 mo)   INR used for dosin.18 (2019)   Warfarin maintenance  plan:   1.25 mg (2.5 mg x 0.5) every Fri; 2.5 mg (2.5 mg x 1) all other days   Full warfarin instructions:   9/21: Hold; 9/22: Hold; 9/23: Hold; 9/24: Hold; 9/25: Hold; 9/26: 5 mg; 9/27: 5 mg; 9/30: 1.25 mg; 10/2: 1.25 mg; Otherwise 1.25 mg every Fri; 2.5 mg all other days   Weekly warfarin total:   16.25 mg   Plan last modified:   Susan Beyer RN (9/28/2018)   Next INR check:   10/4/2019   Priority:   INR   Target end date:   10/4/2018    Indications    Paroxysmal atrial fibrillation (H) [I48.0]  Atrial fibrillation with rapid ventricular response (H) (Resolved) [I48.91]  Long-term (current) use of anticoagulants [Z79.01] [Z79.01]             Anticoagulation Episode Summary     INR check location:       Preferred lab:       Send INR reminders to:   [facility]    Comments:   * anticoagulation short period surrounding ablation on 12/21/18. Cardiology to decide when to stop warfarin. has well-compensated cirrhosis      Anticoagulation Care Providers     Provider Role Specialty Phone number    Alon Pineda MD [facility] [phone]            See the Encounter Report to view Anticoagulation Flowsheet and Dosing Calendar (Go to Encounters tab in chart review, and find the Anticoagulation Therapy Visit)        Sondra Santos RN

## 2019-09-19 NOTE — NURSING NOTE
"Chief Complaint   Patient presents with     RECHECK     cirrhosis follow up     Blood pressure 117/75, pulse 99, temperature 97.7  F (36.5  C), temperature source Oral, weight 109.8 kg (242 lb), SpO2 98 %.    Cat Thurman CMA on 9/19/2019 at 7:45 AM    Injectable Influenza Immunization Documentation    1.  Has the patient received the information for the injectable influenza vaccine? YES     2. Is the patient 6 months of age or older? YES     3. Does the patient have any of the following contraindications?         Severe allergy to eggs? No     Severe allergic reaction to previous influenza vaccines? No   Severe allergy to latex? No       History of Guillain-Arecibo syndrome? No     Currently have a temperature greater than 100.4F? No        4.  Severely egg allergic patients should have flu vaccine eligibility assessed by an MD, RN, or pharmacist, and those who received flu vaccine should be observed for 15 min by an MD, RN, Pharmacist, Medical Technician, or member of clinic staff.\":     5. Latex-allergic patients should be given latex-free influenza vaccine . Please reference the Vaccine latex table to determine if your clinic s product is latex-containing.       Vaccination given by JIM Espinoza        "

## 2019-09-19 NOTE — LETTER
9/19/2019      RE: Maurice oJn  [address]  [facility] [address]       HISTORY OF PRESENT ILLNESS:  I had the pleasure of seeing Maurice Jon for followup in the Liver Clinic at the [facility] on 09/19/2019.  Mr. Jon returns for followup of cirrhosis complicated by a nonocclusive portal vein thrombosis.      He again had a try at ablation which seemed like it produced a partial result in that after cardioversion he remained in sinus rhythm for 4 months.  He feels much better when he is in sinus rhythm.  Unfortunately, he went back into atrial fibrillation in spite of 3 additional attempts at cardioversion were not successful.  They are either now considering another ablation or possibly a pacemaker.      He denies any abdominal pain, itching or skin rash.  He has mild fatigue.  He continues to work full time.  He denies any increased abdominal girth or lower extremity edema.  He is wearing his pressure stockings at work.      He denies any fevers or chills, cough or shortness of breath.  He denies any nausea or vomiting and is somewhat prone to constipation.  His appetite has been good, and unfortunately, he has not lost any weight.  In fact, his weight is up 11 pounds over the past 3 months.  He has not had any gastrointestinal bleeding or any overt signs of hepatic encephalopathy.      One unusual symptom he reports is that occasionally he gets bleeding from his scrotum.  He wondered whether they were not esophageal varices which they would not be.     Current Outpatient Medications   Medication     budesonide-formoterol (SYMBICORT) 80-4.5 MCG/ACT Inhaler     calcium carb 1250 mg, 500 mg [place],/vitamin D 200 units (OSCAL WITH D) 500-200 MG-UNIT per tablet     furosemide (LASIX) 40 MG tablet     metoprolol succinate ER (TOPROL-XL) 100 MG 24 hr tablet     metoprolol succinate ER (TOPROL-XL) 25 MG 24 hr tablet     order for DME     order for DME     order for DME      ORDER FOR DME     pantoprazole (PROTONIX) 40 MG EC tablet     spironolactone (ALDACTONE) 25 MG tablet     warfarin (JANTOVEN) 2.5 MG tablet     ACE/ARB/ARNI NOT PRESCRIBED (INTENTIONAL)     ASPIRIN NOT PRESCRIBED (INTENTIONAL)     ORDER FOR DME     STATIN NOT PRESCRIBED (INTENTIONAL)     Current Facility-Administered Medications   Medication     influenza quadrivalent (PF) vacc (FLUZONE) injection 0.5 mL     /75   Pulse 99   Temp 97.7  F (36.5  C) (Oral)   Wt 109.8 kg (242 lb)   SpO2 98%   BMI 40.27 kg/m       PHYSICAL EXAMINATION:  In general, he looks well.  HEENT exam shows no scleral icterus or temporal muscle wasting.  Chest is clear.  Abdominal exam shows no increase in girth.  No masses or tenderness to palpation are present.  His liver is 10 cm in span without left lobe enlargement.  No spleen tip is palpable, and extremity exam shows no edema.  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  I did examine his scrotum which shows a number of pinpoint, somewhat raised, red lesions.  It is really unclear what they are.     Recent Results (from the past 168 hour(s))   INR [[id number]]    Collection Time: 09/19/19  5:52 AM   Result Value Ref Range    INR 2.18 (H) 0.86 - 1.14   CBC with platelets [PYN224]    Collection Time: 09/19/19  5:52 AM   Result Value Ref Range    WBC 2.4 (L) 4.0 - 11.0 10e9/L    RBC Count 3.64 (L) 4.4 - 5.9 10e12/L    Hemoglobin 11.2 (L) 13.3 - 17.7 g/dL    Hematocrit 34.6 (L) 40.0 - 53.0 %    MCV 95 78 - 100 fl    MCH 30.8 26.5 - 33.0 pg    MCHC 32.4 31.5 - 36.5 g/dL    RDW 14.6 10.0 - 15.0 %    Platelet Count 65 (L) 150 - 450 10e9/L   Basic metabolic panel [LAB15]    Collection Time: 09/19/19  5:52 AM   Result Value Ref Range    Sodium 142 133 - 144 mmol/L    Potassium 3.7 3.4 - 5.3 mmol/L    Chloride 111 (H) 94 - 109 mmol/L    Carbon Dioxide 28 20 - 32 mmol/L    Anion Gap 3 3 - 14 mmol/L    Glucose 99 70 - 99 mg/dL    Urea Nitrogen 16  7 - 30 mg/dL    Creatinine 0.70 0.66 - 1.25 mg/dL    GFR Estimate >90 >60 mL/min/[1.73_m2]    GFR Estimate If Black >90 >60 mL/min/[1.73_m2]    Calcium 8.3 (L) 8.5 - 10.1 mg/dL   Hepatic Panel [LAB20]    Collection Time: 09/19/19  5:52 AM   Result Value Ref Range    Bilirubin Direct 0.3 (H) 0.0 - 0.2 mg/dL    Bilirubin Total 0.9 0.2 - 1.3 mg/dL    Albumin 3.2 (L) 3.4 - 5.0 g/dL    Protein Total 5.9 (L) 6.8 - 8.8 g/dL    Alkaline Phosphatase 74 40 - 150 U/L    ALT 40 0 - 70 U/L    AST 41 0 - 45 U/L      He did have an ultrasound which again shows the nonocclusive portal vein thrombosis.  It has not yet been read officially.  I do not see any major changes.      IMPRESSION:  My impression is Mr. Jon has well-compensated cirrhosis at this point in time.  His liver function is excellent.  He is getting an endoscopy in about 1 week to follow up on his varices.  His ultrasound does not look particularly alarming.  I have recommended that he see a urologist about his scrotum, at least since he has bleeding from that.  I think it is more appropriate he see a urologist than a dermatologist.      I also did go over with him the fact that he really is a reasonable candidate either for another ablation or a pacemaker from the standpoint of his liver disease, and I will see him back in the clinic in 6 months for repeat imaging and blood work.      Thank you very much for allowing me to participate in the care of this patient.  If you have any questions regarding my recommendations, please do not hesitate to contact me.       Hi Roque MD      Professor of Medicine  [employer]      Executive Medical Director, Solid Organ Transplant Program  [facility]      Hi Roque MD

## 2019-09-19 NOTE — TELEPHONE ENCOUNTER
Writer called patient with the update, left a message with the information he did not need to use Lovenox. Writer requested he call [facility] back once he is able to discuss the post-procedure plan and schedule his next INR check.     Dheeraj PANIAGUA RN, CACP 9/19/2019 at 2:17 PM

## 2019-09-19 NOTE — PROGRESS NOTES
HISTORY OF PRESENT ILLNESS:  I had the pleasure of seeing Maurice Jon for followup in the Liver Clinic at the [facility] on 09/19/2019.  Mr. Jon returns for followup of cirrhosis complicated by a nonocclusive portal vein thrombosis.      He again had a try at ablation which seemed like it produced a partial result in that after cardioversion he remained in sinus rhythm for 4 months.  He feels much better when he is in sinus rhythm.  Unfortunately, he went back into atrial fibrillation in spite of 3 additional attempts at cardioversion were not successful.  They are either now considering another ablation or possibly a pacemaker.      He denies any abdominal pain, itching or skin rash.  He has mild fatigue.  He continues to work full time.  He denies any increased abdominal girth or lower extremity edema.  He is wearing his pressure stockings at work.      He denies any fevers or chills, cough or shortness of breath.  He denies any nausea or vomiting and is somewhat prone to constipation.  His appetite has been good, and unfortunately, he has not lost any weight.  In fact, his weight is up 11 pounds over the past 3 months.  He has not had any gastrointestinal bleeding or any overt signs of hepatic encephalopathy.      One unusual symptom he reports is that occasionally he gets bleeding from his scrotum.  He wondered whether they were not esophageal varices which they would not be.     Current Outpatient Medications   Medication     budesonide-formoterol (SYMBICORT) 80-4.5 MCG/ACT Inhaler     calcium carb 1250 mg, 500 mg [place],/vitamin D 200 units (OSCAL WITH D) 500-200 MG-UNIT per tablet     furosemide (LASIX) 40 MG tablet     metoprolol succinate ER (TOPROL-XL) 100 MG 24 hr tablet     metoprolol succinate ER (TOPROL-XL) 25 MG 24 hr tablet     order for DME     order for DME     order for DME     ORDER FOR DME     pantoprazole (PROTONIX) 40 MG EC tablet     spironolactone (ALDACTONE)  25 MG tablet     warfarin (JANTOVEN) 2.5 MG tablet     ACE/ARB/ARNI NOT PRESCRIBED (INTENTIONAL)     ASPIRIN NOT PRESCRIBED (INTENTIONAL)     ORDER FOR DME     STATIN NOT PRESCRIBED (INTENTIONAL)     Current Facility-Administered Medications   Medication     influenza quadrivalent (PF) vacc (FLUZONE) injection 0.5 mL     /75   Pulse 99   Temp 97.7  F (36.5  C) (Oral)   Wt 109.8 kg (242 lb)   SpO2 98%   BMI 40.27 kg/m      PHYSICAL EXAMINATION:  In general, he looks well.  HEENT exam shows no scleral icterus or temporal muscle wasting.  Chest is clear.  Abdominal exam shows no increase in girth.  No masses or tenderness to palpation are present.  His liver is 10 cm in span without left lobe enlargement.  No spleen tip is palpable, and extremity exam shows no edema.  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  I did examine his scrotum which shows a number of pinpoint, somewhat raised, red lesions.  It is really unclear what they are.     Recent Results (from the past 168 hour(s))   INR [[id number]]    Collection Time: 09/19/19  5:52 AM   Result Value Ref Range    INR 2.18 (H) 0.86 - 1.14   CBC with platelets [SGE012]    Collection Time: 09/19/19  5:52 AM   Result Value Ref Range    WBC 2.4 (L) 4.0 - 11.0 10e9/L    RBC Count 3.64 (L) 4.4 - 5.9 10e12/L    Hemoglobin 11.2 (L) 13.3 - 17.7 g/dL    Hematocrit 34.6 (L) 40.0 - 53.0 %    MCV 95 78 - 100 fl    MCH 30.8 26.5 - 33.0 pg    MCHC 32.4 31.5 - 36.5 g/dL    RDW 14.6 10.0 - 15.0 %    Platelet Count 65 (L) 150 - 450 10e9/L   Basic metabolic panel [LAB15]    Collection Time: 09/19/19  5:52 AM   Result Value Ref Range    Sodium 142 133 - 144 mmol/L    Potassium 3.7 3.4 - 5.3 mmol/L    Chloride 111 (H) 94 - 109 mmol/L    Carbon Dioxide 28 20 - 32 mmol/L    Anion Gap 3 3 - 14 mmol/L    Glucose 99 70 - 99 mg/dL    Urea Nitrogen 16 7 - 30 mg/dL    Creatinine 0.70 0.66 - 1.25 mg/dL    GFR Estimate >90 >60  mL/min/[1.73_m2]    GFR Estimate If Black >90 >60 mL/min/[1.73_m2]    Calcium 8.3 (L) 8.5 - 10.1 mg/dL   Hepatic Panel [LAB20]    Collection Time: 09/19/19  5:52 AM   Result Value Ref Range    Bilirubin Direct 0.3 (H) 0.0 - 0.2 mg/dL    Bilirubin Total 0.9 0.2 - 1.3 mg/dL    Albumin 3.2 (L) 3.4 - 5.0 g/dL    Protein Total 5.9 (L) 6.8 - 8.8 g/dL    Alkaline Phosphatase 74 40 - 150 U/L    ALT 40 0 - 70 U/L    AST 41 0 - 45 U/L      He did have an ultrasound which again shows the nonocclusive portal vein thrombosis.  It has not yet been read officially.  I do not see any major changes.      IMPRESSION:  My impression is Mr. Jon has well-compensated cirrhosis at this point in time.  His liver function is excellent.  He is getting an endoscopy in about 1 week to follow up on his varices.  His ultrasound does not look particularly alarming.  I have recommended that he see a urologist about his scrotum, at least since he has bleeding from that.  I think it is more appropriate he see a urologist than a dermatologist.      I also did go over with him the fact that he really is a reasonable candidate either for another ablation or a pacemaker from the standpoint of his liver disease, and I will see him back in the clinic in 6 months for repeat imaging and blood work.      Thank you very much for allowing me to participate in the care of this patient.  If you have any questions regarding my recommendations, please do not hesitate to contact me.       Hi Roque MD      Professor of Medicine  [employer]      Executive Medical Director, Solid Organ Transplant Program  [facility]

## 2019-09-19 NOTE — LETTER
[facility]  Anticoagulation Clinic  [address] [place]  [address]      September 19, 2019      Maurice Jon  [address] [place] [address]      Dear Mr. Jon,    Here are your dosing instructions for the next two weeks.     Thank you,    Anticoagulation Clinic Care Team        [place] - [phone]  [place] - [phone]  [place] - [phone]  [place] - [phone]  [facility] [phone]

## 2019-09-19 NOTE — TELEPHONE ENCOUNTER
Maurice is scheduled to have an EGD completed on 9/26/19 and will need to hold warfarin for 5 days.   Patient is currently on warfarin for Paroxysmal AFib, history of portal vein thrombosis (2017).   ** Patient will need to start holding this weekend, please address as soon as possible.  Perioperative Thrombotic Risk Stratification   High risk for clot    (Bridge) Medium risk for clot   (Maybe Bridge) Low risk for clot   (No Bridge)     Artificial Mitral valve    Artificial aortic valve if tilting disc or caged ball    Stroke, TIA within last 6 months    VTE within last 3 months    Severe thrombophilia (protein C or S deficiency, antithrombin, homozygous Factor V Leiden, antiphospholipid antibodies)  AFIB and    [id number] score 7-9    Stroke/TIA within last 3 months    Rheumatic valvular heart disease   Bileaflet aortic valve  Plus  AFib, prior stroke or TIA, HTN, DM, CHF, or over 75 years old    [id number] score 5-6    VTE within past 3 to 12 months    Non-severe thrombophilia (heterozygous factor V Leiden)    Recurrent VTE    Active cancer (or treated within last 6 months)     Bileaflet valve without Afib, no other risk factors    [id number] < 4 (with no history stroke or TIA)    VTE more than 12 months ago     While the patient is off warfarin, would you recommend the patient use enoxaparin injections as a bridge? If yes, would you like the prophylactic dose (40mg daily) or the therapeutic dose (1mg/kg twice daily)? Should the patient use enoxaparin both before and after the procedure or only afterwards?  Wt Readings from Last 2 Encounters:   09/19/19 109.8 kg (242 lb)   07/23/19 108 kg (238 lb)     Estimated Creatinine Clearance: 131.5 mL/min (based on SCr of 0.7 mg/dL).   Therapeutic Enoxaparin if high risk for clot   CrCl >30ml/min, 1mg/kg/twice daily OR 1.5mg/kg/daily   CrCl >15ml/min, 1mg/kg/daily   CrCl <15mg/min or on dialysis use heparin  Prophylactic if need protection after procedure (i.e.  high clot risk procedure)   Anticoagulation Clinic Staff  Phone: [phone]   Fax: [phone]    [place] # [id number]

## 2019-09-26 ENCOUNTER — HOSPITAL ENCOUNTER (OUTPATIENT)
Facility: CLINIC | Age: 59
Discharge: HOME | End: 2019-09-26
Attending: INTERNAL MEDICINE | Admitting: INTERNAL MEDICINE
Payer: COMMERCIAL

## 2019-09-26 VITALS
RESPIRATION RATE: 16 BRPM | OXYGEN SATURATION: 98 % | HEART RATE: 79 BPM | SYSTOLIC BLOOD PRESSURE: 91 MMHG | DIASTOLIC BLOOD PRESSURE: 66 MMHG

## 2019-09-26 DIAGNOSIS — Z79.01 LONG TERM CURRENT USE OF ANTICOAGULANT THERAPY: ICD-10-CM

## 2019-09-26 LAB
INR PPP: 1.31 (ref 0.86–1.14)
UPPER GI ENDOSCOPY: NORMAL

## 2019-09-26 PROCEDURE — G0500 MOD SEDAT ENDO SERVICE >5YRS: HCPCS | Performed by: INTERNAL MEDICINE

## 2019-09-26 PROCEDURE — 43235 EGD DIAGNOSTIC BRUSH WASH: CPT | Performed by: INTERNAL MEDICINE

## 2019-09-26 PROCEDURE — 25000128 H RX IP 250 OP 636: Performed by: INTERNAL MEDICINE

## 2019-09-26 PROCEDURE — 36415 COLL VENOUS BLD VENIPUNCTURE: CPT | Performed by: INTERNAL MEDICINE

## 2019-09-26 PROCEDURE — 85610 PROTHROMBIN TIME: CPT | Performed by: INTERNAL MEDICINE

## 2019-09-26 PROCEDURE — 25000132 ZZH RX MED GY IP 250 OP 250 PS 637: Performed by: INTERNAL MEDICINE

## 2019-09-26 PROCEDURE — 25000125 ZZHC RX 250: Performed by: INTERNAL MEDICINE

## 2019-09-26 RX ORDER — ONDANSETRON 4 MG/1
4 TABLET, ORALLY DISINTEGRATING ORAL EVERY 6 HOURS PRN
Status: DISCONTINUED | OUTPATIENT
Start: 2019-09-26 | End: 2019-09-26 | Stop reason: HOSPADM

## 2019-09-26 RX ORDER — LIDOCAINE 40 MG/G
CREAM TOPICAL
Status: DISCONTINUED | OUTPATIENT
Start: 2019-09-26 | End: 2019-09-26 | Stop reason: HOSPADM

## 2019-09-26 RX ORDER — NALOXONE HYDROCHLORIDE 0.4 MG/ML
.1-.4 INJECTION, SOLUTION INTRAMUSCULAR; INTRAVENOUS; SUBCUTANEOUS
Status: DISCONTINUED | OUTPATIENT
Start: 2019-09-26 | End: 2019-09-26 | Stop reason: HOSPADM

## 2019-09-26 RX ORDER — FENTANYL CITRATE 50 UG/ML
INJECTION, SOLUTION INTRAMUSCULAR; INTRAVENOUS PRN
Status: DISCONTINUED | OUTPATIENT
Start: 2019-09-26 | End: 2019-09-26 | Stop reason: HOSPADM

## 2019-09-26 RX ORDER — FLUMAZENIL 0.1 MG/ML
0.2 INJECTION, SOLUTION INTRAVENOUS
Status: DISCONTINUED | OUTPATIENT
Start: 2019-09-26 | End: 2019-09-26 | Stop reason: HOSPADM

## 2019-09-26 RX ORDER — ONDANSETRON 2 MG/ML
4 INJECTION INTRAMUSCULAR; INTRAVENOUS EVERY 6 HOURS PRN
Status: DISCONTINUED | OUTPATIENT
Start: 2019-09-26 | End: 2019-09-26 | Stop reason: HOSPADM

## 2019-09-26 RX ORDER — ONDANSETRON 2 MG/ML
4 INJECTION INTRAMUSCULAR; INTRAVENOUS
Status: DISCONTINUED | OUTPATIENT
Start: 2019-09-26 | End: 2019-09-26 | Stop reason: HOSPADM

## 2019-09-26 RX ORDER — SIMETHICONE
LIQUID (ML) MISCELLANEOUS PRN
Status: DISCONTINUED | OUTPATIENT
Start: 2019-09-26 | End: 2019-09-26 | Stop reason: HOSPADM

## 2019-09-26 NOTE — DISCHARGE INSTRUCTIONS
Discharge Instructions after  Upper Endoscopy (EGD)    Activity and Diet  You were given medicine for pain. You may be dizzy or sleepy.  For 24 hours:    Do not drive or use heavy equipment.    Do not make important decisions.    Do not drink any alcohol.  _X__ You may return to your regular diet.    Discomfort  You may have a sore throat for 2 to 3 days. It may help to:    Avoid hot liquids for 24 hours.    Use sore throat lozenges.    Gargle as needed with salt water up to 4 times a day. Mix 1 cup of warm water  with 1 teaspoon of salt. Do not swallow.  You may take Tylenol (acetaminophen) for pain unless your doctor has told you not to.    Do not take aspirin or ibuprofen (Advil, Motrin) or other NSAIDS  (anti-inflammatory drugs) for ___ days.    Follow-up  Other instructions__X__Follow up with Dr. Roque as to when to return for another upper endoscopy.    When to call us:  Problems are rare. Call right away if you have:    Unusual throat pain or trouble swallowing    Unusual pain in belly or chest that is not relieved by belching or passing air    Black stools (tar-like looking bowel movement)    Temperature above 100.6  F. (37.5  C).    If you vomit blood or have severe pain, go to an emergency room.    If you have questions, call:  Monday to Friday, 7 a.m. to 4:30 p.m.: Endoscopy: [phone] (We may have to call you back)    After hours: Hospital: [phone] (Ask for the GI fellow on call)

## 2019-10-02 ENCOUNTER — HEALTH MAINTENANCE LETTER (OUTPATIENT)
Age: 59
End: 2019-10-02

## 2019-10-04 ENCOUNTER — ANTICOAGULATION THERAPY VISIT (OUTPATIENT)
Dept: ANTICOAGULATION | Facility: CLINIC | Age: 59
End: 2019-10-04
Payer: COMMERCIAL

## 2019-10-04 DIAGNOSIS — Z79.01 LONG TERM CURRENT USE OF ANTICOAGULANT THERAPY: ICD-10-CM

## 2019-10-04 DIAGNOSIS — I48.0 PAROXYSMAL ATRIAL FIBRILLATION (H): ICD-10-CM

## 2019-10-04 LAB — INR POINT OF CARE: 1.6 (ref 0.86–1.14)

## 2019-10-04 PROCEDURE — 99207 ZZC NO CHARGE NURSE ONLY: CPT

## 2019-10-04 PROCEDURE — 36416 COLLJ CAPILLARY BLOOD SPEC: CPT

## 2019-10-04 PROCEDURE — 85610 PROTHROMBIN TIME: CPT | Mod: QW

## 2019-10-04 NOTE — PROGRESS NOTES
ANTICOAGULATION FOLLOW-UP CLINIC VISIT    Patient Name:  Maurice Jon  Date:  10/4/2019  Contact Type:  Face to Face    SUBJECTIVE:  Patient Findings     Positives:   Extra doses (patient took increased dosing following a hold)    Comments:   Patient reports he followed the dosing calendar and took his dosing as directed by [facility]. Patient's INR remains below goal range. Patient's INRs have previously been stable at his current maintenance dose. Will increase dose today only, then resume maintenance dose and recheck INR at the lab in 10 days. Patient is unable to get his lab drawn until 3:30 PM, he is aware that [facility] will call him the following day with dosing instructions and patient is to continue his maintenance dose as usual. Patient denies signs or symptoms of bleeding. Patient denies any signs or symptoms of a blood clot. Writer educated patient regarding increased clot risk and when to seek immediate medical attention. Patient verbalized understanding.          Clinical Outcomes     Comments:   Patient reports he followed the dosing calendar and took his dosing as directed by [facility]. Patient's INR remains below goal range. Patient's INRs have previously been stable at his current maintenance dose. Will increase dose today only, then resume maintenance dose and recheck INR at the lab in 10 days. Patient is unable to get his lab drawn until 3:30 PM, he is aware that [facility] will call him the following day with dosing instructions and patient is to continue his maintenance dose as usual. Patient denies signs or symptoms of bleeding. Patient denies any signs or symptoms of a blood clot. Writer educated patient regarding increased clot risk and when to seek immediate medical attention. Patient verbalized understanding.             OBJECTIVE    INR Protime   Date Value Ref Range Status   10/04/2019 1.6 (A) 0.86 - 1.14 Final       ASSESSMENT / PLAN  INR assessment SUB    Recheck INR In: 10 DAYS    INR Location Clinic       Anticoagulation Summary  As of 10/4/2019    INR goal:   2.0-3.0   TTR:   91.4 % (1 y)   INR used for dosin.6! (10/4/2019)   Warfarin maintenance plan:   1.25 mg (2.5 mg x 0.5) every Fri; 2.5 mg (2.5 mg x 1) all other days   Full warfarin instructions:   10/4: 2.5 mg; Otherwise 1.25 mg every Fri; 2.5 mg all other days   Weekly warfarin total:   16.25 mg   Plan last modified:   Susan Beyer RN (2018)   Next INR check:   10/15/2019   Priority:   INR   Target end date:   10/4/2018    Indications    Paroxysmal atrial fibrillation (H) [I48.0]  Atrial fibrillation with rapid ventricular response (H) (Resolved) [I48.91]  Long-term (current) use of anticoagulants [Z79.01] [Z79.01]             Anticoagulation Episode Summary     INR check location:       Preferred lab:       Send INR reminders to:   [facility]    Comments:   * anticoagulation short period surrounding ablation on 18. Cardiology to decide when to stop warfarin. has well-compensated cirrhosis      Anticoagulation Care Providers     Provider Role Specialty Phone number    Alon Pineda MD [facility] [phone]            See the Encounter Report to view Anticoagulation Flowsheet and Dosing Calendar (Go to Encounters tab in chart review, and find the Anticoagulation Therapy Visit)      Christina Singer RN

## 2019-10-06 ENCOUNTER — HOSPITAL ENCOUNTER (INPATIENT)
Facility: CLINIC | Age: 59
LOS: 6 days | Discharge: HOME | DRG: 872 | End: 2019-10-12
Attending: EMERGENCY MEDICINE | Admitting: FAMILY MEDICINE
Payer: COMMERCIAL

## 2019-10-06 DIAGNOSIS — L03.116 CELLULITIS OF LEFT LOWER EXTREMITY: ICD-10-CM

## 2019-10-06 PROBLEM — L03.90 CELLULITIS: Status: ACTIVE | Noted: 2019-10-06

## 2019-10-06 LAB
ALBUMIN SERPL-MCNC: 3.7 G/DL (ref 3.4–5)
ALP SERPL-CCNC: 73 U/L (ref 40–150)
ALT SERPL W P-5'-P-CCNC: 46 U/L (ref 0–70)
ANION GAP SERPL CALCULATED.3IONS-SCNC: 10 MMOL/L (ref 3–14)
AST SERPL W P-5'-P-CCNC: 61 U/L (ref 0–45)
BASOPHILS # BLD AUTO: 0 10E9/L (ref 0–0.2)
BASOPHILS NFR BLD AUTO: 0.2 %
BILIRUB SERPL-MCNC: 1.4 MG/DL (ref 0.2–1.3)
BUN SERPL-MCNC: 19 MG/DL (ref 7–30)
CALCIUM SERPL-MCNC: 8.8 MG/DL (ref 8.5–10.1)
CHLORIDE SERPL-SCNC: 105 MMOL/L (ref 94–109)
CO2 SERPL-SCNC: 26 MMOL/L (ref 20–32)
CREAT SERPL-MCNC: 0.74 MG/DL (ref 0.66–1.25)
DIFFERENTIAL METHOD BLD: ABNORMAL
EOSINOPHIL # BLD AUTO: 0.1 10E9/L (ref 0–0.7)
EOSINOPHIL NFR BLD AUTO: 2.5 %
ERYTHROCYTE [DISTWIDTH] IN BLOOD BY AUTOMATED COUNT: 14.6 % (ref 10–15)
GFR SERPL CREATININE-BSD FRML MDRD: >90 ML/MIN/{1.73_M2}
GLUCOSE SERPL-MCNC: 99 MG/DL (ref 70–99)
HCT VFR BLD AUTO: 36.1 % (ref 40–53)
HGB BLD-MCNC: 11.6 G/DL (ref 13.3–17.7)
IMM GRANULOCYTES # BLD: 0 10E9/L (ref 0–0.4)
IMM GRANULOCYTES NFR BLD: 0.4 %
INR PPP: 1.65 (ref 0.86–1.14)
LACTATE BLD-SCNC: 2.5 MMOL/L (ref 0.7–2)
LACTATE BLD-SCNC: 2.6 MMOL/L (ref 0.7–2)
LYMPHOCYTES # BLD AUTO: 0.3 10E9/L (ref 0.8–5.3)
LYMPHOCYTES NFR BLD AUTO: 5.8 %
MCH RBC QN AUTO: 30.3 PG (ref 26.5–33)
MCHC RBC AUTO-ENTMCNC: 32.1 G/DL (ref 31.5–36.5)
MCV RBC AUTO: 94 FL (ref 78–100)
MONOCYTES # BLD AUTO: 0.5 10E9/L (ref 0–1.3)
MONOCYTES NFR BLD AUTO: 10.2 %
MRSA DNA SPEC QL NAA+PROBE: NEGATIVE
NEUTROPHILS # BLD AUTO: 4.2 10E9/L (ref 1.6–8.3)
NEUTROPHILS NFR BLD AUTO: 80.9 %
NRBC # BLD AUTO: 0 10*3/UL
NRBC BLD AUTO-RTO: 0 /100
PLATELET # BLD AUTO: 74 10E9/L (ref 150–450)
POTASSIUM SERPL-SCNC: 3.7 MMOL/L (ref 3.4–5.3)
PROT SERPL-MCNC: 6.6 G/DL (ref 6.8–8.8)
RBC # BLD AUTO: 3.83 10E12/L (ref 4.4–5.9)
SODIUM SERPL-SCNC: 141 MMOL/L (ref 133–144)
SPECIMEN SOURCE: NORMAL
WBC # BLD AUTO: 5.2 10E9/L (ref 4–11)

## 2019-10-06 PROCEDURE — 83605 ASSAY OF LACTIC ACID: CPT | Performed by: EMERGENCY MEDICINE

## 2019-10-06 PROCEDURE — 99285 EMERGENCY DEPT VISIT HI MDM: CPT | Mod: 25

## 2019-10-06 PROCEDURE — 25000132 ZZH RX MED GY IP 250 OP 250 PS 637: Performed by: FAMILY MEDICINE

## 2019-10-06 PROCEDURE — 25800030 ZZH RX IP 258 OP 636: Performed by: FAMILY MEDICINE

## 2019-10-06 PROCEDURE — 99223 1ST HOSP IP/OBS HIGH 75: CPT | Mod: AI | Performed by: FAMILY MEDICINE

## 2019-10-06 PROCEDURE — 85025 COMPLETE CBC W/AUTO DIFF WBC: CPT | Performed by: EMERGENCY MEDICINE

## 2019-10-06 PROCEDURE — 87641 MR-STAPH DNA AMP PROBE: CPT | Performed by: FAMILY MEDICINE

## 2019-10-06 PROCEDURE — 99285 EMERGENCY DEPT VISIT HI MDM: CPT | Mod: 25 | Performed by: EMERGENCY MEDICINE

## 2019-10-06 PROCEDURE — 25000131 ZZH RX MED GY IP 250 OP 636 PS 637: Performed by: FAMILY MEDICINE

## 2019-10-06 PROCEDURE — 96375 TX/PRO/DX INJ NEW DRUG ADDON: CPT

## 2019-10-06 PROCEDURE — 12000000 ZZH R&B MED SURG/OB

## 2019-10-06 PROCEDURE — 25800030 ZZH RX IP 258 OP 636: Performed by: EMERGENCY MEDICINE

## 2019-10-06 PROCEDURE — 25000128 H RX IP 250 OP 636: Performed by: EMERGENCY MEDICINE

## 2019-10-06 PROCEDURE — 25000128 H RX IP 250 OP 636: Performed by: FAMILY MEDICINE

## 2019-10-06 PROCEDURE — 80053 COMPREHEN METABOLIC PANEL: CPT | Performed by: EMERGENCY MEDICINE

## 2019-10-06 PROCEDURE — 96374 THER/PROPH/DIAG INJ IV PUSH: CPT

## 2019-10-06 PROCEDURE — 96361 HYDRATE IV INFUSION ADD-ON: CPT

## 2019-10-06 PROCEDURE — 87040 BLOOD CULTURE FOR BACTERIA: CPT | Performed by: EMERGENCY MEDICINE

## 2019-10-06 PROCEDURE — 40000274 ZZH STATISTIC RCP CONSULT EA 30 MIN

## 2019-10-06 PROCEDURE — 25000125 ZZHC RX 250: Performed by: EMERGENCY MEDICINE

## 2019-10-06 PROCEDURE — 25000132 ZZH RX MED GY IP 250 OP 250 PS 637: Performed by: EMERGENCY MEDICINE

## 2019-10-06 PROCEDURE — 36415 COLL VENOUS BLD VENIPUNCTURE: CPT | Performed by: EMERGENCY MEDICINE

## 2019-10-06 PROCEDURE — 85610 PROTHROMBIN TIME: CPT | Performed by: EMERGENCY MEDICINE

## 2019-10-06 PROCEDURE — 87640 STAPH A DNA AMP PROBE: CPT | Performed by: FAMILY MEDICINE

## 2019-10-06 RX ORDER — METOPROLOL SUCCINATE 100 MG/1
100 TABLET, EXTENDED RELEASE ORAL AT BEDTIME
Status: DISCONTINUED | OUTPATIENT
Start: 2019-10-06 | End: 2019-10-12 | Stop reason: HOSPADM

## 2019-10-06 RX ORDER — METOPROLOL TARTRATE 1 MG/ML
5 INJECTION, SOLUTION INTRAVENOUS EVERY 5 MIN PRN
Status: DISCONTINUED | OUTPATIENT
Start: 2019-10-06 | End: 2019-10-06

## 2019-10-06 RX ORDER — HYDROMORPHONE HYDROCHLORIDE 1 MG/ML
0.2 INJECTION, SOLUTION INTRAMUSCULAR; INTRAVENOUS; SUBCUTANEOUS
Status: DISCONTINUED | OUTPATIENT
Start: 2019-10-06 | End: 2019-10-12 | Stop reason: HOSPADM

## 2019-10-06 RX ORDER — ACETAMINOPHEN 325 MG/1
650 TABLET ORAL EVERY 4 HOURS PRN
Status: DISCONTINUED | OUTPATIENT
Start: 2019-10-06 | End: 2019-10-12 | Stop reason: HOSPADM

## 2019-10-06 RX ORDER — PANTOPRAZOLE SODIUM 20 MG/1
40 TABLET, DELAYED RELEASE ORAL DAILY
Status: DISCONTINUED | OUTPATIENT
Start: 2019-10-07 | End: 2019-10-12 | Stop reason: HOSPADM

## 2019-10-06 RX ORDER — ACETAMINOPHEN 500 MG
1000 TABLET ORAL ONCE
Status: COMPLETED | OUTPATIENT
Start: 2019-10-06 | End: 2019-10-06

## 2019-10-06 RX ORDER — ACETAMINOPHEN 650 MG/1
650 SUPPOSITORY RECTAL EVERY 4 HOURS PRN
Status: DISCONTINUED | OUTPATIENT
Start: 2019-10-06 | End: 2019-10-12 | Stop reason: HOSPADM

## 2019-10-06 RX ORDER — FUROSEMIDE 40 MG
40 TABLET ORAL 2 TIMES DAILY
Status: DISCONTINUED | OUTPATIENT
Start: 2019-10-06 | End: 2019-10-08

## 2019-10-06 RX ORDER — NALOXONE HYDROCHLORIDE 0.4 MG/ML
.1-.4 INJECTION, SOLUTION INTRAMUSCULAR; INTRAVENOUS; SUBCUTANEOUS
Status: DISCONTINUED | OUTPATIENT
Start: 2019-10-06 | End: 2019-10-12 | Stop reason: HOSPADM

## 2019-10-06 RX ORDER — SPIRONOLACTONE 25 MG/1
25 TABLET ORAL DAILY
Status: DISCONTINUED | OUTPATIENT
Start: 2019-10-07 | End: 2019-10-12 | Stop reason: HOSPADM

## 2019-10-06 RX ORDER — ALBUTEROL SULFATE 0.83 MG/ML
2.5 SOLUTION RESPIRATORY (INHALATION)
Status: DISCONTINUED | OUTPATIENT
Start: 2019-10-06 | End: 2019-10-12 | Stop reason: HOSPADM

## 2019-10-06 RX ORDER — ONDANSETRON 4 MG/1
4 TABLET, ORALLY DISINTEGRATING ORAL EVERY 6 HOURS PRN
Status: DISCONTINUED | OUTPATIENT
Start: 2019-10-06 | End: 2019-10-12 | Stop reason: HOSPADM

## 2019-10-06 RX ORDER — WARFARIN SODIUM 5 MG/1
5 TABLET ORAL
Status: COMPLETED | OUTPATIENT
Start: 2019-10-06 | End: 2019-10-06

## 2019-10-06 RX ORDER — CEFAZOLIN SODIUM 2 G/100ML
2 INJECTION, SOLUTION INTRAVENOUS ONCE
Status: COMPLETED | OUTPATIENT
Start: 2019-10-06 | End: 2019-10-06

## 2019-10-06 RX ORDER — METOPROLOL SUCCINATE 25 MG/1
25 TABLET, EXTENDED RELEASE ORAL EVERY MORNING
Status: DISCONTINUED | OUTPATIENT
Start: 2019-10-07 | End: 2019-10-12 | Stop reason: HOSPADM

## 2019-10-06 RX ORDER — CEFAZOLIN SODIUM 1 G/50ML
2000 SOLUTION INTRAVENOUS EVERY 12 HOURS
Status: DISCONTINUED | OUTPATIENT
Start: 2019-10-06 | End: 2019-10-11

## 2019-10-06 RX ORDER — ONDANSETRON 2 MG/ML
4 INJECTION INTRAMUSCULAR; INTRAVENOUS EVERY 6 HOURS PRN
Status: DISCONTINUED | OUTPATIENT
Start: 2019-10-06 | End: 2019-10-12 | Stop reason: HOSPADM

## 2019-10-06 RX ORDER — LIDOCAINE 40 MG/G
CREAM TOPICAL
Status: DISCONTINUED | OUTPATIENT
Start: 2019-10-06 | End: 2019-10-08

## 2019-10-06 RX ORDER — METOPROLOL TARTRATE 1 MG/ML
5 INJECTION, SOLUTION INTRAVENOUS EVERY 4 HOURS PRN
Status: DISCONTINUED | OUTPATIENT
Start: 2019-10-06 | End: 2019-10-12 | Stop reason: HOSPADM

## 2019-10-06 RX ORDER — LIDOCAINE 40 MG/G
CREAM TOPICAL
Status: DISCONTINUED | OUTPATIENT
Start: 2019-10-06 | End: 2019-10-12 | Stop reason: HOSPADM

## 2019-10-06 RX ORDER — CHOLECALCIFEROL (VITAMIN D3) 50 MCG
1 TABLET ORAL DAILY
COMMUNITY
Start: 2019-10-03

## 2019-10-06 RX ORDER — ACETAMINOPHEN 325 MG/1
325 TABLET ORAL EVERY 4 HOURS PRN
Status: DISCONTINUED | OUTPATIENT
Start: 2019-10-06 | End: 2019-10-06

## 2019-10-06 RX ADMIN — SODIUM CHLORIDE, POTASSIUM CHLORIDE, SODIUM LACTATE AND CALCIUM CHLORIDE 1000 ML: 600; 310; 30; 20 INJECTION, SOLUTION INTRAVENOUS at 09:50

## 2019-10-06 RX ADMIN — FUROSEMIDE 40 MG: 40 TABLET ORAL at 15:36

## 2019-10-06 RX ADMIN — METOPROLOL TARTRATE 5 MG: 5 INJECTION INTRAVENOUS at 09:50

## 2019-10-06 RX ADMIN — HYDROMORPHONE HYDROCHLORIDE 0.2 MG: 1 INJECTION, SOLUTION INTRAMUSCULAR; INTRAVENOUS; SUBCUTANEOUS at 15:37

## 2019-10-06 RX ADMIN — ACETAMINOPHEN 650 MG: 325 TABLET, FILM COATED ORAL at 15:40

## 2019-10-06 RX ADMIN — SODIUM CHLORIDE 1000 ML: 9 INJECTION, SOLUTION INTRAVENOUS at 11:10

## 2019-10-06 RX ADMIN — METOPROLOL SUCCINATE 100 MG: 100 TABLET, EXTENDED RELEASE ORAL at 23:00

## 2019-10-06 RX ADMIN — Medication 2 TABLET: at 15:35

## 2019-10-06 RX ADMIN — CEFAZOLIN SODIUM 2 G: 2 INJECTION, SOLUTION INTRAVENOUS at 10:13

## 2019-10-06 RX ADMIN — ONDANSETRON 4 MG: 4 TABLET, ORALLY DISINTEGRATING ORAL at 15:34

## 2019-10-06 RX ADMIN — ACETAMINOPHEN 1000 MG: 500 TABLET, FILM COATED ORAL at 09:50

## 2019-10-06 RX ADMIN — VANCOMYCIN HYDROCHLORIDE 2000 MG: 10 INJECTION, POWDER, LYOPHILIZED, FOR SOLUTION INTRAVENOUS at 15:42

## 2019-10-06 RX ADMIN — FLUTICASONE FUROATE AND VILANTEROL TRIFENATATE 1 PUFF: 100; 25 POWDER RESPIRATORY (INHALATION) at 15:47

## 2019-10-06 RX ADMIN — WARFARIN SODIUM 5 MG: 5 TABLET ORAL at 17:23

## 2019-10-06 ASSESSMENT — ACTIVITIES OF DAILY LIVING (ADL)
ADLS_ACUITY_SCORE: 10
ADLS_ACUITY_SCORE: 11
ADLS_ACUITY_SCORE: 10

## 2019-10-06 ASSESSMENT — MIFFLIN-ST. JEOR: SCORE: 1835.51

## 2019-10-06 NOTE — ED PROVIDER NOTES
"  History     Chief Complaint   Patient presents with     Cellulitis     L calf redness, pain and swelling, hx of cellulitis in legs     HPI  Maurice Jon is a 58 year old male with history of portal vein thrombosis, liver cirrhosis, portal hypertension, gastroesophageal reflux, paroxysmal atrial fibrillation, severe sepsis, coronary artery disease, and right heart failure who presents to the emergency department with left calf erythema. Patient reports he felt okay when he went to work this morning at 5:30AM. He notes he started to get \"clammy\" and leg started to get red at approximately 6:30AM. Patient reports he takes 100mg of metoprolol (last use this morning) and an anticoagulant (Jantoven). He also takes medication for acid reflux. Patient denies nausea and diarrhea. Patient also denies past clots in legs. Patient notes he wears compression socks.  Patient notes he was seen by Dr. Roque at the [facility] and was told platelet count is low at 35. He recalls last episode of cellulitis in July 2015.  Patient believes last INR was 1.6. Patient is allergic to avelox, moxifloxacin, and sotalol.     Allergies:  Allergies   Allergen Reactions     Avelox Other (See Comments) and GI Disturbance     portal hypertension     Moxifloxacin Nausea and Vomiting, Nausea and Other (See Comments)     portal hypertension     Sotalol Itching       Problem List:    Patient Active Problem List    Diagnosis Date Noted     Health Care Home 07/01/2011     Priority: High     01/07/2014  Status:  Declined  Care Coordinator:  Salena Joel    See Letters for [facility] Care Plan (emergency care plan only)             Portal hypertension (H) 01/28/2010     Priority: High     Liver Cirrhosis 2nd ot Fatty liver, w Ascites 08/22/2005     Priority: High     Cirrhosis, idiopathic  Problem list name updated by automated process. Provider to review       Cellulitis 10/06/2019     Priority: Medium     Acute combined systolic and " diastolic (congestive) hrt fail (H) 01/04/2019     Priority: Medium     CAD (coronary artery disease)      Priority: Medium     Cath 12/26/18- mild nonobstructive disease       Cardiomyopathy (H)      Priority: Medium     12/23/18 Echo- EF 25-30%       Pericarditis      Priority: Medium     Acute on chronic systolic congestive heart failure (H)      Priority: Medium     Obesity (BMI 35.0-39.9) with comorbidity (H) 12/28/2018     Priority: Medium     Right heart failure (H) 12/24/2018     Priority: Medium     Chronic a-fib, S/P Ablation 12/22/18 -- on Warfarin 12/21/2018     Priority: Medium     Encounter for monitoring sotalol therapy 10/31/2018     Priority: Medium     Long-term (current) use of anticoagulants [Z79.01] 09/18/2018     Priority: Medium     Portal vein thrombosis 02/02/2017     Priority: Medium     History of MRSA now culture negative 08/06/2015     Priority: Medium     Severe sepsis (H) 07/13/2015     Priority: Medium     Problem list name updated by automated process. Provider to review       Paroxysmal atrial fibrillation (H)      Priority: Medium     S/p cardioversion       Edema 05/13/2013     Priority: Medium     CARDIOVASCULAR SCREENING; LDL GOAL LESS THAN 160 10/31/2010     Priority: Medium     GERD (gastroesophageal reflux disease) 03/25/2010     Priority: Medium     Eczema 01/28/2010     Priority: Medium     BRUCE-Mild (AHI 9; REM RDI 31) 03/20/2009     Priority: Medium     Sleep study 3/09- .0 minutes, latency 3.6 minutes. REM latency 72.0 minutes. Sleep efficiency 87.7%. The sleep architecture was disrupted with frequent sleep stage changes and arousals. Snoring: mild to loud.  RDI 27.7, AHI of 8.4. REM RDI 31.5 TLO2 saturation 83.0%. This study is suggestive of mild sleep apnea, severe during REM sleep (20% TST). Other:  PLM index was 0.0.       Compulsive behaviors 08/26/2008     Priority: Medium     erectile dysfunction 08/22/2005     Priority: Medium     Obesity 11/24/2010      Priority: Low     Thrombocytopenia (H) 08/22/2005     Priority: Low     Thrombocytopenia associated with cirrhosis and portal hypertension  Problem list name updated by automated process. Provider to review          Past Medical History:    Past Medical History:   Diagnosis Date     A-fib (H)      Acute pulmonary edema (H)      Atrial fibrillation (H)      Atrial fibrillation with rapid ventricular response (H) 5/13/2013     CAD (coronary artery disease)      Calculus of ureter 1993, 1997     Cardiomyopathy (H)      Chronic systolic CHF (congestive heart failure) (H)      Cirrhosis of liver without mention of alcohol 8/22/2005     Edema 5/13/2013     Esophageal varices with bleeding(456.0)      Gallstones      Genital herpes, unspecified 3/20/1999     GERD (gastroesophageal reflux disease)      Hematuria      Hypertension      Hypochondriasis      Obesity 11/24/2010     BRUCE (obstructive sleep apnea) 03/17/2009     Pericarditis      Portal hypertension (H) 1/28/2010     Unspecified thrombocytopenia 8/22/2005       Past Surgical History:    Past Surgical History:   Procedure Laterality Date     ANESTHESIA CARDIOVERSION N/A 1/19/2015    Procedure: ANESTHESIA CARDIOVERSION;  Surgeon: Generic Anesthesia Provider;  Location: [facility] OR     ANESTHESIA CARDIOVERSION N/A 2/6/2019    Procedure: ANESTHESIA CARDIOVERSION;  Surgeon: GENERIC ANESTHESIA PROVIDER;  Location:  OR     ANESTHESIA CARDIOVERSION N/A 7/5/2019    Procedure: ANESTHESIA FOR CARDIOVERSION  DR. MURGUIA);  Surgeon: GENERIC ANESTHESIA PROVIDER;  Location:  OR     COLONOSCOPY N/A 8/14/2017    Procedure: COLONOSCOPY;  Colonoscopy Called will arrive appx 12:30 Per phone call;  Surgeon: Vincent Whittington MD;  Location:  GI     CV HEART CATHETERIZATION WITH POSSIBLE INTERVENTION N/A 12/26/2018    Procedure: Heart Catheterization with possible Intervention;  Surgeon: Brandon Arroyo MD;  Location:  HEART CARDIAC CATH LAB     EP ABLATION FOCAL AFIB N/A 12/21/2018     Procedure: EP Ablation Focal AFIB;  Surgeon: Kristofer Evans MD;  Location: [facility] CARDIAC CATH LAB     ESOPHAGOSCOPY, GASTROSCOPY, DUODENOSCOPY (EGD), COMBINED  7/11/2011    Procedure:COMBINED ESOPHAGOSCOPY, GASTROSCOPY, DUODENOSCOPY (EGD); Surgeon:LAURA LAMAS; Location:[facility] GI     ESOPHAGOSCOPY, GASTROSCOPY, DUODENOSCOPY (EGD), COMBINED  9/10/2012    Procedure: COMBINED ESOPHAGOSCOPY, GASTROSCOPY, DUODENOSCOPY (EGD);;  Surgeon: Hi Roque MD;  Location:  GI     ESOPHAGOSCOPY, GASTROSCOPY, DUODENOSCOPY (EGD), COMBINED  9/9/2013    Procedure: COMBINED ESOPHAGOSCOPY, GASTROSCOPY, DUODENOSCOPY (EGD);;  Surgeon: Hi Roque MD;  Location:  GI     ESOPHAGOSCOPY, GASTROSCOPY, DUODENOSCOPY (EGD), COMBINED N/A 9/15/2014    Procedure: COMBINED ESOPHAGOSCOPY, GASTROSCOPY, DUODENOSCOPY (EGD);  Surgeon: Hi Roque MD;  Location:  GI     ESOPHAGOSCOPY, GASTROSCOPY, DUODENOSCOPY (EGD), COMBINED N/A 10/30/2015    Procedure: COMBINED ESOPHAGOSCOPY, GASTROSCOPY, DUODENOSCOPY (EGD);  Surgeon: Feliberto Fox MD;  Location:  GI     ESOPHAGOSCOPY, GASTROSCOPY, DUODENOSCOPY (EGD), COMBINED N/A 10/10/2016    Procedure: COMBINED ESOPHAGOSCOPY, GASTROSCOPY, DUODENOSCOPY (EGD);  Surgeon: Jose Nesbitt MD;  Location:  GI     ESOPHAGOSCOPY, GASTROSCOPY, DUODENOSCOPY (EGD), COMBINED N/A 9/26/2019    Procedure: ESOPHAGOGASTRODUODENOSCOPY (EGD);  Surgeon: Timo Soares MD;  Location:  GI     H ABLATION FOCAL AFIB  12/21/2018     HERNIA REPAIR       IRRIGATION AND DEBRIDEMENT ABSCESS SCROTUM, COMBINED  10/4/2012    Procedure: COMBINED IRRIGATION AND DEBRIDEMENT ABSCESS SCROTUM;  Irrigation and Debridement of Groin Abscess;  Surgeon: Benny Shoemaker MD;  Location: [facility] OR     SOFT TISSUE SURGERY       SURGICAL HISTORY OF -   1993    rt elbow     SURGICAL HISTORY OF -   1/15/98    repair of ventral and umbilical hernia     SURGICAL HISTORY OF -   2001    endoscopy       Family History:  "   Family History   Problem Relation Age of Onset     Lipids Mother      Hypertension Mother      Eye Disorder Mother      Heart Disease Father         CHF     Lipids Father      Obesity Father      Heart Disease Brother         MI     Eye Disorder Brother      Hypertension Brother         portal HTN     Thrombosis Sister         in her lung     Cancer Other         maternal grandparent/throat cancer       Social History:  Marital Status:   [2]  Social History     Tobacco Use     Smoking status: Former Smoker     Packs/day: 0.80     Years: 6.00     Pack years: 4.80     Types: Cigarettes     Last attempt to quit: 2002     Years since quittin.7     Smokeless tobacco: Never Used   Substance Use Topics     Alcohol use: No     Alcohol/week: 0.0 standard drinks     Comment: quit in      Drug use: No        Medications:    No current outpatient medications on file.        Review of Systems  All other systems are reviewed and are negative.    Physical Exam   BP: (attempted X 3)  Pulse: 145  Heart Rate: 130  Temp: 100.5  F (38.1  C)  Resp: 24  Height: 165.1 cm (5' 5\")  Weight: 112.5 kg (248 lb)  SpO2: 97 %      Physical Exam  Nontoxic appearing no respiratory distress alert and oriented ×3  Head atraumatic normocephalic  Neck supple full active painless range of motion  Lungs clear to auscultation  Heart regular no murmur  Abdomen soft nontender bowel sounds positive no masses or HSM  Strength and sensation grossly intact throughout the extremities,  Speech is fluent, good eye contact, thought processes are rational  Lower extremitie significant for erythema, warmth, tenderness left medial calf no fluctuance  Pedal pulses symmetrical and strong    ED Course        Procedures               Critical Care time:  none       The patient has signs of Severe Sepsis  as evidenced by:    1. 2 SIRS criteria, AND  2. Suspected infection, AND   3. Organ dysfunction: Lactic Acid > 2.0    Time severe sepsis diagnosis " confirmed:  as this was the time when Lactate resulted, and the level was > 2.0    3 Hour Severe Sepsis Bundle Completion:  1. Initial Lactic Acid Result:   Recent Labs   Lab Test 10/06/19  1307 07/13/15  2345 07/13/15  1757   LACT 2.5* 1.5 1.5     2. Blood Cultures before Antibiotics: Yes  3. Broad Spectrum Antibiotics Administered:  yes       Anti-infectives (From admission through now)    Start     Dose/Rate Route Frequency Ordered Stop    10/06/19 1000  ceFAZolin (ANCEF) intermittent infusion 2 g in 100 mL dextrose PRE-MIX      2 g  200 mL/hr over 30 Minutes Intravenous ONCE 10/06/19 0959 10/06/19 1043          4. Volume of IV Fluid administered in ED:        REMINDER: Please use septic shock SmartPhrase for Lactate > 4 or a patient  requiring vasopressors after initial fluid bolus (meaning persistent hypotension)      If one the following conditions is present, a 30cc/kg bolus is recommended as part of the 6 hour bundle (IBW can be used for BMI >30, or document refusal/contraindication)    1.   initial hypotension  defined as 2 bps < 90 or map < 65 in the 6hrs before or 6hrs  after time zero.    2.  Lactate >4.                     Severe Sepsis reassessment:  1. Repeat Lactic Acid Level: 2.5  2. MAP>65 after initial IVF bolus, will continue to monitor fluid status and vital signs               Results for orders placed or performed during the hospital encounter of 10/06/19 (from the past 24 hour(s))   Comprehensive metabolic panel   Result Value Ref Range    Sodium 141 133 - 144 mmol/L    Potassium 3.7 3.4 - 5.3 mmol/L    Chloride 105 94 - 109 mmol/L    Carbon Dioxide 26 20 - 32 mmol/L    Anion Gap 10 3 - 14 mmol/L    Glucose 99 70 - 99 mg/dL    Urea Nitrogen 19 7 - 30 mg/dL    Creatinine 0.74 0.66 - 1.25 mg/dL    GFR Estimate >90 >60 mL/min/[1.73_m2]    GFR Estimate If Black >90 >60 mL/min/[1.73_m2]    Calcium 8.8 8.5 - 10.1 mg/dL    Bilirubin Total 1.4 (H) 0.2 - 1.3 mg/dL    Albumin 3.7 3.4 - 5.0 g/dL     Protein Total 6.6 (L) 6.8 - 8.8 g/dL    Alkaline Phosphatase 73 40 - 150 U/L    ALT 46 0 - 70 U/L    AST 61 (H) 0 - 45 U/L   CBC with platelets differential   Result Value Ref Range    WBC 5.2 4.0 - 11.0 10e9/L    RBC Count 3.83 (L) 4.4 - 5.9 10e12/L    Hemoglobin 11.6 (L) 13.3 - 17.7 g/dL    Hematocrit 36.1 (L) 40.0 - 53.0 %    MCV 94 78 - 100 fl    MCH 30.3 26.5 - 33.0 pg    MCHC 32.1 31.5 - 36.5 g/dL    RDW 14.6 10.0 - 15.0 %    Platelet Count 74 (L) 150 - 450 10e9/L    Diff Method Automated Method     % Neutrophils 80.9 %    % Lymphocytes 5.8 %    % Monocytes 10.2 %    % Eosinophils 2.5 %    % Basophils 0.2 %    % Immature Granulocytes 0.4 %    Nucleated RBCs 0 0 /100    Absolute Neutrophil 4.2 1.6 - 8.3 10e9/L    Absolute Lymphocytes 0.3 (L) 0.8 - 5.3 10e9/L    Absolute Monocytes 0.5 0.0 - 1.3 10e9/L    Absolute Eosinophils 0.1 0.0 - 0.7 10e9/L    Absolute Basophils 0.0 0.0 - 0.2 10e9/L    Abs Immature Granulocytes 0.0 0 - 0.4 10e9/L    Absolute Nucleated RBC 0.0    Lactate for Sepsis Protocol   Result Value Ref Range    Lactate for Sepsis Protocol 2.6 (H) 0.7 - 2.0 mmol/L   Blood culture   Result Value Ref Range    Specimen Description Blood Right Arm     Special Requests Aerobic and anaerobic bottles received     Culture Micro No growth after 1 hour    INR   Result Value Ref Range    INR 1.65 (H) 0.86 - 1.14   Blood culture   Result Value Ref Range    Specimen Description Blood Right Arm     Special Requests Aerobic and anaerobic bottles received     Culture Micro No growth after 1 hour    Lactic acid whole blood   Result Value Ref Range    Lactic Acid 2.5 (H) 0.7 - 2.0 mmol/L       Medications   lidocaine 1 % 0.1-1 mL (has no administration in time range)   lidocaine (LMX4) cream (has no administration in time range)   sodium chloride (PF) 0.9% PF flush 3 mL (has no administration in time range)   sodium chloride (PF) 0.9% PF flush 3 mL (3 mLs Intracatheter Not Given 10/6/19 4361)   lactated ringers BOLUS  1,000 mL (has no administration in time range)   warfarin ANTICOAGULANT (COUMADIN) tablet 5 mg (has no administration in time range)   fluticasone-vilanterol (BREO ELLIPTA) 100-25 MCG/INH inhaler 1 puff (1 puff Inhalation Given 10/6/19 1547)   calcium carbonate-vitamin D (OSCAL w/D) per tablet 2 tablet (2 tablets Oral Given 10/6/19 1535)   furosemide (LASIX) tablet 40 mg (40 mg Oral Given 10/6/19 1536)   metoprolol succinate ER (TOPROL-XL) 24 hr tablet 100 mg (has no administration in time range)   metoprolol succinate ER (TOPROL-XL) 24 hr tablet 25 mg (has no administration in time range)   pantoprazole (PROTONIX) EC tablet 40 mg (has no administration in time range)   spironolactone (ALDACTONE) tablet 25 mg (has no administration in time range)   vitamin D3 (CHOLECALCIFEROL) 1000 units (25 mcg) tablet 2,000 Units (has no administration in time range)   Warfarin Therapy Reminder (Check START DATE - warfarin may be starting in the FUTURE) (has no administration in time range)   lidocaine 1 % 0.1-1 mL (has no administration in time range)   lidocaine (LMX4) kit (has no administration in time range)   sodium chloride (PF) 0.9% PF flush 3 mL (has no administration in time range)   sodium chloride (PF) 0.9% PF flush 3 mL (3 mLs Intracatheter Not Given 10/6/19 1546)   naloxone (NARCAN) injection 0.1-0.4 mg (has no administration in time range)   melatonin tablet 1 mg (has no administration in time range)   Patient is already receiving anticoagulation with heparin, enoxaparin (LOVENOX), warfarin (COUMADIN)  or other anticoagulant medication (has no administration in time range)   acetaminophen (TYLENOL) Suppository 650 mg (has no administration in time range)   HYDROmorphone (PF) (DILAUDID) injection 0.2 mg (0.2 mg Intravenous Given 10/6/19 1537)   ondansetron (ZOFRAN-ODT) ODT tab 4 mg (4 mg Oral Given 10/6/19 1534)     Or   ondansetron (ZOFRAN) injection 4 mg ( Intravenous See Alternative 10/6/19 0181)   albuterol  (PROVENTIL) neb solution 2.5 mg (has no administration in time range)   metoprolol (LOPRESSOR) injection 5 mg (has no administration in time range)   vancomycin (VANCOCIN) 2,000 mg in sodium chloride 0.9 % 500 mL intermittent infusion (2,000 mg Intravenous Given 10/6/19 1542)   acetaminophen (TYLENOL) tablet 650 mg (650 mg Oral Given 10/6/19 1540)   0.9% sodium chloride BOLUS (1,000 mLs Intravenous New Bag 10/6/19 1110)   acetaminophen (TYLENOL) tablet 1,000 mg (1,000 mg Oral Given 10/6/19 0950)   lactated ringers BOLUS 1,000 mL (0 mLs Intravenous Stopped 10/6/19 1109)   ceFAZolin (ANCEF) intermittent infusion 2 g in 100 mL dextrose PRE-MIX (2 g Intravenous Given 10/6/19 1013)     9:17AM Patient assessed.   Assessments & Plan (with Medical Decision Making)  58-year-old male presents with left calf redness, history of cellulitis, reportedly unresponsive to outpatient therapy in the past requests IV therapy and admission.  Mild elevation of lactate at 2.6, repeated 2.5.  Fluid bolus, Ancef.  Remained stable.  Admit to hospital       I have reviewed the nursing notes.    I have reviewed the findings, diagnosis, plan and need for follow up with the patient.       Current Discharge Medication List          Final diagnoses:   Cellulitis of left lower extremity     This document serves as a record of the services and decisions personally performed and made by Surya Willams MD. It was created on HIS/HER behalf by   Nydia Blanc, a trained medical scribe. The creation of this document is based the provider's statements to the medical scribe.  Nydia Blanc 9:16 AM 10/6/2019    Provider:   The information in this document, created by the medical scribe for me, accurately reflects the services I personally performed and the decisions made by me. I have reviewed and approved this document for accuracy prior to leaving the patient care area.  Surya Willams MD 9:16 AM 10/6/2019    10/6/2019   [facility]  DEPARTMENT     Surya Willams MD  10/06/19 1237

## 2019-10-06 NOTE — PLAN OF CARE
Pt relaxing in bed. IV bolus of LR infused. Labs drawn. Temp 101, BP 85/57  rr 12. O2 sats 98% RA. Wife Delano accompanied pt. Arrived via cart to room. Dr Alfaro is taking over care. Updated MD on pt arrival and status. Questions answered. Will continue to assess and monitor

## 2019-10-06 NOTE — PHARMACY-VANCOMYCIN DOSING SERVICE
Pharmacy Vancomycin Initial Note  Date of Service 2019  Patient's  1960  58 year old, male    Indication: Sepsis and Skin and Soft Tissue Infection    Current estimated CrCl = Estimated Creatinine Clearance: 123.9 mL/min (based on SCr of 0.74 mg/dL).    Creatinine for last 3 days  10/6/2019:  9:23 AM Creatinine 0.74 mg/dL    Recent Vancomycin Level(s) for last 3 days  No results found for requested labs within last 72 hours.      Vancomycin IV Administrations (past 72 hours)      No vancomycin orders with administrations in past 72 hours.                Nephrotoxins and other renal medications (From now, onward)    Start     Dose/Rate Route Frequency Ordered Stop    10/06/19 1600  furosemide (LASIX) tablet 40 mg      40 mg Oral 2 TIMES DAILY 10/06/19 1500      10/06/19 1515  vancomycin (VANCOCIN) 2,000 mg in sodium chloride 0.9 % 500 mL intermittent infusion      2,000 mg  over 2 Hours Intravenous EVERY 12 HOURS 10/06/19 1515            Contrast Orders - past 72 hours (72h ago, onward)    None                Plan:  1.  Start vancomycin  2000 mg IV q12h.   2.  Goal Trough Level: 15-20 mg/L   3.  Pharmacy will check trough levels as appropriate in 1-3 Days.    4. Serum creatinine levels will be ordered daily for the first week of therapy and at least twice weekly for subsequent weeks.    5. [place] method utilized to dose vancomycin therapy: Method 2    Ashley Schoen, [facility]

## 2019-10-06 NOTE — PROGRESS NOTES
Skin affirmation note    Admitting nurse completed full skin assessment, Jose Antonio score and Jose Antonio interventions. This writer agrees with the initial skin assessment findings.

## 2019-10-06 NOTE — PLAN OF CARE
"[facility] [place] [place] ADMISSION NOTE    Patient admitted to room 2407 at approximately 1145 via cart from emergency room. Patient was accompanied by spouse.     Verbal SBAR report received from RN prior to patient arrival.     Patient ambulated to bed with stand-by assist. Patient alert and oriented X 3. Pain is controlled with current analgesics.  Medication(s) being used: acetaminophen. 0-10 Pain Scale: 5. Admission vital signs: Blood pressure 101/56, pulse 122, temperature 101.8  F (38.8  C), temperature source Oral, resp. rate 12, height 1.651 m (5' 5\"), weight 108.9 kg (240 lb), SpO2 97 %. Patient and spouse was oriented to plan of care, call light, bed controls, tv, telephone, bathroom, and visiting hours.     Risk Assessment    The following safety risks were identified during admission: skin. Yellow risk band applied: YES.     Skin Initial Assessment    This writer admitted this patient and completed a full skin assessment and Jose Antonio score in the Adult PCS flowsheet. Appropriate interventions initiated as needed.     Secondary skin check completed by Sandra LYN.    Jose Antonio Risk Assessment  Sensory Perception: 4-->no impairment  Moisture: 4-->rarely moist  Activity: 4-->walks frequently  Mobility: 3-->slightly limited  Nutrition: 3-->adequate  Friction and Shear: 3-->no apparent problem  Jose Antonio Score: 21  Bed Support Surface: Atmos Air mattress  Reassessed using Bed Algorithm: No    Salena Ellison RN      "

## 2019-10-06 NOTE — PHARMACY-ANTICOAGULATION SERVICE
Clinical Pharmacy - Warfarin Dosing Consult     Pharmacy has been consulted to manage this patient s warfarin therapy.  Indication: Atrial Fibrillation  Therapy Goal: INR 2-3  Provider/Team: [facility]  OP Anticoag Clinic: [facility]  Warfarin Prior to Admission: Yes  Warfarin PTA Regimen: 1.25mg F, 2.5mg rest of week  Dose Comments: will give 5mg today for persistent low INR since recheck in [facility] on 10/4    INR   Date Value Ref Range Status   10/06/2019 1.65 (H) 0.86 - 1.14 Final     INR Protime   Date Value Ref Range Status   10/04/2019 1.6 (A) 0.86 - 1.14 Final       Recommend warfarin 5 mg today.  Pharmacy will monitor Maurice Jon daily and order warfarin doses to achieve specified goal.      Please contact pharmacy as soon as possible if the warfarin needs to be held for a procedure or if the warfarin goals change.

## 2019-10-06 NOTE — H&P
Admitted:     10/06/2019      CHIEF COMPLAINT:  Left calf redness.      HISTORY OF PRESENT ILLNESS:  Maurice Jon is a 58-year-old male with history of nonalcoholic cirrhosis, portal vein thrombosis, portal hypertension, GERD, paroxysmal atrial fibrillation on anticoagulation, coronary artery disease and right heart failure.  The patient has had recurrent episodes of cellulitis.  He thinks he has had about 5 of them, they have been in his legs.  He also has a history of MRSA and a wound in the past.  He cannot give me details of this.  He woke up today and went to work.  He started to feel clammy and he developed redness of his left lower leg.      He came to the emergency room and was evaluated by Dr. Surya Willams.  He was found to be febrile with a temperature of 100.5.  His pulse was 145 in atrial fibrillation.  He was thought to have cellulitis.  He was given Ancef.  He was given 2 liters of crystalloid and was also given 5 of metoprolol and ultimately 25 mg of metoprolol tartrate.  He was transferred to the floor.      Since being on the floor.  He has had fever to 101.8, his heart rate has been variably high.  He was given 1 more liter of fluid because of blood pressure down to 85/57.  He has chills.  He feels mildly short of breath.  He really has no leg pain.      In reviewing the chart, I note that he has been in the past treated with vancomycin for cellulitis because of his MRSA history, although it has been converted fairly rapidly to cephalosporin.      PAST MEDICAL HISTORY:   1.  Liver cirrhosis -- nonalcoholic and of unknown etiology, patient reports he has 2 older brothers with the same syndrome that .  He is followed closely at the [facility] by Dr. Roque.  He reports the exact etiology of the liver dysfunction remains unclear.   2.  GERD.   3.  Obstructive sleep apnea.   4.  Atrial fibrillation with previous attempts at ablation and cardioversion with persistent atrial  fibrillation for which he is anticoagulated.  He is followed by Dr. Evans.   5.  Portal vein thrombosis.   6.  Portal hypertension.   7.  Thrombocytopenia, apparently related to liver disease.   8.  Right heart failure.   9.  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  Last EF on echo from 06/2019 was 55%.   10.  Genital herpes.   11.  Esophageal varices.   12.  Kidney stone.   13.  Gallstones.      PAST SURGICAL HISTORY:  Includes:     1.  Right elbow surgery.     2.  Ventral and umbilical hernia repairs.    3.  I and D of scrotal abscess.     4.  Atrial fibrillation ablation.     5.  Multiple EGDs.     6.  Heart catheterization.     7.  Colonoscopy.     8.  Multiple cardioversions.      FAMILY HISTORY:  Mother with hyperlipidemia, hypertension and an eye disorder.  Father with heart disease, hyperlipidemia, obesity, CHF.  Two brothers with liver dysfunction.  Sister with a blood clot in her lung.      HABITS:  He quit smoking in 2002.  No alcohol use.      SOCIAL HISTORY:  He lives locally with his wife.      ALLERGIES:     1.  AVELOX CAUSED PORTAL HYPERTENSION.   2.  QUINOLONES CAUSED PORTAL HYPERTENSION.    3.  SOTALOL, ITCHING.      REVIEW OF SYSTEMS:  He currently is chills.  He has had fever.  He has no head and neck symptoms.  Slight shortness of breath, no chest pain, no abdominal pain, no change in bowel habits.  No urinary tract symptoms.  He has left lower anterior legs redness.  The rest of the 10-point review of systems is negative.      MEDICATIONS:     1.  Symbicort 80/4.5 two puffs b.i.d.   2.  Calcium carbonate with vitamin D 2 tablets daily.   3.  Lasix 40 mg b.i.d.   4.  Metoprolol  mg at bedtime and 25 mg in the morning.     5.  Multivite daily.   6.  Protonix 40 mg daily.   7.  Spironolactone 25 mg daily.   8.  Vitamin D3 daily.   9.  Coumadin per [facility].     10.  Statin, aspirin and ACE intentionally not prescribed.       PHYSICAL EXAMINATION:   GENERAL:  He appears comfortable.   VITAL SIGNS:  Currently, his heart rate is variable in A-fib between 105 and 125, temperature was recently 101.8, blood pressure 101/56, respiratory rate 12, sat 97% on room air.   HEENT:  Ears negative.  Oral negative.   NECK:  Supple.   LUNGS:  He has some slight bibasilar crackles, no respiratory distress.   COR:  S1, S2, is irregularly irregular and distant.   ABDOMEN:  Soft, benign, nontender, without guarding, rebound, rigidity or mass.   GENITALIA:  Grossly negative.     EXTREMITIES:  Lower extremities: He has redness of the mid part of the left lower extremity is warm.  He has some bronzing on the right side.   NEUROLOGIC:  Cranial nerves, motor, within normal limits.          LABORATORY DATA:  INR 1.65.  Blood cultures pending.  Lactic was 2.6.  White count 5.2, hemoglobin 11.6, platelet count 74,000.  Comprehensive metabolic panel notable for a bilirubin of 1.4, albumin 3.7, total protein 6.6.  AST was 61.  Repeat lactic acid was 2.5.      ASSESSMENT:   1.  Recurrent cellulitis, left lower extremity, associated with sepsis.   2.  Cirrhosis -- nonalcoholic and apparently idiopathic.   3.  Portal hypertension and history of esophageal varices.   4.  Thrombocytopenia, which apparently is liver related.   5.  History of combined systolic and diastolic CHF, some of which has been tachycardia mediated, last EF by echo was 55% in 06/2019.   6.  Obstructive sleep apnea.   7.  GERD.   8.  History of MRSA in wound -- details unclear.   9.  History of atrial fibrillation and RVR with failed ablation and failed cardioversions in the past, on chronic anticoagulation.      PLAN:  The patient has had 3 liters of fluid.  He does have some lactic acid elevation.  He has crackles at the bases of his lung fields.  I think it would be best not to give further fluids at this time.  He was given some additional IV and oral beta blocker in the ER.  I will order  metoprolol 5 mg q.4 hours p.r.n. sustained heart rate over 115 and blood pressure over 95.  If h has a lot of trouble with heart rate control would have to transfer him to the ICU for potentially for diltiazem.  Fever control should help with atrial fibrillation and RVR.  Acetaminophen is not a perfect drug for this gentleman, but I think it is necessary at this time.  NSAIDs would be relatively contraindicated because of his Coumadin use.  He has a history of MRSA, so I am going to switch him from cephalexin to vancomycin.  If he clinically improves and has a negative MRSA nasal swab and negative blood cultures, consideration could be made to putting him back on a cephalosporin.         DAT VITAL MD             D: 10/06/2019   T: 10/06/2019   MT: SUNITHA      Name:     MARILY NAVARRO   MRN:      [id number]        Account:      [id number]   :      1960        Admitted:     10/06/2019                   Document: [id number]

## 2019-10-07 LAB
ANION GAP SERPL CALCULATED.3IONS-SCNC: 7 MMOL/L (ref 3–14)
BUN SERPL-MCNC: 18 MG/DL (ref 7–30)
CALCIUM SERPL-MCNC: 7.9 MG/DL (ref 8.5–10.1)
CHLORIDE SERPL-SCNC: 105 MMOL/L (ref 94–109)
CO2 SERPL-SCNC: 26 MMOL/L (ref 20–32)
CREAT SERPL-MCNC: 0.81 MG/DL (ref 0.66–1.25)
ERYTHROCYTE [DISTWIDTH] IN BLOOD BY AUTOMATED COUNT: 14.8 % (ref 10–15)
GFR SERPL CREATININE-BSD FRML MDRD: >90 ML/MIN/{1.73_M2}
GLUCOSE SERPL-MCNC: 100 MG/DL (ref 70–99)
HCT VFR BLD AUTO: 29.4 % (ref 40–53)
HGB BLD-MCNC: 9.4 G/DL (ref 13.3–17.7)
INR PPP: 2.46 (ref 0.86–1.14)
LACTATE BLD-SCNC: 2 MMOL/L (ref 0.7–2)
MCH RBC QN AUTO: 30.2 PG (ref 26.5–33)
MCHC RBC AUTO-ENTMCNC: 32 G/DL (ref 31.5–36.5)
MCV RBC AUTO: 95 FL (ref 78–100)
PLATELET # BLD AUTO: 52 10E9/L (ref 150–450)
POTASSIUM SERPL-SCNC: 3.7 MMOL/L (ref 3.4–5.3)
RBC # BLD AUTO: 3.11 10E12/L (ref 4.4–5.9)
SODIUM SERPL-SCNC: 138 MMOL/L (ref 133–144)
WBC # BLD AUTO: 3.5 10E9/L (ref 4–11)

## 2019-10-07 PROCEDURE — 25800030 ZZH RX IP 258 OP 636: Performed by: FAMILY MEDICINE

## 2019-10-07 PROCEDURE — 25000132 ZZH RX MED GY IP 250 OP 250 PS 637: Performed by: FAMILY MEDICINE

## 2019-10-07 PROCEDURE — 40000275 ZZH STATISTIC RCP TIME EA 10 MIN

## 2019-10-07 PROCEDURE — 12000000 ZZH R&B MED SURG/OB

## 2019-10-07 PROCEDURE — 36415 COLL VENOUS BLD VENIPUNCTURE: CPT | Performed by: INTERNAL MEDICINE

## 2019-10-07 PROCEDURE — 99232 SBSQ HOSP IP/OBS MODERATE 35: CPT | Performed by: INTERNAL MEDICINE

## 2019-10-07 PROCEDURE — 25000128 H RX IP 250 OP 636: Performed by: FAMILY MEDICINE

## 2019-10-07 PROCEDURE — 85027 COMPLETE CBC AUTOMATED: CPT | Performed by: FAMILY MEDICINE

## 2019-10-07 PROCEDURE — 25000132 ZZH RX MED GY IP 250 OP 250 PS 637: Performed by: INTERNAL MEDICINE

## 2019-10-07 PROCEDURE — 80048 BASIC METABOLIC PNL TOTAL CA: CPT | Performed by: FAMILY MEDICINE

## 2019-10-07 PROCEDURE — 36415 COLL VENOUS BLD VENIPUNCTURE: CPT | Performed by: FAMILY MEDICINE

## 2019-10-07 PROCEDURE — 85610 PROTHROMBIN TIME: CPT | Performed by: FAMILY MEDICINE

## 2019-10-07 PROCEDURE — 83605 ASSAY OF LACTIC ACID: CPT | Performed by: INTERNAL MEDICINE

## 2019-10-07 RX ADMIN — FUROSEMIDE 40 MG: 40 TABLET ORAL at 09:13

## 2019-10-07 RX ADMIN — HYDROMORPHONE HYDROCHLORIDE 0.2 MG: 1 INJECTION, SOLUTION INTRAMUSCULAR; INTRAVENOUS; SUBCUTANEOUS at 16:05

## 2019-10-07 RX ADMIN — Medication 1.25 MG: at 18:15

## 2019-10-07 RX ADMIN — ACETAMINOPHEN 650 MG: 325 TABLET, FILM COATED ORAL at 16:40

## 2019-10-07 RX ADMIN — SPIRONOLACTONE 25 MG: 25 TABLET ORAL at 09:13

## 2019-10-07 RX ADMIN — VANCOMYCIN HYDROCHLORIDE 2000 MG: 10 INJECTION, POWDER, LYOPHILIZED, FOR SOLUTION INTRAVENOUS at 16:08

## 2019-10-07 RX ADMIN — FLUTICASONE FUROATE AND VILANTEROL TRIFENATATE 1 PUFF: 100; 25 POWDER RESPIRATORY (INHALATION) at 09:09

## 2019-10-07 RX ADMIN — ACETAMINOPHEN 650 MG: 325 TABLET, FILM COATED ORAL at 00:19

## 2019-10-07 RX ADMIN — HYDROMORPHONE HYDROCHLORIDE 0.2 MG: 1 INJECTION, SOLUTION INTRAMUSCULAR; INTRAVENOUS; SUBCUTANEOUS at 09:52

## 2019-10-07 RX ADMIN — METOPROLOL SUCCINATE 100 MG: 100 TABLET, EXTENDED RELEASE ORAL at 22:18

## 2019-10-07 RX ADMIN — MELATONIN 2000 UNITS: at 09:13

## 2019-10-07 RX ADMIN — HYDROMORPHONE HYDROCHLORIDE 0.2 MG: 1 INJECTION, SOLUTION INTRAMUSCULAR; INTRAVENOUS; SUBCUTANEOUS at 04:22

## 2019-10-07 RX ADMIN — HYDROMORPHONE HYDROCHLORIDE 0.2 MG: 1 INJECTION, SOLUTION INTRAMUSCULAR; INTRAVENOUS; SUBCUTANEOUS at 07:12

## 2019-10-07 RX ADMIN — HYDROMORPHONE HYDROCHLORIDE 0.2 MG: 1 INJECTION, SOLUTION INTRAMUSCULAR; INTRAVENOUS; SUBCUTANEOUS at 22:35

## 2019-10-07 RX ADMIN — VANCOMYCIN HYDROCHLORIDE 2000 MG: 10 INJECTION, POWDER, LYOPHILIZED, FOR SOLUTION INTRAVENOUS at 04:03

## 2019-10-07 RX ADMIN — HYDROMORPHONE HYDROCHLORIDE 0.2 MG: 1 INJECTION, SOLUTION INTRAMUSCULAR; INTRAVENOUS; SUBCUTANEOUS at 01:59

## 2019-10-07 RX ADMIN — Medication 2 TABLET: at 09:13

## 2019-10-07 RX ADMIN — PANTOPRAZOLE SODIUM 40 MG: 20 TABLET, DELAYED RELEASE ORAL at 09:13

## 2019-10-07 RX ADMIN — FUROSEMIDE 40 MG: 40 TABLET ORAL at 16:06

## 2019-10-07 ASSESSMENT — ACTIVITIES OF DAILY LIVING (ADL)
ADLS_ACUITY_SCORE: 10

## 2019-10-07 NOTE — PLAN OF CARE
Pt alert and oriented x 4. Pt is calm, cooperative, and chatty. Pt's wife and daughter visited this evening before bed. Pt's left leg is red, edematous, and hot to touch. Pt's pain fluctuated between 5-7 on a 10 point scale; pain was effectively managed with tylenol & dilaudid PRN. Pt's sleep was restless because of his leg pain and leg cramps; pt slept soundly after administration of dilaudid. Pt utilized CPAP brought from home by wife. Pt is utilizing urinal, urine is panfilo and odorous. No BM tonight, bowel sounds normoactive, pt is passing gas. Pt snacked on saltines with peanut butter and cranberry juice.     Delores Bradley, student nurse, on 10/7/2019 at 5:10 AM    NURSING STUDENT NOTE REVIEW    Above Student Nurse Report was reviewed: Yes  Writer agrees with assessments and report.  Reviewed by: Willy Lopez RN on October 7, 2019 at 5:26 AM

## 2019-10-07 NOTE — PROGRESS NOTES
"[facility] Internal Medicine Progress Note     Date of Service (when I saw the patient): 10/07/2019    REASON FOR ADMISSION / INTERVAL HISTORY:  LLE erythema/edema. Pt worried to go home before its resolved.     ASSESSMENT/PLAN:     LLE CELLULITIS with SEPSIS  Presenting with LE erythema/ edema. Has fever/ tachycardia/ leucopenia. Had LA 2.5. was given 3L fluids in ED. HR improved and stable BP. tmax 102.2 last evening. Blood cx-p. Has h/o MRSA and started on vanco.  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 Has pancytopenia. Last saw Dr Xiong in hepatology 2 weeks ago. No new changes     Atrial fibrillation, s/p cardioversion  On coumadin/ metoprolol. Stable  -continue meds     H/o cardiomyopathy felt due to atrial fibrillation with RVR, resolved after cardioversion  H/o EF  55 on most recent echocardiogram 6/19  --cont lisinopril/ Lasix     H/o genital herpes  quisent.     dispo   Will be in hospital 1-2 days.    JAYDE SCHMIDT MD   Pg [phone]    DVT Prhylaxis: Warfarin  Code Status: Full Code    ROS:  As described in A/P and Exam.  Otherwise ALL are  negative.    PHYSICAL EXAM:  All vitals have been reviewed    Blood pressure 118/68, pulse 95, temperature 98.3  F (36.8  C), temperature source Axillary, resp. rate 20, height 1.651 m (5' 5\"), weight 108.9 kg (240 lb), SpO2 93 %.    I/O this shift:  In: 450 [P.O.:450]  Out: 175 [Urine:175]    GENERAL APPEARANCE: healthy, alert and no distress  EYES: conjunctiva clear, eyes grossly normal  HENT: external ears and nose normal   NECK: supple, no masses or adenopathy  RESP: lungs -few crackles bases- no rales, rhonchi or wheezes  CV: regular rate and rhythm, normal S1 S2, no S3 or S4 and no murmur, click or rub   ABDOMEN: soft, nontender, no " HSM or masses and bowel sounds normal  MS: no clubbing, cyanosis; 1-2+ edema B LE  SKIN: LLE erythema circumferential ankle to lower knee  NEURO: -non-focal moves all 4 extr    ROUTINE  LABS (Last four results)  CMP  Recent Labs   Lab 10/07/19  0451 10/06/19  0923    141   POTASSIUM 3.7 3.7   CHLORIDE 105 105   CO2 26 26   ANIONGAP 7 10   * 99   BUN 18 19   CR 0.81 0.74   GFRESTIMATED >90 >90   GFRESTBLACK >90 >90   HALEY 7.9* 8.8   PROTTOTAL  --  6.6*   ALBUMIN  --  3.7   BILITOTAL  --  1.4*   ALKPHOS  --  73   AST  --  61*   ALT  --  46     CBC  Recent Labs   Lab 10/07/19  0451 10/06/19  0923   WBC 3.5* 5.2   RBC 3.11* 3.83*   HGB 9.4* 11.6*   HCT 29.4* 36.1*   MCV 95 94   MCH 30.2 30.3   MCHC 32.0 32.1   RDW 14.8 14.6   PLT 52* 74*     INR  Recent Labs   Lab 10/07/19  0451 10/06/19  0923 10/04/19   INR 2.46* 1.65* 1.6*     Arterial Blood GasNo lab results found in last 7 days.    No results found for this or any previous visit (from the past 24 hour(s)).

## 2019-10-08 LAB
ERYTHROCYTE [DISTWIDTH] IN BLOOD BY AUTOMATED COUNT: 14.8 % (ref 10–15)
HCT VFR BLD AUTO: 30.7 % (ref 40–53)
HGB BLD-MCNC: 9.9 G/DL (ref 13.3–17.7)
INR PPP: 2.46 (ref 0.86–1.14)
MCH RBC QN AUTO: 30.5 PG (ref 26.5–33)
MCHC RBC AUTO-ENTMCNC: 32.2 G/DL (ref 31.5–36.5)
MCV RBC AUTO: 95 FL (ref 78–100)
PLATELET # BLD AUTO: 51 10E9/L (ref 150–450)
RBC # BLD AUTO: 3.25 10E12/L (ref 4.4–5.9)
VANCOMYCIN SERPL-MCNC: 15.7 MG/L
WBC # BLD AUTO: 3.5 10E9/L (ref 4–11)

## 2019-10-08 PROCEDURE — 25000132 ZZH RX MED GY IP 250 OP 250 PS 637: Performed by: FAMILY MEDICINE

## 2019-10-08 PROCEDURE — 80202 ASSAY OF VANCOMYCIN: CPT | Performed by: INTERNAL MEDICINE

## 2019-10-08 PROCEDURE — 25000128 H RX IP 250 OP 636: Performed by: FAMILY MEDICINE

## 2019-10-08 PROCEDURE — 36415 COLL VENOUS BLD VENIPUNCTURE: CPT | Performed by: FAMILY MEDICINE

## 2019-10-08 PROCEDURE — 85610 PROTHROMBIN TIME: CPT | Performed by: FAMILY MEDICINE

## 2019-10-08 PROCEDURE — 12000000 ZZH R&B MED SURG/OB

## 2019-10-08 PROCEDURE — 36415 COLL VENOUS BLD VENIPUNCTURE: CPT | Performed by: INTERNAL MEDICINE

## 2019-10-08 PROCEDURE — 25800030 ZZH RX IP 258 OP 636: Performed by: FAMILY MEDICINE

## 2019-10-08 PROCEDURE — 99232 SBSQ HOSP IP/OBS MODERATE 35: CPT | Performed by: INTERNAL MEDICINE

## 2019-10-08 PROCEDURE — 85027 COMPLETE CBC AUTOMATED: CPT | Performed by: FAMILY MEDICINE

## 2019-10-08 PROCEDURE — 25000128 H RX IP 250 OP 636: Performed by: INTERNAL MEDICINE

## 2019-10-08 PROCEDURE — 25000132 ZZH RX MED GY IP 250 OP 250 PS 637: Performed by: INTERNAL MEDICINE

## 2019-10-08 RX ORDER — WARFARIN SODIUM 2.5 MG/1
2.5 TABLET ORAL
Status: COMPLETED | OUTPATIENT
Start: 2019-10-08 | End: 2019-10-08

## 2019-10-08 RX ORDER — POLYETHYLENE GLYCOL 3350 17 G/17G
17 POWDER, FOR SOLUTION ORAL 2 TIMES DAILY PRN
Status: DISCONTINUED | OUTPATIENT
Start: 2019-10-08 | End: 2019-10-08

## 2019-10-08 RX ORDER — SENNOSIDES 8.6 MG
2 TABLET ORAL 2 TIMES DAILY PRN
Status: DISCONTINUED | OUTPATIENT
Start: 2019-10-08 | End: 2019-10-12 | Stop reason: HOSPADM

## 2019-10-08 RX ORDER — FUROSEMIDE 10 MG/ML
40 INJECTION INTRAMUSCULAR; INTRAVENOUS
Status: DISCONTINUED | OUTPATIENT
Start: 2019-10-08 | End: 2019-10-12 | Stop reason: HOSPADM

## 2019-10-08 RX ORDER — CEFTRIAXONE SODIUM 1 G/50ML
1 INJECTION, SOLUTION INTRAVENOUS EVERY 24 HOURS
Status: DISCONTINUED | OUTPATIENT
Start: 2019-10-08 | End: 2019-10-10

## 2019-10-08 RX ORDER — POLYETHYLENE GLYCOL 3350 17 G/17G
17 POWDER, FOR SOLUTION ORAL DAILY PRN
Status: DISCONTINUED | OUTPATIENT
Start: 2019-10-08 | End: 2019-10-12 | Stop reason: HOSPADM

## 2019-10-08 RX ORDER — FUROSEMIDE 10 MG/ML
40 INJECTION INTRAMUSCULAR; INTRAVENOUS
Status: DISCONTINUED | OUTPATIENT
Start: 2019-10-08 | End: 2019-10-08

## 2019-10-08 RX ADMIN — FUROSEMIDE 40 MG: 10 INJECTION, SOLUTION INTRAMUSCULAR; INTRAVENOUS at 16:15

## 2019-10-08 RX ADMIN — VANCOMYCIN HYDROCHLORIDE 2000 MG: 10 INJECTION, POWDER, LYOPHILIZED, FOR SOLUTION INTRAVENOUS at 03:40

## 2019-10-08 RX ADMIN — FUROSEMIDE 40 MG: 40 TABLET ORAL at 08:20

## 2019-10-08 RX ADMIN — METOPROLOL SUCCINATE 25 MG: 25 TABLET, EXTENDED RELEASE ORAL at 08:20

## 2019-10-08 RX ADMIN — PANTOPRAZOLE SODIUM 40 MG: 20 TABLET, DELAYED RELEASE ORAL at 08:19

## 2019-10-08 RX ADMIN — Medication 2 TABLET: at 08:19

## 2019-10-08 RX ADMIN — HYDROMORPHONE HYDROCHLORIDE 0.2 MG: 1 INJECTION, SOLUTION INTRAMUSCULAR; INTRAVENOUS; SUBCUTANEOUS at 11:41

## 2019-10-08 RX ADMIN — MELATONIN 2000 UNITS: at 08:19

## 2019-10-08 RX ADMIN — ACETAMINOPHEN 650 MG: 325 TABLET, FILM COATED ORAL at 16:18

## 2019-10-08 RX ADMIN — VANCOMYCIN HYDROCHLORIDE 2000 MG: 10 INJECTION, POWDER, LYOPHILIZED, FOR SOLUTION INTRAVENOUS at 16:13

## 2019-10-08 RX ADMIN — CEFTRIAXONE SODIUM 1 G: 1 INJECTION, SOLUTION INTRAVENOUS at 13:59

## 2019-10-08 RX ADMIN — POLYETHYLENE GLYCOL 3350 17 G: 17 POWDER, FOR SOLUTION ORAL at 16:14

## 2019-10-08 RX ADMIN — SPIRONOLACTONE 25 MG: 25 TABLET ORAL at 08:20

## 2019-10-08 RX ADMIN — METOPROLOL SUCCINATE 100 MG: 100 TABLET, EXTENDED RELEASE ORAL at 22:28

## 2019-10-08 RX ADMIN — HYDROMORPHONE HYDROCHLORIDE 0.2 MG: 1 INJECTION, SOLUTION INTRAMUSCULAR; INTRAVENOUS; SUBCUTANEOUS at 18:29

## 2019-10-08 RX ADMIN — WARFARIN SODIUM 2.5 MG: 2.5 TABLET ORAL at 17:13

## 2019-10-08 RX ADMIN — FLUTICASONE FUROATE AND VILANTEROL TRIFENATATE 1 PUFF: 100; 25 POWDER RESPIRATORY (INHALATION) at 08:20

## 2019-10-08 RX ADMIN — HYDROMORPHONE HYDROCHLORIDE 0.2 MG: 1 INJECTION, SOLUTION INTRAMUSCULAR; INTRAVENOUS; SUBCUTANEOUS at 03:53

## 2019-10-08 ASSESSMENT — ACTIVITIES OF DAILY LIVING (ADL)
ADLS_ACUITY_SCORE: 10

## 2019-10-08 ASSESSMENT — MIFFLIN-ST. JEOR: SCORE: 1924.88

## 2019-10-08 NOTE — PLAN OF CARE
Medicated with Dilaudid overnight with good pain control. Slept well throughout the night.   Bilat legs are red and edematous, per pt this is an on-going problem.

## 2019-10-08 NOTE — PROGRESS NOTES
"[facility] Internal Medicine Progress Note     Date of Service (when I saw the patient): 10/08/2019    REASON FOR ADMISSION / INTERVAL HISTORY:  LLE erythema/edema. Still not better-unchanged    ASSESSMENT/PLAN:     LLE CELLULITIS with SEPSIS  Presenting with LE erythema/ edema. Has fever/ tachycardia/ leucopenia. Had LA 2.5. was given 3L fluids in ED. HR improved and stable BP. tmax 101.2 last evening. Blood cx-p. Has h/o MRSA and started on vanco. Erythema not much changed  -Continue iv vanco. Add rocephin.    Encephalopathy, mild, toxic due to sepsis   Likely was due to sepsis. Does have hepatic cirrhosis and at risk for hepatic encephalopathy.   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 Has pancytopenia. Last saw Dr Xiong in hepatology 2 weeks ago. No new changes    LE edema  On po lasix at home. Has significant edema-Will change to iv and watch.     Atrial fibrillation, s/p cardioversion  On coumadin/ metoprolol. Stable  -continue meds     H/o cardiomyopathy felt due to atrial fibrillation with RVR, resolved after cardioversion  H/o EF  55 on most recent echocardiogram 6/19  --cont lisinopril/ Lasix     H/o genital herpes  quisent.     dispo   Will be in hospital 2-3 days.    JAYDE SCHMIDT MD   Pg [phone]    DVT Prhylaxis: Warfarin  Code Status: Full Code    ROS:  As described in A/P and Exam.  Otherwise ALL are  negative.    PHYSICAL EXAM:  All vitals have been reviewed    Blood pressure 107/65, pulse 78, temperature 99.3  F (37.4  C), temperature source Oral, resp. rate 20, height 1.651 m (5' 5\"), weight 117.8 kg (259 lb 11.2 oz), SpO2 92 %.    I/O this shift:  In: 570 [P.O.:570]  Out: 400 [Urine:400]    GENERAL APPEARANCE: healthy, alert and no distress  EYES: conjunctiva clear, eyes grossly normal  HENT: external ears and nose normal   NECK: " supple, no masses or adenopathy  RESP: lungs -few crackles bases- no rales, rhonchi or wheezes  CV: regular rate and rhythm, normal S1 S2, no S3 or S4 and no murmur, click or rub   ABDOMEN: soft, nontender, no HSM or masses and bowel sounds normal  MS: no clubbing, cyanosis; 1-2+ edema B LE  SKIN: LLE erythema circumferential ankle to lower knee  NEURO: -non-focal moves all 4 extr    ROUTINE  LABS (Last four results)  CMP  Recent Labs   Lab 10/07/19  0451 10/06/19  0923    141   POTASSIUM 3.7 3.7   CHLORIDE 105 105   CO2 26 26   ANIONGAP 7 10   * 99   BUN 18 19   CR 0.81 0.74   GFRESTIMATED >90 >90   GFRESTBLACK >90 >90   HALEY 7.9* 8.8   PROTTOTAL  --  6.6*   ALBUMIN  --  3.7   BILITOTAL  --  1.4*   ALKPHOS  --  73   AST  --  61*   ALT  --  46     CBC  Recent Labs   Lab 10/08/19  0517 10/07/19  0451 10/06/19  0923   WBC 3.5* 3.5* 5.2   RBC 3.25* 3.11* 3.83*   HGB 9.9* 9.4* 11.6*   HCT 30.7* 29.4* 36.1*   MCV 95 95 94   MCH 30.5 30.2 30.3   MCHC 32.2 32.0 32.1   RDW 14.8 14.8 14.6   PLT 51* 52* 74*     INR  Recent Labs   Lab 10/08/19  0517 10/07/19  0451 10/06/19  0923 10/04/19   INR 2.46* 2.46* 1.65* 1.6*     Arterial Blood GasNo lab results found in last 7 days.    No results found for this or any previous visit (from the past 24 hour(s)).

## 2019-10-08 NOTE — PLAN OF CARE
"Patient had IV lasix and diuresing well. Complained of a headache tonight and requested tylenol, which was given. Then had dilaudid as the tylenol took \"the edge off\", but not completely effective. Left leg remains edematous, red, and warm to the touch.Temperature 100.4 this evening. Will monitor.  "

## 2019-10-08 NOTE — PLAN OF CARE
Left lower extremity warm, red, taut, shiny with edema. Keeping his legs elevated.IV lasix ordered to start this afternoon as patient already had oral lasix this am. Afebrile this am. Will continue to monitor.

## 2019-10-08 NOTE — PLAN OF CARE
Patient is alert and oriented. Up independently in room. Voids without difficulty, had BM today. Medicated for left lower leg pain twice with dilaudid per patient request. Mild edema present to left leg, both legs and feet with pinpoint red rash, left warmer and leg more red than right. Temperature 101.5 this afternoon, medicated with tylenol. Sepsis alert triggered, lactic acid 2.0. Continues on vancomycin. Afebrile at this time. Metoprolol held this AM secondary to BP of 94/59 P 83. Diuretics given. Eating and drinking well. Makes needs known.

## 2019-10-08 NOTE — PHARMACY-VANCOMYCIN DOSING SERVICE
Pharmacy Vancomycin Note  Date of Service 2019  Patient's  1960   58 year old, male    Indication: Sepsis and Skin and Soft Tissue Infection  Goal Trough Level: 15-20 mg/L  Day of Therapy: 3  Current Vancomycin regimen:  2000 mg IV q12h    Current estimated CrCl = Estimated Creatinine Clearance: 118.1 mL/min (based on SCr of 0.81 mg/dL).    Creatinine for last 3 days  10/6/2019:  9:23 AM Creatinine 0.74 mg/dL  10/7/2019:  4:51 AM Creatinine 0.81 mg/dL    Recent Vancomycin Levels (past 3 days)  10/8/2019:  2:49 PM Vancomycin Level 15.7 mg/L    Vancomycin IV Administrations (past 72 hours)                   vancomycin (VANCOCIN) 2,000 mg in sodium chloride 0.9 % 500 mL intermittent infusion (mg) 2,000 mg Given 10/08/19 1613     2,000 mg Given  0340     2,000 mg Given 10/07/19 1608     2,000 mg Given  0403     2,000 mg Given 10/06/19 1542                Nephrotoxins and other renal medications (From now, onward)    Start     Dose/Rate Route Frequency Ordered Stop    10/08/19 1600  furosemide (LASIX) injection 40 mg      40 mg  over 1-2 Minutes Intravenous 2 TIMES DAILY. 10/08/19 1021      10/06/19 1515  vancomycin (VANCOCIN) 2,000 mg in sodium chloride 0.9 % 500 mL intermittent infusion      2,000 mg  over 2 Hours Intravenous EVERY 12 HOURS 10/06/19 1515               Contrast Orders - past 72 hours (72h ago, onward)    None          Interpretation of levels and current regimen:  Trough level is  Therapeutic    Has serum creatinine changed > 50% in last 72 hours: No        Renal Function: Stable    Plan:  1.  Continue Current Dose  2.  Pharmacy will check trough levels as appropriate in 1-3 Days.    3. Serum creatinine levels will be ordered daily for the first week of therapy and at least twice weekly for subsequent weeks.      Paulina Wright RPH        .

## 2019-10-09 LAB
ANION GAP SERPL CALCULATED.3IONS-SCNC: 6 MMOL/L (ref 3–14)
BUN SERPL-MCNC: 15 MG/DL (ref 7–30)
CALCIUM SERPL-MCNC: 7.9 MG/DL (ref 8.5–10.1)
CHLORIDE SERPL-SCNC: 106 MMOL/L (ref 94–109)
CO2 SERPL-SCNC: 28 MMOL/L (ref 20–32)
CREAT SERPL-MCNC: 0.62 MG/DL (ref 0.66–1.25)
GFR SERPL CREATININE-BSD FRML MDRD: >90 ML/MIN/{1.73_M2}
GLUCOSE SERPL-MCNC: 104 MG/DL (ref 70–99)
INR PPP: 2.36 (ref 0.86–1.14)
POTASSIUM SERPL-SCNC: 3.6 MMOL/L (ref 3.4–5.3)
SODIUM SERPL-SCNC: 140 MMOL/L (ref 133–144)

## 2019-10-09 PROCEDURE — 12000000 ZZH R&B MED SURG/OB

## 2019-10-09 PROCEDURE — 25000128 H RX IP 250 OP 636: Performed by: INTERNAL MEDICINE

## 2019-10-09 PROCEDURE — 25000132 ZZH RX MED GY IP 250 OP 250 PS 637: Performed by: INTERNAL MEDICINE

## 2019-10-09 PROCEDURE — 25000128 H RX IP 250 OP 636: Performed by: FAMILY MEDICINE

## 2019-10-09 PROCEDURE — 99232 SBSQ HOSP IP/OBS MODERATE 35: CPT | Performed by: INTERNAL MEDICINE

## 2019-10-09 PROCEDURE — 25000132 ZZH RX MED GY IP 250 OP 250 PS 637: Performed by: FAMILY MEDICINE

## 2019-10-09 PROCEDURE — 36415 COLL VENOUS BLD VENIPUNCTURE: CPT | Performed by: FAMILY MEDICINE

## 2019-10-09 PROCEDURE — 85610 PROTHROMBIN TIME: CPT | Performed by: FAMILY MEDICINE

## 2019-10-09 PROCEDURE — 80048 BASIC METABOLIC PNL TOTAL CA: CPT | Performed by: FAMILY MEDICINE

## 2019-10-09 PROCEDURE — 25800030 ZZH RX IP 258 OP 636: Performed by: FAMILY MEDICINE

## 2019-10-09 RX ORDER — POTASSIUM CHLORIDE 1500 MG/1
40 TABLET, EXTENDED RELEASE ORAL DAILY
Status: DISCONTINUED | OUTPATIENT
Start: 2019-10-09 | End: 2019-10-12 | Stop reason: HOSPADM

## 2019-10-09 RX ORDER — WARFARIN SODIUM 2.5 MG/1
2.5 TABLET ORAL
Status: COMPLETED | OUTPATIENT
Start: 2019-10-09 | End: 2019-10-09

## 2019-10-09 RX ADMIN — FLUTICASONE FUROATE AND VILANTEROL TRIFENATATE 1 PUFF: 100; 25 POWDER RESPIRATORY (INHALATION) at 08:40

## 2019-10-09 RX ADMIN — MELATONIN 2000 UNITS: at 08:38

## 2019-10-09 RX ADMIN — POTASSIUM CHLORIDE 40 MEQ: 20 TABLET, EXTENDED RELEASE ORAL at 12:34

## 2019-10-09 RX ADMIN — FUROSEMIDE 40 MG: 10 INJECTION, SOLUTION INTRAMUSCULAR; INTRAVENOUS at 16:02

## 2019-10-09 RX ADMIN — ACETAMINOPHEN 650 MG: 325 TABLET, FILM COATED ORAL at 13:29

## 2019-10-09 RX ADMIN — CEFTRIAXONE SODIUM 1 G: 1 INJECTION, SOLUTION INTRAVENOUS at 13:25

## 2019-10-09 RX ADMIN — PANTOPRAZOLE SODIUM 40 MG: 20 TABLET, DELAYED RELEASE ORAL at 08:38

## 2019-10-09 RX ADMIN — WARFARIN SODIUM 2.5 MG: 2.5 TABLET ORAL at 18:20

## 2019-10-09 RX ADMIN — Medication 2 TABLET: at 08:38

## 2019-10-09 RX ADMIN — METOPROLOL SUCCINATE 100 MG: 100 TABLET, EXTENDED RELEASE ORAL at 21:46

## 2019-10-09 RX ADMIN — SPIRONOLACTONE 25 MG: 25 TABLET ORAL at 08:38

## 2019-10-09 RX ADMIN — FUROSEMIDE 40 MG: 10 INJECTION, SOLUTION INTRAMUSCULAR; INTRAVENOUS at 09:55

## 2019-10-09 RX ADMIN — VANCOMYCIN HYDROCHLORIDE 2000 MG: 10 INJECTION, POWDER, LYOPHILIZED, FOR SOLUTION INTRAVENOUS at 03:22

## 2019-10-09 RX ADMIN — ACETAMINOPHEN 650 MG: 325 TABLET, FILM COATED ORAL at 06:24

## 2019-10-09 RX ADMIN — HYDROMORPHONE HYDROCHLORIDE 0.2 MG: 1 INJECTION, SOLUTION INTRAMUSCULAR; INTRAVENOUS; SUBCUTANEOUS at 13:29

## 2019-10-09 RX ADMIN — METOPROLOL SUCCINATE 25 MG: 25 TABLET, EXTENDED RELEASE ORAL at 08:38

## 2019-10-09 RX ADMIN — HYDROMORPHONE HYDROCHLORIDE 0.2 MG: 1 INJECTION, SOLUTION INTRAMUSCULAR; INTRAVENOUS; SUBCUTANEOUS at 06:24

## 2019-10-09 RX ADMIN — VANCOMYCIN HYDROCHLORIDE 2000 MG: 10 INJECTION, POWDER, LYOPHILIZED, FOR SOLUTION INTRAVENOUS at 16:05

## 2019-10-09 ASSESSMENT — ACTIVITIES OF DAILY LIVING (ADL)
ADLS_ACUITY_SCORE: 10

## 2019-10-09 NOTE — PROGRESS NOTES
"[facility] Internal Medicine Progress Note     Date of Service (when I saw the patient): 10/09/2019    REASON FOR ADMISSION / INTERVAL HISTORY:  LLE erythema/edema. Still not better-unchanged    ASSESSMENT/PLAN:     LLE CELLULITIS with SEPSIS  Presenting with LE erythema/ edema. Has fever/ tachycardia/ leucopenia. Had LA 2.5. was given 3L fluids in ED. HR improved and stable BP. tmax 101.2 last evening. Blood cx-p. Has h/o MRSA and started on vanco. Erythema slightly improved.  -Continue iv vanco/ rocephin.    Encephalopathy, mild, toxic due to sepsis   Likely was due to sepsis. Does have hepatic cirrhosis and at risk for hepatic encephalopathy.   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 Has pancytopenia. Last saw Dr Xiong in hepatology 2 weeks ago. No new changes    LE edema  On po lasix at home. Has significant edema-changed to iv lasix 10/8. Diuresing well. Labs stable. Still significant edema  -continue iv lasix.     Atrial fibrillation, s/p cardioversion  On coumadin/ metoprolol. Stable  -continue meds     H/o cardiomyopathy felt due to atrial fibrillation with RVR, resolved after cardioversion  H/o EF  55 on most recent echocardiogram 6/19  --cont lisinopril/ Lasix     H/o genital herpes  quisent.     dispo   Will be in hospital 2-3 days.    JAYDE SCHMIDT MD   Pg [phone]    DVT Prhylaxis: Warfarin  Code Status: Full Code    ROS:  As described in A/P and Exam.  Otherwise ALL are  negative.    PHYSICAL EXAM:  All vitals have been reviewed    Blood pressure 115/67, pulse (P) 87, temperature 98  F (36.7  C), temperature source Oral, resp. rate 18, height 1.651 m (5' 5\"), weight 117.8 kg (259 lb 11.2 oz), SpO2 94 %.    I/O this shift:  In: -   Out: 1700 [Urine:1700]    GENERAL APPEARANCE: healthy, alert and no distress  EYES: conjunctiva clear, " eyes grossly normal  HENT: external ears and nose normal   NECK: supple, no masses or adenopathy  RESP: lungs -few crackles bases- no rales, rhonchi or wheezes  CV: regular rate and rhythm, normal S1 S2, no S3 or S4 and no murmur, click or rub   ABDOMEN: soft, nontender, no HSM or masses and bowel sounds normal  MS: no clubbing, cyanosis; 1-2+ edema B LE  SKIN: LLE erythema circumferential ankle to lower knee  NEURO: -non-focal moves all 4 extr    ROUTINE  LABS (Last four results)  CMP  Recent Labs   Lab 10/09/19  0453 10/07/19  0451 10/06/19  0923    138 141   POTASSIUM 3.6 3.7 3.7   CHLORIDE 106 105 105   CO2 28 26 26   ANIONGAP 6 7 10   * 100* 99   BUN 15 18 19   CR 0.62* 0.81 0.74   GFRESTIMATED >90 >90 >90   GFRESTBLACK >90 >90 >90   HALEY 7.9* 7.9* 8.8   PROTTOTAL  --   --  6.6*   ALBUMIN  --   --  3.7   BILITOTAL  --   --  1.4*   ALKPHOS  --   --  73   AST  --   --  61*   ALT  --   --  46     CBC  Recent Labs   Lab 10/08/19  0517 10/07/19  0451 10/06/19  0923   WBC 3.5* 3.5* 5.2   RBC 3.25* 3.11* 3.83*   HGB 9.9* 9.4* 11.6*   HCT 30.7* 29.4* 36.1*   MCV 95 95 94   MCH 30.5 30.2 30.3   MCHC 32.2 32.0 32.1   RDW 14.8 14.8 14.6   PLT 51* 52* 74*     INR  Recent Labs   Lab 10/09/19  0453 10/08/19  0517 10/07/19  0451 10/06/19  0923   INR 2.36* 2.46* 2.46* 1.65*     Arterial Blood GasNo lab results found in last 7 days.    No results found for this or any previous visit (from the past 24 hour(s)).

## 2019-10-09 NOTE — PLAN OF CARE
Vitals stable; tmax of 100.4.  ambulating independently.  Saline locked.  Urinating frequently.  Left leg warm to the touch. Plan of care reviewed with patient and spouse

## 2019-10-09 NOTE — PLAN OF CARE
Afebrile overnight. Bilat LE edema with francesco discoloration. LLE more edematous and red colored that RLE and warm to the touch. Elevating LEs on pillow while in bed. Has been sleeping quietly between cares. Continues with ABX treatment as ordered.

## 2019-10-09 NOTE — PLAN OF CARE
Patient alert and orientated. Vital signs stable. On room air and afebrile. Up independently.     Left lower leg is red and hot to the touch. Swelling noted. Scheduled IV antibiotics given. Patient stated it doesn't look much different compared to yesterday. Patient reports pain rated a 6-7/10. PRN tylenol and dilaudid given once this shift.      Tele monitor on. This writer received a call from the ICU at 1658 stating the patient had a run of V-tach. This writer went to the patient's room and he had just brushed his teeth and washed his hands. He denied any chest pain or symptoms.       Contact precautions maintained.

## 2019-10-10 LAB
ANION GAP SERPL CALCULATED.3IONS-SCNC: 7 MMOL/L (ref 3–14)
BUN SERPL-MCNC: 15 MG/DL (ref 7–30)
CALCIUM SERPL-MCNC: 8.5 MG/DL (ref 8.5–10.1)
CHLORIDE SERPL-SCNC: 107 MMOL/L (ref 94–109)
CO2 SERPL-SCNC: 27 MMOL/L (ref 20–32)
CREAT SERPL-MCNC: 0.62 MG/DL (ref 0.66–1.25)
ERYTHROCYTE [DISTWIDTH] IN BLOOD BY AUTOMATED COUNT: 14.8 % (ref 10–15)
GFR SERPL CREATININE-BSD FRML MDRD: >90 ML/MIN/{1.73_M2}
GLUCOSE SERPL-MCNC: 96 MG/DL (ref 70–99)
HCT VFR BLD AUTO: 33.6 % (ref 40–53)
HGB BLD-MCNC: 10.6 G/DL (ref 13.3–17.7)
INR PPP: 2.45 (ref 0.86–1.14)
MCH RBC QN AUTO: 30.1 PG (ref 26.5–33)
MCHC RBC AUTO-ENTMCNC: 31.5 G/DL (ref 31.5–36.5)
MCV RBC AUTO: 96 FL (ref 78–100)
PLATELET # BLD AUTO: 59 10E9/L (ref 150–450)
POTASSIUM SERPL-SCNC: 3.8 MMOL/L (ref 3.4–5.3)
RBC # BLD AUTO: 3.52 10E12/L (ref 4.4–5.9)
SODIUM SERPL-SCNC: 141 MMOL/L (ref 133–144)
WBC # BLD AUTO: 3.6 10E9/L (ref 4–11)

## 2019-10-10 PROCEDURE — 85027 COMPLETE CBC AUTOMATED: CPT | Performed by: FAMILY MEDICINE

## 2019-10-10 PROCEDURE — 25000132 ZZH RX MED GY IP 250 OP 250 PS 637: Performed by: INTERNAL MEDICINE

## 2019-10-10 PROCEDURE — 12000000 ZZH R&B MED SURG/OB

## 2019-10-10 PROCEDURE — 36415 COLL VENOUS BLD VENIPUNCTURE: CPT | Performed by: FAMILY MEDICINE

## 2019-10-10 PROCEDURE — 25000128 H RX IP 250 OP 636: Performed by: FAMILY MEDICINE

## 2019-10-10 PROCEDURE — 40000275 ZZH STATISTIC RCP TIME EA 10 MIN

## 2019-10-10 PROCEDURE — 80048 BASIC METABOLIC PNL TOTAL CA: CPT | Performed by: FAMILY MEDICINE

## 2019-10-10 PROCEDURE — 25000128 H RX IP 250 OP 636: Performed by: INTERNAL MEDICINE

## 2019-10-10 PROCEDURE — 25000132 ZZH RX MED GY IP 250 OP 250 PS 637: Performed by: FAMILY MEDICINE

## 2019-10-10 PROCEDURE — 99232 SBSQ HOSP IP/OBS MODERATE 35: CPT | Performed by: INTERNAL MEDICINE

## 2019-10-10 PROCEDURE — 25800030 ZZH RX IP 258 OP 636: Performed by: FAMILY MEDICINE

## 2019-10-10 PROCEDURE — 85610 PROTHROMBIN TIME: CPT | Performed by: FAMILY MEDICINE

## 2019-10-10 RX ORDER — WARFARIN SODIUM 2.5 MG/1
2.5 TABLET ORAL
Status: COMPLETED | OUTPATIENT
Start: 2019-10-10 | End: 2019-10-10

## 2019-10-10 RX ADMIN — PANTOPRAZOLE SODIUM 40 MG: 20 TABLET, DELAYED RELEASE ORAL at 08:12

## 2019-10-10 RX ADMIN — FUROSEMIDE 40 MG: 10 INJECTION, SOLUTION INTRAMUSCULAR; INTRAVENOUS at 15:24

## 2019-10-10 RX ADMIN — TAZOBACTAM SODIUM AND PIPERACILLIN SODIUM 3.38 G: 375; 3 INJECTION, SOLUTION INTRAVENOUS at 11:11

## 2019-10-10 RX ADMIN — HYDROMORPHONE HYDROCHLORIDE 0.2 MG: 1 INJECTION, SOLUTION INTRAMUSCULAR; INTRAVENOUS; SUBCUTANEOUS at 18:02

## 2019-10-10 RX ADMIN — HYDROMORPHONE HYDROCHLORIDE 0.2 MG: 1 INJECTION, SOLUTION INTRAMUSCULAR; INTRAVENOUS; SUBCUTANEOUS at 15:24

## 2019-10-10 RX ADMIN — TAZOBACTAM SODIUM AND PIPERACILLIN SODIUM 3.38 G: 375; 3 INJECTION, SOLUTION INTRAVENOUS at 17:53

## 2019-10-10 RX ADMIN — HYDROMORPHONE HYDROCHLORIDE 0.2 MG: 1 INJECTION, SOLUTION INTRAMUSCULAR; INTRAVENOUS; SUBCUTANEOUS at 06:14

## 2019-10-10 RX ADMIN — POTASSIUM CHLORIDE 40 MEQ: 20 TABLET, EXTENDED RELEASE ORAL at 08:12

## 2019-10-10 RX ADMIN — HYDROMORPHONE HYDROCHLORIDE 0.2 MG: 1 INJECTION, SOLUTION INTRAMUSCULAR; INTRAVENOUS; SUBCUTANEOUS at 20:29

## 2019-10-10 RX ADMIN — HYDROMORPHONE HYDROCHLORIDE 0.2 MG: 1 INJECTION, SOLUTION INTRAMUSCULAR; INTRAVENOUS; SUBCUTANEOUS at 03:43

## 2019-10-10 RX ADMIN — Medication 2 TABLET: at 08:12

## 2019-10-10 RX ADMIN — TAZOBACTAM SODIUM AND PIPERACILLIN SODIUM 3.38 G: 375; 3 INJECTION, SOLUTION INTRAVENOUS at 22:53

## 2019-10-10 RX ADMIN — VANCOMYCIN HYDROCHLORIDE 2000 MG: 10 INJECTION, POWDER, LYOPHILIZED, FOR SOLUTION INTRAVENOUS at 03:35

## 2019-10-10 RX ADMIN — FLUTICASONE FUROATE AND VILANTEROL TRIFENATATE 1 PUFF: 100; 25 POWDER RESPIRATORY (INHALATION) at 08:16

## 2019-10-10 RX ADMIN — METOPROLOL SUCCINATE 25 MG: 25 TABLET, EXTENDED RELEASE ORAL at 08:12

## 2019-10-10 RX ADMIN — MELATONIN 2000 UNITS: at 08:12

## 2019-10-10 RX ADMIN — METOPROLOL SUCCINATE 100 MG: 100 TABLET, EXTENDED RELEASE ORAL at 20:31

## 2019-10-10 RX ADMIN — FUROSEMIDE 40 MG: 10 INJECTION, SOLUTION INTRAMUSCULAR; INTRAVENOUS at 08:13

## 2019-10-10 RX ADMIN — WARFARIN SODIUM 2.5 MG: 2.5 TABLET ORAL at 17:46

## 2019-10-10 RX ADMIN — VANCOMYCIN HYDROCHLORIDE 2000 MG: 10 INJECTION, POWDER, LYOPHILIZED, FOR SOLUTION INTRAVENOUS at 15:31

## 2019-10-10 RX ADMIN — SPIRONOLACTONE 25 MG: 25 TABLET ORAL at 08:12

## 2019-10-10 ASSESSMENT — ACTIVITIES OF DAILY LIVING (ADL)
ADLS_ACUITY_SCORE: 10

## 2019-10-10 ASSESSMENT — MIFFLIN-ST. JEOR: SCORE: 1891.88

## 2019-10-10 NOTE — PLAN OF CARE
Vitals stable.  Pain well controlled with IV dilaudid.  Tolerating regular diet.  Ambulating independently. IV antibiotics given overnight. Tele monitor on.  Patient slept most of the night with home CPAP on.  Plan of care reviewed with patient.

## 2019-10-10 NOTE — PROGRESS NOTES
"[facility] Internal Medicine Progress Note     Date of Service (when I saw the patient): 10/10/2019    REASON FOR ADMISSION / INTERVAL HISTORY:  LLE erythema/edema. Still not better-still feels leg painful/ warm and erythematous.    ASSESSMENT/PLAN:     LLE CELLULITIS with SEPSIS  Presenting with LE erythema/ edema. Has fever/ tachycardia/ leucopenia. Had LA 2.5. was given 3L fluids in ED. HR improved and stable BP.. Has h/o MRSA. Started on vanco on admit. Since was not feeling any better-rocephin added 2 days ago.   Pt does not feel any better-feels leg is still feeling warm/ painful/erythematous. afebrile since yesterday morning. Blood cx-p. In past was rx with iv zosyn/ vanco  -due to not improving, will change antbx to zosyn from rocephin. Continue vanco. Follow CBC.    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 Has pancytopenia. Last saw Dr Xiong in hepatology 2 weeks ago. No new changes    LE edema  On po lasix at home. Has significant edema-changed to iv lasix 10/8. Diuresing well. Labs stable. Still significant edema  -continue iv lasix.     Atrial fibrillation, s/p cardioversion  On coumadin/ metoprolol. Stable  -continue meds     H/o cardiomyopathy felt due to atrial fibrillation with RVR, resolved after cardioversion  H/o EF  55 on most recent echocardiogram 6/19  --cont lisinopril/ Lasix     H/o genital herpes  quisent.     dispo   Will be in hospital 1-2 days.    JAYDE SCHMIDT MD   Pg [phone]    DVT Prhylaxis: Warfarin  Code Status: Full Code    ROS:  As described in A/P and Exam.  Otherwise ALL are  negative.    PHYSICAL EXAM:  All vitals have been reviewed    Blood pressure 112/65, pulse 88, temperature 97.9  F (36.6  C), temperature source Oral, resp. rate 18, height 1.651 m (5' 5\"), weight " 114.5 kg (252 lb 6.8 oz), SpO2 93 %.    I/O this shift:  In: 243 [P.O.:240; I.V.:3]  Out: 1500 [Urine:1500]    GENERAL APPEARANCE: healthy, alert and no distress  EYES: conjunctiva clear, eyes grossly normal  HENT: external ears and nose normal   NECK: supple, no masses or adenopathy  RESP: lungs -few crackles bases- no rales, rhonchi or wheezes  CV: regular rate and rhythm, normal S1 S2, no S3 or S4 and no murmur, click or rub   ABDOMEN: soft, nontender, no HSM or masses and bowel sounds normal  MS: no clubbing, cyanosis; 1-2+ edema B LE  SKIN: LLE erythema circumferential ankle to lower knee  NEURO: -non-focal moves all 4 extr    ROUTINE  LABS (Last four results)  CMP  Recent Labs   Lab 10/10/19  0457 10/09/19  0453 10/07/19  0451 10/06/19  0923    140 138 141   POTASSIUM 3.8 3.6 3.7 3.7   CHLORIDE 107 106 105 105   CO2 27 28 26 26   ANIONGAP 7 6 7 10   GLC 96 104* 100* 99   BUN 15 15 18 19   CR 0.62* 0.62* 0.81 0.74   GFRESTIMATED >90 >90 >90 >90   GFRESTBLACK >90 >90 >90 >90   HALEY 8.5 7.9* 7.9* 8.8   PROTTOTAL  --   --   --  6.6*   ALBUMIN  --   --   --  3.7   BILITOTAL  --   --   --  1.4*   ALKPHOS  --   --   --  73   AST  --   --   --  61*   ALT  --   --   --  46     CBC  Recent Labs   Lab 10/10/19  0457 10/08/19  0517 10/07/19  0451 10/06/19  0923   WBC 3.6* 3.5* 3.5* 5.2   RBC 3.52* 3.25* 3.11* 3.83*   HGB 10.6* 9.9* 9.4* 11.6*   HCT 33.6* 30.7* 29.4* 36.1*   MCV 96 95 95 94   MCH 30.1 30.5 30.2 30.3   MCHC 31.5 32.2 32.0 32.1   RDW 14.8 14.8 14.8 14.6   PLT 59* 51* 52* 74*     INR  Recent Labs   Lab 10/10/19  0457 10/09/19  0453 10/08/19  0517 10/07/19  0451   INR 2.45* 2.36* 2.46* 2.46*     Arterial Blood GasNo lab results found in last 7 days.    No results found for this or any previous visit (from the past 24 hour(s)).

## 2019-10-10 NOTE — PROGRESS NOTES
Patient reporting pain is manageable. Has not required pain medication this shift.  LLE remains red, warm, and edematous.  Patient reports minimal improvement. RLE improving. IV antibiotics infused per MAR.

## 2019-10-11 LAB
ERYTHROCYTE [DISTWIDTH] IN BLOOD BY AUTOMATED COUNT: 14.8 % (ref 10–15)
HCT VFR BLD AUTO: 32.6 % (ref 40–53)
HGB BLD-MCNC: 10.5 G/DL (ref 13.3–17.7)
INR PPP: 2.6 (ref 0.86–1.14)
MCH RBC QN AUTO: 30.2 PG (ref 26.5–33)
MCHC RBC AUTO-ENTMCNC: 32.2 G/DL (ref 31.5–36.5)
MCV RBC AUTO: 94 FL (ref 78–100)
PLATELET # BLD AUTO: 65 10E9/L (ref 150–450)
RBC # BLD AUTO: 3.48 10E12/L (ref 4.4–5.9)
WBC # BLD AUTO: 3.9 10E9/L (ref 4–11)

## 2019-10-11 PROCEDURE — 25000132 ZZH RX MED GY IP 250 OP 250 PS 637: Performed by: INTERNAL MEDICINE

## 2019-10-11 PROCEDURE — 36415 COLL VENOUS BLD VENIPUNCTURE: CPT | Performed by: FAMILY MEDICINE

## 2019-10-11 PROCEDURE — 25000128 H RX IP 250 OP 636: Performed by: INTERNAL MEDICINE

## 2019-10-11 PROCEDURE — 12000000 ZZH R&B MED SURG/OB

## 2019-10-11 PROCEDURE — 85027 COMPLETE CBC AUTOMATED: CPT | Performed by: FAMILY MEDICINE

## 2019-10-11 PROCEDURE — 85610 PROTHROMBIN TIME: CPT | Performed by: FAMILY MEDICINE

## 2019-10-11 PROCEDURE — 25000128 H RX IP 250 OP 636: Performed by: FAMILY MEDICINE

## 2019-10-11 PROCEDURE — 25000132 ZZH RX MED GY IP 250 OP 250 PS 637: Performed by: FAMILY MEDICINE

## 2019-10-11 PROCEDURE — 25800030 ZZH RX IP 258 OP 636: Performed by: FAMILY MEDICINE

## 2019-10-11 PROCEDURE — 99232 SBSQ HOSP IP/OBS MODERATE 35: CPT | Performed by: INTERNAL MEDICINE

## 2019-10-11 RX ADMIN — METOPROLOL SUCCINATE 100 MG: 100 TABLET, EXTENDED RELEASE ORAL at 22:58

## 2019-10-11 RX ADMIN — Medication 2 TABLET: at 08:20

## 2019-10-11 RX ADMIN — TAZOBACTAM SODIUM AND PIPERACILLIN SODIUM 3.38 G: 375; 3 INJECTION, SOLUTION INTRAVENOUS at 11:14

## 2019-10-11 RX ADMIN — MELATONIN 2000 UNITS: at 08:21

## 2019-10-11 RX ADMIN — VANCOMYCIN HYDROCHLORIDE 2000 MG: 10 INJECTION, POWDER, LYOPHILIZED, FOR SOLUTION INTRAVENOUS at 03:18

## 2019-10-11 RX ADMIN — FLUTICASONE FUROATE AND VILANTEROL TRIFENATATE 1 PUFF: 100; 25 POWDER RESPIRATORY (INHALATION) at 08:39

## 2019-10-11 RX ADMIN — FUROSEMIDE 40 MG: 10 INJECTION, SOLUTION INTRAMUSCULAR; INTRAVENOUS at 08:21

## 2019-10-11 RX ADMIN — HYDROMORPHONE HYDROCHLORIDE 0.2 MG: 1 INJECTION, SOLUTION INTRAMUSCULAR; INTRAVENOUS; SUBCUTANEOUS at 03:25

## 2019-10-11 RX ADMIN — PANTOPRAZOLE SODIUM 40 MG: 20 TABLET, DELAYED RELEASE ORAL at 08:20

## 2019-10-11 RX ADMIN — TAZOBACTAM SODIUM AND PIPERACILLIN SODIUM 3.38 G: 375; 3 INJECTION, SOLUTION INTRAVENOUS at 22:58

## 2019-10-11 RX ADMIN — METOPROLOL SUCCINATE 25 MG: 25 TABLET, EXTENDED RELEASE ORAL at 08:20

## 2019-10-11 RX ADMIN — TAZOBACTAM SODIUM AND PIPERACILLIN SODIUM 3.38 G: 375; 3 INJECTION, SOLUTION INTRAVENOUS at 16:51

## 2019-10-11 RX ADMIN — POTASSIUM CHLORIDE 40 MEQ: 20 TABLET, EXTENDED RELEASE ORAL at 08:20

## 2019-10-11 RX ADMIN — TAZOBACTAM SODIUM AND PIPERACILLIN SODIUM 3.38 G: 375; 3 INJECTION, SOLUTION INTRAVENOUS at 06:00

## 2019-10-11 RX ADMIN — FUROSEMIDE 40 MG: 10 INJECTION, SOLUTION INTRAMUSCULAR; INTRAVENOUS at 15:56

## 2019-10-11 RX ADMIN — SPIRONOLACTONE 25 MG: 25 TABLET ORAL at 08:21

## 2019-10-11 RX ADMIN — Medication 1.25 MG: at 16:53

## 2019-10-11 RX ADMIN — HYDROMORPHONE HYDROCHLORIDE 0.2 MG: 1 INJECTION, SOLUTION INTRAMUSCULAR; INTRAVENOUS; SUBCUTANEOUS at 23:39

## 2019-10-11 ASSESSMENT — ACTIVITIES OF DAILY LIVING (ADL)
ADLS_ACUITY_SCORE: 10

## 2019-10-11 ASSESSMENT — MIFFLIN-ST. JEOR: SCORE: 1880.88

## 2019-10-11 NOTE — PLAN OF CARE
Vitals stable; pain controlled with IV dilaudid.  IV antibiotics administered per MAR.  Voiding.  Up independently.  Tolerating regular diet.  Legs red/francesco, warm to the touch bilaterally.  Plan of care reviewed with patient.

## 2019-10-11 NOTE — PLAN OF CARE
Pt a&ox4, vss on RA. denied pain. Up ad hubert. Marked erythema on LLE. Urinal at bedside, given iv lasix, and antibiotics infused as ordered. Calls appropriately.

## 2019-10-11 NOTE — PLAN OF CARE
Vitals stable; afebrile.  Pain controlled with dilaudid.  Tolerating regular diet. Up independently. Lower legs warm to the touch.  Plan of care reviewed with patient.

## 2019-10-11 NOTE — PLAN OF CARE
Patient reports cellulitis is improved but reports that he doesn't feel comfortable with discharge r/t redness and pain. He denies need for pain med. He was instructed to sit in chair for meals and walk in halls. He is eating 100% of his meals. Receiving zosyn every 6 hours. Saline locked between doses.

## 2019-10-11 NOTE — PROGRESS NOTES
[facility] Internal Medicine Progress Note     Date of Service (when I saw the patient): 10/11/2019    REASON FOR ADMISSION / INTERVAL HISTORY:  LLE erythema/edema. Improved but still feels leg painful/ warm and erythematous.    ASSESSMENT/PLAN:     LLE CELLULITIS with SEPSIS  Presenting with LE erythema/ edema. Had fever/ tachycardia/ leucopenia. Had LA 2.5. was given 3L fluids in ED. HR improved and stable BP.. Has h/o MRSA. Started on vanco on admit. Since was not feeling any better-rocephin added 3 days ago. Pt does not feel any better-feels leg is still feeling warm/ painful/erythematous. Afebrile last 2 days. Blood cx-p. WBC stable. In past was rx with iv zosyn/ vanco  Due to not improving, changed  antbx to zosyn from rocephin 10/9. Continued vanco.leg appears lot improved-erythema significantly improved though pt feels its still warm/ heavy and not comfortable going home on po antbx yet.  -continue zosyn. Stop vanco. Home in AM on po antbx    Encephalopathy, mild, toxic due to sepsis   Ruled out.    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 Has pancytopenia. Last saw Dr Xiong in hepatology 2 weeks ago. No new changes    LE edema  On po lasix at home. Has significant edema-changed to iv lasix 10/8. Diuresing well. Labs stable. Still significant edema-likely baseline.  -change back to oral lasix home dose. Continue oral potassium now.     Atrial fibrillation, s/p cardioversion  On coumadin/ metoprolol. Stable  -continue meds     H/o cardiomyopathy felt due to atrial fibrillation with RVR, resolved after cardioversion  H/o EF  55 on most recent echocardiogram 6/19  --cont lisinopril/ Lasix     H/o genital herpes  quisent.     dispo   Home in AM likely    JAYDE SCHMIDT MD   Pg [phone]    DVT Prhylaxis: Warfarin  Code Status: Full Code    ROS:  As described in  "A/P and Exam.  Otherwise ALL are  negative.    PHYSICAL EXAM:  All vitals have been reviewed    Blood pressure 113/70, pulse 92, temperature 98.3  F (36.8  C), temperature source Oral, resp. rate 18, height 1.651 m (5' 5\"), weight 113.4 kg (250 lb), SpO2 96 %.    I/O this shift:  In: 480 [P.O.:480]  Out: 800 [Urine:800]    GENERAL APPEARANCE: healthy, alert and no distress  EYES: conjunctiva clear, eyes grossly normal  HENT: external ears and nose normal   NECK: supple, no masses or adenopathy  MS: no clubbing, cyanosis; 1-2+ edema B LE  SKIN: LLE erythema circumferential ankle to lower knee-significantly improved  NEURO: -non-focal moves all 4 extr    ROUTINE  LABS (Last four results)  CMP  Recent Labs   Lab 10/10/19  0457 10/09/19  0453 10/07/19  0451 10/06/19  0923    140 138 141   POTASSIUM 3.8 3.6 3.7 3.7   CHLORIDE 107 106 105 105   CO2 27 28 26 26   ANIONGAP 7 6 7 10   GLC 96 104* 100* 99   BUN 15 15 18 19   CR 0.62* 0.62* 0.81 0.74   GFRESTIMATED >90 >90 >90 >90   GFRESTBLACK >90 >90 >90 >90   HALEY 8.5 7.9* 7.9* 8.8   PROTTOTAL  --   --   --  6.6*   ALBUMIN  --   --   --  3.7   BILITOTAL  --   --   --  1.4*   ALKPHOS  --   --   --  73   AST  --   --   --  61*   ALT  --   --   --  46     CBC  Recent Labs   Lab 10/11/19  0529 10/10/19  0457 10/08/19  0517 10/07/19  0451   WBC 3.9* 3.6* 3.5* 3.5*   RBC 3.48* 3.52* 3.25* 3.11*   HGB 10.5* 10.6* 9.9* 9.4*   HCT 32.6* 33.6* 30.7* 29.4*   MCV 94 96 95 95   MCH 30.2 30.1 30.5 30.2   MCHC 32.2 31.5 32.2 32.0   RDW 14.8 14.8 14.8 14.8   PLT 65* 59* 51* 52*     INR  Recent Labs   Lab 10/11/19  0529 10/10/19  0457 10/09/19  0453 10/08/19  0517   INR 2.60* 2.45* 2.36* 2.46*     Arterial Blood GasNo lab results found in last 7 days.    No results found for this or any previous visit (from the past 24 hour(s)).    "

## 2019-10-12 VITALS
SYSTOLIC BLOOD PRESSURE: 103 MMHG | RESPIRATION RATE: 18 BRPM | BODY MASS INDEX: 40.55 KG/M2 | DIASTOLIC BLOOD PRESSURE: 49 MMHG | WEIGHT: 243.39 LBS | OXYGEN SATURATION: 94 % | HEIGHT: 65 IN | TEMPERATURE: 97.8 F | HEART RATE: 86 BPM

## 2019-10-12 LAB
BACTERIA SPEC CULT: NO GROWTH
BACTERIA SPEC CULT: NO GROWTH
INR PPP: 2.51 (ref 0.86–1.14)
LACTATE BLD-SCNC: 0.8 MMOL/L (ref 0.7–2)
Lab: NORMAL
Lab: NORMAL
SPECIMEN SOURCE: NORMAL
SPECIMEN SOURCE: NORMAL

## 2019-10-12 PROCEDURE — 83605 ASSAY OF LACTIC ACID: CPT | Performed by: INTERNAL MEDICINE

## 2019-10-12 PROCEDURE — 25000132 ZZH RX MED GY IP 250 OP 250 PS 637: Performed by: FAMILY MEDICINE

## 2019-10-12 PROCEDURE — 25000132 ZZH RX MED GY IP 250 OP 250 PS 637: Performed by: INTERNAL MEDICINE

## 2019-10-12 PROCEDURE — 85610 PROTHROMBIN TIME: CPT | Performed by: FAMILY MEDICINE

## 2019-10-12 PROCEDURE — 36415 COLL VENOUS BLD VENIPUNCTURE: CPT | Performed by: FAMILY MEDICINE

## 2019-10-12 PROCEDURE — 99239 HOSP IP/OBS DSCHRG MGMT >30: CPT | Performed by: INTERNAL MEDICINE

## 2019-10-12 PROCEDURE — 25000128 H RX IP 250 OP 636: Performed by: INTERNAL MEDICINE

## 2019-10-12 PROCEDURE — 25000128 H RX IP 250 OP 636: Performed by: FAMILY MEDICINE

## 2019-10-12 RX ORDER — CEPHALEXIN 500 MG/1
500 CAPSULE ORAL 4 TIMES DAILY
Qty: 28 CAPSULE | Refills: 0 | Status: SHIPPED | OUTPATIENT
Start: 2019-10-12 | End: 2019-11-06

## 2019-10-12 RX ORDER — WARFARIN SODIUM 2.5 MG/1
2.5 TABLET ORAL
Status: DISCONTINUED | OUTPATIENT
Start: 2019-10-12 | End: 2019-10-12 | Stop reason: HOSPADM

## 2019-10-12 RX ADMIN — Medication 2 TABLET: at 09:26

## 2019-10-12 RX ADMIN — FUROSEMIDE 40 MG: 10 INJECTION, SOLUTION INTRAMUSCULAR; INTRAVENOUS at 09:26

## 2019-10-12 RX ADMIN — METOPROLOL SUCCINATE 25 MG: 25 TABLET, EXTENDED RELEASE ORAL at 09:27

## 2019-10-12 RX ADMIN — MELATONIN 2000 UNITS: at 09:27

## 2019-10-12 RX ADMIN — FLUTICASONE FUROATE AND VILANTEROL TRIFENATATE 1 PUFF: 100; 25 POWDER RESPIRATORY (INHALATION) at 09:28

## 2019-10-12 RX ADMIN — TAZOBACTAM SODIUM AND PIPERACILLIN SODIUM 3.38 G: 375; 3 INJECTION, SOLUTION INTRAVENOUS at 05:28

## 2019-10-12 RX ADMIN — PANTOPRAZOLE SODIUM 40 MG: 20 TABLET, DELAYED RELEASE ORAL at 09:25

## 2019-10-12 RX ADMIN — POTASSIUM CHLORIDE 40 MEQ: 20 TABLET, EXTENDED RELEASE ORAL at 09:26

## 2019-10-12 RX ADMIN — SPIRONOLACTONE 25 MG: 25 TABLET ORAL at 09:28

## 2019-10-12 RX ADMIN — HYDROMORPHONE HYDROCHLORIDE 0.2 MG: 1 INJECTION, SOLUTION INTRAMUSCULAR; INTRAVENOUS; SUBCUTANEOUS at 03:37

## 2019-10-12 ASSESSMENT — ACTIVITIES OF DAILY LIVING (ADL)
ADLS_ACUITY_SCORE: 10

## 2019-10-12 ASSESSMENT — MIFFLIN-ST. JEOR: SCORE: 1850.88

## 2019-10-12 NOTE — PLAN OF CARE
Alert and oriented.  0.2 mg IV Dilaudid given for left lower extremity pain X 2 and he was able to sleep.   Sleeping with home cpap on.  Patient states he feels left lower extremity is getting worse not better.  Redness with in marked lines, continues to have edema.   Using call light appropriately.

## 2019-10-12 NOTE — DISCHARGE SUMMARY
[facility] Discharge Summary    Maurice Jon MRN# [id number]   Age: 58 year old YOB: 1960     Date of Admission:  10/6/2019  Date of Discharge::  10/12/2019  Admitting Physician:  Hi Alfaro MD  Discharge Physician:  Alissa Schwarz MD  Primary Physician: No Ref-Primary, Physician  Transferring Facility: N/A     Home clinic: unknown          Admission Diagnoses:   Cellulitis of left lower extremity [L03.116]          Discharge Diagnosis:   Principle diagnosis: LLE CELLULITIS  Secondary diagnoses:  Patient Active Problem List   Diagnosis     Thrombocytopenia (H)     Liver Cirrhosis 2nd ot Fatty liver, w Ascites     erectile dysfunction     Compulsive behaviors     BRUCE-Mild (AHI 9; REM RDI 31)     Portal hypertension (H)     Eczema     GERD (gastroesophageal reflux disease)     CARDIOVASCULAR SCREENING; LDL GOAL LESS THAN 160     Obesity     Health Care Home     Edema     Paroxysmal atrial fibrillation (H)     Severe sepsis (H)     History of MRSA now culture negative     Portal vein thrombosis     Long-term (current) use of anticoagulants [Z79.01]     Encounter for monitoring sotalol therapy     Chronic a-fib, S/P Ablation 12/22/18 -- on Warfarin     Right heart failure (H)     Obesity (BMI 35.0-39.9) with comorbidity (H)     CAD (coronary artery disease)     Cardiomyopathy (H)     Pericarditis     Acute on chronic systolic congestive heart failure (H)     Acute combined systolic and diastolic (congestive) hrt fail (H)     Cellulitis          Brief History of Presenting Illness:   As per admit hx  Maurice Jon is a 58-year-old male with history of nonalcoholic cirrhosis, portal vein thrombosis, portal hypertension, GERD, paroxysmal atrial fibrillation on anticoagulation, coronary artery disease and right heart failure.  The patient has had recurrent episodes of cellulitis.  He thinks he has had about 5 of them, they have been in his legs.  He also has a history of MRSA and a  wound in the past.  He cannot give me details of this.  He woke up today and went to work.  He started to feel clammy and he developed redness of his left lower leg.      He came to the emergency room and was evaluated by Dr. Surya Willams.  He was found to be febrile with a temperature of 100.5.  His pulse was 145 in atrial fibrillation.  He was thought to have cellulitis.  He was given Ancef.  He was given 2 liters of crystalloid and was also given 5 of metoprolol and ultimately 25 mg of metoprolol tartrate.  He was transferred to the floor.      Since being on the floor.  He has had fever to 101.8, his heart rate has been variably high.  He was given 1 more liter of fluid because of blood pressure down to 85/57.  He has chills.  He feels mildly short of breath.  He really has no leg pain.      In reviewing the chart, I note that he has been in the past treated with vancomycin for cellulitis because of his MRSA history, although it has been converted fairly rapidly to cephalosporin.     No results found for this or any previous visit (from the past 24 hour(s)).         Hospital Course:   LLE CELLULITIS with SEPSIS  Presenting with LE erythema/ edema. Had fever/ tachycardia/ leucopenia. Had LA 2.5. was given 3L fluids in ED. HR improved and stable BP.. Has h/o MRSA. Started on vanco on admit. Since was not feeling any better-rocephin added 3 days ago. Pt did not feel any better-felt leg was still feeling warm/ painful/erythematous.  Due to not improving, changed  antbx to zosyn from rocephin 10/9. Continued vanco.leg appears lot improved-erythema significantly improved .  Afebrile last 3 days. Blood cx-NTD. WBC stable.   Pt received about 6 days of iv antbx.  Will discharge home on po keflex x 7 days     Encephalopathy, mild, toxic due to sepsis   Ruled out.     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 Has pancytopenia. Last saw Dr Xiong in hepatology 2 weeks ago. No new changes     LE edema  On po lasix at home. Has significant edema-changed to iv lasix 10/8. Diuresing well. Labs stable. Edema improved a lot on iv lasix. Continue home dose at home     Atrial fibrillation, s/p cardioversion  On coumadin/ metoprolol. Stable  -continue meds     H/o cardiomyopathy felt due to atrial fibrillation with RVR, resolved after cardioversion  H/o EF  55 on most recent echocardiogram 6/19  --cont lisinopril/ Lasix      H/o genital herpes  quisent.          Procedures:   No procedures performed during this admission         Allergies:      Allergies   Allergen Reactions     Avelox Other (See Comments) and GI Disturbance     portal hypertension     Moxifloxacin Nausea and Vomiting, Nausea and Other (See Comments)     portal hypertension     Sotalol Itching             Medications Prior to Admission:     Medications Prior to Admission   Medication Sig Dispense Refill Last Dose     budesonide-formoterol (SYMBICORT) 80-4.5 MCG/ACT Inhaler Inhale 2 puffs into the lungs 2 times daily 10.2 g 3 10/6/2019 at am     calcium carb 1250 mg, 500 mg [place],/vitamin D 200 units (OSCAL WITH D) 500-200 MG-UNIT per tablet Take 2 tablets by mouth daily with food.   10/5/2019 at am     furosemide (LASIX) 40 MG tablet Take 1 tablet (40 mg) by mouth 2 times daily 60 tablet 1 10/6/2019 at am     metoprolol succinate ER (TOPROL-XL) 100 MG 24 hr tablet Take 1 tablet (100 mg) by mouth daily (Patient taking differently: Take 100 mg by mouth At Bedtime ) 90 tablet 3 10/4/2019 at pm     metoprolol succinate ER (TOPROL-XL) 25 MG 24 hr tablet Take 1 tablet (25 mg) by mouth every morning 90 tablet 3 10/6/2019 at am     Multiple Vitamins-Minerals (MENS 50+ MULTI VITAMIN/MIN PO) Take 1 tablet by mouth daily   10/6/2019 at am     order for DME Equipment being ordered:   New CPAP mask, tube, filters,water tray 1 Device 3 10/5/2019  at hs     pantoprazole (PROTONIX) 40 MG EC tablet Take 1 tablet (40 mg) by mouth daily 90 tablet 3 10/6/2019 at am     spironolactone (ALDACTONE) 25 MG tablet Take 1 tablet (25 mg) by mouth daily 90 tablet 3 10/6/2019 at am     vitamin D3 (CHOLECALCIFEROL) 2000 units (50 mcg) tablet Take 1 tablet by mouth daily   10/6/2019 at am     warfarin ANTICOAGULANT (JANTOVEN ANTICOAGULANT) 2.5 MG tablet 1.25 mg Fridays; 2.5mg all other days or As directed by Anticoagulation Clinic. INR DUE FOR FURTHER REFILLS 80 tablet 0 10/6/2019 at am     ACE/ARB/ARNI NOT PRESCRIBED (INTENTIONAL) Please choose reason not prescribed, below (Patient not taking: Reported on 9/19/2019)   Not Taking     ASPIRIN NOT PRESCRIBED (INTENTIONAL) Please choose reason not prescribed, below   Not Taking     order for DME Open toe size large 30/40 Mediven Assure Medical Compression socks Model [id number].    Fax to Fax [phone]. [facility] 12 Device 0 Taking     ORDER FOR DME Respironics RemStar 60 Series Auto AFlex 12-15 cm H2O with heated humidty and a modem.  Pt chose a Quattro Air FFM size medium.   Taking     ORDER FOR DME Juzo compression stockings, 30-40mm compression. Patient has portal hypertension and develops leg edema, which these control well. 2 Package 3 Taking     STATIN NOT PRESCRIBED (INTENTIONAL) Please choose reason not prescribed, below   Not Taking             Discharge Medications:     Current Discharge Medication List      START taking these medications    Details   cephALEXin (KEFLEX) 500 MG capsule Take 1 capsule (500 mg) by mouth 4 times daily for 7 days  Qty: 28 capsule, Refills: 0    Associated Diagnoses: Cellulitis of left lower extremity         CONTINUE these medications which have NOT CHANGED    Details   budesonide-formoterol (SYMBICORT) 80-4.5 MCG/ACT Inhaler Inhale 2 puffs into the lungs 2 times daily  Qty: 10.2 g, Refills: 3    Associated Diagnoses: Shortness of breath      calcium carb 1250 mg, 500 mg  [place],/vitamin D 200 units (OSCAL WITH D) 500-200 MG-UNIT per tablet Take 2 tablets by mouth daily with food.      furosemide (LASIX) 40 MG tablet Take 1 tablet (40 mg) by mouth 2 times daily  Qty: 60 tablet, Refills: 1    Associated Diagnoses: Portal hypertension (H)      !! metoprolol succinate ER (TOPROL-XL) 100 MG 24 hr tablet Take 1 tablet (100 mg) by mouth daily  Qty: 90 tablet, Refills: 3    Associated Diagnoses: Chronic a-fib      !! metoprolol succinate ER (TOPROL-XL) 25 MG 24 hr tablet Take 1 tablet (25 mg) by mouth every morning  Qty: 90 tablet, Refills: 3    Comments: New additional dose in the morning--in addition to 100 mg dose at night  Associated Diagnoses: Persistent atrial fibrillation      Multiple Vitamins-Minerals (MENS 50+ MULTI VITAMIN/MIN PO) Take 1 tablet by mouth daily      !! order for DME Equipment being ordered:   New CPAP mask, tube, filters,water tray  Qty: 1 Device, Refills: 3    Associated Diagnoses: Sleep apnea      pantoprazole (PROTONIX) 40 MG EC tablet Take 1 tablet (40 mg) by mouth daily  Qty: 90 tablet, Refills: 3    Associated Diagnoses: Gastroesophageal reflux disease, esophagitis presence not specified      spironolactone (ALDACTONE) 25 MG tablet Take 1 tablet (25 mg) by mouth daily  Qty: 90 tablet, Refills: 3    Associated Diagnoses: Portal hypertension (H)      vitamin D3 (CHOLECALCIFEROL) 2000 units (50 mcg) tablet Take 1 tablet by mouth daily      warfarin ANTICOAGULANT (JANTOVEN ANTICOAGULANT) 2.5 MG tablet 1.25 mg Fridays; 2.5mg all other days or As directed by Anticoagulation Clinic. INR DUE FOR FURTHER REFILLS  Qty: 80 tablet, Refills: 0    Associated Diagnoses: Long term current use of anticoagulant therapy; Paroxysmal atrial fibrillation (H); Atrial fibrillation with rapid ventricular response (H)      ACE/ARB/ARNI NOT PRESCRIBED (INTENTIONAL) Please choose reason not prescribed, below    Associated Diagnoses: Thrombocytopenia (H); Cirrhosis of liver without  "ascites, unspecified hepatic cirrhosis type (H)      ASPIRIN NOT PRESCRIBED (INTENTIONAL) Please choose reason not prescribed, below    Associated Diagnoses: Thrombocytopenia (H)      !! order for DME Open toe size large 30/40 Mediven Assure Medical Compression socks Model [id number].    Fax to Fax [phone]. [facility]  Qty: 12 Device, Refills: 0    Associated Diagnoses: Chronic congestive heart failure, unspecified heart failure type (H); Edema, unspecified type      !! ORDER FOR DME Respironics RemStar 60 Series Auto AFlex 12-15 cm H2O with heated humidty and a modem.  Pt chose a Quattro Air FFM size medium.    Associated Diagnoses: BRUCE (obstructive sleep apnea)      !! ORDER FOR DME Juzo compression stockings, 30-40mm compression. Patient has portal hypertension and develops leg edema, which these control well.  Qty: 2 Package, Refills: 3    Associated Diagnoses: Portal hypertension (H); Edema      STATIN NOT PRESCRIBED (INTENTIONAL) Please choose reason not prescribed, below    Associated Diagnoses: Thrombocytopenia (H); Cirrhosis of liver without ascites, unspecified hepatic cirrhosis type (H)       !! - Potential duplicate medications found. Please discuss with provider.                Consultations:   No consultations were requested during this admission            Discharge Exam:   Blood pressure (P) 97/55, pulse (P) 86, temperature (P) 97.8  F (36.6  C), temperature source (P) Oral, resp. rate (P) 18, height 1.651 m (5' 5\"), weight 110.4 kg (243 lb 6.2 oz), SpO2 (P) 94 %.  GENERAL APPEARANCE: healthy, alert and no distress  EYES: conjunctiva clear, eyes grossly normal  HENT: external ears and nose normal   NECK: supple, no masses or adenopathy  RESP: lungs clear to auscultation - no rales, rhonchi or wheezes  CV: regular rate and rhythm, normal S1 S2, no S3 or S4 and no murmur, click or rub   ABDOMEN: soft, nontender, no HSM or masses and bowel sounds normal  MS: no clubbing, cyanosis; 1-2+ edema B " LE  SKIN: clear without significant rashes or lesions  NEURO: Normal strength and tone, sensory exam grossly normal, mentation intact and speech normal    Unresulted Labs Ordered in the Past 30 Days of this Admission     No orders found from 9/6/2019 to 10/7/2019.          No results found for this or any previous visit (from the past 24 hour(s)).         Pending Tests at Discharge:   None         Discharge Instructions and Follow-Up:   Discharge diet: Regular   Discharge activity: Activity as tolerated   Discharge follow-up: Follow up with primary care provider in 1-2 weeks           Discharge Disposition:   Discharged to home      Attestation:  I have reviewed today's vital signs, notes, medications, labs and imaging.    Time Spent on this Encounter   I, Alissa Schwarz MD, personally saw the patient today and spent greater than 30 minutes discharging this patient.    Alissa Schwarz MD

## 2019-10-12 NOTE — PLAN OF CARE
"Writer assigned to this Pt from [id number]. A&O. Up independently in room. Contact precautions. Wife present for part of the evening and reported that she emptied 760 ml of urine out @ 2023. IV saline locked. Redness remains within markings. Zosyn for abx. Pt on warfarin. Denies pain. CPAP on at night. /58 (BP Location: Left arm)   Pulse 86   Temp 97.4  F (36.3  C) (Oral)   Resp 16   Ht 1.651 m (5' 5\")   Wt 113.4 kg (250 lb)   SpO2 95%   BMI 41.60 kg/m  . Will continue to monitor.  "

## 2019-10-12 NOTE — PLAN OF CARE
[facility] DISCHARGE NOTE    Patient discharged to home at 11:40 PM via wheel chair. Accompanied by spouse and staff. Discharge instructions reviewed with patient, opportunity offered to ask questions. Prescriptions sent to patients preferred pharmacy. All belongings sent with patient.    Sydnee Murphy RN

## 2019-10-14 ENCOUNTER — TELEPHONE (OUTPATIENT)
Dept: FAMILY MEDICINE | Facility: CLINIC | Age: 59
End: 2019-10-14

## 2019-10-14 ENCOUNTER — ANTICOAGULATION THERAPY VISIT (OUTPATIENT)
Dept: ANTICOAGULATION | Facility: CLINIC | Age: 59
End: 2019-10-14
Payer: COMMERCIAL

## 2019-10-14 DIAGNOSIS — Z79.01 LONG TERM CURRENT USE OF ANTICOAGULANT THERAPY: ICD-10-CM

## 2019-10-14 DIAGNOSIS — I48.0 PAROXYSMAL ATRIAL FIBRILLATION (H): ICD-10-CM

## 2019-10-14 PROCEDURE — 99207 ZZC NO CHARGE NURSE ONLY: CPT

## 2019-10-14 NOTE — TELEPHONE ENCOUNTER
"  ED for acute condition Discharge Protocol    \"Hi, my name is Linda Sarmiento RN, a registered nurse, and I am calling from [facility].  I am calling to follow up and see how things are going for you after your recent emergency visit.\"    Tell me how you are doing now that you are home?\"  Left left still swollen and tight feeling, tolerating antibiotic well      Discharge Instructions    \"Let's review your discharge instructions.  What is/are the follow-up recommendations?  Pt. Response: patient to continue on oral Keflex, seeing Kathrin Sutton tomorrow in [facility], also going to have INR drawn tomorrow as well.    \"Has an appointment with your primary care provider been scheduled?\"  Yes. (confirm and remind to bring meds)    Medications    \"Tell me what changed about your medicines when you discharged?\"    Kelfex    \"What questions do you have about your medications?\"   None    On warfarin: \"Were you given any recommendations for follow-up with the anticoagulation clinic?\" Yes - Anticoagulation clinic appointment is already scheduled at appropriate interval    Call Summary    \"What questions or concerns do you have about your recent visit and your follow-up care?\"     none    \"If you have questions or things don't continue to improve, we encourage you contact us through the main clinic number (give number).  Even if the clinic is not open, triage nurses are available 24/7 to help you.     We would like you to know that our clinic has extended hours (provide information).  We also have urgent care (provide details on closest location and hours/contact info)\"    \"Thank you for your time and take care!\"      Post Discharge Medication Reconciliation Status: unable to reconcile discharge medications due to pt declining to review, says he has no questions. Feels he understands discharge medications well.    RIKI Gabriel                    "

## 2019-10-14 NOTE — PROGRESS NOTES
ANTICOAGULATION FOLLOW-UP CLINIC VISIT    Patient Name:  Maurice Jon  Date:  10/14/2019  Contact Type:  chart review and update due to hospitalization for cellulitis    SUBJECTIVE:  Patient Findings     Positives:   Change in medications (keflex for 7 days), Hospital admission (10-6 to 10-12 for LLE cellulitis)             OBJECTIVE    INR   Date Value Ref Range Status   10/12/2019 2.51 (H) 0.86 - 1.14 Final       ASSESSMENT / PLAN  No question data found.  Anticoagulation Summary  As of 10/14/2019    INR goal:   2.0-3.0   TTR:   90.8 % (1 y)   INR used for dosing:      Warfarin maintenance plan:   1.25 mg (2.5 mg x 0.5) every Fri; 2.5 mg (2.5 mg x 1) all other days   Full warfarin instructions:   1.25 mg every Fri; 2.5 mg all other days   Weekly warfarin total:   16.25 mg   Plan last modified:   Susan Beyer RN (9/28/2018)   Next INR check:   10/15/2019   Priority:   INR   Target end date:   10/4/2018    Indications    Paroxysmal atrial fibrillation (H) [I48.0]  Atrial fibrillation with rapid ventricular response (H) (Resolved) [I48.91]  Long-term (current) use of anticoagulants [Z79.01] [Z79.01]             Anticoagulation Episode Summary     INR check location:       Preferred lab:       Send INR reminders to:   [facility]    Comments:   * anticoagulation short period surrounding ablation on 12/21/18. Cardiology to decide when to stop warfarin. has well-compensated cirrhosis      Anticoagulation Care Providers     Provider Role Specialty Phone number    Alon Pineda MD [facility] [phone]            See the Encounter Report to view Anticoagulation Flowsheet and Dosing Calendar (Go to Encounters tab in chart review, and find the Anticoagulation Therapy Visit)        Susan Beyer RN

## 2019-10-15 ENCOUNTER — ANTICOAGULATION THERAPY VISIT (OUTPATIENT)
Dept: ANTICOAGULATION | Facility: CLINIC | Age: 59
End: 2019-10-15

## 2019-10-15 ENCOUNTER — OFFICE VISIT (OUTPATIENT)
Dept: FAMILY MEDICINE | Facility: CLINIC | Age: 59
End: 2019-10-15
Payer: COMMERCIAL

## 2019-10-15 VITALS
WEIGHT: 239.8 LBS | OXYGEN SATURATION: 96 % | HEIGHT: 65 IN | BODY MASS INDEX: 39.95 KG/M2 | SYSTOLIC BLOOD PRESSURE: 96 MMHG | DIASTOLIC BLOOD PRESSURE: 56 MMHG | TEMPERATURE: 98.4 F | HEART RATE: 98 BPM

## 2019-10-15 DIAGNOSIS — I48.0 PAROXYSMAL ATRIAL FIBRILLATION (H): ICD-10-CM

## 2019-10-15 DIAGNOSIS — L03.116 CELLULITIS OF LEFT LOWER LEG: Primary | ICD-10-CM

## 2019-10-15 DIAGNOSIS — Z79.01 LONG TERM CURRENT USE OF ANTICOAGULANT THERAPY: ICD-10-CM

## 2019-10-15 DIAGNOSIS — I42.9 CARDIOMYOPATHY, UNSPECIFIED TYPE (H): ICD-10-CM

## 2019-10-15 DIAGNOSIS — K74.60 LIVER CIRRHOSIS SECONDARY TO NONALCOHOLIC STEATOHEPATITIS (NASH) (H): ICD-10-CM

## 2019-10-15 DIAGNOSIS — K75.81 LIVER CIRRHOSIS SECONDARY TO NONALCOHOLIC STEATOHEPATITIS (NASH) (H): ICD-10-CM

## 2019-10-15 LAB — INR PPP: 1.94 (ref 0.86–1.14)

## 2019-10-15 PROCEDURE — 85610 PROTHROMBIN TIME: CPT | Performed by: FAMILY MEDICINE

## 2019-10-15 PROCEDURE — 36415 COLL VENOUS BLD VENIPUNCTURE: CPT | Performed by: FAMILY MEDICINE

## 2019-10-15 PROCEDURE — 99207 ZZC NO CHARGE NURSE ONLY: CPT

## 2019-10-15 PROCEDURE — 99495 TRANSJ CARE MGMT MOD F2F 14D: CPT | Performed by: NURSE PRACTITIONER

## 2019-10-15 ASSESSMENT — MIFFLIN-ST. JEOR: SCORE: 1834.61

## 2019-10-15 NOTE — TELEPHONE ENCOUNTER
Patient left a voice mail stating he was waiting to talk to someone about supplies and a possible new machine. He was last seen 12/2018 so is not due for his bi yearly until 12/2020. Im sure Eder will sign a note for replacement. Please call patient.   Thank you

## 2019-10-15 NOTE — PROGRESS NOTES
Subjective     Maurice Jon is a 58 year old male who presents to clinic today for the following health issues:  Chief Complaint   Patient presents with     Hospital F/U       HPI       Hospital Follow-up Visit:    Hospital/Nursing Home/IP Rehab Facility: [facility]  Date of Admission: 10/06/2019  Date of Discharge: 10/12/2019  Reason(s) for Admission: Cellulitis of the Lower Left Leg            Problems taking medications regularly:  None       Medication changes since discharge: Started cephALEXin (KEFLEX) 500MG Taking 4 times days for 7 days        Problems adhering to non-medication therapy:  None    Summary of hospitalization:  [facility] discharge summary reviewed  Diagnostic Tests/Treatments reviewed.  Follow up needed: none  Other Healthcare Providers Involved in Patient s Care:         None  Update since discharge: improved.     Post Discharge Medication Reconciliation: discharge medications reconciled, continue medications without change.  Plan of care communicated with patient     Coding guidelines for this visit:  Type of Medical   Decision Making Face-to-Face Visit       within 7 Days of discharge Face-to-Face Visit        within 14 days of discharge   Moderate Complexity [id number]   High Complexity 65009 93833                Hospital Course:   LLE CELLULITIS with SEPSIS  Presenting with LE erythema/ edema. Had fever/ tachycardia/ leucopenia. Had LA 2.5. was given 3L fluids in ED. HR improved and stable BP.. Has h/o MRSA. Started on vanco on admit. Since was not feeling any better-rocephin added 3 days ago. Pt did not feel any better-felt leg was still feeling warm/ painful/erythematous.  Due to not improving, changed  antbx to zosyn from rocephin 10/9. Continued vanco.leg appears lot improved-erythema significantly improved .  Afebrile last 3 days. Blood cx-NTD. WBC stable.   Pt received about 6 days of iv antbx.  Will discharge home on po keflex x 7 days     Encephalopathy,  mild, toxic due to sepsis   Ruled out.     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 Has pancytopenia. Last saw Dr Xiong in hepatology 2 weeks ago. No new changes     LE edema  On po lasix at home. Has significant edema-changed to iv lasix 10/8. Diuresing well. Labs stable. Edema improved a lot on iv lasix. Continue home dose at home     Atrial fibrillation, s/p cardioversion  On coumadin/ metoprolol. Stable  -continue meds     H/o cardiomyopathy felt due to atrial fibrillation with RVR, resolved after cardioversion  H/o EF  55 on most recent echocardiogram 6/19  --cont lisinopril/ Lasix      H/o genital herpes  quisent.      His cellulitis has improved, edema continues. He feels well at present, back to his previous baseline prior to admission for cellulitis.  He is requesting [facility] paperwork to be completed.   He has established GI and cardiology care but does not have a PCP. He has seen Dr. MELODIE Pineda in the past and wishes to see her.        Patient Active Problem List   Diagnosis     Thrombocytopenia (H)     Liver Cirrhosis 2nd ot Fatty liver, w Ascites     erectile dysfunction     Compulsive behaviors     BRUCE-Mild (AHI 9; REM RDI 31)     Portal hypertension (H)     Eczema     GERD (gastroesophageal reflux disease)     CARDIOVASCULAR SCREENING; LDL GOAL LESS THAN 160     Obesity     Health Care Home     Edema     Paroxysmal atrial fibrillation (H)     Severe sepsis (H)     History of MRSA now culture negative     Portal vein thrombosis     Long-term (current) use of anticoagulants [Z79.01]     Encounter for monitoring sotalol therapy     Chronic a-fib, S/P Ablation 12/22/18 -- on Warfarin     Right heart failure (H)     Obesity (BMI 35.0-39.9) with comorbidity (H)     CAD (coronary artery disease)     Cardiomyopathy (H)     Pericarditis     Acute on chronic systolic  congestive heart failure (H)     Acute combined systolic and diastolic (congestive) hrt fail (H)     Cellulitis     Past Surgical History:   Procedure Laterality Date     ANESTHESIA CARDIOVERSION N/A 1/19/2015    Procedure: ANESTHESIA CARDIOVERSION;  Surgeon: Generic Anesthesia Provider;  Location: [facility] OR     ANESTHESIA CARDIOVERSION N/A 2/6/2019    Procedure: ANESTHESIA CARDIOVERSION;  Surgeon: GENERIC ANESTHESIA PROVIDER;  Location:  OR     ANESTHESIA CARDIOVERSION N/A 7/5/2019    Procedure: ANESTHESIA FOR CARDIOVERSION  DR. MURGUIA);  Surgeon: GENERIC ANESTHESIA PROVIDER;  Location:  OR     COLONOSCOPY N/A 8/14/2017    Procedure: COLONOSCOPY;  Colonoscopy Called will arrive appx 12:30 Per phone call;  Surgeon: Vincent Whittington MD;  Location:  GI     CV HEART CATHETERIZATION WITH POSSIBLE INTERVENTION N/A 12/26/2018    Procedure: Heart Catheterization with possible Intervention;  Surgeon: Brandon Arroyo MD;  Location:  HEART CARDIAC CATH LAB     EP ABLATION FOCAL AFIB N/A 12/21/2018    Procedure: EP Ablation Focal AFIB;  Surgeon: Kristofer Murguia MD;  Location:  HEART CARDIAC CATH LAB     ESOPHAGOSCOPY, GASTROSCOPY, DUODENOSCOPY (EGD), COMBINED  7/11/2011    Procedure:COMBINED ESOPHAGOSCOPY, GASTROSCOPY, DUODENOSCOPY (EGD); Surgeon:LAURA LAMAS; Location:[facility] GI     ESOPHAGOSCOPY, GASTROSCOPY, DUODENOSCOPY (EGD), COMBINED  9/10/2012    Procedure: COMBINED ESOPHAGOSCOPY, GASTROSCOPY, DUODENOSCOPY (EGD);;  Surgeon: Hi Roque MD;  Location:  GI     ESOPHAGOSCOPY, GASTROSCOPY, DUODENOSCOPY (EGD), COMBINED  9/9/2013    Procedure: COMBINED ESOPHAGOSCOPY, GASTROSCOPY, DUODENOSCOPY (EGD);;  Surgeon: Hi Roque MD;  Location:  GI     ESOPHAGOSCOPY, GASTROSCOPY, DUODENOSCOPY (EGD), COMBINED N/A 9/15/2014    Procedure: COMBINED ESOPHAGOSCOPY, GASTROSCOPY, DUODENOSCOPY (EGD);  Surgeon: Hi Roque MD;  Location:  GI     ESOPHAGOSCOPY, GASTROSCOPY, DUODENOSCOPY (EGD), COMBINED N/A 10/30/2015     Procedure: COMBINED ESOPHAGOSCOPY, GASTROSCOPY, DUODENOSCOPY (EGD);  Surgeon: Feliberto Fox MD;  Location: [facility]     ESOPHAGOSCOPY, GASTROSCOPY, DUODENOSCOPY (EGD), COMBINED N/A 10/10/2016    Procedure: COMBINED ESOPHAGOSCOPY, GASTROSCOPY, DUODENOSCOPY (EGD);  Surgeon: Jose Nesbitt MD;  Location: [facility] GI     ESOPHAGOSCOPY, GASTROSCOPY, DUODENOSCOPY (EGD), COMBINED N/A 2019    Procedure: ESOPHAGOGASTRODUODENOSCOPY (EGD);  Surgeon: Timo Soares MD;  Location: [facility] GI     H ABLATION FOCAL AFIB  2018     HERNIA REPAIR       IRRIGATION AND DEBRIDEMENT ABSCESS SCROTUM, COMBINED  10/4/2012    Procedure: COMBINED IRRIGATION AND DEBRIDEMENT ABSCESS SCROTUM;  Irrigation and Debridement of Groin Abscess;  Surgeon: Benny Shoemaker MD;  Location: [facility] OR     SOFT TISSUE SURGERY       SURGICAL HISTORY OF -       rt elbow     SURGICAL HISTORY OF -   1/15/98    repair of ventral and umbilical hernia     SURGICAL HISTORY OF -       endoscopy       Social History     Tobacco Use     Smoking status: Former Smoker     Packs/day: 0.80     Years: 6.00     Pack years: 4.80     Types: Cigarettes     Last attempt to quit: 2002     Years since quittin.7     Smokeless tobacco: Never Used   Substance Use Topics     Alcohol use: No     Alcohol/week: 0.0 standard drinks     Comment: quit in      Family History   Problem Relation Age of Onset     Lipids Mother      Hypertension Mother      Eye Disorder Mother      Heart Disease Father         CHF     Lipids Father      Obesity Father      Heart Disease Brother         MI     Eye Disorder Brother      Hypertension Brother         portal HTN     Thrombosis Sister         in her lung     Cancer Other         maternal grandparent/throat cancer         Current Outpatient Medications   Medication Sig Dispense Refill     budesonide-formoterol (SYMBICORT) 80-4.5 MCG/ACT Inhaler Inhale 2 puffs into the lungs 2 times daily 10.2 g 3     calcium  carb 1250 mg, 500 mg [place],/vitamin D 200 units (OSCAL WITH D) 500-200 MG-UNIT per tablet Take 2 tablets by mouth daily with food.       cephALEXin (KEFLEX) 500 MG capsule Take 1 capsule (500 mg) by mouth 4 times daily for 7 days 28 capsule 0     furosemide (LASIX) 40 MG tablet Take 1 tablet (40 mg) by mouth 2 times daily 60 tablet 1     metoprolol succinate ER (TOPROL-XL) 100 MG 24 hr tablet Take 1 tablet (100 mg) by mouth daily (Patient taking differently: Take 100 mg by mouth At Bedtime ) 90 tablet 3     metoprolol succinate ER (TOPROL-XL) 25 MG 24 hr tablet Take 1 tablet (25 mg) by mouth every morning 90 tablet 3     Multiple Vitamins-Minerals (MENS 50+ MULTI VITAMIN/MIN PO) Take 1 tablet by mouth daily       order for DME Open toe size large 30/40 Mediven Assure Medical Compression socks Model 58586.    Fax to Fax [phone]. [facility] medical 12 Device 0     order for DME Equipment being ordered:   New CPAP mask, tube, filters,water tray 1 Device 3     ORDER FOR DME Respironics RemStar 60 Series Auto AFlex 12-15 cm H2O with heated humidty and a modem.  Pt chose a Quattro Air FFM size medium.       ORDER FOR DME Juzo compression stockings, 30-40mm compression. Patient has portal hypertension and develops leg edema, which these control well. 2 Package 3     pantoprazole (PROTONIX) 40 MG EC tablet Take 1 tablet (40 mg) by mouth daily 90 tablet 3     spironolactone (ALDACTONE) 25 MG tablet Take 1 tablet (25 mg) by mouth daily 90 tablet 3     STATIN NOT PRESCRIBED (INTENTIONAL) Please choose reason not prescribed, below       vitamin D3 (CHOLECALCIFEROL) 2000 units (50 mcg) tablet Take 1 tablet by mouth daily       warfarin ANTICOAGULANT (JANTOVEN ANTICOAGULANT) 2.5 MG tablet 1.25 mg Fridays; 2.5mg all other days or As directed by Anticoagulation Clinic. INR DUE FOR FURTHER REFILLS 80 tablet 0     ACE/ARB/ARNI NOT PRESCRIBED (INTENTIONAL) Please choose reason not prescribed, below (Patient not taking: Reported  on 9/19/2019)       ASPIRIN NOT PRESCRIBED (INTENTIONAL) Please choose reason not prescribed, below       Allergies   Allergen Reactions     Avelox Other (See Comments) and GI Disturbance     portal hypertension     Moxifloxacin Nausea and Vomiting, Nausea and Other (See Comments)     portal hypertension     Sotalol Itching     Recent Labs   Lab Test 10/10/19  0457 10/09/19  0453  10/06/19  0923 09/19/19  0552  03/15/19  1618  01/03/19  0846  05/19/18  07/28/15  1440  02/27/13   LDL  --   --   --   --  58  --   --   --   --   --  50  --   --   --  54   HDL  --   --   --   --  54  --   --   --   --   --  42  --   --   --  59   TRIG  --   --   --   --  80  --   --   --   --   --  63  --   --   --  72   ALT  --   --   --  46 40  --  44   < >  --    < >  --    < > 46   < > 42   CR 0.62* 0.62*   < > 0.74 0.70  --  0.74  --  0.87   < >  --    < > 0.73   < > 0.66   GFRESTIMATED >90 >90   < > >90 >90  --  >90  --  >90   < >  --    < > >90  Non  GFR Calc     < > 111   GFRESTBLACK >90 >90   < > >90 >90  --  >90  --  >90   < >  --    < > >90   GFR Calc     < > 129   POTASSIUM 3.8 3.6   < > 3.7 3.7   < > 3.7   < > 4.1   < >  --    < > 3.8   < > 3.6   TSH  --   --   --   --   --   --   --   --  5.48*  --   --   --  2.57   < >  --     < > = values in this interval not displayed.      BP Readings from Last 3 Encounters:   10/15/19 96/56   10/12/19 103/49   09/26/19 91/66    Wt Readings from Last 3 Encounters:   10/15/19 108.8 kg (239 lb 12.8 oz)   10/12/19 110.4 kg (243 lb 6.2 oz)   09/19/19 109.8 kg (242 lb)                      Reviewed and updated as needed this visit by Provider         Review of Systems   ROS COMP: Constitutional, HEENT, cardiovascular, pulmonary, gi and gu systems are negative, except as otherwise noted.      Objective    There were no vitals taken for this visit.  There is no height or weight on file to calculate BMI.  Physical Exam   GENERAL: healthy, alert and no  "distress  HENT: pharynx without erythema  NECK: no adenopathy, no asymmetry  RESP: lungs clear to auscultation - no rales, rhonchi or wheezes  CV: irregular rate and rhythm  ABDOMEN: soft, obese  MS: no gross musculoskeletal defects noted, bilateral edema, left lower leg is pink, area of previous erythema marked and it's subsiding      Diagnostic Test Results:  Labs reviewed in Epic        Assessment & Plan     1. Cellulitis of left lower leg    Improving, continue with oral Keflex and monitoring.  [facility] paperwork filled out for no work until 10/21/19.    2. Paroxysmal atrial fibrillation (H)    Continue with INR clinic and cardiology.    3. Cardiomyopathy, unspecified type (H)    Continue with cardiology.    4. Liver Cirrhosis 2nd ot Fatty liver, w Ascites    Continue with present medications and follow up with GI.       BMI:   Estimated body mass index is 39.9 kg/m  as calculated from the following:    Height as of this encounter: 1.651 m (5' 5\").    Weight as of this encounter: 108.8 kg (239 lb 12.8 oz).           See Patient Instructions  Patient Instructions   Continue with antibiotics and monitoring left lower leg.  Continue all other medications.  No work until 10/21/19.  Appointment made to establish primary care with Dr. MELODIE Pineda.      Our Clinic hours are:  Mondays    7:20 am - 7 pm  Tues -  Fri  7:20 am - 5 pm    Clinic Phone: [phone]    The clinic lab opens at 7:30 am Mon - Fri and appointments are required.    [facility]  Ph. [phone]  Monday  8 am - 7pm  Tues - Fri 8 am - 5:30 pm             Return in about 1 week (around 10/22/2019) for or sooner if symptoms persist or worsen, Routine Visit.    JANETTE Dos Santos CNP  [facility]      "

## 2019-10-15 NOTE — LETTER
[facility]  [address]  [facility] [address]  Phone: [phone]    October 15, 2019        Maurice Jon  [address] [place] [address]          To whom it may concern:    RE: Maurice Jon    Patient was seen and treated today at our clinic and missed work 10/7/19 through 10/20/19 due to medical issues.      Please contact me for questions or concerns.      Sincerely,        JANETTE Dos Santos CNP

## 2019-10-15 NOTE — TELEPHONE ENCOUNTER
Returned call to patient. He states that he was told that he is eligible for a new machine. Discussed getting orders for a new machine. He states his machine is working just fine and not sure if really needs a new one. Had questions about a SoClean. Provided information.

## 2019-10-15 NOTE — PROGRESS NOTES
ANTICOAGULATION FOLLOW-UP CLINIC VISIT    Patient Name:  Maurice Jon  Date:  10/15/2019  Contact Type:  Telephone    SUBJECTIVE:  Patient Findings     Positives:   Change in medications (Has a few more days of keflex for cellulitis)    Comments:   Patient reports his cellulitis is slowly improving. He states he still has some edema and is taking his diuretics to help. Patient will continue his same warfarin dose over the next week and return for an INR check in 7 days. If he has any concerns or changes he should call [facility]. Patient verbalized understanding.         Clinical Outcomes     Comments:   Patient reports his cellulitis is slowly improving. He states he still has some edema and is taking his diuretics to help. Patient will continue his same warfarin dose over the next week and return for an INR check in 7 days. If he has any concerns or changes he should call [facility]. Patient verbalized understanding.            OBJECTIVE    INR   Date Value Ref Range Status   10/15/2019 1.94 (H) 0.86 - 1.14 Final       ASSESSMENT / PLAN  No question data found.  Anticoagulation Summary  As of 10/15/2019    INR goal:   2.0-3.0   TTR:   90.8 % (1 y)   INR used for dosin.94! (10/15/2019)   Warfarin maintenance plan:   1.25 mg (2.5 mg x 0.5) every Fri; 2.5 mg (2.5 mg x 1) all other days   Full warfarin instructions:   1.25 mg every Fri; 2.5 mg all other days   Weekly warfarin total:   16.25 mg   No change documented:   Deisy Reza RN   Plan last modified:   Susan Beyer RN (2018)   Next INR check:   10/22/2019   Priority:   INR   Target end date:   10/4/2018    Indications    Paroxysmal atrial fibrillation (H) [I48.0]  Atrial fibrillation with rapid ventricular response (H) (Resolved) [I48.91]  Long-term (current) use of anticoagulants [Z79.01] [Z79.01]             Anticoagulation Episode Summary     INR check location:       Preferred lab:       Send INR reminders to:   [facility] PHONE ANTICOAG POOL     Comments:   * anticoagulation short period surrounding ablation on 12/21/18. Cardiology to decide when to stop warfarin. has well-compensated cirrhosis      Anticoagulation Care Providers     Provider Role Specialty Phone number    Alon Pineda MD [facility] [phone]            See the Encounter Report to view Anticoagulation Flowsheet and Dosing Calendar (Go to Encounters tab in chart review, and find the Anticoagulation Therapy Visit)        De[facility] Reza RN [facility]

## 2019-10-15 NOTE — PATIENT INSTRUCTIONS
Continue with antibiotics and monitoring left lower leg.  Continue all other medications.  No work until 10/21/19.  Appointment made to establish primary care with Dr. MELODIE Pineda.      Our Clinic hours are:  Mondays    7:20 am - 7 pm  Tues -  Fri  7:20 am - 5 pm    Clinic Phone: [phone]    The clinic lab opens at 7:30 am Mon - Fri and appointments are required.    [facility]  Ph. [phone]  Monday  8 am - 7pm  Tues - Fri 8 am - 5:30 pm

## 2019-10-21 ENCOUNTER — TELEPHONE (OUTPATIENT)
Dept: FAMILY MEDICINE | Facility: CLINIC | Age: 59
End: 2019-10-21

## 2019-10-21 NOTE — TELEPHONE ENCOUNTER
Reason for call:  Patient reporting a symptom    Symptom or request: Pt was recently hospitalized for a leg cellulitis and he continues to have leg soreness and he is completely off antibiotics now.  He has an appt for Friday to establish care with Dr. Pineda and he wants to know if he should be seen sooner?  Please call patient and advise.      Duration (how long have symptoms been present): ongoing    Have you been treated for this before? Yes    Additional comments:     Phone Number patient can be reached at:  Cell number on file:    Telephone Information:   Mobile [phone]     Best Time:  any    Can we leave a detailed message on this number:  YES    Call taken on 10/21/2019 at 9:29 AM by Donya Langley

## 2019-10-21 NOTE — TELEPHONE ENCOUNTER
Worse since Thursday or Friday. More tenderness noted. Not more swollen then it has been. Advised he go to the ED as he is worse and we had no available appts at [facility] or [place]. No fever or chills.  Jenni Nicole RN

## 2019-10-22 ENCOUNTER — ANTICOAGULATION THERAPY VISIT (OUTPATIENT)
Dept: ANTICOAGULATION | Facility: CLINIC | Age: 59
End: 2019-10-22
Payer: COMMERCIAL

## 2019-10-22 DIAGNOSIS — Z79.01 LONG TERM CURRENT USE OF ANTICOAGULANT THERAPY: ICD-10-CM

## 2019-10-22 DIAGNOSIS — I48.0 PAROXYSMAL ATRIAL FIBRILLATION (H): ICD-10-CM

## 2019-10-22 LAB — INR POINT OF CARE: 2.6 (ref 0.86–1.14)

## 2019-10-22 PROCEDURE — 85610 PROTHROMBIN TIME: CPT | Mod: QW

## 2019-10-22 PROCEDURE — 99207 ZZC NO CHARGE NURSE ONLY: CPT

## 2019-10-22 PROCEDURE — 36416 COLLJ CAPILLARY BLOOD SPEC: CPT

## 2019-10-22 NOTE — PROGRESS NOTES
10/28/19  Patient started on Keflex. Writer left  requesting patient to call [facility] to schedule and INR appointment for this week.    Taylor Feilx RN, BSN, PHN  Anticoagulation Clinic   [phone]        ANTICOAGULATION FOLLOW-UP CLINIC VISIT    Patient Name:  Maurice Jon  Date:  10/22/2019  Contact Type:  Face to Face    SUBJECTIVE:  Patient Findings     Positives:   Change in health (leg swelling)    Comments:   Patient has finished his course of Keflex for cellulitis; he reports he feels that he is improving but still has swelling and tenderness to his leg. He does have a follow up appt on Friday to address these symptoms. Patient reports he has taken his warfarin as directed. INR is therapeutic, will plan to continue same dose, recheck INR in 4 weeks. Discussed that if anything changes following his appt on Friday, we can always check his INR sooner if needed. Patient denies concerns of bleeding or increased bruising.         Clinical Outcomes     Comments:   Patient has finished his course of Keflex for cellulitis; he reports he feels that he is improving but still has swelling and tenderness to his leg. He does have a follow up appt on Friday to address these symptoms. Patient reports he has taken his warfarin as directed. INR is therapeutic, will plan to continue same dose, recheck INR in 4 weeks. Discussed that if anything changes following his appt on Friday, we can always check his INR sooner if needed. Patient denies concerns of bleeding or increased bruising.            OBJECTIVE    INR Protime   Date Value Ref Range Status   10/22/2019 2.6 (A) 0.86 - 1.14 Final       ASSESSMENT / PLAN  INR assessment THER    Recheck INR In: 4 WEEKS    INR Location Clinic      Anticoagulation Summary  As of 10/22/2019    INR goal:   2.0-3.0   TTR:   90.8 % (1 y)   INR used for dosin.6 (10/22/2019)   Warfarin maintenance plan:   1.25 mg (2.5 mg x 0.5) every Fri; 2.5 mg (2.5 mg x 1) all other days   Full  warfarin instructions:   1.25 mg every Fri; 2.5 mg all other days   Weekly warfarin total:   16.25 mg   No change documented:   Christina Singer RN   Plan last modified:   Susan Beyer RN (9/28/2018)   Next INR check:   11/19/2019   Priority:   INR   Target end date:   10/4/2018    Indications    Paroxysmal atrial fibrillation (H) [I48.0]  Atrial fibrillation with rapid ventricular response (H) (Resolved) [I48.91]  Long-term (current) use of anticoagulants [Z79.01] [Z79.01]             Anticoagulation Episode Summary     INR check location:       Preferred lab:       Send INR reminders to:   [facility]    Comments:   * anticoagulation short period surrounding ablation on 12/21/18. Cardiology to decide when to stop warfarin. has well-compensated cirrhosis      Anticoagulation Care Providers     Provider Role Specialty Phone number    Alon Pineda MD [facility] [phone]            See the Encounter Report to view Anticoagulation Flowsheet and Dosing Calendar (Go to Encounters tab in chart review, and find the Anticoagulation Therapy Visit)      Christina Singer, RN

## 2019-10-25 ENCOUNTER — OFFICE VISIT (OUTPATIENT)
Dept: FAMILY MEDICINE | Facility: CLINIC | Age: 59
End: 2019-10-25
Payer: COMMERCIAL

## 2019-10-25 VITALS
DIASTOLIC BLOOD PRESSURE: 67 MMHG | BODY MASS INDEX: 39.99 KG/M2 | HEART RATE: 77 BPM | SYSTOLIC BLOOD PRESSURE: 112 MMHG | RESPIRATION RATE: 16 BRPM | WEIGHT: 240 LBS | TEMPERATURE: 97.5 F | OXYGEN SATURATION: 98 % | HEIGHT: 65 IN

## 2019-10-25 DIAGNOSIS — L03.116 CELLULITIS OF LEFT LOWER EXTREMITY: ICD-10-CM

## 2019-10-25 DIAGNOSIS — K74.60 LIVER CIRRHOSIS SECONDARY TO NONALCOHOLIC STEATOHEPATITIS (NASH) (H): Primary | ICD-10-CM

## 2019-10-25 DIAGNOSIS — K75.81 LIVER CIRRHOSIS SECONDARY TO NONALCOHOLIC STEATOHEPATITIS (NASH) (H): Primary | ICD-10-CM

## 2019-10-25 PROCEDURE — 99214 OFFICE O/P EST MOD 30 MIN: CPT | Performed by: FAMILY MEDICINE

## 2019-10-25 RX ORDER — CEPHALEXIN 500 MG/1
500 CAPSULE ORAL 4 TIMES DAILY
Qty: 40 CAPSULE | Refills: 0 | Status: SHIPPED | OUTPATIENT
Start: 2019-10-25 | End: 2019-11-06

## 2019-10-25 ASSESSMENT — PAIN SCALES - GENERAL: PAINLEVEL: MODERATE PAIN (4)

## 2019-10-25 ASSESSMENT — MIFFLIN-ST. JEOR: SCORE: 1835.51

## 2019-10-25 NOTE — PROGRESS NOTES
Subjective     Maurice Jon is a 58 year old male who presents to clinic today for the following health issues:    HPI     Chief Complaint   Patient presents with     [facility] F/U     Cellulitis left lower leg.  Currrently has sx's of itching, dry skin where the infection was and retaining fluid and is tingling, left leg. Has bump/mass on back of left leg.     Letter for School/Work     Needs letter for work. stating he was here for follow up due to his hosp. stay for cellulits           Hospital Follow-up Visit:    Hospital/Nursing Home/IP Rehab Facility: [facility]  Date of Admission: 10/6/19  Date of Discharge: 10/12/19  Reason(s) for Admission: Cellulitis lower left leg            Problems taking medications regularly:  None       Medication changes since discharge: None       Problems adhering to non-medication therapy:  None    Summary of hospitalization:  [facility] discharge summary reviewed  Diagnostic Tests/Treatments reviewed.  Follow up needed: none  Other Healthcare Providers Involved in Patient s Care:         None  Update since discharge: improved.     Post Discharge Medication Reconciliation: discharge medications reconciled and changed, per note/orders (see AVS).  Plan of care communicated with patient     Coding guidelines for this visit:  Type of Medical   Decision Making Face-to-Face Visit       within 7 Days of discharge Face-to-Face Visit        within 14 days of discharge   Moderate Complexity [id number]   High Complexity [id number]          ADDITIONAL HPI: 58 year old male here for above issue.      ROS: 10 point review of systems negative except as per HPI.    PAST MEDICAL HISTORY:  Past Medical History:   Diagnosis Date     A-fib (H)      Acute pulmonary edema (H)      Atrial fibrillation (H)      Atrial fibrillation with rapid ventricular response (H) 5/13/2013     CAD (coronary artery disease)     Cath 12/26/18- mild nonobstructive disease      Calculus of ureter 1993, 1997    history of     Cardiomyopathy (H)     12/23/18 Echo- EF 25-30%     Chronic systolic CHF (congestive heart failure) (H)      Cirrhosis of liver without mention of alcohol 8/22/2005    Cirrhosis, idiopathic     Edema 5/13/2013     Esophageal varices with bleeding(456.0)     Pt denies     Gallstones      Genital herpes, unspecified 3/20/1999    resolving herpes progenitalis     GERD (gastroesophageal reflux disease)      Hematuria      Hypertension      Hypochondriasis     problems in past     Obesity 11/24/2010     BRUCE (obstructive sleep apnea) 03/17/2009    Mild AHI 8.4  RDI 31 - Pt refuses to wear his CPAP     Pericarditis      Portal hypertension (H) 1/28/2010     Unspecified thrombocytopenia 8/22/2005    Thrombocytopenia associated with cirrhosis and portal hypertension        ACTIVE MEDICAL PROBLEMS:  Patient Active Problem List   Diagnosis     Thrombocytopenia (H)     Liver Cirrhosis 2nd ot Fatty liver, w Ascites     erectile dysfunction     Compulsive behaviors     BRUCE-Mild (AHI 9; REM RDI 31)     Portal hypertension (H)     Eczema     GERD (gastroesophageal reflux disease)     CARDIOVASCULAR SCREENING; LDL GOAL LESS THAN 160     Obesity     Health Care Home     Edema     Paroxysmal atrial fibrillation (H)     Severe sepsis (H)     History of MRSA now culture negative     Portal vein thrombosis     Long-term (current) use of anticoagulants [Z79.01]     Encounter for monitoring sotalol therapy     Chronic a-fib, S/P Ablation 12/22/18 -- on Warfarin     Right heart failure (H)     Obesity (BMI 35.0-39.9) with comorbidity (H)     CAD (coronary artery disease)     Cardiomyopathy (H)     Pericarditis     Acute on chronic systolic congestive heart failure (H)     Acute combined systolic and diastolic (congestive) hrt fail (H)     Cellulitis        FAMILY HISTORY:  Family History   Problem Relation Age of Onset     Lipids Mother      Hypertension Mother      Eye Disorder Mother       Heart Disease Father         CHF     Lipids Father      Obesity Father      Heart Disease Brother         MI     Eye Disorder Brother      Hypertension Brother         portal HTN     Thrombosis Sister         in her lung     Cancer Other         maternal grandparent/throat cancer       SOCIAL HISTORY:  Social History     Socioeconomic History     Marital status:      Spouse name: Not on file     Number of children: Not on file     Years of education: Not on file     Highest education level: Not on file   Occupational History     Not on file   Social Needs     Financial resource strain: Not on file     Food insecurity:     Worry: Not on file     Inability: Not on file     Transportation needs:     Medical: Not on file     Non-medical: Not on file   Tobacco Use     Smoking status: Former Smoker     Packs/day: 0.80     Years: 6.00     Pack years: 4.80     Types: Cigarettes     Last attempt to quit: 2002     Years since quittin.8     Smokeless tobacco: Never Used   Substance and Sexual Activity     Alcohol use: No     Alcohol/week: 0.0 standard drinks     Comment: quit in      Drug use: No     Sexual activity: Yes     Partners: Female   Lifestyle     Physical activity:     Days per week: Not on file     Minutes per session: Not on file     Stress: Not on file   Relationships     Social connections:     Talks on phone: Not on file     Gets together: Not on file     Attends [employer] service: Not on file     Active member of club or organization: Not on file     Attends meetings of clubs or organizations: Not on file     Relationship status: Not on file     Intimate partner violence:     Fear of current or ex partner: Not on file     Emotionally abused: Not on file     Physically abused: Not on file     Forced sexual activity: Not on file   Other Topics Concern     Parent/sibling w/ CABG, MI or angioplasty before 65F 55M? Yes     Comment: BROTHER   - MI AT AGE 44      Service Not Asked      Blood Transfusions Not Asked     Caffeine Concern Not Asked     Occupational Exposure Not Asked     Hobby Hazards Not Asked     Sleep Concern Not Asked     Stress Concern Not Asked     Weight Concern Not Asked     Special Diet Not Asked     Back Care Not Asked     Exercise No     Bike Helmet Not Asked     Seat Belt Not Asked     Self-Exams Not Asked   Social History Narrative     Not on file       MEDICATIONS:  Current Outpatient Medications   Medication Sig Dispense Refill     ACE/ARB/ARNI NOT PRESCRIBED (INTENTIONAL) Please choose reason not prescribed, below       ASPIRIN NOT PRESCRIBED (INTENTIONAL) Please choose reason not prescribed, below       budesonide-formoterol (SYMBICORT) 80-4.5 MCG/ACT Inhaler Inhale 2 puffs into the lungs 2 times daily 10.2 g 3     cephALEXin (KEFLEX) 500 MG capsule Take 1 capsule (500 mg) by mouth 4 times daily for 10 days 40 capsule 0     furosemide (LASIX) 40 MG tablet Take 1 tablet (40 mg) by mouth 2 times daily 60 tablet 1     metoprolol succinate ER (TOPROL-XL) 100 MG 24 hr tablet Take 1 tablet (100 mg) by mouth daily (Patient taking differently: Take 100 mg by mouth At Bedtime ) 90 tablet 3     metoprolol succinate ER (TOPROL-XL) 25 MG 24 hr tablet Take 1 tablet (25 mg) by mouth every morning 90 tablet 3     Multiple Vitamins-Minerals (MENS 50+ MULTI VITAMIN/MIN PO) Take 1 tablet by mouth daily       order for DME Open toe size large 30/40 Mediven Assure Medical Compression socks Model 97775.    Fax to Fax [phone]. [facility] medical 12 Device 0     order for DME Equipment being ordered:   New CPAP mask, tube, filters,water tray 1 Device 3     ORDER FOR DME Respironics RemStar 60 Series Auto AFlex 12-15 cm H2O with heated humidty and a modem.  Pt chose a Quattro Air FFM size medium.       ORDER FOR DME Juzo compression stockings, 30-40mm compression. Patient has portal hypertension and develops leg edema, which these control well. 2 Package 3     pantoprazole (PROTONIX)  "40 MG EC tablet Take 1 tablet (40 mg) by mouth daily 90 tablet 3     spironolactone (ALDACTONE) 25 MG tablet Take 1 tablet (25 mg) by mouth daily 90 tablet 3     STATIN NOT PRESCRIBED (INTENTIONAL) Please choose reason not prescribed, below       vitamin D3 (CHOLECALCIFEROL) 2000 units (50 mcg) tablet Take 1 tablet by mouth daily       warfarin ANTICOAGULANT (JANTOVEN ANTICOAGULANT) 2.5 MG tablet 1.25 mg Fridays; 2.5mg all other days or As directed by Anticoagulation Clinic. INR DUE FOR FURTHER REFILLS 80 tablet 0     calcium carb 1250 mg, 500 mg [place],/vitamin D 200 units (OSCAL WITH D) 500-200 MG-UNIT per tablet Take 2 tablets by mouth daily with food.         ALLERGIES:     Allergies   Allergen Reactions     Avelox Other (See Comments) and GI Disturbance     portal hypertension     Moxifloxacin Nausea and Vomiting, Nausea and Other (See Comments)     portal hypertension     Sotalol Itching       Problem list, Medication list, Allergies, and Medical/Social/Surgical histories reviewed in [facility] and updated as appropriate.    OBJECTIVE:                                                    VITALS: /67 (BP Location: Right arm, Cuff Size: Adult Large)   Pulse 77   Temp 97.5  F (36.4  C) (Tympanic)   Resp 16   Ht 1.651 m (5' 5\")   Wt 108.9 kg (240 lb)   SpO2 98%   BMI 39.94 kg/m   Body mass index is 39.94 kg/m .  GENERAL: Pleasant, well appearing male.  EXTREMITIES: francesco discoloration of bilateral lower extremities. There is more erythema and warmth to the discoloration on left lower leg.      ASSESSMENT/PLAN:                                                    1. Liver Cirrhosis 2nd ot Fatty liver, w Ascites  Following with hepatology. He plans to see for primary care so we did discuss his history with this briefly today. Overall appears to be stable.    2. Cellulitis of left lower extremity  Appears to be redeveloping cellulitis. Will treat with keflex. Follow-up if not improving or if worsening.   - " cephALEXin (KEFLEX) 500 MG capsule; Take 1 capsule (500 mg) by mouth 4 times daily for 10 days  Dispense: 40 capsule; Refill: 0

## 2019-10-25 NOTE — LETTER
[facility]  [address]  [place] [address]  Phone: [phone]    October 25, 2019        Maurice Jon  [address] [place] [address]          To whom it may concern:    RE: Maurice Jon    Patient was seen and treated today at our clinic and missed work 10/25/2019 due to office visit.      Please contact me for questions or concerns.      Sincerely,        Alon Pineda MD

## 2019-10-25 NOTE — PATIENT INSTRUCTIONS
Our Clinic hours are:  Mondays    7:20 am - 7 pm  Tues -  Fri  7:20 am - 5 pm    Clinic Phone: [phone]    The clinic lab opens at 7:30 am Mon - Fri and appointments are required.    [facility]. [phone]  Monday  8 am - 7pm  Tues - Fri 8 am - 5:30 pm

## 2019-11-01 ENCOUNTER — TELEPHONE (OUTPATIENT)
Dept: CARDIOLOGY | Facility: CLINIC | Age: 59
End: 2019-11-01

## 2019-11-01 NOTE — TELEPHONE ENCOUNTER
11/01/19 Spoke w patient who would like to move up his appt w Dr Evans . Appt changed to 11/13 @ 215. Pt voiced agreement. Alice 1105 am

## 2019-11-01 NOTE — TELEPHONE ENCOUNTER
11/01/19 Pt LM wanting earlier appt to talk about potential procedure. Attempted to call pt back, only reached . Awaiting callback KHJacey 945 am

## 2019-11-04 NOTE — TELEPHONE ENCOUNTER
"PT called and LVM regarding eligibility of new machine. He asked for the \"lady\" he spoke to prior to call him back regarding his cpap. Forwarded note to Delores Kahn (DME).    Virginia Mejia MA on 11/4/2019 at 3:26 PM    "

## 2019-11-05 NOTE — TELEPHONE ENCOUNTER
Pt called and asked more questions about a CPAP, he states that he thinks he would like to go through with getting a new one as he has met his deductible for this year. Informed him that he will need an order from his sleep provider and will not need to be seen if requests orders prior to 1 year from his last office visit to Dr. Benitez. Pt states understanding. Discussed CPAP /disinfecting machines, informed him that any one of those machine would void his warranty of his new CPAP machine. He states he verify his insurance benefits again and will call me when he is ready to schedule.

## 2019-11-06 ENCOUNTER — OFFICE VISIT (OUTPATIENT)
Dept: FAMILY MEDICINE | Facility: CLINIC | Age: 59
End: 2019-11-06
Payer: COMMERCIAL

## 2019-11-06 VITALS
TEMPERATURE: 98.1 F | DIASTOLIC BLOOD PRESSURE: 78 MMHG | WEIGHT: 241 LBS | HEIGHT: 65 IN | OXYGEN SATURATION: 96 % | SYSTOLIC BLOOD PRESSURE: 110 MMHG | HEART RATE: 109 BPM | BODY MASS INDEX: 40.15 KG/M2 | RESPIRATION RATE: 16 BRPM

## 2019-11-06 DIAGNOSIS — R60.9 EDEMA, UNSPECIFIED TYPE: ICD-10-CM

## 2019-11-06 DIAGNOSIS — I50.9 CHRONIC CONGESTIVE HEART FAILURE, UNSPECIFIED HEART FAILURE TYPE (H): Primary | ICD-10-CM

## 2019-11-06 DIAGNOSIS — L03.116 CELLULITIS OF LEFT LOWER EXTREMITY: ICD-10-CM

## 2019-11-06 PROCEDURE — 99214 OFFICE O/P EST MOD 30 MIN: CPT | Performed by: FAMILY MEDICINE

## 2019-11-06 PROCEDURE — 87070 CULTURE OTHR SPECIMN AEROBIC: CPT | Performed by: FAMILY MEDICINE

## 2019-11-06 RX ORDER — MUPIROCIN 20 MG/G
OINTMENT TOPICAL 3 TIMES DAILY
Qty: 30 G | Refills: 3 | Status: SHIPPED | OUTPATIENT
Start: 2019-11-06 | End: 2021-01-01

## 2019-11-06 ASSESSMENT — PAIN SCALES - GENERAL: PAINLEVEL: MODERATE PAIN (5)

## 2019-11-06 ASSESSMENT — MIFFLIN-ST. JEOR: SCORE: 1840.05

## 2019-11-06 NOTE — LETTER
[facility]  [address]  [facility] [address]  Phone: [phone]    November 6, 2019        Maurice Jon  [address] [place] [address]          To whom it may concern:    RE: Maurice Jon    Patient was seen and treated today at our clinic and missed work 11/6/2019 for cellulitis.    Please contact me for questions or concerns.      Sincerely,        Alon Pineda MD

## 2019-11-06 NOTE — PROGRESS NOTES
Subjective     Maurice Jon is a 58 year old male who presents to clinic today for the following health issues:    HPI   Problem:   Chief Complaint   Patient presents with     Cellulitis     recheck left foot     Letter for School/Work     needs note/letter for work regarding his Cellulitis for his FMLA through work     Orders     needs and order for  compression stocking. 12 pair of compression stockings     ADDITIONAL HPI: 58 year old male here for above issue.  Cellulitis overall has improved. He needs refills of compression stockings to keep edema controlled. He has a small new sore on posterior left calf that just opened. Not weeping.    ROS: 10 point review of systems negative except as per HPI.    PAST MEDICAL HISTORY:  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 8/22/2005    Cirrhosis, idiopathic     Edema 5/13/2013     Esophageal varices with bleeding(456.0)     Pt denies     Gallstones      Genital herpes, unspecified 3/20/1999    resolving herpes progenitalis     GERD (gastroesophageal reflux disease)      Hematuria      Hypertension      Hypochondriasis     problems in past     Obesity 11/24/2010     BRUCE (obstructive sleep apnea) 03/17/2009    Mild AHI 8.4  RDI 31 - Pt refuses to wear his CPAP     Pericarditis      Portal hypertension (H) 1/28/2010     Unspecified thrombocytopenia 8/22/2005    Thrombocytopenia associated with cirrhosis and portal hypertension        ACTIVE MEDICAL PROBLEMS:  Patient Active Problem List   Diagnosis     Thrombocytopenia (H)     Liver Cirrhosis 2nd  ot Fatty liver, w Ascites     erectile dysfunction     Compulsive behaviors     BRUCE-Mild (AHI 9; REM RDI 31)     Portal hypertension (H)     Eczema     GERD (gastroesophageal reflux disease)     CARDIOVASCULAR SCREENING; LDL GOAL LESS THAN 160     Obesity     Health Care Home     Edema     Paroxysmal atrial fibrillation (H)     Severe sepsis (H)     History of MRSA now culture negative     Portal vein thrombosis     Long-term (current) use of anticoagulants [Z79.01]     Encounter for monitoring sotalol therapy     Chronic a-fib, S/P Ablation 12/22/18 -- on Warfarin     Right heart failure (H)     Obesity (BMI 35.0-39.9) with comorbidity (H)     CAD (coronary artery disease)     Cardiomyopathy (H)     Pericarditis     Acute on chronic systolic congestive heart failure (H)     Acute combined systolic and diastolic (congestive) hrt fail (H)     Cellulitis        FAMILY HISTORY:  Family History   Problem Relation Age of Onset     Lipids Mother      Hypertension Mother      Eye Disorder Mother      Heart Disease Father         CHF     Lipids Father      Obesity Father      Heart Disease Brother         MI     Eye Disorder Brother      Hypertension Brother         portal HTN     Thrombosis Sister         in her lung     Cancer Other         maternal grandparent/throat cancer       SOCIAL HISTORY:  Social History     Socioeconomic History     Marital status:      Spouse name: Not on file     Number of children: Not on file     Years of education: Not on file     Highest education level: Not on file   Occupational History     Not on file   Social Needs     Financial resource strain: Not on file     Food insecurity:     Worry: Not on file     Inability: Not on file     Transportation needs:     Medical: Not on file     Non-medical: Not on file   Tobacco Use     Smoking status: Former Smoker     Packs/day: 0.80     Years: 6.00     Pack years: 4.80     Types: Cigarettes     Last attempt to quit: 1/1/2002     Years since  quittin.8     Smokeless tobacco: Never Used   Substance and Sexual Activity     Alcohol use: No     Alcohol/week: 0.0 standard drinks     Comment: quit in      Drug use: No     Sexual activity: Yes     Partners: Female   Lifestyle     Physical activity:     Days per week: Not on file     Minutes per session: Not on file     Stress: Not on file   Relationships     Social connections:     Talks on phone: Not on file     Gets together: Not on file     Attends Mu-ism service: Not on file     Active member of club or organization: Not on file     Attends meetings of clubs or organizations: Not on file     Relationship status: Not on file     Intimate partner violence:     Fear of current or ex partner: Not on file     Emotionally abused: Not on file     Physically abused: Not on file     Forced sexual activity: Not on file   Other Topics Concern     Parent/sibling w/ CABG, MI or angioplasty before 65F 55M? Yes     Comment: BROTHER   - MI AT AGE 44      Service Not Asked     Blood Transfusions Not Asked     Caffeine Concern Not Asked     Occupational Exposure Not Asked     Hobby Hazards Not Asked     Sleep Concern Not Asked     Stress Concern Not Asked     Weight Concern Not Asked     Special Diet Not Asked     Back Care Not Asked     Exercise No     Bike Helmet Not Asked     Seat Belt Not Asked     Self-Exams Not Asked   Social History Narrative     Not on file       MEDICATIONS:  Current Outpatient Medications   Medication Sig Dispense Refill     mupirocin (BACTROBAN) 2 % external ointment Apply topically 3 times daily Apply to open sores on lower legs. 30 g 3     order for DME Open toe size large 30/40 Med[employer] Assure Medical Compression socks Model 15920.  Or similar as per availability/formulary.  Fax to Fax [phone]. [facility] 12 Device 0     ACE/ARB/ARNI NOT PRESCRIBED (INTENTIONAL) Please choose reason not prescribed, below       ASPIRIN NOT PRESCRIBED (INTENTIONAL) Please choose  reason not prescribed, below       budesonide-formoterol (SYMBICORT) 80-4.5 MCG/ACT Inhaler Inhale 2 puffs into the lungs 2 times daily 10.2 g 3     calcium carb 1250 mg, 500 mg [place],/vitamin D 200 units (OSCAL WITH D) 500-200 MG-UNIT per tablet Take 2 tablets by mouth daily with food.       furosemide (LASIX) 40 MG tablet Take 1 tablet (40 mg) by mouth 2 times daily 60 tablet 1     metoprolol succinate ER (TOPROL-XL) 100 MG 24 hr tablet Take 1 tablet (100 mg) by mouth daily (Patient taking differently: Take 100 mg by mouth At Bedtime ) 90 tablet 3     metoprolol succinate ER (TOPROL-XL) 25 MG 24 hr tablet Take 1 tablet (25 mg) by mouth every morning 90 tablet 3     Multiple Vitamins-Minerals (MENS 50+ MULTI VITAMIN/MIN PO) Take 1 tablet by mouth daily       order for DME Equipment being ordered:   New CPAP mask, tube, filters,water tray 1 Device 3     ORDER FOR DME Respironics RemStar 60 Series Auto AFlex 12-15 cm H2O with heated humidty and a modem.  Pt chose a Showpad Air FFM size medium.       pantoprazole (PROTONIX) 40 MG EC tablet Take 1 tablet (40 mg) by mouth daily 90 tablet 3     spironolactone (ALDACTONE) 25 MG tablet Take 1 tablet (25 mg) by mouth daily 90 tablet 3     STATIN NOT PRESCRIBED (INTENTIONAL) Please choose reason not prescribed, below       vitamin D3 (CHOLECALCIFEROL) 2000 units (50 mcg) tablet Take 1 tablet by mouth daily       warfarin ANTICOAGULANT (JANTOVEN ANTICOAGULANT) 2.5 MG tablet 1.25 mg Fridays; 2.5mg all other days or As directed by Anticoagulation Clinic. INR DUE FOR FURTHER REFILLS 80 tablet 0       ALLERGIES:     Allergies   Allergen Reactions     Avelox Other (See Comments) and GI Disturbance     portal hypertension     Moxifloxacin Nausea and Vomiting, Nausea and Other (See Comments)     portal hypertension     Sotalol Itching       Problem list, Medication list, Allergies, and Medical/Social/Surgical histories reviewed in [facility] and updated as appropriate.    OBJECTIVE:         "                                            VITALS: /78 (BP Location: Right arm)   Pulse 109   Temp 98.1  F (36.7  C) (Tympanic)   Resp 16   Ht 1.651 m (5' 5\")   Wt 109.3 kg (241 lb)   SpO2 96%   BMI 40.10 kg/m   Body mass index is 40.1 kg/m .  GENERAL: Pleasant, well appearing male.  SKIN:  Diffuse chronic venous stasis changes bilateral lower extremities. There is a 3x3mm superficial pre-ulceration that has opened left posterior calf.    ASSESSMENT/PLAN:                                                    1. Chronic congestive heart failure, unspecified heart failure type (H)  Well controlled. Refilled stockings  - order for DME; Open toe size large 30/40 Mediven Assure Medical Compression socks Model 43787.  Or similar as per availability/formulary.  Fax to Fax [phone]. [facility]  Dispense: 12 Device; Refill: 0    2. Edema, unspecified type  - order for DME; Open toe size large 30/40 Mediven Assure Medical Compression socks Model [id number].  Or similar as per availability/formulary.  Fax to Fax [phone]. [facility]  Dispense: 12 Device; Refill: 0    3. Cellulitis of left lower extremity  Diffuse cellulitis has resolved. Small new superficial wound. Will treat topically and advised he monitor this. He checks his legs daily.  - mupirocin (BACTROBAN) 2 % external ointment; Apply topically 3 times daily Apply to open sores on lower legs.  Dispense: 30 g; Refill: 3  - Wound Culture Aerobic Bacterial       "

## 2019-11-06 NOTE — PATIENT INSTRUCTIONS
Our Clinic hours are:  Mondays    7:20 am - 7 pm  Tues -  Fri  7:20 am - 5 pm    Clinic Phone: [phone]    The clinic lab opens at 7:30 am Mon - Fri and appointments are required.    [facility]. [phone]  Monday  8 am - 7pm  Tues - Fri 8 am - 5:30 pm

## 2019-11-08 ENCOUNTER — ANTICOAGULATION THERAPY VISIT (OUTPATIENT)
Dept: ANTICOAGULATION | Facility: CLINIC | Age: 59
End: 2019-11-08
Payer: COMMERCIAL

## 2019-11-08 DIAGNOSIS — Z79.01 LONG TERM CURRENT USE OF ANTICOAGULANT THERAPY: ICD-10-CM

## 2019-11-08 DIAGNOSIS — G47.33 OSA (OBSTRUCTIVE SLEEP APNEA): Primary | ICD-10-CM

## 2019-11-08 DIAGNOSIS — I48.0 PAROXYSMAL ATRIAL FIBRILLATION (H): ICD-10-CM

## 2019-11-08 LAB
BACTERIA SPEC CULT: NORMAL
INR POINT OF CARE: 3.1 (ref 0.86–1.14)
Lab: NORMAL
SPECIMEN SOURCE: NORMAL

## 2019-11-08 PROCEDURE — 99207 ZZC NO CHARGE NURSE ONLY: CPT

## 2019-11-08 PROCEDURE — 85610 PROTHROMBIN TIME: CPT | Mod: QW

## 2019-11-08 PROCEDURE — 36416 COLLJ CAPILLARY BLOOD SPEC: CPT

## 2019-11-08 NOTE — PROGRESS NOTES
ANTICOAGULATION FOLLOW-UP CLINIC VISIT    Patient Name:  Maurice Jon  Date:  11/8/2019  Contact Type:  Face to Face    SUBJECTIVE:  Patient Findings     Positives:   Change in health (swelling), Change in medications (finished 10 day course of Keflex for cellulitis )    Comments:   Patient finished a 10 day course of Keflex for cellulitis. He is still having some swelling in his legs, but states it is improving. Will plan to continue maintenance dose and recheck INR in 2 weeks. Patient denies signs or symptoms of bleeding. Writer educated patient regarding increased bleed risk and when to seek immediate medical attention. Patient verbalized understanding.    Patient is also waiting on a prior-auth for a procedure on his eyes (laser surgery due to glaucoma). He is going to be asking about whether they recommend holding warfarin and will let [facility] know when this is scheduled.           Clinical Outcomes     Comments:   Patient finished a 10 day course of Keflex for cellulitis. He is still having some swelling in his legs, but states it is improving. Will plan to continue maintenance dose and recheck INR in 2 weeks. Patient denies signs or symptoms of bleeding. Writer educated patient regarding increased bleed risk and when to seek immediate medical attention. Patient verbalized understanding.    Patient is also waiting on a prior-auth for a procedure on his eyes (laser surgery due to glaucoma). He is going to be asking about whether they recommend holding warfarin and will let [facility] know when this is scheduled.              OBJECTIVE    INR Protime   Date Value Ref Range Status   11/08/2019 3.1 (A) 0.86 - 1.14 Final       ASSESSMENT / PLAN  INR assessment SUPRA    Recheck INR In: 2 WEEKS    INR Location Clinic      Anticoagulation Summary  As of 11/8/2019    INR goal:   2.0-3.0   TTR:   90.4 % (1.1 y)   INR used for dosing:   3.1! (11/8/2019)   Warfarin maintenance plan:   1.25 mg (2.5 mg x 0.5) every Fri; 2.5 mg  (2.5 mg x 1) all other days   Full warfarin instructions:   1.25 mg every Fri; 2.5 mg all other days   Weekly warfarin total:   16.25 mg   No change documented:   Christina Singer RN   Plan last modified:   Susan Beyer RN (9/28/2018)   Next INR check:   11/19/2019   Priority:   INR   Target end date:   10/4/2018    Indications    Paroxysmal atrial fibrillation (H) [I48.0]  Atrial fibrillation with rapid ventricular response (H) (Resolved) [I48.91]  Long-term (current) use of anticoagulants [Z79.01] [Z79.01]             Anticoagulation Episode Summary     INR check location:       Preferred lab:       Send INR reminders to:   [facility]    Comments:   * anticoagulation short period surrounding ablation on 12/21/18. Cardiology to decide when to stop warfarin. has well-compensated cirrhosis      Anticoagulation Care Providers     Provider Role Specialty Phone number    Alon Pineda MD [facility] [phone]            See the Encounter Report to view Anticoagulation Flowsheet and Dosing Calendar (Go to Encounters tab in chart review, and find the Anticoagulation Therapy Visit)      Christina Singer, RN

## 2019-11-08 NOTE — RESULT ENCOUNTER NOTE
Notified via MyChart: normal skin bacteria. No signs of resistant bacteria like MRSA. The topical antibiotic should hopefully help this heal up nicely.

## 2019-11-13 ENCOUNTER — OFFICE VISIT (OUTPATIENT)
Dept: CARDIOLOGY | Facility: CLINIC | Age: 59
End: 2019-11-13
Payer: COMMERCIAL

## 2019-11-13 VITALS
HEIGHT: 65 IN | HEART RATE: 92 BPM | DIASTOLIC BLOOD PRESSURE: 75 MMHG | BODY MASS INDEX: 39.65 KG/M2 | WEIGHT: 238 LBS | SYSTOLIC BLOOD PRESSURE: 119 MMHG

## 2019-11-13 DIAGNOSIS — I48.19 PERSISTENT ATRIAL FIBRILLATION (H): Primary | ICD-10-CM

## 2019-11-13 PROCEDURE — 99214 OFFICE O/P EST MOD 30 MIN: CPT | Performed by: INTERNAL MEDICINE

## 2019-11-13 ASSESSMENT — MIFFLIN-ST. JEOR: SCORE: 1826.44

## 2019-11-13 NOTE — LETTER
11/13/2019      Alon Pineda MD  [address]  [facility] [address]      RE: Maurice Jon       Dear Colleague,    I had the pleasure of seeing Maurice Jon in the [facility].    Service Date: 11/13/2019      HISTORY OF PRESENT ILLNESS:  Thank you for allowing me to participate in the care of this very delightful patient.  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 Roque at the [facility] at the [facility].  The patient has been seeing me for long-lasting persistent atrial fibrillation, associated reduction in exercise tolerance and at times palpitations.  We have tried rate control in the past and eventually rate control strategy on sotalol, but it was unsuccessful.  In light of that, I recommended a catheter-based ablation, which was performed a year ago.      At that time, the patient presented in atrial fibrillation.  At that study, the patient was found to have a tremendous amount of scarring in the left atrium and not much potentials in all pulmonary veins.  I isolated the veins and also the left atrial posterior wall.  I was not optimistic at the outcome because of the amount of scarring I noted.  The next day, the patient went into heart failure.  An echocardiogram demonstrated ejection fraction was 30%.  Angiogram was then performed, demonstrated normal coronaries.  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      Since then, he has required a few cardioversions, even having to go on flecainide once his LV function normalized, but the patient continued to have recurrence of atrial fibrillation.  The patient could not tolerate amiodarone in the past because of shortness of breath and other side effects.      I saw the patient this past July, and we discussed a few  options including repeat AFib ablation without any guarantee that we would achieve long-term remission, given the amount of scarring noted. The second option is AV jerry ablation and biventricular pacemaker in light of his previous LV dysfunction, even though he has normalized now.      A month ago, the patient was hospitalized for cellulitis and has resolved now.  He was in atrial fibrillation with rapid rate at that time, and when he was discharged home, there was no change in his medication.  His rate appeared to be quite fast still on today's visit.      We discussed at length about options going forward including the option I just outlined.  I am leaning toward the latter option, which is AV node ablation and biventricular pacemaker if there is appropriate coronary sinus.  If not, place the lead in the His position.  Even though the patient is 58, but given the amount of scarring noted, I would estimate that the success rate for a second procedure is 50% at best.  The patient and his wife would like to proceed with the recommended option.  I went over the procedure in detail with risks including but not limited to vascular injury, cardiac perforation and CVA.  I would like him to hold warfarin starting today.  I would like to do this procedure this Friday, either myself if I am available or one of my partners.      Even though the patient has liver cirrhosis, Dr. Roque is okay for him to continue with warfarin at this point in time.  If his LV function continued to be normal and the patient has no other CHADS-VASc score risk factors,  one may consider stopping warfarin in the future.         CIRILO MURGUIA MD         D: 2019   T: 2019   MT: al      Name:     MARILY NAVARRO   MRN:      [id number]        Account:      [id number]   :      1960           Service Date: 2019      Document: [id number]        Outpatient Encounter Medications as of 2019   Medication Sig Dispense  Refill     budesonide-formoterol (SYMBICORT) 80-4.5 MCG/ACT Inhaler Inhale 2 puffs into the lungs 2 times daily 10.2 g 3     calcium carb 1250 mg, 500 mg [place],/vitamin D 200 units (OSCAL WITH D) 500-200 MG-UNIT per tablet Take 2 tablets by mouth daily with food.       furosemide (LASIX) 40 MG tablet Take 1 tablet (40 mg) by mouth 2 times daily 60 tablet 1     metoprolol succinate ER (TOPROL-XL) 100 MG 24 hr tablet Take 1 tablet (100 mg) by mouth daily (Patient taking differently: Take 100 mg by mouth At Bedtime ) 90 tablet 3     Multiple Vitamins-Minerals (MENS 50+ MULTI VITAMIN/MIN PO) Take 1 tablet by mouth daily       mupirocin (BACTROBAN) 2 % external ointment Apply topically 3 times daily Apply to open sores on lower legs. 30 g 3     spironolactone (ALDACTONE) 25 MG tablet Take 1 tablet (25 mg) by mouth daily 90 tablet 3     vitamin D3 (CHOLECALCIFEROL) 2000 units (50 mcg) tablet Take 1 tablet by mouth daily       warfarin ANTICOAGULANT (JANTOVEN ANTICOAGULANT) 2.5 MG tablet 1.25 mg Fridays; 2.5mg all other days or As directed by Anticoagulation Clinic. INR DUE FOR FURTHER REFILLS 80 tablet 0     [DISCONTINUED] metoprolol succinate ER (TOPROL-XL) 25 MG 24 hr tablet Take 1 tablet (25 mg) by mouth every morning 90 tablet 3     [DISCONTINUED] pantoprazole (PROTONIX) 40 MG EC tablet Take 1 tablet (40 mg) by mouth daily 90 tablet 3     ACE/ARB/ARNI NOT PRESCRIBED (INTENTIONAL) Please choose reason not prescribed, below       ASPIRIN NOT PRESCRIBED (INTENTIONAL) Please choose reason not prescribed, below       order for DME Open toe size large 30/40 [facility] [employer] Compression socks Model 37394.  Or similar as per availability/formulary.  Fax to Fax [phone]. [facility] 12 Device 0     order for DME Equipment being ordered:   New CPAP mask, tube, filters,water tray 1 Device 3     ORDER FOR DME Respironics RemStar 60 Series Auto AFlex 12-15 cm H2O with heated humidty and a modem.  Pt chose a  Quattro Air FFM size medium.       STATIN NOT PRESCRIBED (INTENTIONAL) Please choose reason not prescribed, below       No facility-administered encounter medications on file as of 11/13/2019.      Again, thank you for allowing me to participate in the care of your patient.      Sincerely,    Kristofer Brown MD     [facility]

## 2019-11-13 NOTE — PROGRESS NOTES
HPI and Plan:   See dictation  [id number]  No orders of the defined types were placed in this encounter.      No orders of the defined types were placed in this encounter.      There are no discontinued medications.      Encounter Diagnosis   Name Primary?     Persistent atrial fibrillation Yes       CURRENT MEDICATIONS:  Current Outpatient Medications   Medication Sig Dispense Refill     budesonide-formoterol (SYMBICORT) 80-4.5 MCG/ACT Inhaler Inhale 2 puffs into the lungs 2 times daily 10.2 g 3     calcium carb 1250 mg, 500 mg [place],/vitamin D 200 units (OSCAL WITH D) 500-200 MG-UNIT per tablet Take 2 tablets by mouth daily with food.       furosemide (LASIX) 40 MG tablet Take 1 tablet (40 mg) by mouth 2 times daily 60 tablet 1     metoprolol succinate ER (TOPROL-XL) 100 MG 24 hr tablet Take 1 tablet (100 mg) by mouth daily (Patient taking differently: Take 100 mg by mouth At Bedtime ) 90 tablet 3     metoprolol succinate ER (TOPROL-XL) 25 MG 24 hr tablet Take 1 tablet (25 mg) by mouth every morning 90 tablet 3     Multiple Vitamins-Minerals (MENS 50+ MULTI VITAMIN/MIN PO) Take 1 tablet by mouth daily       mupirocin (BACTROBAN) 2 % external ointment Apply topically 3 times daily Apply to open sores on lower legs. 30 g 3     pantoprazole (PROTONIX) 40 MG EC tablet Take 1 tablet (40 mg) by mouth daily 90 tablet 3     spironolactone (ALDACTONE) 25 MG tablet Take 1 tablet (25 mg) by mouth daily 90 tablet 3     vitamin D3 (CHOLECALCIFEROL) 2000 units (50 mcg) tablet Take 1 tablet by mouth daily       warfarin ANTICOAGULANT (JANTOVEN ANTICOAGULANT) 2.5 MG tablet 1.25 mg Fridays; 2.5mg all other days or As directed by Anticoagulation Clinic. INR DUE FOR FURTHER REFILLS 80 tablet 0     ACE/ARB/ARNI NOT PRESCRIBED (INTENTIONAL) Please choose reason not prescribed, below       ASPIRIN NOT PRESCRIBED (INTENTIONAL) Please choose reason not prescribed, below       order for DME Open toe size large 30/40 [facility]  Compression socks Model [id number].  Or similar as per availability/formulary.  Fax to Fax [phone]. [facility] Device 0     order for DME Equipment being ordered:   New CPAP mask, tube, filters,water tray 1 Device 3     ORDER FOR DME Respironics RemStar 60 Series Auto AFlex 12-15 cm H2O with heated humidty and a modem.  Pt chose a Quattro Air FFM size medium.       STATIN NOT PRESCRIBED (INTENTIONAL) Please choose reason not prescribed, below         ALLERGIES     Allergies   Allergen Reactions     Avelox Other (See Comments) and GI Disturbance     portal hypertension     Moxifloxacin Nausea and Vomiting, Nausea and Other (See Comments)     portal hypertension     Sotalol Itching       PAST MEDICAL HISTORY:  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 8/22/2005    Cirrhosis, idiopathic     Edema 5/13/2013     Esophageal varices with bleeding(456.0)     Pt denies     Gallstones      Genital herpes, unspecified 3/20/1999    resolving herpes progenitalis     GERD (gastroesophageal reflux disease)      Hematuria      Hypertension      Hypochondriasis     problems in past     Obesity 11/24/2010     BRUCE (obstructive sleep apnea) 03/17/2009    Mild AHI 8.4  RDI 31 - Pt refuses to wear his CPAP     Pericarditis      Portal hypertension (H) 1/28/2010     Unspecified thrombocytopenia 8/22/2005    Thrombocytopenia associated with cirrhosis and portal hypertension       PAST SURGICAL HISTORY:  Past Surgical History:   Procedure Laterality Date     ANESTHESIA CARDIOVERSION N/A 1/19/2015     Procedure: ANESTHESIA CARDIOVERSION;  Surgeon: Generic Anesthesia Provider;  Location: [facility]     ANESTHESIA CARDIOVERSION N/A 2/6/2019    Procedure: ANESTHESIA CARDIOVERSION;  Surgeon: GENERIC ANESTHESIA PROVIDER;  Location:  OR     ANESTHESIA CARDIOVERSION N/A 7/5/2019    Procedure: ANESTHESIA FOR CARDIOVERSION  DR. MURGUIA);  Surgeon: GENERIC ANESTHESIA PROVIDER;  Location:  OR     COLONOSCOPY N/A 8/14/2017    Procedure: COLONOSCOPY;  Colonoscopy Called will arrive appx 12:30 Per phone call;  Surgeon: Vincent Whittington MD;  Location:  GI     CV HEART CATHETERIZATION WITH POSSIBLE INTERVENTION N/A 12/26/2018    Procedure: Heart Catheterization with possible Intervention;  Surgeon: Brandon Arroyo MD;  Location:  HEART CARDIAC CATH LAB     EP ABLATION FOCAL AFIB N/A 12/21/2018    Procedure: EP Ablation Focal AFIB;  Surgeon: Kristofer Murguia MD;  Location:  HEART CARDIAC CATH LAB     ESOPHAGOSCOPY, GASTROSCOPY, DUODENOSCOPY (EGD), COMBINED  7/11/2011    Procedure:COMBINED ESOPHAGOSCOPY, GASTROSCOPY, DUODENOSCOPY (EGD); Surgeon:LAURA LAMAS; Location:[facility]     ESOPHAGOSCOPY, GASTROSCOPY, DUODENOSCOPY (EGD), COMBINED  9/10/2012    Procedure: COMBINED ESOPHAGOSCOPY, GASTROSCOPY, DUODENOSCOPY (EGD);;  Surgeon: Hi Roque MD;  Location:  GI     ESOPHAGOSCOPY, GASTROSCOPY, DUODENOSCOPY (EGD), COMBINED  9/9/2013    Procedure: COMBINED ESOPHAGOSCOPY, GASTROSCOPY, DUODENOSCOPY (EGD);;  Surgeon: Hi Roque MD;  Location:  GI     ESOPHAGOSCOPY, GASTROSCOPY, DUODENOSCOPY (EGD), COMBINED N/A 9/15/2014    Procedure: COMBINED ESOPHAGOSCOPY, GASTROSCOPY, DUODENOSCOPY (EGD);  Surgeon: Hi Roque MD;  Location:  GI     ESOPHAGOSCOPY, GASTROSCOPY, DUODENOSCOPY (EGD), COMBINED N/A 10/30/2015    Procedure: COMBINED ESOPHAGOSCOPY, GASTROSCOPY, DUODENOSCOPY (EGD);  Surgeon: Feliberto Fox MD;  Location:  GI     ESOPHAGOSCOPY, GASTROSCOPY, DUODENOSCOPY (EGD), COMBINED N/A 10/10/2016    Procedure:  COMBINED ESOPHAGOSCOPY, GASTROSCOPY, DUODENOSCOPY (EGD);  Surgeon: Jose Nesbitt MD;  [facility]: [facility]     ESOPHAGOSCOPY, GASTROSCOPY, DUODENOSCOPY (EGD), COMBINED N/A 2019    Procedure: ESOPHAGOGASTRODUODENOSCOPY (EGD);  Surgeon: Timo Soares MD;  Location: [facility]     H ABLATION FOCAL AFIB  2018     HERNIA REPAIR       IRRIGATION AND DEBRIDEMENT ABSCESS SCROTUM, COMBINED  10/4/2012    Procedure: COMBINED IRRIGATION AND DEBRIDEMENT ABSCESS SCROTUM;  Irrigation and Debridement of Groin Abscess;  Surgeon: Benny Shoemaker MD;  Location: [facility]     SOFT TISSUE SURGERY       SURGICAL HISTORY OF -       rt elbow     SURGICAL HISTORY OF -   1/15/98    repair of ventral and umbilical hernia     SURGICAL HISTORY OF -       endoscopy       FAMILY HISTORY:  Family History   Problem Relation Age of Onset     Lipids Mother      Hypertension Mother      Eye Disorder Mother      Heart Disease Father         CHF     Lipids Father      Obesity Father      Heart Disease Brother         MI     Eye Disorder Brother      Hypertension Brother         portal HTN     Thrombosis Sister         in her lung     Eye Disorder Sister      Cancer Other         maternal grandparent/throat cancer       SOCIAL HISTORY:  Social History     Socioeconomic History     Marital status:      Spouse name: None     Number of children: None     Years of education: None     Highest education level: None   Occupational History     None   Social Needs     Financial resource strain: None     Food insecurity:     Worry: None     Inability: None     Transportation needs:     Medical: None     Non-medical: None   Tobacco Use     Smoking status: Former Smoker     Packs/day: 0.80     Years: 6.00     Pack years: 4.80     Types: Cigarettes     Last attempt to quit: 2002     Years since quittin.8     Smokeless tobacco: Never Used   Substance and Sexual Activity     Alcohol use: No     Alcohol/week: 0.0 standard  "drinks     Comment: quit in 2007     Drug use: No     Sexual activity: Yes     Partners: Female   Lifestyle     Physical activity:     Days per week: None     Minutes per session: None     Stress: None   Relationships     Social connections:     Talks on phone: None     Gets together: None     Attends Buddhist service: None     Active member of club or organization: None     Attends meetings of clubs or organizations: None     Relationship status: None     Intimate partner violence:     Fear of current or ex partner: None     Emotionally abused: None     Physically abused: None     Forced sexual activity: None   Other Topics Concern     Parent/sibling w/ CABG, MI or angioplasty before 65F 55M? Yes     Comment: BROTHER   - MI AT AGE 44      Service Not Asked     Blood Transfusions Not Asked     Caffeine Concern Not Asked     Occupational Exposure Not Asked     Hobby Hazards Not Asked     Sleep Concern Not Asked     Stress Concern Not Asked     Weight Concern Not Asked     Special Diet Not Asked     Back Care Not Asked     Exercise No     Bike Helmet Not Asked     Seat Belt Not Asked     Self-Exams Not Asked   Social History Narrative     None       Review of Systems:  Skin:  Negative       Eyes:  Positive for      ENT:  Negative      Respiratory:  Positive for sleep apnea;CPAP;dyspnea on exertion;dyspnea at rest SOB increased   Cardiovascular:    Positive for;fatigue;palpitations;lightheadedness;chest pain;dizziness more frequent dizzy spells   Gastroenterology: Positive for heartburn;reflux    Genitourinary:  Positive for   cirrohsis of the liver  Musculoskeletal:  Negative      Neurologic:  Positive for memory problems    Psychiatric:  Negative      Heme/Lymph/Imm:  Negative      Endocrine:  Negative        Physical Exam:  Vitals: /75   Pulse 92   Ht 1.651 m (5' 5\")   Wt 108 kg (238 lb)   BMI 39.61 kg/m      Constitutional:  cooperative, alert and oriented, well developed, well nourished, in no " acute distress obese      Skin:  warm and dry to the touch, no apparent skin lesions or masses noted          Head:  normocephalic, no masses or lesions        Eyes:  pupils equal and round, conjunctivae and lids unremarkable, sclera white, no xanthalasma, EOMS intact, no nystagmus        Lymph:No Cervical lymphadenopathy present     ENT:  no pallor or cyanosis, dentition good        Neck:  carotid pulses are full and equal bilaterally, JVP normal, no carotid bruit        Respiratory:  normal breath sounds, clear to auscultation, normal A-P diameter, normal symmetry, normal respiratory excursion, no use of accessory muscles         Cardiac:   irregularly irregular rhythm;tachycardic              pulses full and equal, no bruits auscultated                                        GI:  abdomen soft, non-tender, BS normoactive, no mass, no HSM, no bruits        Extremities and Muscular Skeletal:  no deformities, clubbing, cyanosis, erythema observed              Neurological:  no gross motor deficits        Psych:  Alert and Oriented x 3        CC  No referring provider defined for this encounter.

## 2019-11-13 NOTE — LETTER
11/13/2019    Alon Pineda MD  [address]  [facility] [address]    RE: Maurice Jon       Dear Colleague,    I had the pleasure of seeing Maurice Jon in the [facility].    HPI and Plan:   See dictation  [id number]  No orders of the defined types were placed in this encounter.      No orders of the defined types were placed in this encounter.      There are no discontinued medications.      Encounter Diagnosis   Name Primary?     Persistent atrial fibrillation Yes       CURRENT MEDICATIONS:  Current Outpatient Medications   Medication Sig Dispense Refill     budesonide-formoterol (SYMBICORT) 80-4.5 MCG/ACT Inhaler Inhale 2 puffs into the lungs 2 times daily 10.2 g 3     calcium carb 1250 mg, 500 mg [place],/vitamin D 200 units (OSCAL WITH D) 500-200 MG-UNIT per tablet Take 2 tablets by mouth daily with food.       furosemide (LASIX) 40 MG tablet Take 1 tablet (40 mg) by mouth 2 times daily 60 tablet 1     metoprolol succinate ER (TOPROL-XL) 100 MG 24 hr tablet Take 1 tablet (100 mg) by mouth daily (Patient taking differently: Take 100 mg by mouth At Bedtime ) 90 tablet 3     metoprolol succinate ER (TOPROL-XL) 25 MG 24 hr tablet Take 1 tablet (25 mg) by mouth every morning 90 tablet 3     Multiple Vitamins-Minerals (MENS 50+ MULTI VITAMIN/MIN PO) Take 1 tablet by mouth daily       mupirocin (BACTROBAN) 2 % external ointment Apply topically 3 times daily Apply to open sores on lower legs. 30 g 3     pantoprazole (PROTONIX) 40 MG EC tablet Take 1 tablet (40 mg) by mouth daily 90 tablet 3     spironolactone (ALDACTONE) 25 MG tablet Take 1 tablet (25 mg) by mouth daily 90 tablet 3     vitamin D3 (CHOLECALCIFEROL) 2000 units (50 mcg) tablet Take 1 tablet by mouth daily       warfarin ANTICOAGULANT (JANTOVEN ANTICOAGULANT) 2.5 MG tablet 1.25 mg Fridays; 2.5mg all other days or As directed by Anticoagulation Clinic. INR DUE FOR FURTHER REFILLS 80 tablet 0      ACE/ARB/ARNI NOT PRESCRIBED (INTENTIONAL) Please choose reason not prescribed, below       ASPIRIN NOT PRESCRIBED (INTENTIONAL) Please choose reason not prescribed, below       order for DME Open toe size large 30/40 [employer] [facility] Compression socks Model 22550.  Or similar as per availability/formulary.  Fax to Fax [phone]. [facility] 12 Device 0     order for DME Equipment being ordered:   New CPAP mask, tube, filters,water tray 1 Device 3     ORDER FOR DME Respironics RemStar 60 Series Auto AFlex 12-15 cm H2O with heated humidty and a modem.  Pt chose a Quattro Air FFM size medium.       STATIN NOT PRESCRIBED (INTENTIONAL) Please choose reason not prescribed, below         ALLERGIES     Allergies   Allergen Reactions     Avelox Other (See Comments) and GI Disturbance     portal hypertension     Moxifloxacin Nausea and Vomiting, Nausea and Other (See Comments)     portal hypertension     Sotalol Itching       PAST MEDICAL HISTORY:  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 8/22/2005    Cirrhosis, idiopathic     Edema 5/13/2013     Esophageal varices with bleeding(456.0)     Pt denies     Gallstones      Genital herpes, unspecified 3/20/1999    resolving herpes progenitalis     GERD (gastroesophageal reflux disease)      Hematuria      Hypertension      Hypochondriasis     problems in past     Obesity 11/24/2010     BRUCE (obstructive sleep apnea) 03/17/2009    Mild AHI 8.4  RDI 31 - Pt refuses to wear his CPAP     Pericarditis      Portal hypertension (H) 1/28/2010      Unspecified thrombocytopenia 8/22/2005    Thrombocytopenia associated with cirrhosis and portal hypertension       PAST SURGICAL HISTORY:  Past Surgical History:   Procedure Laterality Date     ANESTHESIA CARDIOVERSION N/A 1/19/2015    Procedure: ANESTHESIA CARDIOVERSION;  Surgeon: Generic Anesthesia Provider;  Location: [facility]     ANESTHESIA CARDIOVERSION N/A 2/6/2019    Procedure: ANESTHESIA CARDIOVERSION;  Surgeon: GENERIC ANESTHESIA PROVIDER;  Location:  OR     ANESTHESIA CARDIOVERSION N/A 7/5/2019    Procedure: ANESTHESIA FOR CARDIOVERSION  DR. MURGUIA);  Surgeon: GENERIC ANESTHESIA PROVIDER;  Location:  OR     COLONOSCOPY N/A 8/14/2017    Procedure: COLONOSCOPY;  Colonoscopy Called will arrive appx 12:30 Per phone call;  Surgeon: Vincent Whittington MD;  Location:  GI     CV HEART CATHETERIZATION WITH POSSIBLE INTERVENTION N/A 12/26/2018    Procedure: Heart Catheterization with possible Intervention;  Surgeon: Brandon Arroyo MD;  Location:  HEART CARDIAC CATH LAB     EP ABLATION FOCAL AFIB N/A 12/21/2018    Procedure: EP Ablation Focal AFIB;  Surgeon: Kristofer Murguia MD;  Location:  HEART CARDIAC CATH LAB     ESOPHAGOSCOPY, GASTROSCOPY, DUODENOSCOPY (EGD), COMBINED  7/11/2011    Procedure:COMBINED ESOPHAGOSCOPY, GASTROSCOPY, DUODENOSCOPY (EGD); Surgeon:LAURA LAMAS; Location:[facility]     ESOPHAGOSCOPY, GASTROSCOPY, DUODENOSCOPY (EGD), COMBINED  9/10/2012    Procedure: COMBINED ESOPHAGOSCOPY, GASTROSCOPY, DUODENOSCOPY (EGD);;  Surgeon: Hi Roque MD;  Location:  GI     ESOPHAGOSCOPY, GASTROSCOPY, DUODENOSCOPY (EGD), COMBINED  9/9/2013    Procedure: COMBINED ESOPHAGOSCOPY, GASTROSCOPY, DUODENOSCOPY (EGD);;  Surgeon: Hi Roque MD;  Location:  GI     ESOPHAGOSCOPY, GASTROSCOPY, DUODENOSCOPY (EGD), COMBINED N/A 9/15/2014    Procedure: COMBINED ESOPHAGOSCOPY, GASTROSCOPY, DUODENOSCOPY (EGD);  Surgeon: Hi Roque MD;  Location:  GI     ESOPHAGOSCOPY, GASTROSCOPY, DUODENOSCOPY (EGD),  COMBINED N/A 10/30/2015    Procedure: COMBINED ESOPHAGOSCOPY, GASTROSCOPY, DUODENOSCOPY (EGD);  Surgeon: Feliberto Fox MD;  [facility]: [facility]     ESOPHAGOSCOPY, GASTROSCOPY, DUODENOSCOPY (EGD), COMBINED N/A 10/10/2016    Procedure: COMBINED ESOPHAGOSCOPY, GASTROSCOPY, DUODENOSCOPY (EGD);  Surgeon: Jose Nesbitt MD;  [facility]: [facility]     ESOPHAGOSCOPY, GASTROSCOPY, DUODENOSCOPY (EGD), COMBINED N/A 9/26/2019    Procedure: ESOPHAGOGASTRODUODENOSCOPY (EGD);  Surgeon: Timo Soares MD;  Location: [facility]     H ABLATION FOCAL AFIB  12/21/2018     HERNIA REPAIR       IRRIGATION AND DEBRIDEMENT ABSCESS SCROTUM, COMBINED  10/4/2012    Procedure: COMBINED IRRIGATION AND DEBRIDEMENT ABSCESS SCROTUM;  Irrigation and Debridement of Groin Abscess;  Surgeon: Benny Shoemaker MD;  Location: [facility]     SOFT TISSUE SURGERY       SURGICAL HISTORY OF -   1993    rt elbow     SURGICAL HISTORY OF -   1/15/98    repair of ventral and umbilical hernia     SURGICAL HISTORY OF -   2001    endoscopy       FAMILY HISTORY:  Family History   Problem Relation Age of Onset     Lipids Mother      Hypertension Mother      Eye Disorder Mother      Heart Disease Father         CHF     Lipids Father      Obesity Father      Heart Disease Brother         MI     Eye Disorder Brother      Hypertension Brother         portal HTN     Thrombosis Sister         in her lung     Eye Disorder Sister      Cancer Other         maternal grandparent/throat cancer       SOCIAL HISTORY:  Social History     Socioeconomic History     Marital status:      Spouse name: None     Number of children: None     Years of education: None     Highest education level: None   Occupational History     None   Social Needs     Financial resource strain: None     Food insecurity:     Worry: None     Inability: None     Transportation needs:     Medical: None     Non-medical: None   Tobacco Use     Smoking status: Former Smoker     Packs/day: 0.80      Years: 6.00     Pack years: 4.80     Types: Cigarettes     Last attempt to quit: 2002     Years since quittin.8     Smokeless tobacco: Never Used   Substance and Sexual Activity     Alcohol use: No     Alcohol/week: 0.0 standard drinks     Comment: quit in      Drug use: No     Sexual activity: Yes     Partners: Female   Lifestyle     Physical activity:     Days per week: None     Minutes per session: None     Stress: None   Relationships     Social connections:     Talks on phone: None     Gets together: None     Attends Christianity service: None     Active member of club or organization: None     Attends meetings of clubs or organizations: None     Relationship status: None     Intimate partner violence:     Fear of current or ex partner: None     Emotionally abused: None     Physically abused: None     Forced sexual activity: None   Other Topics Concern     Parent/sibling w/ CABG, MI or angioplasty before 65F 55M? Yes     Comment: BROTHER   - MI AT AGE 44      Service Not Asked     Blood Transfusions Not Asked     Caffeine Concern Not Asked     Occupational Exposure Not Asked     Hobby Hazards Not Asked     Sleep Concern Not Asked     Stress Concern Not Asked     Weight Concern Not Asked     Special Diet Not Asked     Back Care Not Asked     Exercise No     Bike Helmet Not Asked     Seat Belt Not Asked     Self-Exams Not Asked   Social History Narrative     None       Review of Systems:  Skin:  Negative       Eyes:  Positive for      ENT:  Negative      Respiratory:  Positive for sleep apnea;CPAP;dyspnea on exertion;dyspnea at rest SOB increased   Cardiovascular:    Positive for;fatigue;palpitations;lightheadedness;chest pain;dizziness more frequent dizzy spells   Gastroenterology: Positive for heartburn;reflux    Genitourinary:  Positive for   cirrohsis of the liver  Musculoskeletal:  Negative      Neurologic:  Positive for memory problems    Psychiatric:  Negative      Heme/Lymph/Imm:   "Negative      Endocrine:  Negative        Physical Exam:  Vitals: /75   Pulse 92   Ht 1.651 m (5' 5\")   Wt 108 kg (238 lb)   BMI 39.61 kg/m       Constitutional:  cooperative, alert and oriented, well developed, well nourished, in no acute distress obese      Skin:  warm and dry to the touch, no apparent skin lesions or masses noted          Head:  normocephalic, no masses or lesions        Eyes:  pupils equal and round, conjunctivae and lids unremarkable, sclera white, no xanthalasma, EOMS intact, no nystagmus        Lymph:No Cervical lymphadenopathy present     ENT:  no pallor or cyanosis, dentition good        Neck:  carotid pulses are full and equal bilaterally, JVP normal, no carotid bruit        Respiratory:  normal breath sounds, clear to auscultation, normal A-P diameter, normal symmetry, normal respiratory excursion, no use of accessory muscles         Cardiac:   irregularly irregular rhythm;tachycardic              pulses full and equal, no bruits auscultated                                        GI:  abdomen soft, non-tender, BS normoactive, no mass, no HSM, no bruits        Extremities and Muscular Skeletal:  no deformities, clubbing, cyanosis, erythema observed              Neurological:  no gross motor deficits        Psych:  Alert and Oriented x 3        CC  No referring provider defined for this encounter.                Thank you for allowing me to participate in the care of your patient.      Sincerely,     Kristofer Brown MD     [facility]    cc:   No referring provider defined for this encounter.        "

## 2019-11-14 ENCOUNTER — DOCUMENTATION ONLY (OUTPATIENT)
Dept: CARDIOLOGY | Facility: CLINIC | Age: 59
End: 2019-11-14

## 2019-11-14 RX ORDER — LIDOCAINE 40 MG/G
CREAM TOPICAL
Status: CANCELLED | OUTPATIENT
Start: 2019-11-14

## 2019-11-14 RX ORDER — CEFAZOLIN SODIUM 2 G/100ML
2 INJECTION, SOLUTION INTRAVENOUS
Status: CANCELLED | OUTPATIENT
Start: 2019-11-14

## 2019-11-14 RX ORDER — SODIUM CHLORIDE 450 MG/100ML
INJECTION, SOLUTION INTRAVENOUS CONTINUOUS
Status: CANCELLED | OUTPATIENT
Start: 2019-11-14

## 2019-11-14 NOTE — TELEPHONE ENCOUNTER
Patient returned Carola's call. Reviewed pre procedure instructions. Patient had many questions that were answered. Patient verbalized understanding.

## 2019-11-14 NOTE — PROGRESS NOTES
Called patient with pre-procedure instructions for device implant:     Anticoagulation: Warfarin on hold starting 11/13/19. This was discussed with patient by Dr. Evans during his in clinic appointment on 11/13/19.   Oral diabetes meds: NA  Insulin: NA  Diuretic: Hold Furosemide on 11/15/2019.  Contrast allergy: No  Pt informed to be NPO at midnight  (if procedure scheduled 1pm or later, may have clear liquid breakfast before 8am)    Instructed pt to shower the morning of the procedure, and then put on a clean shirt in order to help prevent infection.     Pt has post-procedure transportation and 24 hours monitoring set up.   Pt is aware of no driving for 24 hours post procedure due to sedation.     Pt aware of arrival time at 1100 and location.     Called pt and left message with instructions to call the clinic to review and discuss the above pre-procedure instructions for his procedure tomorrow, 11/15/2019. Clinic phone number provided.

## 2019-11-14 NOTE — PROGRESS NOTES
Service Date: 11/13/2019      HISTORY OF PRESENT ILLNESS:  Thank you for allowing me to participate in the care of this very delightful patient.  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 Roque at the [facility] at [facility].  The patient has been seeing me for long-lasting persistent atrial fibrillation, associated reduction in exercise tolerance and at times palpitations.  We have tried rate control in the past and eventually rate control strategy on sotalol, but it was unsuccessful.  In light of that, I recommended a catheter-based ablation, which was performed a year ago.      At that time, the patient presented in atrial fibrillation.  At that study, the patient was found to have a tremendous amount of scarring in the left atrium and not much potentials in all pulmonary veins.  I isolated the veins and also the left atrial posterior wall.  I was not optimistic at the outcome because of the amount of scarring I noted.  The next day, the patient went into heart failure.  An echocardiogram demonstrated ejection fraction was 30%.  Angiogram was then performed, demonstrated normal coronaries.  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      Since then, he has required a few cardioversions, even having to go on flecainide once his LV function normalized, but the patient continued to have recurrence of atrial fibrillation.  The patient could not tolerate amiodarone in the past because of shortness of breath and other side effects.      I saw the patient this past July, and we discussed a few options including repeat AFib ablation without any guarantee that we would achieve long-term remission, given the amount of scarring noted. The second option is AV jerry ablation and biventricular pacemaker in light of his previous LV  dysfunction, even though he has normalized now.      A month ago, the patient was hospitalized for cellulitis and has resolved now.  He was in atrial fibrillation with rapid rate at that time, and when he was discharged home, there was no change in his medication.  His rate appeared to be quite fast still on today's visit.      We discussed at length about options going forward including the option I just outlined.  I am leaning toward the latter option, which is AV node ablation and biventricular pacemaker if there is appropriate coronary sinus.  If not, place the lead in the His position.  Even though the patient is 58, but given the amount of scarring noted, I would estimate that the success rate for a second procedure is 50% at best.  The patient and his wife would like to proceed with the recommended option.  I went over the procedure in detail with risks including but not limited to vascular injury, cardiac perforation and CVA.  I would like him to hold warfarin starting today.  I would like to do this procedure this Friday, either myself if I am available or one of my partners.      Even though the patient has liver cirrhosis, Dr. Roque is okay for him to continue with warfarin at this point in time.  If his LV function continued to be normal and the patient has no other CHADS-VASc score risk factors,  one may consider stopping warfarin in the future.         CIRILO MURGUIA MD             D: 2019   T: 2019   MT: al      Name:     MARILY NAVARRO   MRN:      [id number]        Account:      [id number]   :      1960           Service Date: 2019      Document: [id number]

## 2019-11-15 ENCOUNTER — SURGERY (OUTPATIENT)
Age: 59
End: 2019-11-15
Payer: COMMERCIAL

## 2019-11-15 ENCOUNTER — HOSPITAL ENCOUNTER (OUTPATIENT)
Facility: CLINIC | Age: 59
Discharge: HOME | End: 2019-11-16
Admitting: INTERNAL MEDICINE
Payer: COMMERCIAL

## 2019-11-15 DIAGNOSIS — I48.19 PERSISTENT ATRIAL FIBRILLATION (H): ICD-10-CM

## 2019-11-15 LAB
ANION GAP SERPL CALCULATED.3IONS-SCNC: 4 MMOL/L (ref 3–14)
BUN SERPL-MCNC: 17 MG/DL (ref 7–30)
CALCIUM SERPL-MCNC: 8.4 MG/DL (ref 8.5–10.1)
CHLORIDE SERPL-SCNC: 114 MMOL/L (ref 94–109)
CO2 SERPL-SCNC: 25 MMOL/L (ref 20–32)
CREAT SERPL-MCNC: 0.61 MG/DL (ref 0.66–1.25)
ERYTHROCYTE [DISTWIDTH] IN BLOOD BY AUTOMATED COUNT: 15.8 % (ref 10–15)
GFR SERPL CREATININE-BSD FRML MDRD: >90 ML/MIN/{1.73_M2}
GLUCOSE BLDC GLUCOMTR-MCNC: 75 MG/DL (ref 70–99)
GLUCOSE SERPL-MCNC: 86 MG/DL (ref 70–99)
HCT VFR BLD AUTO: 33.6 % (ref 40–53)
HGB BLD-MCNC: 10.8 G/DL (ref 13.3–17.7)
INR BLD: 2.5 (ref 0.86–1.14)
MCH RBC QN AUTO: 29.8 PG (ref 26.5–33)
MCHC RBC AUTO-ENTMCNC: 32.1 G/DL (ref 31.5–36.5)
MCV RBC AUTO: 93 FL (ref 78–100)
PLATELET # BLD AUTO: 58 10E9/L (ref 150–450)
POTASSIUM SERPL-SCNC: 3.9 MMOL/L (ref 3.4–5.3)
RBC # BLD AUTO: 3.63 10E12/L (ref 4.4–5.9)
SODIUM SERPL-SCNC: 143 MMOL/L (ref 133–144)
WBC # BLD AUTO: 2.9 10E9/L (ref 4–11)

## 2019-11-15 PROCEDURE — 25000128 H RX IP 250 OP 636

## 2019-11-15 PROCEDURE — 36415 COLL VENOUS BLD VENIPUNCTURE: CPT

## 2019-11-15 PROCEDURE — 36415 COLL VENOUS BLD VENIPUNCTURE: CPT | Performed by: INTERNAL MEDICINE

## 2019-11-15 PROCEDURE — 85027 COMPLETE CBC AUTOMATED: CPT | Performed by: INTERNAL MEDICINE

## 2019-11-15 PROCEDURE — 99152 MOD SED SAME PHYS/QHP 5/>YRS: CPT | Performed by: INTERNAL MEDICINE

## 2019-11-15 PROCEDURE — 25800029 ZZH RX IP 258 OP 250: Performed by: INTERNAL MEDICINE

## 2019-11-15 PROCEDURE — 80048 BASIC METABOLIC PNL TOTAL CA: CPT | Performed by: INTERNAL MEDICINE

## 2019-11-15 PROCEDURE — 25000128 H RX IP 250 OP 636: Performed by: INTERNAL MEDICINE

## 2019-11-15 PROCEDURE — 85610 PROTHROMBIN TIME: CPT | Mod: QW | Performed by: INTERNAL MEDICINE

## 2019-11-15 PROCEDURE — 99153 MOD SED SAME PHYS/QHP EA: CPT | Performed by: INTERNAL MEDICINE

## 2019-11-15 PROCEDURE — 25000132 ZZH RX MED GY IP 250 OP 250 PS 637: Performed by: INTERNAL MEDICINE

## 2019-11-15 PROCEDURE — 27210794 ZZH OR GENERAL SUPPLY STERILE: Performed by: INTERNAL MEDICINE

## 2019-11-15 PROCEDURE — C1733 CATH, EP, OTHR THAN COOL-TIP: HCPCS | Performed by: INTERNAL MEDICINE

## 2019-11-15 PROCEDURE — 40000235 ZZH STATISTIC TELEMETRY

## 2019-11-15 PROCEDURE — 33207 INSERT HEART PM VENTRICULAR: CPT | Mod: KX | Performed by: INTERNAL MEDICINE

## 2019-11-15 PROCEDURE — 40000852 ZZH STATISTIC HEART CATH LAB OR EP LAB

## 2019-11-15 PROCEDURE — C1900 LEAD, CORONARY VENOUS: HCPCS | Performed by: INTERNAL MEDICINE

## 2019-11-15 PROCEDURE — C1894 INTRO/SHEATH, NON-LASER: HCPCS | Performed by: INTERNAL MEDICINE

## 2019-11-15 PROCEDURE — C1730 CATH, EP, 19 OR FEW ELECT: HCPCS | Performed by: INTERNAL MEDICINE

## 2019-11-15 PROCEDURE — 33225 L VENTRIC PACING LEAD ADD-ON: CPT | Performed by: INTERNAL MEDICINE

## 2019-11-15 PROCEDURE — C1769 GUIDE WIRE: HCPCS | Performed by: INTERNAL MEDICINE

## 2019-11-15 PROCEDURE — 93650 ICAR CATH ABLTJ AV NODE FUNC: CPT | Performed by: INTERNAL MEDICINE

## 2019-11-15 PROCEDURE — 25800030 ZZH RX IP 258 OP 636

## 2019-11-15 PROCEDURE — 25500064 ZZH RX 255 OP 636: Performed by: INTERNAL MEDICINE

## 2019-11-15 PROCEDURE — C1898 LEAD, PMKR, OTHER THAN TRANS: HCPCS | Performed by: INTERNAL MEDICINE

## 2019-11-15 PROCEDURE — 25000125 ZZHC RX 250: Performed by: INTERNAL MEDICINE

## 2019-11-15 PROCEDURE — C2621 PMKR, OTHER THAN SING/DUAL: HCPCS | Performed by: INTERNAL MEDICINE

## 2019-11-15 PROCEDURE — 33207 INSERT HEART PM VENTRICULAR: CPT | Performed by: INTERNAL MEDICINE

## 2019-11-15 PROCEDURE — 82962 GLUCOSE BLOOD TEST: CPT

## 2019-11-15 PROCEDURE — C1887 CATHETER, GUIDING: HCPCS | Performed by: INTERNAL MEDICINE

## 2019-11-15 DEVICE — CRT-P VALITUDE X4: Type: IMPLANTABLE DEVICE | Status: FUNCTIONAL

## 2019-11-15 RX ORDER — OXYCODONE AND ACETAMINOPHEN 5; 325 MG/1; MG/1
1 TABLET ORAL EVERY 4 HOURS PRN
Status: DISCONTINUED | OUTPATIENT
Start: 2019-11-15 | End: 2019-11-16 | Stop reason: HOSPADM

## 2019-11-15 RX ORDER — CEFAZOLIN SODIUM 2 G/100ML
2 INJECTION, SOLUTION INTRAVENOUS EVERY 8 HOURS
Status: COMPLETED | OUTPATIENT
Start: 2019-11-15 | End: 2019-11-16

## 2019-11-15 RX ORDER — LIDOCAINE 40 MG/G
CREAM TOPICAL
Status: DISCONTINUED | OUTPATIENT
Start: 2019-11-15 | End: 2019-11-15 | Stop reason: HOSPADM

## 2019-11-15 RX ORDER — FENTANYL CITRATE 50 UG/ML
INJECTION, SOLUTION INTRAMUSCULAR; INTRAVENOUS
Status: DISCONTINUED | OUTPATIENT
Start: 2019-11-15 | End: 2019-11-15 | Stop reason: HOSPADM

## 2019-11-15 RX ORDER — HEPARIN SODIUM 200 [USP'U]/100ML
100-500 INJECTION, SOLUTION INTRAVENOUS CONTINUOUS PRN
Status: DISCONTINUED | OUTPATIENT
Start: 2019-11-15 | End: 2019-11-15 | Stop reason: HOSPADM

## 2019-11-15 RX ORDER — SODIUM CHLORIDE 450 MG/100ML
INJECTION, SOLUTION INTRAVENOUS CONTINUOUS
Status: DISCONTINUED | OUTPATIENT
Start: 2019-11-15 | End: 2019-11-15 | Stop reason: HOSPADM

## 2019-11-15 RX ORDER — NALOXONE HYDROCHLORIDE 0.4 MG/ML
.1-.4 INJECTION, SOLUTION INTRAMUSCULAR; INTRAVENOUS; SUBCUTANEOUS
Status: DISCONTINUED | OUTPATIENT
Start: 2019-11-15 | End: 2019-11-16 | Stop reason: HOSPADM

## 2019-11-15 RX ORDER — PANTOPRAZOLE SODIUM 40 MG/1
40 TABLET, DELAYED RELEASE ORAL DAILY
Status: DISCONTINUED | OUTPATIENT
Start: 2019-11-16 | End: 2019-11-16 | Stop reason: HOSPADM

## 2019-11-15 RX ORDER — FUROSEMIDE 40 MG
40 TABLET ORAL 2 TIMES DAILY
Status: DISCONTINUED | OUTPATIENT
Start: 2019-11-15 | End: 2019-11-16 | Stop reason: HOSPADM

## 2019-11-15 RX ORDER — DOBUTAMINE HYDROCHLORIDE 200 MG/100ML
5-40 INJECTION INTRAVENOUS CONTINUOUS PRN
Status: DISCONTINUED | OUTPATIENT
Start: 2019-11-15 | End: 2019-11-15 | Stop reason: HOSPADM

## 2019-11-15 RX ORDER — HEPARIN SODIUM 200 [USP'U]/100ML
100-600 INJECTION, SOLUTION INTRAVENOUS CONTINUOUS PRN
Status: DISCONTINUED | OUTPATIENT
Start: 2019-11-15 | End: 2019-11-15 | Stop reason: HOSPADM

## 2019-11-15 RX ORDER — METOPROLOL SUCCINATE 25 MG/1
25 TABLET, EXTENDED RELEASE ORAL EVERY MORNING
Status: DISCONTINUED | OUTPATIENT
Start: 2019-11-16 | End: 2019-11-16 | Stop reason: HOSPADM

## 2019-11-15 RX ORDER — CEFAZOLIN SODIUM 1 G/3ML
1 INJECTION, POWDER, FOR SOLUTION INTRAMUSCULAR; INTRAVENOUS
Status: DISCONTINUED | OUTPATIENT
Start: 2019-11-15 | End: 2019-11-15 | Stop reason: HOSPADM

## 2019-11-15 RX ORDER — CEFAZOLIN SODIUM 2 G/100ML
2 INJECTION, SOLUTION INTRAVENOUS
Status: COMPLETED | OUTPATIENT
Start: 2019-11-15 | End: 2019-11-15

## 2019-11-15 RX ORDER — BUPIVACAINE HYDROCHLORIDE 2.5 MG/ML
INJECTION, SOLUTION EPIDURAL; INFILTRATION; INTRACAUDAL
Status: DISCONTINUED | OUTPATIENT
Start: 2019-11-15 | End: 2019-11-15 | Stop reason: HOSPADM

## 2019-11-15 RX ORDER — SPIRONOLACTONE 25 MG/1
25 TABLET ORAL DAILY
Status: DISCONTINUED | OUTPATIENT
Start: 2019-11-15 | End: 2019-11-16 | Stop reason: HOSPADM

## 2019-11-15 RX ADMIN — CEFAZOLIN SODIUM 2 G: 2 INJECTION, SOLUTION INTRAVENOUS at 19:44

## 2019-11-15 RX ADMIN — SODIUM CHLORIDE: 4.5 INJECTION, SOLUTION INTRAVENOUS at 12:33

## 2019-11-15 RX ADMIN — MIDAZOLAM 1 MG: 1 INJECTION INTRAMUSCULAR; INTRAVENOUS at 16:10

## 2019-11-15 RX ADMIN — CEFAZOLIN SODIUM 2 G: 2 INJECTION, SOLUTION INTRAVENOUS at 12:47

## 2019-11-15 RX ADMIN — BUPIVACAINE HYDROCHLORIDE 10 ML: 2.5 INJECTION, SOLUTION EPIDURAL; INFILTRATION; INTRACAUDAL; PERINEURAL at 14:23

## 2019-11-15 RX ADMIN — FENTANYL CITRATE 25 MCG: 50 INJECTION, SOLUTION INTRAMUSCULAR; INTRAVENOUS at 15:14

## 2019-11-15 RX ADMIN — FENTANYL CITRATE 50 MCG: 50 INJECTION, SOLUTION INTRAMUSCULAR; INTRAVENOUS at 16:10

## 2019-11-15 RX ADMIN — SPIRONOLACTONE 25 MG: 25 TABLET ORAL at 19:47

## 2019-11-15 RX ADMIN — LIDOCAINE HYDROCHLORIDE 8 ML: 10 INJECTION, SOLUTION EPIDURAL; INFILTRATION; INTRACAUDAL; PERINEURAL at 16:12

## 2019-11-15 RX ADMIN — MIDAZOLAM 0.5 MG: 1 INJECTION INTRAMUSCULAR; INTRAVENOUS at 15:14

## 2019-11-15 RX ADMIN — PHYTONADIONE 2 MG: 2 INJECTION, EMULSION INTRAMUSCULAR; INTRAVENOUS; SUBCUTANEOUS at 14:00

## 2019-11-15 RX ADMIN — FENTANYL CITRATE 25 MCG: 50 INJECTION, SOLUTION INTRAMUSCULAR; INTRAVENOUS at 15:36

## 2019-11-15 RX ADMIN — FENTANYL CITRATE 25 MCG: 50 INJECTION, SOLUTION INTRAMUSCULAR; INTRAVENOUS at 16:08

## 2019-11-15 RX ADMIN — BACITRACIN 25000 UNITS: 5000 INJECTION, POWDER, FOR SOLUTION INTRAMUSCULAR at 15:46

## 2019-11-15 RX ADMIN — MIDAZOLAM 1 MG: 1 INJECTION INTRAMUSCULAR; INTRAVENOUS at 14:10

## 2019-11-15 RX ADMIN — MIDAZOLAM 0.5 MG: 1 INJECTION INTRAMUSCULAR; INTRAVENOUS at 14:43

## 2019-11-15 RX ADMIN — FENTANYL CITRATE 25 MCG: 50 INJECTION, SOLUTION INTRAMUSCULAR; INTRAVENOUS at 14:43

## 2019-11-15 RX ADMIN — MIDAZOLAM 0.5 MG: 1 INJECTION INTRAMUSCULAR; INTRAVENOUS at 15:36

## 2019-11-15 RX ADMIN — MIDAZOLAM 1 MG: 1 INJECTION INTRAMUSCULAR; INTRAVENOUS at 16:47

## 2019-11-15 RX ADMIN — FUROSEMIDE 40 MG: 40 TABLET ORAL at 19:47

## 2019-11-15 RX ADMIN — FENTANYL CITRATE 50 MCG: 50 INJECTION, SOLUTION INTRAMUSCULAR; INTRAVENOUS at 14:10

## 2019-11-15 RX ADMIN — MIDAZOLAM 1 MG: 1 INJECTION INTRAMUSCULAR; INTRAVENOUS at 14:22

## 2019-11-15 RX ADMIN — Medication 3 ML: at 14:48

## 2019-11-15 RX ADMIN — FENTANYL CITRATE 50 MCG: 50 INJECTION, SOLUTION INTRAMUSCULAR; INTRAVENOUS at 14:22

## 2019-11-15 RX ADMIN — LIDOCAINE HYDROCHLORIDE 10 ML: 10 INJECTION, SOLUTION EPIDURAL; INFILTRATION; INTRACAUDAL; PERINEURAL at 14:22

## 2019-11-15 RX ADMIN — MIDAZOLAM 0.5 MG: 1 INJECTION INTRAMUSCULAR; INTRAVENOUS at 16:08

## 2019-11-15 ASSESSMENT — MIFFLIN-ST. JEOR: SCORE: 1826.44

## 2019-11-15 NOTE — PRE-PROCEDURE
GENERAL PRE-PROCEDURE:   Procedure:  Biv/AVN ablation  Date/Time:  11/15/2019 1:27 PM    Verbal consent obtained?: Yes    Written consent obtained?: No    Risks and benefits: Risks, benefits and alternatives were discussed    Consent given by:  Patient  Patient states understanding of procedure being performed: Yes    Patient's understanding of procedure matches consent: Yes    Procedure consent matches procedure scheduled: Yes    Expected level of sedation:  Moderate  Appropriately NPO:  Yes  ASA Class:  Class 3- Severe systemic disease, definite functional limitations  Mallampati  :  Grade 2- soft palate, base of uvula, tonsillar pillars, and portion of posterior pharyngeal wall visible  Lungs:  Lungs clear with good breath sounds bilaterally  Heart:  Normal heart sounds and rate  History & Physical reviewed:  History and physical reviewed and no updates needed  Statement of review:  I have reviewed the lab findings, diagnostic data, medications, and the plan for sedation

## 2019-11-15 NOTE — PROGRESS NOTES
[facility] Admission Nursing Note    Reason for admission: av node ablation and pacer  CS arrival time: 1155  Accompanied by: wife  Name/phone of DC : wife jason here,  and to be with him after discharge and overnight [phone]  Medications held: warfarin last dose wed am, lasix and spironolactone yesterday at 1300.   Consent signed: yes  Abnormal assessment/labs: await  If abnormal, provider notified:   Education/questions answered: explained procedure  Plan: routine cares per order.

## 2019-11-16 ENCOUNTER — APPOINTMENT (OUTPATIENT)
Dept: GENERAL RADIOLOGY | Facility: CLINIC | Age: 59
End: 2019-11-16
Attending: INTERNAL MEDICINE
Payer: COMMERCIAL

## 2019-11-16 VITALS
TEMPERATURE: 97.5 F | SYSTOLIC BLOOD PRESSURE: 110 MMHG | BODY MASS INDEX: 39.87 KG/M2 | HEART RATE: 80 BPM | WEIGHT: 239.3 LBS | RESPIRATION RATE: 18 BRPM | OXYGEN SATURATION: 95 % | HEIGHT: 65 IN | DIASTOLIC BLOOD PRESSURE: 55 MMHG

## 2019-11-16 PROCEDURE — 93005 ELECTROCARDIOGRAM TRACING: CPT

## 2019-11-16 PROCEDURE — 25000128 H RX IP 250 OP 636: Performed by: INTERNAL MEDICINE

## 2019-11-16 PROCEDURE — 99213 OFFICE O/P EST LOW 20 MIN: CPT | Performed by: INTERNAL MEDICINE

## 2019-11-16 PROCEDURE — 40000065 ZZH STATISTIC EKG NON-CHARGEABLE

## 2019-11-16 PROCEDURE — 25000132 ZZH RX MED GY IP 250 OP 250 PS 637: Performed by: INTERNAL MEDICINE

## 2019-11-16 PROCEDURE — 40000986 XR CHEST 2 VW

## 2019-11-16 RX ORDER — METOPROLOL SUCCINATE 25 MG/1
25 TABLET, EXTENDED RELEASE ORAL EVERY MORNING
Qty: 30 TABLET | Refills: 3 | Status: SHIPPED | OUTPATIENT
Start: 2019-11-17 | End: 2019-12-10

## 2019-11-16 RX ORDER — METOPROLOL SUCCINATE 100 MG/1
100 TABLET, EXTENDED RELEASE ORAL DAILY
Qty: 30 TABLET | Refills: 1 | Status: SHIPPED | OUTPATIENT
Start: 2019-11-16 | End: 2019-12-10

## 2019-11-16 RX ORDER — PANTOPRAZOLE SODIUM 40 MG/1
40 TABLET, DELAYED RELEASE ORAL DAILY
Qty: 30 TABLET | Refills: 3 | Status: SHIPPED | OUTPATIENT
Start: 2019-11-17 | End: 2020-05-26

## 2019-11-16 RX ADMIN — PANTOPRAZOLE SODIUM 40 MG: 40 TABLET, DELAYED RELEASE ORAL at 08:14

## 2019-11-16 RX ADMIN — CEFAZOLIN SODIUM 2 G: 2 INJECTION, SOLUTION INTRAVENOUS at 10:54

## 2019-11-16 RX ADMIN — METOPROLOL SUCCINATE 25 MG: 25 TABLET, EXTENDED RELEASE ORAL at 08:14

## 2019-11-16 RX ADMIN — SPIRONOLACTONE 25 MG: 25 TABLET ORAL at 08:14

## 2019-11-16 RX ADMIN — FLUTICASONE FUROATE AND VILANTEROL TRIFENATATE 1 PUFF: 100; 25 POWDER RESPIRATORY (INHALATION) at 08:15

## 2019-11-16 RX ADMIN — FUROSEMIDE 40 MG: 40 TABLET ORAL at 08:14

## 2019-11-16 RX ADMIN — CEFAZOLIN SODIUM 2 G: 2 INJECTION, SOLUTION INTRAVENOUS at 04:13

## 2019-11-16 RX ADMIN — OXYCODONE HYDROCHLORIDE AND ACETAMINOPHEN 1 TABLET: 5; 325 TABLET ORAL at 02:21

## 2019-11-16 ASSESSMENT — MIFFLIN-ST. JEOR: SCORE: 1832.34

## 2019-11-16 NOTE — DISCHARGE INSTRUCTIONS
EP (Electrophysiology) Study or Ablation  Discharge Instructions  __________________________________________      Patient Name: Maurice Jon  Today's Date: November 16, 2019    When you go home, have an adult stay with you until the next day.    Care of your puncture site:    In the first 24 hours: If you cough or sneeze, press firmly on the puncture site.    The day after your test or treatment:    You may take a shower or tub bath.    You may remove the Band-Aid the next day.    Bleeding is rare. If bleeding occurs, press firmly on the site while lying down and call 911.    Activity  Because you have received medicine to sedate you, you should not drive for 48 hours.    To prevent bleeding, for the next 48 hours:    No lifting more than 10 pounds.    No housework, yard work or any activity that makes you sweat.    No stooping or squatting.    No sex.    No alcohol.    Ask your doctor when you can return to work. Most people return to normal activity in 3 to 5 days.    Discomfort  You may have bruising, soreness or a hardening at the puncture site. This is normal.    If you have pain or shortness of breath, you may take Advil (ibuprofen) or Tylenol (acetaminophen).  Call the clinic and talk to a nurse if you have:    An increase in pain, fluid or swelling at the site.    A temperature of 101  F (38.3  C) or higher when taken under the tongue.    Shortness of breath or chest pain that is not relieved by Advil or Tylenol.    Premature heartbeats that occur often and lead to a return of symptoms.          Heart rhythms  You may have some premature heartbeats. These feel very strong. They may make you feel that the fast heart rhythm (tachycardia) is going to start again.  Give it time. The premature beats should occur less often.    Questions?  Call [facility] at [phone].  AV node Ablation  Discharge Instructions  __________________________________________      Patient Name:  Maurice Jon  Today's Date: November 16, 2019    When you go home, have an adult stay with you until the next day.    Care of your puncture site:    In the first 24 hours: If you cough or sneeze, press firmly on the puncture site.    The day after your test or treatment:    You may take a shower or tub bath.    You may remove the Band-Aid the next day.    Bleeding is rare. If bleeding occurs, press firmly on the site while lying down and call 911.    Activity  Because you have received medicine to sedate you, you should not drive for 48 hours.    To prevent bleeding, for the next 48 hours:    No lifting more than 10 pounds.    No housework, yard work or any activity that makes you sweat.    No stooping or squatting.    No sex.    No alcohol.    Ask your doctor when you can return to work. Most people return to normal activity in 3 to 5 days.    Discomfort  You may have bruising, soreness or a hardening at the puncture site. This is normal.    If you have pain or shortness of breath, you may take Advil (ibuprofen) or Tylenol (acetaminophen).  Call the clinic and talk to a nurse if you have:    An increase in pain, fluid or swelling at the site.    A temperature of 101  F (38.3  C) or higher when taken under the tongue.    Shortness of breath or chest pain that is not relieved by Advil or Tylenol.    Premature heartbeats that occur often and lead to a return of symptoms.          Heart rhythms  You may have some premature heartbeats. These feel very strong. They may make you feel that the fast heart rhythm (tachycardia) is going to start again.  Give it time. The premature beats should occur less often.    Questions?  Call [facility] at [phone].                                                Home Care after a Pacemaker procedure    Today's Date:  November 16, 2019    How should I care for the wound?  Keep the bandage on for 3 days. Change it only if it gets loose or soaked. If  you need to change it, use 4x4-inch gauze and a large clear bandage.     Leave the strips of tape on. They will fall off on their own, or we will remove them at your first check-up.    Check your wound daily for signs of infection, such as increased redness, severe swelling or draining. Fever may also be a sign of infection. Call us if you see any of these signs.    Can I take a shower or bath?  If there are no signs of infection, you may shower after the bandage comes off in 3 days. If you take a tub bath, keep the wound dry.    Will I have much pain?  You may have mild to medium pain for 3 to 5 days. Take acetaminophen (Tylenol) or ibuprofen (Advil) for the pain. If the pain persists or is severe, call us.    How should I limit my activities?  For at least 3 weeks: Do not raise your elbow above your shoulder. You can begin to use your arm as it feels comfortable to you.    In 6 to 8 weeks: You may begin to golf, play tennis, swim and do similar activities.    If you received an ICD, do not drive until we check it at your first follow-up visit.    If you received a new generator (battery), you should not drive for 24 hours.    What about after the wound has healed and I am feeling better?  For your safety, do not swim alone. If possible, avoid climbing a ladder. It is best to stay within 4 feet of the ground.    Avoid activities that may involve rough contact or a hard hit to the area of the device.    What if I feel dizzy or light-headed?  If you feel dizzy or light-headed, please call us.    How will others know that I have a device implanted in me?  Before you leave the hospital, you will receive a temporary ID card. A permanent card will be mailed to you about 6 to 8 weeks later. Always carry the ID card with you. It has important details about your device.    You should also get a MedicAlert bracelet or tag that says you have a pacemaker or defibrillator.   Please ask us for a MedicAlert brochure, or go to  [email or web address].     Always tell doctors, dentists and other care providers that you have a device implanted in you.    Let us know before you plan any surgeries. Your care team must take special steps to keep you safe during certain procedures. These steps will depend on the type of device you have. Your doctor will need to see your ID card. He or she may need to call us for instructions.    Will I need to be careful around electrical equipment?    All of these household appliances are safe to use:    Microwaves     Remote controls    Radios    Small electrical tools    Cordless phones    However, you need to keep all electrical equipment in good repair.    Strong electro-magnetic fields can interfere with your device. Stay at least a few feet away from:    Stereo speakers, including boom boxes    Magnets and magnetic wands used by airport security; bingo wands    Do not try to fix any motor while it is running. Running motors can make an electro-magnetic field that can interfere with your device.    Use cell phones with the ear that is farthest from your device. Do not put a cell phone in a pocket near your device, unless the phone is turned off.    Avoid the following, which can interfere with your device:    Magnetic resonance imaging (MRI) tests in the hospital    Arc welding    Getting close to a TV or radio transmission tower or to an antenna on a ham and CB radio    When do I need to come back for a check-up?  You will need to come back for your first check-up in 7 to 10 days. We will contact you after you leave the hospital. If you do not hear from us within 3 days, call us to set up a visit. Please bring your medicine list or your medicine bottles to this visit.    The battery in your device will need to be checked every 3 months. As it gets older, it will need to be checked more often than this.    For questions or to make an appointment:    Call [facility] at [phone].    If you are  deaf or hard of hearing, please let us know. We provide many free services including sign language interpreters, oral interpreters, TTYs, telephone amplifiers, note takers and written materials.

## 2019-11-16 NOTE — PROGRESS NOTES
"Patient back from EP lab at 1735.  Rating \"tailbone\" pain and all over pain a 7 of 10, and states he wants to sit up.  VS WNL, A-paced rhythm, A&O, and tolerates sips of water.  Left chest Pacemaker site is CDI without hematoma or swelling.  Right groin site is CDI without hematoma or swelling.  Plan for patient to stay overnight in [facility].  Report called to obs RN.  "

## 2019-11-16 NOTE — PLAN OF CARE
Pt arrived from [facility] at 1850hrs. A/OX4. Tele: A-paced, Soft BP, other VSS. Home CPAP in use. L chest PPM site w/ drsg, CDI. R groin puncture site w/ drsg, saturated with blood, continue to monitor for further bleedning. Bedrest till 2130hrs. Due to dangle. Voiding per urinal.

## 2019-11-16 NOTE — PLAN OF CARE
Pt discharging home at this time accompanied by pt's spouse. AVS and education given. Questions answered. Pt sent home with all his belongings.

## 2019-11-16 NOTE — DISCHARGE SUMMARY
[facility]    Discharge Summary  Cardiology    Date of Admission:  11/15/2019  Date of Discharge:  No discharge date for patient encounter.  Discharging Provider: Jaya Stevenson MD    Discharge Diagnoses   Patient Active Problem List    Diagnosis Date Noted     Health Care Home 07/01/2011     Priority: High     01/07/2014  Status:  Declined  Care Coordinator:  Salena Joel    See Letters for H Care Plan (emergency care plan only)             Portal hypertension (H) 01/28/2010     Priority: High     Liver Cirrhosis 2nd ot Fatty liver, w Ascites 08/22/2005     Priority: High     Cirrhosis, idiopathic         Persistent atrial fibrillation 11/13/2019     Priority: Medium     Added automatically from request for surgery [id number]       Cellulitis 10/06/2019     Priority: Medium     Acute combined systolic and diastolic (congestive) hrt fail (H) 01/04/2019     Priority: Medium     CAD (coronary artery disease)      Priority: Medium     Cath 12/26/18- mild nonobstructive disease       Cardiomyopathy (H)      Priority: Medium     12/23/18 Echo- EF 25-30%       Pericarditis      Priority: Medium     Acute on chronic systolic congestive heart failure (H)      Priority: Medium     Obesity (BMI 35.0-39.9) with comorbidity (H) 12/28/2018     Priority: Medium     Right heart failure (H) 12/24/2018     Priority: Medium     Chronic a-fib, S/P Ablation 12/22/18 -- on Warfarin 12/21/2018     Priority: Medium     Encounter for monitoring sotalol therapy 10/31/2018     Priority: Medium     Long-term (current) use of anticoagulants [Z79.01] 09/18/2018     Priority: Medium     Portal vein thrombosis 02/02/2017     Priority: Medium     History of MRSA now culture negative 08/06/2015     Priority: Medium     Severe sepsis (H) 07/13/2015     Priority: Medium     Problem list name updated by automated process. Provider to review       Paroxysmal atrial fibrillation (H)      Priority: Medium     S/p  cardioversion       Edema 05/13/2013     Priority: Medium     CARDIOVASCULAR SCREENING; LDL GOAL LESS THAN 160 10/31/2010     Priority: Medium     GERD (gastroesophageal reflux disease) 03/25/2010     Priority: Medium     Eczema 01/28/2010     Priority: Medium     BRUCE-Mild (AHI 9; REM RDI 31) 03/20/2009     Priority: Medium     Sleep study 3/09- .0 minutes, latency 3.6 minutes. REM latency 72.0 minutes. Sleep efficiency 87.7%. The sleep architecture was disrupted with frequent sleep stage changes and arousals. Snoring: mild to loud.  RDI 27.7, AHI of 8.4. REM RDI 31.5 TLO2 saturation 83.0%. This study is suggestive of mild sleep apnea, severe during REM sleep (20% TST). Other:  PLM index was 0.0.       Compulsive behaviors 08/26/2008     Priority: Medium     erectile dysfunction 08/22/2005     Priority: Medium     Obesity 11/24/2010     Priority: Low     Thrombocytopenia (H) 08/22/2005     Priority: Low     Thrombocytopenia associated with cirrhosis and portal hypertension  Problem list name updated by automated process. Provider to review         History of Present Illness   Maurice Jon is an 58 year old male who presented with CRT pacer and AV node ablation.    Patient did well overnight.  He had no acute events.  Device interrogation is within normal limits and chest x-ray is normal.    Hospital Course   Maurice Jon was admitted on 11/15/2019.  The following problems were addressed during his hospitalization:  CRT-P  And AV node ablation    Principal Problem:    Persistent atrial fibrillation      Jaya Stevenson MD    Significant Results and Procedures   Patient did well after his procedure with no acute events.    Pending Results   These results will be followed up by   Unresulted Labs Ordered in the Past 30 Days of this Admission     No orders found for last 31 day(s).          Code Status   Full Code    Primary Care Physician   Alon Pineda    Physical Exam   Temp: 97.5   F (36.4  C) Temp src: Oral BP: 110/55 Pulse: 80 Heart Rate: 80 Resp: 18 SpO2: 95 % O2 Device: None (Room air) Oxygen Delivery: 4 LPM  Vitals:    11/15/19 1154 11/16/19 0612   Weight: 108 kg (238 lb) 108.5 kg (239 lb 4.8 oz)     Vital Signs with Ranges  Temp:  [95.9  F (35.5  C)-97.5  F (36.4  C)] 97.5  F (36.4  C)  Pulse:  [76-81] 80  Heart Rate:  [80-94] 80  Resp:  [16-18] 18  BP: (101-114)/(45-81) 110/55  SpO2:  [93 %-98 %] 95 %  I/O last 3 completed shifts:  In: 360 [P.O.:360]  Out: 1175 [Urine:1175]    Constitutional: Alert and oriented x3 no acute distress    Respiratory: Clear bilaterally  Cardiovascular: No significant murmurs regular rate rhythm  GI: Normal  Skin: Vascular access sites cool dry and intact with no hematoma  Musculoskeletal: Normal  Neurologic: Intact      Time Spent on this Encounter   I, Jaya Stevenson MD, personally saw the patient today and spent less than or equal to 30 minutes discharging this patient.    Discharge Disposition   Discharged to home  Condition at discharge: Stable    Consultations This Hospital Stay   None    Discharge Orders     Discharge Medications   Current Discharge Medication List      START taking these medications    Details   fluticasone-vilanterol (BREO ELLIPTA) 100-25 MCG/INH inhaler Inhale 1 puff into the lungs daily  Qty: 1 Inhaler, Refills: 3    Associated Diagnoses: Persistent atrial fibrillation         CONTINUE these medications which have CHANGED    Details   !! metoprolol succinate ER (TOPROL-XL) 100 MG 24 hr tablet Take 1 tablet (100 mg) by mouth daily  Qty: 30 tablet, Refills: 1    Associated Diagnoses: Persistent atrial fibrillation      !! metoprolol succinate ER (TOPROL-XL) 25 MG 24 hr tablet Take 1 tablet (25 mg) by mouth every morning  Qty: 30 tablet, Refills: 3    Associated Diagnoses: Persistent atrial fibrillation      pantoprazole (PROTONIX) 40 MG EC tablet Take 1 tablet (40 mg) by mouth daily  Qty: 30 tablet, Refills: 3    Associated  Diagnoses: Persistent atrial fibrillation       !! - Potential duplicate medications found. Please discuss with provider.      CONTINUE these medications which have NOT CHANGED    Details   budesonide-formoterol (SYMBICORT) 80-4.5 MCG/ACT Inhaler Inhale 2 puffs into the lungs 2 times daily  Qty: 10.2 g, Refills: 3    Associated Diagnoses: Shortness of breath      calcium carb 1250 mg, 500 mg [place],/vitamin D 200 units (OSCAL WITH D) 500-200 MG-UNIT per tablet Take 2 tablets by mouth daily with food.      furosemide (LASIX) 40 MG tablet Take 1 tablet (40 mg) by mouth 2 times daily  Qty: 60 tablet, Refills: 1    Associated Diagnoses: Portal hypertension (H)      !! metoprolol succinate ER (TOPROL-XL) 100 MG 24 hr tablet Take 1 tablet (100 mg) by mouth daily  Qty: 90 tablet, Refills: 3    Associated Diagnoses: Chronic a-fib      Multiple Vitamins-Minerals (MENS 50+ MULTI VITAMIN/MIN PO) Take 1 tablet by mouth daily      mupirocin (BACTROBAN) 2 % external ointment Apply topically 3 times daily Apply to open sores on lower legs.  Qty: 30 g, Refills: 3    Associated Diagnoses: Cellulitis of left lower extremity      spironolactone (ALDACTONE) 25 MG tablet Take 1 tablet (25 mg) by mouth daily  Qty: 90 tablet, Refills: 3    Associated Diagnoses: Portal hypertension (H)      vitamin D3 (CHOLECALCIFEROL) 2000 units (50 mcg) tablet Take 1 tablet by mouth daily      ACE/ARB/ARNI NOT PRESCRIBED (INTENTIONAL) Please choose reason not prescribed, below    Associated Diagnoses: Thrombocytopenia (H); Cirrhosis of liver without ascites, unspecified hepatic cirrhosis type (H)      ASPIRIN NOT PRESCRIBED (INTENTIONAL) Please choose reason not prescribed, below    Associated Diagnoses: Thrombocytopenia (H)      !! order for DME Open toe size large 30/40 Mediven Assure Medical Compression socks Model 40375.  Or similar as per availability/formulary.  Fax to Fax [phone]. [facility]  Qty: 12 Device, Refills: 0    Associated  Diagnoses: Chronic congestive heart failure, unspecified heart failure type (H); Edema, unspecified type      !! order for DME Equipment being ordered:   New CPAP mask, tube, filters,water tray  Qty: 1 Device, Refills: 3    Associated Diagnoses: Sleep apnea      !! ORDER FOR DME Respironics RemStar 60 Series Auto AFlex 12-15 cm H2O with heated humidty and a modem.  Pt chose a Quattro Air FFM size medium.    Associated Diagnoses: BRUCE (obstructive sleep apnea)      STATIN NOT PRESCRIBED (INTENTIONAL) Please choose reason not prescribed, below    Associated Diagnoses: Thrombocytopenia (H); Cirrhosis of liver without ascites, unspecified hepatic cirrhosis type (H)      warfarin ANTICOAGULANT (JANTOVEN ANTICOAGULANT) 2.5 MG tablet 1.25 mg Fridays; 2.5mg all other days or As directed by Anticoagulation Clinic. INR DUE FOR FURTHER REFILLS  Qty: 80 tablet, Refills: 0    Associated Diagnoses: Long term current use of anticoagulant therapy; Paroxysmal atrial fibrillation (H); Atrial fibrillation with rapid ventricular response (H)       !! - Potential duplicate medications found. Please discuss with provider.        Allergies   Allergies   Allergen Reactions     Avelox Other (See Comments) and GI Disturbance     portal hypertension     Moxifloxacin Nausea and Vomiting, Nausea and Other (See Comments)     portal hypertension     Sotalol Itching     Data   Most Recent 3 CBC's:  Recent Labs   Lab Test 11/15/19  1230 10/11/19  0529 10/10/19  0457   WBC 2.9* 3.9* 3.6*   HGB 10.8* 10.5* 10.6*   MCV 93 94 96   PLT 58* 65* 59*      Most Recent 3 BMP's:  Recent Labs   Lab Test 11/15/19  1230 10/10/19  0457 10/09/19  0453    141 140   POTASSIUM 3.9 3.8 3.6   CHLORIDE 114* 107 106   CO2 25 27 28   BUN 17 15 15   CR 0.61* 0.62* 0.62*   ANIONGAP 4 7 6   HALEY 8.4* 8.5 7.9*   GLC 86 96 104*     Most Recent 2 LFT's:  Recent Labs   Lab Test 10/06/19  0923 09/19/19  0552   AST 61* 41   ALT 46 40   ALKPHOS 73 74   BILITOTAL 1.4* 0.9      Most Recent INR's and Anticoagulation Dosing History:  Anticoagulation Dose History     Recent Dosing and Labs Latest Ref Rng & Units 10/10/2019 10/11/2019 10/12/2019 10/15/2019 10/22/2019 11/8/2019 11/15/2019    Warfarin 1.25 mg - - 1.25 mg - - - - -    Warfarin 2.5 mg - 2.5 mg - - - - - -    INR 0.86 - 1.14 2.45(H) 2.60(H) 2.51(H) 1.94(H) - - -    INR 0.86 - 1.14 - - - - 2.6(A) 3.1(A) -    INR Point of Care 0.86 - 1.14 - - - - - - 2.5(H)        Most Recent 3 Troponin's:  Recent Labs   Lab Test 05/10/18  1600 05/16/13  0610 05/14/13  0800   TROPI <0.015 0.026 0.022     Most Recent Cholesterol Panel:  Recent Labs   Lab Test 09/19/19  0552   CHOL 128   LDL 58   HDL 54   TRIG 80     Most Recent 6 Bacteria Isolates From Any Culture (See EPIC Reports for Culture Details):  Recent Labs   Lab Test 11/06/19  1500 10/06/19  0951 10/06/19  0923 12/23/18  0455 12/23/18  0450 07/18/15  0800   CULT Light growth  Normal skin donnell   No growth No growth No growth No growth No growth after 6 days  No growth after 6 days     Most Recent TSH, T4 and A1c Labs:  Recent Labs   Lab Test 01/03/19  0846   TSH 5.48*   T4 1.11

## 2019-11-16 NOTE — PLAN OF CARE
A&O x4.  Regular diet. SBA.  VSS on home CPAP while sleeping/ RA while awake. L chest pacemaker site with dressing CDI.  R groin puncture site CDI.  Voiding in urinal. PIV saline locked. PRN Percocet given x1 for L shoulder discomfort.  Ambulated in room this shift. Tele A-paced.

## 2019-11-18 ENCOUNTER — TELEPHONE (OUTPATIENT)
Dept: CARDIOLOGY | Facility: CLINIC | Age: 59
End: 2019-11-18

## 2019-11-18 ENCOUNTER — ANTICOAGULATION THERAPY VISIT (OUTPATIENT)
Dept: ANTICOAGULATION | Facility: CLINIC | Age: 59
End: 2019-11-18
Payer: COMMERCIAL

## 2019-11-18 DIAGNOSIS — I48.19 PERSISTENT ATRIAL FIBRILLATION (H): Primary | ICD-10-CM

## 2019-11-18 DIAGNOSIS — Z95.0 CARDIAC PACEMAKER IN SITU: Primary | ICD-10-CM

## 2019-11-18 DIAGNOSIS — I48.0 PAROXYSMAL ATRIAL FIBRILLATION (H): ICD-10-CM

## 2019-11-18 DIAGNOSIS — Z95.0 CARDIAC PACEMAKER IN SITU: ICD-10-CM

## 2019-11-18 DIAGNOSIS — Z79.01 LONG TERM CURRENT USE OF ANTICOAGULANT THERAPY: ICD-10-CM

## 2019-11-18 PROCEDURE — 99207 ZZC NO CHARGE NURSE ONLY: CPT

## 2019-11-18 NOTE — PROGRESS NOTES
"ANTICOAGULATION FOLLOW-UP CLINIC VISIT    Patient Name:  Maurice Jon  Date:  11/18/2019  Contact Type:  Chart Review    SUBJECTIVE:  Patient Findings     Positives:   Hospital admission (AV node ablation/pacer )    Comments:   Patient was hospitalized 11/15-11/16 for an AV node ablation and pacer. Patient saw Cardiology on Nov 13th and started to hold his warfarin 11/14. On Nov 15th he was given 2 mg Vit K. Calendar updated to reflect this. Patient's next appt with [facility] is Nov 19th, will need to check with patient what dosing he has been taking since his discharge from the hospital.  Per office visit note with Dr. Evans on Nov 13th:  \"Even though the patient has liver cirrhosis, Dr. Roque is okay for him to continue with warfarin at this point in time.  If his LV function continued to be normal and the patient has no other CHADS-VASc score risk factors,  one may consider stopping warfarin in the future.\"        Clinical Outcomes     Comments:   Patient was hospitalized 11/15-11/16 for an AV node ablation and pacer. Patient saw Cardiology on Nov 13th and started to hold his warfarin 11/14. On Nov 15th he was given 2 mg Vit K. Calendar updated to reflect this. Patient's next appt with [facility] is Nov 19th, will need to check with patient what dosing he has been taking since his discharge from the hospital.  Per office visit note with Dr. Evans on Nov 13th:  \"Even though the patient has liver cirrhosis, Dr. Roque is okay for him to continue with warfarin at this point in time.  If his LV function continued to be normal and the patient has no other CHADS-VASc score risk factors,  one may consider stopping warfarin in the future.\"            ASSESSMENT / PLAN  No question data found.  Anticoagulation Summary  As of 11/18/2019    INR goal:   2.0-3.0   TTR:   90.3 % (1.1 y)   INR used for dosing:   No new INR was available at the time of this encounter.   Warfarin maintenance plan:   1.25 mg (2.5 mg x 0.5) every Fri; 2.5 mg " (2.5 mg x 1) all other days   Full warfarin instructions:   1.25 mg every Fri; 2.5 mg all other days   Weekly warfarin total:   16.25 mg   Plan last modified:   Susan Beyer RN (9/28/2018)   Next INR check:   11/19/2019   Priority:   INR   Target end date:   10/4/2018    Indications    Paroxysmal atrial fibrillation (H) [I48.0]  Atrial fibrillation with rapid ventricular response (H) (Resolved) [I48.91]  Long-term (current) use of anticoagulants [Z79.01] [Z79.01]             Anticoagulation Episode Summary     INR check location:       Preferred lab:       Send INR reminders to:   [facility]    Comments:   * anticoagulation short period surrounding ablation on 12/21/18. Cardiology to decide when to stop warfarin. has well-compensated cirrhosis      Anticoagulation Care Providers     Provider Role Specialty Phone number    Alon Pineda MD [facility] [phone]            See the Encounter Report to view Anticoagulation Flowsheet and Dosing Calendar (Go to Encounters tab in chart review, and find the Anticoagulation Therapy Visit)    Christina Singer RN

## 2019-11-18 NOTE — TELEPHONE ENCOUNTER
Post device implant discharge phone call. PPM/RIKA)      Reviewed the following:  No raising arm above shoulder on the side of implant for 3 weeks  Remove outer dressing 3 days after implant. May shower after outer dressing removed. Leave steri-strips in place, will be removed at 1 week device check  Limit driving for: NA   Watch for redness, drainage, warmth, or fever. Call device clinic if any signs of infection.     1 week device check scheduled: November 19 @ 0730 in [place]    Pt states understanding of all instructions. sml

## 2019-11-19 ENCOUNTER — HOSPITAL ENCOUNTER (OUTPATIENT)
Dept: CARDIOLOGY | Facility: CLINIC | Age: 59
Discharge: HOME | End: 2019-11-19
Attending: INTERNAL MEDICINE | Admitting: INTERNAL MEDICINE
Payer: COMMERCIAL

## 2019-11-19 DIAGNOSIS — I48.19 PERSISTENT ATRIAL FIBRILLATION (H): ICD-10-CM

## 2019-11-19 DIAGNOSIS — Z95.0 CARDIAC PACEMAKER IN SITU: ICD-10-CM

## 2019-11-19 PROCEDURE — 93283 PRGRMG EVAL IMPLANTABLE DFB: CPT

## 2019-11-19 PROCEDURE — 93283 PRGRMG EVAL IMPLANTABLE DFB: CPT | Mod: 26 | Performed by: INTERNAL MEDICINE

## 2019-11-20 DIAGNOSIS — K76.6 PORTAL HYPERTENSION (H): ICD-10-CM

## 2019-11-20 LAB — INTERPRETATION ECG - MUSE: NORMAL

## 2019-11-20 RX ORDER — FUROSEMIDE 40 MG
40 TABLET ORAL 2 TIMES DAILY
Qty: 60 TABLET | Refills: 1 | Status: SHIPPED | OUTPATIENT
Start: 2019-11-20 | End: 2020-03-23

## 2019-11-25 ENCOUNTER — TELEPHONE (OUTPATIENT)
Dept: ANTICOAGULATION | Facility: CLINIC | Age: 59
End: 2019-11-25

## 2019-11-25 ENCOUNTER — MYC MEDICAL ADVICE (OUTPATIENT)
Dept: CARDIOLOGY | Facility: CLINIC | Age: 59
End: 2019-11-25

## 2019-11-25 NOTE — TELEPHONE ENCOUNTER
19      RE: Maurice Jon  BD: 1960      To Whom It May Concern:    Maurice is under my care for the treatment of a new implanted ICD (cardio defibrillator). This procedure occurred on 11-15-19. He was seen in the outpatient clinic on 19 and was found to be physically healing well and the implanted device was working properly.   Yomi will have physical restriction through  includin) Limited use of left are re: lifting limit of #10  2) Left arm to be lifted no higher than chest level  3) Pushing, pulling weight limit of #10.  4) No limit to standing, sitting, walking.      If there are any questions or if we can be of any assistance, please call the Cardiology Clinic, (EP/RN) at the following number: [phone]      Sincerely,     Ag Maloney MD  Electrophysiology Department  [facility]/ Susan LangenbrunnerRN

## 2019-11-25 NOTE — TELEPHONE ENCOUNTER
Patient called to schedule an INR appointment. He will go to the lab tomorrow afternoon. He is aware the results will be available on Wednesday. [facility] is to call patient's cell phone. It is okay to leave a detailed voicemail.     Patient only skipped his warfarin the day of his AV node ablation/pacer. He has been taking his warfarin as instructed, no missed doses. No other changes or concerns reported by patient.     Dheeraj PANIAGUA RN, CACP

## 2019-11-26 ENCOUNTER — DOCUMENTATION ONLY (OUTPATIENT)
Dept: SLEEP MEDICINE | Facility: CLINIC | Age: 59
End: 2019-11-26
Payer: COMMERCIAL

## 2019-11-26 DIAGNOSIS — Z79.01 LONG TERM CURRENT USE OF ANTICOAGULANT THERAPY: ICD-10-CM

## 2019-11-26 LAB — INR PPP: 2.53 (ref 0.86–1.14)

## 2019-11-26 PROCEDURE — 36415 COLL VENOUS BLD VENIPUNCTURE: CPT | Performed by: FAMILY MEDICINE

## 2019-11-26 PROCEDURE — 85610 PROTHROMBIN TIME: CPT | Performed by: FAMILY MEDICINE

## 2019-11-26 NOTE — PROGRESS NOTES
Patient was offered choice of vendor and chose [facility].  Patient Maurice Jon was set up at [facility]  on November 26, 2019. Patient received a [employer] Respironics DreamStation Auto. Pressures were set at 12 cm H2O.   Patient s ramp is off cm H2O for Off and FLEX/EPR is 2.  Patient received a Resmed Mask name: F20  Full Face mask size Medium, heated tubing and heated humidifier.  Patient is not enrolled in the STM Program and does not need to meet compliance.   Delores Kahn

## 2019-11-27 ENCOUNTER — ANTICOAGULATION THERAPY VISIT (OUTPATIENT)
Dept: ANTICOAGULATION | Facility: CLINIC | Age: 59
End: 2019-11-27
Payer: COMMERCIAL

## 2019-11-27 DIAGNOSIS — Z79.01 LONG TERM CURRENT USE OF ANTICOAGULANT THERAPY: ICD-10-CM

## 2019-11-27 DIAGNOSIS — I48.0 PAROXYSMAL ATRIAL FIBRILLATION (H): ICD-10-CM

## 2019-11-27 PROCEDURE — 99207 ZZC NO CHARGE NURSE ONLY: CPT

## 2019-11-27 NOTE — PROGRESS NOTES
ANTICOAGULATION FOLLOW-UP CLINIC VISIT    Patient Name:  Maurice Jon  Date:  2019  Contact Type:  Telephone    SUBJECTIVE:  Patient Findings     Comments:   No changes in medications, activity, health, or diet noted. No bleeding or increased bruising noted. Took warfarin as prescribed.  Patient will continue weekly maintenance dose. INR is therapeutic.   Recheck in 3 weeks.   Patient verbalizes understanding and agrees to plan. No further questions or concerns.          Clinical Outcomes     Negatives:   Major bleeding event, Thromboembolic event, Anticoagulation-related hospital admission, Anticoagulation-related ED visit, Anticoagulation-related fatality    Comments:   No changes in medications, activity, health, or diet noted. No bleeding or increased bruising noted. Took warfarin as prescribed.  Patient will continue weekly maintenance dose. INR is therapeutic.   Recheck in 3 weeks.   Patient verbalizes understanding and agrees to plan. No further questions or concerns.             OBJECTIVE    INR   Date Value Ref Range Status   2019 2.53 (H) 0.86 - 1.14 Final       ASSESSMENT / PLAN  INR assessment THER    Recheck INR In: 3 WEEKS    INR Location Clinic      Anticoagulation Summary  As of 2019    INR goal:   2.0-3.0   TTR:   92.5 % (11.7 mo)   INR used for dosin.53 (2019)   Warfarin maintenance plan:   1.25 mg (2.5 mg x 0.5) every Fri; 2.5 mg (2.5 mg x 1) all other days   Full warfarin instructions:   1.25 mg every Fri; 2.5 mg all other days   Weekly warfarin total:   16.25 mg   No change documented:   Taylor Felix RN   Plan last modified:   Susan Beyer RN (2018)   Next INR check:   2019   Priority:   INR   Target end date:   10/4/2018    Indications    Paroxysmal atrial fibrillation (H) [I48.0]  Atrial fibrillation with rapid ventricular response (H) (Resolved) [I48.91]  Long-term (current) use of anticoagulants [Z79.01] [Z79.01]              Anticoagulation Episode Summary     INR check location:       Preferred lab:       Send INR reminders to:   [facility]    Comments:   * anticoagulation short period surrounding ablation on 12/21/18. Cardiology to decide when to stop warfarin. has well-compensated cirrhosis      Anticoagulation Care Providers     Provider Role Specialty Phone number    Alon Pineda MD [facility] [phone]            See the Encounter Report to view Anticoagulation Flowsheet and Dosing Calendar (Go to Encounters tab in chart review, and find the Anticoagulation Therapy Visit)        Taylor Felix RN

## 2019-11-27 NOTE — ADDENDUM NOTE
Addended by: SANDRINE WINN on: 11/27/2019 09:51 AM     Modules accepted: Level of Service, SmartSet

## 2019-11-27 NOTE — PROGRESS NOTES
11/27/19    Writer left  requesting a call back to [facility]. No dosing instructions given. Plan to continue on maintenance dose and recheck in 6 weeks.     Taylor Felix RN, BSN, PHN  Anticoagulation Clinic   [phone]

## 2019-12-02 ENCOUNTER — DOCUMENTATION ONLY (OUTPATIENT)
Dept: SLEEP MEDICINE | Facility: CLINIC | Age: 59
End: 2019-12-02
Payer: COMMERCIAL

## 2019-12-02 NOTE — PROGRESS NOTES
3 DAY STM VISIT    Diagnostic AHI: 9 PSG    Patient contacted for 3 day STM visit.  Message left for patient to return call    Replacement device: Yes    STM ordered by provider: Yes     Device type: CPAP  PAP settings from order::  CPAP fixed 12 cm  H20  Mask type:    Full Face Mask     Device settings from machine  Assessment: No usage reporting but has a profile in [employer].  Action plan: Patient to have 14 day STM visit. Patient has a follow up visit scheduled:   no.    Total time spent on remote patient monitoring data analysis and patient contact today:   13 minutes

## 2019-12-10 ENCOUNTER — TELEPHONE (OUTPATIENT)
Dept: CARDIOLOGY | Facility: CLINIC | Age: 59
End: 2019-12-10

## 2019-12-10 LAB
MDC_IDC_LEAD_IMPLANT_DT: NORMAL
MDC_IDC_LEAD_IMPLANT_DT: NORMAL
MDC_IDC_LEAD_LOCATION: NORMAL
MDC_IDC_LEAD_LOCATION: NORMAL
MDC_IDC_LEAD_LOCATION_DETAIL_1: NORMAL
MDC_IDC_LEAD_LOCATION_DETAIL_1: NORMAL
MDC_IDC_LEAD_MFG: NORMAL
MDC_IDC_LEAD_MFG: NORMAL
MDC_IDC_LEAD_MODEL: NORMAL
MDC_IDC_LEAD_MODEL: NORMAL
MDC_IDC_LEAD_POLARITY_TYPE: NORMAL
MDC_IDC_LEAD_POLARITY_TYPE: NORMAL
MDC_IDC_LEAD_SERIAL: NORMAL
MDC_IDC_LEAD_SERIAL: NORMAL
MDC_IDC_MSMT_BATTERY_STATUS: NORMAL
MDC_IDC_MSMT_LEADCHNL_LV_PACING_THRESHOLD_AMPLITUDE: 1.1 V
MDC_IDC_MSMT_LEADCHNL_LV_PACING_THRESHOLD_PULSEWIDTH: 0.5 MS
MDC_IDC_MSMT_LEADCHNL_LV_SENSING_INTR_AMPL: 5.1 MV
MDC_IDC_MSMT_LEADCHNL_RV_PACING_THRESHOLD_AMPLITUDE: 0.9 V
MDC_IDC_MSMT_LEADCHNL_RV_PACING_THRESHOLD_PULSEWIDTH: 0.4 MS
MDC_IDC_PG_IMPLANT_DTM: NORMAL
MDC_IDC_PG_MFG: NORMAL
MDC_IDC_PG_MODEL: NORMAL
MDC_IDC_PG_SERIAL: NORMAL
MDC_IDC_PG_TYPE: NORMAL
MDC_IDC_SESS_CLINIC_NAME: NORMAL
MDC_IDC_SESS_DTM: NORMAL
MDC_IDC_SESS_TYPE: NORMAL
MDC_IDC_SET_BRADY_AT_MODE_SWITCH_MODE: NORMAL
MDC_IDC_SET_BRADY_LOWRATE: 70 {BEATS}/MIN
MDC_IDC_SET_BRADY_MAX_SENSOR_RATE: 130 {BEATS}/MIN
MDC_IDC_SET_BRADY_MODE: NORMAL
MDC_IDC_SET_BRADY_PAV_DELAY_HIGH: 180 MS
MDC_IDC_SET_BRADY_PAV_DELAY_LOW: 180 MS
MDC_IDC_SET_BRADY_SAV_DELAY_LOW: 120 MS
MDC_IDC_SET_CRT_LVRV_DELAY: 40 MS
MDC_IDC_SET_CRT_PACED_CHAMBERS: NORMAL
MDC_IDC_SET_LEADCHNL_LV_PACING_AMPLITUDE: 2 V
MDC_IDC_SET_LEADCHNL_LV_PACING_CAPTURE_MODE: NORMAL
MDC_IDC_SET_LEADCHNL_LV_PACING_POLARITY: NORMAL
MDC_IDC_SET_LEADCHNL_LV_PACING_PULSEWIDTH: 0.5 MS
MDC_IDC_SET_LEADCHNL_LV_SENSING_ADAPTATION_MODE: NORMAL
MDC_IDC_SET_LEADCHNL_LV_SENSING_POLARITY: NORMAL
MDC_IDC_SET_LEADCHNL_LV_SENSING_SENSITIVITY: 2.5 MV
MDC_IDC_SET_LEADCHNL_RA_PACING_ANODE_ELECTRODE_1: NORMAL
MDC_IDC_SET_LEADCHNL_RA_PACING_ANODE_LOCATION_1: NORMAL
MDC_IDC_SET_LEADCHNL_RA_PACING_CAPTURE_MODE: NORMAL
MDC_IDC_SET_LEADCHNL_RA_PACING_CATHODE_ELECTRODE_1: NORMAL
MDC_IDC_SET_LEADCHNL_RA_PACING_CATHODE_LOCATION_1: NORMAL
MDC_IDC_SET_LEADCHNL_RA_PACING_POLARITY: NORMAL
MDC_IDC_SET_LEADCHNL_RA_SENSING_ADAPTATION_MODE: NORMAL
MDC_IDC_SET_LEADCHNL_RA_SENSING_ANODE_ELECTRODE_1: NORMAL
MDC_IDC_SET_LEADCHNL_RA_SENSING_ANODE_LOCATION_1: NORMAL
MDC_IDC_SET_LEADCHNL_RA_SENSING_CATHODE_ELECTRODE_1: NORMAL
MDC_IDC_SET_LEADCHNL_RA_SENSING_CATHODE_LOCATION_1: NORMAL
MDC_IDC_SET_LEADCHNL_RA_SENSING_POLARITY: NORMAL
MDC_IDC_SET_LEADCHNL_RA_SENSING_SENSITIVITY: 0.75 MV
MDC_IDC_SET_LEADCHNL_RV_PACING_AMPLITUDE: 2 V
MDC_IDC_SET_LEADCHNL_RV_PACING_ANODE_ELECTRODE_1: NORMAL
MDC_IDC_SET_LEADCHNL_RV_PACING_ANODE_LOCATION_1: NORMAL
MDC_IDC_SET_LEADCHNL_RV_PACING_CAPTURE_MODE: NORMAL
MDC_IDC_SET_LEADCHNL_RV_PACING_CATHODE_ELECTRODE_1: NORMAL
MDC_IDC_SET_LEADCHNL_RV_PACING_CATHODE_LOCATION_1: NORMAL
MDC_IDC_SET_LEADCHNL_RV_PACING_POLARITY: NORMAL
MDC_IDC_SET_LEADCHNL_RV_PACING_PULSEWIDTH: 0.4 MS
MDC_IDC_SET_LEADCHNL_RV_SENSING_ADAPTATION_MODE: NORMAL
MDC_IDC_SET_LEADCHNL_RV_SENSING_ANODE_ELECTRODE_1: NORMAL
MDC_IDC_SET_LEADCHNL_RV_SENSING_ANODE_LOCATION_1: NORMAL
MDC_IDC_SET_LEADCHNL_RV_SENSING_CATHODE_ELECTRODE_1: NORMAL
MDC_IDC_SET_LEADCHNL_RV_SENSING_CATHODE_LOCATION_1: NORMAL
MDC_IDC_SET_LEADCHNL_RV_SENSING_POLARITY: NORMAL
MDC_IDC_SET_LEADCHNL_RV_SENSING_SENSITIVITY: 2.5 MV
MDC_IDC_SET_ZONE_DETECTION_INTERVAL: 375 MS
MDC_IDC_SET_ZONE_TYPE: NORMAL
MDC_IDC_SET_ZONE_VENDOR_TYPE: NORMAL
MDC_IDC_STAT_BRADY_RV_PERCENT_PACED: 100 %
MDC_IDC_STAT_EPISODE_RECENT_COUNT: 0
MDC_IDC_STAT_EPISODE_RECENT_COUNT_DTM_END: NORMAL
MDC_IDC_STAT_EPISODE_RECENT_COUNT_DTM_START: NORMAL
MDC_IDC_STAT_EPISODE_TOTAL_COUNT: 0
MDC_IDC_STAT_EPISODE_TOTAL_COUNT_DTM_END: NORMAL
MDC_IDC_STAT_EPISODE_TYPE: NORMAL
MDC_IDC_STAT_EPISODE_TYPE: NORMAL
MDC_IDC_STAT_EPISODE_VENDOR_TYPE: NORMAL
MDC_IDC_STAT_EPISODE_VENDOR_TYPE: NORMAL

## 2019-12-10 NOTE — TELEPHONE ENCOUNTER
ADDENDUM:  Pt called for clarification.  Reviewed recommendation to wean then stop metoprolol.  Pt verbalized understanding.  DESTIN WISE, RN on 12/10/2019 at 3:30 PM      ADDENDUM: Pt at work and left his cell phone at home. Spoke with wife. Advised pt to take 1/2 dose metoprolol for 3 days then stop. ALso enc her to have Yomi call me if he has questions. Med list updated. Melany Ewing RN Cardiology December 10, 2019, 8:40 AM      ----- Message from Kristofer Brown MD sent at 12/9/2019  4:18 PM CST -----  Regarding: RE: Pt questioning metoprolol dose  Ok to be off metoprolol but I would take 1/2 dose for 3 days then stop. Thanks, qp  ----- Message -----  From: Salome Ewing RN  Sent: 12/9/2019   1:29 PM CST  To: Kristofer Brown MD  Subject: Pt questioning metoprolol dose                   Dr Evans,  When Yomi was discharged after AVNA and PPM implant, his metoprolol was continued. He is questioning if he has to continue taking the metoprolol now that he had the ablation. I discussed that he is also on it for the cardiomyopathy, but he is wondering why if his EF has recovered. He is also wondering if he will be on the metoprolol long term. I tried to answer his questions but he wasn't convinced and wanted to get the answer from you.  Thanks  Melany

## 2019-12-11 ENCOUNTER — DOCUMENTATION ONLY (OUTPATIENT)
Dept: SLEEP MEDICINE | Facility: CLINIC | Age: 59
End: 2019-12-11
Payer: COMMERCIAL

## 2019-12-11 NOTE — PROGRESS NOTES
14 DAY STM VISIT    Diagnostic AHI: 9 PSG      Message left for patient to return call.     Assessment: Pt meeting objective benchmarks.       Action plan: waiting for patient to return call.     Device type: CPAP    PAP settings: 12 cm H20    Mask type:  Full Face Mask    Objective measures: 14 day rolling measures      Objective measure goal  Compliance   Goal >70%  Leak   Goal < 10%  AHI  Goal < 5  Usage  Goal >240      Total time spent on accessing, reviewing and interpreting remote patient PAP therapy data:   10 minutes      Total time spent with direct patient communication :   1 minutes

## 2019-12-19 ENCOUNTER — APPOINTMENT (OUTPATIENT)
Dept: GENERAL RADIOLOGY | Facility: CLINIC | Age: 59
End: 2019-12-19
Attending: FAMILY MEDICINE
Payer: COMMERCIAL

## 2019-12-19 ENCOUNTER — APPOINTMENT (OUTPATIENT)
Dept: CARDIOLOGY | Facility: CLINIC | Age: 59
End: 2019-12-19
Attending: FAMILY MEDICINE
Payer: COMMERCIAL

## 2019-12-19 ENCOUNTER — HOSPITAL ENCOUNTER (OUTPATIENT)
Facility: CLINIC | Age: 59
Setting detail: OBSERVATION
Discharge: HOME | End: 2019-12-20
Attending: FAMILY MEDICINE | Admitting: INTERNAL MEDICINE
Payer: COMMERCIAL

## 2019-12-19 DIAGNOSIS — K75.81 LIVER CIRRHOSIS SECONDARY TO NONALCOHOLIC STEATOHEPATITIS (NASH) (H): ICD-10-CM

## 2019-12-19 DIAGNOSIS — K74.60 CIRRHOSIS OF LIVER WITHOUT ASCITES, UNSPECIFIED HEPATIC CIRRHOSIS TYPE (H): ICD-10-CM

## 2019-12-19 DIAGNOSIS — Z95.0 CARDIAC PACEMAKER IN SITU: ICD-10-CM

## 2019-12-19 DIAGNOSIS — I48.91 ATRIAL FIBRILLATION, UNSPECIFIED TYPE (H): ICD-10-CM

## 2019-12-19 DIAGNOSIS — K74.60 LIVER CIRRHOSIS SECONDARY TO NONALCOHOLIC STEATOHEPATITIS (NASH) (H): ICD-10-CM

## 2019-12-19 DIAGNOSIS — R79.89 ELEVATED TROPONIN: ICD-10-CM

## 2019-12-19 DIAGNOSIS — Z86.79 HISTORY OF ATRIAL FIBRILLATION: ICD-10-CM

## 2019-12-19 DIAGNOSIS — R07.9 CHEST PAIN, UNSPECIFIED TYPE: ICD-10-CM

## 2019-12-19 DIAGNOSIS — Z98.890 HISTORY OF ATRIOVENTRICULAR NODAL ABLATION: ICD-10-CM

## 2019-12-19 DIAGNOSIS — Z79.01 LONG TERM (CURRENT) USE OF ANTICOAGULANTS: ICD-10-CM

## 2019-12-19 LAB
ALBUMIN SERPL-MCNC: 3.3 G/DL (ref 3.4–5)
ALP SERPL-CCNC: 68 U/L (ref 40–150)
ALT SERPL W P-5'-P-CCNC: 76 U/L (ref 0–70)
ANION GAP SERPL CALCULATED.3IONS-SCNC: 9 MMOL/L (ref 3–14)
APTT PPP: 41 SEC (ref 22–37)
AST SERPL W P-5'-P-CCNC: 140 U/L (ref 0–45)
BASOPHILS # BLD AUTO: 0 10E9/L (ref 0–0.2)
BASOPHILS NFR BLD AUTO: 0.3 %
BILIRUB SERPL-MCNC: 1.3 MG/DL (ref 0.2–1.3)
BUN SERPL-MCNC: 14 MG/DL (ref 7–30)
CALCIUM SERPL-MCNC: 8.6 MG/DL (ref 8.5–10.1)
CHLORIDE SERPL-SCNC: 108 MMOL/L (ref 94–109)
CO2 BLDCOV-SCNC: 23 MMOL/L (ref 21–28)
CO2 SERPL-SCNC: 22 MMOL/L (ref 20–32)
CREAT BLD-MCNC: 0.7 MG/DL (ref 0.66–1.25)
CREAT SERPL-MCNC: 0.66 MG/DL (ref 0.66–1.25)
CREAT SERPL-MCNC: 0.69 MG/DL (ref 0.66–1.25)
DIFFERENTIAL METHOD BLD: ABNORMAL
EOSINOPHIL # BLD AUTO: 0.1 10E9/L (ref 0–0.7)
EOSINOPHIL NFR BLD AUTO: 4.1 %
ERYTHROCYTE [DISTWIDTH] IN BLOOD BY AUTOMATED COUNT: 16.2 % (ref 10–15)
GFR SERPL CREATININE-BSD FRML MDRD: >90 ML/MIN/{1.73_M2}
GLUCOSE SERPL-MCNC: 92 MG/DL (ref 70–99)
HCT VFR BLD AUTO: 31.6 % (ref 40–53)
HGB BLD-MCNC: 9.7 G/DL (ref 13.3–17.7)
IMM GRANULOCYTES # BLD: 0 10E9/L (ref 0–0.4)
IMM GRANULOCYTES NFR BLD: 0.6 %
INR PPP: 3.12 (ref 0.86–1.14)
INTERPRETATION ECG - MUSE: NORMAL
LACTATE BLD-SCNC: 1.1 MMOL/L (ref 0.7–2.1)
LIPASE SERPL-CCNC: 103 U/L (ref 73–393)
LYMPHOCYTES # BLD AUTO: 0.4 10E9/L (ref 0.8–5.3)
LYMPHOCYTES NFR BLD AUTO: 12.9 %
MCH RBC QN AUTO: 28.6 PG (ref 26.5–33)
MCHC RBC AUTO-ENTMCNC: 30.7 G/DL (ref 31.5–36.5)
MCV RBC AUTO: 93 FL (ref 78–100)
MONOCYTES # BLD AUTO: 0.4 10E9/L (ref 0–1.3)
MONOCYTES NFR BLD AUTO: 11.4 %
NEUTROPHILS # BLD AUTO: 2.2 10E9/L (ref 1.6–8.3)
NEUTROPHILS NFR BLD AUTO: 70.7 %
NRBC # BLD AUTO: 0 10*3/UL
NRBC BLD AUTO-RTO: 0 /100
NT-PROBNP SERPL-MCNC: 404 PG/ML (ref 0–900)
PCO2 BLDV: 33 MM HG (ref 40–50)
PH BLDV: 7.45 PH (ref 7.32–7.43)
PLATELET # BLD AUTO: 48 10E9/L (ref 150–450)
PLATELET # BLD EST: ABNORMAL 10*3/UL
PO2 BLDV: 54 MM HG (ref 25–47)
POTASSIUM SERPL-SCNC: 3.6 MMOL/L (ref 3.4–5.3)
PROT SERPL-MCNC: 5.8 G/DL (ref 6.8–8.8)
RBC # BLD AUTO: 3.39 10E12/L (ref 4.4–5.9)
SAO2 % BLDV FROM PO2: 90 %
SODIUM SERPL-SCNC: 139 MMOL/L (ref 133–144)
TROPONIN I BLD-MCNC: 0.02 UG/L (ref 0–0.08)
TROPONIN I SERPL-MCNC: 0.03 UG/L (ref 0–0.04)
TROPONIN I SERPL-MCNC: 0.05 UG/L (ref 0–0.04)
WBC # BLD AUTO: 3.2 10E9/L (ref 4–11)

## 2019-12-19 PROCEDURE — 25000132 ZZH RX MED GY IP 250 OP 250 PS 637: Performed by: STUDENT IN AN ORGANIZED HEALTH CARE EDUCATION/TRAINING PROGRAM

## 2019-12-19 PROCEDURE — 84484 ASSAY OF TROPONIN QUANT: CPT | Performed by: FAMILY MEDICINE

## 2019-12-19 PROCEDURE — 82803 BLOOD GASES ANY COMBINATION: CPT

## 2019-12-19 PROCEDURE — 83880 ASSAY OF NATRIURETIC PEPTIDE: CPT | Performed by: FAMILY MEDICINE

## 2019-12-19 PROCEDURE — 96376 TX/PRO/DX INJ SAME DRUG ADON: CPT | Performed by: FAMILY MEDICINE

## 2019-12-19 PROCEDURE — 84484 ASSAY OF TROPONIN QUANT: CPT

## 2019-12-19 PROCEDURE — 85025 COMPLETE CBC W/AUTO DIFF WBC: CPT | Performed by: FAMILY MEDICINE

## 2019-12-19 PROCEDURE — 85610 PROTHROMBIN TIME: CPT | Performed by: FAMILY MEDICINE

## 2019-12-19 PROCEDURE — 83605 ASSAY OF LACTIC ACID: CPT

## 2019-12-19 PROCEDURE — 82565 ASSAY OF CREATININE: CPT

## 2019-12-19 PROCEDURE — 93010 ELECTROCARDIOGRAM REPORT: CPT | Mod: Z6 | Performed by: FAMILY MEDICINE

## 2019-12-19 PROCEDURE — 96374 THER/PROPH/DIAG INJ IV PUSH: CPT | Mod: 59 | Performed by: FAMILY MEDICINE

## 2019-12-19 PROCEDURE — 93306 TTE W/DOPPLER COMPLETE: CPT

## 2019-12-19 PROCEDURE — 99285 EMERGENCY DEPT VISIT HI MDM: CPT | Mod: 25 | Performed by: FAMILY MEDICINE

## 2019-12-19 PROCEDURE — 71045 X-RAY EXAM CHEST 1 VIEW: CPT

## 2019-12-19 PROCEDURE — 85730 THROMBOPLASTIN TIME PARTIAL: CPT | Performed by: FAMILY MEDICINE

## 2019-12-19 PROCEDURE — 93005 ELECTROCARDIOGRAM TRACING: CPT | Performed by: FAMILY MEDICINE

## 2019-12-19 PROCEDURE — 36415 COLL VENOUS BLD VENIPUNCTURE: CPT

## 2019-12-19 PROCEDURE — 40000275 ZZH STATISTIC RCP TIME EA 10 MIN

## 2019-12-19 PROCEDURE — 93306 TTE W/DOPPLER COMPLETE: CPT | Mod: 26 | Performed by: INTERNAL MEDICINE

## 2019-12-19 PROCEDURE — 21400000 ZZH R&B CCU UMMC

## 2019-12-19 PROCEDURE — 94660 CPAP INITIATION&MGMT: CPT

## 2019-12-19 PROCEDURE — 27210339 ZZH CIRCUIT HUMIDITY W/CPAP BIP

## 2019-12-19 PROCEDURE — 80053 COMPREHEN METABOLIC PANEL: CPT | Performed by: FAMILY MEDICINE

## 2019-12-19 PROCEDURE — 25000128 H RX IP 250 OP 636: Performed by: FAMILY MEDICINE

## 2019-12-19 PROCEDURE — 99220 ZZC INITIAL OBSERVATION CARE,LEVL III: CPT | Mod: 25 | Performed by: INTERNAL MEDICINE

## 2019-12-19 PROCEDURE — 25500064 ZZH RX 255 OP 636: Performed by: INTERNAL MEDICINE

## 2019-12-19 PROCEDURE — 83690 ASSAY OF LIPASE: CPT | Performed by: FAMILY MEDICINE

## 2019-12-19 PROCEDURE — 25000132 ZZH RX MED GY IP 250 OP 250 PS 637: Performed by: FAMILY MEDICINE

## 2019-12-19 RX ORDER — PANTOPRAZOLE SODIUM 40 MG/1
40 TABLET, DELAYED RELEASE ORAL DAILY
Status: DISCONTINUED | OUTPATIENT
Start: 2019-12-20 | End: 2019-12-20 | Stop reason: HOSPADM

## 2019-12-19 RX ORDER — NITROGLYCERIN 0.4 MG/1
0.4 TABLET SUBLINGUAL EVERY 5 MIN PRN
Status: DISCONTINUED | OUTPATIENT
Start: 2019-12-19 | End: 2019-12-20 | Stop reason: HOSPADM

## 2019-12-19 RX ORDER — FENTANYL CITRATE 50 UG/ML
25 INJECTION, SOLUTION INTRAMUSCULAR; INTRAVENOUS ONCE
Status: COMPLETED | OUTPATIENT
Start: 2019-12-19 | End: 2019-12-19

## 2019-12-19 RX ORDER — METOPROLOL TARTRATE 50 MG
50-100 TABLET ORAL
Status: DISCONTINUED | OUTPATIENT
Start: 2019-12-19 | End: 2019-12-20 | Stop reason: HOSPADM

## 2019-12-19 RX ORDER — ACETAMINOPHEN 325 MG/1
650 TABLET ORAL EVERY 4 HOURS PRN
Status: DISCONTINUED | OUTPATIENT
Start: 2019-12-19 | End: 2019-12-20 | Stop reason: HOSPADM

## 2019-12-19 RX ORDER — LIDOCAINE 40 MG/G
CREAM TOPICAL
Status: DISCONTINUED | OUTPATIENT
Start: 2019-12-19 | End: 2019-12-20 | Stop reason: HOSPADM

## 2019-12-19 RX ORDER — WARFARIN SODIUM 2.5 MG/1
2.5 TABLET ORAL DAILY
Status: DISCONTINUED | OUTPATIENT
Start: 2019-12-20 | End: 2019-12-20

## 2019-12-19 RX ORDER — LIDOCAINE 40 MG/G
CREAM TOPICAL
Status: DISCONTINUED | OUTPATIENT
Start: 2019-12-19 | End: 2019-12-19

## 2019-12-19 RX ORDER — NITROGLYCERIN 0.4 MG/1
0.4 TABLET SUBLINGUAL EVERY 5 MIN PRN
Status: DISCONTINUED | OUTPATIENT
Start: 2019-12-19 | End: 2019-12-19

## 2019-12-19 RX ORDER — POLYETHYLENE GLYCOL 3350 17 G/17G
17 POWDER, FOR SOLUTION ORAL DAILY PRN
Status: DISCONTINUED | OUTPATIENT
Start: 2019-12-19 | End: 2019-12-20 | Stop reason: HOSPADM

## 2019-12-19 RX ORDER — LANOLIN ALCOHOL/MO/W.PET/CERES
3 CREAM (GRAM) TOPICAL
Status: DISCONTINUED | OUTPATIENT
Start: 2019-12-19 | End: 2019-12-20 | Stop reason: HOSPADM

## 2019-12-19 RX ADMIN — FENTANYL CITRATE 25 MCG: 50 INJECTION, SOLUTION INTRAMUSCULAR; INTRAVENOUS at 14:30

## 2019-12-19 RX ADMIN — NITROGLYCERIN 0.4 MG: 0.4 TABLET SUBLINGUAL at 14:29

## 2019-12-19 RX ADMIN — NITROGLYCERIN 0.4 MG: 0.4 TABLET SUBLINGUAL at 22:44

## 2019-12-19 RX ADMIN — FENTANYL CITRATE 25 MCG: 50 INJECTION, SOLUTION INTRAMUSCULAR; INTRAVENOUS at 15:45

## 2019-12-19 RX ADMIN — HUMAN ALBUMIN MICROSPHERES AND PERFLUTREN 6 ML: 10; .22 INJECTION, SOLUTION INTRAVENOUS at 13:36

## 2019-12-19 RX ADMIN — ACETAMINOPHEN 650 MG: 325 TABLET, FILM COATED ORAL at 22:44

## 2019-12-19 ASSESSMENT — ENCOUNTER SYMPTOMS
SPEECH DIFFICULTY: 0
BACK PAIN: 0
NERVOUS/ANXIOUS: 1
APPETITE CHANGE: 1
FEVER: 0
TROUBLE SWALLOWING: 0
SEIZURES: 0
FATIGUE: 1
SINUS PRESSURE: 0
ACTIVITY CHANGE: 1
CHILLS: 0
WEAKNESS: 1
BRUISES/BLEEDS EASILY: 1
DYSPHORIC MOOD: 1
VOMITING: 0
SHORTNESS OF BREATH: 0
NUMBNESS: 0
NAUSEA: 0
LIGHT-HEADEDNESS: 0
DECREASED CONCENTRATION: 1
CHEST TIGHTNESS: 1
CONFUSION: 0
ABDOMINAL PAIN: 0
VOICE CHANGE: 0
ABDOMINAL DISTENTION: 1
DYSURIA: 0

## 2019-12-19 ASSESSMENT — ACTIVITIES OF DAILY LIVING (ADL)
FALL_HISTORY_WITHIN_LAST_SIX_MONTHS: NO
TRANSFERRING: 0-->INDEPENDENT
RETIRED_EATING: 0-->INDEPENDENT
TOILETING: 0-->INDEPENDENT
SWALLOWING: 0-->SWALLOWS FOODS/LIQUIDS WITHOUT DIFFICULTY
COGNITION: 0 - NO COGNITION ISSUES REPORTED
BATHING: 0-->INDEPENDENT
AMBULATION: 0-->INDEPENDENT
RETIRED_COMMUNICATION: 0-->UNDERSTANDS/COMMUNICATES WITHOUT DIFFICULTY
DRESS: 0-->INDEPENDENT

## 2019-12-19 ASSESSMENT — MIFFLIN-ST. JEOR
SCORE: 1830.51
SCORE: 1842.76

## 2019-12-19 ASSESSMENT — PAIN DESCRIPTION - DESCRIPTORS
DESCRIPTORS: DISCOMFORT
DESCRIPTORS: DISCOMFORT

## 2019-12-19 NOTE — ED NOTES
[facility], [place]   ED Nurse to Floor Handoff     Maurice Jon is a 59 year old male who speaks English and lives with family members,  in a home  They arrived in the ED by ambulance from work.    ED Chief Complaint: Chest Pain    ED Dx;   Final diagnoses:   Chest pain, unspecified type   History of atrial fibrillation   History of atrioventricular jerry ablation   Cardiac pacemaker in situ   Liver Cirrhosis 2nd ot Fatty liver, w Ascites   Elevated troponin         Needed?: No    Allergies:   Allergies   Allergen Reactions     Avelox Other (See Comments) and GI Disturbance     portal hypertension     Moxifloxacin Nausea and Vomiting, Nausea and Other (See Comments)     portal hypertension     Sotalol Itching   .  Past Medical Hx:   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 8/22/2005    Cirrhosis, idiopathic     Edema 5/13/2013     Esophageal varices with bleeding(456.0)     Pt denies     Gallstones      Genital herpes, unspecified 3/20/1999    resolving herpes progenitalis     GERD (gastroesophageal reflux disease)      Hematuria      Hypertension      Hypochondriasis     problems in past     Obesity 11/24/2010     BRUCE (obstructive sleep apnea) 03/17/2009    Mild AHI 8.4  RDI 31 - Pt refuses to wear his CPAP     Pericarditis      Portal hypertension (H) 1/28/2010     Unspecified thrombocytopenia 8/22/2005    Thrombocytopenia associated with cirrhosis and portal hypertension      Baseline Mental status: WDL  Current Mental Status changes: at  basesline    Infection present or suspected this encounter: no  Sepsis suspected: No  Isolation type: No active isolations     Activity level - Baseline/Home:  Independent  Activity Level - Current:   Stand with Assist    Bariatric equipment needed?: No    In the ED these meds were given:   Medications   lidocaine 1 % 0.1-1 mL (has no administration in time range)   lidocaine (LMX4) cream (has no administration in time range)   sodium chloride (PF) 0.9% PF flush 3 mL (has no administration in time range)   sodium chloride (PF) 0.9% PF flush 3 mL (3 mLs Intracatheter Given 12/19/19 1431)   nitroGLYcerin (NITROSTAT) sublingual tablet 0.4 mg (0.4 mg Sublingual Given 12/19/19 1429)   fentaNYL (PF) (SUBLIMAZE) injection 25 mcg (25 mcg Intravenous Given 12/19/19 1430)   perflutren diluted 1mL to 2mL with saline (OPTISON) diluted injection 6 mL (6 mLs Intravenous Given 12/19/19 1336)   sodium chloride (PF) 0.9% PF flush 10 mL (10 mLs Intravenous Given 12/19/19 1431)   fentaNYL (PF) (SUBLIMAZE) injection 25 mcg (25 mcg Intravenous Given 12/19/19 1545)       Drips running?  No    Home pump  No    Current LDAs  Peripheral IV 12/19/19 Left Lower forearm (Active)   Site Assessment WDL 12/19/2019 10:34 AM   Line Status Infusing 12/19/2019 10:34 AM   Phlebitis Scale 0-->no symptoms 12/19/2019 10:34 AM   Infiltration Scale 0 12/19/2019 10:34 AM   Extravasation? No 12/19/2019 10:34 AM   Number of days: 0       Right Groin Interventional Procedure Access (Active)   Number of days: 34       Rash 10/07/19 0910 other (see comments) lower leg (Active)   Number of days: 73       Labs results:   Labs Ordered and Resulted from Time of ED Arrival Up to the Time of Departure from the ED   CBC WITH PLATELETS DIFFERENTIAL - Abnormal; Notable for the following components:       Result Value    WBC 3.2 (*)     RBC Count 3.39 (*)     Hemoglobin 9.7 (*)     Hematocrit 31.6 (*)     MCHC 30.7 (*)     RDW 16.2 (*)     Platelet Count 48 (*)      Absolute Lymphocytes 0.4 (*)     All other components within normal limits   INR - Abnormal; Notable for the following components:    INR 3.12 (*)     All other components within normal limits   COMPREHENSIVE METABOLIC PANEL - Abnormal; Notable for the following components:    Albumin 3.3 (*)     Protein Total 5.8 (*)     ALT 76 (*)      (*)     All other components within normal limits   PARTIAL THROMBOPLASTIN TIME - Abnormal; Notable for the following components:    PTT 41 (*)     All other components within normal limits   TROPONIN I - Abnormal; Notable for the following components:    Troponin I ES 0.046 (*)     All other components within normal limits   ISTAT  GASES LACTATE JONATHAN POCT - Abnormal; Notable for the following components:    Ph Venous 7.45 (*)     PCO2 Venous 33 (*)     PO2 Venous 54 (*)     All other components within normal limits   LIPASE   TROPONIN I   NT PROBNP INPATIENT   CREATININE POCT   ISTAT CREATININE NURSING POCT   ISTAT TROPONIN NURSING POCT   ISTAT CG4 GASES LACTATE JONATHAN NURSING POCT   PULSE OXIMETRY NURSING   CARDIAC CONTINUOUS MONITORING   PERIPHERAL IV CATHETER   TROPONIN POCT       Imaging Studies:   Recent Results (from the past 24 hour(s))   XR Chest Port 1 View    Narrative    XR CHEST PORT 1 VW  12/19/2019 11:00 AM      HISTORY: cp and sob    COMPARISON: 11/16/2019    FINDINGS: A single AP radiograph of the chest. Levoscoliotic curvature  of the thoracic spine. Trachea is midline, heart size is enlarged.  Dual chamber implantable cardiac device. Increased bilateral  interstitial perihilar streaky opacities. Left pleural effusion with  overlying basilar opacities. No pneumothorax. No acute osseous or  abdominal abnormality.      Impression    IMPRESSION:  1. Cardiomegaly with moderate pulmonary edema.  2. Left pleural effusion with underlying basilar  atelectasis/developing consolidation.    I have personally reviewed the examination and initial interpretation  and I agree  with the findings.    ANYI CARMONA MD   Echocardiogram Complete    Narrative    [id number]  [id number]  [id number]  [id number]^TIARRA^ILENE^ADELAIDE           [facility],[place]  Echocardiography Laboratory  [address] [place] [id number]     Name: MARILY NAVARRO  MRN: [id number]  : 1960  Study Date: 2019 01:06 PM  Age: 59 yrs  Gender: Male  Patient Location: [facility]  Reason For Study: Chest Pain  Ordering Physician: ILENE MAYEN  Performed By: Lizy Willams RDCS     BSA: 2.1 m2  Height: 65 in  Weight: 240 lb  HR: 72  BP: 104/65 mmHg  _____________________________________________________________________________  __        Procedure  Echocardiogram with two-dimensional, color and spectral Doppler performed.  Contrast Optison. Optison (NDC #4285-1538-69) given intravenously. Patient was  given 6 ml mixture of 3 ml Optison and 6 ml saline. 3 ml wasted. The final  echo results were communicated to ED physician..  _____________________________________________________________________________  __        Interpretation Summary  Borderline (EF 50-55%) reduced left ventricular function is present.  Moderate right ventricular dilation is present. Global right ventricular  function is mildly reduced.  Trace aortic insufficiency is present.  Estimated pulmonary artery systolic pressure is 26 mmHg plus right atrial  pressure.  IVC diameter >2.1 cm collapsing <50% with sniff suggests a high RA pressure  estimated at 15 mmHg or greater.  No pericardial effusion is present.     This study was compared with the study from 19. Patient is now in a paced  rhythm. Minimal change in biventricular function.  _____________________________________________________________________________  __        Left Ventricle  Left ventricular wall thickness is normal. Left ventricular size is normal.  Diastolic function not assessed due to arrhythmia. Borderline (EF 50-55%)  reduced left ventricular  function is present. Abnormal septal motion  consistent with pacemaker is present.     Right Ventricle  Moderate right ventricular dilation is present. Global right ventricular  function is mildly reduced. A pacemaker lead is noted in the right ventricle.     Atria  Severe biatrial enlargement is present.     Mitral Valve  Mild mitral annular calcification is present. Trace mitral insufficiency is  present.        Aortic Valve  Trileaflet aortic sclerosis without stenosis. Trace aortic insufficiency is  present.     Tricuspid Valve  Mild tricuspid insufficiency is present. Estimated pulmonary artery systolic  pressure is 26 mmHg plus right atrial pressure.     Pulmonic Valve  The pulmonic valve is normal.     Vessels  Normal diameter aortic root and proximal ascending aorta. Dilation of the  inferior vena cava is present with abnormal respiratory variation in diameter.  IVC diameter >2.1 cm collapsing <50% with sniff suggests a high RA pressure  estimated at 15 mmHg or greater.     Pericardium  No pericardial effusion is present.        Compared to Previous Study  This study was compared with the study from 19 .  _____________________________________________________________________________  __  MMode/2D Measurements & Calculations     IVSd: 0.93 cm  LVIDd: 5.2 cm  LVIDs: 3.7 cm  LVPWd: 1.1 cm  FS: 29.3 %  LV mass(C)d: 193.9 grams  LV mass(C)dI: 90.7 grams/m2  Ao root diam: 3.6 cm  asc Aorta Diam: 3.3 cm  LVOT diam: 2.4 cm  LVOT area: 4.5 cm2  LA Volume (BP): 150.0 ml  LA Volume Index (BP): 70.1 ml/m2  RWT: 0.42           Doppler Measurements & Calculations  MV E max magali: 110.0 cm/sec  MV dec slope: 798.0 cm/sec2  PA acc time: 0.09 sec  E/E' av.4  Lateral E/e': 7.3  Medial E/e': 9.5     _____________________________________________________________________________  __           Report approved by: Laura Conrad 2019 02:36 PM          Recent vital signs:   /67   Pulse 70   Temp 97.6  F  "(36.4  C) (Oral)   Resp 18   Ht 1.651 m (5' 5\")   Wt 108.9 kg (240 lb)   SpO2 99%   BMI 39.94 kg/m      [place] Coma Scale Score: 15 (12/19/19 1425)       Cardiac Rhythm: V-paced  Pt needs tele? Yes  Skin/wound Issues: None    Code Status: Full Code    Pain control: fair    Nausea control: pt had none    Abnormal labs/tests/findings requiring intervention: trop-0.046    Family present during ED course? Yes   Family Comments/Social Situation comments: spouse and daughter    Tasks needing completion: None    Santo Alexis, RN    [phone] [facility]      "

## 2019-12-19 NOTE — H&P
[facility] Cardiology History and Physical    Maurice Jon MRN# [id number]   Age: 59 year old YOB: 1960     Date of Admission:  12/19/2019    Primary care provider: Alon Pineda          Assessment and Plan:   Maurice Jon is a 59 year old male with PMHx significant for persistent A-Fib on coumadin s/p recent AV node ablation and biventricular pacemaker implantation (11/15/2019), NICM w/ EF 50-55%, well-compensated non-alcoholic cirrhosis, portal hypertension, BRUCE who presents via EMS with chest pain.     #. Atypical Chest pain  Left-sided non-reproducible dull chest pain, not precipitated by exertion and not relieved by nitroglycerin or rest, improved with fentanyl. EKG without ST-changes. Troponin initially negative (0.032) and then was 0.046. Echo done 12/19/2019 showed 50-55% with elevated atrial pressures. This is likely anginal chest pain and possibly related to anxiety. He did get an angiogram ~1 yr (12/2018) that showed normal coronaries.   - Plan: Coronary CT, NPO at midnight    #. Persistent Atrial fibrillation  Chadvasc 0-1  Follows with EP cardiology. He is on coumadin, now s/p AV node ablation and bi-ventricular pacemaker on 11/15/2019.   - continue coumadin    #. Compensated non-alcoholic cirrhosis  #. Portal hypertension  HE: None  Ascites: None  Varices: Grade 1 non-bleeding varices, portal gastropathy (9/2019)  HCC screening: No focal masses on Abd US on 9/2019  He follows with Dr. Roque at [facility], his cirrhosis is well-compensated.  - continue GERD     MELD-Na score: 20 at 12/19/2019 10:45 AM  MELD score: 20 at 12/19/2019 10:45 AM  Calculated from:  Serum Creatinine: 0.7 mg/dL (Rounded to 1 mg/dL) at 12/19/2019 10:45 AM  Serum Sodium: 139 mmol/L (Rounded to 137 mmol/L) at 12/19/2019 10:39 AM  Total Bilirubin: 1.3 mg/dL at 12/19/2019 10:39 AM  INR(ratio): 3.12 at 12/19/2019 10:39 AM  Age: 59 years    #. Pancytopenia  Chronic, stable, likely 2/2 liver  disease      Diet: NPO at midnight, 2g Na diet  Prophylaxis: warfarin, hold in case of procedure  Code Status: FULL code  Disposition: pending cardiac work-up    Patient seen and discussed with Dr. Taylor, who agrees with above plan.    Sabrina Sharif, PGY-2  Internal Medicine   Pager: [phone]          Chief Complaint:     Chest Pain         History of Present Illness:     59 year old male with PMHx significant for persistent A-Fib on coumadin s/p recent AV node ablation and biventricular pacemaker implantation (11/15/2019), NICM w/ EF 50-55%, well-compensated non-alcoholic cirrhosis, portal hypertension, BRUCE who presents via EMS with chest pain.     He reports left sided chest pain that started this morning after having a conversation with his boss. 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 The pain does not go away with rest, however, it gets better when he changes position. Although he initially stated that he has never experience this type of pain before, he reports similar pain ~ 1yr ago when he had his angiogram. He doesn't know if nitroglycerin in ED helped, but he felt the fentanyl helped bring his pain from 9/10 to 4/10.     On ROS, denies headache, SOB, palpitations, abd pain, urinary/bladder sx.     He denies smoking or drinking and lives with his wife and daughter at home.            Review of Systems:   10 point ROS negative unless noted above         Past Medical History:     Last Echocardiogram: 12/19/2019  Interpretation Summary  Borderline (EF 50-55%) reduced left ventricular function is present.  Moderate right ventricular dilation is present. Global right ventricular  function is mildly reduced.  Trace aortic insufficiency is present.  Estimated pulmonary artery systolic pressure is 26 mmHg plus right  atrial  pressure.  IVC diameter >2.1 cm collapsing <50% with sniff suggests a high RA pressure  estimated at 15 mmHg or greater.  No pericardial effusion is present.     This study was compared with the study from 6/21/19. Patient is now in a paced  rhythm. Minimal change in biventricular function.    Last Catheterization: 12/21/2019  FINDINGS  LEFT MAIN CORONARY ARTERY: Normal  LEFT ANTERIOR DESCENDING ARTERY: The proximal LAD is angiographically  normal. The mid LAD has mild irregularities. The distal LAD is normal.  First diagonal branch has 10% stenosis.  CIRCUMFLEX ARTERY: This is a large codominant vessel. The proximal  circumflex has mild luminal irregularities. The distal circumflex  scintigraphically normal. The first and second obtuse marginal  branches are angiographically normal.  RIGHT CORONARY ARTERY: This is a large codominant vessel. The proximal  RCA is normal. The RCA has diffuse 10% stenosis. The distal RCA has  mild irregularities. The right PDA is angiographically normal. The  right posterior lateral branch small vessel with mild diffuse disease.     HEMODYNAMICS  RIGHT ATRIAL PRESSURE: The mean RA pressure is 14 mmHg  RIGHT VENTRICULAR PRESSURE: 36/8/13  PULMONARY ARTERY PRESSURE: 38/20/26  PULMONARY CAPILLARY WEDGE PRESSURE: Mean wedge is 18 mmHg  CARDIAC OUTPUT AND INDEX: 8.5 l/min and 3.95 l/min/m2 per Leon  LEFT VENTRICULAR END-DIASTOLIC PRESSURE: 15 mmHg                                                               IMPRESSION:   1. Minimal coronary artery disease  2. Mildly elevated left heart filling pressure (wedge 18 mmHg) and  moderate-severely elevated right heart filling pressure (mean RA 14  MmHg)    Last Stress test: 1/2010    Medical History reviewed.   Past Medical History:   Diagnosis Date     A-fib (H)      Acute pulmonary edema (H)      Atrial fibrillation (H)      Atrial fibrillation with rapid ventricular response (H) 5/13/2013     CAD (coronary artery disease)     Cath  12/26/18- mild nonobstructive disease     Calculus of ureter 1993, 1997    history of     Cardiomyopathy (H)     12/23/18 Echo- EF 25-30%     Chronic systolic CHF (congestive heart failure) (H)      Cirrhosis of liver without mention of alcohol 8/22/2005    Cirrhosis, idiopathic     Edema 5/13/2013     Esophageal varices with bleeding(456.0)     Pt denies     Gallstones      Genital herpes, unspecified 3/20/1999    resolving herpes progenitalis     GERD (gastroesophageal reflux disease)      Hematuria      Hypertension      Hypochondriasis     problems in past     Obesity 11/24/2010     BRUCE (obstructive sleep apnea) 03/17/2009    Mild AHI 8.4  RDI 31 - Pt refuses to wear his CPAP     Pericarditis      Portal hypertension (H) 1/28/2010     Unspecified thrombocytopenia 8/22/2005    Thrombocytopenia associated with cirrhosis and portal hypertension             Past Surgical History:   Surgical History reviewed.   Past Surgical History:   Procedure Laterality Date     ANESTHESIA CARDIOVERSION N/A 1/19/2015    Procedure: ANESTHESIA CARDIOVERSION;  Surgeon: Generic Anesthesia Provider;  Location: [facility] OR     ANESTHESIA CARDIOVERSION N/A 2/6/2019    Procedure: ANESTHESIA CARDIOVERSION;  Surgeon: GENERIC ANESTHESIA PROVIDER;  Location:  OR     ANESTHESIA CARDIOVERSION N/A 7/5/2019    Procedure: ANESTHESIA FOR CARDIOVERSION  DR. MURGUIA);  Surgeon: GENERIC ANESTHESIA PROVIDER;  Location:  OR     COLONOSCOPY N/A 8/14/2017    Procedure: COLONOSCOPY;  Colonoscopy Called will arrive appx 12:30 Per phone call;  Surgeon: Vincent Whittington MD;  [facility]: [facility]     CV HEART CATHETERIZATION WITH POSSIBLE INTERVENTION N/A 12/26/2018    Procedure: Heart Catheterization with possible Intervention;  Surgeon: Brandon Arroyo MD;  Location:  HEART CARDIAC CATH LAB     EP ABLATION AV NODE N/A 11/15/2019    Procedure: EP Ablation AV Node;  Surgeon: Ag Maloney MD;  Location:  HEART CARDIAC CATH LAB     EP ABLATION FOCAL AFIB  N/A 12/21/2018    Procedure: EP Ablation Focal AFIB;  Surgeon: Kristofer Evans MD;  Location:  HEART CARDIAC CATH LAB     EP PACEMAKER N/A 11/15/2019    Procedure: EP PACEMAKER;  Surgeon: Ag Maloney MD;  Location:  HEART CARDIAC CATH LAB     ESOPHAGOSCOPY, GASTROSCOPY, DUODENOSCOPY (EGD), COMBINED  7/11/2011    Procedure:COMBINED ESOPHAGOSCOPY, GASTROSCOPY, DUODENOSCOPY (EGD); Surgeon:LAURA LAMAS; Location:[facility]     ESOPHAGOSCOPY, GASTROSCOPY, DUODENOSCOPY (EGD), COMBINED  9/10/2012    Procedure: COMBINED ESOPHAGOSCOPY, GASTROSCOPY, DUODENOSCOPY (EGD);;  Surgeon: Hi Roque MD;  Location:  GI     ESOPHAGOSCOPY, GASTROSCOPY, DUODENOSCOPY (EGD), COMBINED  9/9/2013    Procedure: COMBINED ESOPHAGOSCOPY, GASTROSCOPY, DUODENOSCOPY (EGD);;  Surgeon: Hi Roque MD;  Location:  GI     ESOPHAGOSCOPY, GASTROSCOPY, DUODENOSCOPY (EGD), COMBINED N/A 9/15/2014    Procedure: COMBINED ESOPHAGOSCOPY, GASTROSCOPY, DUODENOSCOPY (EGD);  Surgeon: Hi Roque MD;  Location:  GI     ESOPHAGOSCOPY, GASTROSCOPY, DUODENOSCOPY (EGD), COMBINED N/A 10/30/2015    Procedure: COMBINED ESOPHAGOSCOPY, GASTROSCOPY, DUODENOSCOPY (EGD);  Surgeon: Feliberto Fox MD;  Location:  GI     ESOPHAGOSCOPY, GASTROSCOPY, DUODENOSCOPY (EGD), COMBINED N/A 10/10/2016    Procedure: COMBINED ESOPHAGOSCOPY, GASTROSCOPY, DUODENOSCOPY (EGD);  Surgeon: Jose Nesbitt MD;  Location:  GI     ESOPHAGOSCOPY, GASTROSCOPY, DUODENOSCOPY (EGD), COMBINED N/A 9/26/2019    Procedure: ESOPHAGOGASTRODUODENOSCOPY (EGD);  Surgeon: Timo Soares MD;  [facility]: [facility]     H ABLATION FOCAL AFIB  12/21/2018     HERNIA REPAIR       IRRIGATION AND DEBRIDEMENT ABSCESS SCROTUM, COMBINED  10/4/2012    Procedure: COMBINED IRRIGATION AND DEBRIDEMENT ABSCESS SCROTUM;  Irrigation and Debridement of Groin Abscess;  Surgeon: Benny Shoemaker MD;  Location: [facility] OR     SOFT TISSUE SURGERY       SURGICAL HISTORY OF -   1993    rt  elbow     SURGICAL HISTORY OF -   1/15/98    repair of ventral and umbilical hernia     SURGICAL HISTORY OF -       endoscopy             Social History:   Social History reviewed.   Social History     Tobacco Use     Smoking status: Former Smoker     Packs/day: 0.80     Years: 6.00     Pack years: 4.80     Types: Cigarettes     Last attempt to quit: 2002     Years since quittin.9     Smokeless tobacco: Never Used   Substance Use Topics     Alcohol use: No     Alcohol/week: 0.0 standard drinks     Comment: quit in              Family History:   Family History reviewed.  Family History   Problem Relation Age of Onset     Lipids Mother      Hypertension Mother      Eye Disorder Mother      Heart Disease Father         CHF     Lipids Father      Obesity Father      Heart Disease Brother         MI     Eye Disorder Brother      Hypertension Brother         portal HTN     Thrombosis Sister         in her lung     Eye Disorder Sister      Cancer Other         maternal grandparent/throat cancer             Allergies:     Allergies   Allergen Reactions     Avelox Other (See Comments) and GI Disturbance     portal hypertension     Moxifloxacin Nausea and Vomiting, Nausea and Other (See Comments)     portal hypertension     Sotalol Itching             Medications:   Medications Reviewed.   Current Facility-Administered Medications   Medication     lidocaine (LMX4) cream     lidocaine 1 % 0.1-1 mL     nitroGLYcerin (NITROSTAT) sublingual tablet 0.4 mg     sodium chloride (PF) 0.9% PF flush 3 mL     sodium chloride (PF) 0.9% PF flush 3 mL     Current Outpatient Medications   Medication Sig     ACE/ARB/ARNI NOT PRESCRIBED (INTENTIONAL) Please choose reason not prescribed, below     ASPIRIN NOT PRESCRIBED (INTENTIONAL) Please choose reason not prescribed, below     budesonide-formoterol (SYMBICORT) 80-4.5 MCG/ACT Inhaler Inhale 2 puffs into the lungs 2 times daily     calcium carb 1250 mg, 500 mg [place],/vitamin D  "200 units (OSCAL WITH D) 500-200 MG-UNIT per tablet Take 2 tablets by mouth daily with food.     fluticasone-vilanterol (BREO ELLIPTA) 100-25 MCG/INH inhaler Inhale 1 puff into the lungs daily     furosemide (LASIX) 40 MG tablet Take 1 tablet (40 mg) by mouth 2 times daily     Multiple Vitamins-Minerals (MENS 50+ MULTI VITAMIN/MIN PO) Take 1 tablet by mouth daily     mupirocin (BACTROBAN) 2 % external ointment Apply topically 3 times daily Apply to open sores on lower legs.     order for DME Open toe size large 30/40 [facility] Compression socks Model 60585.  Or similar as per availability/formulary.  Fax to Fax [phone]. [facility] medical     order for DME Equipment being ordered:   New CPAP mask, tube, filters,water tray     ORDER FOR DME Respironics RemStar 60 Series Auto AFlex 12-15 cm H2O with heated humidty and a modem.  Pt chose a [facility] [employer] FFM size medium.     pantoprazole (PROTONIX) 40 MG EC tablet Take 1 tablet (40 mg) by mouth daily     spironolactone (ALDACTONE) 25 MG tablet Take 1 tablet (25 mg) by mouth daily     STATIN NOT PRESCRIBED (INTENTIONAL) Please choose reason not prescribed, below     vitamin D3 (CHOLECALCIFEROL) 2000 units (50 mcg) tablet Take 1 tablet by mouth daily     warfarin ANTICOAGULANT (JANTOVEN ANTICOAGULANT) 2.5 MG tablet 1.25 mg Fridays; 2.5mg all other days or As directed by Anticoagulation Clinic. INR DUE FOR FURTHER REFILLS             Physical Exam:   Vitals were reviewed.  Blood pressure 110/67, pulse 70, temperature 97.6  F (36.4  C), temperature source Oral, resp. rate 18, height 1.651 m (5' 5\"), weight 108.9 kg (240 lb), SpO2 99 %.    General: NAD, pleasant  HEENT: MMM, PERRLA, EOM intact  CV: RRR, normal S1S2, no murmur  Resp: Clear to auscultation bilaterally, no wheezes, rhonchi  Abd: Soft, obese, non-tender, BS+  Skin: Not jaundiced, no rash, no ecchymoses  Extremities: warm and well perfused, palpable pulses, no edema  Neuro: A&Ox3, no focal " deficits         Data:   No intake/output data recorded.    ROUTINE LABS (Last four results)  CMP  Recent Labs   Lab 12/19/19  1045 12/19/19  1039   NA  --  139   POTASSIUM  --  3.6   CHLORIDE  --  108   CO2  --  22   ANIONGAP  --  9   GLC  --  92   BUN  --  14   CR  --  0.69   GFRESTIMATED >90 >90   GFRESTBLACK >90 >90   HALEY  --  8.6   PROTTOTAL  --  5.8*   ALBUMIN  --  3.3*   BILITOTAL  --  1.3   ALKPHOS  --  68   AST  --  140*   ALT  --  76*     CBC  Recent Labs   Lab 12/19/19  1039   WBC 3.2*   RBC 3.39*   HGB 9.7*   HCT 31.6*   MCV 93   MCH 28.6   MCHC 30.7*   RDW 16.2*   PLT 48*     INR  Recent Labs   Lab 12/19/19  1039   INR 3.12*     Arterial Blood GasNo lab results found in last 7 days.    Imaging:  CXR on 12/19/2019:  FINDINGS: A single AP radiograph of the chest. Levoscoliotic curvature  of the thoracic spine. Trachea is midline, heart size is enlarged.  Dual chamber implantable cardiac device. Increased bilateral  interstitial perihilar streaky opacities. Left pleural effusion with  overlying basilar opacities. No pneumothorax. No acute osseous or  abdominal abnormality.                                                                IMPRESSION:  1. Cardiomegaly with moderate pulmonary edema.  2. Left pleural effusion with underlying basilar  atelectasis/developing consolidation.

## 2019-12-19 NOTE — ED NOTES
Bed: ED01  Expected date:   Expected time:   Means of arrival:   Comments:  [facility]  59M - Chest pain; has a pacer  RED

## 2019-12-19 NOTE — ED TRIAGE NOTES
Pt was at work at 7AM had chest pain. Got worse, EMS got there at 9AM. Midsternal crushing chest pain. Got 5 sprays nitroglycerin, 50mcg Fentanyl and 0.5mg Ativan. Has hx portal vein hypertension, recent pacemaker (Nov 15)

## 2019-12-20 ENCOUNTER — APPOINTMENT (OUTPATIENT)
Dept: CT IMAGING | Facility: CLINIC | Age: 59
End: 2019-12-20
Attending: INTERNAL MEDICINE
Payer: COMMERCIAL

## 2019-12-20 ENCOUNTER — ANCILLARY PROCEDURE (OUTPATIENT)
Dept: CARDIOLOGY | Facility: CLINIC | Age: 59
End: 2019-12-20
Attending: INTERNAL MEDICINE
Payer: COMMERCIAL

## 2019-12-20 VITALS
RESPIRATION RATE: 18 BRPM | SYSTOLIC BLOOD PRESSURE: 119 MMHG | BODY MASS INDEX: 40.44 KG/M2 | HEIGHT: 65 IN | TEMPERATURE: 97.8 F | DIASTOLIC BLOOD PRESSURE: 75 MMHG | OXYGEN SATURATION: 96 % | WEIGHT: 242.7 LBS | HEART RATE: 71 BPM

## 2019-12-20 DIAGNOSIS — I44.2 ATRIOVENTRICULAR BLOCK, COMPLETE (H): Primary | ICD-10-CM

## 2019-12-20 DIAGNOSIS — I44.2 ATRIOVENTRICULAR BLOCK, COMPLETE (H): ICD-10-CM

## 2019-12-20 PROCEDURE — 40000275 ZZH STATISTIC RCP TIME EA 10 MIN

## 2019-12-20 PROCEDURE — 93286 PERI-PX EVAL PM/LDLS PM IP: CPT | Mod: 26 | Performed by: INTERNAL MEDICINE

## 2019-12-20 PROCEDURE — 40000141 ZZH STATISTIC PERIPHERAL IV START W/O US GUIDANCE

## 2019-12-20 PROCEDURE — 25000128 H RX IP 250 OP 636: Performed by: INTERNAL MEDICINE

## 2019-12-20 PROCEDURE — 25000132 ZZH RX MED GY IP 250 OP 250 PS 637

## 2019-12-20 PROCEDURE — 75574 CT ANGIO HRT W/3D IMAGE: CPT

## 2019-12-20 PROCEDURE — 75574 CT ANGIO HRT W/3D IMAGE: CPT | Mod: 26 | Performed by: INTERNAL MEDICINE

## 2019-12-20 PROCEDURE — 25000132 ZZH RX MED GY IP 250 OP 250 PS 637: Performed by: STUDENT IN AN ORGANIZED HEALTH CARE EDUCATION/TRAINING PROGRAM

## 2019-12-20 PROCEDURE — 25000132 ZZH RX MED GY IP 250 OP 250 PS 637: Performed by: NURSE PRACTITIONER

## 2019-12-20 PROCEDURE — G0378 HOSPITAL OBSERVATION PER HR: HCPCS

## 2019-12-20 PROCEDURE — 93280 PM DEVICE PROGR EVAL DUAL: CPT

## 2019-12-20 RX ORDER — DIPHENHYDRAMINE HCL 25 MG
25 CAPSULE ORAL
Status: DISCONTINUED | OUTPATIENT
Start: 2019-12-20 | End: 2019-12-20 | Stop reason: HOSPADM

## 2019-12-20 RX ORDER — SPIRONOLACTONE 25 MG/1
25 TABLET ORAL DAILY
Status: DISCONTINUED | OUTPATIENT
Start: 2019-12-20 | End: 2019-12-20 | Stop reason: HOSPADM

## 2019-12-20 RX ORDER — HYDROCORTISONE 2.5 %
CREAM (GRAM) TOPICAL 2 TIMES DAILY PRN
Status: DISCONTINUED | OUTPATIENT
Start: 2019-12-20 | End: 2019-12-20 | Stop reason: HOSPADM

## 2019-12-20 RX ORDER — DIPHENHYDRAMINE HYDROCHLORIDE 50 MG/ML
25-50 INJECTION INTRAMUSCULAR; INTRAVENOUS
Status: DISCONTINUED | OUTPATIENT
Start: 2019-12-20 | End: 2019-12-20 | Stop reason: HOSPADM

## 2019-12-20 RX ORDER — FUROSEMIDE 40 MG
40 TABLET ORAL 2 TIMES DAILY
Status: DISCONTINUED | OUTPATIENT
Start: 2019-12-20 | End: 2019-12-20

## 2019-12-20 RX ORDER — METHYLPREDNISOLONE SODIUM SUCCINATE 125 MG/2ML
125 INJECTION, POWDER, LYOPHILIZED, FOR SOLUTION INTRAMUSCULAR; INTRAVENOUS
Status: DISCONTINUED | OUTPATIENT
Start: 2019-12-20 | End: 2019-12-20 | Stop reason: HOSPADM

## 2019-12-20 RX ORDER — FUROSEMIDE 40 MG
40 TABLET ORAL 2 TIMES DAILY
Status: DISCONTINUED | OUTPATIENT
Start: 2019-12-20 | End: 2019-12-20 | Stop reason: HOSPADM

## 2019-12-20 RX ORDER — ACYCLOVIR 200 MG/1
0-1 CAPSULE ORAL
Status: DISCONTINUED | OUTPATIENT
Start: 2019-12-20 | End: 2019-12-20 | Stop reason: HOSPADM

## 2019-12-20 RX ORDER — IOPAMIDOL 755 MG/ML
120 INJECTION, SOLUTION INTRAVASCULAR ONCE
Status: COMPLETED | OUTPATIENT
Start: 2019-12-20 | End: 2019-12-20

## 2019-12-20 RX ORDER — NITROGLYCERIN 0.4 MG/1
.4-.8 TABLET SUBLINGUAL
Status: DISCONTINUED | OUTPATIENT
Start: 2019-12-20 | End: 2019-12-20 | Stop reason: HOSPADM

## 2019-12-20 RX ORDER — ONDANSETRON 2 MG/ML
4 INJECTION INTRAMUSCULAR; INTRAVENOUS
Status: DISCONTINUED | OUTPATIENT
Start: 2019-12-20 | End: 2019-12-20 | Stop reason: HOSPADM

## 2019-12-20 RX ADMIN — SPIRONOLACTONE 25 MG: 25 TABLET ORAL at 13:34

## 2019-12-20 RX ADMIN — PANTOPRAZOLE SODIUM 40 MG: 40 TABLET, DELAYED RELEASE ORAL at 10:11

## 2019-12-20 RX ADMIN — HYDROCORTISONE: 25 CREAM TOPICAL at 13:11

## 2019-12-20 RX ADMIN — NITROGLYCERIN 0.8 MG: 0.4 TABLET SUBLINGUAL at 11:41

## 2019-12-20 RX ADMIN — FUROSEMIDE 40 MG: 40 TABLET ORAL at 13:34

## 2019-12-20 RX ADMIN — IOPAMIDOL 120 ML: 755 INJECTION, SOLUTION INTRAVENOUS at 11:39

## 2019-12-20 ASSESSMENT — MIFFLIN-ST. JEOR: SCORE: 1842.76

## 2019-12-20 ASSESSMENT — ACTIVITIES OF DAILY LIVING (ADL)
ADLS_ACUITY_SCORE: 12

## 2019-12-20 NOTE — PHARMACY-ADMISSION MEDICATION HISTORY
"Admission medication history interview status for the 2019 admission is complete. See Epic admission navigator for allergy information, pharmacy, prior to admission medications and immunization status.     Medication history interview sources:  the patient, outside pharmacy fill records (SureScripts), and Epic provider notes.     Changes made to PTA medication list (reason)  Added:     Tylenol (as needed)  Deleted:     Breo Ellipta 100-25 mcg Inhaler - Patient states he doesn't use at all anymore  Changed: None.     Recent Antibiotics:  10/25/19 Cephalexin 500m capsule by mouth four times daily for 10 days. Indication: Cellulitis (treatment continued). Course of treatment complete.   10/12/19 Cephalexin 500m capsule by mouth four times daily for 7 days. Course of treatment complete.     Warfarin Dosing (Jantoven):  Jantoven 1.25 mg on  and 2.5mg all other days. The patient last took 2.5mg this morning.     Additional medication history information (including reliability of information, actions taken by pharmacist):The patient knew is medications well and fill records show reasonable compliance. The patient states he uses his Symbicort \"as needed\" and uses it approximately 2-3 times per week, sometimes more.     Prior to Admission medications    Medication Sig Last Dose Taking? Auth Provider   Acetaminophen 325 MG CAPS Take 325-650 mg by mouth every 6 hours as needed PRN Yes Unknown, Entered By History   budesonide-formoterol (SYMBICORT) 80-4.5 MCG/ACT Inhaler Inhale 2 puffs into the lungs 2 times daily 2019 at PRN Yes Kailey Ac, JANETTE CNP   calcium carb 1250 mg, 500 mg [place],/vitamin D 200 units (OSCAL WITH D) 500-200 MG-UNIT per tablet Take 2 tablets by mouth daily with food. 2019 at AM Yes Reported, Patient   furosemide (LASIX) 40 MG tablet Take 1 tablet (40 mg) by mouth 2 times daily 2019 at PM Yes Kristofer Evans MD   Multiple Vitamins-Minerals (MENS 50+ MULTI " VITAMIN/MIN PO) Take 1 tablet by mouth daily 12/19/2019 at AM Yes Reported, Patient   mupirocin (BACTROBAN) 2 % external ointment Apply topically 3 times daily Apply to open sores on lower legs. PRN Yes Alon Pineda MD   pantoprazole (PROTONIX) 40 MG EC tablet Take 1 tablet (40 mg) by mouth daily 12/19/2019 at AM Yes Jaya Stevenson MD   spironolactone (ALDACTONE) 25 MG tablet Take 1 tablet (25 mg) by mouth daily 12/18/2019 at PM Yes Kristofer Evans MD   vitamin D3 (CHOLECALCIFEROL) 2000 units (50 mcg) tablet Take 1 tablet by mouth daily 12/18/2019 at PM Yes Reported, Patient   warfarin ANTICOAGULANT (JANTOVEN ANTICOAGULANT) 2.5 MG tablet 1.25 mg Fridays; 2.5mg all other days or As directed by Anticoagulation Clinic. INR DUE FOR FURTHER REFILLS 12/19/2019 at AM Yes Alon Pineda MD   ACE/ARB/ARNI NOT PRESCRIBED (INTENTIONAL) Please choose reason not prescribed, below   Anai Jacobs MD   ASPIRIN NOT PRESCRIBED (INTENTIONAL) Please choose reason not prescribed, below   Anai Jacobs MD   order for DME Open toe size large 30/40 [facility] Medical Compression socks Model 72076.  Or similar as per availability/formulary.  Fax to Fax [phone]. [facility]   Alon Pineda MD   order for DME Equipment being ordered:   New CPAP mask, tube, filters,water tray   Adrian Pineda MD   ORDER FOR DME Respironics RemStar 60 Series Auto AFlex 12-15 cm H2O with heated humidty and a modem.  Pt chose a [employer] Air FFM size medium.   Linda Tan PA-C   STATIN NOT PRESCRIBED (INTENTIONAL) Please choose reason not prescribed, below   Anai Jacobs MD     Medication history completed by:   Denisse Eid   Student Pharmacist  [facility]

## 2019-12-20 NOTE — DISCHARGE SUMMARY
[address] [address]  p: [phone]    Discharge Summary: Cardiology Service    Maurice Jon MRN# [id number]   YOB: 1960 Age: 59 year old       Admission Date: 12/19/2019  Discharge Date: 12/20/19      Discharge Diagnoses:  1. Atypical chest pain 2/2 Anxiety  2. NICM (EF 50-55%)  3. Persistent atrial fibrillation s/p AV node ablation, BI-V PPM    Imaging with results:  Echocardiogram 12/20/19:  Interpretation Summary  Borderline (EF 50-55%) reduced left ventricular function is present.  Moderate right ventricular dilation is present. Global right ventricular  function is mildly reduced.  Trace aortic insufficiency is present.  Estimated pulmonary artery systolic pressure is 26 mmHg plus right atrial  pressure.  IVC diameter >2.1 cm collapsing <50% with sniff suggests a high RA pressure  estimated at 15 mmHg or greater.  No pericardial effusion is present.     This study was compared with the study from 6/21/19. Patient is now in a paced  rhythm. Minimal change in biventricular function.    Other imaging studies:  CTA 12/20/19:  IMPRESSION:  1.  Minimal non-obstructive coronary artery disease.  2.  Total Agatston score 37.4 placing the patient in the 56th  percentile when compared to age and gender matched control group.  3.  Please review Radiology report for incidental noncardiac findings  that will follow separately.    EKG 12Lead 12/19/19:      Brief HPI:  Yomi Jon is a 59 year old male with PMHx of persistent A-Fib on coumadin s/p recent AV node ablation and biventricular pacemaker implantation (11/15/2019), NICM w/ EF 50-55%, well-compensated non-alcoholic cirrhosis, portal hypertension, BRUCE who presents via EMS with atypical chest pain.     Hospital Course by Diagnosis:  # Atypical Chest pain likely 2/2 Anxiety  #NICM (EF 50-55%)  Yesterday morning patient reports left-sided non-reproducible dull chest pain, not  "precipitated by exertion and not relieved by nitroglycerin or rest, improved with fentanyl and ativan. Patient reports felt felt tachypneic, needed CPAP to \"slow my breathing down,\" which helped improve his pain. EKG without ST-changes. Troponin unremarkable 0.032-->0.046. Echo done 12/19/2019 showed 50-55% with elevated atrial pressures, improved from Echo in past (EF had been as low as 25-30%, normalized EF around 6/19). This is likely anginal chest pain and possibly related to anxiety. He did get an angiogram ~1 yr (12/2018) that showed normal coronaries. CTA done today with non-obstructive CAD.   - Continue PTA Aldactone and Lasix  - Not currently on statin, given liver cirrhosis history  - No further testing needed at this time  - Pt to follow up with primary cardiologist in 4-6 weeks     #. Persistent Atrial fibrillation  Follows with EP cardiology at [facility]. He is on coumadin, now s/p AV node ablation and bi-ventricular pacemaker on 11/15/2019. Currently in a Vpaced rhythm. Denies any chest pain or palpitations  - continue coumadin  - Follow up with primary cardiologist     Chronic Conditions  - Portal HTN  - Liver Cirrhosis  - HTN: Stable, continue PTA Aldactone, Lasix  - BRUCE: Continue CPAP at night      Condition on discharge  Temp:  [97.7  F (36.5  C)-98.2  F (36.8  C)] 97.8  F (36.6  C)  Pulse:  [55-71] 71  Heart Rate:  [70-76] 70  Resp:  [16-18] 18  BP: (100-122)/(57-75) 119/75  SpO2:  [96 %-100 %] 96 %  General: Alert, interactive, NAD  Eyes: sclera anicteric, EOMI  Neck: JVP flat, carotid 2+ bilaterally  Cardiovascular: paced rhythm, normal S1 and S2, no murmurs, gallops, or rubs  Resp: clear to auscultation bilaterally, no rales, wheezes, or rhonchi  GI: Soft, nontender, nondistended. +BS.  No HSM or masses, no rebound or guarding.  Extremities: no edema, no cyanosis or clubbing, dorsalis pedis and posterior tibialis pulses 2+ bilaterally  Skin: Warm and dry, no jaundice or rash  Neuro: CN 2-12 " intact, moves all extremities equally  Psych: Alert & oriented x 3      Medication Changes:  None    Discharge medications:   Current Discharge Medication List      CONTINUE these medications which have NOT CHANGED    Details   Acetaminophen 325 MG CAPS Take 325-650 mg by mouth every 6 hours as needed      budesonide-formoterol (SYMBICORT) 80-4.5 MCG/ACT Inhaler Inhale 2 puffs into the lungs 2 times daily  Qty: 10.2 g, Refills: 3    Associated Diagnoses: Shortness of breath      calcium carb 1250 mg, 500 mg Hopi,/vitamin D 200 units (OSCAL WITH D) 500-200 MG-UNIT per tablet Take 2 tablets by mouth daily with food.      furosemide (LASIX) 40 MG tablet Take 1 tablet (40 mg) by mouth 2 times daily  Qty: 60 tablet, Refills: 1    Associated Diagnoses: Portal hypertension (H)      Multiple Vitamins-Minerals (MENS 50+ MULTI VITAMIN/MIN PO) Take 1 tablet by mouth daily      mupirocin (BACTROBAN) 2 % external ointment Apply topically 3 times daily Apply to open sores on lower legs.  Qty: 30 g, Refills: 3    Associated Diagnoses: Cellulitis of left lower extremity      pantoprazole (PROTONIX) 40 MG EC tablet Take 1 tablet (40 mg) by mouth daily  Qty: 30 tablet, Refills: 3    Associated Diagnoses: Persistent atrial fibrillation      spironolactone (ALDACTONE) 25 MG tablet Take 1 tablet (25 mg) by mouth daily  Qty: 90 tablet, Refills: 3    Associated Diagnoses: Portal hypertension (H)      vitamin D3 (CHOLECALCIFEROL) 2000 units (50 mcg) tablet Take 1 tablet by mouth daily      warfarin ANTICOAGULANT (JANTOVEN ANTICOAGULANT) 2.5 MG tablet 1.25 mg Fridays; 2.5mg all other days or As directed by Anticoagulation Clinic. INR DUE FOR FURTHER REFILLS  Qty: 80 tablet, Refills: 0    Associated Diagnoses: Long term current use of anticoagulant therapy; Paroxysmal atrial fibrillation (H); Atrial fibrillation with rapid ventricular response (H)      ACE/ARB/ARNI NOT PRESCRIBED (INTENTIONAL) Please choose reason not prescribed, below     Associated Diagnoses: Thrombocytopenia (H); Cirrhosis of liver without ascites, unspecified hepatic cirrhosis type (H)      ASPIRIN NOT PRESCRIBED (INTENTIONAL) Please choose reason not prescribed, below    Associated Diagnoses: Thrombocytopenia (H)      !! order for DME Open toe size large 30/40 Mediven Assure Medical Compression socks Model 63554.  Or similar as per availability/formulary.  Fax to Fax [phone]. [facility]  Qty: 12 Device, Refills: 0    Associated Diagnoses: Chronic congestive heart failure, unspecified heart failure type (H); Edema, unspecified type      !! order for DME Equipment being ordered:   New CPAP mask, tube, filters,water tray  Qty: 1 Device, Refills: 3    Associated Diagnoses: Sleep apnea      !! ORDER FOR DME Respironics RemStar 60 Series Auto AFlex 12-15 cm H2O with heated humidty and a modem.  Pt chose a Quattro Air FFM size medium.    Associated Diagnoses: BRUCE (obstructive sleep apnea)      STATIN NOT PRESCRIBED (INTENTIONAL) Please choose reason not prescribed, below    Associated Diagnoses: Thrombocytopenia (H); Cirrhosis of liver without ascites, unspecified hepatic cirrhosis type (H)       !! - Potential duplicate medications found. Please discuss with provider.          Labs or imaging requiring follow-up after discharge:  N/A    Follow-up:  - Follow up with PCP in 7-10 days for post hospitalization visit, neuro function assessment  - Follow up with Primary Cardiologist (morgan) in 4-6 weeks for atypical chest pain follow up    Code status:  Full    Patient Care Team:  Alon Pineda MD as PCP - General (Family Practice)  Hi Roque MD as MD (Gastroenterology)  Alon Pineda MD as Assigned PCP    Lee Ann SAVAGE, CNP  [facility] Cardiology  [phone]

## 2019-12-20 NOTE — PROVIDER NOTIFICATION
Paged [phone]: Pt asked about going back on routine med (Lasix and Spironolactone, and changing diet.

## 2019-12-20 NOTE — PROGRESS NOTES
[id number]:    Observation goal:   Patient will remain chest pain free, complete cardiac testing: Met. Angio done. Pt does still have some chest discomfort mainly due to pacemaker incision.     D: Admit 12/19 w/ chest pain & elevated troponin. Hx of A-Fib on coumadin s/p recent ablation & bi-ventricular pacemaker (11/15/2019), NICM w/ EF 50-55%, well-compensated non-alcoholic cirrhosis, portal hypertension, & BRUCE.      I/A: Pt A/Ox4. VSS on RA when awake. CPAP when sleeping. V Paced. C/o of some pace maker incisional pain/discomfort; cream put on with relief.  Antonio areas on lower legs residual from cellulitis. SBA. Voiding using urinal. No BM this shift. Came back from angio around 1230pm. Doing great. Tolerating regular diet. Wife will be pt primary transport. Discharge papers gone over with pt.      P: Discharged

## 2019-12-20 NOTE — PROGRESS NOTES
SPIRITUAL HEALTH SERVICES  SPIRITUAL ASSESSMENT Progress Note  [facility] 6C     REFERRAL SOURCE: Initial unit  visit with patient Yomi Jon, per request for hospital  visit as noted in initial nursing assessment.    Pt shared in-depth regarding his cardiac issues, and some stressful experiences he has had at work. We talked about coping strategies and how pt might lower stress levels. We closed our visit with prayer.   PLAN: continue to follow, will check in on pt again early next week if pt still on unit.    Jose Ignacio) Halina Monique M.Div., [facility]  Staff   Pager [phone]

## 2019-12-20 NOTE — PROGRESS NOTES
Pt arrived for Coronary CT angiogram from unit 6C. Test, meds and side effects reviewed with pt.  Resting HR 70 bpm.  Patient is 100% ventricular paced.  Device nurse has turned down the lower limit per verbal order Dr Taylor temporarily during scan.  Administered 0.8 mg SL nitroglycerin on CTA table per order. CTA completed.  Original pacemaker setting of 70 bpm is restored.  Patient tolerated procedure well and denies symptoms of allergic reaction.  Post monitoring completed and VSS.  D/C instructions reviewed with pt whom verbalized understanding of need to increase PO fluids today.  Verbal report has been given to 6C RIKI Guerrero.

## 2019-12-20 NOTE — UTILIZATION REVIEW
"  Admission Status; Secondary Review Determination     Admission Date: 12/19/2019 10:30 AM      Under the authority of the Utilization Management Committee, the utilization review process indicated a secondary review on the above patient.  The review outcome is based on review of the medical records, discussions with staff, and applying clinical experience noted on the date of the review.        ()      Inpatient Status Appropriate - This patient's medical care is consistent with medical management for inpatient care and reasonable inpatient medical practice.      (x) Observation Status Appropriate - This patient does not meet hospital inpatient criteria and is placed in observation status. If this patient's primary payer is Medicare and was admitted as an inpatient, Condition Code 44 should be used and patient status changed to \"observation\".   () Admission Status NOT Appropriate - This patient's medical care is not consistent with medical management for Inpatient or Observation Status.          RATIONALE FOR DETERMINATION   Maurice Jon is a 59 year old male who has a history of atrial fibrillation (on Warfarin, ablation AV node 11/15/2019), EP pacemaker ([employer],11/15/2019), chronic systolic CHF, cardiomyopathy, acute pulmonary edema, CAD, pericarditis, thrombocytopenia, portal hypertension, liver cirrhosis, HTN, BRUCE who was brought by ambulance to the Emergency Department for chest pain. Pain is atypical for cardiac etiology.  He was placed in the hospital for further evaluation on inpatient status.  However, observation status at the hospital is more appropriate status at this time for the expected level of care patient will need during this hospital stay.      The severity of illness, intensity of service provided, expected LOS and risk for adverse outcome make the care appropriate for observation level care at the hospital.        The information on this document is developed by the " utilization review team in order for the business office to ensure compliance.  This only denotes the appropriateness of proper admission status and does not reflect the quality of care rendered.         The definitions of Inpatient Status and Observation Status used in making the determination above are those provided in the CMS Coverage Manual, Chapter 1 and Chapter 6, section 70.4.      Sincerely,     Kashif Coleman D.O.  Utilization Review/ Case Management  [facility].

## 2019-12-20 NOTE — PLAN OF CARE
"D: Admit 12/19 w/ chest pain & elevated troponin. Hx of A-Fib on coumadin s/p recent ablation & bi-ventricular pacemaker (11/15/2019), NICM w/ EF 50-55%, well-compensated non-alcoholic cirrhosis, portal hypertension, & BRUCE.     I/A: Pt A/Ox4. VSS on Cpap. V Paced. Tylenol & SL nitroglycerin x1 for chest pain w/ relief. Pacemaker incision site pink & slightly itchy, MD notified & cortisone cream ordered. Antonio areas on lower legs residual from cellulitis. SBA. Admission profile & 2 RN Skin assessment complete. Wife reported changes in pt's cognition & possible memory impairment that she has noticed on a daily basis. 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    P: NPO at midnight for angiogram. Hold warfarin in am. Continue monitoring & notify Cards 1 of changes/concerns.   "

## 2019-12-20 NOTE — PLAN OF CARE
Admission          12/19/2019 10:30 AM  -----------------------------------------------------------  Diagnosis: Chest pain, elevated troponin    Admitted from: ED  Via: bed  Accompanied by: Wife & daughter w/ pt  Family Aware of Admission: Yes  Belongings: Placed in closet; valuables sent home with family  Admission Profile: complete  Teaching: orientation to unit, call don't fall, use of console, meal times, visiting hours, when to call for the RN (angina/sob/dizziness, etc.), and enforced importance of safety   Access: PIV  Telemetry: Placed on pt  Ht./Wt.: complete  2 RN Skin Assessment: Completed with Jackie PANIAGUA No PU identified.    Temp:  [97.6  F (36.4  C)-98.2  F (36.8  C)] 98.2  F (36.8  C)  Pulse:  [69-70] 70  Heart Rate:  [69-76] 76  Resp:  [13-24] 18  BP: (100-122)/(55-72) 122/67  SpO2:  [96 %-100 %] 98 %

## 2019-12-23 ENCOUNTER — ANTICOAGULATION THERAPY VISIT (OUTPATIENT)
Dept: ANTICOAGULATION | Facility: CLINIC | Age: 59
End: 2019-12-23

## 2019-12-23 ENCOUNTER — PATIENT OUTREACH (OUTPATIENT)
Dept: CARE COORDINATION | Facility: CLINIC | Age: 59
End: 2019-12-23

## 2019-12-23 ENCOUNTER — TELEPHONE (OUTPATIENT)
Dept: FAMILY MEDICINE | Facility: CLINIC | Age: 59
End: 2019-12-23

## 2019-12-23 DIAGNOSIS — Z79.01 LONG TERM CURRENT USE OF ANTICOAGULANT THERAPY: ICD-10-CM

## 2019-12-23 DIAGNOSIS — I48.0 PAROXYSMAL ATRIAL FIBRILLATION (H): ICD-10-CM

## 2019-12-23 NOTE — TELEPHONE ENCOUNTER
"Hospital/TCU/ED for chronic condition Discharge Protocol    \"Hi, my name is Violet Stevenson RN, a registered nurse, and I am calling from [facility].  I am calling to follow up and see how things are going for you after your recent emergency visit/hospital/TCU stay.\"    Tell me how you are doing now that you are home?\" taking it easy.      Discharge Instructions    \"Let's review your discharge instructions.  What is/are the follow-up recommendations?  Pt. Response: discussed at discharged.    \"Has an appointment with your primary care provider been scheduled?\"   Yes. (confirm)    \"When you see the provider, I would recommend that you bring your medications with you.\"    Medications    \"Tell me what changed about your medicines when you discharged?\"    Changes to chronic meds?    0-1    \"What questions do you have about your medications?\"    None     New diagnoses of heart failure, COPD, diabetes, or MI?    No              Post Discharge Medication Reconciliation Status: discharge medications reconciled, continue medications without change.    Was MTM referral placed (*Make sure to put transitions as reason for referral)?   No    Call Summary    \"What questions or concerns do you have about your recent visit and your follow-up care?\"     none    \"If you have questions or things don't continue to improve, we encourage you contact us through the main clinic number (give number).  Even if the clinic is not open, triage nurses are available 24/7 to help you.     We would like you to know that our clinic has extended hours (provide information).  We also have urgent care (provide details on closest location and hours/contact info)\"      \"Thank you for your time and take care!\"             "

## 2019-12-24 ENCOUNTER — OFFICE VISIT (OUTPATIENT)
Dept: FAMILY MEDICINE | Facility: CLINIC | Age: 59
End: 2019-12-24
Payer: COMMERCIAL

## 2019-12-24 ENCOUNTER — ANCILLARY PROCEDURE (OUTPATIENT)
Dept: GENERAL RADIOLOGY | Facility: CLINIC | Age: 59
End: 2019-12-24
Attending: NURSE PRACTITIONER
Payer: COMMERCIAL

## 2019-12-24 VITALS
BODY MASS INDEX: 39.79 KG/M2 | WEIGHT: 238.8 LBS | RESPIRATION RATE: 18 BRPM | HEIGHT: 65 IN | TEMPERATURE: 99.4 F | HEART RATE: 66 BPM | OXYGEN SATURATION: 98 % | DIASTOLIC BLOOD PRESSURE: 66 MMHG | SYSTOLIC BLOOD PRESSURE: 134 MMHG

## 2019-12-24 DIAGNOSIS — R05.9 COUGH: ICD-10-CM

## 2019-12-24 DIAGNOSIS — B34.9 VIRAL SYNDROME: Primary | ICD-10-CM

## 2019-12-24 LAB
FLUAV+FLUBV AG SPEC QL: NEGATIVE
FLUAV+FLUBV AG SPEC QL: NEGATIVE
SPECIMEN SOURCE: NORMAL

## 2019-12-24 PROCEDURE — 71046 X-RAY EXAM CHEST 2 VIEWS: CPT

## 2019-12-24 PROCEDURE — 87804 INFLUENZA ASSAY W/OPTIC: CPT | Performed by: NURSE PRACTITIONER

## 2019-12-24 PROCEDURE — 99213 OFFICE O/P EST LOW 20 MIN: CPT | Performed by: NURSE PRACTITIONER

## 2019-12-24 RX ORDER — BENZONATATE 200 MG/1
200 CAPSULE ORAL 3 TIMES DAILY PRN
Qty: 30 CAPSULE | Refills: 0 | Status: SHIPPED | OUTPATIENT
Start: 2019-12-24 | End: 2019-12-26

## 2019-12-24 ASSESSMENT — MIFFLIN-ST. JEOR: SCORE: 1825.07

## 2019-12-24 NOTE — PROGRESS NOTES
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 Takes a deep breath he coughs. Has a pacemaker. Last Thursday  had to go to ER for tightness in chest, was kept over night. Concerned to bring that on again with the cold and cough.   Wheeze  x  Hard to tell   Chest Pain  x  Not pain but feels tight with a needle it's irritated   Shortness of breath   x    Rash   x    Other   x      Symptom duration:  1 day   Symptom severity:  moderate to severe    Treatments tried:  none    Contacts:  work           Patient Active Problem List   Diagnosis     Thrombocytopenia (H)     Liver Cirrhosis 2nd ot Fatty liver, w Ascites     erectile dysfunction     Compulsive behaviors     BRUCE-Mild (AHI 9; REM RDI 31)     Portal hypertension (H)     Eczema     GERD (gastroesophageal reflux disease)     CARDIOVASCULAR SCREENING; LDL GOAL LESS THAN 160     Obesity     Health Care Home     Edema     Paroxysmal atrial fibrillation (H)     Severe sepsis (H)     History of MRSA now culture negative     Portal vein thrombosis     Long-term (current) use of anticoagulants [Z79.01]     Encounter for monitoring sotalol therapy     Chronic a-fib, S/P Ablation 12/22/18 -- on Warfarin     Right heart failure (H)     Obesity (BMI 35.0-39.9) with comorbidity (H)     CAD (coronary artery disease)     Cardiomyopathy (H)     Pericarditis     Acute on chronic systolic congestive heart failure (H)     Acute combined  systolic and diastolic (congestive) hrt fail (H)     Cellulitis     Persistent atrial fibrillation     Chest pain     Atypical chest pain     Past Surgical History:   Procedure Laterality Date     ANESTHESIA CARDIOVERSION N/A 1/19/2015    Procedure: ANESTHESIA CARDIOVERSION;  Surgeon: Generic Anesthesia Provider;  Location: [facility]     ANESTHESIA CARDIOVERSION N/A 2/6/2019    Procedure: ANESTHESIA CARDIOVERSION;  Surgeon: GENERIC ANESTHESIA PROVIDER;  Location:  OR     ANESTHESIA CARDIOVERSION N/A 7/5/2019    Procedure: ANESTHESIA FOR CARDIOVERSION  DR. MURGUIA);  Surgeon: GENERIC ANESTHESIA PROVIDER;  Location:  OR     COLONOSCOPY N/A 8/14/2017    Procedure: COLONOSCOPY;  Colonoscopy Called will arrive appx 12:30 Per phone call;  Surgeon: Vincent Whittington MD;  Location:  GI     CV HEART CATHETERIZATION WITH POSSIBLE INTERVENTION N/A 12/26/2018    Procedure: Heart Catheterization with possible Intervention;  Surgeon: Brandon Arroyo MD;  Location:  HEART CARDIAC CATH LAB     EP ABLATION AV NODE N/A 11/15/2019    Procedure: EP Ablation AV Node;  Surgeon: Ag Maloney MD;  Location:  HEART CARDIAC CATH LAB     EP ABLATION FOCAL AFIB N/A 12/21/2018    Procedure: EP Ablation Focal AFIB;  Surgeon: Kristofer Murguia MD;  Location:  HEART CARDIAC CATH LAB     EP PACEMAKER N/A 11/15/2019    Procedure: EP PACEMAKER;  Surgeon: Ag Maloney MD;  Location:  HEART CARDIAC CATH LAB     ESOPHAGOSCOPY, GASTROSCOPY, DUODENOSCOPY (EGD), COMBINED  7/11/2011    Procedure:COMBINED ESOPHAGOSCOPY, GASTROSCOPY, DUODENOSCOPY (EGD); Surgeon:LAURA LAMAS; Location:[facility]     ESOPHAGOSCOPY, GASTROSCOPY, DUODENOSCOPY (EGD), COMBINED  9/10/2012    Procedure: COMBINED ESOPHAGOSCOPY, GASTROSCOPY, DUODENOSCOPY (EGD);;  Surgeon: Hi Roque MD;  Location:  GI     ESOPHAGOSCOPY, GASTROSCOPY, DUODENOSCOPY (EGD), COMBINED  9/9/2013    Procedure: COMBINED ESOPHAGOSCOPY, GASTROSCOPY, DUODENOSCOPY (EGD);;   Surgeon: Hi Roque MD;  Location:  GI     ESOPHAGOSCOPY, GASTROSCOPY, DUODENOSCOPY (EGD), COMBINED N/A 9/15/2014    Procedure: COMBINED ESOPHAGOSCOPY, GASTROSCOPY, DUODENOSCOPY (EGD);  Surgeon: Hi Roque MD;  Location:  GI     ESOPHAGOSCOPY, GASTROSCOPY, DUODENOSCOPY (EGD), COMBINED N/A 10/30/2015    Procedure: COMBINED ESOPHAGOSCOPY, GASTROSCOPY, DUODENOSCOPY (EGD);  Surgeon: Feliberto Fox MD;  Location:  GI     ESOPHAGOSCOPY, GASTROSCOPY, DUODENOSCOPY (EGD), COMBINED N/A 10/10/2016    Procedure: COMBINED ESOPHAGOSCOPY, GASTROSCOPY, DUODENOSCOPY (EGD);  Surgeon: Jose Nesbitt MD;  Location:  GI     ESOPHAGOSCOPY, GASTROSCOPY, DUODENOSCOPY (EGD), COMBINED N/A 2019    Procedure: ESOPHAGOGASTRODUODENOSCOPY (EGD);  Surgeon: Timo Soares MD;  Location:  GI     H ABLATION FOCAL AFIB  2018     HERNIA REPAIR       IRRIGATION AND DEBRIDEMENT ABSCESS SCROTUM, COMBINED  10/4/2012    Procedure: COMBINED IRRIGATION AND DEBRIDEMENT ABSCESS SCROTUM;  Irrigation and Debridement of Groin Abscess;  Surgeon: Benny Shoemaker MD;  Location: [facility]     SOFT TISSUE SURGERY       SURGICAL HISTORY OF -       rt elbow     SURGICAL HISTORY OF -   1/15/98    repair of ventral and umbilical hernia     SURGICAL HISTORY OF -       endoscopy       Social History     Tobacco Use     Smoking status: Former Smoker     Packs/day: 0.80     Years: 6.00     Pack years: 4.80     Types: Cigarettes     Last attempt to quit: 2002     Years since quittin.9     Smokeless tobacco: Never Used   Substance Use Topics     Alcohol use: No     Alcohol/week: 0.0 standard drinks     Comment: quit in      Family History   Problem Relation Age of Onset     Lipids Mother      Hypertension Mother      Eye Disorder Mother      Heart Disease Father         CHF     Lipids Father      Obesity Father      Heart Disease Brother         MI     Eye Disorder Brother      Hypertension Brother          portal HTN     Thrombosis Sister         in her lung     Eye Disorder Sister      Cancer Other         maternal grandparent/throat cancer         Current Outpatient Medications   Medication Sig Dispense Refill     ACE/ARB/ARNI NOT PRESCRIBED (INTENTIONAL) Please choose reason not prescribed, below       benzonatate (TESSALON) 200 MG capsule Take 1 capsule (200 mg) by mouth 3 times daily as needed for cough 30 capsule 0     budesonide-formoterol (SYMBICORT) 80-4.5 MCG/ACT Inhaler Inhale 2 puffs into the lungs 2 times daily 10.2 g 3     calcium carb 1250 mg, 500 mg [place],/vitamin D 200 units (OSCAL WITH D) 500-200 MG-UNIT per tablet Take 2 tablets by mouth daily with food.       furosemide (LASIX) 40 MG tablet Take 1 tablet (40 mg) by mouth 2 times daily 60 tablet 1     Multiple Vitamins-Minerals (MENS 50+ MULTI VITAMIN/MIN PO) Take 1 tablet by mouth daily       mupirocin (BACTROBAN) 2 % external ointment Apply topically 3 times daily Apply to open sores on lower legs. 30 g 3     order for DME Open toe size large 30/40 [facility] [employer] Compression socks Model 20205.  Or similar as per availability/formulary.  Fax to Fax [phone]. [facility] medical 12 Device 0     order for DME Equipment being ordered:   New CPAP mask, tube, filters,water tray 1 Device 3     ORDER FOR DME Respironics RemStar 60 Series Auto AFlex 12-15 cm H2O with heated humidty and a modem.  Pt chose a Quattro Air FFM size medium.       pantoprazole (PROTONIX) 40 MG EC tablet Take 1 tablet (40 mg) by mouth daily 30 tablet 3     spironolactone (ALDACTONE) 25 MG tablet Take 1 tablet (25 mg) by mouth daily 90 tablet 3     STATIN NOT PRESCRIBED (INTENTIONAL) Please choose reason not prescribed, below       vitamin D3 (CHOLECALCIFEROL) 2000 units (50 mcg) tablet Take 1 tablet by mouth daily       warfarin ANTICOAGULANT (JANTOVEN ANTICOAGULANT) 2.5 MG tablet 1.25 mg Fridays; 2.5mg all other days or As directed by Anticoagulation Clinic. INR DUE  "FOR FURTHER REFILLS 90 tablet 0     Acetaminophen 325 MG CAPS Take 325-650 mg by mouth every 6 hours as needed       ASPIRIN NOT PRESCRIBED (INTENTIONAL) Please choose reason not prescribed, below       Allergies   Allergen Reactions     Avelox Other (See Comments) and GI Disturbance     portal hypertension     Moxifloxacin Nausea and Vomiting, Nausea and Other (See Comments)     portal hypertension     Sotalol Itching       Reviewed and updated as needed this visit by Provider  Tobacco  Allergies  Meds  Problems  Med Hx  Surg Hx  Fam Hx         Review of Systems   ROS COMP: Constitutional, HEENT, cardiovascular, pulmonary, GI, , musculoskeletal, neuro, skin, endocrine and psych systems are negative, except as otherwise noted.      Objective    /66 (BP Location: Right arm, Patient Position: Left side, Cuff Size: Adult Large)   Pulse 66   Temp 99.4  F (37.4  C) (Tympanic)   Resp 18   Ht 1.651 m (5' 5\")   Wt 108.3 kg (238 lb 12.8 oz)   SpO2 98%   BMI 39.74 kg/m    Body mass index is 39.74 kg/m .  Physical Exam   GENERAL: healthy, alert and no distress, nontoxic in appearance  EYES: Eyes grossly normal to inspection, PERRL and conjunctivae and sclerae normal  HENT: ear canals and TM's normal, nose and mouth without ulcers or lesions  NECK: no adenopathy, supple with full ROM  RESP: lungs clear to auscultation - no rales, rhonchi or wheezes  CV: regular rate and rhythm, normal S1 S2, no S3 or S4, no murmur, click or rub, no peripheral edema   ABDOMEN: soft, nontender, no hepatosplenomegaly, no masses and bowel sounds normal  MS: no gross musculoskeletal defects noted, no edema  No rash    Diagnostic Test Results: Very difficult CXR to read. Will await over read.    CHEST TWO VIEWS  12/24/2019 12:00 PM      HISTORY: Cough.     COMPARISON: 12/19/2019.                                                                      IMPRESSION: Stable cardiomegaly. Stable left chest pacemaker. No new  focal " "airspace disease. Mild bilateral vascular congestion appears  mildly decreased.    Labs reviewed in Epic  Results for orders placed or performed in visit on 12/24/19 (from the past 24 hour(s))   Influenza A/B antigen   Result Value Ref Range    Influenza A/B Agn Specimen Nasal     Influenza A Negative NEG^Negative    Influenza B Negative NEG^Negative           Assessment & Plan  Eyes are better. Does not want any eye med at this time.  Problem List Items Addressed This Visit     None      Visit Diagnoses     Viral syndrome    -  Primary    Cough        Relevant Medications    benzonatate (TESSALON) 200 MG capsule    Other Relevant Orders    XR Chest 2 Views (Completed)    Influenza A/B antigen (Completed)             BMI:   Estimated body mass index is 39.74 kg/m  as calculated from the following:    Height as of this encounter: 1.651 m (5' 5\").    Weight as of this encounter: 108.3 kg (238 lb 12.8 oz).   Weight management plan: Patient was referred to their PCP to discuss a diet and exercise plan.        Patient Instructions   Increase rest and fluids. Tylenol for comfort. Cool mist vaporizer. If your symptoms worsen or do not resolve follow up with your primary care provider in 1 week and sooner if needed.      Mucinex 600 mg 12 hour formula for ear, head and chest congestion.  It can also thin post nasal drip which can cause a cough and sore throat.    Indications for emergent return to emergency department discussed with patient, who verbalized good understanding and agreement.  Patient understands the limitations of today's evaluation.           Patient Education     Viral Syndrome (Adult)  A viral illness may cause a number of symptoms such as fever. Other symptoms depend on the part of the body that the virus affects. If it settles in your nose, throat, and lungs, it may cause cough, sore throat, congestion, runny nose, headache, earache and other ear symptoms, or shortness of breath. If it settles in your " "stomach and intestinal tract, it may cause nausea, vomiting, cramping, and diarrhea. Sometimes it causes generalized symptoms like \"aching all over,\" feeling tired, loss of energy, or loss of appetite.  A viral illness usually lasts anywhere from several days to several weeks, but sometimes it lasts longer. In some cases, a more serious infection can look like a viral syndrome in the first few days of the illness. You may need another exam and additional tests to know the difference. Watch for the warning signs listed below for when to seek medical advice.  Home care  Follow these guidelines for taking care of yourself at home:    If symptoms are severe, rest at home for the first 2 to 3 days.    Stay away from cigarette smoke - both your smoke and the smoke from others.    You may use over-the-counter acetaminophen or ibuprofen for fever, muscle aching, and headache, unless another medicine was prescribed for this. If you have chronic liver or kidney disease or ever had a stomach ulcer or gastrointestinal bleeding, talk with your healthcare provider before using these medicines. No one who is younger than 18 and ill with a fever should take aspirin. It may cause severe disease or death.    Your appetite may be poor, so a light diet is fine. Avoid dehydration by drinking 8 to 12, 8-ounce glasses of fluids each day. This may include water; orange juice; lemonade; apple, grape, and cranberry juice; clear fruit drinks; electrolyte replacement and sports drinks; and decaffeinated teas and coffee. If you have been diagnosed with a kidney disease, ask your healthcare provider how much and what types of fluids you should drink to prevent dehydration. If you have kidney disease, drinking too much fluid can cause it build up in the your body and be dangerous to your health.    Over-the-counter remedies won't shorten the length of the illness but may be helpful for symptoms such as cough, sore throat, nasal and sinus " congestion, or diarrhea. Don't use decongestants if you have high blood pressure.  Follow-up care  Follow up with your healthcare provider if you do not improve over the next week.  Call 911  Call 911 if any of the following occur:    Convulsion    Feeling weak, dizzy, or like you are going to faint    Chest pain, or more than mild shortness of breath  When to seek medical advice  Call your healthcare provider right away if any of these occur:    Cough with lots of colored sputum (mucus) or blood in your sputum    Chest pain, shortness of breath, wheezing, or trouble breathing    Severe headache; face, neck, or ear pain    Severe, constant pain in the lower right side of your belly (abdominal)    Continued vomiting (can t keep liquids down)    Frequent diarrhea (more than 5 times a day); blood (red or black color) or mucus in diarrhea    Feeling weak, dizzy, or like you are going to faint    Extreme thirst    Fever of 100.4 F (38 C) or higher, or as directed by your healthcare provider  Date Last Reviewed: 4/1/2018 2000-2018 [employer]. [address] [place] [address]. All rights reserved. This information is not intended as a substitute for professional medical care. Always follow your healthcare professional's instructions.             Return in about 3 days (around 12/27/2019), or if symptoms worsen or fail to improve, for Follow up with your primary care provider.    JANETTE Hendrix [facility]

## 2019-12-24 NOTE — PATIENT INSTRUCTIONS
"Increase rest and fluids. Tylenol for comfort. Cool mist vaporizer. If your symptoms worsen or do not resolve follow up with your primary care provider in 1 week and sooner if needed.      Mucinex 600 mg 12 hour formula for ear, head and chest congestion.  It can also thin post nasal drip which can cause a cough and sore throat.    Indications for emergent return to emergency department discussed with patient, who verbalized good understanding and agreement.  Patient understands the limitations of today's evaluation.           Patient Education     Viral Syndrome (Adult)  A viral illness may cause a number of symptoms such as fever. Other symptoms depend on the part of the body that the virus affects. If it settles in your nose, throat, and lungs, it may cause cough, sore throat, congestion, runny nose, headache, earache and other ear symptoms, or shortness of breath. If it settles in your stomach and intestinal tract, it may cause nausea, vomiting, cramping, and diarrhea. Sometimes it causes generalized symptoms like \"aching all over,\" feeling tired, loss of energy, or loss of appetite.  A viral illness usually lasts anywhere from several days to several weeks, but sometimes it lasts longer. In some cases, a more serious infection can look like a viral syndrome in the first few days of the illness. You may need another exam and additional tests to know the difference. Watch for the warning signs listed below for when to seek medical advice.  Home care  Follow these guidelines for taking care of yourself at home:    If symptoms are severe, rest at home for the first 2 to 3 days.    Stay away from cigarette smoke - both your smoke and the smoke from others.    You may use over-the-counter acetaminophen or ibuprofen for fever, muscle aching, and headache, unless another medicine was prescribed for this. If you have chronic liver or kidney disease or ever had a stomach ulcer or gastrointestinal bleeding, talk with your " healthcare provider before using these medicines. No one who is younger than 18 and ill with a fever should take aspirin. It may cause severe disease or death.    Your appetite may be poor, so a light diet is fine. Avoid dehydration by drinking 8 to 12, 8-ounce glasses of fluids each day. This may include water; orange juice; lemonade; apple, grape, and cranberry juice; clear fruit drinks; electrolyte replacement and sports drinks; and decaffeinated teas and coffee. If you have been diagnosed with a kidney disease, ask your healthcare provider how much and what types of fluids you should drink to prevent dehydration. If you have kidney disease, drinking too much fluid can cause it build up in the your body and be dangerous to your health.    Over-the-counter remedies won't shorten the length of the illness but may be helpful for symptoms such as cough, sore throat, nasal and sinus congestion, or diarrhea. Don't use decongestants if you have high blood pressure.  Follow-up care  Follow up with your healthcare provider if you do not improve over the next week.  Call 911  Call 911 if any of the following occur:    Convulsion    Feeling weak, dizzy, or like you are going to faint    Chest pain, or more than mild shortness of breath  When to seek medical advice  Call your healthcare provider right away if any of these occur:    Cough with lots of colored sputum (mucus) or blood in your sputum    Chest pain, shortness of breath, wheezing, or trouble breathing    Severe headache; face, neck, or ear pain    Severe, constant pain in the lower right side of your belly (abdominal)    Continued vomiting (can t keep liquids down)    Frequent diarrhea (more than 5 times a day); blood (red or black color) or mucus in diarrhea    Feeling weak, dizzy, or like you are going to faint    Extreme thirst    Fever of 100.4 F (38 C) or higher, or as directed by your healthcare provider  Date Last Reviewed: 4/1/2018 2000-2018 [employer]  [employer]. [address] [place] [address], [place] [address]. All rights reserved. This information is not intended as a substitute for professional medical care. Always follow your healthcare professional's instructions.

## 2019-12-24 NOTE — NURSING NOTE
"Chief Complaint   Patient presents with     URI     Today woke up with a cough hurts to cough, phlegm is green and brown.       Initial /66 (BP Location: Right arm, Patient Position: Left side, Cuff Size: Adult Large)   Pulse 66   Temp 99.4  F (37.4  C) (Tympanic)   Resp 18   Ht 1.651 m (5' 5\")   Wt 108.3 kg (238 lb 12.8 oz)   SpO2 98%   BMI 39.74 kg/m   Estimated body mass index is 39.74 kg/m  as calculated from the following:    Height as of this encounter: 1.651 m (5' 5\").    Weight as of this encounter: 108.3 kg (238 lb 12.8 oz).    Patient presents to the clinic using No DME    Health Maintenance that is potentially due pending provider review:  NONE    n/a    Is there anyone who you would like to be able to receive your results? No  If yes have patient fill out SHAAN    Adriana Shaffer CMA    "

## 2019-12-26 ENCOUNTER — OFFICE VISIT (OUTPATIENT)
Dept: FAMILY MEDICINE | Facility: CLINIC | Age: 59
End: 2019-12-26
Payer: COMMERCIAL

## 2019-12-26 ENCOUNTER — DOCUMENTATION ONLY (OUTPATIENT)
Dept: SLEEP MEDICINE | Facility: CLINIC | Age: 59
End: 2019-12-26
Payer: COMMERCIAL

## 2019-12-26 VITALS
TEMPERATURE: 99.5 F | BODY MASS INDEX: 39.07 KG/M2 | HEART RATE: 70 BPM | SYSTOLIC BLOOD PRESSURE: 126 MMHG | DIASTOLIC BLOOD PRESSURE: 62 MMHG | RESPIRATION RATE: 20 BRPM | OXYGEN SATURATION: 98 % | WEIGHT: 234.8 LBS

## 2019-12-26 DIAGNOSIS — J20.9 ACUTE BRONCHITIS, UNSPECIFIED ORGANISM: Primary | ICD-10-CM

## 2019-12-26 LAB
MDC_IDC_LEAD_IMPLANT_DT: NORMAL
MDC_IDC_LEAD_IMPLANT_DT: NORMAL
MDC_IDC_LEAD_LOCATION: NORMAL
MDC_IDC_LEAD_LOCATION: NORMAL
MDC_IDC_LEAD_LOCATION_DETAIL_1: NORMAL
MDC_IDC_LEAD_LOCATION_DETAIL_1: NORMAL
MDC_IDC_LEAD_MFG: NORMAL
MDC_IDC_LEAD_MFG: NORMAL
MDC_IDC_LEAD_MODEL: NORMAL
MDC_IDC_LEAD_MODEL: NORMAL
MDC_IDC_LEAD_POLARITY_TYPE: NORMAL
MDC_IDC_LEAD_POLARITY_TYPE: NORMAL
MDC_IDC_LEAD_SERIAL: NORMAL
MDC_IDC_LEAD_SERIAL: NORMAL
MDC_IDC_MSMT_BATTERY_DTM: NORMAL
MDC_IDC_MSMT_BATTERY_REMAINING_LONGEVITY: 132 MO
MDC_IDC_MSMT_BATTERY_STATUS: NORMAL
MDC_IDC_MSMT_LEADCHNL_LV_IMPEDANCE_VALUE: 562 OHM
MDC_IDC_MSMT_LEADCHNL_LV_PACING_THRESHOLD_AMPLITUDE: 0.8 V
MDC_IDC_MSMT_LEADCHNL_LV_PACING_THRESHOLD_PULSEWIDTH: 0.5 MS
MDC_IDC_MSMT_LEADCHNL_LV_SENSING_INTR_AMPL: 6.8 MV
MDC_IDC_MSMT_LEADCHNL_RA_IMPEDANCE_VALUE: 3000 OHM
MDC_IDC_MSMT_LEADCHNL_RV_IMPEDANCE_VALUE: 830 OHM
MDC_IDC_MSMT_LEADCHNL_RV_PACING_THRESHOLD_AMPLITUDE: 1.1 V
MDC_IDC_MSMT_LEADCHNL_RV_PACING_THRESHOLD_PULSEWIDTH: 0.4 MS
MDC_IDC_MSMT_LEADCHNL_RV_SENSING_INTR_AMPL: 8.4 MV
MDC_IDC_PG_IMPLANT_DTM: NORMAL
MDC_IDC_PG_MFG: NORMAL
MDC_IDC_PG_MODEL: NORMAL
MDC_IDC_PG_SERIAL: NORMAL
MDC_IDC_PG_TYPE: NORMAL
MDC_IDC_SESS_CLINIC_NAME: NORMAL
MDC_IDC_SESS_DTM: NORMAL
MDC_IDC_SESS_TYPE: NORMAL
MDC_IDC_SET_BRADY_AT_MODE_SWITCH_MODE: NORMAL
MDC_IDC_SET_BRADY_LOWRATE: 70 {BEATS}/MIN
MDC_IDC_SET_BRADY_MAX_SENSOR_RATE: 130 {BEATS}/MIN
MDC_IDC_SET_BRADY_MODE: NORMAL
MDC_IDC_SET_BRADY_PAV_DELAY_HIGH: 180 MS
MDC_IDC_SET_BRADY_PAV_DELAY_LOW: 180 MS
MDC_IDC_SET_BRADY_SAV_DELAY_LOW: 120 MS
MDC_IDC_SET_CRT_LVRV_DELAY: 40 MS
MDC_IDC_SET_CRT_PACED_CHAMBERS: NORMAL
MDC_IDC_SET_LEADCHNL_LV_PACING_AMPLITUDE: 2 V
MDC_IDC_SET_LEADCHNL_LV_PACING_CAPTURE_MODE: NORMAL
MDC_IDC_SET_LEADCHNL_LV_PACING_POLARITY: NORMAL
MDC_IDC_SET_LEADCHNL_LV_PACING_PULSEWIDTH: 0.5 MS
MDC_IDC_SET_LEADCHNL_LV_SENSING_ADAPTATION_MODE: NORMAL
MDC_IDC_SET_LEADCHNL_LV_SENSING_POLARITY: NORMAL
MDC_IDC_SET_LEADCHNL_LV_SENSING_SENSITIVITY: 2.5 MV
MDC_IDC_SET_LEADCHNL_RA_PACING_ANODE_ELECTRODE_1: NORMAL
MDC_IDC_SET_LEADCHNL_RA_PACING_ANODE_LOCATION_1: NORMAL
MDC_IDC_SET_LEADCHNL_RA_PACING_CAPTURE_MODE: NORMAL
MDC_IDC_SET_LEADCHNL_RA_PACING_CATHODE_ELECTRODE_1: NORMAL
MDC_IDC_SET_LEADCHNL_RA_PACING_CATHODE_LOCATION_1: NORMAL
MDC_IDC_SET_LEADCHNL_RA_PACING_POLARITY: NORMAL
MDC_IDC_SET_LEADCHNL_RA_SENSING_ADAPTATION_MODE: NORMAL
MDC_IDC_SET_LEADCHNL_RA_SENSING_ANODE_ELECTRODE_1: NORMAL
MDC_IDC_SET_LEADCHNL_RA_SENSING_ANODE_LOCATION_1: NORMAL
MDC_IDC_SET_LEADCHNL_RA_SENSING_CATHODE_ELECTRODE_1: NORMAL
MDC_IDC_SET_LEADCHNL_RA_SENSING_CATHODE_LOCATION_1: NORMAL
MDC_IDC_SET_LEADCHNL_RA_SENSING_POLARITY: NORMAL
MDC_IDC_SET_LEADCHNL_RA_SENSING_SENSITIVITY: 0.75 MV
MDC_IDC_SET_LEADCHNL_RV_PACING_AMPLITUDE: 2.6 V
MDC_IDC_SET_LEADCHNL_RV_PACING_ANODE_ELECTRODE_1: NORMAL
MDC_IDC_SET_LEADCHNL_RV_PACING_ANODE_LOCATION_1: NORMAL
MDC_IDC_SET_LEADCHNL_RV_PACING_CAPTURE_MODE: NORMAL
MDC_IDC_SET_LEADCHNL_RV_PACING_CATHODE_ELECTRODE_1: NORMAL
MDC_IDC_SET_LEADCHNL_RV_PACING_CATHODE_LOCATION_1: NORMAL
MDC_IDC_SET_LEADCHNL_RV_PACING_POLARITY: NORMAL
MDC_IDC_SET_LEADCHNL_RV_PACING_PULSEWIDTH: 0.4 MS
MDC_IDC_SET_LEADCHNL_RV_SENSING_ADAPTATION_MODE: NORMAL
MDC_IDC_SET_LEADCHNL_RV_SENSING_ANODE_ELECTRODE_1: NORMAL
MDC_IDC_SET_LEADCHNL_RV_SENSING_ANODE_LOCATION_1: NORMAL
MDC_IDC_SET_LEADCHNL_RV_SENSING_CATHODE_ELECTRODE_1: NORMAL
MDC_IDC_SET_LEADCHNL_RV_SENSING_CATHODE_LOCATION_1: NORMAL
MDC_IDC_SET_LEADCHNL_RV_SENSING_POLARITY: NORMAL
MDC_IDC_SET_LEADCHNL_RV_SENSING_SENSITIVITY: 2.5 MV
MDC_IDC_SET_ZONE_DETECTION_INTERVAL: 375 MS
MDC_IDC_SET_ZONE_TYPE: NORMAL
MDC_IDC_SET_ZONE_VENDOR_TYPE: NORMAL
MDC_IDC_STAT_BRADY_RV_PERCENT_PACED: 98 %
MDC_IDC_STAT_CRT_LV_PERCENT_PACED: 98 %
MDC_IDC_STAT_EPISODE_RECENT_COUNT: 1
MDC_IDC_STAT_EPISODE_RECENT_COUNT_DTM_END: NORMAL
MDC_IDC_STAT_EPISODE_RECENT_COUNT_DTM_START: NORMAL
MDC_IDC_STAT_EPISODE_TOTAL_COUNT: 1
MDC_IDC_STAT_EPISODE_TOTAL_COUNT_DTM_END: NORMAL
MDC_IDC_STAT_EPISODE_TYPE: NORMAL
MDC_IDC_STAT_EPISODE_TYPE: NORMAL
MDC_IDC_STAT_EPISODE_VENDOR_TYPE: NORMAL
MDC_IDC_STAT_EPISODE_VENDOR_TYPE: NORMAL

## 2019-12-26 PROCEDURE — 99213 OFFICE O/P EST LOW 20 MIN: CPT | Performed by: FAMILY MEDICINE

## 2019-12-26 RX ORDER — AZITHROMYCIN 250 MG/1
TABLET, FILM COATED ORAL
Qty: 6 TABLET | Refills: 0 | Status: SHIPPED | OUTPATIENT
Start: 2019-12-26 | End: 2019-12-30

## 2019-12-26 NOTE — PATIENT INSTRUCTIONS
(J20.9) Acute bronchitis, unspecified organism  (primary encounter diagnosis)  Comment:   Plan: azithromycin (ZITHROMAX) 250 MG tablet        Use the med for 5 days, with non spicy food. It lasts for 10 days. Avoid contagious exposures.   Use the DM cough med. Elevate the head of the bed.

## 2019-12-26 NOTE — PROGRESS NOTES
STM Recheck: Patient called back and left message saying everything going well with CPAP and he is benefiting from it.

## 2019-12-26 NOTE — PROGRESS NOTES
Subjective     Maurice Jon is a 59 year old male who presents to clinic today for the following health issues:    HPI   Acute Illness   Acute illness concerns: URI. Patient has been in the Hospital a few times for the pacemaker and cellulitis. Patient states that the Z-pack works very well for him.  Onset: x 2 days    Fever: Yes- low grade    Chills/Sweats: no    Headache (location?): no    Sinus Pressure:YES- post-nasal drainage and facial pain    Conjunctivitis:  YES: bilateral. Woke up and eyes were caked shut.    Ear Pain: YES: bilateral. Popping and ringing    Rhinorrhea: YES- little bit    Congestion: YES    Sore Throat: YES- scratchy     Cough: YES-productive of clear sputum, productive of yellow sputum, productive of green sputum, with shortness of breath    Wheeze: YES    Decreased Appetite: YES    Nausea: no    Vomiting: no    Diarrhea:  no    Dysuria/Freq.: no    Fatigue/Achiness: YES- fatigue    Sick/Strep Exposure: YES- people at work are sick.     Therapies Tried and outcome: mucinex, Tessalon pearls- not really helping.      Current Outpatient Medications:      budesonide-formoterol (SYMBICORT) 80-4.5 MCG/ACT Inhaler, Inhale 2 puffs into the lungs 2 times daily, Disp: 10.2 g, Rfl: 3     calcium carb 1250 mg, 500 mg [place],/vitamin D 200 units (OSCAL WITH D) 500-200 MG-UNIT per tablet, Take 2 tablets by mouth daily with food., Disp: , Rfl:      Multiple Vitamins-Minerals (MENS 50+ MULTI VITAMIN/MIN PO), Take 1 tablet by mouth daily, Disp: , Rfl:      order for DME, Open toe size large 30/40 Med[employer] Assure Medical Compression socks Model 08336.  Or similar as per availability/formulary. Fax to Fax [phone]. [facility] medical, Disp: 12 Device, Rfl: 0     order for DME, Equipment being ordered:  New CPAP mask, tube, filters,water tray, Disp: 1 Device, Rfl: 3     ORDER FOR DME, Respironics RemStar 60 Series Auto AFlex 12-15 cm H2O with heated humidty and a modem.  Pt chose a Quattro Air FFM size  medium., Disp: , Rfl:      pantoprazole (PROTONIX) 40 MG EC tablet, Take 1 tablet (40 mg) by mouth daily, Disp: 30 tablet, Rfl: 3     spironolactone (ALDACTONE) 25 MG tablet, Take 1 tablet (25 mg) by mouth daily, Disp: 90 tablet, Rfl: 3     vitamin D3 (CHOLECALCIFEROL) 2000 units (50 mcg) tablet, Take 1 tablet by mouth daily, Disp: , Rfl:      warfarin ANTICOAGULANT (JANTOVEN ANTICOAGULANT) 2.5 MG tablet, 1.25 mg Fridays; 2.5mg all other days or As directed by Anticoagulation Clinic. INR DUE FOR FURTHER REFILLS, Disp: 90 tablet, Rfl: 0     ACE/ARB/ARNI NOT PRESCRIBED (INTENTIONAL), Please choose reason not prescribed, below (Patient not taking: Reported on 12/26/2019), Disp: , Rfl:      Acetaminophen 325 MG CAPS, Take 325-650 mg by mouth every 6 hours as needed, Disp: , Rfl:      ASPIRIN NOT PRESCRIBED (INTENTIONAL), Please choose reason not prescribed, below, Disp: , Rfl:      furosemide (LASIX) 40 MG tablet, Take 1 tablet (40 mg) by mouth 2 times daily (Patient not taking: Reported on 12/26/2019), Disp: 60 tablet, Rfl: 1     mupirocin (BACTROBAN) 2 % external ointment, Apply topically 3 times daily Apply to open sores on lower legs. (Patient not taking: Reported on 12/26/2019), Disp: 30 g, Rfl: 3     STATIN NOT PRESCRIBED (INTENTIONAL), Please choose reason not prescribed, below (Patient not taking: Reported on 12/26/2019), Disp: , Rfl:     Patient Active Problem List   Diagnosis     Thrombocytopenia (H)     Liver Cirrhosis 2nd ot Fatty liver, w Ascites     erectile dysfunction     Compulsive behaviors     BRUCE-Mild (AHI 9; REM RDI 31)     Portal hypertension (H)     Eczema     GERD (gastroesophageal reflux disease)     CARDIOVASCULAR SCREENING; LDL GOAL LESS THAN 160     Obesity     Health Care Home     Edema     Paroxysmal atrial fibrillation (H)     Severe sepsis (H)     History of MRSA now culture negative     Portal vein thrombosis     Long-term (current) use of anticoagulants [Z79.01]     Encounter for  monitoring sotalol therapy     Chronic a-fib, S/P Ablation 12/22/18 -- on Warfarin     Right heart failure (H)     Obesity (BMI 35.0-39.9) with comorbidity (H)     CAD (coronary artery disease)     Cardiomyopathy (H)     Pericarditis     Acute on chronic systolic congestive heart failure (H)     Acute combined systolic and diastolic (congestive) hrt fail (H)     Cellulitis     Persistent atrial fibrillation     Chest pain     Atypical chest pain       Blood pressure 126/62, pulse 70, temperature 99.5  F (37.5  C), temperature source Tympanic, resp. rate 20, weight 106.5 kg (234 lb 12.8 oz), SpO2 98 %.    Exam:  GENERAL APPEARANCE: alert, mild distress and cooperative  EYES: EOMI,  PERRL  HENT: ear canals and TM's normal and nose and mouth without ulcers or lesions  NECK: no adenopathy, no asymmetry, masses, or scars and thyroid normal to palpation  RESP: rhonchi bilaterally  CV: regular rates and rhythm, normal S1 S2, no S3 or S4 and no murmur, click or rub -  PSYCH: mentation appears normal and affect normal/bright      (J20.9) Acute bronchitis, unspecified organism  (primary encounter diagnosis)  Comment:   Plan: azithromycin (ZITHROMAX) 250 MG tablet        Use the med for 5 days, with non spicy food. It lasts for 10 days. Avoid contagious exposures.   Use the DM cough med. Elevate the head of the bed.       Talha Pan MD

## 2019-12-27 ENCOUNTER — DOCUMENTATION ONLY (OUTPATIENT)
Dept: SLEEP MEDICINE | Facility: CLINIC | Age: 59
End: 2019-12-27
Payer: COMMERCIAL

## 2019-12-27 NOTE — PROGRESS NOTES
30 DAY STM VISIT    Diagnostic AHI: 35  PSG      Data only recheck     Assessment: Pt meeting objective benchmarks.     Action plan: pt to have 6 month STM visit  Patient has not scheduled a follow up visit.  Device type: CPAP  PAP settings: CPAP fixed 12 cm  H20        Mask type:  Full Face Mask    Objective measures: 14 day rolling measures      Objective measure goal  Compliance   Goal >70%  Leak   Goal < 10%  AHI  Goal < 5  Usage  Goal >240      Total time spent on accessing, reviewing and interpreting remote patient PAP therapy data:   11 minutes      Total time spent with direct patient communication :   0 minutes    Total time spent on remote patient monitoring analysis for last 30 days:   35 min

## 2019-12-30 ENCOUNTER — OFFICE VISIT (OUTPATIENT)
Dept: FAMILY MEDICINE | Facility: CLINIC | Age: 59
End: 2019-12-30
Payer: COMMERCIAL

## 2019-12-30 VITALS
DIASTOLIC BLOOD PRESSURE: 70 MMHG | HEIGHT: 65 IN | WEIGHT: 234 LBS | TEMPERATURE: 98.7 F | BODY MASS INDEX: 38.99 KG/M2 | HEART RATE: 72 BPM | RESPIRATION RATE: 18 BRPM | SYSTOLIC BLOOD PRESSURE: 120 MMHG | OXYGEN SATURATION: 97 %

## 2019-12-30 DIAGNOSIS — R07.89 ATYPICAL CHEST PAIN: Primary | ICD-10-CM

## 2019-12-30 DIAGNOSIS — E66.01 MORBID OBESITY (H): ICD-10-CM

## 2019-12-30 DIAGNOSIS — I50.9 CHRONIC CONGESTIVE HEART FAILURE, UNSPECIFIED HEART FAILURE TYPE (H): ICD-10-CM

## 2019-12-30 DIAGNOSIS — I48.0 PAROXYSMAL ATRIAL FIBRILLATION (H): ICD-10-CM

## 2019-12-30 PROCEDURE — 99214 OFFICE O/P EST MOD 30 MIN: CPT | Performed by: NURSE PRACTITIONER

## 2019-12-30 ASSESSMENT — MIFFLIN-ST. JEOR: SCORE: 1803.3

## 2019-12-30 NOTE — PROGRESS NOTES
Subjective     Maurice Jon is a 59 year old male who presents to clinic today for the following health issues: the patient was recently admitted for observation for evaluation of chest pain, he had normal labs, heart ECHO, EKG. This was likely anginal chest pain and possibly related to anxiety. He did get an angiogram ~1 yr (12/2018) that showed normal coronaries. CTA done today with non-obstructive CAD. The patient is feeling well today, denies chest pain, palpitations, dizziness, he still have mild dry cough, recovering from recent upper respirator infection. He was treated with Z-pack and have appointment for INR check tomorrow morning.     [facility]     Hospital Follow-up Visit:    Hospital/Nursing Home/IP Rehab Facility: [facility]  Date of Admission: 12/19/19   Date of Discharge: 12/20/19   Reason(s) for Admission: chest pain             Problems taking medications regularly:  None       Medication changes since discharge: None       Problems adhering to non-medication therapy:  None  Summary of hospitalization:  [facility] discharge summary reviewed  Diagnostic Tests/Treatments reviewed.  Follow up needed: none  Other Healthcare Providers Involved in Patient s Care:         None  Update since discharge: improved.     Post Discharge Medication Reconciliation: discharge medications reconciled, continue medications without change.  Plan of care communicated with patient     Coding guidelines for this visit:  Type of Medical   Decision Making Face-to-Face Visit       within 7 Days of discharge Face-to-Face Visit        within 14 days of discharge   Moderate Complexity [id number]   High Complexity [id number]            Reviewed and updated as needed this visit by Provider  Tobacco  Allergies  Meds  Problems  Med Hx  Surg Hx  Fam Hx         Review of Systems   ROS COMP: Constitutional, HEENT, cardiovascular, pulmonary, gi and gu systems are negative, except as otherwise noted.     "  Objective    /70 (BP Location: Right arm, Patient Position: Sitting, Cuff Size: Adult Regular)   Pulse 72   Temp 98.7  F (37.1  C) (Tympanic)   Resp 18   Ht 1.651 m (5' 5\")   Wt 106.1 kg (234 lb)   SpO2 97%   BMI 38.94 kg/m    Body mass index is 38.94 kg/m .  Physical Exam   GENERAL: healthy, alert and no distress  RESP: lungs clear to auscultation - no rales, rhonchi or wheezes  CV: regular rate and rhythm, normal S1 S2, no S3 or S4, no murmur, click or rub, no peripheral edema and peripheral pulses strong  MS: no gross musculoskeletal defects noted, no edema  SKIN: no suspicious lesions or rashes  NEURO: Normal strength and tone, mentation intact and speech normal  PSYCH: mentation appears normal, affect normal/bright    Diagnostic Test Results:  Labs reviewed in Epic        Assessment & Plan     1. Atypical chest pain  -resolved, patient feeling well.   -EKG without ST-changes. Troponin was unremarkable 0.032-->0.046. Echo done 12/19/2019 showed 50-55% with elevated atrial pressures, improved from Echo in past (EF had been as low as 25-30%, normalized EF around 6/19).   -he will follow up with his cardiologist in 1-2 months     2. Chronic congestive heart failure, unspecified heart failure type (H)  -stable    3. Paroxysmal atrial fibrillation (H)  -stable, well controlled, patient asymptomatic  -continue Coumadin  -follow up with cardiology     4. Morbid obesity (H)  -following healthy diet       Regular exercise  See Patient Instructions    Return in about 4 weeks (around 1/27/2020) for cardiologist .    JANETTE Arriola [facility]      "

## 2019-12-31 ENCOUNTER — ANTICOAGULATION THERAPY VISIT (OUTPATIENT)
Dept: ANTICOAGULATION | Facility: CLINIC | Age: 59
End: 2019-12-31
Payer: COMMERCIAL

## 2019-12-31 ENCOUNTER — HOSPITAL ENCOUNTER (OUTPATIENT)
Dept: CARDIOLOGY | Facility: CLINIC | Age: 59
Discharge: HOME | End: 2019-12-31
Attending: INTERNAL MEDICINE | Admitting: INTERNAL MEDICINE
Payer: COMMERCIAL

## 2019-12-31 DIAGNOSIS — I48.0 PAROXYSMAL ATRIAL FIBRILLATION (H): ICD-10-CM

## 2019-12-31 DIAGNOSIS — Z79.01 LONG TERM CURRENT USE OF ANTICOAGULANT THERAPY: ICD-10-CM

## 2019-12-31 DIAGNOSIS — Z95.0 CARDIAC PACEMAKER IN SITU: ICD-10-CM

## 2019-12-31 LAB — INR POINT OF CARE: 2.7 (ref 0.86–1.14)

## 2019-12-31 PROCEDURE — 36416 COLLJ CAPILLARY BLOOD SPEC: CPT

## 2019-12-31 PROCEDURE — 93281 PM DEVICE PROGR EVAL MULTI: CPT | Mod: 26 | Performed by: INTERNAL MEDICINE

## 2019-12-31 PROCEDURE — 93281 PM DEVICE PROGR EVAL MULTI: CPT

## 2019-12-31 PROCEDURE — 85610 PROTHROMBIN TIME: CPT | Mod: QW

## 2019-12-31 PROCEDURE — 99207 ZZC NO CHARGE NURSE ONLY: CPT

## 2019-12-31 NOTE — PROGRESS NOTES
ADDENDUM: Anticoag Chart Review following ED visit  Visit date(s): 1/10/2020    Reason for visit: microscopic hematuria, generalized weakness    New medications:       Next INR check date: 2020   This recheck date is an appropriate timeframe given the changes noted during the ED/hospitalization.          ANTICOAGULATION FOLLOW-UP CLINIC VISIT    Patient Name:  Maurice Jon  Date:  2019  Contact Type:  Face to Face    SUBJECTIVE:  Patient Findings     Positives:   Change in health (getting over bronchitis), Change in medications (just ended 5 day course of azithromycin)    Comments:   Patient has taken this medication before and it works well for him, he is now feeling better.     No issues with bleeding or unusual bruising noted.         Clinical Outcomes     Comments:   Patient has taken this medication before and it works well for him, he is now feeling better.     No issues with bleeding or unusual bruising noted.            OBJECTIVE    INR Protime   Date Value Ref Range Status   2019 2.7 (A) 0.86 - 1.14 Final       ASSESSMENT / PLAN  No question data found.  Anticoagulation Summary  As of 2019    INR goal:   2.0-3.0   TTR:   91.1 % (11.8 mo)   INR used for dosin.7 (2019)   Warfarin maintenance plan:   1.25 mg (2.5 mg x 0.5) every Fri; 2.5 mg (2.5 mg x 1) all other days   Full warfarin instructions:   1.25 mg every Fri; 2.5 mg all other days   Weekly warfarin total:   16.25 mg   No change documented:   Deisy Reza RN   Plan last modified:   Susan Beyer RN (2018)   Next INR check:   2020   Priority:   Maintenance   Target end date:   10/4/2018    Indications    Paroxysmal atrial fibrillation (H) [I48.0]  Atrial fibrillation with rapid ventricular response (H) (Resolved) [I48.91]  Long-term (current) use of anticoagulants [Z79.01] [Z79.01]             Anticoagulation Episode Summary     INR check location:       Preferred lab:       Send INR  reminders to:   [facility]    Comments:   * anticoagulation short period surrounding ablation on 12/21/18. Cardiology to decide when to stop warfarin. has well-compensated cirrhosis      Anticoagulation Care Providers     Provider Role Specialty Phone number    Aoln Pineda MD [facility] [phone]            See the Encounter Report to view Anticoagulation Flowsheet and Dosing Calendar (Go to Encounters tab in chart review, and find the Anticoagulation Therapy Visit)        [facility]

## 2020-01-01 ENCOUNTER — HOSPITAL ENCOUNTER (OUTPATIENT)
Dept: ULTRASOUND IMAGING | Facility: CLINIC | Age: 60
Discharge: HOME | End: 2020-09-16
Attending: INTERNAL MEDICINE | Admitting: INTERNAL MEDICINE
Payer: COMMERCIAL

## 2020-01-01 ENCOUNTER — VIRTUAL VISIT (OUTPATIENT)
Dept: FAMILY MEDICINE | Facility: CLINIC | Age: 60
End: 2020-01-01
Payer: COMMERCIAL

## 2020-01-01 ENCOUNTER — HOSPITAL ENCOUNTER (OUTPATIENT)
Dept: CARDIOLOGY | Facility: CLINIC | Age: 60
Discharge: HOME | End: 2020-12-08
Attending: INTERNAL MEDICINE | Admitting: INTERNAL MEDICINE
Payer: COMMERCIAL

## 2020-01-01 ENCOUNTER — HOSPITAL ENCOUNTER (OUTPATIENT)
Dept: CARDIOLOGY | Facility: CLINIC | Age: 60
Discharge: HOME | End: 2020-10-05
Attending: INTERNAL MEDICINE | Admitting: INTERNAL MEDICINE
Payer: COMMERCIAL

## 2020-01-01 ENCOUNTER — DOCUMENTATION ONLY (OUTPATIENT)
Dept: CARDIOLOGY | Facility: CLINIC | Age: 60
End: 2020-01-01

## 2020-01-01 ENCOUNTER — ANTICOAGULATION THERAPY VISIT (OUTPATIENT)
Dept: ANTICOAGULATION | Facility: CLINIC | Age: 60
End: 2020-01-01

## 2020-01-01 ENCOUNTER — TELEPHONE (OUTPATIENT)
Dept: ANTICOAGULATION | Facility: CLINIC | Age: 60
End: 2020-01-01

## 2020-01-01 ENCOUNTER — TELEPHONE (OUTPATIENT)
Dept: FAMILY MEDICINE | Facility: CLINIC | Age: 60
End: 2020-01-01

## 2020-01-01 ENCOUNTER — OFFICE VISIT (OUTPATIENT)
Dept: DERMATOLOGY | Facility: CLINIC | Age: 60
End: 2020-01-01
Payer: COMMERCIAL

## 2020-01-01 ENCOUNTER — TELEPHONE (OUTPATIENT)
Dept: CARDIOLOGY | Facility: CLINIC | Age: 60
End: 2020-01-01

## 2020-01-01 ENCOUNTER — VIRTUAL VISIT (OUTPATIENT)
Dept: GASTROENTEROLOGY | Facility: CLINIC | Age: 60
End: 2020-01-01
Attending: INTERNAL MEDICINE
Payer: COMMERCIAL

## 2020-01-01 VITALS
RESPIRATION RATE: 16 BRPM | HEART RATE: 70 BPM | OXYGEN SATURATION: 99 % | SYSTOLIC BLOOD PRESSURE: 115 MMHG | HEART RATE: 72 BPM | DIASTOLIC BLOOD PRESSURE: 65 MMHG | SYSTOLIC BLOOD PRESSURE: 122 MMHG | DIASTOLIC BLOOD PRESSURE: 66 MMHG | RESPIRATION RATE: 20 BRPM

## 2020-01-01 VITALS
DIASTOLIC BLOOD PRESSURE: 57 MMHG | BODY MASS INDEX: 36.62 KG/M2 | SYSTOLIC BLOOD PRESSURE: 119 MMHG | HEART RATE: 70 BPM | HEIGHT: 64 IN

## 2020-01-01 DIAGNOSIS — D48.5 NEOPLASM OF UNCERTAIN BEHAVIOR OF SKIN: Primary | ICD-10-CM

## 2020-01-01 DIAGNOSIS — K74.60 CIRRHOSIS OF LIVER WITHOUT ASCITES, UNSPECIFIED HEPATIC CIRRHOSIS TYPE (H): Primary | ICD-10-CM

## 2020-01-01 DIAGNOSIS — I48.0 PAROXYSMAL ATRIAL FIBRILLATION (H): ICD-10-CM

## 2020-01-01 DIAGNOSIS — G47.30 INSOMNIA WITH SLEEP APNEA: Primary | ICD-10-CM

## 2020-01-01 DIAGNOSIS — Z79.01 LONG TERM CURRENT USE OF ANTICOAGULANT THERAPY: ICD-10-CM

## 2020-01-01 DIAGNOSIS — I48.91 ATRIAL FIBRILLATION (H): ICD-10-CM

## 2020-01-01 DIAGNOSIS — J30.2 SEASONAL ALLERGIC RHINITIS, UNSPECIFIED TRIGGER: Primary | ICD-10-CM

## 2020-01-01 DIAGNOSIS — G47.00 INSOMNIA WITH SLEEP APNEA: Primary | ICD-10-CM

## 2020-01-01 DIAGNOSIS — L91.8 INFLAMED SKIN TAG: ICD-10-CM

## 2020-01-01 DIAGNOSIS — I48.91 ATRIAL FIBRILLATION WITH RAPID VENTRICULAR RESPONSE (H): ICD-10-CM

## 2020-01-01 DIAGNOSIS — I50.810 RIGHT-SIDED CONGESTIVE HEART FAILURE, UNSPECIFIED HF CHRONICITY (H): ICD-10-CM

## 2020-01-01 DIAGNOSIS — K21.9 GASTROESOPHAGEAL REFLUX DISEASE WITHOUT ESOPHAGITIS: Primary | ICD-10-CM

## 2020-01-01 DIAGNOSIS — D22.9 MULTIPLE BENIGN NEVI: ICD-10-CM

## 2020-01-01 DIAGNOSIS — I48.20 CHRONIC ATRIAL FIBRILLATION (H): ICD-10-CM

## 2020-01-01 DIAGNOSIS — L82.1 SEBORRHEIC KERATOSIS: ICD-10-CM

## 2020-01-01 DIAGNOSIS — I50.9 CHRONIC CONGESTIVE HEART FAILURE, UNSPECIFIED HEART FAILURE TYPE (H): Primary | ICD-10-CM

## 2020-01-01 DIAGNOSIS — Z95.0 BIVENTRICULAR CARDIAC PACEMAKER IN SITU: ICD-10-CM

## 2020-01-01 DIAGNOSIS — E87.6 HYPOKALEMIA: Primary | ICD-10-CM

## 2020-01-01 DIAGNOSIS — R60.9 EDEMA, UNSPECIFIED TYPE: ICD-10-CM

## 2020-01-01 DIAGNOSIS — Z79.01 LONG TERM CURRENT USE OF ANTICOAGULANT THERAPY: Primary | ICD-10-CM

## 2020-01-01 DIAGNOSIS — E87.6 HYPOKALEMIA: ICD-10-CM

## 2020-01-01 DIAGNOSIS — L91.8 INFLAMED SKIN TAG: Primary | ICD-10-CM

## 2020-01-01 DIAGNOSIS — Z95.0 CARDIAC PACEMAKER IN SITU: ICD-10-CM

## 2020-01-01 DIAGNOSIS — I50.41 ACUTE COMBINED SYSTOLIC AND DIASTOLIC (CONGESTIVE) HRT FAIL (H): ICD-10-CM

## 2020-01-01 DIAGNOSIS — K74.60 CIRRHOSIS OF LIVER WITHOUT ASCITES, UNSPECIFIED HEPATIC CIRRHOSIS TYPE (H): ICD-10-CM

## 2020-01-01 DIAGNOSIS — K21.9 GASTROESOPHAGEAL REFLUX DISEASE WITHOUT ESOPHAGITIS: ICD-10-CM

## 2020-01-01 DIAGNOSIS — L81.4 LENTIGO: ICD-10-CM

## 2020-01-01 DIAGNOSIS — I42.9 CARDIOMYOPATHY (H): Primary | ICD-10-CM

## 2020-01-01 DIAGNOSIS — I42.9 CARDIOMYOPATHY (H): ICD-10-CM

## 2020-01-01 DIAGNOSIS — D18.01 CHERRY ANGIOMA: ICD-10-CM

## 2020-01-01 DIAGNOSIS — I48.19 PERSISTENT ATRIAL FIBRILLATION (H): ICD-10-CM

## 2020-01-01 LAB
ALBUMIN SERPL-MCNC: 3.3 G/DL (ref 3.4–5)
ALBUMIN SERPL-MCNC: 3.8 G/DL (ref 3.4–5)
ALP SERPL-CCNC: 86 U/L (ref 40–150)
ALP SERPL-CCNC: 89 U/L (ref 40–150)
ALT SERPL W P-5'-P-CCNC: 42 U/L (ref 0–70)
ALT SERPL W P-5'-P-CCNC: 47 U/L (ref 0–70)
ANION GAP SERPL CALCULATED.3IONS-SCNC: 2 MMOL/L (ref 3–14)
ANION GAP SERPL CALCULATED.3IONS-SCNC: 7 MMOL/L (ref 3–14)
ANION GAP SERPL CALCULATED.3IONS-SCNC: 8 MMOL/L (ref 3–14)
AST SERPL W P-5'-P-CCNC: 53 U/L (ref 0–45)
AST SERPL W P-5'-P-CCNC: 63 U/L (ref 0–45)
BILIRUB DIRECT SERPL-MCNC: 0.6 MG/DL (ref 0–0.2)
BILIRUB SERPL-MCNC: 1.4 MG/DL (ref 0.2–1.3)
BILIRUB SERPL-MCNC: 1.9 MG/DL (ref 0.2–1.3)
BUN SERPL-MCNC: 15 MG/DL (ref 7–30)
BUN SERPL-MCNC: 17 MG/DL (ref 7–30)
BUN SERPL-MCNC: 18 MG/DL (ref 7–30)
CALCIUM SERPL-MCNC: 10 MG/DL (ref 8.5–10.1)
CALCIUM SERPL-MCNC: 8.7 MG/DL (ref 8.5–10.1)
CALCIUM SERPL-MCNC: 9 MG/DL (ref 8.5–10.1)
CAPILLARY BLOOD COLLECTION: NORMAL
CAPILLARY BLOOD COLLECTION: NORMAL
CHLORIDE SERPL-SCNC: 101 MMOL/L (ref 94–109)
CHLORIDE SERPL-SCNC: 104 MMOL/L (ref 94–109)
CHLORIDE SERPL-SCNC: 105 MMOL/L (ref 94–109)
CO2 SERPL-SCNC: 27 MMOL/L (ref 20–32)
CO2 SERPL-SCNC: 28 MMOL/L (ref 20–32)
CO2 SERPL-SCNC: 34 MMOL/L (ref 20–32)
COPATH REPORT: NORMAL
CREAT SERPL-MCNC: 0.78 MG/DL (ref 0.66–1.25)
CREAT SERPL-MCNC: 0.84 MG/DL (ref 0.66–1.25)
CREAT SERPL-MCNC: 0.87 MG/DL (ref 0.66–1.25)
ERYTHROCYTE [DISTWIDTH] IN BLOOD BY AUTOMATED COUNT: 13.3 % (ref 10–15)
GFR SERPL CREATININE-BSD FRML MDRD: >90 ML/MIN/{1.73_M2}
GLUCOSE SERPL-MCNC: 149 MG/DL (ref 70–99)
GLUCOSE SERPL-MCNC: 65 MG/DL (ref 70–99)
GLUCOSE SERPL-MCNC: 95 MG/DL (ref 70–99)
HCT VFR BLD AUTO: 42.8 % (ref 40–53)
HGB BLD-MCNC: 15 G/DL (ref 13.3–17.7)
INR PPP: 2.4 (ref 0.86–1.14)
INR PPP: 2.6 (ref 0.86–1.14)
INR PPP: 2.6 (ref 0.86–1.14)
INR PPP: 2.77 (ref 0.86–1.14)
MCH RBC QN AUTO: 36.2 PG (ref 26.5–33)
MCHC RBC AUTO-ENTMCNC: 35 G/DL (ref 31.5–36.5)
MCV RBC AUTO: 103 FL (ref 78–100)
MDC_IDC_EPISODE_DTM: NORMAL
MDC_IDC_EPISODE_DURATION: 14 S
MDC_IDC_EPISODE_DURATION: 14 S
MDC_IDC_EPISODE_DURATION: 15 S
MDC_IDC_EPISODE_DURATION: 18 S
MDC_IDC_EPISODE_DURATION: 19 S
MDC_IDC_EPISODE_ID: NORMAL
MDC_IDC_EPISODE_TYPE: NORMAL
MDC_IDC_LEAD_IMPLANT_DT: NORMAL
MDC_IDC_LEAD_LOCATION: NORMAL
MDC_IDC_LEAD_LOCATION_DETAIL_1: NORMAL
MDC_IDC_LEAD_MFG: NORMAL
MDC_IDC_LEAD_MODEL: NORMAL
MDC_IDC_LEAD_POLARITY_TYPE: NORMAL
MDC_IDC_LEAD_SERIAL: NORMAL
MDC_IDC_MSMT_BATTERY_DTM: NORMAL
MDC_IDC_MSMT_BATTERY_DTM: NORMAL
MDC_IDC_MSMT_BATTERY_REMAINING_LONGEVITY: 126 MO
MDC_IDC_MSMT_BATTERY_REMAINING_LONGEVITY: 132 MO
MDC_IDC_MSMT_BATTERY_REMAINING_PERCENTAGE: 100 %
MDC_IDC_MSMT_BATTERY_REMAINING_PERCENTAGE: 100 %
MDC_IDC_MSMT_BATTERY_STATUS: NORMAL
MDC_IDC_MSMT_LEADCHNL_LV_IMPEDANCE_VALUE: 628 OHM
MDC_IDC_MSMT_LEADCHNL_LV_IMPEDANCE_VALUE: 665 OHM
MDC_IDC_MSMT_LEADCHNL_LV_IMPEDANCE_VALUE: 687 OHM
MDC_IDC_MSMT_LEADCHNL_LV_PACING_THRESHOLD_AMPLITUDE: 0.4 V
MDC_IDC_MSMT_LEADCHNL_LV_PACING_THRESHOLD_AMPLITUDE: 0.7 V
MDC_IDC_MSMT_LEADCHNL_LV_PACING_THRESHOLD_AMPLITUDE: 0.7 V
MDC_IDC_MSMT_LEADCHNL_LV_PACING_THRESHOLD_PULSEWIDTH: 0.5 MS
MDC_IDC_MSMT_LEADCHNL_LV_SENSING_INTR_AMPL: 8.5 MV
MDC_IDC_MSMT_LEADCHNL_RA_IMPEDANCE_VALUE: 3000 OHM
MDC_IDC_MSMT_LEADCHNL_RV_IMPEDANCE_VALUE: 751 OHM
MDC_IDC_MSMT_LEADCHNL_RV_IMPEDANCE_VALUE: 760 OHM
MDC_IDC_MSMT_LEADCHNL_RV_IMPEDANCE_VALUE: 773 OHM
MDC_IDC_MSMT_LEADCHNL_RV_PACING_THRESHOLD_AMPLITUDE: 1.2 V
MDC_IDC_MSMT_LEADCHNL_RV_PACING_THRESHOLD_AMPLITUDE: 1.3 V
MDC_IDC_MSMT_LEADCHNL_RV_PACING_THRESHOLD_AMPLITUDE: 1.4 V
MDC_IDC_MSMT_LEADCHNL_RV_PACING_THRESHOLD_PULSEWIDTH: 0.4 MS
MDC_IDC_MSMT_LEADCHNL_RV_SENSING_INTR_AMPL: 9.2 MV
MDC_IDC_PG_IMPLANT_DTM: NORMAL
MDC_IDC_PG_MFG: NORMAL
MDC_IDC_PG_MODEL: NORMAL
MDC_IDC_PG_SERIAL: NORMAL
MDC_IDC_PG_TYPE: NORMAL
MDC_IDC_SESS_CLINIC_NAME: NORMAL
MDC_IDC_SESS_DTM: NORMAL
MDC_IDC_SESS_TYPE: NORMAL
MDC_IDC_SET_BRADY_AT_MODE_SWITCH_MODE: NORMAL
MDC_IDC_SET_BRADY_AT_MODE_SWITCH_RATE: 170 {BEATS}/MIN
MDC_IDC_SET_BRADY_AT_MODE_SWITCH_RATE: 170 {BEATS}/MIN
MDC_IDC_SET_BRADY_LOWRATE: 70 {BEATS}/MIN
MDC_IDC_SET_BRADY_MAX_SENSOR_RATE: 130 {BEATS}/MIN
MDC_IDC_SET_BRADY_MODE: NORMAL
MDC_IDC_SET_BRADY_PAV_DELAY_HIGH: 180 MS
MDC_IDC_SET_BRADY_PAV_DELAY_LOW: 180 MS
MDC_IDC_SET_BRADY_SAV_DELAY_LOW: 120 MS
MDC_IDC_SET_CRT_LVRV_DELAY: 40 MS
MDC_IDC_SET_CRT_PACED_CHAMBERS: NORMAL
MDC_IDC_SET_LEADCHNL_LV_PACING_AMPLITUDE: 2 V
MDC_IDC_SET_LEADCHNL_LV_PACING_CAPTURE_MODE: NORMAL
MDC_IDC_SET_LEADCHNL_LV_PACING_POLARITY: NORMAL
MDC_IDC_SET_LEADCHNL_LV_PACING_PULSEWIDTH: 0.5 MS
MDC_IDC_SET_LEADCHNL_LV_SENSING_ADAPTATION_MODE: NORMAL
MDC_IDC_SET_LEADCHNL_LV_SENSING_POLARITY: NORMAL
MDC_IDC_SET_LEADCHNL_LV_SENSING_SENSITIVITY: 2.5 MV
MDC_IDC_SET_LEADCHNL_RA_PACING_CAPTURE_MODE: NORMAL
MDC_IDC_SET_LEADCHNL_RA_PACING_POLARITY: NORMAL
MDC_IDC_SET_LEADCHNL_RA_SENSING_ADAPTATION_MODE: NORMAL
MDC_IDC_SET_LEADCHNL_RA_SENSING_POLARITY: NORMAL
MDC_IDC_SET_LEADCHNL_RA_SENSING_SENSITIVITY: 0.75 MV
MDC_IDC_SET_LEADCHNL_RV_PACING_AMPLITUDE: 2.6 V
MDC_IDC_SET_LEADCHNL_RV_PACING_AMPLITUDE: 2.8 V
MDC_IDC_SET_LEADCHNL_RV_PACING_AMPLITUDE: 3 V
MDC_IDC_SET_LEADCHNL_RV_PACING_CAPTURE_MODE: NORMAL
MDC_IDC_SET_LEADCHNL_RV_PACING_POLARITY: NORMAL
MDC_IDC_SET_LEADCHNL_RV_PACING_PULSEWIDTH: 0.4 MS
MDC_IDC_SET_LEADCHNL_RV_SENSING_ADAPTATION_MODE: NORMAL
MDC_IDC_SET_LEADCHNL_RV_SENSING_POLARITY: NORMAL
MDC_IDC_SET_LEADCHNL_RV_SENSING_SENSITIVITY: 2.5 MV
MDC_IDC_SET_ZONE_DETECTION_INTERVAL: 375 MS
MDC_IDC_SET_ZONE_TYPE: NORMAL
MDC_IDC_SET_ZONE_VENDOR_TYPE: NORMAL
MDC_IDC_STAT_BRADY_DTM_END: NORMAL
MDC_IDC_STAT_BRADY_DTM_END: NORMAL
MDC_IDC_STAT_BRADY_DTM_START: NORMAL
MDC_IDC_STAT_BRADY_DTM_START: NORMAL
MDC_IDC_STAT_BRADY_RA_PERCENT_PACED: 0 %
MDC_IDC_STAT_BRADY_RA_PERCENT_PACED: 0 %
MDC_IDC_STAT_BRADY_RV_PERCENT_PACED: 95 %
MDC_IDC_STAT_BRADY_RV_PERCENT_PACED: 96 %
MDC_IDC_STAT_CRT_DTM_END: NORMAL
MDC_IDC_STAT_CRT_DTM_END: NORMAL
MDC_IDC_STAT_CRT_DTM_START: NORMAL
MDC_IDC_STAT_CRT_DTM_START: NORMAL
MDC_IDC_STAT_CRT_LV_PERCENT_PACED: 95 %
MDC_IDC_STAT_CRT_LV_PERCENT_PACED: 95 %
MDC_IDC_STAT_EPISODE_RECENT_COUNT: 0
MDC_IDC_STAT_EPISODE_RECENT_COUNT: 1
MDC_IDC_STAT_EPISODE_RECENT_COUNT: 1
MDC_IDC_STAT_EPISODE_RECENT_COUNT: 15
MDC_IDC_STAT_EPISODE_RECENT_COUNT: 17
MDC_IDC_STAT_EPISODE_RECENT_COUNT: 18
MDC_IDC_STAT_EPISODE_RECENT_COUNT_DTM_END: NORMAL
MDC_IDC_STAT_EPISODE_RECENT_COUNT_DTM_START: NORMAL
MDC_IDC_STAT_EPISODE_TOTAL_COUNT: 19
MDC_IDC_STAT_EPISODE_TOTAL_COUNT_DTM_END: NORMAL
MDC_IDC_STAT_EPISODE_TYPE: NORMAL
MDC_IDC_STAT_EPISODE_VENDOR_TYPE: NORMAL
PLATELET # BLD AUTO: 80 10E9/L (ref 150–450)
POTASSIUM SERPL-SCNC: 3.2 MMOL/L (ref 3.4–5.3)
POTASSIUM SERPL-SCNC: 3.4 MMOL/L (ref 3.4–5.3)
POTASSIUM SERPL-SCNC: 3.5 MMOL/L (ref 3.4–5.3)
PROT SERPL-MCNC: 6 G/DL (ref 6.8–8.8)
PROT SERPL-MCNC: 6.9 G/DL (ref 6.8–8.8)
RBC # BLD AUTO: 4.14 10E12/L (ref 4.4–5.9)
SODIUM SERPL-SCNC: 137 MMOL/L (ref 133–144)
SODIUM SERPL-SCNC: 139 MMOL/L (ref 133–144)
SODIUM SERPL-SCNC: 140 MMOL/L (ref 133–144)
WBC # BLD AUTO: 4.6 10E9/L (ref 4–11)

## 2020-01-01 PROCEDURE — 11200 RMVL SKIN TAGS UP TO&INC 15: CPT | Mod: 59 | Performed by: PHYSICIAN ASSISTANT

## 2020-01-01 PROCEDURE — 36415 COLL VENOUS BLD VENIPUNCTURE: CPT | Performed by: PHYSICIAN ASSISTANT

## 2020-01-01 PROCEDURE — 93281 PM DEVICE PROGR EVAL MULTI: CPT

## 2020-01-01 PROCEDURE — 80076 HEPATIC FUNCTION PANEL: CPT | Performed by: INTERNAL MEDICINE

## 2020-01-01 PROCEDURE — 11102 TANGNTL BX SKIN SINGLE LES: CPT | Performed by: PHYSICIAN ASSISTANT

## 2020-01-01 PROCEDURE — 11200 RMVL SKIN TAGS UP TO&INC 15: CPT | Performed by: PHYSICIAN ASSISTANT

## 2020-01-01 PROCEDURE — 80053 COMPREHEN METABOLIC PANEL: CPT | Performed by: PHYSICIAN ASSISTANT

## 2020-01-01 PROCEDURE — 80048 BASIC METABOLIC PNL TOTAL CA: CPT | Performed by: INTERNAL MEDICINE

## 2020-01-01 PROCEDURE — 99207 ZZC NO CHARGE NURSE ONLY: CPT

## 2020-01-01 PROCEDURE — 36415 COLL VENOUS BLD VENIPUNCTURE: CPT | Performed by: INTERNAL MEDICINE

## 2020-01-01 PROCEDURE — 36416 COLLJ CAPILLARY BLOOD SPEC: CPT | Performed by: FAMILY MEDICINE

## 2020-01-01 PROCEDURE — 76700 US EXAM ABDOM COMPLETE: CPT

## 2020-01-01 PROCEDURE — 99207 PR DROP WITH A PROCEDURE: CPT | Performed by: PHYSICIAN ASSISTANT

## 2020-01-01 PROCEDURE — 93296 REM INTERROG EVL PM/IDS: CPT

## 2020-01-01 PROCEDURE — 93294 REM INTERROG EVL PM/LDLS PM: CPT | Performed by: INTERNAL MEDICINE

## 2020-01-01 PROCEDURE — 85610 PROTHROMBIN TIME: CPT | Performed by: FAMILY MEDICINE

## 2020-01-01 PROCEDURE — 85027 COMPLETE CBC AUTOMATED: CPT | Performed by: INTERNAL MEDICINE

## 2020-01-01 PROCEDURE — 99207 PR NO CHARGE NURSE ONLY: CPT

## 2020-01-01 PROCEDURE — 88305 TISSUE EXAM BY PATHOLOGIST: CPT | Mod: TC | Performed by: PHYSICIAN ASSISTANT

## 2020-01-01 PROCEDURE — 99213 OFFICE O/P EST LOW 20 MIN: CPT | Mod: 95 | Performed by: FAMILY MEDICINE

## 2020-01-01 PROCEDURE — 93281 PM DEVICE PROGR EVAL MULTI: CPT | Mod: 26 | Performed by: INTERNAL MEDICINE

## 2020-01-01 PROCEDURE — 80048 BASIC METABOLIC PNL TOTAL CA: CPT | Performed by: PHYSICIAN ASSISTANT

## 2020-01-01 PROCEDURE — 99214 OFFICE O/P EST MOD 30 MIN: CPT | Mod: 25 | Performed by: PHYSICIAN ASSISTANT

## 2020-01-01 RX ORDER — WARFARIN SODIUM 2.5 MG/1
TABLET ORAL
Qty: 88 TABLET | Refills: 0 | Status: SHIPPED | OUTPATIENT
Start: 2020-01-01 | End: 2021-01-01

## 2020-01-01 RX ORDER — TORSEMIDE 20 MG/1
TABLET ORAL
Qty: 270 TABLET | Refills: 0 | Status: SHIPPED | OUTPATIENT
Start: 2020-01-01 | End: 2021-01-01

## 2020-01-01 RX ORDER — PANTOPRAZOLE SODIUM 40 MG/1
40 TABLET, DELAYED RELEASE ORAL DAILY
Qty: 90 TABLET | Refills: 0 | Status: SHIPPED | OUTPATIENT
Start: 2020-01-01 | End: 2021-01-01

## 2020-01-01 RX ORDER — ALBUTEROL SULFATE 90 UG/1
2 POWDER, METERED RESPIRATORY (INHALATION) EVERY 6 HOURS PRN
Qty: 1 EACH | Refills: 4 | Status: SHIPPED | OUTPATIENT
Start: 2020-01-01

## 2020-01-01 RX ORDER — WARFARIN SODIUM 2.5 MG/1
TABLET ORAL
Qty: 100 TABLET | Refills: 0 | Status: SHIPPED | OUTPATIENT
Start: 2020-01-01 | End: 2020-01-01

## 2020-01-01 RX ORDER — PANTOPRAZOLE SODIUM 40 MG/1
40 TABLET, DELAYED RELEASE ORAL DAILY
Qty: 90 TABLET | Refills: 0 | Status: SHIPPED | OUTPATIENT
Start: 2020-01-01 | End: 2020-01-01

## 2020-01-01 RX ORDER — TORSEMIDE 20 MG/1
TABLET ORAL
Qty: 90 TABLET | Refills: 2 | Status: SHIPPED | OUTPATIENT
Start: 2020-01-01 | End: 2020-01-01

## 2020-01-01 ASSESSMENT — PAIN SCALES - GENERAL: PAINLEVEL: NO PAIN (0)

## 2020-01-01 ASSESSMENT — ASTHMA QUESTIONNAIRES: ACT_TOTALSCORE: 21

## 2020-01-06 LAB
MDC_IDC_LEAD_IMPLANT_DT: NORMAL
MDC_IDC_LEAD_IMPLANT_DT: NORMAL
MDC_IDC_LEAD_LOCATION: NORMAL
MDC_IDC_LEAD_LOCATION: NORMAL
MDC_IDC_LEAD_LOCATION_DETAIL_1: NORMAL
MDC_IDC_LEAD_LOCATION_DETAIL_1: NORMAL
MDC_IDC_LEAD_MFG: NORMAL
MDC_IDC_LEAD_MFG: NORMAL
MDC_IDC_LEAD_MODEL: NORMAL
MDC_IDC_LEAD_MODEL: NORMAL
MDC_IDC_LEAD_POLARITY_TYPE: NORMAL
MDC_IDC_LEAD_POLARITY_TYPE: NORMAL
MDC_IDC_LEAD_SERIAL: NORMAL
MDC_IDC_LEAD_SERIAL: NORMAL
MDC_IDC_MSMT_BATTERY_STATUS: NORMAL
MDC_IDC_MSMT_LEADCHNL_LV_IMPEDANCE_VALUE: 552 OHM
MDC_IDC_MSMT_LEADCHNL_LV_PACING_THRESHOLD_AMPLITUDE: 0.7 V
MDC_IDC_MSMT_LEADCHNL_LV_PACING_THRESHOLD_PULSEWIDTH: 0.5 MS
MDC_IDC_MSMT_LEADCHNL_LV_SENSING_INTR_AMPL: 7.1 MV
MDC_IDC_MSMT_LEADCHNL_RA_IMPEDANCE_VALUE: 3000 OHM
MDC_IDC_MSMT_LEADCHNL_RV_IMPEDANCE_VALUE: 833 OHM
MDC_IDC_MSMT_LEADCHNL_RV_PACING_THRESHOLD_AMPLITUDE: 1 V
MDC_IDC_MSMT_LEADCHNL_RV_PACING_THRESHOLD_PULSEWIDTH: 0.4 MS
MDC_IDC_MSMT_LEADCHNL_RV_SENSING_INTR_AMPL: 9.3 MV
MDC_IDC_PG_IMPLANT_DTM: NORMAL
MDC_IDC_PG_MFG: NORMAL
MDC_IDC_PG_MODEL: NORMAL
MDC_IDC_PG_SERIAL: NORMAL
MDC_IDC_PG_TYPE: NORMAL
MDC_IDC_SESS_CLINIC_NAME: NORMAL
MDC_IDC_SESS_DTM: NORMAL
MDC_IDC_SESS_TYPE: NORMAL
MDC_IDC_SET_BRADY_AT_MODE_SWITCH_MODE: NORMAL
MDC_IDC_SET_BRADY_LOWRATE: 70 {BEATS}/MIN
MDC_IDC_SET_BRADY_MAX_SENSOR_RATE: 130 {BEATS}/MIN
MDC_IDC_SET_BRADY_MODE: NORMAL
MDC_IDC_SET_BRADY_PAV_DELAY_HIGH: 180 MS
MDC_IDC_SET_BRADY_PAV_DELAY_LOW: 180 MS
MDC_IDC_SET_BRADY_SAV_DELAY_LOW: 120 MS
MDC_IDC_SET_CRT_LVRV_DELAY: 40 MS
MDC_IDC_SET_CRT_PACED_CHAMBERS: NORMAL
MDC_IDC_SET_LEADCHNL_LV_PACING_AMPLITUDE: 2 V
MDC_IDC_SET_LEADCHNL_LV_PACING_CAPTURE_MODE: NORMAL
MDC_IDC_SET_LEADCHNL_LV_PACING_POLARITY: NORMAL
MDC_IDC_SET_LEADCHNL_LV_PACING_PULSEWIDTH: 0.5 MS
MDC_IDC_SET_LEADCHNL_LV_SENSING_ADAPTATION_MODE: NORMAL
MDC_IDC_SET_LEADCHNL_LV_SENSING_POLARITY: NORMAL
MDC_IDC_SET_LEADCHNL_LV_SENSING_SENSITIVITY: 2.5 MV
MDC_IDC_SET_LEADCHNL_RA_PACING_ANODE_ELECTRODE_1: NORMAL
MDC_IDC_SET_LEADCHNL_RA_PACING_ANODE_LOCATION_1: NORMAL
MDC_IDC_SET_LEADCHNL_RA_PACING_CAPTURE_MODE: NORMAL
MDC_IDC_SET_LEADCHNL_RA_PACING_CATHODE_ELECTRODE_1: NORMAL
MDC_IDC_SET_LEADCHNL_RA_PACING_CATHODE_LOCATION_1: NORMAL
MDC_IDC_SET_LEADCHNL_RA_PACING_POLARITY: NORMAL
MDC_IDC_SET_LEADCHNL_RA_SENSING_ADAPTATION_MODE: NORMAL
MDC_IDC_SET_LEADCHNL_RA_SENSING_ANODE_ELECTRODE_1: NORMAL
MDC_IDC_SET_LEADCHNL_RA_SENSING_ANODE_LOCATION_1: NORMAL
MDC_IDC_SET_LEADCHNL_RA_SENSING_CATHODE_ELECTRODE_1: NORMAL
MDC_IDC_SET_LEADCHNL_RA_SENSING_CATHODE_LOCATION_1: NORMAL
MDC_IDC_SET_LEADCHNL_RA_SENSING_POLARITY: NORMAL
MDC_IDC_SET_LEADCHNL_RA_SENSING_SENSITIVITY: 0.75 MV
MDC_IDC_SET_LEADCHNL_RV_PACING_AMPLITUDE: 2.6 V
MDC_IDC_SET_LEADCHNL_RV_PACING_ANODE_ELECTRODE_1: NORMAL
MDC_IDC_SET_LEADCHNL_RV_PACING_ANODE_LOCATION_1: NORMAL
MDC_IDC_SET_LEADCHNL_RV_PACING_CAPTURE_MODE: NORMAL
MDC_IDC_SET_LEADCHNL_RV_PACING_CATHODE_ELECTRODE_1: NORMAL
MDC_IDC_SET_LEADCHNL_RV_PACING_CATHODE_LOCATION_1: NORMAL
MDC_IDC_SET_LEADCHNL_RV_PACING_POLARITY: NORMAL
MDC_IDC_SET_LEADCHNL_RV_PACING_PULSEWIDTH: 0.4 MS
MDC_IDC_SET_LEADCHNL_RV_SENSING_ADAPTATION_MODE: NORMAL
MDC_IDC_SET_LEADCHNL_RV_SENSING_ANODE_ELECTRODE_1: NORMAL
MDC_IDC_SET_LEADCHNL_RV_SENSING_ANODE_LOCATION_1: NORMAL
MDC_IDC_SET_LEADCHNL_RV_SENSING_CATHODE_ELECTRODE_1: NORMAL
MDC_IDC_SET_LEADCHNL_RV_SENSING_CATHODE_LOCATION_1: NORMAL
MDC_IDC_SET_LEADCHNL_RV_SENSING_POLARITY: NORMAL
MDC_IDC_SET_LEADCHNL_RV_SENSING_SENSITIVITY: 2.5 MV
MDC_IDC_SET_ZONE_DETECTION_INTERVAL: 375 MS
MDC_IDC_SET_ZONE_TYPE: NORMAL
MDC_IDC_SET_ZONE_VENDOR_TYPE: NORMAL
MDC_IDC_STAT_BRADY_RV_PERCENT_PACED: 98 %
MDC_IDC_STAT_CRT_LV_PERCENT_PACED: 98 %
MDC_IDC_STAT_EPISODE_RECENT_COUNT: 0
MDC_IDC_STAT_EPISODE_RECENT_COUNT_DTM_END: NORMAL
MDC_IDC_STAT_EPISODE_RECENT_COUNT_DTM_START: NORMAL
MDC_IDC_STAT_EPISODE_TOTAL_COUNT: 1
MDC_IDC_STAT_EPISODE_TOTAL_COUNT_DTM_END: NORMAL
MDC_IDC_STAT_EPISODE_TYPE: NORMAL
MDC_IDC_STAT_EPISODE_TYPE: NORMAL
MDC_IDC_STAT_EPISODE_VENDOR_TYPE: NORMAL
MDC_IDC_STAT_EPISODE_VENDOR_TYPE: NORMAL

## 2020-01-07 DIAGNOSIS — Z95.0 CARDIAC PACEMAKER IN SITU: ICD-10-CM

## 2020-01-07 DIAGNOSIS — I50.9 CHF (CONGESTIVE HEART FAILURE) (H): Primary | ICD-10-CM

## 2020-01-10 ENCOUNTER — TELEPHONE (OUTPATIENT)
Dept: FAMILY MEDICINE | Facility: CLINIC | Age: 60
End: 2020-01-10

## 2020-01-10 ENCOUNTER — APPOINTMENT (OUTPATIENT)
Dept: CT IMAGING | Facility: CLINIC | Age: 60
End: 2020-01-10
Attending: EMERGENCY MEDICINE
Payer: COMMERCIAL

## 2020-01-10 ENCOUNTER — HOSPITAL ENCOUNTER (EMERGENCY)
Facility: CLINIC | Age: 60
Discharge: HOME | End: 2020-01-10
Attending: EMERGENCY MEDICINE | Admitting: EMERGENCY MEDICINE
Payer: COMMERCIAL

## 2020-01-10 VITALS
SYSTOLIC BLOOD PRESSURE: 127 MMHG | RESPIRATION RATE: 16 BRPM | DIASTOLIC BLOOD PRESSURE: 77 MMHG | OXYGEN SATURATION: 93 % | TEMPERATURE: 97.9 F | BODY MASS INDEX: 38.94 KG/M2 | HEIGHT: 65 IN | HEART RATE: 70 BPM

## 2020-01-10 DIAGNOSIS — N20.0 KIDNEY STONE: ICD-10-CM

## 2020-01-10 DIAGNOSIS — D69.6 THROMBOCYTOPENIA (H): ICD-10-CM

## 2020-01-10 DIAGNOSIS — I42.8 NONISCHEMIC CARDIOMYOPATHY (H): ICD-10-CM

## 2020-01-10 DIAGNOSIS — Z79.01 CHRONIC ANTICOAGULATION: ICD-10-CM

## 2020-01-10 DIAGNOSIS — R31.29 MICROSCOPIC HEMATURIA: ICD-10-CM

## 2020-01-10 DIAGNOSIS — R79.89 ELEVATED TROPONIN: ICD-10-CM

## 2020-01-10 LAB
ALBUMIN SERPL-MCNC: 3.4 G/DL (ref 3.4–5)
ALBUMIN UR-MCNC: 30 MG/DL
ALP SERPL-CCNC: 66 U/L (ref 40–150)
ALT SERPL W P-5'-P-CCNC: 50 U/L (ref 0–70)
ANION GAP SERPL CALCULATED.3IONS-SCNC: 7 MMOL/L (ref 3–14)
APPEARANCE UR: CLEAR
AST SERPL W P-5'-P-CCNC: 96 U/L (ref 0–45)
BASOPHILS # BLD AUTO: 0 10E9/L (ref 0–0.2)
BASOPHILS NFR BLD AUTO: 0.3 %
BILIRUB SERPL-MCNC: 1.6 MG/DL (ref 0.2–1.3)
BILIRUB UR QL STRIP: NEGATIVE
BUN SERPL-MCNC: 20 MG/DL (ref 7–30)
CALCIUM SERPL-MCNC: 9 MG/DL (ref 8.5–10.1)
CHLORIDE SERPL-SCNC: 110 MMOL/L (ref 94–109)
CO2 SERPL-SCNC: 25 MMOL/L (ref 20–32)
COLOR UR AUTO: YELLOW
CREAT SERPL-MCNC: 0.69 MG/DL (ref 0.66–1.25)
DIFFERENTIAL METHOD BLD: ABNORMAL
EOSINOPHIL # BLD AUTO: 0.3 10E9/L (ref 0–0.7)
EOSINOPHIL NFR BLD AUTO: 7.7 %
ERYTHROCYTE [DISTWIDTH] IN BLOOD BY AUTOMATED COUNT: 16.4 % (ref 10–15)
GFR SERPL CREATININE-BSD FRML MDRD: >90 ML/MIN/{1.73_M2}
GLUCOSE SERPL-MCNC: 90 MG/DL (ref 70–99)
GLUCOSE UR STRIP-MCNC: NEGATIVE MG/DL
HCT VFR BLD AUTO: 32.6 % (ref 40–53)
HGB BLD-MCNC: 10.4 G/DL (ref 13.3–17.7)
HGB UR QL STRIP: ABNORMAL
IMM GRANULOCYTES # BLD: 0 10E9/L (ref 0–0.4)
IMM GRANULOCYTES NFR BLD: 0.3 %
INR PPP: 3.6 (ref 0.86–1.14)
KETONES UR STRIP-MCNC: NEGATIVE MG/DL
LACTATE BLD-SCNC: 1.4 MMOL/L (ref 0.7–2)
LEUKOCYTE ESTERASE UR QL STRIP: NEGATIVE
LIPASE SERPL-CCNC: 102 U/L (ref 73–393)
LYMPHOCYTES # BLD AUTO: 0.7 10E9/L (ref 0.8–5.3)
LYMPHOCYTES NFR BLD AUTO: 17.2 %
MCH RBC QN AUTO: 28.4 PG (ref 26.5–33)
MCHC RBC AUTO-ENTMCNC: 31.9 G/DL (ref 31.5–36.5)
MCV RBC AUTO: 89 FL (ref 78–100)
MONOCYTES # BLD AUTO: 0.6 10E9/L (ref 0–1.3)
MONOCYTES NFR BLD AUTO: 16.4 %
MUCOUS THREADS #/AREA URNS LPF: PRESENT /LPF
NEUTROPHILS # BLD AUTO: 2.2 10E9/L (ref 1.6–8.3)
NEUTROPHILS NFR BLD AUTO: 58.1 %
NITRATE UR QL: NEGATIVE
NRBC # BLD AUTO: 0 10*3/UL
NRBC BLD AUTO-RTO: 0 /100
PH UR STRIP: 6 PH (ref 5–7)
PLATELET # BLD AUTO: 88 10E9/L (ref 150–450)
POTASSIUM SERPL-SCNC: 3.9 MMOL/L (ref 3.4–5.3)
PROT SERPL-MCNC: 6.2 G/DL (ref 6.8–8.8)
RBC # BLD AUTO: 3.66 10E12/L (ref 4.4–5.9)
RBC #/AREA URNS AUTO: >182 /HPF (ref 0–2)
SODIUM SERPL-SCNC: 142 MMOL/L (ref 133–144)
SOURCE: ABNORMAL
SP GR UR STRIP: 1.01 (ref 1–1.03)
SQUAMOUS #/AREA URNS AUTO: <1 /HPF (ref 0–1)
TROPONIN I SERPL-MCNC: 0.05 UG/L (ref 0–0.04)
UROBILINOGEN UR STRIP-MCNC: 2 MG/DL (ref 0–2)
WBC # BLD AUTO: 3.8 10E9/L (ref 4–11)
WBC #/AREA URNS AUTO: 4 /HPF (ref 0–5)

## 2020-01-10 PROCEDURE — 85025 COMPLETE CBC W/AUTO DIFF WBC: CPT | Performed by: EMERGENCY MEDICINE

## 2020-01-10 PROCEDURE — 99285 EMERGENCY DEPT VISIT HI MDM: CPT | Mod: 25 | Performed by: EMERGENCY MEDICINE

## 2020-01-10 PROCEDURE — 83690 ASSAY OF LIPASE: CPT | Performed by: EMERGENCY MEDICINE

## 2020-01-10 PROCEDURE — 74176 CT ABD & PELVIS W/O CONTRAST: CPT

## 2020-01-10 PROCEDURE — 84484 ASSAY OF TROPONIN QUANT: CPT | Performed by: EMERGENCY MEDICINE

## 2020-01-10 PROCEDURE — 85610 PROTHROMBIN TIME: CPT | Performed by: EMERGENCY MEDICINE

## 2020-01-10 PROCEDURE — 25800030 ZZH RX IP 258 OP 636: Performed by: EMERGENCY MEDICINE

## 2020-01-10 PROCEDURE — 99285 EMERGENCY DEPT VISIT HI MDM: CPT | Mod: Z6 | Performed by: EMERGENCY MEDICINE

## 2020-01-10 PROCEDURE — 81001 URINALYSIS AUTO W/SCOPE: CPT | Performed by: EMERGENCY MEDICINE

## 2020-01-10 PROCEDURE — 83605 ASSAY OF LACTIC ACID: CPT | Performed by: EMERGENCY MEDICINE

## 2020-01-10 PROCEDURE — 93005 ELECTROCARDIOGRAM TRACING: CPT | Performed by: EMERGENCY MEDICINE

## 2020-01-10 PROCEDURE — 80053 COMPREHEN METABOLIC PANEL: CPT | Performed by: EMERGENCY MEDICINE

## 2020-01-10 RX ADMIN — SODIUM CHLORIDE 500 ML: 9 INJECTION, SOLUTION INTRAVENOUS at 18:19

## 2020-01-10 NOTE — TELEPHONE ENCOUNTER
Reason for call:  Patient reporting a symptom    Symptom or request: Dark colored urine    Duration (how long have symptoms been present): ?    Have you been treated for this before? ?    Additional comments: Patient was at work, could not talk, asked that RN to call him back after 3 pm    Phone Number patient can be reached at:  Cell number on file:    Telephone Information:   Mobile [phone]       Best Time:  After 3 pm Fri 1/10/2020    Can we leave a detailed message on this number:  YES    Call taken on 1/10/2020 at 1:58 PM by Xena Gutierrez

## 2020-01-10 NOTE — ED NOTES
"Pt presents with c/o dark colored urine for a \"few days\" only in the AM.  Pt states that as day goes on, urine clears.  Today patient states that he had this occur in the AM and again in the afternoon which is what brought him in.  Denies pain and increased frequency with urination.  Pt has not taken lasix for the past few days.  Complains of generalized weakness, denies fevers at home.  Hx of portal hypertension.  Pt A&Ox4.   "

## 2020-01-10 NOTE — ED AVS SNAPSHOT
[facility]  [address] [address]  Phone:  [phone]  Fax:  [phone]                                    Maurice Jon   MRN: [id number]    Department:  [facility]   Date of Visit:  1/10/2020           After Visit Summary Signature Page    I have received my discharge instructions, and my questions have been answered. I have discussed any challenges I see with this plan with the nurse or doctor.    ..........................................................................................................................................  Patient/Patient Representative Signature      ..........................................................................................................................................  Patient Representative Print Name and Relationship to Patient    ..................................................               ................................................  Date                                   Time    ..........................................................................................................................................  Reviewed by Signature/Title    ...................................................              ..............................................  Date                                               Time          22EPIC Rev 08/18

## 2020-01-10 NOTE — ED TRIAGE NOTES
States having generalized weakness. His urine is dark in the morning and then it clears up during the day. Denies abd pain

## 2020-01-10 NOTE — PHARMACY
Medication list up to date. Patient states he takes Furosemide and Spironolactone as needed instead of scheduled.    Patient also states he has a pacemaker.

## 2020-01-10 NOTE — TELEPHONE ENCOUNTER
S-(situation): dark, foul smelling urine    B-(background): started 3 days ago, has history of kidney stones and portal HTN, has pacemake placed on 11-15-19.    A-(assessment): patient called and reporting dark, foul smelling urine, says he has urgency, feels weak, poor appetite, but does not feel like he has fever. No back pain, no flank pain, and unable to tell if urine has blood in it. Denies frequency.    R-(recommendations): Advised to be seen in Urgent Care/ER today for evaluation as no appointments today in clinic, need to rule out infectio. Patient agrees with the plan.     RIKI Gabriel

## 2020-01-10 NOTE — ED PROVIDER NOTES
History     Chief Complaint   Patient presents with     Generalized Weakness     HPI  Maurice Jon is a 59 year old male who presents with complaint of dark urine in the mornings for the past 3 days.  Recurred again this afternoon prompting evaluation.  Describes generalized fatigue, no fever or chills.  He has not had such difficulties in the past.  He has history of portal hypertension secondary to idiopathic cirrhosis.  He denies alcohol use.  Uses minimal Tylenol.  He has history of ascites, denies abdominal pain or distention.  He describes diminished appetite over the last couple of days.  No nausea vomiting diarrhea black or bloody stools.  He denies NSAID use or aspirin use.  Chronically anticoagulated on warfarin secondary to portal vein thrombosis and atrial fibrillation.  Denies leg pain or swelling.  He reports bronchitis/URI and finished a azithromycin 12/30.  Last INR was 2.6 on 12/31.  Reports taking medications as prescribed.    Admission to [facility] second atypical chest pain 12/19/2019, discharge summary from that visit reviewed.    Has known nonischemic cardiomyopathy: Was discharged with troponin of 0.046     Discharge Diagnoses:  1. Atypical chest pain 2/2 Anxiety  2. NICM (EF 50-55%)  3. Persistent atrial fibrillation s/p AV node ablation, BI-V PPM     Imaging with results:  Echocardiogram 12/20/19:  Interpretation Summary  Borderline (EF 50-55%) reduced left ventricular function is present.  Moderate right ventricular dilation is present. Global right ventricular  function is mildly reduced.  Trace aortic insufficiency is present.  Estimated pulmonary artery systolic pressure is 26 mmHg plus right atrial  pressure.  IVC diameter >2.1 cm collapsing <50% with sniff suggests a high RA pressure  estimated at 15 mmHg or greater.  No pericardial effusion is present.     This study was compared with the study from 6/21/19. Patient is now in a paced  rhythm. Minimal change in biventricular  "function.     Other imaging studies:  CTA 12/20/19:  IMPRESSION:  1.  Minimal non-obstructive coronary artery disease.  2.  Total Agatston score 37.4 placing the patient in the 56th  percentile when compared to age and gender matched control group.  3.  Please review Radiology report for incidental noncardiac findings  that will follow separately.     EKG 12Lead 12/19/19:       Brief HPI:  Yomi Jon is a 59 year old male with PMHx of persistent A-Fib on coumadin s/p recent AV node ablation and biventricular pacemaker implantation (11/15/2019), NICM w/ EF 50-55%, well-compensated non-alcoholic cirrhosis, portal hypertension, BRUCE who presents via EMS with atypical chest pain.      Hospital Course by Diagnosis:  # Atypical Chest pain likely 2/2 Anxiety  #NICM (EF 50-55%)  Yesterday morning patient reports left-sided non-reproducible dull chest pain, not precipitated by exertion and not relieved by nitroglycerin or rest, improved with fentanyl and ativan. Patient reports felt felt tachypneic, needed CPAP to \"slow my breathing down,\" which helped improve his pain. EKG without ST-changes. Troponin unremarkable 0.032-->0.046. Echo done 12/19/2019 showed 50-55% with elevated atrial pressures, improved from Echo in past (EF had been as low as 25-30%, normalized EF around 6/19). This is likely anginal chest pain and possibly related to anxiety. He did get an angiogram ~1 yr (12/2018) that showed normal coronaries. CTA done today with non-obstructive CAD.   - Continue PTA Aldactone and Lasix  - Not currently on statin, given liver cirrhosis history  - No further testing needed at this time  - Pt to follow up with primary cardiologist in 4-6 weeks     #. Persistent Atrial fibrillation  Follows with EP cardiology at [facility]. He is on coumadin, now s/p AV node ablation and bi-ventricular pacemaker on 11/15/2019. Currently in a Vpaced rhythm. Denies any chest pain or palpitations  - continue coumadin  - Follow up with primary " cardiologist     Chronic Conditions  - Portal HTN  - Liver Cirrhosis  - HTN: Stable, continue PTA Aldactone, Lasix  - BRUCE: Continue CPAP at night       Allergies:  Allergies   Allergen Reactions     Avelox Other (See Comments) and GI Disturbance     portal hypertension     Moxifloxacin Nausea and Vomiting, Nausea and Other (See Comments)     portal hypertension     Sotalol Itching       Problem List:    Patient Active Problem List    Diagnosis Date Noted     Health Care Home 07/01/2011     Priority: High     01/07/2014  Status:  Declined  Care Coordinator:  Salena Joel    See Letters for [facility] Care Plan (emergency care plan only)             Portal hypertension (H) 01/28/2010     Priority: High     Liver Cirrhosis 2nd ot Fatty liver, w Ascites 08/22/2005     Priority: High     Cirrhosis, idiopathic         Atypical chest pain 12/20/2019     Priority: Medium     Chest pain 12/19/2019     Priority: Medium     Persistent atrial fibrillation 11/13/2019     Priority: Medium     Added automatically from request for surgery [id number]       Cellulitis 10/06/2019     Priority: Medium     Acute combined systolic and diastolic (congestive) hrt fail (H) 01/04/2019     Priority: Medium     CAD (coronary artery disease)      Priority: Medium     Cath 12/26/18- mild nonobstructive disease       Cardiomyopathy (H)      Priority: Medium     12/23/18 Echo- EF 25-30%       Pericarditis      Priority: Medium     Acute on chronic systolic congestive heart failure (H)      Priority: Medium     Obesity (BMI 35.0-39.9) with comorbidity (H) 12/28/2018     Priority: Medium     Right heart failure (H) 12/24/2018     Priority: Medium     Chronic a-fib, S/P Ablation 12/22/18 -- on Warfarin 12/21/2018     Priority: Medium     Encounter for monitoring sotalol therapy 10/31/2018     Priority: Medium     Long-term (current) use of anticoagulants [Z79.01] 09/18/2018     Priority: Medium     Portal vein thrombosis 02/02/2017     Priority: Medium      History of MRSA now culture negative 08/06/2015     Priority: Medium     Severe sepsis (H) 07/13/2015     Priority: Medium     Problem list name updated by automated process. Provider to review       Paroxysmal atrial fibrillation (H)      Priority: Medium     S/p cardioversion       Edema 05/13/2013     Priority: Medium     CARDIOVASCULAR SCREENING; LDL GOAL LESS THAN 160 10/31/2010     Priority: Medium     GERD (gastroesophageal reflux disease) 03/25/2010     Priority: Medium     Eczema 01/28/2010     Priority: Medium     BRUCE-Mild (AHI 9; REM RDI 31) 03/20/2009     Priority: Medium     Sleep study 3/09- .0 minutes, latency 3.6 minutes. REM latency 72.0 minutes. Sleep efficiency 87.7%. The sleep architecture was disrupted with frequent sleep stage changes and arousals. Snoring: mild to loud.  RDI 27.7, AHI of 8.4. REM RDI 31.5 TLO2 saturation 83.0%. This study is suggestive of mild sleep apnea, severe during REM sleep (20% TST). Other:  PLM index was 0.0.       Compulsive behaviors 08/26/2008     Priority: Medium     erectile dysfunction 08/22/2005     Priority: Medium     Obesity 11/24/2010     Priority: Low     Thrombocytopenia (H) 08/22/2005     Priority: Low     Thrombocytopenia associated with cirrhosis and portal hypertension  Problem list name updated by automated process. Provider to review          Past Medical History:    Past Medical History:   Diagnosis Date     A-fib (H)      Acute pulmonary edema (H)      Atrial fibrillation (H)      Atrial fibrillation with rapid ventricular response (H) 5/13/2013     CAD (coronary artery disease)      Calculus of ureter 1993, 1997     Cardiomyopathy (H)      Chronic systolic CHF (congestive heart failure) (H)      Cirrhosis of liver without mention of alcohol 8/22/2005     Edema 5/13/2013     Esophageal varices with bleeding(456.0)      Gallstones      Genital herpes, unspecified 3/20/1999     GERD (gastroesophageal reflux disease)      Hematuria       Hypertension      Hypochondriasis      Obesity 11/24/2010     BRUCE (obstructive sleep apnea) 03/17/2009     Pericarditis      Portal hypertension (H) 1/28/2010     Unspecified thrombocytopenia 8/22/2005       Past Surgical History:    Past Surgical History:   Procedure Laterality Date     ANESTHESIA CARDIOVERSION N/A 1/19/2015    Procedure: ANESTHESIA CARDIOVERSION;  Surgeon: Generic Anesthesia Provider;  Location: [facility] OR     ANESTHESIA CARDIOVERSION N/A 2/6/2019    Procedure: ANESTHESIA CARDIOVERSION;  Surgeon: GENERIC ANESTHESIA PROVIDER;  Location:  OR     ANESTHESIA CARDIOVERSION N/A 7/5/2019    Procedure: ANESTHESIA FOR CARDIOVERSION  DR. MURGUIA);  Surgeon: GENERIC ANESTHESIA PROVIDER;  Location:  OR     COLONOSCOPY N/A 8/14/2017    Procedure: COLONOSCOPY;  Colonoscopy Called will arrive appx 12:30 Per phone call;  Surgeon: Vincent Whittington MD;  Location: [facility]     CV HEART CATHETERIZATION WITH POSSIBLE INTERVENTION N/A 12/26/2018    Procedure: Heart Catheterization with possible Intervention;  Surgeon: Brandon Arroyo MD;  Location:  HEART CARDIAC CATH LAB     EP ABLATION AV NODE N/A 11/15/2019    Procedure: EP Ablation AV Node;  Surgeon: Ag Maloney MD;  Location:  HEART CARDIAC CATH LAB     EP ABLATION FOCAL AFIB N/A 12/21/2018    Procedure: EP Ablation Focal AFIB;  Surgeon: Kristofer Murguia MD;  Location:  HEART CARDIAC CATH LAB     EP PACEMAKER N/A 11/15/2019    Procedure: EP PACEMAKER;  Surgeon: Ag Maloney MD;  Location:  HEART CARDIAC CATH LAB     ESOPHAGOSCOPY, GASTROSCOPY, DUODENOSCOPY (EGD), COMBINED  7/11/2011    Procedure:COMBINED ESOPHAGOSCOPY, GASTROSCOPY, DUODENOSCOPY (EGD); Surgeon:LAURA LAMAS; Location:[facility] GI     ESOPHAGOSCOPY, GASTROSCOPY, DUODENOSCOPY (EGD), COMBINED  9/10/2012    Procedure: COMBINED ESOPHAGOSCOPY, GASTROSCOPY, DUODENOSCOPY (EGD);;  Surgeon: Hi Roque MD;  Location:  GI     ESOPHAGOSCOPY, GASTROSCOPY, DUODENOSCOPY (EGD),  COMBINED  9/9/2013    Procedure: COMBINED ESOPHAGOSCOPY, GASTROSCOPY, DUODENOSCOPY (EGD);;  Surgeon: Hi Roque MD;  Location: [facility]     ESOPHAGOSCOPY, GASTROSCOPY, DUODENOSCOPY (EGD), COMBINED N/A 9/15/2014    Procedure: COMBINED ESOPHAGOSCOPY, GASTROSCOPY, DUODENOSCOPY (EGD);  Surgeon: Hi Roque MD;  Location: [facility]     ESOPHAGOSCOPY, GASTROSCOPY, DUODENOSCOPY (EGD), COMBINED N/A 10/30/2015    Procedure: COMBINED ESOPHAGOSCOPY, GASTROSCOPY, DUODENOSCOPY (EGD);  Surgeon: Feliberto Fox MD;  Location: [facility]     ESOPHAGOSCOPY, GASTROSCOPY, DUODENOSCOPY (EGD), COMBINED N/A 10/10/2016    Procedure: COMBINED ESOPHAGOSCOPY, GASTROSCOPY, DUODENOSCOPY (EGD);  Surgeon: Jose Nesbitt MD;  Location: [facility]     ESOPHAGOSCOPY, GASTROSCOPY, DUODENOSCOPY (EGD), COMBINED N/A 9/26/2019    Procedure: ESOPHAGOGASTRODUODENOSCOPY (EGD);  Surgeon: Timo Soares MD;  Location: [facility]     H ABLATION FOCAL AFIB  12/21/2018     HERNIA REPAIR       IRRIGATION AND DEBRIDEMENT ABSCESS SCROTUM, COMBINED  10/4/2012    Procedure: COMBINED IRRIGATION AND DEBRIDEMENT ABSCESS SCROTUM;  Irrigation and Debridement of Groin Abscess;  Surgeon: Benny Shoemaker MD;  Location: [facility] OR     SOFT TISSUE SURGERY       SURGICAL HISTORY OF -   1993    rt elbow     SURGICAL HISTORY OF -   1/15/98    repair of ventral and umbilical hernia     SURGICAL HISTORY OF -   2001    endoscopy       Family History:    Family History   Problem Relation Age of Onset     Lipids Mother      Hypertension Mother      Eye Disorder Mother      Heart Disease Father         CHF     Lipids Father      Obesity Father      Heart Disease Brother         MI     Eye Disorder Brother      Hypertension Brother         portal HTN     Thrombosis Sister         in her lung     Eye Disorder Sister      Cancer Other         maternal grandparent/throat cancer       Social History:  Marital Status:   [2]  Social History     Tobacco Use     Smoking status:  "Former Smoker     Packs/day: 0.80     Years: 6.00     Pack years: 4.80     Types: Cigarettes     Last attempt to quit: 2002     Years since quittin.0     Smokeless tobacco: Never Used   Substance Use Topics     Alcohol use: No     Alcohol/week: 0.0 standard drinks     Comment: quit in      Drug use: No        Medications:    budesonide-formoterol (SYMBICORT) 80-4.5 MCG/ACT Inhaler  furosemide (LASIX) 40 MG tablet  pantoprazole (PROTONIX) 40 MG EC tablet  spironolactone (ALDACTONE) 25 MG tablet  vitamin D3 (CHOLECALCIFEROL) 2000 units (50 mcg) tablet  warfarin ANTICOAGULANT (JANTOVEN ANTICOAGULANT) 2.5 MG tablet  ACE/ARB/ARNI NOT PRESCRIBED (INTENTIONAL)  Acetaminophen 325 MG CAPS  ASPIRIN NOT PRESCRIBED (INTENTIONAL)  calcium carb 1250 mg, 500 mg [place],/vitamin D 200 units (OSCAL WITH D) 500-200 MG-UNIT per tablet  Multiple Vitamins-Minerals (MENS 50+ MULTI VITAMIN/MIN PO)  mupirocin (BACTROBAN) 2 % external ointment  order for DME  order for DME  ORDER FOR DME  STATIN NOT PRESCRIBED (INTENTIONAL)          Review of Systems  All other systems reviewed and are negative.    Physical Exam   BP: 127/65  Pulse: 71  Temp: 97.9  F (36.6  C)  Resp: 16  Height: 165.1 cm (5' 5\")  SpO2: 98 %      Physical Exam  Nontoxic appearing no respiratory distress alert and oriented ×3  Sclera nonicteric  Head atraumatic normocephalic  Neck supple full active painless range of motion  Lungs clear to auscultation  Heart regular no murmur  Abdomen soft nontender obese, bowel sounds positive no masses or HSM  Strength and sensation grossly intact throughout the extremities, gait and station normal  Speech is fluent, good eye contact, thought processes are rational  Lower extremities without swelling, redness or tenderness  Pedal pulses symmetrical and strong    ED Course        Procedures  EKG paced rate 71, unchanged from most recent ECG epic date 2019    Critical Care time:  none                  Results for orders " placed or performed during the hospital encounter of 01/10/20 (from the past 24 hour(s))   UA with Microscopic   Result Value Ref Range    Color Urine Yellow     Appearance Urine Clear     Glucose Urine Negative NEG^Negative mg/dL    Bilirubin Urine Negative NEG^Negative    Ketones Urine Negative NEG^Negative mg/dL    Specific Gravity Urine 1.015 1.003 - 1.035    Blood Urine Large (A) NEG^Negative    pH Urine 6.0 5.0 - 7.0 pH    Protein Albumin Urine 30 (A) NEG^Negative mg/dL    Urobilinogen mg/dL 2.0 0.0 - 2.0 mg/dL    Nitrite Urine Negative NEG^Negative    Leukocyte Esterase Urine Negative NEG^Negative    Source Midstream Urine     WBC Urine 4 0 - 5 /HPF    RBC Urine >182 (H) 0 - 2 /HPF    Squamous Epithelial /HPF Urine <1 0 - 1 /HPF    Mucous Urine Present (A) NEG^Negative /LPF   CBC with platelets differential   Result Value Ref Range    WBC 3.8 (L) 4.0 - 11.0 10e9/L    RBC Count 3.66 (L) 4.4 - 5.9 10e12/L    Hemoglobin 10.4 (L) 13.3 - 17.7 g/dL    Hematocrit 32.6 (L) 40.0 - 53.0 %    MCV 89 78 - 100 fl    MCH 28.4 26.5 - 33.0 pg    MCHC 31.9 31.5 - 36.5 g/dL    RDW 16.4 (H) 10.0 - 15.0 %    Platelet Count 88 (L) 150 - 450 10e9/L    Diff Method Automated Method     % Neutrophils 58.1 %    % Lymphocytes 17.2 %    % Monocytes 16.4 %    % Eosinophils 7.7 %    % Basophils 0.3 %    % Immature Granulocytes 0.3 %    Nucleated RBCs 0 0 /100    Absolute Neutrophil 2.2 1.6 - 8.3 10e9/L    Absolute Lymphocytes 0.7 (L) 0.8 - 5.3 10e9/L    Absolute Monocytes 0.6 0.0 - 1.3 10e9/L    Absolute Eosinophils 0.3 0.0 - 0.7 10e9/L    Absolute Basophils 0.0 0.0 - 0.2 10e9/L    Abs Immature Granulocytes 0.0 0 - 0.4 10e9/L    Absolute Nucleated RBC 0.0    INR   Result Value Ref Range    INR 3.60 (H) 0.86 - 1.14   Comprehensive metabolic panel   Result Value Ref Range    Sodium 142 133 - 144 mmol/L    Potassium 3.9 3.4 - 5.3 mmol/L    Chloride 110 (H) 94 - 109 mmol/L    Carbon Dioxide 25 20 - 32 mmol/L    Anion Gap 7 3 - 14 mmol/L     Glucose 90 70 - 99 mg/dL    Urea Nitrogen 20 7 - 30 mg/dL    Creatinine 0.69 0.66 - 1.25 mg/dL    GFR Estimate >90 >60 mL/min/[1.73_m2]    GFR Estimate If Black >90 >60 mL/min/[1.73_m2]    Calcium 9.0 8.5 - 10.1 mg/dL    Bilirubin Total 1.6 (H) 0.2 - 1.3 mg/dL    Albumin 3.4 3.4 - 5.0 g/dL    Protein Total 6.2 (L) 6.8 - 8.8 g/dL    Alkaline Phosphatase 66 40 - 150 U/L    ALT 50 0 - 70 U/L    AST 96 (H) 0 - 45 U/L   Lipase   Result Value Ref Range    Lipase 102 73 - 393 U/L   Lactic acid whole blood   Result Value Ref Range    Lactic Acid 1.4 0.7 - 2.0 mmol/L   Troponin I   Result Value Ref Range    Troponin I ES 0.048 (H) 0.000 - 0.045 ug/L   Abd/pelvis CT - no contrast - Stone Protocol    Narrative    CT ABDOMEN PELVIS WITHOUT CONTRAST   1/10/2020 5:37 PM     HISTORY: Hematuria, anticoagulated, history of kidney stone.    TECHNIQUE: No IV contrast material. Radiation dose for this scan was  reduced using automated exposure control, adjustment of the mA and/or  kV according to patient size, or iterative reconstruction technique.    COMPARISON: None.    FINDINGS:    There are nonobstructing bilateral renal calculi, 3 on the right and 5  on the left. Largest calcification is an 8 mm calculus at the lower  pole of the left kidney. No hydronephrosis or hydroureter.    The heart is markedly enlarged. There is a cirrhotic appearance of the  liver. A subcentimeter hypodensity in the superior right lobe of the  liver is too small to characterize but statistically likely to be  benign. The spleen is upper-normal in size. The gallbladder contains a  gallstone. The pancreas is unremarkable. No evidence of adrenal mass.  There are large varices in the left upper quadrant that appear to  drain from the splenic vein to the left renal vein. Gastroesophageal  varices are also present.    There is no free intraperitoneal air or ascites. The appendix is  normal. No bowel obstruction. No abdominal or retroperitoneal  lymphadenopathy.  No pelvic adenopathy or mass. No lytic or blastic  bone lesions.      Impression    IMPRESSION:  1. Nonobstructing bilateral nephrolithiasis.  2. Cardiomegaly.  3. Cirrhotic liver.  4. Large varices in left upper quadrant presumably related to portal  venous hypertension.  5. Cholelithiasis.       Medications - No data to display    Assessments & Plan (with Medical Decision Making)  59-year-old male presents with malaise, anorexia, change in urine color.    Exam findings morbidly obese, lungs clear abdomen is soft nontender without fluid wave.  No significant peripheral edema  Broad differential, generalized work-up with respect to known nonischemic cardiomyopathy, portal hypertension secondary to cirrhosis, urinary changes.  CT abdomen pelvis nonobstructing bilateral nephrolithiasis, cardiomegaly, cirrhotic liver, varices left upper quadrant and cholelithiasis.  No evidence for intra-abdominal infectious process, no ascites, no hydronephrosis.  Urinalysis significant for 182 red cells, 4 white cells negative nitrite.  CBC is baseline mildly anemic with chronic thrombocytopenia.  Chemistries unremarkable with exception of bilirubin mild elevation of 1.6, history of known cirrhosis and portal hypertension.  No abdominal pain or tenderness, no ascites no fever.  Mild elevation of troponin at 0.048, essentially the same as at discharge from [facility] last month.  ECG shows paced rhythm.  ECG is unchanged from last month.  INR elevated at 3.6.    Overall findings consistent with microscopic hematuria likely secondary to over anticoagulation on Coumadin in the context of intrarenal stone disease.  There is no evidence for urinary tract infection or urinary tract obstruction.  Recommend holding Coumadin tomorrow, half dose next day and then follow-up 1/13 for repeat INR.  With respect to elevated troponin this is unchanged from discharge last month, patient has known nonischemic cardiomyopathy, his ECG is paced, he has no  chest pain no shortness of air no clinical evidence for congestive failure.  Recommend follow-up cardiology, no change in medications, return criteria reviewed with respect to chest pain shortness of air syncope/near syncope.     I have reviewed the nursing notes.    I have reviewed the findings, diagnosis, plan and need for follow up with the patient.       New Prescriptions    No medications on file       Final diagnoses:   Chronic anticoagulation   Microscopic hematuria   Nonischemic cardiomyopathy (H)   Elevated troponin   Thrombocytopenia (H)       1/10/2020   [facility] EMERGENCY DEPARTMENT     Surya Willams MD  01/11/20 0647

## 2020-01-11 NOTE — DISCHARGE INSTRUCTIONS
Take no coumadin 1/11 and take half dose on 1/12.    Follow-up on 1/13 for INR    Call cardiology regarding further evaluation with respect to persistently mildly elevated troponin    Return here for pain, fever, trouble breathing or any other concern.

## 2020-01-13 ENCOUNTER — ANTICOAGULATION THERAPY VISIT (OUTPATIENT)
Dept: ANTICOAGULATION | Facility: CLINIC | Age: 60
End: 2020-01-13
Payer: COMMERCIAL

## 2020-01-13 DIAGNOSIS — I48.0 PAROXYSMAL ATRIAL FIBRILLATION (H): ICD-10-CM

## 2020-01-13 DIAGNOSIS — Z79.01 LONG TERM CURRENT USE OF ANTICOAGULANT THERAPY: ICD-10-CM

## 2020-01-13 LAB — INR POINT OF CARE: 2.8 (ref 0.86–1.14)

## 2020-01-13 PROCEDURE — 85610 PROTHROMBIN TIME: CPT | Mod: QW

## 2020-01-13 PROCEDURE — 36416 COLLJ CAPILLARY BLOOD SPEC: CPT

## 2020-01-13 PROCEDURE — 99207 ZZC NO CHARGE NURSE ONLY: CPT

## 2020-01-13 NOTE — PROGRESS NOTES
"ANTICOAGULATION FOLLOW-UP CLINIC VISIT    Patient Name:  Maurice Jon  Date:  1/13/2020  Contact Type:  Face to Face    SUBJECTIVE:  Patient Findings     Positives:   Missed doses (held dose sat, decreased dose sun )    Comments:   Patient was in ED on Friday for generalized weakness and microscopic hematuria, patient was instructed to hold warfarin on Sat and take 1.25 mg Sunday per ER MD for INR of 3.6. Per MD note: \"Overall findings consistent with microscopic hematuria likely secondary to over anticoagulation on Coumadin in the context of intrarenal stone disease.\"     INR is 2.8 today. Patient did have a CT scan. Patient reports his urine is now clear and he has no pain. Unsure why INR was elevated, looking back at his dose patient's INR has been fluctuating. Patient advised to resume his maintenance dose and keep his appointment in 8 days. If his INR is elevated at his maintenance dose, he will need a dose adjustment.          Clinical Outcomes     Comments:   Patient was in ED on Friday for generalized weakness and microscopic hematuria, patient was instructed to hold warfarin on Sat and take 1.25 mg Sunday per ER MD for INR of 3.6. Per MD note: \"Overall findings consistent with microscopic hematuria likely secondary to over anticoagulation on Coumadin in the context of intrarenal stone disease.\"     INR is 2.8 today. Patient did have a CT scan. Patient reports his urine is now clear and he has no pain. Unsure why INR was elevated, looking back at his dose patient's INR has been fluctuating. Patient advised to resume his maintenance dose and keep his appointment in 8 days. If his INR is elevated at his maintenance dose, he will need a dose adjustment.             OBJECTIVE    INR Protime   Date Value Ref Range Status   01/13/2020 2.8 (A) 0.86 - 1.14 Final       ASSESSMENT / PLAN  INR assessment THER    Recheck INR In: 8 DAYS    INR Location Clinic      Anticoagulation Summary  As of 1/13/2020    INR " goal:   2.0-3.0   TTR:   89.4 % (11.8 mo)   INR used for dosin.8 (2020)   Warfarin maintenance plan:   1.25 mg (2.5 mg x 0.5) every Fri; 2.5 mg (2.5 mg x 1) all other days   Full warfarin instructions:   1.25 mg every Fri; 2.5 mg all other days   Weekly warfarin total:   16.25 mg   No change documented:   Christina Singer RN   Plan last modified:   Susan Beyer RN (2018)   Next INR check:   2020   Priority:   Maintenance   Target end date:   10/4/2018    Indications    Paroxysmal atrial fibrillation (H) [I48.0]  Atrial fibrillation with rapid ventricular response (H) (Resolved) [I48.91]  Long-term (current) use of anticoagulants [Z79.01] [Z79.01]             Anticoagulation Episode Summary     INR check location:       Preferred lab:       Send INR reminders to:   [facility]    Comments:   * anticoagulation short period surrounding ablation on 18. Cardiology to decide when to stop warfarin. has well-compensated cirrhosis      Anticoagulation Care Providers     Provider Role Specialty Phone number    Alon Pineda MD [facility] [phone]            See the Encounter Report to view Anticoagulation Flowsheet and Dosing Calendar (Go to Encounters tab in chart review, and find the Anticoagulation Therapy Visit)      Christina Singer RN

## 2020-01-24 ENCOUNTER — ANTICOAGULATION THERAPY VISIT (OUTPATIENT)
Dept: ANTICOAGULATION | Facility: CLINIC | Age: 60
End: 2020-01-24
Payer: COMMERCIAL

## 2020-01-24 DIAGNOSIS — Z79.01 LONG TERM CURRENT USE OF ANTICOAGULANT THERAPY: ICD-10-CM

## 2020-01-24 DIAGNOSIS — I48.0 PAROXYSMAL ATRIAL FIBRILLATION (H): ICD-10-CM

## 2020-01-24 LAB — INR POINT OF CARE: 2.9 (ref 0.86–1.14)

## 2020-01-24 PROCEDURE — 85610 PROTHROMBIN TIME: CPT | Mod: QW

## 2020-01-24 PROCEDURE — 36416 COLLJ CAPILLARY BLOOD SPEC: CPT

## 2020-01-24 PROCEDURE — 99207 ZZC NO CHARGE NURSE ONLY: CPT

## 2020-01-24 NOTE — PROGRESS NOTES
ANTICOAGULATION FOLLOW-UP CLINIC VISIT    Patient Name:  Maurice Jon  Date:  2020  Contact Type:  Face to Face    SUBJECTIVE:  Patient Findings     Comments:   Patient reports no changes in medication, activity, or diet. Patient reports no changes in health. Patient reports has taken warfarin as instructed. Patient reports no increased bruising or bleeding and no signs or symptoms of a blood clot. Patient states when his INR was elevated he did have increased swelling and issues with a kidney stone.  Will plan to continue maintenance dose and recheck INR in 2 weeks.   Patient to call [facility] with any changes or concerns. Patient verbalized understanding of all instructions, denies questions or concerns at this time.             Clinical Outcomes     Negatives:   Major bleeding event, Thromboembolic event, Anticoagulation-related hospital admission, Anticoagulation-related ED visit, Anticoagulation-related fatality    Comments:   Patient reports no changes in medication, activity, or diet. Patient reports no changes in health. Patient reports has taken warfarin as instructed. Patient reports no increased bruising or bleeding and no signs or symptoms of a blood clot. Patient states when his INR was elevated he did have increased swelling and issues with a kidney stone.  Will plan to continue maintenance dose and recheck INR in 2 weeks.   Patient to call [facility] with any changes or concerns. Patient verbalized understanding of all instructions, denies questions or concerns at this time.                OBJECTIVE    INR Protime   Date Value Ref Range Status   2020 2.9 (A) 0.86 - 1.14 Final       ASSESSMENT / PLAN  INR assessment THER    Recheck INR In: 2 WEEKS    INR Location Clinic      Anticoagulation Summary  As of 2020    INR goal:   2.0-3.0   TTR:   89.4 % (11.8 mo)   INR used for dosin.9 (2020)   Warfarin maintenance plan:   1.25 mg (2.5 mg x 0.5) every Fri; 2.5 mg (2.5 mg x 1) all other  days   Full warfarin instructions:   1.25 mg every Fri; 2.5 mg all other days   Weekly warfarin total:   16.25 mg   No change documented:   Christina Singer RN   Plan last modified:   Susan Beyer RN (9/28/2018)   Next INR check:   2/7/2020   Priority:   Maintenance   Target end date:   10/4/2018    Indications    Paroxysmal atrial fibrillation (H) [I48.0]  Atrial fibrillation with rapid ventricular response (H) (Resolved) [I48.91]  Long-term (current) use of anticoagulants [Z79.01] [Z79.01]             Anticoagulation Episode Summary     INR check location:       Preferred lab:       Send INR reminders to:   [facility]    Comments:   * anticoagulation short period surrounding ablation on 12/21/18. Cardiology to decide when to stop warfarin. has well-compensated cirrhosis      Anticoagulation Care Providers     Provider Role Specialty Phone number    Alon Pineda MD [facility] [phone]            See the Encounter Report to view Anticoagulation Flowsheet and Dosing Calendar (Go to Encounters tab in chart review, and find the Anticoagulation Therapy Visit)      Christina Singer, RN

## 2020-02-07 ENCOUNTER — ANTICOAGULATION THERAPY VISIT (OUTPATIENT)
Dept: ANTICOAGULATION | Facility: CLINIC | Age: 60
End: 2020-02-07
Payer: COMMERCIAL

## 2020-02-07 DIAGNOSIS — Z79.01 LONG TERM CURRENT USE OF ANTICOAGULANT THERAPY: ICD-10-CM

## 2020-02-07 DIAGNOSIS — I48.0 PAROXYSMAL ATRIAL FIBRILLATION (H): ICD-10-CM

## 2020-02-07 LAB — INR POINT OF CARE: 2.6 (ref 0.86–1.14)

## 2020-02-07 PROCEDURE — 36416 COLLJ CAPILLARY BLOOD SPEC: CPT

## 2020-02-07 PROCEDURE — 99207 ZZC NO CHARGE NURSE ONLY: CPT

## 2020-02-07 PROCEDURE — 85610 PROTHROMBIN TIME: CPT | Mod: QW

## 2020-02-07 NOTE — PROGRESS NOTES
ANTICOAGULATION FOLLOW-UP CLINIC VISIT    Patient Name:  Maurice Jon  Date:  2020  Contact Type:  Face to Face    SUBJECTIVE:  Patient Findings     Comments:   Patient reports no changes in medication, activity, or diet. Patient reports no changes in health. Patient reports has taken warfarin as instructed. Patient reports no increased bruising or bleeding and no signs or symptoms of a blood clot. Will plan to continue maintenance dose and recheck INR in 4 weeks. Patient to call [facility] with any changes or concerns. Patient verbalized understanding of all instructions, denies questions or concerns at this time.             Clinical Outcomes     Negatives:   Major bleeding event, Thromboembolic event, Anticoagulation-related hospital admission, Anticoagulation-related ED visit, Anticoagulation-related fatality    Comments:   Patient reports no changes in medication, activity, or diet. Patient reports no changes in health. Patient reports has taken warfarin as instructed. Patient reports no increased bruising or bleeding and no signs or symptoms of a blood clot. Will plan to continue maintenance dose and recheck INR in 4 weeks. Patient to call [facility] with any changes or concerns. Patient verbalized understanding of all instructions, denies questions or concerns at this time.                OBJECTIVE    INR Protime   Date Value Ref Range Status   2020 2.6 (A) 0.86 - 1.14 Final       ASSESSMENT / PLAN  INR assessment THER    Recheck INR In: 4 WEEKS    INR Location Clinic      Anticoagulation Summary  As of 2020    INR goal:   2.0-3.0   TTR:   89.4 % (11.8 mo)   INR used for dosin.6 (2020)   Warfarin maintenance plan:   1.25 mg (2.5 mg x 0.5) every Fri; 2.5 mg (2.5 mg x 1) all other days   Full warfarin instructions:   1.25 mg every Fri; 2.5 mg all other days   Weekly warfarin total:   16.25 mg   No change documented:   Christina Singer RN   Plan last modified:   Susan Beyer RN (2018)    Next INR check:   3/6/2020   Priority:   Maintenance   Target end date:   10/4/2018    Indications    Paroxysmal atrial fibrillation (H) [I48.0]  Atrial fibrillation with rapid ventricular response (H) (Resolved) [I48.91]  Long-term (current) use of anticoagulants [Z79.01] [Z79.01]             Anticoagulation Episode Summary     INR check location:       Preferred lab:       Send INR reminders to:   [facility]    Comments:   * anticoagulation short period surrounding ablation on 12/21/18. Cardiology to decide when to stop warfarin. has well-compensated cirrhosis      Anticoagulation Care Providers     Provider Role Specialty Phone number    Alon Pineda MD [facility] [phone]            See the Encounter Report to view Anticoagulation Flowsheet and Dosing Calendar (Go to Encounters tab in chart review, and find the Anticoagulation Therapy Visit)    Patient declined AVS today.     Christina Singer RN

## 2020-02-12 ENCOUNTER — NURSE TRIAGE (OUTPATIENT)
Dept: NURSING | Facility: CLINIC | Age: 60
End: 2020-02-12

## 2020-02-13 ENCOUNTER — OFFICE VISIT (OUTPATIENT)
Dept: FAMILY MEDICINE | Facility: CLINIC | Age: 60
End: 2020-02-13
Payer: COMMERCIAL

## 2020-02-13 VITALS
BODY MASS INDEX: 39.16 KG/M2 | RESPIRATION RATE: 16 BRPM | WEIGHT: 229.4 LBS | SYSTOLIC BLOOD PRESSURE: 114 MMHG | HEIGHT: 64 IN | OXYGEN SATURATION: 98 % | DIASTOLIC BLOOD PRESSURE: 60 MMHG | TEMPERATURE: 99 F | HEART RATE: 72 BPM

## 2020-02-13 DIAGNOSIS — B35.4 RINGWORM OF BODY: Primary | ICD-10-CM

## 2020-02-13 DIAGNOSIS — Z23 NEED FOR VACCINATION: ICD-10-CM

## 2020-02-13 DIAGNOSIS — L91.8 SKIN TAG: ICD-10-CM

## 2020-02-13 PROCEDURE — 99213 OFFICE O/P EST LOW 20 MIN: CPT | Mod: 25 | Performed by: NURSE PRACTITIONER

## 2020-02-13 PROCEDURE — 90714 TD VACC NO PRESV 7 YRS+ IM: CPT | Performed by: NURSE PRACTITIONER

## 2020-02-13 PROCEDURE — 90471 IMMUNIZATION ADMIN: CPT | Performed by: NURSE PRACTITIONER

## 2020-02-13 RX ORDER — CICLOPIROX OLAMINE 7.7 MG/G
CREAM TOPICAL 2 TIMES DAILY
Qty: 90 G | Refills: 1 | Status: SHIPPED | OUTPATIENT
Start: 2020-02-13 | End: 2020-04-29

## 2020-02-13 ASSESSMENT — MIFFLIN-ST. JEOR: SCORE: 1758.61

## 2020-02-13 NOTE — TELEPHONE ENCOUNTER
Yomi calls and says that he has a rash on his back. Pt. Says that he has had this rash for 1 month. Rash consists of small groups of dots. Dots are raised and itchy. Rash is also in a u-shape. Rash is pinkish/red colored. Pt. Wants to see his Dr. RN conferenced pt. With [facility]  for assistance in scheduling an appointment.  Reason for Disposition    Localized rash present > 7 days    Additional Information    Negative: [1] Sudden onset of rash (within last 2 hours) AND [2] difficulty with breathing or swallowing    Negative: Sounds like a life-threatening emergency to the triager    Negative: Poison ivy, oak, or sumac contact suspected    Negative: Insect bite(s) suspected    Negative: Ringworm suspected (i.e., round pink patch, sometimes looks like ring, usually 1/2 to 1 inch [12-25 mm],  in size, slowly increasing in size)    Negative: Athlete's Foot suspected (i.e., itchy rash between the toes)    Negative: Jock Itch suspected (i.e., itchy rash on inner thighs near genital area)    Negative: Wound infection suspected (i.e., pain, spreading redness, or pus; in a cut, puncture, scrape or sutured wound)    Negative: Impetigo suspected  (i.e., painless infected superficial small sores, less than 1 inch or 2.5 cm, often covered by a soft, yellow-brown scab or crust; sometimes occurring near nasal openings)    Negative: Shingles suspected (i.e., painful rash, multiple small blisters grouped together in one area of body; dermatomal distribution)    Negative: Rash of external female genital area (vulva)    Negative: Rash of penis or scrotum    Negative: Small spot, skin growth, or mole    Negative: Sores or skin ulcer, not a rash    Negative: Localized lump (or swelling) without redness or rash    Negative: [1] Localized purple or blood-colored spots or dots AND [2] not from injury or friction AND [3] fever    Negative: Patient sounds very sick or weak to the triager    Negative: [1] Red area or streak AND [2]  fever    Negative: [1] Rash is painful to touch AND [2] fever    Negative: [1] Looks infected (spreading redness, pus) AND [2] large red area (> 2 in. or 5 cm)    Negative: [1] Looks infected (spreading redness, pus) AND [2] diabetes mellitus or weak immune system (e.g., HIV positive,  cancer chemotherapy, chronic steroid treatment, splenectomy)    Negative: [1] Localized purple or blood-colored spots or dots AND [2] not from injury or friction AND [3] no fever    Negative: [1] Looks infected (spreading redness, pus) AND [2] no fever    Negative: Looks like a boil, infected sore, deep ulcer or other infected rash    Negative: [1] Localized rash is very painful AND [2] no fever    Negative: Genital area rash    Negative: Lyme disease suspected (e.g., bull's eye rash or tick bite / exposure)    Negative: [1] Applying cream or ointment AND [2] causes severe itch, burning or pain    Negative: [1] Pimples (localized) AND [2 ] no improvement after using CARE ADVICE    Negative: Tender bumps in armpits    Negative: [1] Severe localized itching AND [2] after 2 days of steroid cream    Protocols used: RASH OR REDNESS - [id number]

## 2020-02-13 NOTE — PROGRESS NOTES
"Subjective     Maurice Jon is a 59 year old male who presents to clinic today for the following health issues:    HPI   Rash      Duration: one month +     Description  Location: on his back   Itching: severe    Intensity:  moderate    Accompanying signs and symptoms: red and bumpy.     History (similar episodes/previous evaluation): None    Precipitating or alleviating factors:  New exposures:  None  Recent travel: no      Therapies tried and outcome: hydrocortisone cream- was effective last night     Reviewed and updated as needed this visit by Provider         Review of Systems   ROS COMP: Constitutional, HEENT, cardiovascular, pulmonary, gi and gu systems are negative, except as otherwise noted.      Objective    /60 (BP Location: Right arm, Patient Position: Sitting, Cuff Size: Adult Regular)   Pulse 72   Temp 99  F (37.2  C) (Tympanic)   Resp 16   Ht 1.613 m (5' 3.5\")   Wt 104.1 kg (229 lb 6.4 oz)   SpO2 98%   BMI 40.00 kg/m    Body mass index is 40 kg/m .  Physical Exam   GENERAL: healthy, alert and no distress  SKIN: multiple skin tags in right axillary area and rash on the right side middle back, see image below:          Diagnostic Test Results:  Labs reviewed in Epic        Assessment & Plan     1. Ringworm of body    - ciclopirox (LOPROX) 0.77 % cream; Apply topically 2 times daily  Dispense: 90 g; Refill: 1    2. Need for vaccination    - TD PRSERV FREE >=7 YRS ADS IM [81786]  - 1st  Administration  [00371]    3. Skin tag  -multiple skin tags, patient is on Coumadin, recommended to schedule with dermatology for removal   - DERMATOLOGY REFERRAL       See Patient Instructions    Return in about 2 weeks (around 2/27/2020), or if symptoms worsen or fail to improve.    JANETTE Arriola [facility]      "

## 2020-02-19 PROBLEM — I48.19 PERSISTENT ATRIAL FIBRILLATION (H): Status: ACTIVE | Noted: 2019-11-13

## 2020-02-22 ENCOUNTER — HOSPITAL ENCOUNTER (EMERGENCY)
Facility: CLINIC | Age: 60
Discharge: HOME | End: 2020-02-22
Attending: EMERGENCY MEDICINE | Admitting: EMERGENCY MEDICINE
Payer: COMMERCIAL

## 2020-02-22 ENCOUNTER — APPOINTMENT (OUTPATIENT)
Dept: ULTRASOUND IMAGING | Facility: CLINIC | Age: 60
End: 2020-02-22
Attending: EMERGENCY MEDICINE
Payer: COMMERCIAL

## 2020-02-22 ENCOUNTER — APPOINTMENT (OUTPATIENT)
Dept: GENERAL RADIOLOGY | Facility: CLINIC | Age: 60
End: 2020-02-22
Attending: EMERGENCY MEDICINE
Payer: COMMERCIAL

## 2020-02-22 VITALS
OXYGEN SATURATION: 99 % | BODY MASS INDEX: 42.35 KG/M2 | SYSTOLIC BLOOD PRESSURE: 131 MMHG | TEMPERATURE: 98.7 F | WEIGHT: 239 LBS | HEART RATE: 70 BPM | DIASTOLIC BLOOD PRESSURE: 85 MMHG | HEIGHT: 63 IN | RESPIRATION RATE: 20 BRPM

## 2020-02-22 DIAGNOSIS — K74.60 CIRRHOSIS OF LIVER WITHOUT ASCITES, UNSPECIFIED HEPATIC CIRRHOSIS TYPE (H): ICD-10-CM

## 2020-02-22 DIAGNOSIS — K80.20 CALCULUS OF GALLBLADDER WITHOUT CHOLECYSTITIS WITHOUT OBSTRUCTION: ICD-10-CM

## 2020-02-22 LAB
ALBUMIN SERPL-MCNC: 3.4 G/DL (ref 3.4–5)
ALP SERPL-CCNC: 78 U/L (ref 40–150)
ALT SERPL W P-5'-P-CCNC: 45 U/L (ref 0–70)
ANION GAP SERPL CALCULATED.3IONS-SCNC: 8 MMOL/L (ref 3–14)
AST SERPL W P-5'-P-CCNC: 51 U/L (ref 0–45)
BASOPHILS # BLD AUTO: 0 10E9/L (ref 0–0.2)
BASOPHILS NFR BLD AUTO: 0.3 %
BILIRUB SERPL-MCNC: 1.6 MG/DL (ref 0.2–1.3)
BUN SERPL-MCNC: 20 MG/DL (ref 7–30)
CALCIUM SERPL-MCNC: 8.7 MG/DL (ref 8.5–10.1)
CHLORIDE SERPL-SCNC: 107 MMOL/L (ref 94–109)
CO2 SERPL-SCNC: 26 MMOL/L (ref 20–32)
CREAT SERPL-MCNC: 0.81 MG/DL (ref 0.66–1.25)
DIFFERENTIAL METHOD BLD: ABNORMAL
EOSINOPHIL # BLD AUTO: 0.2 10E9/L (ref 0–0.7)
EOSINOPHIL NFR BLD AUTO: 5.8 %
ERYTHROCYTE [DISTWIDTH] IN BLOOD BY AUTOMATED COUNT: 17.5 % (ref 10–15)
GFR SERPL CREATININE-BSD FRML MDRD: >90 ML/MIN/{1.73_M2}
GLUCOSE SERPL-MCNC: 86 MG/DL (ref 70–99)
HCT VFR BLD AUTO: 31.5 % (ref 40–53)
HGB BLD-MCNC: 9.8 G/DL (ref 13.3–17.7)
IMM GRANULOCYTES # BLD: 0 10E9/L (ref 0–0.4)
IMM GRANULOCYTES NFR BLD: 0.3 %
INR PPP: 3.12 (ref 0.86–1.14)
LIPASE SERPL-CCNC: 139 U/L (ref 73–393)
LYMPHOCYTES # BLD AUTO: 0.6 10E9/L (ref 0.8–5.3)
LYMPHOCYTES NFR BLD AUTO: 19.7 %
MCH RBC QN AUTO: 27.1 PG (ref 26.5–33)
MCHC RBC AUTO-ENTMCNC: 31.1 G/DL (ref 31.5–36.5)
MCV RBC AUTO: 87 FL (ref 78–100)
MONOCYTES # BLD AUTO: 0.5 10E9/L (ref 0–1.3)
MONOCYTES NFR BLD AUTO: 16.7 %
NEUTROPHILS # BLD AUTO: 1.7 10E9/L (ref 1.6–8.3)
NEUTROPHILS NFR BLD AUTO: 57.2 %
NRBC # BLD AUTO: 0 10*3/UL
NRBC BLD AUTO-RTO: 0 /100
PLATELET # BLD AUTO: 69 10E9/L (ref 150–450)
POTASSIUM SERPL-SCNC: 4.1 MMOL/L (ref 3.4–5.3)
PROT SERPL-MCNC: 6.1 G/DL (ref 6.8–8.8)
RBC # BLD AUTO: 3.62 10E12/L (ref 4.4–5.9)
SODIUM SERPL-SCNC: 141 MMOL/L (ref 133–144)
WBC # BLD AUTO: 2.9 10E9/L (ref 4–11)

## 2020-02-22 PROCEDURE — 74019 RADEX ABDOMEN 2 VIEWS: CPT

## 2020-02-22 PROCEDURE — 99284 EMERGENCY DEPT VISIT MOD MDM: CPT | Mod: 25

## 2020-02-22 PROCEDURE — 99284 EMERGENCY DEPT VISIT MOD MDM: CPT | Mod: Z6 | Performed by: EMERGENCY MEDICINE

## 2020-02-22 PROCEDURE — 85610 PROTHROMBIN TIME: CPT | Performed by: EMERGENCY MEDICINE

## 2020-02-22 PROCEDURE — 80053 COMPREHEN METABOLIC PANEL: CPT | Performed by: EMERGENCY MEDICINE

## 2020-02-22 PROCEDURE — 76705 ECHO EXAM OF ABDOMEN: CPT

## 2020-02-22 PROCEDURE — 83690 ASSAY OF LIPASE: CPT | Performed by: EMERGENCY MEDICINE

## 2020-02-22 PROCEDURE — 85025 COMPLETE CBC W/AUTO DIFF WBC: CPT | Performed by: EMERGENCY MEDICINE

## 2020-02-22 ASSESSMENT — MIFFLIN-ST. JEOR: SCORE: 1794.23

## 2020-02-22 NOTE — ED AVS SNAPSHOT
[facility] Department  [address] [address]  Phone:  [phone]  Fax:  [phone]                                    Maurice Jon   MRN: [id number]    Department:  [facility] Department   Date of Visit:  2/22/2020           After Visit Summary Signature Page    I have received my discharge instructions, and my questions have been answered. I have discussed any challenges I see with this plan with the nurse or doctor.    ..........................................................................................................................................  Patient/Patient Representative Signature      ..........................................................................................................................................  Patient Representative Print Name and Relationship to Patient    ..................................................               ................................................  Date                                   Time    ..........................................................................................................................................  Reviewed by Signature/Title    ...................................................              ..............................................  Date                                               Time          22EPIC Rev 08/18

## 2020-02-22 NOTE — ED TRIAGE NOTES
Pt has abdominal pain, it feels swollen, feels constipated. Pt has hx of portal HTN and has pacemaker, pt also feels SOA.

## 2020-02-23 NOTE — ED NOTES
Intermittent upper abdominal pain and nausea for approximately 1 week.  Pain worse after eating.  Patient denies pain with urination.  Last bowel movement was this morning and was difficult to go.  Patient feels like he is constipated.

## 2020-02-23 NOTE — DISCHARGE INSTRUCTIONS
Continue current medications, follow-up with Dr. Roque on Monday by phone regarding further evaluation and treatment of gallstone.    Return here for worsening persistent pain, vomiting, fever or any other concern    Try MiraLAX for the next couple days, drink plenty of fluids

## 2020-02-24 ENCOUNTER — DOCUMENTATION ONLY (OUTPATIENT)
Dept: FAMILY MEDICINE | Facility: CLINIC | Age: 60
End: 2020-02-24

## 2020-02-24 DIAGNOSIS — I48.0 PAROXYSMAL ATRIAL FIBRILLATION (H): ICD-10-CM

## 2020-02-24 DIAGNOSIS — Z79.01 LONG TERM CURRENT USE OF ANTICOAGULANT THERAPY: ICD-10-CM

## 2020-02-24 NOTE — ED PROVIDER NOTES
History     Chief Complaint   Patient presents with     Abdominal Pain     HPI  Maurice Jon is a 59 year old male who presents complaining of 2 weeks abdominal bloating postprandial, trouble breathing after he eats or drinks.  Denies vomiting but has had some intermittent nausea, symptoms worse for the past 1 week.  Describes associated constipation, diminished stool output for the past 3 to 5 days.  Denies black or bloody component to stool.  Urinating per baseline.  Denies fever.  Has known cirrhosis and follows with Dr. Jude BINGHAM [facility].  Patient also has known cardiomyopathy, chronic atrial fibrillation, portal vein thrombosis, history of sepsis chronic anemia and thrombocytopenia.  Chronic anticoagulation Coumadin reports compliance with medications.  Last INR therapeutic at 2.6 on 2/7/2020  Allergies:  Allergies   Allergen Reactions     Avelox Other (See Comments) and GI Disturbance     portal hypertension     Moxifloxacin Nausea and Vomiting, Nausea and Other (See Comments)     portal hypertension     Sotalol Itching       Problem List:    Patient Active Problem List    Diagnosis Date Noted     Health Care Home 07/01/2011     Priority: High     01/07/2014  Status:  Declined  Care Coordinator:  Salena Joel    See Letters for [facility] Care Plan (emergency care plan only)             Portal hypertension (H) 01/28/2010     Priority: High     Liver Cirrhosis 2nd ot Fatty liver, w Ascites 08/22/2005     Priority: High     Cirrhosis, idiopathic         Atypical chest pain 12/20/2019     Priority: Medium     Chest pain 12/19/2019     Priority: Medium     Persistent atrial fibrillation 11/13/2019     Priority: Medium     Added automatically from request for surgery [id number]       Cellulitis 10/06/2019     Priority: Medium     Acute combined systolic and diastolic (congestive) hrt fail (H) 01/04/2019     Priority: Medium     CAD (coronary artery disease)      Priority: Medium     Cath 12/26/18- mild nonobstructive  disease       Cardiomyopathy (H)      Priority: Medium     12/23/18 Echo- EF 25-30%       Pericarditis      Priority: Medium     Acute on chronic systolic congestive heart failure (H)      Priority: Medium     Obesity (BMI 35.0-39.9) with comorbidity (H) 12/28/2018     Priority: Medium     Right heart failure (H) 12/24/2018     Priority: Medium     Chronic a-fib, S/P Ablation 12/22/18 -- on Warfarin 12/21/2018     Priority: Medium     Encounter for monitoring sotalol therapy 10/31/2018     Priority: Medium     Long-term (current) use of anticoagulants [Z79.01] 09/18/2018     Priority: Medium     Portal vein thrombosis 02/02/2017     Priority: Medium     History of MRSA now culture negative 08/06/2015     Priority: Medium     Severe sepsis (H) 07/13/2015     Priority: Medium     Problem list name updated by automated process. Provider to review       Paroxysmal atrial fibrillation (H)      Priority: Medium     S/p cardioversion       Edema 05/13/2013     Priority: Medium     CARDIOVASCULAR SCREENING; LDL GOAL LESS THAN 160 10/31/2010     Priority: Medium     GERD (gastroesophageal reflux disease) 03/25/2010     Priority: Medium     Eczema 01/28/2010     Priority: Medium     BRUCE-Mild (AHI 9; REM RDI 31) 03/20/2009     Priority: Medium     Sleep study 3/09- .0 minutes, latency 3.6 minutes. REM latency 72.0 minutes. Sleep efficiency 87.7%. The sleep architecture was disrupted with frequent sleep stage changes and arousals. Snoring: mild to loud.  RDI 27.7, AHI of 8.4. REM RDI 31.5 TLO2 saturation 83.0%. This study is suggestive of mild sleep apnea, severe during REM sleep (20% TST). Other:  PLM index was 0.0.       Compulsive behaviors 08/26/2008     Priority: Medium     erectile dysfunction 08/22/2005     Priority: Medium     Obesity 11/24/2010     Priority: Low     Thrombocytopenia (H) 08/22/2005     Priority: Low     Thrombocytopenia associated with cirrhosis and portal hypertension  Problem list name updated  by automated process. Provider to review          Past Medical History:    Past Medical History:   Diagnosis Date     A-fib (H)      Acute pulmonary edema (H)      Atrial fibrillation (H)      Atrial fibrillation with rapid ventricular response (H) 5/13/2013     CAD (coronary artery disease)      Calculus of ureter 1993, 1997     Cardiomyopathy (H)      Chronic systolic CHF (congestive heart failure) (H)      Cirrhosis of liver without mention of alcohol 8/22/2005     Edema 5/13/2013     Esophageal varices with bleeding(456.0)      Gallstones      Genital herpes, unspecified 3/20/1999     GERD (gastroesophageal reflux disease)      Hematuria      Hypertension      Hypochondriasis      Obesity 11/24/2010     BRUCE (obstructive sleep apnea) 03/17/2009     Pericarditis      Portal hypertension (H) 1/28/2010     Unspecified thrombocytopenia 8/22/2005       Past Surgical History:    Past Surgical History:   Procedure Laterality Date     ANESTHESIA CARDIOVERSION N/A 1/19/2015    Procedure: ANESTHESIA CARDIOVERSION;  Surgeon: Generic Anesthesia Provider;  Location: [facility]     ANESTHESIA CARDIOVERSION N/A 2/6/2019    Procedure: ANESTHESIA CARDIOVERSION;  Surgeon: GENERIC ANESTHESIA PROVIDER;  Location:  OR     ANESTHESIA CARDIOVERSION N/A 7/5/2019    Procedure: ANESTHESIA FOR CARDIOVERSION  DR. MURGUIA);  Surgeon: GENERIC ANESTHESIA PROVIDER;  Location:  OR     COLONOSCOPY N/A 8/14/2017    Procedure: COLONOSCOPY;  Colonoscopy Called will arrive appx 12:30 Per phone call;  Surgeon: Vincent Whittington MD;  [facility]     CV HEART CATHETERIZATION WITH POSSIBLE INTERVENTION N/A 12/26/2018    Procedure: Heart Catheterization with possible Intervention;  Surgeon: Brandon Arroyo MD;  Location:  HEART CARDIAC CATH LAB     EP ABLATION AV NODE N/A 11/15/2019    Procedure: EP Ablation AV Node;  Surgeon: Ag Maloney MD;  Location:  HEART CARDIAC CATH LAB     EP ABLATION FOCAL AFIB N/A 12/21/2018    Procedure: EP  Ablation Focal AFIB;  Surgeon: Kristofer Evans MD;  Location:  HEART CARDIAC CATH LAB     EP PACEMAKER N/A 11/15/2019    Procedure: EP PACEMAKER;  Surgeon: Ag Maloney MD;  Location:  HEART CARDIAC CATH LAB     ESOPHAGOSCOPY, GASTROSCOPY, DUODENOSCOPY (EGD), COMBINED  7/11/2011    Procedure:COMBINED ESOPHAGOSCOPY, GASTROSCOPY, DUODENOSCOPY (EGD); Surgeon:LAURA LAMAS; Location:[facility]     ESOPHAGOSCOPY, GASTROSCOPY, DUODENOSCOPY (EGD), COMBINED  9/10/2012    Procedure: COMBINED ESOPHAGOSCOPY, GASTROSCOPY, DUODENOSCOPY (EGD);;  Surgeon: Hi Roque MD;  Location:  GI     ESOPHAGOSCOPY, GASTROSCOPY, DUODENOSCOPY (EGD), COMBINED  9/9/2013    Procedure: COMBINED ESOPHAGOSCOPY, GASTROSCOPY, DUODENOSCOPY (EGD);;  Surgeon: Hi Roque MD;  Location:  GI     ESOPHAGOSCOPY, GASTROSCOPY, DUODENOSCOPY (EGD), COMBINED N/A 9/15/2014    Procedure: COMBINED ESOPHAGOSCOPY, GASTROSCOPY, DUODENOSCOPY (EGD);  Surgeon: Hi Roque MD;  Location:  GI     ESOPHAGOSCOPY, GASTROSCOPY, DUODENOSCOPY (EGD), COMBINED N/A 10/30/2015    Procedure: COMBINED ESOPHAGOSCOPY, GASTROSCOPY, DUODENOSCOPY (EGD);  Surgeon: Feliberto Fox MD;  Location:  GI     ESOPHAGOSCOPY, GASTROSCOPY, DUODENOSCOPY (EGD), COMBINED N/A 10/10/2016    Procedure: COMBINED ESOPHAGOSCOPY, GASTROSCOPY, DUODENOSCOPY (EGD);  Surgeon: Jose Nesbitt MD;  Location:  GI     ESOPHAGOSCOPY, GASTROSCOPY, DUODENOSCOPY (EGD), COMBINED N/A 9/26/2019    Procedure: ESOPHAGOGASTRODUODENOSCOPY (EGD);  Surgeon: Timo Soares MD;  [facility]     H ABLATION FOCAL AFIB  12/21/2018     HERNIA REPAIR       IRRIGATION AND DEBRIDEMENT ABSCESS SCROTUM, COMBINED  10/4/2012    Procedure: COMBINED IRRIGATION AND DEBRIDEMENT ABSCESS SCROTUM;  Irrigation and Debridement of Groin Abscess;  Surgeon: Benny Shoemaker MD;  Location: [facility] OR     SOFT TISSUE SURGERY       SURGICAL HISTORY OF -   1993    rt elbow     SURGICAL HISTORY OF -    "1/15/98    repair of ventral and umbilical hernia     SURGICAL HISTORY OF -       endoscopy       Family History:    Family History   Problem Relation Age of Onset     Lipids Mother      Hypertension Mother      Eye Disorder Mother      Heart Disease Father         CHF     Lipids Father      Obesity Father      Heart Disease Brother         MI     Eye Disorder Brother      Hypertension Brother         portal HTN     Thrombosis Sister         in her lung     Eye Disorder Sister      Cancer Other         maternal grandparent/throat cancer       Social History:  Marital Status:   [2]  Social History     Tobacco Use     Smoking status: Former Smoker     Packs/day: 0.80     Years: 6.00     Pack years: 4.80     Types: Cigarettes     Last attempt to quit: 2002     Years since quittin.1     Smokeless tobacco: Never Used   Substance Use Topics     Alcohol use: No     Alcohol/week: 0.0 standard drinks     Comment: quit in      Drug use: No        Medications:    ACE/ARB/ARNI NOT PRESCRIBED (INTENTIONAL)  Acetaminophen 325 MG CAPS  ASPIRIN NOT PRESCRIBED (INTENTIONAL)  budesonide-formoterol (SYMBICORT) 80-4.5 MCG/ACT Inhaler  calcium carb 1250 mg, 500 mg [place],/vitamin D 200 units (OSCAL WITH D) 500-200 MG-UNIT per tablet  ciclopirox (LOPROX) 0.77 % cream  furosemide (LASIX) 40 MG tablet  Multiple Vitamins-Minerals (MENS 50+ MULTI VITAMIN/MIN PO)  mupirocin (BACTROBAN) 2 % external ointment  order for DME  order for DME  ORDER FOR DME  pantoprazole (PROTONIX) 40 MG EC tablet  spironolactone (ALDACTONE) 25 MG tablet  STATIN NOT PRESCRIBED (INTENTIONAL)  vitamin D3 (CHOLECALCIFEROL) 2000 units (50 mcg) tablet  warfarin ANTICOAGULANT (JANTOVEN ANTICOAGULANT) 2.5 MG tablet          Review of Systems  All other systems reviewed and are negative.    Physical Exam   BP: 127/72  Pulse: 72  Temp: 98.7  F (37.1  C)  Resp: 20  Height: 160 cm (5' 3\")  Weight: 108.4 kg (239 lb)  SpO2: 98 %      Physical " Exam  Nontoxic appearing no respiratory distress alert and oriented ×3  Head atraumatic normocephalic  Conjunctiva noninjected, oropharynx moist without lesions or erythema  No cervical adenopathy   Neck supple full active painless range of motion  Lungs clear to auscultation  Heart regular no murmur  Abdomen soft distended, obese, mild diffuse tenderness without guarding or rebound bowel sounds positive   Strength and sensation grossly intact throughout the extremities, gait and station normal  Speech is fluent, good eye contact, thought processes are rational  Lower extremities without swelling, redness or tenderness  Pedal pulses symmetrical and strong    ED Course        Procedures  Results for orders placed or performed during the hospital encounter of 02/22/20   XR Abdomen 2 Views     Status: None    Narrative    ABDOMEN TWO-THREE VIEW  2/22/2020 6:51 PM     HISTORY: Pain.    COMPARISON: May 13, 2013    FINDINGS: Moderate  amount of stool. No free air. There are no air  filled distended loops of small bowel. The colon is not distended. The  lung bases are unremarkable.      Impression    IMPRESSION: Nonobstructed bowel gas pattern.    DAT CHOU MD   Abdomen US, limited (RUQ only)     Status: None    Narrative    ULTRASOUND ABDOMEN LIMITED  2/22/2020 8:28 PM     HISTORY: Post prandial pain.    COMPARISON: None.    FINDINGS:  Liver is coarse and increased in echogenicity without focal  lesions. Gallbladder demonstrates cholelithiasis without evidence for  cholecystitis. Extrahepatic bile duct is normal in diameter. Pancreas  is normal where visualized. Right kidney is normal in size. There is  no hydronephrosis. Portal vein is patent.      Impression    IMPRESSION:    1. Cholelithiasis without evidence for cholecystitis.   2. Cirrhotic liver morphology.     DAT CHOU MD   CBC with platelets, differential     Status: Abnormal   Result Value Ref Range    WBC 2.9 (L) 4.0 - 11.0 10e9/L    RBC Count 3.62 (L)  4.4 - 5.9 10e12/L    Hemoglobin 9.8 (L) 13.3 - 17.7 g/dL    Hematocrit 31.5 (L) 40.0 - 53.0 %    MCV 87 78 - 100 fl    MCH 27.1 26.5 - 33.0 pg    MCHC 31.1 (L) 31.5 - 36.5 g/dL    RDW 17.5 (H) 10.0 - 15.0 %    Platelet Count 69 (L) 150 - 450 10e9/L    Diff Method Automated Method     % Neutrophils 57.2 %    % Lymphocytes 19.7 %    % Monocytes 16.7 %    % Eosinophils 5.8 %    % Basophils 0.3 %    % Immature Granulocytes 0.3 %    Nucleated RBCs 0 0 /100    Absolute Neutrophil 1.7 1.6 - 8.3 10e9/L    Absolute Lymphocytes 0.6 (L) 0.8 - 5.3 10e9/L    Absolute Monocytes 0.5 0.0 - 1.3 10e9/L    Absolute Eosinophils 0.2 0.0 - 0.7 10e9/L    Absolute Basophils 0.0 0.0 - 0.2 10e9/L    Abs Immature Granulocytes 0.0 0 - 0.4 10e9/L    Absolute Nucleated RBC 0.0    Comprehensive metabolic panel     Status: Abnormal   Result Value Ref Range    Sodium 141 133 - 144 mmol/L    Potassium 4.1 3.4 - 5.3 mmol/L    Chloride 107 94 - 109 mmol/L    Carbon Dioxide 26 20 - 32 mmol/L    Anion Gap 8 3 - 14 mmol/L    Glucose 86 70 - 99 mg/dL    Urea Nitrogen 20 7 - 30 mg/dL    Creatinine 0.81 0.66 - 1.25 mg/dL    GFR Estimate >90 >60 mL/min/[1.73_m2]    GFR Estimate If Black >90 >60 mL/min/[1.73_m2]    Calcium 8.7 8.5 - 10.1 mg/dL    Bilirubin Total 1.6 (H) 0.2 - 1.3 mg/dL    Albumin 3.4 3.4 - 5.0 g/dL    Protein Total 6.1 (L) 6.8 - 8.8 g/dL    Alkaline Phosphatase 78 40 - 150 U/L    ALT 45 0 - 70 U/L    AST 51 (H) 0 - 45 U/L   INR     Status: Abnormal   Result Value Ref Range    INR 3.12 (H) 0.86 - 1.14   Lipase     Status: None   Result Value Ref Range    Lipase 139 73 - 393 U/L                  Critical Care time:  none               No results found for this or any previous visit (from the past 24 hour(s)).    Medications - No data to display    Assessments & Plan (with Medical Decision Making)  Postprandial abdominal pain and bloating for the past couple weeks, worse over the last week, abdominal exam is benign and nonsurgical.  Context  cirrhosis with history of portal vein thrombosis, chronic anticoagulation secondary to atrial fibrillation therapeutic on Coumadin with INR 3.12.  Baseline labs pancytopenia.  Flat and upright of the abdomen no evidence for free air or obstruction.  Moderate amount of stool.  Ultrasound right upper quadrant cholelithiasis without evidence for cholecystitis or biliary tract obstruction.  Recommend patient follow-up with GI regarding further evaluation, possible nuclear medicine study/HIDA scan.  He is poor surgical risk.  No indication for admission.  Recommend diet activity as tolerated continue current medications.  Return criteria reviewed.     I have reviewed the nursing notes.    I have reviewed the findings, diagnosis, plan and need for follow up with the patient.          Discharge Medication List as of 2/22/2020  9:52 PM          Final diagnoses:   Calculus of gallbladder without cholecystitis without obstruction   Cirrhosis of liver without ascites, unspecified hepatic cirrhosis type (H)       2/22/2020   [facility] EMERGENCY DEPARTMENT     Surya Willams MD  02/23/20 [id number]

## 2020-02-24 NOTE — PROGRESS NOTES
ANTICOAGULATION  MANAGEMENT: Discharge Review    Maurice Jon chart reviewed for anticoagulation continuity of care    Emergency room visit on 2/22/2020 for Abdominal pain/bloating.    Discharge disposition: Home    Results:    Recent Labs   Lab Test 02/22/20  1819   INR 3.12*       Anticoagulation inpatient management:     not applicable     Anticoagulation discharge instructions:     Warfarin dosing: home regimen continued   Bridging: No   INR goal change: No      Medication changes affecting anticoagulation: No    Additional factors affecting anticoagulation: Pain    Plan     No adjustment to anticoagulation plan needed  Agree with discharge plan for follow up on 3/6/2020. INR was slightly supratherapeutic so agree with recheck INR within 2 weeks.     Patient not contacted    Christina Singer RN

## 2020-02-26 ENCOUNTER — TELEPHONE (OUTPATIENT)
Dept: GASTROENTEROLOGY | Facility: CLINIC | Age: 60
End: 2020-02-26

## 2020-02-26 NOTE — TELEPHONE ENCOUNTER
Pt called writer back. Discussed recent hospitalization. Patients main concern is regarding a gall stone seen on imaging.   Went over assessment and plan that recommend further evaluation, possible nuclear medicine study/HIDA scan.     Advised that patient follow-up with his primary doctor to place orders. Patient was agreeable to plan.    HAWA Evangelista  [facility]ology Clinic  -----  Left two VM for patient to call writer back.    Tonja Jones LPN  Hepatology Clinic    ------  [facility]    Phone Message    May a detailed message be left on voicemail: yes     Reason for Call: Patient calling in requesting a call back to discuss recent ED visit at [facility] on 2/22/2020.  Thank you, Claire Norman on 2/26/2020 at 4:19 PM     Action Taken: Message routed to:  [facility] ([facility] HEPATOLOGY    Travel Screening: Not Applicable

## 2020-03-04 ENCOUNTER — HOSPITAL ENCOUNTER (EMERGENCY)
Facility: CLINIC | Age: 60
Discharge: HOME | End: 2020-03-04
Attending: STUDENT IN AN ORGANIZED HEALTH CARE EDUCATION/TRAINING PROGRAM | Admitting: STUDENT IN AN ORGANIZED HEALTH CARE EDUCATION/TRAINING PROGRAM
Payer: COMMERCIAL

## 2020-03-04 ENCOUNTER — APPOINTMENT (OUTPATIENT)
Dept: ULTRASOUND IMAGING | Facility: CLINIC | Age: 60
End: 2020-03-04
Attending: STUDENT IN AN ORGANIZED HEALTH CARE EDUCATION/TRAINING PROGRAM
Payer: COMMERCIAL

## 2020-03-04 ENCOUNTER — OFFICE VISIT (OUTPATIENT)
Dept: FAMILY MEDICINE | Facility: CLINIC | Age: 60
End: 2020-03-04
Payer: COMMERCIAL

## 2020-03-04 VITALS
OXYGEN SATURATION: 96 % | WEIGHT: 243.4 LBS | RESPIRATION RATE: 18 BRPM | HEART RATE: 73 BPM | TEMPERATURE: 99.6 F | SYSTOLIC BLOOD PRESSURE: 122 MMHG | BODY MASS INDEX: 43.12 KG/M2 | DIASTOLIC BLOOD PRESSURE: 70 MMHG | HEIGHT: 63 IN

## 2020-03-04 VITALS
SYSTOLIC BLOOD PRESSURE: 109 MMHG | BODY MASS INDEX: 43.05 KG/M2 | RESPIRATION RATE: 20 BRPM | OXYGEN SATURATION: 96 % | HEIGHT: 63 IN | DIASTOLIC BLOOD PRESSURE: 61 MMHG | WEIGHT: 243 LBS | HEART RATE: 70 BPM | TEMPERATURE: 98.3 F

## 2020-03-04 DIAGNOSIS — K80.20 GALLSTONES: ICD-10-CM

## 2020-03-04 DIAGNOSIS — R63.5 WEIGHT GAIN: ICD-10-CM

## 2020-03-04 DIAGNOSIS — R60.9 EDEMA, UNSPECIFIED TYPE: ICD-10-CM

## 2020-03-04 DIAGNOSIS — R21 RASH: ICD-10-CM

## 2020-03-04 DIAGNOSIS — R17 JAUNDICE: Primary | ICD-10-CM

## 2020-03-04 DIAGNOSIS — R80.9 PROTEINURIA, UNSPECIFIED TYPE: ICD-10-CM

## 2020-03-04 DIAGNOSIS — K76.6 PORTAL HYPERTENSION (H): ICD-10-CM

## 2020-03-04 DIAGNOSIS — R60.0 BILATERAL LOWER EXTREMITY EDEMA: ICD-10-CM

## 2020-03-04 LAB
ALBUMIN SERPL-MCNC: 3.2 G/DL (ref 3.4–5)
ALBUMIN UR-MCNC: 100 MG/DL
ALP SERPL-CCNC: 82 U/L (ref 40–150)
ALT SERPL W P-5'-P-CCNC: 41 U/L (ref 0–70)
ANION GAP SERPL CALCULATED.3IONS-SCNC: 10 MMOL/L (ref 3–14)
APPEARANCE UR: CLEAR
AST SERPL W P-5'-P-CCNC: 52 U/L (ref 0–45)
BASOPHILS # BLD AUTO: 0 10E9/L (ref 0–0.2)
BASOPHILS NFR BLD AUTO: 0.3 %
BILIRUB SERPL-MCNC: 1.4 MG/DL (ref 0.2–1.3)
BILIRUB UR QL STRIP: NEGATIVE
BUN SERPL-MCNC: 20 MG/DL (ref 7–30)
CALCIUM SERPL-MCNC: 8.9 MG/DL (ref 8.5–10.1)
CHLORIDE SERPL-SCNC: 108 MMOL/L (ref 94–109)
CO2 SERPL-SCNC: 25 MMOL/L (ref 20–32)
COLOR UR AUTO: YELLOW
CREAT SERPL-MCNC: 0.76 MG/DL (ref 0.66–1.25)
DIFFERENTIAL METHOD BLD: ABNORMAL
EOSINOPHIL # BLD AUTO: 0.3 10E9/L (ref 0–0.7)
EOSINOPHIL NFR BLD AUTO: 9.1 %
ERYTHROCYTE [DISTWIDTH] IN BLOOD BY AUTOMATED COUNT: 17.9 % (ref 10–15)
GFR SERPL CREATININE-BSD FRML MDRD: >90 ML/MIN/{1.73_M2}
GLUCOSE SERPL-MCNC: 82 MG/DL (ref 70–99)
GLUCOSE UR STRIP-MCNC: NEGATIVE MG/DL
HCT VFR BLD AUTO: 30.2 % (ref 40–53)
HGB BLD-MCNC: 9.3 G/DL (ref 13.3–17.7)
HGB UR QL STRIP: NEGATIVE
IMM GRANULOCYTES # BLD: 0 10E9/L (ref 0–0.4)
IMM GRANULOCYTES NFR BLD: 0.3 %
INR PPP: 3.44 (ref 0.86–1.14)
KETONES UR STRIP-MCNC: NEGATIVE MG/DL
LEUKOCYTE ESTERASE UR QL STRIP: NEGATIVE
LIPASE SERPL-CCNC: 133 U/L (ref 73–393)
LYMPHOCYTES # BLD AUTO: 0.7 10E9/L (ref 0.8–5.3)
LYMPHOCYTES NFR BLD AUTO: 20.6 %
MCH RBC QN AUTO: 26.9 PG (ref 26.5–33)
MCHC RBC AUTO-ENTMCNC: 30.8 G/DL (ref 31.5–36.5)
MCV RBC AUTO: 87 FL (ref 78–100)
MONOCYTES # BLD AUTO: 0.6 10E9/L (ref 0–1.3)
MONOCYTES NFR BLD AUTO: 16.2 %
MUCOUS THREADS #/AREA URNS LPF: PRESENT /LPF
NEUTROPHILS # BLD AUTO: 1.8 10E9/L (ref 1.6–8.3)
NEUTROPHILS NFR BLD AUTO: 53.5 %
NITRATE UR QL: NEGATIVE
NRBC # BLD AUTO: 0 10*3/UL
NRBC BLD AUTO-RTO: 0 /100
PH UR STRIP: 6 PH (ref 5–7)
PLATELET # BLD AUTO: 53 10E9/L (ref 150–450)
POTASSIUM SERPL-SCNC: 3.7 MMOL/L (ref 3.4–5.3)
PROT SERPL-MCNC: 5.9 G/DL (ref 6.8–8.8)
RBC # BLD AUTO: 3.46 10E12/L (ref 4.4–5.9)
RBC #/AREA URNS AUTO: 7 /HPF (ref 0–2)
SODIUM SERPL-SCNC: 143 MMOL/L (ref 133–144)
SOURCE: ABNORMAL
SP GR UR STRIP: 1.02 (ref 1–1.03)
UROBILINOGEN UR STRIP-MCNC: 4 MG/DL (ref 0–2)
WBC # BLD AUTO: 3.4 10E9/L (ref 4–11)
WBC #/AREA URNS AUTO: 9 /HPF (ref 0–5)

## 2020-03-04 PROCEDURE — 99285 EMERGENCY DEPT VISIT HI MDM: CPT | Mod: Z6 | Performed by: STUDENT IN AN ORGANIZED HEALTH CARE EDUCATION/TRAINING PROGRAM

## 2020-03-04 PROCEDURE — 85610 PROTHROMBIN TIME: CPT | Performed by: STUDENT IN AN ORGANIZED HEALTH CARE EDUCATION/TRAINING PROGRAM

## 2020-03-04 PROCEDURE — 81001 URINALYSIS AUTO W/SCOPE: CPT | Performed by: STUDENT IN AN ORGANIZED HEALTH CARE EDUCATION/TRAINING PROGRAM

## 2020-03-04 PROCEDURE — 76705 ECHO EXAM OF ABDOMEN: CPT

## 2020-03-04 PROCEDURE — 96374 THER/PROPH/DIAG INJ IV PUSH: CPT | Performed by: STUDENT IN AN ORGANIZED HEALTH CARE EDUCATION/TRAINING PROGRAM

## 2020-03-04 PROCEDURE — 83690 ASSAY OF LIPASE: CPT | Performed by: STUDENT IN AN ORGANIZED HEALTH CARE EDUCATION/TRAINING PROGRAM

## 2020-03-04 PROCEDURE — 25000128 H RX IP 250 OP 636: Performed by: STUDENT IN AN ORGANIZED HEALTH CARE EDUCATION/TRAINING PROGRAM

## 2020-03-04 PROCEDURE — 99285 EMERGENCY DEPT VISIT HI MDM: CPT | Mod: 25 | Performed by: STUDENT IN AN ORGANIZED HEALTH CARE EDUCATION/TRAINING PROGRAM

## 2020-03-04 PROCEDURE — 85025 COMPLETE CBC W/AUTO DIFF WBC: CPT | Performed by: STUDENT IN AN ORGANIZED HEALTH CARE EDUCATION/TRAINING PROGRAM

## 2020-03-04 PROCEDURE — 99214 OFFICE O/P EST MOD 30 MIN: CPT | Performed by: NURSE PRACTITIONER

## 2020-03-04 PROCEDURE — 80053 COMPREHEN METABOLIC PANEL: CPT | Performed by: STUDENT IN AN ORGANIZED HEALTH CARE EDUCATION/TRAINING PROGRAM

## 2020-03-04 RX ORDER — FUROSEMIDE 10 MG/ML
60 INJECTION INTRAMUSCULAR; INTRAVENOUS ONCE
Status: COMPLETED | OUTPATIENT
Start: 2020-03-04 | End: 2020-03-04

## 2020-03-04 RX ADMIN — FUROSEMIDE 60 MG: 10 INJECTION, SOLUTION INTRAVENOUS at 20:57

## 2020-03-04 ASSESSMENT — MIFFLIN-ST. JEOR
SCORE: 1812.37
SCORE: 1814.19

## 2020-03-04 NOTE — ED PROVIDER NOTES
History     Chief Complaint   Patient presents with     Jaundice     being sent from clinic for jaundice.  NO abd pain.  Recently diagnosed with gallbladder stone      HPI  Maurice Jon is a 59 year old male with past medical history which includes liver cirrhosis secondary to fatty liver, portal hypertension, paroxysmal atrial fibrillation, congestive heart failure, thrombocytopenia and chronic anticoagulation with warfarin therapy who presents from clinic for evaluation of jaundiced appearance, weight gain and lower extremity edema.  Patient was seen in the department on 2/22/2020 for evaluation of prandial epigastric bloating with nausea.  He was following up at primary care clinic today to arrange for further testing after formal RUQ ultrasound had identified cholelithiasis but provider instead transferred to the department.  In the department he denies fever, chills, abdominal pain, chest pain, or infectious symptoms.  He does admit to nearly 14 pound weight gain over the past 2 weeks despite regular use of spironolactone and furosemide.  He feels as though fluid is primarily collecting throughout abdomen and both lower extremities.  He is also mildly dyspneic with ambulation but denies shortness of breath at rest.    Allergies:  Allergies   Allergen Reactions     Avelox Other (See Comments) and GI Disturbance     portal hypertension     Moxifloxacin Nausea and Vomiting, Nausea and Other (See Comments)     portal hypertension     Sotalol Itching       Problem List:    Patient Active Problem List    Diagnosis Date Noted     Health Care Home 07/01/2011     Priority: High     01/07/2014  Status:  Declined  Care Coordinator:  Salena Joel    See Letters for [facility] Care Plan (emergency care plan only)             Portal hypertension (H) 01/28/2010     Priority: High     Liver Cirrhosis 2nd ot Fatty liver, w Ascites 08/22/2005     Priority: High     Cirrhosis, idiopathic         Atypical chest pain  12/20/2019     Priority: Medium     Chest pain 12/19/2019     Priority: Medium     Persistent atrial fibrillation 11/13/2019     Priority: Medium     Added automatically from request for surgery [id number]       Cellulitis 10/06/2019     Priority: Medium     Acute combined systolic and diastolic (congestive) hrt fail (H) 01/04/2019     Priority: Medium     CAD (coronary artery disease)      Priority: Medium     Cath 12/26/18- mild nonobstructive disease       Cardiomyopathy (H)      Priority: Medium     12/23/18 Echo- EF 25-30%       Pericarditis      Priority: Medium     Acute on chronic systolic congestive heart failure (H)      Priority: Medium     Obesity (BMI 35.0-39.9) with comorbidity (H) 12/28/2018     Priority: Medium     Right heart failure (H) 12/24/2018     Priority: Medium     Chronic a-fib, S/P Ablation 12/22/18 -- on Warfarin 12/21/2018     Priority: Medium     Encounter for monitoring sotalol therapy 10/31/2018     Priority: Medium     Long-term (current) use of anticoagulants [Z79.01] 09/18/2018     Priority: Medium     Portal vein thrombosis 02/02/2017     Priority: Medium     History of MRSA now culture negative 08/06/2015     Priority: Medium     Severe sepsis (H) 07/13/2015     Priority: Medium     Problem list name updated by automated process. Provider to review       Paroxysmal atrial fibrillation (H)      Priority: Medium     S/p cardioversion       Edema 05/13/2013     Priority: Medium     CARDIOVASCULAR SCREENING; LDL GOAL LESS THAN 160 10/31/2010     Priority: Medium     GERD (gastroesophageal reflux disease) 03/25/2010     Priority: Medium     Eczema 01/28/2010     Priority: Medium     BRUCE-Mild (AHI 9; REM RDI 31) 03/20/2009     Priority: Medium     Sleep study 3/09- .0 minutes, latency 3.6 minutes. REM latency 72.0 minutes. Sleep efficiency 87.7%. The sleep architecture was disrupted with frequent sleep stage changes and arousals. Snoring: mild to loud.  RDI 27.7, AHI of 8.4. REM  RDI 31.5 TLO2 saturation 83.0%. This study is suggestive of mild sleep apnea, severe during REM sleep (20% TST). Other:  PLM index was 0.0.       Compulsive behaviors 08/26/2008     Priority: Medium     erectile dysfunction 08/22/2005     Priority: Medium     Obesity 11/24/2010     Priority: Low     Thrombocytopenia (H) 08/22/2005     Priority: Low     Thrombocytopenia associated with cirrhosis and portal hypertension  Problem list name updated by automated process. Provider to review          Past Medical History:    Past Medical History:   Diagnosis Date     A-fib (H)      Acute pulmonary edema (H)      Atrial fibrillation (H)      Atrial fibrillation with rapid ventricular response (H) 5/13/2013     CAD (coronary artery disease)      Calculus of ureter 1993, 1997     Cardiomyopathy (H)      Chronic systolic CHF (congestive heart failure) (H)      Cirrhosis of liver without mention of alcohol 8/22/2005     Edema 5/13/2013     Esophageal varices with bleeding(456.0)      Gallstones      Genital herpes, unspecified 3/20/1999     GERD (gastroesophageal reflux disease)      Hematuria      Hypertension      Hypochondriasis      Obesity 11/24/2010     BRUCE (obstructive sleep apnea) 03/17/2009     Pericarditis      Portal hypertension (H) 1/28/2010     Unspecified thrombocytopenia 8/22/2005       Past Surgical History:    Past Surgical History:   Procedure Laterality Date     ANESTHESIA CARDIOVERSION N/A 1/19/2015    Procedure: ANESTHESIA CARDIOVERSION;  Surgeon: Generic Anesthesia Provider;  Location: [facility] OR     ANESTHESIA CARDIOVERSION N/A 2/6/2019    Procedure: ANESTHESIA CARDIOVERSION;  Surgeon: GENERIC ANESTHESIA PROVIDER;  Location:  OR     ANESTHESIA CARDIOVERSION N/A 7/5/2019    Procedure: ANESTHESIA FOR CARDIOVERSION  DR. MURGUIA);  Surgeon: GENERIC ANESTHESIA PROVIDER;  Location:  OR     COLONOSCOPY N/A 8/14/2017    Procedure: COLONOSCOPY;  Colonoscopy Called will arrive appx 12:30 Per phone call;  Surgeon:  Vincent Whittington MD;  Location:  GI     CV HEART CATHETERIZATION WITH POSSIBLE INTERVENTION N/A 12/26/2018    Procedure: Heart Catheterization with possible Intervention;  Surgeon: Brandon Arroyo MD;  Location:  HEART CARDIAC CATH LAB     EP ABLATION AV NODE N/A 11/15/2019    Procedure: EP Ablation AV Node;  Surgeon: Ag Maloney MD;  Location:  HEART CARDIAC CATH LAB     EP ABLATION FOCAL AFIB N/A 12/21/2018    Procedure: EP Ablation Focal AFIB;  Surgeon: Kristofer Evans MD;  Location:  HEART CARDIAC CATH LAB     EP PACEMAKER N/A 11/15/2019    Procedure: EP PACEMAKER;  Surgeon: Ag Maloney MD;  Location:  HEART CARDIAC CATH LAB     ESOPHAGOSCOPY, GASTROSCOPY, DUODENOSCOPY (EGD), COMBINED  7/11/2011    Procedure:COMBINED ESOPHAGOSCOPY, GASTROSCOPY, DUODENOSCOPY (EGD); Surgeon:LAURA LAMAS; Location:[facility] GI     ESOPHAGOSCOPY, GASTROSCOPY, DUODENOSCOPY (EGD), COMBINED  9/10/2012    Procedure: COMBINED ESOPHAGOSCOPY, GASTROSCOPY, DUODENOSCOPY (EGD);;  Surgeon: Hi Roque MD;  Location:  GI     ESOPHAGOSCOPY, GASTROSCOPY, DUODENOSCOPY (EGD), COMBINED  9/9/2013    Procedure: COMBINED ESOPHAGOSCOPY, GASTROSCOPY, DUODENOSCOPY (EGD);;  Surgeon: Hi Roque MD;  Location:  GI     ESOPHAGOSCOPY, GASTROSCOPY, DUODENOSCOPY (EGD), COMBINED N/A 9/15/2014    Procedure: COMBINED ESOPHAGOSCOPY, GASTROSCOPY, DUODENOSCOPY (EGD);  Surgeon: Hi Roque MD;  Location:  GI     ESOPHAGOSCOPY, GASTROSCOPY, DUODENOSCOPY (EGD), COMBINED N/A 10/30/2015    Procedure: COMBINED ESOPHAGOSCOPY, GASTROSCOPY, DUODENOSCOPY (EGD);  Surgeon: Feliberto Fox MD;  Location:  GI     ESOPHAGOSCOPY, GASTROSCOPY, DUODENOSCOPY (EGD), COMBINED N/A 10/10/2016    Procedure: COMBINED ESOPHAGOSCOPY, GASTROSCOPY, DUODENOSCOPY (EGD);  Surgeon: Jose Nesbitt MD;  Location:  GI     ESOPHAGOSCOPY, GASTROSCOPY, DUODENOSCOPY (EGD), COMBINED N/A 9/26/2019    Procedure: ESOPHAGOGASTRODUODENOSCOPY  (EGD);  Surgeon: Timo Soares MD;  Location: [facility]     H ABLATION FOCAL AFIB  2018     HERNIA REPAIR       IRRIGATION AND DEBRIDEMENT ABSCESS SCROTUM, COMBINED  10/4/2012    Procedure: COMBINED IRRIGATION AND DEBRIDEMENT ABSCESS SCROTUM;  Irrigation and Debridement of Groin Abscess;  Surgeon: Benny Shoemaker MD;  Location: [facility] OR     SOFT TISSUE SURGERY       SURGICAL HISTORY OF -       rt elbow     SURGICAL HISTORY OF -   1/15/98    repair of ventral and umbilical hernia     SURGICAL HISTORY OF -       endoscopy       Family History:    Family History   Problem Relation Age of Onset     Lipids Mother      Hypertension Mother      Eye Disorder Mother      Heart Disease Father         CHF     Lipids Father      Obesity Father      Heart Disease Brother         MI     Eye Disorder Brother      Hypertension Brother         portal HTN     Thrombosis Sister         in her lung     Eye Disorder Sister      Cancer Other         maternal grandparent/throat cancer       Social History:  Marital Status:   [2]  Social History     Tobacco Use     Smoking status: Former Smoker     Packs/day: 0.80     Years: 6.00     Pack years: 4.80     Types: Cigarettes     Last attempt to quit: 2002     Years since quittin.1     Smokeless tobacco: Never Used   Substance Use Topics     Alcohol use: No     Alcohol/week: 0.0 standard drinks     Comment: quit in      Drug use: No        Medications:    ACE/ARB/ARNI NOT PRESCRIBED (INTENTIONAL)  Acetaminophen 325 MG CAPS  ASPIRIN NOT PRESCRIBED (INTENTIONAL)  budesonide-formoterol (SYMBICORT) 80-4.5 MCG/ACT Inhaler  calcium carb 1250 mg, 500 mg [place],/vitamin D 200 units (OSCAL WITH D) 500-200 MG-UNIT per tablet  ciclopirox (LOPROX) 0.77 % cream  furosemide (LASIX) 40 MG tablet  Multiple Vitamins-Minerals (MENS 50+ MULTI VITAMIN/MIN PO)  mupirocin (BACTROBAN) 2 % external ointment  order for DME  order for DME  ORDER FOR DME  pantoprazole  "(PROTONIX) 40 MG EC tablet  spironolactone (ALDACTONE) 25 MG tablet  STATIN NOT PRESCRIBED (INTENTIONAL)  vitamin D3 (CHOLECALCIFEROL) 2000 units (50 mcg) tablet  warfarin ANTICOAGULANT (JANTOVEN ANTICOAGULANT) 2.5 MG tablet          Review of Systems  Constitutional:  Negative for fever or recent illness.  Cardiovascular:  Negative for chest pain.  Respiratory: Positive for shortness of breath with minimal exertion.  Negative for cough.  Gastrointestinal: Positive for postprandial epigastric bloating.  Negative for vomiting or diarrhea.  Denies blood with bowel movements.  Genitourinary:  Negative for dysuria, oliguria, or hematuria.  Neurological:  Negative for headache or dizziness.    All others reviewed and are negative.      Physical Exam   BP: 127/75  Pulse: 70  Temp: 98.3  F (36.8  C)  Resp: 20  Height: 160 cm (5' 3\")  Weight: 110.2 kg (243 lb)  SpO2: 98 %      Physical Exam  Constitutional:  Well developed, well nourished.  Appears nontoxic and in no acute distress.    HENT:  Normocephalic and atraumatic.  Symmetric in appearance.  Eyes: Mild scleral icterus.  Neck:  Neck supple.  Cardiovascular:  No cyanosis.  RRR.  No audible murmurs noted.  4+ bilateral lower extremity edema, symmetric in appearance without calf tenderness.  Respiratory:  Effort normal without sign of respiratory distress.  CTAB without diminished regions.  No wheezing, rhonchi, or crackles.  Gastrointestinal:  Moderately distended abdomen.  Nontender and without guarding.  No rigidity or rebound tenderness.  Negative Theodore's sign.  Negative McBurney's point.    Musculoskeletal:  Moves extremities spontaneously.  Neurological:  Patient is alert.  Skin:  Skin is warm and dry.  Psychiatric:  Normal mood and affect.      ED Course        Procedures              Critical Care time:                 Results for orders placed or performed during the hospital encounter of 03/04/20 (from the past 24 hour(s))   UA reflex to Microscopic and " Culture   Result Value Ref Range    Color Urine Yellow     Appearance Urine Clear     Glucose Urine Negative NEG^Negative mg/dL    Bilirubin Urine Negative NEG^Negative    Ketones Urine Negative NEG^Negative mg/dL    Specific Gravity Urine 1.017 1.003 - 1.035    Blood Urine Negative NEG^Negative    pH Urine 6.0 5.0 - 7.0 pH    Protein Albumin Urine 100 (A) NEG^Negative mg/dL    Urobilinogen mg/dL 4.0 (H) 0.0 - 2.0 mg/dL    Nitrite Urine Negative NEG^Negative    Leukocyte Esterase Urine Negative NEG^Negative    Source Midstream Urine     RBC Urine 7 (H) 0 - 2 /HPF    WBC Urine 9 (H) 0 - 5 /HPF    Mucous Urine Present (A) NEG^Negative /LPF   CBC with platelets differential   Result Value Ref Range    WBC 3.4 (L) 4.0 - 11.0 10e9/L    RBC Count 3.46 (L) 4.4 - 5.9 10e12/L    Hemoglobin 9.3 (L) 13.3 - 17.7 g/dL    Hematocrit 30.2 (L) 40.0 - 53.0 %    MCV 87 78 - 100 fl    MCH 26.9 26.5 - 33.0 pg    MCHC 30.8 (L) 31.5 - 36.5 g/dL    RDW 17.9 (H) 10.0 - 15.0 %    Platelet Count 53 (L) 150 - 450 10e9/L    Diff Method Automated Method     % Neutrophils 53.5 %    % Lymphocytes 20.6 %    % Monocytes 16.2 %    % Eosinophils 9.1 %    % Basophils 0.3 %    % Immature Granulocytes 0.3 %    Nucleated RBCs 0 0 /100    Absolute Neutrophil 1.8 1.6 - 8.3 10e9/L    Absolute Lymphocytes 0.7 (L) 0.8 - 5.3 10e9/L    Absolute Monocytes 0.6 0.0 - 1.3 10e9/L    Absolute Eosinophils 0.3 0.0 - 0.7 10e9/L    Absolute Basophils 0.0 0.0 - 0.2 10e9/L    Abs Immature Granulocytes 0.0 0 - 0.4 10e9/L    Absolute Nucleated RBC 0.0    INR   Result Value Ref Range    INR 3.44 (H) 0.86 - 1.14   Comprehensive metabolic panel   Result Value Ref Range    Sodium 143 133 - 144 mmol/L    Potassium 3.7 3.4 - 5.3 mmol/L    Chloride 108 94 - 109 mmol/L    Carbon Dioxide 25 20 - 32 mmol/L    Anion Gap 10 3 - 14 mmol/L    Glucose 82 70 - 99 mg/dL    Urea Nitrogen 20 7 - 30 mg/dL    Creatinine 0.76 0.66 - 1.25 mg/dL    GFR Estimate >90 >60 mL/min/[1.73_m2]    GFR  Estimate If Black >90 >60 mL/min/[1.73_m2]    Calcium 8.9 8.5 - 10.1 mg/dL    Bilirubin Total 1.4 (H) 0.2 - 1.3 mg/dL    Albumin 3.2 (L) 3.4 - 5.0 g/dL    Protein Total 5.9 (L) 6.8 - 8.8 g/dL    Alkaline Phosphatase 82 40 - 150 U/L    ALT 41 0 - 70 U/L    AST 52 (H) 0 - 45 U/L   Lipase   Result Value Ref Range    Lipase 133 73 - 393 U/L   Abdomen US, limited (RUQ only)    Narrative    US ABDOMEN LIMITED 3/4/2020 6:42 PM    CLINICAL HISTORY: Jaundice and known cholelithiasis.    TECHNIQUE: Limited abdominal ultrasound.    COMPARISON: 2/22/2020    FINDINGS:    GALLBLADDER: Small gallstones in the gallbladder.    BILE DUCTS: There is no biliary dilatation. The common duct measures  6mm.    LIVER: Cirrhotic appearance of the liver.    RIGHT KIDNEY: No hydronephrosis.    PANCREAS: The visualized portions of the pancreas are normal.  Partially calcified portal vein again seen.    No significant ascites.      Impression    IMPRESSION:  1.  Tiny gallstones in the gallbladder. No bile duct dilatation.  2.  Cirrhotic appearance of the liver.  3.  No significant change from previous.    BERNARDINO COBB MD       Medications   furosemide (LASIX) injection 60 mg (60 mg Intravenous Given 3/4/20 2057)       Assessments & Plan (with Medical Decision Making)   Maurice Jon is a 59 year old male who presents to the department from clinic for evaluation of jaundiced appearance with increasing weight gain, ascites, lower extremity edema.  On examination he has some mild scleral icteric is but without overt jaundiced appearance.  He is without hypoxia, tachypnea or overt respiratory distress and does not appear to be in acute congestive heart failure.  No recent infectious signs or symptoms.  WBC, hemoglobin and platelet values similar to previous on file.  Although creatinine is within reference range, patient has some protein with any urinalysis.  Consulted on-call [facility] nephrologist Dr. Iqbal who does not  believe this is truly nephrotic syndrome and would feel comfortable with diuresis but defers to GI.  Subsequently consulted on-call hepatologist Dr. Leventhal who is an associate of Dr. Roque, he recommended increasing furosemide diuretic therapy and follow-up with Dr. Roque tomorrow for continued management planning.  Patient does not appear acutely ill or in distress, outpatient therapy appropriate at this time.          Disclaimer:  This note consists of symbols derived from keyboarding, dictation, and/or voice recognition software.  As a result, there may be errors in the script that have gone undetected.  Please consider this when interpreting information found in the chart.        I have reviewed the nursing notes.    I have reviewed the findings, diagnosis, plan and need for follow up with the patient.       New Prescriptions    No medications on file       Final diagnoses:   Weight gain   Bilateral lower extremity edema   Proteinuria, unspecified type       3/4/2020   [facility] EMERGENCY DEPARTMENT     Manuel Vital DO  03/04/20 5013

## 2020-03-04 NOTE — PROGRESS NOTES
"Subjective     Maurice Jon is a 59 year old male who presents to clinic today for the following health issues: the patient with history of portal hypertension here for ED follow up, was recently seen in emergency room and diagnosed with gallstones. The patient complains of feeling of abdominal distension, shortness of breath and 15 lbs weight gain in 2 weeks. He is also having low grade fevers.    HPI   ED/UC Followup:    Facility: [facility]   Date of visit: 2/22/20   Reason for visit: Calculus of gallbladder without cholecystitis without obstruction   Current Status: Still has abdominal bloating and legs are still swelling, has been wearing his compression stockings. Needs a referral to get a HIDA scan.        Rash      Duration: Since 2/13     Description  Location: back, chest, stomach, shoulders, back of arms   Itching: moderate- can itch at times     Intensity:  moderate    Accompanying signs and symptoms: None    History (similar episodes/previous evaluation): YES was seen on 2/13 and diagnosed with ring worm.     Precipitating or alleviating factors:  New exposures:  None  Recent travel: no      Therapies tried and outcome: Loprox cream- has not been helpful       Reviewed and updated as needed this visit by Provider         Review of Systems   ROS COMP: Constitutional, HEENT, cardiovascular, pulmonary, gi and gu systems are negative, except as otherwise noted.      Objective    /70 (BP Location: Right arm, Patient Position: Sitting, Cuff Size: Adult Regular)   Pulse 73   Temp 99.6  F (37.6  C) (Tympanic)   Resp 18   Ht 1.6 m (5' 3\")   Wt 110.4 kg (243 lb 6.4 oz)   SpO2 96%   BMI 43.12 kg/m    Body mass index is 43.12 kg/m .  Physical Exam   GENERAL: healthy, alert and no distress  EYES: slight scleral icterus   CV: regular rates and rhythm, normal S1 S2, no S3 or S4, no murmur, click or rub, peripheral pulses strong and 4+ bilateral lower extremity pitting edema to LE    ABDOMEN: soft, " non-tender, moderately distended abdomen  SKIN: slight jaundice, mild erythematous rash on upper back, shoulders   NEURO: Normal strength and tone, mentation intact and speech normal  PSYCH: mentation appears normal, affect normal/bright    Diagnostic Test Results:  Labs reviewed in Epic        Assessment & Plan     1. Jaundice  -history of gallstones, recommended ED evaluation for possible obstruction, cholecystitis     2. Edema, unspecified type  -gained 14 lbs in 2 weeks, possible ascitics, recommended ED evaluation and IV diuretics     3. Portal hypertension (H)  -he will follow up with Dr. Jude ayala    4. Gallstones  -follow up with liver clinic and schedule HIDA scan   - NM Hepatobiliary Scan w GB EF; Future    5. Rash  -follow up with dermatologist  -stop Loprox and start Kenalog   - triamcinolone (KENALOG) 0.1 % external cream; Apply topically 2 times daily  Dispense: 80 g; Refill: 1       See Patient Instructions      JANETTE Arriola [facility]

## 2020-03-04 NOTE — ED NOTES
"Pt was seen in clinic today for f/u appointment. Increase in weight by 14lbs, jaundice, swollen legs, bilirubin increase. Per NP seen today patient was to be seen in ER-evaluated for gallbladder blockage. Recent hx of gallstones, Hx liver cirrhosis, portal hypertension. Abdominal region feels full, distended. Pt denies confusion, states he has been \"slow\". Wife shakes head \"yes\" when asked about confusion.   "

## 2020-03-04 NOTE — ED AVS SNAPSHOT
[facility] Emergency Department  [address] [address]  Phone:  [phone]  Fax:  [phone]                                    Maurice Jon   MRN: [id number]    Department:  [facility] Emergency Department   Date of Visit:  3/4/2020           After Visit Summary Signature Page    I have received my discharge instructions, and my questions have been answered. I have discussed any challenges I see with this plan with the nurse or doctor.    ..........................................................................................................................................  Patient/Patient Representative Signature      ..........................................................................................................................................  Patient Representative Print Name and Relationship to Patient    ..................................................               ................................................  Date                                   Time    ..........................................................................................................................................  Reviewed by Signature/Title    ...................................................              ..............................................  Date                                               Time          22EPIC Rev 08/18

## 2020-03-05 ENCOUNTER — DOCUMENTATION ONLY (OUTPATIENT)
Dept: ANTICOAGULATION | Facility: CLINIC | Age: 60
End: 2020-03-05

## 2020-03-05 DIAGNOSIS — Z79.01 LONG TERM CURRENT USE OF ANTICOAGULANT THERAPY: ICD-10-CM

## 2020-03-05 DIAGNOSIS — I48.0 PAROXYSMAL ATRIAL FIBRILLATION (H): ICD-10-CM

## 2020-03-05 RX ORDER — TRIAMCINOLONE ACETONIDE 1 MG/G
CREAM TOPICAL 2 TIMES DAILY
Qty: 80 G | Refills: 1 | Status: SHIPPED | OUTPATIENT
Start: 2020-03-05

## 2020-03-06 ENCOUNTER — APPOINTMENT (OUTPATIENT)
Dept: GENERAL RADIOLOGY | Facility: CLINIC | Age: 60
End: 2020-03-06
Attending: EMERGENCY MEDICINE
Payer: COMMERCIAL

## 2020-03-06 ENCOUNTER — TELEPHONE (OUTPATIENT)
Dept: GASTROENTEROLOGY | Facility: CLINIC | Age: 60
End: 2020-03-06

## 2020-03-06 ENCOUNTER — APPOINTMENT (OUTPATIENT)
Dept: CT IMAGING | Facility: CLINIC | Age: 60
End: 2020-03-06
Attending: EMERGENCY MEDICINE
Payer: COMMERCIAL

## 2020-03-06 ENCOUNTER — HOSPITAL ENCOUNTER (EMERGENCY)
Facility: CLINIC | Age: 60
Discharge: HOME | End: 2020-03-06
Attending: EMERGENCY MEDICINE | Admitting: EMERGENCY MEDICINE
Payer: COMMERCIAL

## 2020-03-06 VITALS
BODY MASS INDEX: 43.04 KG/M2 | DIASTOLIC BLOOD PRESSURE: 71 MMHG | RESPIRATION RATE: 20 BRPM | HEART RATE: 70 BPM | SYSTOLIC BLOOD PRESSURE: 120 MMHG | WEIGHT: 242.95 LBS | OXYGEN SATURATION: 98 % | TEMPERATURE: 98.1 F

## 2020-03-06 DIAGNOSIS — K74.60 LIVER CIRRHOSIS SECONDARY TO NONALCOHOLIC STEATOHEPATITIS (NASH) (H): ICD-10-CM

## 2020-03-06 DIAGNOSIS — K75.81 LIVER CIRRHOSIS SECONDARY TO NONALCOHOLIC STEATOHEPATITIS (NASH) (H): ICD-10-CM

## 2020-03-06 DIAGNOSIS — R60.9 EDEMA, UNSPECIFIED TYPE: ICD-10-CM

## 2020-03-06 DIAGNOSIS — R31.9 HEMATURIA, UNSPECIFIED TYPE: ICD-10-CM

## 2020-03-06 LAB
ALBUMIN SERPL-MCNC: 3.1 G/DL (ref 3.4–5)
ALBUMIN UR-MCNC: 100 MG/DL
ALP SERPL-CCNC: 80 U/L (ref 40–150)
ALT SERPL W P-5'-P-CCNC: 44 U/L (ref 0–70)
ANION GAP SERPL CALCULATED.3IONS-SCNC: 5 MMOL/L (ref 3–14)
APPEARANCE UR: CLEAR
APTT PPP: 43 SEC (ref 22–37)
AST SERPL W P-5'-P-CCNC: 59 U/L (ref 0–45)
BASOPHILS # BLD AUTO: 0 10E9/L (ref 0–0.2)
BASOPHILS NFR BLD AUTO: 0.3 %
BILIRUB DIRECT SERPL-MCNC: 0.7 MG/DL (ref 0–0.2)
BILIRUB SERPL-MCNC: 1.9 MG/DL (ref 0.2–1.3)
BILIRUB UR QL STRIP: NEGATIVE
BUN SERPL-MCNC: 19 MG/DL (ref 7–30)
CALCIUM SERPL-MCNC: 8.3 MG/DL (ref 8.5–10.1)
CHLORIDE SERPL-SCNC: 105 MMOL/L (ref 94–109)
CO2 SERPL-SCNC: 29 MMOL/L (ref 20–32)
COLOR UR AUTO: YELLOW
CREAT SERPL-MCNC: 0.84 MG/DL (ref 0.66–1.25)
DIFFERENTIAL METHOD BLD: ABNORMAL
EOSINOPHIL # BLD AUTO: 0.3 10E9/L (ref 0–0.7)
EOSINOPHIL NFR BLD AUTO: 9.3 %
ERYTHROCYTE [DISTWIDTH] IN BLOOD BY AUTOMATED COUNT: 18 % (ref 10–15)
GFR SERPL CREATININE-BSD FRML MDRD: >90 ML/MIN/{1.73_M2}
GLUCOSE SERPL-MCNC: 85 MG/DL (ref 70–99)
GLUCOSE UR STRIP-MCNC: NEGATIVE MG/DL
HCT VFR BLD AUTO: 30.4 % (ref 40–53)
HGB BLD-MCNC: 9.1 G/DL (ref 13.3–17.7)
HGB UR QL STRIP: ABNORMAL
IMM GRANULOCYTES # BLD: 0 10E9/L (ref 0–0.4)
IMM GRANULOCYTES NFR BLD: 0.3 %
INR PPP: 3.3 (ref 0.86–1.14)
KETONES UR STRIP-MCNC: NEGATIVE MG/DL
LEUKOCYTE ESTERASE UR QL STRIP: ABNORMAL
LYMPHOCYTES # BLD AUTO: 0.5 10E9/L (ref 0.8–5.3)
LYMPHOCYTES NFR BLD AUTO: 16.3 %
MCH RBC QN AUTO: 26.5 PG (ref 26.5–33)
MCHC RBC AUTO-ENTMCNC: 29.9 G/DL (ref 31.5–36.5)
MCV RBC AUTO: 88 FL (ref 78–100)
MONOCYTES # BLD AUTO: 0.5 10E9/L (ref 0–1.3)
MONOCYTES NFR BLD AUTO: 15.7 %
MUCOUS THREADS #/AREA URNS LPF: PRESENT /LPF
NEUTROPHILS # BLD AUTO: 1.7 10E9/L (ref 1.6–8.3)
NEUTROPHILS NFR BLD AUTO: 58.1 %
NITRATE UR QL: NEGATIVE
NRBC # BLD AUTO: 0 10*3/UL
NRBC BLD AUTO-RTO: 0 /100
NT-PROBNP SERPL-MCNC: 436 PG/ML (ref 0–900)
PH UR STRIP: 6.5 PH (ref 5–7)
PLATELET # BLD AUTO: 54 10E9/L (ref 150–450)
POTASSIUM SERPL-SCNC: 3.4 MMOL/L (ref 3.4–5.3)
PROT SERPL-MCNC: 5.8 G/DL (ref 6.8–8.8)
RBC # BLD AUTO: 3.44 10E12/L (ref 4.4–5.9)
RBC #/AREA URNS AUTO: >182 /HPF (ref 0–2)
SODIUM SERPL-SCNC: 138 MMOL/L (ref 133–144)
SOURCE: ABNORMAL
SP GR UR STRIP: 1.01 (ref 1–1.03)
TROPONIN I SERPL-MCNC: 0.03 UG/L (ref 0–0.04)
UROBILINOGEN UR STRIP-MCNC: 2 MG/DL (ref 0–2)
WBC # BLD AUTO: 3 10E9/L (ref 4–11)
WBC #/AREA URNS AUTO: 0 /HPF (ref 0–5)

## 2020-03-06 PROCEDURE — 85610 PROTHROMBIN TIME: CPT | Performed by: EMERGENCY MEDICINE

## 2020-03-06 PROCEDURE — 80048 BASIC METABOLIC PNL TOTAL CA: CPT | Performed by: EMERGENCY MEDICINE

## 2020-03-06 PROCEDURE — 81001 URINALYSIS AUTO W/SCOPE: CPT | Performed by: EMERGENCY MEDICINE

## 2020-03-06 PROCEDURE — 71046 X-RAY EXAM CHEST 2 VIEWS: CPT

## 2020-03-06 PROCEDURE — 83880 ASSAY OF NATRIURETIC PEPTIDE: CPT | Performed by: EMERGENCY MEDICINE

## 2020-03-06 PROCEDURE — 80076 HEPATIC FUNCTION PANEL: CPT | Performed by: EMERGENCY MEDICINE

## 2020-03-06 PROCEDURE — 99285 EMERGENCY DEPT VISIT HI MDM: CPT | Mod: 25 | Performed by: EMERGENCY MEDICINE

## 2020-03-06 PROCEDURE — 93005 ELECTROCARDIOGRAM TRACING: CPT | Performed by: EMERGENCY MEDICINE

## 2020-03-06 PROCEDURE — 84484 ASSAY OF TROPONIN QUANT: CPT | Performed by: EMERGENCY MEDICINE

## 2020-03-06 PROCEDURE — 85025 COMPLETE CBC W/AUTO DIFF WBC: CPT | Performed by: EMERGENCY MEDICINE

## 2020-03-06 PROCEDURE — 25000128 H RX IP 250 OP 636: Performed by: EMERGENCY MEDICINE

## 2020-03-06 PROCEDURE — 85730 THROMBOPLASTIN TIME PARTIAL: CPT | Performed by: EMERGENCY MEDICINE

## 2020-03-06 PROCEDURE — 96374 THER/PROPH/DIAG INJ IV PUSH: CPT | Performed by: EMERGENCY MEDICINE

## 2020-03-06 PROCEDURE — 74176 CT ABD & PELVIS W/O CONTRAST: CPT

## 2020-03-06 PROCEDURE — 93010 ELECTROCARDIOGRAM REPORT: CPT | Mod: Z6 | Performed by: EMERGENCY MEDICINE

## 2020-03-06 RX ORDER — FUROSEMIDE 10 MG/ML
40 INJECTION INTRAMUSCULAR; INTRAVENOUS ONCE
Status: COMPLETED | OUTPATIENT
Start: 2020-03-06 | End: 2020-03-06

## 2020-03-06 RX ADMIN — FUROSEMIDE 40 MG: 10 INJECTION, SOLUTION INTRAVENOUS at 17:24

## 2020-03-06 ASSESSMENT — ENCOUNTER SYMPTOMS
CONSTIPATION: 0
CONFUSION: 1
FEVER: 0
FATIGUE: 1
HEMATURIA: 1
LIGHT-HEADEDNESS: 1
VOMITING: 0
COLOR CHANGE: 1
BLOOD IN STOOL: 0
DIARRHEA: 0
CHILLS: 0
NAUSEA: 0

## 2020-03-06 NOTE — TELEPHONE ENCOUNTER
LVM advising patient to be seen by his primary, urgent care or ED today to get symptoms evaluated.  Call back number given if needed.      Tonja Jones LPN  Hepatology Clinic          [facility]    Phone Message    May a detailed message be left on voicemail: yes     Reason for Call: Symptoms or Concerns     If patient has red-flag symptoms, warm transfer to triage line    Current symptom or concern:  Pt was seen in the ER on 3/4/20 for water retention.   The ER has changed his meds and this morning he has noticed blood in his urine.     Also has swelling in both legs from knee down.     Symptoms have been present for: 4 hour(s)    Has patient previously been seen for this? Yes    By : [facility]    Date: 3/4/20    Are there any new or worsening symptoms? Yes: Worsening.          Action Taken: Message routed to:  [facility] Hepatology    Travel Screening: Not Applicable

## 2020-03-06 NOTE — DISCHARGE INSTRUCTIONS
Your blood work is stable.  Your liver function tests are the same as prior.     Take 1 extra lasix (40 mg tablet daily) as previously recommended.     Please make an appointment to follow up with Urology Clinic (phone: [phone]) in 4-7 days regarding your blood in your urine.    Please follow up with your liver doctor.     Return to the ER if symptoms worsen.

## 2020-03-06 NOTE — ED TRIAGE NOTES
Patient presents ambulatory from home with complaints of hematuria, jaundice, and water retention. Hx of liver disease, portal hypertension.

## 2020-03-06 NOTE — ED AVS SNAPSHOT
[facility], [place], Emergency Department  [address] [place] [id number]  Phone:  [phone]                                    Maurice Jon   MRN: [id number]    Department:  [facility], Emergency Department   Date of Visit:  3/6/2020           After Visit Summary Signature Page    I have received my discharge instructions, and my questions have been answered. I have discussed any challenges I see with this plan with the nurse or doctor.    ..........................................................................................................................................  Patient/Patient Representative Signature      ..........................................................................................................................................  Patient Representative Print Name and Relationship to Patient    ..................................................               ................................................  Date                                   Time    ..........................................................................................................................................  Reviewed by Signature/Title    ...................................................              ..............................................  Date                                               Time          22EPIC Rev 08/18

## 2020-03-06 NOTE — ED PROVIDER NOTES
ED Triage Provider Note  [facility]  Encounter Date: Mar 6, 2020  1:54 PM     History:  Chief Complaint   Patient presents with     Hematuria     Maurice Jon is a 59 year old male who presents with hematuria and worsening jaundice. History provided by patient and wife. Patient has a history of liver cirrhosis with liver failure, portal hypertension, paroxysmal atrial fibrillation status post pacemaker chronically anticoagulated on warfarin, CHF, and thrombocytopenia. Patient presented to [facility] ED on 3/4/2020 with increased jaundice and lower extremity edema. They discussed case with [facility] hepatology and recommended increasing his furosemide. Patient has been trying to increase his furosemide dose every 4 hours and today developed hematuria. He thought symptoms would improve as he has had hematuria in the past from kidney stones. However his hematuria has persisted. He came home from work at 10 and wife saw he was more jaundiced. Wife brought him to the Emergency Department for evaluation. Wife notes he has low plts. He has increased fluid feeling it is pushing up into his thoracic space. No prior paracentesis. No history of kidney disease.     Last echo was in December, EF 50-55%.   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 8/22/2005    Cirrhosis, idiopathic     Edema 5/13/2013     Esophageal varices with bleeding(456.0)     Pt denies     Gallstones      Genital herpes, unspecified 3/20/1999    resolving herpes progenitalis     GERD  (gastroesophageal reflux disease)      Hematuria      Hypertension      Hypochondriasis     problems in past     Obesity 11/24/2010     BRUCE (obstructive sleep apnea) 03/17/2009    Mild AHI 8.4  RDI 31 - Pt refuses to wear his CPAP     Pericarditis      Portal hypertension (H) 1/28/2010     Unspecified thrombocytopenia 8/22/2005    Thrombocytopenia associated with cirrhosis and portal hypertension     Past Surgical History:   Procedure Laterality Date     ANESTHESIA CARDIOVERSION N/A 1/19/2015    Procedure: ANESTHESIA CARDIOVERSION;  Surgeon: Generic Anesthesia Provider;  Location: [facility] OR     ANESTHESIA CARDIOVERSION N/A 2/6/2019    Procedure: ANESTHESIA CARDIOVERSION;  Surgeon: GENERIC ANESTHESIA PROVIDER;  Location:  OR     ANESTHESIA CARDIOVERSION N/A 7/5/2019    Procedure: ANESTHESIA FOR CARDIOVERSION  DR. MURGUIA);  Surgeon: GENERIC ANESTHESIA PROVIDER;  Location:  OR     COLONOSCOPY N/A 8/14/2017    Procedure: COLONOSCOPY;  Colonoscopy Called will arrive appx 12:30 Per phone call;  Surgeon: Vincent Whittington MD;  Location:  GI     CV HEART CATHETERIZATION WITH POSSIBLE INTERVENTION N/A 12/26/2018    Procedure: Heart Catheterization with possible Intervention;  Surgeon: Brandon Arroyo MD;  Location:  HEART CARDIAC CATH LAB     EP ABLATION AV NODE N/A 11/15/2019    Procedure: EP Ablation AV Node;  Surgeon: Ag Maloney MD;  Location:  HEART CARDIAC CATH LAB     EP ABLATION FOCAL AFIB N/A 12/21/2018    Procedure: EP Ablation Focal AFIB;  Surgeon: Kristofer Murguia MD;  Location:  HEART CARDIAC CATH LAB     EP PACEMAKER N/A 11/15/2019    Procedure: EP PACEMAKER;  Surgeon: Ag Maloney MD;  Location:  HEART CARDIAC CATH LAB     ESOPHAGOSCOPY, GASTROSCOPY, DUODENOSCOPY (EGD), COMBINED  7/11/2011    Procedure:COMBINED ESOPHAGOSCOPY, GASTROSCOPY, DUODENOSCOPY (EGD); Surgeon:LAURA LAMAS; Location:[facility] GI     ESOPHAGOSCOPY, GASTROSCOPY, DUODENOSCOPY (EGD), COMBINED  9/10/2012     Procedure: COMBINED ESOPHAGOSCOPY, GASTROSCOPY, DUODENOSCOPY (EGD);;  Surgeon: Hi Roque MD;  Location: [facility]     ESOPHAGOSCOPY, GASTROSCOPY, DUODENOSCOPY (EGD), COMBINED  9/9/2013    Procedure: COMBINED ESOPHAGOSCOPY, GASTROSCOPY, DUODENOSCOPY (EGD);;  Surgeon: Hi Roque MD;  Location: [facility] GI     ESOPHAGOSCOPY, GASTROSCOPY, DUODENOSCOPY (EGD), COMBINED N/A 9/15/2014    Procedure: COMBINED ESOPHAGOSCOPY, GASTROSCOPY, DUODENOSCOPY (EGD);  Surgeon: Hi Roque MD;  Location: [facility]     ESOPHAGOSCOPY, GASTROSCOPY, DUODENOSCOPY (EGD), COMBINED N/A 10/30/2015    Procedure: COMBINED ESOPHAGOSCOPY, GASTROSCOPY, DUODENOSCOPY (EGD);  Surgeon: Feliberto Fox MD;  Location:  GI     ESOPHAGOSCOPY, GASTROSCOPY, DUODENOSCOPY (EGD), COMBINED N/A 10/10/2016    Procedure: COMBINED ESOPHAGOSCOPY, GASTROSCOPY, DUODENOSCOPY (EGD);  Surgeon: Jose Nesbitt MD;  [facility]     ESOPHAGOSCOPY, GASTROSCOPY, DUODENOSCOPY (EGD), COMBINED N/A 9/26/2019    Procedure: ESOPHAGOGASTRODUODENOSCOPY (EGD);  Surgeon: Timo Soares MD;  Location:  GI     H ABLATION FOCAL AFIB  12/21/2018     HERNIA REPAIR       IRRIGATION AND DEBRIDEMENT ABSCESS SCROTUM, COMBINED  10/4/2012    Procedure: COMBINED IRRIGATION AND DEBRIDEMENT ABSCESS SCROTUM;  Irrigation and Debridement of Groin Abscess;  Surgeon: Benny Shoemaker MD;  Location: [facility] OR     SOFT TISSUE SURGERY       SURGICAL HISTORY OF -   1993    rt elbow     SURGICAL HISTORY OF -   1/15/98    repair of ventral and umbilical hernia     SURGICAL HISTORY OF -   2001    endoscopy     No current facility-administered medications for this encounter.      Current Outpatient Medications   Medication     ACE/ARB/ARNI NOT PRESCRIBED (INTENTIONAL)     Acetaminophen 325 MG CAPS     ASPIRIN NOT PRESCRIBED (INTENTIONAL)     budesonide-formoterol (SYMBICORT) 80-4.5 MCG/ACT Inhaler     calcium carb 1250 mg, 500 mg [place],/vitamin D 200 units (OSCAL WITH D) 500-200 MG-UNIT per  tablet     ciclopirox (LOPROX) 0.77 % cream     furosemide (LASIX) 40 MG tablet     Multiple Vitamins-Minerals (MENS 50+ MULTI VITAMIN/MIN PO)     mupirocin (BACTROBAN) 2 % external ointment     order for DME     order for DME     ORDER FOR DME     pantoprazole (PROTONIX) 40 MG EC tablet     spironolactone (ALDACTONE) 25 MG tablet     STATIN NOT PRESCRIBED (INTENTIONAL)     triamcinolone (KENALOG) 0.1 % external cream     vitamin D3 (CHOLECALCIFEROL) 2000 units (50 mcg) tablet     warfarin ANTICOAGULANT (JANTOVEN ANTICOAGULANT) 2.5 MG tablet        Allergies   Allergen Reactions     Avelox Other (See Comments) and GI Disturbance     portal hypertension     Moxifloxacin Nausea and Vomiting, Nausea and Other (See Comments)     portal hypertension     Sotalol Itching     Social History     Socioeconomic History     Marital status:      Spouse name: Not on file     Number of children: Not on file     Years of education: Not on file     Highest education level: Not on file   Occupational History     Not on file   Social Needs     Financial resource strain: Not on file     Food insecurity:     Worry: Not on file     Inability: Not on file     Transportation needs:     Medical: Not on file     Non-medical: Not on file   Tobacco Use     Smoking status: Former Smoker     Packs/day: 0.80     Years: 6.00     Pack years: 4.80     Types: Cigarettes     Last attempt to quit: 2002     Years since quittin.1     Smokeless tobacco: Never Used   Substance and Sexual Activity     Alcohol use: No     Alcohol/week: 0.0 standard drinks     Comment: quit in      Drug use: No     Sexual activity: Yes     Partners: Female   Lifestyle     Physical activity:     Days per week: Not on file     Minutes per session: Not on file     Stress: Not on file   Relationships     Social connections:     Talks on phone: Not on file     Gets together: Not on file     Attends [employer] service: Not on file     Active member of club or  organization: Not on file     Attends meetings of clubs or organizations: Not on file     Relationship status: Not on file     Intimate partner violence:     Fear of current or ex partner: Not on file     Emotionally abused: Not on file     Physically abused: Not on file     Forced sexual activity: Not on file   Other Topics Concern     Parent/sibling w/ CABG, MI or angioplasty before 65F 55M? Yes     Comment: BROTHER   - MI AT AGE 44      Service Not Asked     Blood Transfusions Not Asked     Caffeine Concern Not Asked     Occupational Exposure Not Asked     Hobby Hazards Not Asked     Sleep Concern Not Asked     Stress Concern Not Asked     Weight Concern Not Asked     Special Diet Not Asked     Back Care Not Asked     Exercise No     Bike Helmet Not Asked     Seat Belt Not Asked     Self-Exams Not Asked   Social History Narrative     Not on file       Review of Systems:  General: No fevers or chills  Skin: No rash or diaphoresis. Increased jaundice  Eyes: No eye redness or discharge  Ears/Nose/Throat: No rhinorrhea or nasal congestion  Respiratory: No cough or SOB  Cardiovascular: No chest pain or palpitations  Gastrointestinal: No nausea, vomiting, or diarrhea  Genitourinary: endorses hematuria. No urinary frequency or dysuria  Musculoskeletal: No arthralgias or myalgias  Neurologic: No numbness or weakness  Psychiatric: No depression or SI  Hematologic/Lymphatic/Immunologic: No leg swelling, no easy bruising/bleeding  Endocrine: No polyuria/polydypsia     Exam:  /49   Pulse 70   Temp 98.1  F (36.7  C) (Oral)   Resp 18   Wt 110.2 kg (242 lb 15.2 oz)   SpO2 99%   BMI 43.04 kg/m    General: No acute distress. Appears stated age.   Cardio: Regular rate, extremities well perfused  Resp: Normal work of breathing, grossly normal respiratory rate  Abdomen: protuberant abdomen/distended  Neuro: Alert. CN II-XII grossly intact. Grossly intact strength.   Extremities: BLE edema    Medical Decision  Making:  Patient arriving to the ED with problem as above. A medical screening exam was performed. Lab, EKG, Ct abdomen, CXR, UA orders initiated from Triage. The patient is appropriate to be immediately roomed.     VIOLET RAIN MD     Note created by Dorie Haddad on 3/6/2020 at 1:54 PM       Violet Rain MD  03/06/20 141

## 2020-03-06 NOTE — ED PROVIDER NOTES
[facility] ([facility])  3/06/20    History     Chief Complaint   Patient presents with     Hematuria     The history is provided by the patient and medical records.     Maurice Jon is a 59 year old male with a past medical history significant for atrial fibrillation (on coumadin) s/p AV node ablation and biventricular pacemaker implantation (11/15/2019), NICM (with EF of 50-55%), well-compensated non-alcoholic cirrhosis, portal HTN and BRUCE who presents here to the Emergency Department due to hematuria. Patient reports that he went to the bathroom this morning and began having dark red hematuria. Reports that he has had this in the past but it has never been this bad. He is also reports increased fatigue, and lightheadedness. Also noting tingling to his lower extremities but no numbness. Patient came home from work around 10 AM and wife noted him to be more jaundiced. Denies nausea or vomiting. Denies fevers, chills, diarrhea, constipation or blood in his stools. Patient is a former smoker.    Per chart review patient presented to [facility] ED on 3/4/2020 due to increased jaundice and lower extremity edema. Hepatology here at [facility] was consulted who recommended increasing the patients furosemide dose every x4 hours.    I have reviewed the Medications, Allergies, Past Medical and Surgical History, and Social History in the [facility] system.    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 8/22/2005    Cirrhosis, idiopathic     Edema 5/13/2013     Esophageal  varices with bleeding(456.0)     Pt denies     Gallstones      Genital herpes, unspecified 3/20/1999    resolving herpes progenitalis     GERD (gastroesophageal reflux disease)      Hematuria      Hypertension      Hypochondriasis     problems in past     Obesity 11/24/2010     BRUCE (obstructive sleep apnea) 03/17/2009    Mild AHI 8.4  RDI 31 - Pt refuses to wear his CPAP     Pericarditis      Portal hypertension (H) 1/28/2010     Unspecified thrombocytopenia 8/22/2005    Thrombocytopenia associated with cirrhosis and portal hypertension       Past Surgical History:   Procedure Laterality Date     ANESTHESIA CARDIOVERSION N/A 1/19/2015    Procedure: ANESTHESIA CARDIOVERSION;  Surgeon: Generic Anesthesia Provider;  Location: [facility] OR     ANESTHESIA CARDIOVERSION N/A 2/6/2019    Procedure: ANESTHESIA CARDIOVERSION;  Surgeon: GENERIC ANESTHESIA PROVIDER;  Location:  OR     ANESTHESIA CARDIOVERSION N/A 7/5/2019    Procedure: ANESTHESIA FOR CARDIOVERSION  DR. MURGUIA);  Surgeon: GENERIC ANESTHESIA PROVIDER;  Location:  OR     COLONOSCOPY N/A 8/14/2017    Procedure: COLONOSCOPY;  Colonoscopy Called will arrive appx 12:30 Per phone call;  Surgeon: Vincent Whittington MD;  Location: [facility] GI     CV HEART CATHETERIZATION WITH POSSIBLE INTERVENTION N/A 12/26/2018    Procedure: Heart Catheterization with possible Intervention;  Surgeon: Brandon Arroyo MD;  Location:  HEART CARDIAC CATH LAB     EP ABLATION AV NODE N/A 11/15/2019    Procedure: EP Ablation AV Node;  Surgeon: Ag Maloney MD;  Location:  HEART CARDIAC CATH LAB     EP ABLATION FOCAL AFIB N/A 12/21/2018    Procedure: EP Ablation Focal AFIB;  Surgeon: Kristofer Murguia MD;  Location:  HEART CARDIAC CATH LAB     EP PACEMAKER N/A 11/15/2019    Procedure: EP PACEMAKER;  Surgeon: Ag Maloney MD;  Location:  HEART CARDIAC CATH LAB     ESOPHAGOSCOPY, GASTROSCOPY, DUODENOSCOPY (EGD), COMBINED  7/11/2011    Procedure:COMBINED ESOPHAGOSCOPY, GASTROSCOPY,  DUODENOSCOPY (EGD); Surgeon:LAURA LAMAS; Location:[facility] GI     ESOPHAGOSCOPY, GASTROSCOPY, DUODENOSCOPY (EGD), COMBINED  9/10/2012    Procedure: COMBINED ESOPHAGOSCOPY, GASTROSCOPY, DUODENOSCOPY (EGD);;  Surgeon: Hi Roque MD;  Location:  GI     ESOPHAGOSCOPY, GASTROSCOPY, DUODENOSCOPY (EGD), COMBINED  9/9/2013    Procedure: COMBINED ESOPHAGOSCOPY, GASTROSCOPY, DUODENOSCOPY (EGD);;  Surgeon: Hi Roque MD;  Location:  GI     ESOPHAGOSCOPY, GASTROSCOPY, DUODENOSCOPY (EGD), COMBINED N/A 9/15/2014    Procedure: COMBINED ESOPHAGOSCOPY, GASTROSCOPY, DUODENOSCOPY (EGD);  Surgeon: Hi Roque MD;  Location:  GI     ESOPHAGOSCOPY, GASTROSCOPY, DUODENOSCOPY (EGD), COMBINED N/A 10/30/2015    Procedure: COMBINED ESOPHAGOSCOPY, GASTROSCOPY, DUODENOSCOPY (EGD);  Surgeon: Feliberto Fox MD;  Location:  GI     ESOPHAGOSCOPY, GASTROSCOPY, DUODENOSCOPY (EGD), COMBINED N/A 10/10/2016    Procedure: COMBINED ESOPHAGOSCOPY, GASTROSCOPY, DUODENOSCOPY (EGD);  Surgeon: Jose Nesbitt MD;  Location:  GI     ESOPHAGOSCOPY, GASTROSCOPY, DUODENOSCOPY (EGD), COMBINED N/A 9/26/2019    Procedure: ESOPHAGOGASTRODUODENOSCOPY (EGD);  Surgeon: Timo Soares MD;  Location:  GI     H ABLATION FOCAL AFIB  12/21/2018     HERNIA REPAIR       IRRIGATION AND DEBRIDEMENT ABSCESS SCROTUM, COMBINED  10/4/2012    Procedure: COMBINED IRRIGATION AND DEBRIDEMENT ABSCESS SCROTUM;  Irrigation and Debridement of Groin Abscess;  Surgeon: Benny Shoemaker MD;  Location: [facility] OR     SOFT TISSUE SURGERY       SURGICAL HISTORY OF -   1993    rt elbow     SURGICAL HISTORY OF -   1/15/98    repair of ventral and umbilical hernia     SURGICAL HISTORY OF -   2001    endoscopy       Family History   Problem Relation Age of Onset     Lipids Mother      Hypertension Mother      Eye Disorder Mother      Heart Disease Father         CHF     Lipids Father      Obesity Father      Heart Disease Brother         MI     Eye  Disorder Brother      Hypertension Brother         portal HTN     Thrombosis Sister         in her lung     Eye Disorder Sister      Cancer Other         maternal grandparent/throat cancer       Social History     Tobacco Use     Smoking status: Former Smoker     Packs/day: 0.80     Years: 6.00     Pack years: 4.80     Types: Cigarettes     Last attempt to quit: 2002     Years since quittin.1     Smokeless tobacco: Never Used   Substance Use Topics     Alcohol use: No     Alcohol/week: 0.0 standard drinks     Comment: quit in        No current facility-administered medications for this encounter.      Current Outpatient Medications   Medication     ACE/ARB/ARNI NOT PRESCRIBED (INTENTIONAL)     Acetaminophen 325 MG CAPS     ASPIRIN NOT PRESCRIBED (INTENTIONAL)     budesonide-formoterol (SYMBICORT) 80-4.5 MCG/ACT Inhaler     calcium carb 1250 mg, 500 mg [place],/vitamin D 200 units (OSCAL WITH D) 500-200 MG-UNIT per tablet     ciclopirox (LOPROX) 0.77 % cream     furosemide (LASIX) 40 MG tablet     Multiple Vitamins-Minerals (MENS 50+ MULTI VITAMIN/MIN PO)     mupirocin (BACTROBAN) 2 % external ointment     order for DME     order for DME     ORDER FOR DME     pantoprazole (PROTONIX) 40 MG EC tablet     spironolactone (ALDACTONE) 25 MG tablet     STATIN NOT PRESCRIBED (INTENTIONAL)     triamcinolone (KENALOG) 0.1 % external cream     vitamin D3 (CHOLECALCIFEROL) 2000 units (50 mcg) tablet     warfarin ANTICOAGULANT (JANTOVEN ANTICOAGULANT) 2.5 MG tablet        Allergies   Allergen Reactions     Avelox Other (See Comments) and GI Disturbance     portal hypertension     Moxifloxacin Nausea and Vomiting, Nausea and Other (See Comments)     portal hypertension     Sotalol Itching         Review of Systems   Constitutional: Positive for fatigue. Negative for chills and fever.   Gastrointestinal: Negative for blood in stool, constipation, diarrhea, nausea and vomiting.   Genitourinary: Positive for hematuria.    Skin: Positive for color change (Jaundiced).   Neurological: Positive for light-headedness.        Positive for tingling in lower extremities   Psychiatric/Behavioral: Positive for confusion.   All other systems reviewed and are negative.      Physical Exam   BP: 116/49  Pulse: 70  Temp: 98.1  F (36.7  C)  Resp: 18  Weight: 110.2 kg (242 lb 15.2 oz)  SpO2: 99 %      Physical Exam  Constitutional:       Appearance: He is well-developed.   HENT:      Head: Normocephalic and atraumatic.      Nose: Nose normal.   Neck:      Musculoskeletal: Normal range of motion and neck supple.   Cardiovascular:      Rate and Rhythm: Normal rate and regular rhythm.      Heart sounds: Normal heart sounds.   Pulmonary:      Effort: Pulmonary effort is normal. No respiratory distress.      Breath sounds: Normal breath sounds. No stridor. No wheezing or rhonchi.      Comments: sats 99% on RA  Abdominal:      General: There is no distension.      Palpations: Abdomen is soft.      Tenderness: There is no abdominal tenderness. There is no rebound.   Musculoskeletal:      Right lower leg: Edema (3+edema) present.      Left lower leg: Edema (3+ edema) present.   Skin:     General: Skin is warm and dry.   Neurological:      General: No focal deficit present.      Mental Status: He is alert and oriented to person, place, and time.   Psychiatric:         Behavior: Behavior normal.         Thought Content: Thought content normal.         ED Course         Results for orders placed or performed during the hospital encounter of 03/06/20 (from the past 24 hour(s))   CBC with platelets differential   Result Value Ref Range    WBC 3.0 (L) 4.0 - 11.0 10e9/L    RBC Count 3.44 (L) 4.4 - 5.9 10e12/L    Hemoglobin 9.1 (L) 13.3 - 17.7 g/dL    Hematocrit 30.4 (L) 40.0 - 53.0 %    MCV 88 78 - 100 fl    MCH 26.5 26.5 - 33.0 pg    MCHC 29.9 (L) 31.5 - 36.5 g/dL    RDW 18.0 (H) 10.0 - 15.0 %    Platelet Count 54 (L) 150 - 450 10e9/L    Diff Method Automated  Method     % Neutrophils 58.1 %    % Lymphocytes 16.3 %    % Monocytes 15.7 %    % Eosinophils 9.3 %    % Basophils 0.3 %    % Immature Granulocytes 0.3 %    Nucleated RBCs 0 0 /100    Absolute Neutrophil 1.7 1.6 - 8.3 10e9/L    Absolute Lymphocytes 0.5 (L) 0.8 - 5.3 10e9/L    Absolute Monocytes 0.5 0.0 - 1.3 10e9/L    Absolute Eosinophils 0.3 0.0 - 0.7 10e9/L    Absolute Basophils 0.0 0.0 - 0.2 10e9/L    Abs Immature Granulocytes 0.0 0 - 0.4 10e9/L    Absolute Nucleated RBC 0.0    Partial thromboplastin time   Result Value Ref Range    PTT 43 (H) 22 - 37 sec   INR   Result Value Ref Range    INR 3.30 (H) 0.86 - 1.14   Basic metabolic panel   Result Value Ref Range    Sodium 138 133 - 144 mmol/L    Potassium 3.4 3.4 - 5.3 mmol/L    Chloride 105 94 - 109 mmol/L    Carbon Dioxide 29 20 - 32 mmol/L    Anion Gap 5 3 - 14 mmol/L    Glucose 85 70 - 99 mg/dL    Urea Nitrogen 19 7 - 30 mg/dL    Creatinine 0.84 0.66 - 1.25 mg/dL    GFR Estimate >90 >60 mL/min/[1.73_m2]    GFR Estimate If Black >90 >60 mL/min/[1.73_m2]    Calcium 8.3 (L) 8.5 - 10.1 mg/dL   Nt probnp inpatient (BNP)   Result Value Ref Range    N-Terminal Pro BNP Inpatient 436 0 - 900 pg/mL   Hepatic panel   Result Value Ref Range    Bilirubin Direct 0.7 (H) 0.0 - 0.2 mg/dL    Bilirubin Total 1.9 (H) 0.2 - 1.3 mg/dL    Albumin 3.1 (L) 3.4 - 5.0 g/dL    Protein Total 5.8 (L) 6.8 - 8.8 g/dL    Alkaline Phosphatase 80 40 - 150 U/L    ALT 44 0 - 70 U/L    AST 59 (H) 0 - 45 U/L   EKG 12-lead, tracing only   Result Value Ref Range    Interpretation ECG Click View Image link to view waveform and result    UA reflex to Microscopic and Culture   Result Value Ref Range    Color Urine Yellow     Appearance Urine Clear     Glucose Urine Negative NEG^Negative mg/dL    Bilirubin Urine Negative NEG^Negative    Ketones Urine Negative NEG^Negative mg/dL    Specific Gravity Urine 1.015 1.003 - 1.035    Blood Urine Large (A) NEG^Negative    pH Urine 6.5 5.0 - 7.0 pH    Protein  Albumin Urine 100 (A) NEG^Negative mg/dL    Urobilinogen mg/dL 2.0 0.0 - 2.0 mg/dL    Nitrite Urine Negative NEG^Negative    Leukocyte Esterase Urine Trace (A) NEG^Negative    Source Clean catch urine     RBC Urine >182 (H) 0 - 2 /HPF    WBC Urine 0 0 - 5 /HPF    Mucous Urine Present (A) NEG^Negative /LPF   XR Chest 2 Views    Narrative    EXAMINATION: XR CHEST 2 VW, 3/6/2020 2:39 PM    INDICATION: fluid overload    COMPARISON: 12/24/2019    FINDINGS: PA and lateral upright views of the chest. Stable left chest  cardiac pacer generator and wires are in stable position. Trachea is  midline. The cardiomediastinal silhouette is stably enlarged. Trace  bilateral pleural effusions. No pneumothorax. Prominent pulmonary  vasculature. No focal airspace opacity.    Visualized upper abdomen is unremarkable. No acute osseous  abnormality. Soft tissues within normal limits.      Impression    IMPRESSION: Cardiomegaly with trace bilateral pleural effusions and  pulmonary vascular congestion.   CT Abdomen Pelvis w/o Contrast    Narrative    1. Unchanged nonobstructing renal stones bilaterally, measuring up to  5 mm on the right, and 7 mm on the left.  2. Cirrhotic liver with evidence of portal hypertension including  esophageal and splenorenal varices.  3. Cardiomegaly with a small right pleural effusion.        Procedures             EKG Interpretation:      Interpreted by Ingris Hall MD  Time reviewed: 4/6/2020  Symptoms at time of EKG: None   Rhythm: paced  Rate: Normal  Axis: Left Axis Deviation  Ectopy: paced  Conduction: PACED  ST Segments/ T Waves: No ST-T wave changes  Q Waves: none  Comparison to prior: Unchanged    Clinical Impression: no acute changes                              Labs Ordered and Resulted from Time of ED Arrival Up to the Time of Departure from the ED   CBC WITH PLATELETS DIFFERENTIAL - Abnormal; Notable for the following components:       Result Value    WBC 3.0 (*)     RBC Count 3.44 (*)      Hemoglobin 9.1 (*)     Hematocrit 30.4 (*)     MCHC 29.9 (*)     RDW 18.0 (*)     Platelet Count 54 (*)     Absolute Lymphocytes 0.5 (*)     All other components within normal limits   PARTIAL THROMBOPLASTIN TIME - Abnormal; Notable for the following components:    PTT 43 (*)     All other components within normal limits   INR - Abnormal; Notable for the following components:    INR 3.30 (*)     All other components within normal limits   UA MACROSCOPIC WITH REFLEX TO MICRO AND CULTURE - Abnormal; Notable for the following components:    Blood Urine Large (*)     Protein Albumin Urine 100 (*)     Leukocyte Esterase Urine Trace (*)     RBC Urine >182 (*)     Mucous Urine Present (*)     All other components within normal limits   BASIC METABOLIC PANEL - Abnormal; Notable for the following components:    Calcium 8.3 (*)     All other components within normal limits   HEPATIC PANEL - Abnormal; Notable for the following components:    Bilirubin Direct 0.7 (*)     Bilirubin Total 1.9 (*)     Albumin 3.1 (*)     Protein Total 5.8 (*)     AST 59 (*)     All other components within normal limits   NT PROBNP INPATIENT   TROPONIN I   AMMONIA     Results for orders placed or performed during the hospital encounter of 03/06/20   XR Chest 2 Views     Status: None    Narrative    EXAMINATION: XR CHEST 2 VW, 3/6/2020 2:39 PM    INDICATION: fluid overload    COMPARISON: 12/24/2019    FINDINGS: PA and lateral upright views of the chest. Stable left chest  cardiac pacer generator and wires are in stable position. Trachea is  midline. The cardiomediastinal silhouette is stably enlarged. Trace  bilateral pleural effusions. No pneumothorax. Prominent pulmonary  vasculature. No focal airspace opacity.    Visualized upper abdomen is unremarkable. No acute osseous  abnormality. Soft tissues within normal limits.      Impression    IMPRESSION: Cardiomegaly with trace bilateral pleural effusions and  pulmonary vascular congestion.    I  have personally reviewed the examination and initial interpretation  and I agree with the findings.    ANYI CARMONA MD   CT Abdomen Pelvis w/o Contrast     Status: None    Narrative    EXAMINATION: CT ABDOMEN PELVIS W/O CONTRAST, 3/6/2020 2:49 PM    TECHNIQUE:  Helical CT images from the lung bases through the  symphysis pubis were obtained without IV contrast. Contrast dose: None    COMPARISON: Ultrasound abdomen 3/4/2018, CT abdomen pelvis 1/8/2020    HISTORY: hematuria    FINDINGS:    Abdomen and pelvis: No hydronephrosis of either kidney. Several  bilateral nonobstructing renal stones, unchanged in configuration and  size from 1/10/2020. The largest stone on the right measures up to 5  mm in the inferior pole collecting system, and the largest stone on  the left measures up to 7 mm in the inferior pole collecting system.  Urinary bladder is decompressed.    There are splenorenal varices draining into the left renal vein.  Esophageal varices.    Cirrhotic morphology of the liver with small hypoattenuating foci  which are too small to definitively characterize. Small calcified  gallstone in the gallbladder.    The the, right adrenal glands, and spleen are within normal limits.  The spleen measures 11 cm in craniocaudal dimension. Left adrenal  gland is not well visualized.    No dilated loops of bowel. Normal appendix.    No ascites or lymphadenopathy.    Lung bases: Cardiomegaly. Cardiac device leads are partially  visualized. Small right pleural effusion. Calcified granuloma along  the right major fissure. Atelectasis along the lateral major fissure.    Bones and soft tissues: Degenerative changes of the spine. 90 degree  angulation of the sacrum at S3 or S4, compatible with prior fracture.      Impression    IMPRESSION:   1. Unchanged nonobstructing renal stones bilaterally, measuring up to  5 mm on the right, and 7 mm on the left.  2. Cirrhotic liver with evidence of portal hypertension including  esophageal  and splenorenal varices.  3. Cholelithiasis without CT evidence of cholecystitis.  4. Cardiomegaly with a small right pleural effusion.     I have personally reviewed the examination and initial interpretation  and I agree with the findings.    KIERRA ROBERT MD   CBC with platelets differential     Status: Abnormal   Result Value Ref Range    WBC 3.0 (L) 4.0 - 11.0 10e9/L    RBC Count 3.44 (L) 4.4 - 5.9 10e12/L    Hemoglobin 9.1 (L) 13.3 - 17.7 g/dL    Hematocrit 30.4 (L) 40.0 - 53.0 %    MCV 88 78 - 100 fl    MCH 26.5 26.5 - 33.0 pg    MCHC 29.9 (L) 31.5 - 36.5 g/dL    RDW 18.0 (H) 10.0 - 15.0 %    Platelet Count 54 (L) 150 - 450 10e9/L    Diff Method Automated Method     % Neutrophils 58.1 %    % Lymphocytes 16.3 %    % Monocytes 15.7 %    % Eosinophils 9.3 %    % Basophils 0.3 %    % Immature Granulocytes 0.3 %    Nucleated RBCs 0 0 /100    Absolute Neutrophil 1.7 1.6 - 8.3 10e9/L    Absolute Lymphocytes 0.5 (L) 0.8 - 5.3 10e9/L    Absolute Monocytes 0.5 0.0 - 1.3 10e9/L    Absolute Eosinophils 0.3 0.0 - 0.7 10e9/L    Absolute Basophils 0.0 0.0 - 0.2 10e9/L    Abs Immature Granulocytes 0.0 0 - 0.4 10e9/L    Absolute Nucleated RBC 0.0    Partial thromboplastin time     Status: Abnormal   Result Value Ref Range    PTT 43 (H) 22 - 37 sec   INR     Status: Abnormal   Result Value Ref Range    INR 3.30 (H) 0.86 - 1.14   UA reflex to Microscopic and Culture     Status: Abnormal   Result Value Ref Range    Color Urine Yellow     Appearance Urine Clear     Glucose Urine Negative NEG^Negative mg/dL    Bilirubin Urine Negative NEG^Negative    Ketones Urine Negative NEG^Negative mg/dL    Specific Gravity Urine 1.015 1.003 - 1.035    Blood Urine Large (A) NEG^Negative    pH Urine 6.5 5.0 - 7.0 pH    Protein Albumin Urine 100 (A) NEG^Negative mg/dL    Urobilinogen mg/dL 2.0 0.0 - 2.0 mg/dL    Nitrite Urine Negative NEG^Negative    Leukocyte Esterase Urine Trace (A) NEG^Negative    Source Clean catch urine     RBC Urine >182  (H) 0 - 2 /HPF    WBC Urine 0 0 - 5 /HPF    Mucous Urine Present (A) NEG^Negative /LPF   Basic metabolic panel     Status: Abnormal   Result Value Ref Range    Sodium 138 133 - 144 mmol/L    Potassium 3.4 3.4 - 5.3 mmol/L    Chloride 105 94 - 109 mmol/L    Carbon Dioxide 29 20 - 32 mmol/L    Anion Gap 5 3 - 14 mmol/L    Glucose 85 70 - 99 mg/dL    Urea Nitrogen 19 7 - 30 mg/dL    Creatinine 0.84 0.66 - 1.25 mg/dL    GFR Estimate >90 >60 mL/min/[1.73_m2]    GFR Estimate If Black >90 >60 mL/min/[1.73_m2]    Calcium 8.3 (L) 8.5 - 10.1 mg/dL   Nt probnp inpatient (BNP)     Status: None   Result Value Ref Range    N-Terminal Pro BNP Inpatient 436 0 - 900 pg/mL   Hepatic panel     Status: Abnormal   Result Value Ref Range    Bilirubin Direct 0.7 (H) 0.0 - 0.2 mg/dL    Bilirubin Total 1.9 (H) 0.2 - 1.3 mg/dL    Albumin 3.1 (L) 3.4 - 5.0 g/dL    Protein Total 5.8 (L) 6.8 - 8.8 g/dL    Alkaline Phosphatase 80 40 - 150 U/L    ALT 44 0 - 70 U/L    AST 59 (H) 0 - 45 U/L   Troponin I     Status: None   Result Value Ref Range    Troponin I ES 0.029 0.000 - 0.045 ug/L   EKG 12-lead, tracing only     Status: None (Preliminary result)   Result Value Ref Range    Interpretation ECG Click View Image link to view waveform and result      Medications   furosemide (LASIX) injection 40 mg (40 mg Intravenous Given 3/6/20 1724)              Assessments & Plan (with Medical Decision Making)   Patient is a 59-year-old male with a known history of liver cirrhosis secondary to fatty liver disease and known portal hypertension with known esophageal varices who presented to the ER due to hematuria.  Patient has had hematuria in the past but became concerned because it seemed more red than normal.  Patient here had a CT abdomen and pelvis that shows bilateral intraparenchymal stones but no signs of any stones in the ureters.  Patient's labs are all stable.  Patient's hemoglobin stable.  Patient has stable platelets.  His T bili is elevated to 1.9  which is similar to prior.  Patient has no signs of overt icterus on exam.  A long discussion with the patient and the wife.  They are concerned regarding his ongoing liver disease but agree to follow-up with their primary hepatologist in clinic as scheduled.  Regarding his hematuria is possible that it is due to a small stone that passed.  I recommended he follow-up with urology for further evaluation and care.  Patient was given 1 dose of IV Lasix here in the ER.  Patient was seen in outside ER 2 days ago and was recommended to take an additional 40 mg of Lasix a day at home.  I agree with continuing this recommendation until he can be seen by the liver specialist in a week.  Patient with no respiratory issues and no indications for needing admission at this time.  Patient and wife both agree with plan of discharge    I have reviewed the nursing notes.    I have reviewed the findings, diagnosis, plan and need for follow up with the patient.    New Prescriptions    No medications on file       Final diagnoses:   Hematuria, unspecified type   Liver Cirrhosis 2nd ot Fatty liver, w Ascites   Edema, unspecified type     I, Rajinder Galicia, am serving as a trained medical scribe to document services personally performed by Ingris Hall MD, based on the provider's statements to me.   I, Ingris Hall MD, was physically present and have reviewed and verified the accuracy of this note documented by Rajinder Galicia.    3/6/2020   [facility], [place], EMERGENCY DEPARTMENT     Ingris Hall MD  03/06/20 [id number]

## 2020-03-07 LAB — INTERPRETATION ECG - MUSE: NORMAL

## 2020-03-09 ENCOUNTER — TELEPHONE (OUTPATIENT)
Dept: FAMILY MEDICINE | Facility: CLINIC | Age: 60
End: 2020-03-09

## 2020-03-09 DIAGNOSIS — Z79.01 LONG TERM CURRENT USE OF ANTICOAGULANT THERAPY: ICD-10-CM

## 2020-03-09 DIAGNOSIS — I48.0 PAROXYSMAL ATRIAL FIBRILLATION (H): ICD-10-CM

## 2020-03-09 NOTE — TELEPHONE ENCOUNTER
ANTICOAGULATION  MANAGEMENT: Discharge Review    Maurice Jon chart reviewed for anticoagulation continuity of care    Emergency room visit on 3/6/2020 for hematuria / edema.    Discharge disposition: Home    Results:    Recent Labs   Lab Test 03/06/20  1359   INR 3.30*       Anticoagulation inpatient management:     not applicable     Anticoagulation discharge instructions:     Warfarin dosing: home regimen continued   Bridging: No   INR goal change: No      Medication changes affecting anticoagulation: No    Additional factors affecting anticoagulation: Yes: edema (increased lasix dose at his previous ED visit)    Plan     Recommend to check INR this week. Left a detailed voicemail for patient to call [facility] to schedule an INR check. Patient should call the central INR nurse line regarding this warfarin.    Left a detailed message for Yomi    Anticoagulation Calendar updated    Deisy Reza RN CACP

## 2020-03-10 ENCOUNTER — ANTICOAGULATION THERAPY VISIT (OUTPATIENT)
Dept: ANTICOAGULATION | Facility: CLINIC | Age: 60
End: 2020-03-10
Payer: COMMERCIAL

## 2020-03-10 DIAGNOSIS — I48.0 PAROXYSMAL ATRIAL FIBRILLATION (H): ICD-10-CM

## 2020-03-10 DIAGNOSIS — Z79.01 LONG TERM CURRENT USE OF ANTICOAGULANT THERAPY: ICD-10-CM

## 2020-03-10 LAB — INR POINT OF CARE: 2.5 (ref 0.86–1.14)

## 2020-03-10 PROCEDURE — 36416 COLLJ CAPILLARY BLOOD SPEC: CPT

## 2020-03-10 PROCEDURE — 99207 ZZC NO CHARGE NURSE ONLY: CPT

## 2020-03-10 PROCEDURE — 85610 PROTHROMBIN TIME: CPT | Mod: QW

## 2020-03-10 NOTE — PROGRESS NOTES
ANTICOAGULATION FOLLOW-UP CLINIC VISIT    Patient Name:  Maurice Jon  Date:  3/10/2020  Contact Type:  Face to Face    SUBJECTIVE:  Patient Findings     Positives:   Emergency department visit (hematuria and swelling/inflammation - lasix increased), Change in medications (lasix increased)    Comments:   No changes in activity or diet noted. No other concerns with clotting, bleeding, or increased bruising noted. Took warfarin as prescribed.  Patient is to continue maintenance warfarin plan, and check INR in about 9 days in lab.  Patient verbalizes understanding and agrees to plan. No further questions or concerns.        Clinical Outcomes     Negatives:   Major bleeding event, Thromboembolic event, Anticoagulation-related hospital admission, Anticoagulation-related ED visit, Anticoagulation-related fatality    Comments:   No changes in activity or diet noted. No other concerns with clotting, bleeding, or increased bruising noted. Took warfarin as prescribed.  Patient is to continue maintenance warfarin plan, and check INR in about 9 days in lab.  Patient verbalizes understanding and agrees to plan. No further questions or concerns.           OBJECTIVE    INR Protime   Date Value Ref Range Status   03/10/2020 2.5 (A) 0.86 - 1.14 Final       ASSESSMENT / PLAN  INR assessment THER    Recheck INR In: 9 DAYS    INR Location Clinic      Anticoagulation Summary  As of 3/10/2020    INR goal:   2.0-3.0   TTR:   84.4 % (11.8 mo)   INR used for dosin.5 (3/10/2020)   Warfarin maintenance plan:   1.25 mg (2.5 mg x 0.5) every Fri; 2.5 mg (2.5 mg x 1) all other days   Full warfarin instructions:   1.25 mg every Fri; 2.5 mg all other days   Weekly warfarin total:   16.25 mg   No change documented:   Sondra Santos RN   Plan last modified:   Susan Beyer RN (2018)   Next INR check:   3/20/2020   Priority:   Maintenance   Target end date:   10/4/2018    Indications    Paroxysmal atrial fibrillation (H)  [I48.0]  Atrial fibrillation with rapid ventricular response (H) (Resolved) [I48.91]  Long-term (current) use of anticoagulants [Z79.01] [Z79.01]             Anticoagulation Episode Summary     INR check location:       Preferred lab:       Send INR reminders to:   [facility]    Comments:   * anticoagulation short period surrounding ablation on 12/21/18. Cardiology to decide when to stop warfarin. has well-compensated cirrhosis      Anticoagulation Care Providers     Provider Role Specialty Phone number    Alon Pineda MD [facility] [phone]            See the Encounter Report to view Anticoagulation Flowsheet and Dosing Calendar (Go to Encounters tab in chart review, and find the Anticoagulation Therapy Visit)        Sondra Santos RN

## 2020-03-12 ENCOUNTER — TELEPHONE (OUTPATIENT)
Dept: FAMILY MEDICINE | Facility: CLINIC | Age: 60
End: 2020-03-12

## 2020-03-12 ENCOUNTER — HOSPITAL ENCOUNTER (EMERGENCY)
Facility: CLINIC | Age: 60
Discharge: HOME | End: 2020-03-12
Attending: PHYSICIAN ASSISTANT | Admitting: PHYSICIAN ASSISTANT
Payer: COMMERCIAL

## 2020-03-12 VITALS
TEMPERATURE: 98.2 F | DIASTOLIC BLOOD PRESSURE: 77 MMHG | SYSTOLIC BLOOD PRESSURE: 139 MMHG | HEART RATE: 80 BPM | OXYGEN SATURATION: 99 %

## 2020-03-12 DIAGNOSIS — R30.0 DYSURIA: ICD-10-CM

## 2020-03-12 DIAGNOSIS — R60.0 LOWER EXTREMITY EDEMA: ICD-10-CM

## 2020-03-12 DIAGNOSIS — R82.90 ABNORMAL FINDING ON URINALYSIS: ICD-10-CM

## 2020-03-12 LAB
ALBUMIN UR-MCNC: 100 MG/DL
ANION GAP SERPL CALCULATED.3IONS-SCNC: 7 MMOL/L (ref 3–14)
APPEARANCE UR: CLEAR
BASOPHILS # BLD AUTO: 0 10E9/L (ref 0–0.2)
BASOPHILS NFR BLD AUTO: 0.3 %
BILIRUB UR QL STRIP: NEGATIVE
BUN SERPL-MCNC: 21 MG/DL (ref 7–30)
CALCIUM SERPL-MCNC: 9 MG/DL (ref 8.5–10.1)
CHLORIDE SERPL-SCNC: 103 MMOL/L (ref 94–109)
CO2 SERPL-SCNC: 29 MMOL/L (ref 20–32)
COLOR UR AUTO: YELLOW
CREAT SERPL-MCNC: 0.95 MG/DL (ref 0.66–1.25)
DIFFERENTIAL METHOD BLD: ABNORMAL
EOSINOPHIL # BLD AUTO: 0.2 10E9/L (ref 0–0.7)
EOSINOPHIL NFR BLD AUTO: 5.6 %
ERYTHROCYTE [DISTWIDTH] IN BLOOD BY AUTOMATED COUNT: 17.5 % (ref 10–15)
GFR SERPL CREATININE-BSD FRML MDRD: 87 ML/MIN/{1.73_M2}
GLUCOSE SERPL-MCNC: 148 MG/DL (ref 70–99)
GLUCOSE UR STRIP-MCNC: NEGATIVE MG/DL
HCT VFR BLD AUTO: 29.9 % (ref 40–53)
HGB BLD-MCNC: 9 G/DL (ref 13.3–17.7)
HGB UR QL STRIP: ABNORMAL
IMM GRANULOCYTES # BLD: 0 10E9/L (ref 0–0.4)
IMM GRANULOCYTES NFR BLD: 0.3 %
KETONES UR STRIP-MCNC: NEGATIVE MG/DL
LEUKOCYTE ESTERASE UR QL STRIP: ABNORMAL
LYMPHOCYTES # BLD AUTO: 0.6 10E9/L (ref 0.8–5.3)
LYMPHOCYTES NFR BLD AUTO: 17.6 %
MCH RBC QN AUTO: 26.4 PG (ref 26.5–33)
MCHC RBC AUTO-ENTMCNC: 30.1 G/DL (ref 31.5–36.5)
MCV RBC AUTO: 88 FL (ref 78–100)
MONOCYTES # BLD AUTO: 0.6 10E9/L (ref 0–1.3)
MONOCYTES NFR BLD AUTO: 17.6 %
MUCOUS THREADS #/AREA URNS LPF: PRESENT /LPF
NEUTROPHILS # BLD AUTO: 1.9 10E9/L (ref 1.6–8.3)
NEUTROPHILS NFR BLD AUTO: 58.6 %
NITRATE UR QL: NEGATIVE
NRBC # BLD AUTO: 0 10*3/UL
NRBC BLD AUTO-RTO: 0 /100
PH UR STRIP: 5 PH (ref 5–7)
PLATELET # BLD AUTO: 68 10E9/L (ref 150–450)
POTASSIUM SERPL-SCNC: 3.7 MMOL/L (ref 3.4–5.3)
RBC # BLD AUTO: 3.41 10E12/L (ref 4.4–5.9)
RBC #/AREA URNS AUTO: 81 /HPF (ref 0–2)
SODIUM SERPL-SCNC: 139 MMOL/L (ref 133–144)
SOURCE: ABNORMAL
SP GR UR STRIP: 1.01 (ref 1–1.03)
UROBILINOGEN UR STRIP-MCNC: 2 MG/DL (ref 0–2)
WBC # BLD AUTO: 3.2 10E9/L (ref 4–11)
WBC #/AREA URNS AUTO: 20 /HPF (ref 0–5)

## 2020-03-12 PROCEDURE — 99213 OFFICE O/P EST LOW 20 MIN: CPT | Mod: Z6 | Performed by: PHYSICIAN ASSISTANT

## 2020-03-12 PROCEDURE — 80048 BASIC METABOLIC PNL TOTAL CA: CPT | Performed by: PHYSICIAN ASSISTANT

## 2020-03-12 PROCEDURE — G0463 HOSPITAL OUTPT CLINIC VISIT: HCPCS

## 2020-03-12 PROCEDURE — 87086 URINE CULTURE/COLONY COUNT: CPT | Performed by: PHYSICIAN ASSISTANT

## 2020-03-12 PROCEDURE — 81001 URINALYSIS AUTO W/SCOPE: CPT | Performed by: NURSE PRACTITIONER

## 2020-03-12 PROCEDURE — 85025 COMPLETE CBC W/AUTO DIFF WBC: CPT | Performed by: PHYSICIAN ASSISTANT

## 2020-03-12 RX ORDER — CEPHALEXIN 500 MG/1
500 CAPSULE ORAL 2 TIMES DAILY
Qty: 14 CAPSULE | Refills: 0 | Status: SHIPPED | OUTPATIENT
Start: 2020-03-12 | End: 2020-04-29

## 2020-03-12 NOTE — ED AVS SNAPSHOT
[facility] Emergency Department  [address] [address]  Phone:  [phone]  Fax:  [phone]                                    Maurice Jon   MRN: [id number]    Department:  [facility] Emergency Department   Date of Visit:  3/12/2020           After Visit Summary Signature Page    I have received my discharge instructions, and my questions have been answered. I have discussed any challenges I see with this plan with the nurse or doctor.    ..........................................................................................................................................  Patient/Patient Representative Signature      ..........................................................................................................................................  Patient Representative Print Name and Relationship to Patient    ..................................................               ................................................  Date                                   Time    ..........................................................................................................................................  Reviewed by Signature/Title    ...................................................              ..............................................  Date                                               Time          22EPIC Rev 08/18

## 2020-03-12 NOTE — ED TRIAGE NOTES
Pt is on lasix for fluid retention, is having increased urinary frequency, some burning, is only going a very small amount per episode.

## 2020-03-12 NOTE — TELEPHONE ENCOUNTER
Reason for call:    Symptom or request:     patient called stating that he has urgency to void, however only goes small amounts and frequency.    Patient is taking 120 mg of lasix from last ED visit for edeama    Best Time:  any    Can we leave a detailed message on this number?  YES     Maria Fernanda VERNON  Station

## 2020-03-12 NOTE — TELEPHONE ENCOUNTER
Pt feels he may have a UTI.  Urgency,frequency with little urine results even though he is on Lasix and pushing fluids.  No fever.  Recommended UC for possible UTI.  Pt agreed.  Lamin

## 2020-03-12 NOTE — ED PROVIDER NOTES
History   No chief complaint on file.    HPI  Maurice Jon is a 59 year old male who with significant past medical history including liver cirrhosis, portal hypertension with esophageal varices paroxysmal atrial fibrillation with pacemaker, congestive heart failure thrombocytopenia  and chronic anticoagulation with warfarin therapy who who has had multiple ED visits over the last week for lower extremity edema presents to the urgent care with concern for possible urinary tract infection.  Patient reports that prior ED visit on 3/6/2080 increase the dose of his Lasix from 80 to 120 mg/day.  Beginning earlier today he had some dysuria, increased urgency however states that he is voiding in small amounts when he urinates.  He did have some hematuria several days ago which seems to have resolved.  He continues to have bilateral lower extremity edema however admits that he has he is not using his compression stockings today.  He has not had any recent fever, chills, allergies, cough, dyspnea, wheezing, vomiting, diarrhea, abdominal pain.  He has not had any prior history of paracentesis.  Has prior to prior ED evaluations he was diagnosed with 2 relatively large stones present in each kidney measuring 0.5 and 0.7 cm each as well as several other bilateral nonobstructive stones.      Allergies:  Allergies   Allergen Reactions     Avelox Other (See Comments) and GI Disturbance     portal hypertension     Moxifloxacin Nausea and Vomiting, Nausea and Other (See Comments)     portal hypertension     Sotalol Itching       Problem List:    Patient Active Problem List    Diagnosis Date Noted     Health Care Home 07/01/2011     Priority: High     01/07/2014  Status:  Declined  Care Coordinator:  Salena Joel    See Letters for [facility] Care Plan (emergency care plan only)             Portal hypertension (H) 01/28/2010     Priority: High     Liver Cirrhosis 2nd ot Fatty liver, w Ascites 08/22/2005     Priority: High      Cirrhosis, idiopathic         Atypical chest pain 12/20/2019     Priority: Medium     Chest pain 12/19/2019     Priority: Medium     Persistent atrial fibrillation 11/13/2019     Priority: Medium     Added automatically from request for surgery [id number]       Cellulitis 10/06/2019     Priority: Medium     Acute combined systolic and diastolic (congestive) hrt fail (H) 01/04/2019     Priority: Medium     CAD (coronary artery disease)      Priority: Medium     Cath 12/26/18- mild nonobstructive disease       Cardiomyopathy (H)      Priority: Medium     12/23/18 Echo- EF 25-30%       Pericarditis      Priority: Medium     Acute on chronic systolic congestive heart failure (H)      Priority: Medium     Obesity (BMI 35.0-39.9) with comorbidity (H) 12/28/2018     Priority: Medium     Right heart failure (H) 12/24/2018     Priority: Medium     Chronic a-fib, S/P Ablation 12/22/18 -- on Warfarin 12/21/2018     Priority: Medium     Encounter for monitoring sotalol therapy 10/31/2018     Priority: Medium     Long-term (current) use of anticoagulants [Z79.01] 09/18/2018     Priority: Medium     Portal vein thrombosis 02/02/2017     Priority: Medium     History of MRSA now culture negative 08/06/2015     Priority: Medium     Severe sepsis (H) 07/13/2015     Priority: Medium     Problem list name updated by automated process. Provider to review       Paroxysmal atrial fibrillation (H)      Priority: Medium     S/p cardioversion       Edema 05/13/2013     Priority: Medium     CARDIOVASCULAR SCREENING; LDL GOAL LESS THAN 160 10/31/2010     Priority: Medium     GERD (gastroesophageal reflux disease) 03/25/2010     Priority: Medium     Eczema 01/28/2010     Priority: Medium     BRUCE-Mild (AHI 9; REM RDI 31) 03/20/2009     Priority: Medium     Sleep study 3/09- .0 minutes, latency 3.6 minutes. REM latency 72.0 minutes. Sleep efficiency 87.7%. The sleep architecture was disrupted with frequent sleep stage changes and arousals.  Snoring: mild to loud.  RDI 27.7, AHI of 8.4. REM RDI 31.5 TLO2 saturation 83.0%. This study is suggestive of mild sleep apnea, severe during REM sleep (20% TST). Other:  PLM index was 0.0.       Compulsive behaviors 08/26/2008     Priority: Medium     erectile dysfunction 08/22/2005     Priority: Medium     Obesity 11/24/2010     Priority: Low     Thrombocytopenia (H) 08/22/2005     Priority: Low     Thrombocytopenia associated with cirrhosis and portal hypertension  Problem list name updated by automated process. Provider to review          Past Medical History:    Past Medical History:   Diagnosis Date     A-fib (H)      Acute pulmonary edema (H)      Atrial fibrillation (H)      Atrial fibrillation with rapid ventricular response (H) 5/13/2013     CAD (coronary artery disease)      Calculus of ureter 1993, 1997     Cardiomyopathy (H)      Chronic systolic CHF (congestive heart failure) (H)      Cirrhosis of liver without mention of alcohol 8/22/2005     Edema 5/13/2013     Esophageal varices with bleeding(456.0)      Gallstones      Genital herpes, unspecified 3/20/1999     GERD (gastroesophageal reflux disease)      Hematuria      Hypertension      Hypochondriasis      Obesity 11/24/2010     BRUCE (obstructive sleep apnea) 03/17/2009     Pericarditis      Portal hypertension (H) 1/28/2010     Unspecified thrombocytopenia 8/22/2005       Past Surgical History:    Past Surgical History:   Procedure Laterality Date     ANESTHESIA CARDIOVERSION N/A 1/19/2015    Procedure: ANESTHESIA CARDIOVERSION;  Surgeon: Generic Anesthesia Provider;  Location: [facility]     ANESTHESIA CARDIOVERSION N/A 2/6/2019    Procedure: ANESTHESIA CARDIOVERSION;  Surgeon: GENERIC ANESTHESIA PROVIDER;  Location:  OR     ANESTHESIA CARDIOVERSION N/A 7/5/2019    Procedure: ANESTHESIA FOR CARDIOVERSION  DR. MURGUIA);  Surgeon: GENERIC ANESTHESIA PROVIDER;  Location:  OR     COLONOSCOPY N/A 8/14/2017    Procedure: COLONOSCOPY;  Colonoscopy Called  will arrive appx 12:30 Per phone call;  Surgeon: Vincent Whittington MD;  [facility]     CV HEART CATHETERIZATION WITH POSSIBLE INTERVENTION N/A 12/26/2018    Procedure: Heart Catheterization with possible Intervention;  Surgeon: Brandon Arroyo MD;  Location:  HEART CARDIAC CATH LAB     EP ABLATION AV NODE N/A 11/15/2019    Procedure: EP Ablation AV Node;  Surgeon: Ag Maloney MD;  Location:  HEART CARDIAC CATH LAB     EP ABLATION FOCAL AFIB N/A 12/21/2018    Procedure: EP Ablation Focal AFIB;  Surgeon: Kristofer Evans MD;  Location:  HEART CARDIAC CATH LAB     EP PACEMAKER N/A 11/15/2019    Procedure: EP PACEMAKER;  Surgeon: Ag Maloney MD;  Location:  HEART CARDIAC CATH LAB     ESOPHAGOSCOPY, GASTROSCOPY, DUODENOSCOPY (EGD), COMBINED  7/11/2011    Procedure:COMBINED ESOPHAGOSCOPY, GASTROSCOPY, DUODENOSCOPY (EGD); Surgeon:LAURA LAMAS; Location:[facility]     ESOPHAGOSCOPY, GASTROSCOPY, DUODENOSCOPY (EGD), COMBINED  9/10/2012    Procedure: COMBINED ESOPHAGOSCOPY, GASTROSCOPY, DUODENOSCOPY (EGD);;  Surgeon: Hi Roque MD;  Location:  GI     ESOPHAGOSCOPY, GASTROSCOPY, DUODENOSCOPY (EGD), COMBINED  9/9/2013    Procedure: COMBINED ESOPHAGOSCOPY, GASTROSCOPY, DUODENOSCOPY (EGD);;  Surgeon: Hi Roque MD;  Location:  GI     ESOPHAGOSCOPY, GASTROSCOPY, DUODENOSCOPY (EGD), COMBINED N/A 9/15/2014    Procedure: COMBINED ESOPHAGOSCOPY, GASTROSCOPY, DUODENOSCOPY (EGD);  Surgeon: Hi Roque MD;  Location:  GI     ESOPHAGOSCOPY, GASTROSCOPY, DUODENOSCOPY (EGD), COMBINED N/A 10/30/2015    Procedure: COMBINED ESOPHAGOSCOPY, GASTROSCOPY, DUODENOSCOPY (EGD);  Surgeon: Feliberto Fox MD;  Location:  GI     ESOPHAGOSCOPY, GASTROSCOPY, DUODENOSCOPY (EGD), COMBINED N/A 10/10/2016    Procedure: COMBINED ESOPHAGOSCOPY, GASTROSCOPY, DUODENOSCOPY (EGD);  Surgeon: Jose Nesbitt MD;  Location:  GI     ESOPHAGOSCOPY, GASTROSCOPY, DUODENOSCOPY (EGD), COMBINED N/A  2019    Procedure: ESOPHAGOGASTRODUODENOSCOPY (EGD);  Surgeon: Timo Soarse MD;  [facility]: [facility]     H ABLATION FOCAL AFIB  2018     HERNIA REPAIR       IRRIGATION AND DEBRIDEMENT ABSCESS SCROTUM, COMBINED  10/4/2012    Procedure: COMBINED IRRIGATION AND DEBRIDEMENT ABSCESS SCROTUM;  Irrigation and Debridement of Groin Abscess;  Surgeon: Benny Shoemaker MD;  Location: [facility]     SOFT TISSUE SURGERY       SURGICAL HISTORY OF -       rt elbow     SURGICAL HISTORY OF -   1/15/98    repair of ventral and umbilical hernia     SURGICAL HISTORY OF -       endoscopy       Family History:    Family History   Problem Relation Age of Onset     Lipids Mother      Hypertension Mother      Eye Disorder Mother      Heart Disease Father         CHF     Lipids Father      Obesity Father      Heart Disease Brother         MI     Eye Disorder Brother      Hypertension Brother         portal HTN     Thrombosis Sister         in her lung     Eye Disorder Sister      Cancer Other         maternal grandparent/throat cancer     Social History:  Marital Status:   [2]  Social History     Tobacco Use     Smoking status: Former Smoker     Packs/day: 0.80     Years: 6.00     Pack years: 4.80     Types: Cigarettes     Last attempt to quit: 2002     Years since quittin.2     Smokeless tobacco: Never Used   Substance Use Topics     Alcohol use: No     Alcohol/week: 0.0 standard drinks     Comment: quit in      Drug use: No        Medications:    cephALEXin (KEFLEX) 500 MG capsule  ACE/ARB/ARNI NOT PRESCRIBED (INTENTIONAL)  Acetaminophen 325 MG CAPS  ASPIRIN NOT PRESCRIBED (INTENTIONAL)  budesonide-formoterol (SYMBICORT) 80-4.5 MCG/ACT Inhaler  calcium carb 1250 mg, 500 mg [place],/vitamin D 200 units (OSCAL WITH D) 500-200 MG-UNIT per tablet  ciclopirox (LOPROX) 0.77 % cream  furosemide (LASIX) 40 MG tablet  Multiple Vitamins-Minerals (MENS 50+ MULTI VITAMIN/MIN PO)  mupirocin (BACTROBAN) 2 %  external ointment  order for DME  order for DME  ORDER FOR DME  pantoprazole (PROTONIX) 40 MG EC tablet  spironolactone (ALDACTONE) 25 MG tablet  STATIN NOT PRESCRIBED (INTENTIONAL)  triamcinolone (KENALOG) 0.1 % external cream  vitamin D3 (CHOLECALCIFEROL) 2000 units (50 mcg) tablet  warfarin ANTICOAGULANT (JANTOVEN ANTICOAGULANT) 2.5 MG tablet      Review of Systems  CONSTITUTIONAL:NEGATIVE for fever, chills, change in weight  INTEGUMENTARY/SKIN: NEGATIVE for worrisome rashes, moles or lesions  EYES: NEGATIVE for vision changes or irritation  ENT/MOUTH: NEGATIVE for ear, mouth and throat problems  RESP:NEGATIVE for significant cough or SOB  CV:  POSITIVE for lower extremity edema NEGATIVE for chest pain, palpitations  GI: NEGATIVE for abdominal pain, diarrhea, nausea and vomiting  :  POSITIVE for dysuria, increased urinary urgency, voiding in small amounts, resolved hematuria NEGATIVE for penile or testicular pain   Physical Exam   BP: 139/77  Pulse: 80  Temp: 98.2  F (36.8  C)  SpO2: 99 %  Physical Exam  Constitutional: nontoxic and in no acute distress.    HENT:  Normocephalic and atraumatic.  Symmetric in appearance.  Cardiovascular:  No cyanosis.  RRR.  No audible murmurs noted.  +3 bilateral lower extremity edema, symmetric in appearance without calf tenderness.  Respiratory:  Effort normal without sign of respiratory distress.  CTAB without wheezing, rhonchi, or crackles.  Gastrointestinal:  distended abdomen.  Musculoskeletal:  Moves extremities spontaneously.  Neurological:  Patient is alert.  Skin:  Skin is warm and dry.  Psychiatric:  Normal mood and affect.  ED Course        Procedures        Critical Care time:  none          Results for orders placed or performed during the hospital encounter of 03/12/20 (from the past 24 hour(s))   UA reflex to Microscopic   Result Value Ref Range    Color Urine Yellow     Appearance Urine Clear     Glucose Urine Negative NEG^Negative mg/dL    Bilirubin Urine  Negative NEG^Negative    Ketones Urine Negative NEG^Negative mg/dL    Specific Gravity Urine 1.014 1.003 - 1.035    Blood Urine Large (A) NEG^Negative    pH Urine 5.0 5.0 - 7.0 pH    Protein Albumin Urine 100 (A) NEG^Negative mg/dL    Urobilinogen mg/dL 2.0 0.0 - 2.0 mg/dL    Nitrite Urine Negative NEG^Negative    Leukocyte Esterase Urine Trace (A) NEG^Negative    Source Midstream Urine     RBC Urine 81 (H) 0 - 2 /HPF    WBC Urine 20 (H) 0 - 5 /HPF    Mucous Urine Present (A) NEG^Negative /LPF   CBC with platelets, differential   Result Value Ref Range    WBC 3.2 (L) 4.0 - 11.0 10e9/L    RBC Count 3.41 (L) 4.4 - 5.9 10e12/L    Hemoglobin 9.0 (L) 13.3 - 17.7 g/dL    Hematocrit 29.9 (L) 40.0 - 53.0 %    MCV 88 78 - 100 fl    MCH 26.4 (L) 26.5 - 33.0 pg    MCHC 30.1 (L) 31.5 - 36.5 g/dL    RDW 17.5 (H) 10.0 - 15.0 %    Platelet Count 68 (L) 150 - 450 10e9/L    Diff Method Automated Method     % Neutrophils 58.6 %    % Lymphocytes 17.6 %    % Monocytes 17.6 %    % Eosinophils 5.6 %    % Basophils 0.3 %    % Immature Granulocytes 0.3 %    Nucleated RBCs 0 0 /100    Absolute Neutrophil 1.9 1.6 - 8.3 10e9/L    Absolute Lymphocytes 0.6 (L) 0.8 - 5.3 10e9/L    Absolute Monocytes 0.6 0.0 - 1.3 10e9/L    Absolute Eosinophils 0.2 0.0 - 0.7 10e9/L    Absolute Basophils 0.0 0.0 - 0.2 10e9/L    Abs Immature Granulocytes 0.0 0 - 0.4 10e9/L    Absolute Nucleated RBC 0.0    Basic metabolic panel   Result Value Ref Range    Sodium 139 133 - 144 mmol/L    Potassium 3.7 3.4 - 5.3 mmol/L    Chloride 103 94 - 109 mmol/L    Carbon Dioxide 29 20 - 32 mmol/L    Anion Gap 7 3 - 14 mmol/L    Glucose 148 (H) 70 - 99 mg/dL    Urea Nitrogen 21 7 - 30 mg/dL    Creatinine 0.95 0.66 - 1.25 mg/dL    GFR Estimate 87 >60 mL/min/[1.73_m2]    GFR Estimate If Black >90 >60 mL/min/[1.73_m2]    Calcium 9.0 8.5 - 10.1 mg/dL       Medications - No data to display    Assessments & Plan (with Medical Decision Making)     I have reviewed the nursing notes.    I  have reviewed the findings, diagnosis, plan and need for follow up with the patient.       Discharge Medication List as of 3/12/2020  6:21 PM      START taking these medications    Details   cephALEXin (KEFLEX) 500 MG capsule Take 1 capsule (500 mg) by mouth 2 times daily for 7 days, Disp-14 capsule,R-0, E-Prescribe             Final diagnoses:   Dysuria   Abnormal finding on urinalysis   Lower extremity edema     59-year-old male who has had multiple ED visits over the last 2 weeks presents to the urgent care with concern over possible urinary tract infection given dysuria, increased urinary urgency and voiding in small amounts which developed earlier today.  He had stable vital signs upon arrival.  Physical exam findings as described above.  As part of evaluation he did have urinalysis which showed large amount of blood, positive leukocyte esterase and increased number of white blood cells at 20 however no bacteria was noted.  Did obtain metabolic panel which did not show any evidence of acute renal failure secondary to increased diuretic use.  CBC was also obtained and was showed numerous abnormalities, consistent with his baseline over the last 2 weeks.  Differential for his dysuria would include acute urinary tract infection, prostatitis.  Did also discuss possibility of chemical urethritis.  Patient denies any concern for sexually transmitted infection.  Discussed with patient that microscopic hematuria could be secondary to known history of stable kidney stones versus secondary to infection versus secondary to Jantoven use.  As his INR was within theraeutic range two days ago will defer repeat testing at this time.  He was discharged home stable with instructions to follow-up with primary care provider for further medication management.  After discussing versus benefits he did agree to initiate antibiotic pending urine culture.  He was discharged home stable with prescription for Keflex. Follow up with PCP if  no improvement in three days.   Worrisome reasons to return to the ER/UC sooner discussed.      Disclaimer: This note consists of symbols derived from keyboarding, dictation, and/or voice recognition software. As a result, there may be errors in the script that have gone undetected.  Please consider this when interpreting information found in the chart.    3/12/2020   [facility] EMERGENCY DEPARTMENT     Mine Wynne PA-C  03/14/20 1205

## 2020-03-13 ENCOUNTER — TELEPHONE (OUTPATIENT)
Dept: FAMILY MEDICINE | Facility: CLINIC | Age: 60
End: 2020-03-13

## 2020-03-13 DIAGNOSIS — Z79.01 LONG TERM CURRENT USE OF ANTICOAGULANT THERAPY: ICD-10-CM

## 2020-03-13 DIAGNOSIS — I48.0 PAROXYSMAL ATRIAL FIBRILLATION (H): ICD-10-CM

## 2020-03-13 LAB
BACTERIA SPEC CULT: NORMAL
Lab: NORMAL
SPECIMEN SOURCE: NORMAL

## 2020-03-13 NOTE — TELEPHONE ENCOUNTER
ANTICOAGULATION  MANAGEMENT: Discharge Review    Mauricepj Jon chart reviewed for anticoagulation continuity of care    Emergency room visit on 3-12-20 for possible UTI.    Discharge disposition: Home    Results:    Recent Labs   Lab Test 03/10/20 03/06/20  1359 03/04/20  1718   INR 2.5* 3.30* 3.44*       Anticoagulation inpatient management:     not applicable     Anticoagulation discharge instructions:     Warfarin dosing: home regimen continued   Bridging: No   INR goal change: No      Medication changes affecting anticoagulation: Yes: Keflex    Additional factors affecting anticoagulation: Yes: possible UTI or prostatitis    Plan     Recommend to check INR on 3-17-20    Left a detailed message for patient to call [facility] and schedule an INR for 3-17    Anticoagulation Calendar updated    Susan Beyer RN

## 2020-03-14 NOTE — RESULT ENCOUNTER NOTE
Final urine culture report is NEGATIVE per [place] [facility] Result protocol.    If NEGATIVE result, no change in treatment, per [facility] Lab Result protocol.

## 2020-03-16 ENCOUNTER — TELEPHONE (OUTPATIENT)
Dept: FAMILY MEDICINE | Facility: CLINIC | Age: 60
End: 2020-03-16

## 2020-03-16 DIAGNOSIS — K76.6 PORTAL HYPERTENSION (H): ICD-10-CM

## 2020-03-16 RX ORDER — FUROSEMIDE 40 MG
40 TABLET ORAL 3 TIMES DAILY
Qty: 90 TABLET | Refills: 1 | Status: CANCELLED | OUTPATIENT
Start: 2020-03-16

## 2020-03-16 NOTE — TELEPHONE ENCOUNTER
Urinating more often  No bleeding    uti  Continues to take keflex    S-(situation): The patient would like to have more Lasix due to edema.    B-(background): The patient is doing better.    A-(assessment): The patient reports that he has been seen for edema. The patient states that in the past 2 weeks he has had a significant weight gain. The patient reports the he is doing better. The patient would like get a few more Lasix medication to continue with edema.      R-(recommendations): Advised the patient to be seen. The patient was scheduled.    Violet HUTCHINSON RN

## 2020-03-16 NOTE — TELEPHONE ENCOUNTER
Reason for call:  Patient reporting a symptom    Symptom or request: Pt has been seen a few times in the ED lately for a UTI and Edema.  Pt continues to have edema in his legs and a little SOB, but not presently, despite increased dose of Lasix and antinbiotic.  No fever.  Pt is asking that new Rx for Lasix 3 pills daily be sent to [facility]  Please call patient and advise.      Duration (how long have symptoms been present): ongoing    Have you been treated for this before? Yes    Additional comments:     Phone Number patient can be reached at:  Cell number on file:    Telephone Information:   Mobile [phone]     Best Time:  any    Can we leave a detailed message on this number:  YES    Call taken on 3/16/2020 at 3:27 PM by Donya Langley

## 2020-03-17 ENCOUNTER — TELEPHONE (OUTPATIENT)
Dept: TRANSPLANT | Facility: CLINIC | Age: 60
End: 2020-03-17

## 2020-03-17 ENCOUNTER — OFFICE VISIT (OUTPATIENT)
Dept: FAMILY MEDICINE | Facility: CLINIC | Age: 60
End: 2020-03-17
Payer: COMMERCIAL

## 2020-03-17 ENCOUNTER — ANTICOAGULATION THERAPY VISIT (OUTPATIENT)
Dept: ANTICOAGULATION | Facility: CLINIC | Age: 60
End: 2020-03-17
Payer: COMMERCIAL

## 2020-03-17 VITALS
OXYGEN SATURATION: 97 % | SYSTOLIC BLOOD PRESSURE: 112 MMHG | WEIGHT: 230 LBS | TEMPERATURE: 99 F | BODY MASS INDEX: 39.27 KG/M2 | DIASTOLIC BLOOD PRESSURE: 60 MMHG | HEART RATE: 69 BPM | RESPIRATION RATE: 18 BRPM | HEIGHT: 64 IN

## 2020-03-17 DIAGNOSIS — R30.0 BURNING WITH URINATION: Primary | ICD-10-CM

## 2020-03-17 DIAGNOSIS — R31.29 OTHER MICROSCOPIC HEMATURIA: ICD-10-CM

## 2020-03-17 DIAGNOSIS — K76.6 PORTAL HYPERTENSION (H): ICD-10-CM

## 2020-03-17 DIAGNOSIS — Z79.01 LONG TERM CURRENT USE OF ANTICOAGULANT THERAPY: ICD-10-CM

## 2020-03-17 DIAGNOSIS — I48.0 PAROXYSMAL ATRIAL FIBRILLATION (H): ICD-10-CM

## 2020-03-17 LAB
ALBUMIN UR-MCNC: NEGATIVE MG/DL
APPEARANCE UR: CLEAR
BACTERIA #/AREA URNS HPF: ABNORMAL /HPF
BILIRUB UR QL STRIP: NEGATIVE
COLOR UR AUTO: YELLOW
GLUCOSE UR STRIP-MCNC: NEGATIVE MG/DL
HGB UR QL STRIP: ABNORMAL
INR POINT OF CARE: 3 (ref 0.86–1.14)
KETONES UR STRIP-MCNC: NEGATIVE MG/DL
LEUKOCYTE ESTERASE UR QL STRIP: ABNORMAL
NITRATE UR QL: NEGATIVE
PH UR STRIP: 6.5 PH (ref 5–7)
RBC #/AREA URNS AUTO: ABNORMAL /HPF
SOURCE: ABNORMAL
SP GR UR STRIP: 1.01 (ref 1–1.03)
UROBILINOGEN UR STRIP-ACNC: 0.2 EU/DL (ref 0.2–1)
WBC #/AREA URNS AUTO: ABNORMAL /HPF

## 2020-03-17 PROCEDURE — 99207 ZZC NO CHARGE NURSE ONLY: CPT

## 2020-03-17 PROCEDURE — 36416 COLLJ CAPILLARY BLOOD SPEC: CPT

## 2020-03-17 PROCEDURE — 85610 PROTHROMBIN TIME: CPT | Mod: QW

## 2020-03-17 PROCEDURE — 99214 OFFICE O/P EST MOD 30 MIN: CPT | Performed by: FAMILY MEDICINE

## 2020-03-17 PROCEDURE — 81001 URINALYSIS AUTO W/SCOPE: CPT | Performed by: FAMILY MEDICINE

## 2020-03-17 RX ORDER — SPIRONOLACTONE 25 MG/1
25 TABLET ORAL DAILY
Qty: 90 TABLET | Refills: 3 | Status: SHIPPED | OUTPATIENT
Start: 2020-03-17 | End: 2021-01-01

## 2020-03-17 ASSESSMENT — MIFFLIN-ST. JEOR: SCORE: 1761.33

## 2020-03-17 NOTE — PROGRESS NOTES
ANTICOAGULATION FOLLOW-UP CLINIC VISIT    Patient Name:  Maurice Jon  Date:  3/17/2020  Contact Type:  Face to Face    SUBJECTIVE:  Patient Findings     Comments:   Pt has almost finished Keflex. Pt is feeling better.  No changes in medications, activity, or diet noted. No concerns with clotting, bleeding, or increased bruising noted. Took warfarin as prescribed.  Patient is to continue maintenance warfarin plan, and check INR in 3 weeks.  Patient verbalizes understanding and agrees to plan. No further questions or concerns.        Clinical Outcomes     Negatives:   Major bleeding event, Thromboembolic event, Anticoagulation-related hospital admission, Anticoagulation-related ED visit, Anticoagulation-related fatality    Comments:   Pt has almost finished Keflex. Pt is feeling better.  No changes in medications, activity, or diet noted. No concerns with clotting, bleeding, or increased bruising noted. Took warfarin as prescribed.  Patient is to continue maintenance warfarin plan, and check INR in 3 weeks.  Patient verbalizes understanding and agrees to plan. No further questions or concerns.           OBJECTIVE    INR Protime   Date Value Ref Range Status   03/17/2020 3.0 (A) 0.86 - 1.14 Final       ASSESSMENT / PLAN  INR assessment THER    Recheck INR In: 3 WEEKS    INR Location Clinic      Anticoagulation Summary  As of 3/17/2020    INR goal:   2.0-3.0   TTR:   84.4 % (11.8 mo)   INR used for dosing:   3.0 (3/17/2020)   Warfarin maintenance plan:   1.25 mg (2.5 mg x 0.5) every Fri; 2.5 mg (2.5 mg x 1) all other days   Full warfarin instructions:   1.25 mg every Fri; 2.5 mg all other days   Weekly warfarin total:   16.25 mg   Plan last modified:   Susan Beyer RN (9/28/2018)   Next INR check:   4/7/2020   Priority:   Maintenance   Target end date:   10/4/2018    Indications    Paroxysmal atrial fibrillation (H) [I48.0]  Atrial fibrillation with rapid ventricular response (H) (Resolved)  [I48.91]  Long-term (current) use of anticoagulants [Z79.01] [Z79.01]             Anticoagulation Episode Summary     INR check location:       Preferred lab:       Send INR reminders to:   [facility]    Comments:   * anticoagulation short period surrounding ablation on 12/21/18. Cardiology to decide when to stop warfarin. has well-compensated cirrhosis      Anticoagulation Care Providers     Provider Role Specialty Phone number    Alon Pineda MD [facility] [phone]            See the Encounter Report to view Anticoagulation Flowsheet and Dosing Calendar (Go to Encounters tab in chart review, and find the Anticoagulation Therapy Visit)        Sondra Santos RN

## 2020-03-17 NOTE — PROGRESS NOTES
"Subjective     Maurice Jon is a 59 year old male who presents to clinic today for the following health issues:    HPI     Chief Complaint   Patient presents with     Urinary Problem     Edema     recheck        Genitourinary - Male  Onset: x 3 weeks     Description:   Dysuria (painful urination): no   Hematuria (blood in urine): no   Frequency: no   Are you urinating at night : yes   Hesitancy (delay in urine): no   Retention (unable to empty): no   Decrease in urinary flow: no   Incontinence: no     Progression of Symptoms:  improving    Accompanying Signs & Symptoms:  Fever: no   Back/Flank pain: no   Urethral discharge: no   Testicle lumps/masses/pain: no   Nausea and/or vomiting: no   Abdominal pain: no     History:   History of frequent UTI's: no   History of kidney stones: YES  History of hernias: YES  Personal or Family history of Prostate problems: no  Sexually active: no     Precipitating factors:   none    Alleviating factors:  none            Reviewed and updated as needed this visit by Provider         Review of Systems         Objective    /60   Pulse 69   Temp 99  F (37.2  C)   Resp 18   Ht 1.613 m (5' 3.5\")   Wt 104.3 kg (230 lb)   SpO2 97%   BMI 40.10 kg/m    Body mass index is 40.1 kg/m .  Physical Exam               Further history obtained, clarified or corrected by physician:  He says his urinary symptoms have completely resolved.  He is not having any blood and is not having any burning.  He has a couple days left of antibiotics.  His urine culture was essentially negative.  He will be seeing his gastroenterologist for his liver disease in just a couple days.  He has the phone number to call to set up a urology consult but he has not done that yet.    OBJECTIVE:  /60   Pulse 69   Temp 99  F (37.2  C)   Resp 18   Ht 1.613 m (5' 3.5\")   Wt 104.3 kg (230 lb)   SpO2 97%   BMI 40.10 kg/m    LUNGS: clear to auscultation, normal breath sounds  CV: RRR without " murmur  ABD: BS+, soft, nontender, no masses, no hepatosplenomegaly  EXTREMITIES: without joint tenderness, swelling or erythema.  No muscle tenderness or abnormality.  SKIN: No rashes or abnormalities  NEURO:non focal exam    UA: positive for blood    ASSESSMENT:     Burning with urination  Other microscopic hematuria    PLAN:    Finish out the antibiotics for just the last 2 days  Follow-up with gastroenterology as scheduled later this week  Call for the urology consultation.    Orders Placed This Encounter     UA reflex to Microscopic and Culture     Urine Microscopic

## 2020-03-17 NOTE — PATIENT INSTRUCTIONS
Our Clinic hours are:  Mondays    7:20 am - 7 pm  Tues -  Fri  7:20 am - 5 pm    Clinic Phone: [phone]    The clinic lab opens at 7:30 am Mon - Fri and appointments are required.    [facility]. [phone]  Monday  8 am - 7pm  Tues - Fri 8 am - 5:30 pm

## 2020-03-17 NOTE — TELEPHONE ENCOUNTER
In light of the COVID 19 pandemic, [facility] cares about your safety and we are calling to convert your scheduled in-person clinic visit to a phone visit on 3/19/20.  The appointment will be on the same date and time.  What is the best number for the provider to call you at?  [phone]

## 2020-03-19 ENCOUNTER — VIRTUAL VISIT (OUTPATIENT)
Dept: GASTROENTEROLOGY | Facility: CLINIC | Age: 60
End: 2020-03-19
Attending: INTERNAL MEDICINE
Payer: COMMERCIAL

## 2020-03-19 ENCOUNTER — TELEPHONE (OUTPATIENT)
Dept: UROLOGY | Facility: CLINIC | Age: 60
End: 2020-03-19

## 2020-03-19 DIAGNOSIS — K74.60 CIRRHOSIS OF LIVER WITHOUT ASCITES, UNSPECIFIED HEPATIC CIRRHOSIS TYPE (H): Primary | ICD-10-CM

## 2020-03-19 RX ORDER — FERROUS GLUCONATE 324(38)MG
324 TABLET ORAL
Qty: 100 TABLET | Refills: 1 | Status: SHIPPED | OUTPATIENT
Start: 2020-03-19 | End: 2020-05-15

## 2020-03-19 NOTE — TELEPHONE ENCOUNTER
I spoke to Yomi today. He is a patient with a history of Kidney stones.  He came into the ER with Dysuria on 3-12-20. His urinalysis indicated Large blood in the urine and *1 RBC on HPF.  He had a CT previously at the [facility].  Impression:   Unchanged nonobstructing renal stones bilaterally, measuring up to  5 mm on the right, and 7 mm on the left.  2. Cirrhotic liver with evidence of portal hypertension including  esophageal and splenorenal varices.  3. Cholelithiasis without CT evidence of cholecystitis.  4. Cardiomegaly with a small right pleural effusion.     Yomi has  past medical history including liver cirrhosis, portal hypertension with esophageal varices paroxysmal atrial fibrillation with pacemaker, congestive heart failure thrombocytopenia  and chronic anticoagulation with warfarin therapy who who has had multiple ED visits. He also has a history of kidney stones. He has been told by multiple physician's that he is considered a high risk patient.   He currently is in no pain and he has no visual hematuria.  He just wanted it to be known that he has these kidney stones unless he has problems.  I also suggested that he calls to the [facility] urology clinic since he is a high risk patient and if he were to have surgery it may need to be at the [facility].   He understands that currently we are not seeing non urgent clinic patients due to the Coronavirus. He will make a call to the urology clinic at the [place] and is welcome to call us back with questions or concerns,.. Pamella SENA Rn

## 2020-03-19 NOTE — TELEPHONE ENCOUNTER
Reason for Call:  Other appointment    Detailed comments: pt calling stating he talk to his liver dr who looked at his past ER visits. Had CT scan done has 0.5mm and 0.7mm stones. Also has some blood in his urine. Not in any pain at this point. Is on Lasix for water retention.    Phone Number Patient can be reached at: Cell [phone]    Best Time: any     Can we leave a detailed message on this number? YES    Call taken on 3/19/2020 at 8:51 AM by Sharon Rojas

## 2020-03-19 NOTE — PROGRESS NOTES
"Maurice Jon is a 59 year old male who is being evaluated via a billable telephone visit.      The patient has been notified of following:     \"This telephone visit will be conducted via a call between you and your physician/provider. We have found that certain health care needs can be provided without the need for a physical exam.  This service lets us provide the care you need with a short phone conversation.  If a prescription is necessary we can send it directly to your pharmacy.  If lab work is needed we can place an order for that and you can then stop by our lab to have the test done at a later time.    If during the course of the call the physician/provider feels a telephone visit is not appropriate, you will not be charged for this service.\"     Maurice Jon has cirrhosis caused by PATEL    I have reviewed and updated the patient's Past Medical History, Social History, Family History and Medication List.    ALLERGIES  Avelox; Moxifloxacin; and Sotalol    He is doing well.  He does note occasional mild abdominal pain, he has no itching or skin rash.  He has mild fatigue, but is working full time.  He denies any increased abdominal girth or lower extremity edema.  He denies any gastrointestinal bleeding or any overt signs of encephalopathy.     He denies any fevers or chills, cough or shortness of breath.  He denies any nausea or vomiting, or diarrhea.  He does occasionally have some constipation.  He reports his appetite is good and his weight has been stable.  Several weeks ago he had gained a fair amount of weight which he thought was increased fluid retention.  However, an ultrasound showed no ascites and is a mention that just spontaneously resolved.    He also was recently diagnosed with kidney stones.  He had presented with some urinary symptoms but his urine culture was negative.    Assessment/Plan:  My impression is that Mr. 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  " His liver disease remains well compensated.  He is up-to-date with regard to vaccines and cancer screening.  I will not be making any change to his medical regimen at this visit.  He does appear to be iron deficient and so I will prescribe some iron pills for him.    As I mentioned above, I have encouraged him to follow-up with the urologist to whom he was referred regarding his kidney stone disease.    I will see him back in the clinic in 6 months for repeat imaging and blood work.    Phone call duration: 15 minutes    Hi Roque MD      Professor of Medicine  [employer]      Executive Medical Director, Solid Organ Transplant Program  [facility]

## 2020-03-23 DIAGNOSIS — K76.6 PORTAL HYPERTENSION (H): ICD-10-CM

## 2020-03-23 RX ORDER — FUROSEMIDE 40 MG
40 TABLET ORAL 2 TIMES DAILY
Qty: 60 TABLET | Refills: 1 | Status: SHIPPED | OUTPATIENT
Start: 2020-03-23 | End: 2020-03-24

## 2020-03-24 ENCOUNTER — TELEPHONE (OUTPATIENT)
Dept: CARDIOLOGY | Facility: CLINIC | Age: 60
End: 2020-03-24

## 2020-03-24 ENCOUNTER — VIRTUAL VISIT (OUTPATIENT)
Dept: CARDIOLOGY | Facility: CLINIC | Age: 60
End: 2020-03-24
Attending: PHYSICIAN ASSISTANT
Payer: COMMERCIAL

## 2020-03-24 VITALS — WEIGHT: 235 LBS | BODY MASS INDEX: 40.98 KG/M2

## 2020-03-24 DIAGNOSIS — I50.810 RIGHT-SIDED CONGESTIVE HEART FAILURE, UNSPECIFIED HF CHRONICITY (H): ICD-10-CM

## 2020-03-24 DIAGNOSIS — Z95.0 CARDIAC PACEMAKER IN SITU: ICD-10-CM

## 2020-03-24 PROCEDURE — 99214 OFFICE O/P EST MOD 30 MIN: CPT | Mod: TEL | Performed by: PHYSICIAN ASSISTANT

## 2020-03-24 RX ORDER — TORSEMIDE 20 MG/1
TABLET ORAL
Qty: 90 TABLET | Refills: 1 | Status: SHIPPED | OUTPATIENT
Start: 2020-03-24 | End: 2020-05-26

## 2020-03-24 NOTE — TELEPHONE ENCOUNTER
Remote alert received for VT episode. Patient has had 1 VT and 6 NSVT episodes logged since last check on 12/31/19. VT EGM shows 21 seconds of NSVT vs RVR with average V rates in the 180's. Discussed with patient who stated he was asymptomatic to episode. 3 NSVT EGM's available showing 13-15 seconds of NSVT vs RVR with average V rates in the 160-170's.  Patient's underlying rhythm at last in-clinic device check was Afib with V conduction in the 40's.  Patient is scheduled to do telephone visit with IRMA Dinh today and scheduled for next remote device check on 3/31/20.

## 2020-03-24 NOTE — PROGRESS NOTES
"Pt reports a rash on his back that has been assessed by PCP. 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 Using steroid cream. Pt also noted that Dr Roque increased the lasix to 40 mg TID 2 weeks ago. Pt also wants Nadege SOLIS to know that his liver status has been good. Did find out he is anemic so has been on iron TID. He notes he is starting to retain fluid again in his abdomen and it feels like it is pushing up into his chest. Also notes his legs are swelling up again from his knees to the tops of his feet. Worries that he will develop another cellulitis in his legs. He is cleaning his legs daily with Hibiclens and wearing his compression stockings faithfully. Pt wonders if the pacemaker adjustment done in January has anything to do with all this swelling. Melany Ewing RN Cardiology March 24, 2020, 11:33 AM    Maurice Jon is a 59 year old male who is being evaluated via a billable telephone visit.      The patient has been notified of following:     \"This telephone visit will be conducted via a call between you and your physician/provider. We have found that certain health care needs can be provided without the need for a physical exam.  This service lets us provide the care you need with a short phone conversation.  If a prescription is necessary we can send it directly to your pharmacy.  If lab work is needed we can place an order for that and you can then stop by our lab to have the test done at a later time.    If during the course of the call the physician/provider feels a telephone visit is not appropriate, you will not be charged for this service.\"     Maurice Jon complains of    Chief Complaint   Patient presents with     RECHECK     follow up AFib s/p PVI 12/2018       I have reviewed and updated the patient's Past Medical History, Social History, Family History and Medication List.    ALLERGIES  Avelox; Moxifloxacin; and Sotalol    VITALS  235#    Brief HPI:  1. Persistent " AFib, first diagnosed 2013. Now s/p AVN ablation and BiV [employer] PPM (no A lead placed)     2. NAFLD with cirrhosis followed by Dr. Roque at [facility].  Complicated by portal vein nonocclusive thrombosis, portal hypertension, thrombocytopeniam splenomegaly, splenic vein thrombosis and known esophageal varices (Upper GI last done 9/2019). Virtual Visit with Dr. Roque 3/2020.     3. HFpEF with EF down to 25-30% and moderate reduction in RV function and moderate-severe TR on echo done 12/2018. EF 50-55% on echo 12/2019. Now with what sounds like R sided heart failure     4. Pulmonary nodules noted on CTA Heart 12/21/2018, resolved on CT 4/2019. No need for follow-up.     Dr. Evans saw Yomi 11/2019 at which time they discussed his continue atrial arrhythmias. Given significant scarring in the LA, Dr. Evans noted that chances of maintaining SR were slim and recommended AVN ablation/BIV PPM .    This was done 11/2019 (AVN ablation, [employer] BiV PPM - no atrial lead) and he's back for routine follow-up.    His biggest issue lately has been increased LE edema and abdominal distention for which he was in the ER 3/4 and again 3/6. Lasix was increased to 40 mg TID on 3/4 in the ER (not updated in Epic).      He had a Virtual Visit with Dr. Roque on 3/19, who reviewed blood work done in the ER 3/2020. Albumin and Protein levels were low. Hgb was 9.0 g/dL. At that time, abdominal swelling was down and an US had been done showing no ascites. Dr Roque noted his liver disease was well-compensated. FeSO4 TID was Rxd.     Component      Latest Ref Rng & Units 3/4/2020 3/6/2020 3/12/2020   Sodium      133 - 144 mmol/L 143 138 139   Potassium      3.4 - 5.3 mmol/L 3.7 3.4 3.7   Chloride      94 - 109 mmol/L 108 105 103   Carbon Dioxide      20 - 32 mmol/L 25 29 29   Anion Gap      3 - 14 mmol/L 10 5 7   Glucose      70 - 99 mg/dL 82 85 148 (H)   Urea Nitrogen      7 - 30 mg/dL 20 19 21   Creatinine      0.66 - 1.25  "mg/dL 0.76 0.84 0.95   GFR Estimate      >60 mL/min/1.73:m2 >90 >90 87   GFR Estimate If Black      >60 mL/min/1.73:m2 >90 >90 >90   Calcium      8.5 - 10.1 mg/dL 8.9 8.3 (L) 9.0     Component      Latest Ref Rng & Units 2/22/2020 3/4/2020 3/6/2020 3/12/2020   WBC      4.0 - 11.0 10e9/L 2.9 (L) 3.4 (L) 3.0 (L) 3.2 (L)   RBC Count      4.4 - 5.9 10e12/L 3.62 (L) 3.46 (L) 3.44 (L) 3.41 (L)   Hemoglobin      13.3 - 17.7 g/dL 9.8 (L) 9.3 (L) 9.1 (L) 9.0 (L)   Hematocrit      40.0 - 53.0 % 31.5 (L) 30.2 (L) 30.4 (L) 29.9 (L)   MCV      78 - 100 fl 87 87 88 88   MCH      26.5 - 33.0 pg 27.1 26.9 26.5 26.4 (L)   MCHC      31.5 - 36.5 g/dL 31.1 (L) 30.8 (L) 29.9 (L) 30.1 (L)   RDW      10.0 - 15.0 % 17.5 (H) 17.9 (H) 18.0 (H) 17.5 (H)   Platelet Count      150 - 450 10e9/L 69 (L) 53 (L) 54 (L) 68 (L)     Component      Latest Ref Rng & Units 2/22/2020 3/4/2020 3/6/2020   Bilirubin Total      0.2 - 1.3 mg/dL 1.6 (H) 1.4 (H) 1.9 (H)   Albumin      3.4 - 5.0 g/dL 3.4 3.2 (L) 3.1 (L)   Protein Total      6.8 - 8.8 g/dL 6.1 (L) 5.9 (L) 5.8 (L)   Alkaline Phosphatase      40 - 150 U/L 78 82 80   ALT      0 - 70 U/L 45 41 44   AST      0 - 45 U/L 51 (H) 52 (H) 59 (H)   Bilirubin Direct      0.0 - 0.2 mg/dL   0.7 (H)     Interval Hx:  Yomi continues to note significant LE edema despite compression stockings as well as significant abdominal bloating.  He isn't sure the extra 40 mg Lasix per day is helping \"that much.\"     Was told that protein/albumin levels are low so has been drinking 1 Ensure-type drink/day.  He started this only a few days ago and has not noticed much difference.    No c/o CP. Still without orthopnea, PND or change in mild MENARD.  Does feel like abdomen is \"pushing up\" on lungs, however.    Just before our visit he got a call from our device RN that he had a remote alert for a 21s run of VT vs RVR to 180s.  Asx'c.      Device interrogation 12/2019 showed 98% BIV Paced.    Assessment/Plan:    1. Fluid " "overload/HFpEF    Pt with multiple ER visits 3/2020 for fluid retention; had gained 14#. Lasix increased from 40 mg BID to TID 3/4 - hasn't helped much and still feels abdomen is \"full\"    Note low protein/albumin. Note Hgb remains low.  Is now drinking Ensure-type drink and is on TID Iron    Denies SOB, orthopnea or PND    Note EF improved 50-55%     PLAN:    Given preserved EF and noting pt has not improved despite increasing Lasix ~3 weeks ago, will plan TORSEMIDE 20 mg. Will take 2 (40 mg) in AM and 1 (20 mg) at noon    Continue spirolactone      Phone visit with me in 1 week (prefers 350 timeframe)    Will need to get BMP if remains on this      Continue compression stockings    Continue low sodium diet.     Increase K in diet (OJ)      2. Permanent AFib    Remains on AC with warfarin. Did have some hematuria after an ER visit (noted to have kidney stones as well)    S/p AVN ablation and BIV PPM without A lead. 98% BIV paced at check 12/2019    Device \"alert\" sent today due to rapid rate vs VT.       PLAN:    Will reach out to Device to see if VT or AFib/RVR given AVN ablation    Likely add BB at our telephone visit next week    Continue warfarin    3. Liver Cirrhosis    Just saw Dr. Roque and hepatic disease is stable    Note low protein/albumin but ALT, AST OK    Esophageal varices noted on routine EGD 9/2019 (grade I)     PLAN:    Continue spirinolactone    Dr. Roque planning to see him again 6 m    I have reviewed the note as documented above.  This accurately captures the substance of my conversation with the patient.        Phone call contact time  Call Started at 1340  Call Ended at 1420       Virginia Montilla PA-C [facility]      "

## 2020-03-31 ENCOUNTER — ANCILLARY PROCEDURE (OUTPATIENT)
Dept: CARDIOLOGY | Facility: CLINIC | Age: 60
End: 2020-03-31
Attending: INTERNAL MEDICINE
Payer: COMMERCIAL

## 2020-03-31 ENCOUNTER — VIRTUAL VISIT (OUTPATIENT)
Dept: CARDIOLOGY | Facility: CLINIC | Age: 60
End: 2020-03-31
Attending: PHYSICIAN ASSISTANT
Payer: COMMERCIAL

## 2020-03-31 VITALS
BODY MASS INDEX: 38.62 KG/M2 | SYSTOLIC BLOOD PRESSURE: 127 MMHG | DIASTOLIC BLOOD PRESSURE: 65 MMHG | HEART RATE: 68 BPM | WEIGHT: 218 LBS | HEIGHT: 63 IN

## 2020-03-31 DIAGNOSIS — I50.810 RIGHT-SIDED CONGESTIVE HEART FAILURE, UNSPECIFIED HF CHRONICITY (H): ICD-10-CM

## 2020-03-31 DIAGNOSIS — Z95.810 ICD (IMPLANTABLE CARDIOVERTER-DEFIBRILLATOR) IN PLACE: ICD-10-CM

## 2020-03-31 PROCEDURE — 99214 OFFICE O/P EST MOD 30 MIN: CPT | Mod: TEL | Performed by: PHYSICIAN ASSISTANT

## 2020-03-31 PROCEDURE — 93297 REM INTERROG DEV EVAL ICPMS: CPT | Performed by: INTERNAL MEDICINE

## 2020-03-31 PROCEDURE — G2066 INTER DEVC REMOTE 30D: HCPCS | Performed by: INTERNAL MEDICINE

## 2020-03-31 ASSESSMENT — MIFFLIN-ST. JEOR: SCORE: 1698.97

## 2020-03-31 NOTE — PROGRESS NOTES
"Maurice Jon is a 59 year old male who is being evaluated via a billable telephone visit.      The patient has been notified of following:     \"This telephone visit will be conducted via a call between you and your physician/provider. We have found that certain health care needs can be provided without the need for a physical exam.  This service lets us provide the care you need with a short phone conversation.  If a prescription is necessary we can send it directly to your pharmacy.  If lab work is needed we can place an order for that and you can then stop by our lab to have the test done at a later time.    If during the course of the call the physician/provider feels a telephone visit is not appropriate, you will not be charged for this service.\"     Patient has given verbal consent for Telephone visit?  Yes    Maurice Jon complains of    Chief Complaint   Patient presents with     Follow Up     pt. started on  Torsemide       I have reviewed and updated the patient's Past Medical History, Social History, Family History and Medication List.    ALLERGIES:  Avelox; Moxifloxacin; and Sotalol    VITALS:    BP.127/65  P.   68  HT. 5 3  WT. 218      Natalia Bonilla    CC:  FLUID OVERLOAD     VITALS:  BP.127/65  P.   68  HT. 5 3  WT. 218 (235# at last telephone visit 3/24, 1 week ago)    BRIEF HPI:    1. Persistent AFib, first diagnosed 2013. Now s/p AVN ablation and BiV [employer] PPM (no A lead placed)     2. NAFLD with cirrhosis followed by Dr. Roque at [facility].  Complicated by portal vein nonocclusive thrombosis, portal hypertension, thrombocytopenia, splenomegaly, splenic vein thrombosis and known esophageal varices (Upper GI last done 9/2019). Virtual Visit with Dr. Roque 3/2020.     3. HFpEF with EF down to 25-30% and moderate reduction in RV function and moderate-severe TR on echo done 12/2018. EF 50-55% on echo 12/2019. Now with what sounds like R sided heart " "failure     4. Pulmonary nodules noted on CTA Heart 12/21/2018, resolved on CT 4/2019. No need for follow-up.     I saw Yomi 3/23 at which time I switched his Lasix 40 mg TID to torsemide 40 mg in AM and 20 mg at noon. He's opted to take  torsemide 20  three times a day (between 7 AM and 4 PM)    INTERVAL HISTORY:  Yomi is feeling \"so much better.\" He's down 17# in 1 week. No c/o dehydration sxs.  His legs are \"much less hard\" making it easier to walk.  Belly is much softer.  Overall, he's feeling great.    DIAGNOSTICS:  Remote device interrogation today showed 96% BiV paced.  1 VT lasting 34 seconds, rate 179 bpm; 2 NSVT with 1 tracing; shows 16 seconds at 186 bpm. Asx'c. Violet (wife) believes that the 34s VT occurred after he had fallen asleep in the chair before bed (not using CPAP)    ASSESSMENT/PLAN:    1. R sided HF    As above, doing great on torsemide.  Still thinks he's retaining some fluid in the belly     PLAN:    Continue torsemide 20mg TID for now - reviewed dehydration sxs and he'll back off to BID if these occur before our next telephone call    BMP sometime before I talk to him again given high dose torsemide    1 week telephone visit @ 3:50    2. VT    Longest episode appeared to occur when he was sleeping in chair before bed. Notes he wasn't using CPAP    Echo 12/2019 with improved EF 50-55%     PLAN:    Discussed restarting BB therapy. He's really hesitant to start this given how incredibly fatigued he was on metoprolol in the past.  Explained that HR would not be an issue post BiV ICD placement    Will revisit restarting low dose BB for VT once he's euvolemic    BMP this week    Monitor/call for lightheadedness/dizziness/palpitations    USE CPAP    I have reviewed the note as documented above.  This accurately captures the substance of my conversation with the patient.        Phone call contact time  Call Started at 1558  Call Ended at 1630       Virginia Montilla PA-C [facility]      "

## 2020-04-01 DIAGNOSIS — K74.60 CIRRHOSIS OF LIVER WITHOUT ASCITES, UNSPECIFIED HEPATIC CIRRHOSIS TYPE (H): ICD-10-CM

## 2020-04-01 DIAGNOSIS — I50.810 RIGHT-SIDED CONGESTIVE HEART FAILURE, UNSPECIFIED HF CHRONICITY (H): ICD-10-CM

## 2020-04-01 LAB
AFP SERPL-MCNC: 2.3 UG/L (ref 0–8)
ALBUMIN SERPL-MCNC: 3.5 G/DL (ref 3.4–5)
ALP SERPL-CCNC: 100 U/L (ref 40–150)
ALT SERPL W P-5'-P-CCNC: 52 U/L (ref 0–70)
ANION GAP SERPL CALCULATED.3IONS-SCNC: 5 MMOL/L (ref 3–14)
AST SERPL W P-5'-P-CCNC: 82 U/L (ref 0–45)
BILIRUB DIRECT SERPL-MCNC: 0.9 MG/DL (ref 0–0.2)
BILIRUB SERPL-MCNC: 2 MG/DL (ref 0.2–1.3)
BUN SERPL-MCNC: 20 MG/DL (ref 7–30)
CALCIUM SERPL-MCNC: 9.4 MG/DL (ref 8.5–10.1)
CHLORIDE SERPL-SCNC: 103 MMOL/L (ref 94–109)
CO2 SERPL-SCNC: 31 MMOL/L (ref 20–32)
CREAT SERPL-MCNC: 0.88 MG/DL (ref 0.66–1.25)
ERYTHROCYTE [DISTWIDTH] IN BLOOD BY AUTOMATED COUNT: 20.5 % (ref 10–15)
GFR SERPL CREATININE-BSD FRML MDRD: >90 ML/MIN/{1.73_M2}
GLUCOSE SERPL-MCNC: 94 MG/DL (ref 70–99)
HCT VFR BLD AUTO: 33.7 % (ref 40–53)
HGB BLD-MCNC: 10.4 G/DL (ref 13.3–17.7)
INR PPP: 2.77 (ref 0.86–1.14)
MCH RBC QN AUTO: 27.4 PG (ref 26.5–33)
MCHC RBC AUTO-ENTMCNC: 30.9 G/DL (ref 31.5–36.5)
MCV RBC AUTO: 89 FL (ref 78–100)
PLATELET # BLD AUTO: 76 10E9/L (ref 150–450)
POTASSIUM SERPL-SCNC: 3.6 MMOL/L (ref 3.4–5.3)
PROT SERPL-MCNC: 6.2 G/DL (ref 6.8–8.8)
RBC # BLD AUTO: 3.79 10E12/L (ref 4.4–5.9)
SODIUM SERPL-SCNC: 139 MMOL/L (ref 133–144)
WBC # BLD AUTO: 3.9 10E9/L (ref 4–11)

## 2020-04-01 PROCEDURE — 85027 COMPLETE CBC AUTOMATED: CPT | Performed by: INTERNAL MEDICINE

## 2020-04-01 PROCEDURE — 85610 PROTHROMBIN TIME: CPT | Performed by: INTERNAL MEDICINE

## 2020-04-01 PROCEDURE — 80076 HEPATIC FUNCTION PANEL: CPT | Performed by: INTERNAL MEDICINE

## 2020-04-01 PROCEDURE — 82105 ALPHA-FETOPROTEIN SERUM: CPT | Performed by: INTERNAL MEDICINE

## 2020-04-01 PROCEDURE — 80048 BASIC METABOLIC PNL TOTAL CA: CPT | Performed by: INTERNAL MEDICINE

## 2020-04-01 PROCEDURE — 36415 COLL VENOUS BLD VENIPUNCTURE: CPT | Performed by: INTERNAL MEDICINE

## 2020-04-03 LAB
MDC_IDC_EPISODE_DTM: NORMAL
MDC_IDC_EPISODE_DURATION: 16 S
MDC_IDC_EPISODE_DURATION: 17 S
MDC_IDC_EPISODE_DURATION: 34 S
MDC_IDC_EPISODE_ID: NORMAL
MDC_IDC_EPISODE_TYPE: NORMAL
MDC_IDC_LEAD_IMPLANT_DT: NORMAL
MDC_IDC_LEAD_IMPLANT_DT: NORMAL
MDC_IDC_LEAD_LOCATION: NORMAL
MDC_IDC_LEAD_LOCATION: NORMAL
MDC_IDC_LEAD_LOCATION_DETAIL_1: NORMAL
MDC_IDC_LEAD_LOCATION_DETAIL_1: NORMAL
MDC_IDC_LEAD_MFG: NORMAL
MDC_IDC_LEAD_MFG: NORMAL
MDC_IDC_LEAD_MODEL: NORMAL
MDC_IDC_LEAD_MODEL: NORMAL
MDC_IDC_LEAD_POLARITY_TYPE: NORMAL
MDC_IDC_LEAD_POLARITY_TYPE: NORMAL
MDC_IDC_LEAD_SERIAL: NORMAL
MDC_IDC_LEAD_SERIAL: NORMAL
MDC_IDC_MSMT_BATTERY_DTM: NORMAL
MDC_IDC_MSMT_BATTERY_REMAINING_LONGEVITY: 132 MO
MDC_IDC_MSMT_BATTERY_REMAINING_PERCENTAGE: 100 %
MDC_IDC_MSMT_BATTERY_STATUS: NORMAL
MDC_IDC_MSMT_LEADCHNL_LV_IMPEDANCE_VALUE: 550 OHM
MDC_IDC_MSMT_LEADCHNL_LV_PACING_THRESHOLD_AMPLITUDE: 0.7 V
MDC_IDC_MSMT_LEADCHNL_LV_PACING_THRESHOLD_PULSEWIDTH: 0.5 MS
MDC_IDC_MSMT_LEADCHNL_RV_IMPEDANCE_VALUE: 786 OHM
MDC_IDC_MSMT_LEADCHNL_RV_PACING_THRESHOLD_AMPLITUDE: 1.3 V
MDC_IDC_MSMT_LEADCHNL_RV_PACING_THRESHOLD_PULSEWIDTH: 0.4 MS
MDC_IDC_PG_IMPLANT_DTM: NORMAL
MDC_IDC_PG_MFG: NORMAL
MDC_IDC_PG_MODEL: NORMAL
MDC_IDC_PG_SERIAL: NORMAL
MDC_IDC_PG_TYPE: NORMAL
MDC_IDC_SESS_CLINIC_NAME: NORMAL
MDC_IDC_SESS_DTM: NORMAL
MDC_IDC_SESS_TYPE: NORMAL
MDC_IDC_SET_BRADY_AT_MODE_SWITCH_RATE: 170 {BEATS}/MIN
MDC_IDC_SET_BRADY_LOWRATE: 70 {BEATS}/MIN
MDC_IDC_SET_BRADY_MAX_SENSOR_RATE: 130 {BEATS}/MIN
MDC_IDC_SET_BRADY_MODE: NORMAL
MDC_IDC_SET_CRT_LVRV_DELAY: 40 MS
MDC_IDC_SET_CRT_PACED_CHAMBERS: NORMAL
MDC_IDC_SET_LEADCHNL_LV_PACING_AMPLITUDE: 2 V
MDC_IDC_SET_LEADCHNL_LV_PACING_ANODE_ELECTRODE_1: NORMAL
MDC_IDC_SET_LEADCHNL_LV_PACING_ANODE_LOCATION_1: NORMAL
MDC_IDC_SET_LEADCHNL_LV_PACING_CATHODE_ELECTRODE_1: NORMAL
MDC_IDC_SET_LEADCHNL_LV_PACING_CATHODE_LOCATION_1: NORMAL
MDC_IDC_SET_LEADCHNL_LV_PACING_PULSEWIDTH: 0.5 MS
MDC_IDC_SET_LEADCHNL_LV_SENSING_ADAPTATION_MODE: NORMAL
MDC_IDC_SET_LEADCHNL_LV_SENSING_ANODE_ELECTRODE_1: NORMAL
MDC_IDC_SET_LEADCHNL_LV_SENSING_ANODE_LOCATION_1: NORMAL
MDC_IDC_SET_LEADCHNL_LV_SENSING_CATHODE_ELECTRODE_1: NORMAL
MDC_IDC_SET_LEADCHNL_LV_SENSING_CATHODE_LOCATION_1: NORMAL
MDC_IDC_SET_LEADCHNL_LV_SENSING_SENSITIVITY: 2.5 MV
MDC_IDC_SET_LEADCHNL_RA_SENSING_ADAPTATION_MODE: NORMAL
MDC_IDC_SET_LEADCHNL_RA_SENSING_SENSITIVITY: 0.75 MV
MDC_IDC_SET_LEADCHNL_RV_PACING_AMPLITUDE: 3 V
MDC_IDC_SET_LEADCHNL_RV_PACING_CAPTURE_MODE: NORMAL
MDC_IDC_SET_LEADCHNL_RV_PACING_POLARITY: NORMAL
MDC_IDC_SET_LEADCHNL_RV_PACING_PULSEWIDTH: 0.4 MS
MDC_IDC_SET_LEADCHNL_RV_SENSING_ADAPTATION_MODE: NORMAL
MDC_IDC_SET_LEADCHNL_RV_SENSING_POLARITY: NORMAL
MDC_IDC_SET_LEADCHNL_RV_SENSING_SENSITIVITY: 2.5 MV
MDC_IDC_SET_ZONE_DETECTION_INTERVAL: 375 MS
MDC_IDC_SET_ZONE_TYPE: NORMAL
MDC_IDC_SET_ZONE_VENDOR_TYPE: NORMAL
MDC_IDC_STAT_BRADY_DTM_END: NORMAL
MDC_IDC_STAT_BRADY_DTM_START: NORMAL
MDC_IDC_STAT_BRADY_RA_PERCENT_PACED: 0 %
MDC_IDC_STAT_BRADY_RV_PERCENT_PACED: 96 %
MDC_IDC_STAT_CRT_DTM_END: NORMAL
MDC_IDC_STAT_CRT_DTM_START: NORMAL
MDC_IDC_STAT_CRT_LV_PERCENT_PACED: 96 %
MDC_IDC_STAT_EPISODE_RECENT_COUNT: 0
MDC_IDC_STAT_EPISODE_RECENT_COUNT: 1
MDC_IDC_STAT_EPISODE_RECENT_COUNT: 9
MDC_IDC_STAT_EPISODE_RECENT_COUNT_DTM_END: NORMAL
MDC_IDC_STAT_EPISODE_RECENT_COUNT_DTM_START: NORMAL
MDC_IDC_STAT_EPISODE_TYPE: NORMAL
MDC_IDC_STAT_EPISODE_VENDOR_TYPE: NORMAL

## 2020-04-06 DIAGNOSIS — I48.91 ATRIAL FIBRILLATION WITH RAPID VENTRICULAR RESPONSE (H): ICD-10-CM

## 2020-04-06 DIAGNOSIS — Z79.01 LONG TERM CURRENT USE OF ANTICOAGULANT THERAPY: ICD-10-CM

## 2020-04-06 DIAGNOSIS — I48.0 PAROXYSMAL ATRIAL FIBRILLATION (H): ICD-10-CM

## 2020-04-07 ENCOUNTER — TELEPHONE (OUTPATIENT)
Dept: ANTICOAGULATION | Facility: CLINIC | Age: 60
End: 2020-04-07

## 2020-04-07 DIAGNOSIS — Z79.01 LONG TERM CURRENT USE OF ANTICOAGULANT THERAPY: ICD-10-CM

## 2020-04-07 DIAGNOSIS — I48.0 PAROXYSMAL ATRIAL FIBRILLATION (H): Primary | ICD-10-CM

## 2020-04-07 DIAGNOSIS — I48.20 CHRONIC ATRIAL FIBRILLATION (H): ICD-10-CM

## 2020-04-07 RX ORDER — WARFARIN SODIUM 2.5 MG/1
TABLET ORAL
Qty: 90 TABLET | Refills: 0 | Status: SHIPPED | OUTPATIENT
Start: 2020-04-07 | End: 2020-01-01

## 2020-04-07 NOTE — TELEPHONE ENCOUNTER
ANTICOAGULATION MANAGEMENT     Patient Name:  Maurice Jon  Date:  2020    ASSESSMENT /SUBJECTIVE:    2020 INR result of 2.77 is therapeutic. Goal INR of 2.0-3.0      Warfarin dose taken: Warfarin taken as previously instructed    Diet: No new diet changes affecting INR    Medication changes/ interactions: Patient has started torsemide instead of lasix. pt has lost about 28 lbs of water weight (roughly per pt).     Previous INR: Therapeutic     S/S of bleeding or thromboembolism: No    New injury or illness: No    Upcoming surgery, procedure or cardioversion: No    Additional findings: None      PLAN:    Spoke with Maurice regarding INR result and instructed:     Warfarin Dosing Instructions: Continue your current warfarin dose and check INR in 4 weeks from last INR    Instructed patient to follow up no later than:   Lab visit scheduled    Education provided: None required      Pt verbalizes understanding and agrees to warfarin dosing plan.    Instructed to call the Anticoagulation Clinic for any changes, questions or concerns. (#[phone])        OBJECTIVE:  INR   Date Value Ref Range Status   2020 2.77 (H) 0.86 - 1.14 Final             Anticoagulation Summary  As of 2020    INR goal:   2.0-3.0   TTR:   84.2 % (11.7 mo)   INR used for dosin.77 (2020)   Warfarin maintenance plan:   1.25 mg (2.5 mg x 0.5) every Fri; 2.5 mg (2.5 mg x 1) all other days   Full warfarin instructions:   1.25 mg every Fri; 2.5 mg all other days   Weekly warfarin total:   16.25 mg   No change documented:   Sondra Santos RN   Plan last modified:   Susan Beyer RN (2018)   Next INR check:   2020   Priority:   Maintenance   Target end date:   10/4/2018    Indications    Paroxysmal atrial fibrillation (H) [I48.0]  Atrial fibrillation with rapid ventricular response (H) (Resolved) [I48.91]  Long-term (current) use of anticoagulants [Z79.01] [Z79.01]             Anticoagulation Episode  Summary     INR check location:       Preferred lab:       Send INR reminders to:   [facility]    Comments:   * anticoagulation short period surrounding ablation on 12/21/18. Cardiology to decide when to stop warfarin. has well-compensated cirrhosis      Anticoagulation Care Providers     Provider Role Specialty Phone number    Alon Pineda MD [facility] [phone]

## 2020-04-07 NOTE — TELEPHONE ENCOUNTER
"Requested Prescriptions   Signed Prescriptions Disp Refills    warfarin ANTICOAGULANT (JANTOVEN ANTICOAGULANT) 2.5 MG tablet 90 tablet 0     Si.25 mg ; 2.5mg all other days or As directed by Anticoagulation Clinic. INR DUE FOR FURTHER REFILLS       Vitamin K Antagonists Failed - 2020  1:52 PM        Failed - INR is within goal in the past 6 weeks     Confirm INR is within goal in the past 6 weeks.     Recent Labs   Lab Test 20  1548   INR 2.77*                       Passed - Recent (12 mo) or future (30 days) visit within the authorizing provider's specialty     Patient has had an office visit with the authorizing provider or a provider within the authorizing providers department within the previous 12 mos or has a future within next 30 days. See \"Patient Info\" tab in inbasket, or \"Choose Columns\" in Meds & Orders section of the refill encounter.              Passed - Medication is active on med list        Passed - Patient is 18 years of age or older           Had INR visit today.  Prescription approved per [facility] Refill Protocol.  Anna Chacon RN    "

## 2020-04-09 ENCOUNTER — VIRTUAL VISIT (OUTPATIENT)
Dept: CARDIOLOGY | Facility: CLINIC | Age: 60
End: 2020-04-09
Attending: PHYSICIAN ASSISTANT
Payer: COMMERCIAL

## 2020-04-09 VITALS
BODY MASS INDEX: 38.98 KG/M2 | DIASTOLIC BLOOD PRESSURE: 51 MMHG | HEIGHT: 63 IN | WEIGHT: 220 LBS | SYSTOLIC BLOOD PRESSURE: 139 MMHG | HEART RATE: 70 BPM

## 2020-04-09 DIAGNOSIS — I50.810 RIGHT-SIDED CONGESTIVE HEART FAILURE, UNSPECIFIED HF CHRONICITY (H): ICD-10-CM

## 2020-04-09 PROCEDURE — 99214 OFFICE O/P EST MOD 30 MIN: CPT | Mod: 95 | Performed by: PHYSICIAN ASSISTANT

## 2020-04-09 ASSESSMENT — MIFFLIN-ST. JEOR: SCORE: 1708.04

## 2020-04-09 NOTE — PATIENT INSTRUCTIONS
So good to talk to you today!  Glad you're continuing to do well.    As we discussed:    1. Keep using CPAP (great work, Delano!)    2. Continue torsemide 20 mg three times a day. OK to take the last 2 doses together if needed.  If you get lightheaded/dizzy before our next visit, please back down to torsemide 20 mg twice a day instead of 3 times a day    Phone visit with me again Friday 4/17 @ 3:50 PM  Call [phone] if issues prior!

## 2020-04-09 NOTE — PROGRESS NOTES
"Maurice Jon is a 59 year old male who is being evaluated via a billable telephone visit.      The patient has been notified of following:     \"This telephone visit will be conducted via a call between you and your physician/provider. We have found that certain health care needs can be provided without the need for a physical exam.  This service lets us provide the care you need with a short phone conversation.  If a prescription is necessary we can send it directly to your pharmacy.  If lab work is needed we can place an order for that and you can then stop by our lab to have the test done at a later time.    Telephone visits are billed at different rates depending on your insurance coverage. During this emergency period, for some insurers they may be billed the same as an in-person visit.  Please reach out to your insurance provider with any questions.    If during the course of the call the physician/provider feels a telephone visit is not appropriate, you will not be charged for this service.\"    Patient has given verbal consent for Telephone visit?  Yes    How would you like to obtain your AVS? Leon     Patient reported vitals   BP.139/51  P. 70  Wt.220lb - was 218# at last telephone encounter 3/31. Was 235# 3/24/2020  Ht. 5'3\"    Natalia Harmon Lpn    I have reviewed and updated the patient's Past Medical History, Social History, Family History and Medication List.    ALLERGIES:  Avelox; Moxifloxacin; and Sotalol     CC:  Fluid overload    VITALS:  BP.139/51  P. 70  Wt.220lb - was 218# at last telephone encounter 3/31. Was 235# 3/24/2020  Ht. 5'3\"    BRIEF HPI:  Yomi is a 60 yo who sees Dr. Evans for his h/o:    1. Persistent AFib - first diagnosed 2013. Now s/p AVN ablation and BiV [employer] PPM (no A lead placed)  2. NAFLD with cirrhosis - followed by Dr. Roque at [facility]. Complicated by portal vein nonocclusive thrombosis, portal hypertension, thrombocytopenia, splenomegaly, " "splenic vein thrombosis and known esophageal varices (Upper GI last done 9/2019). Virtual Visit with Dr. Roque 3/2020.  3. HFpEF - with EF down to 25-30% and moderate reduction in RV function and moderate-severe TR on echo done 12/2018. EF 50-55% on echo 12/2019. Now with what sounds like R sided heart failure  3. Pulmonary nodules - noted on CTA Heart 12/21/2018, resolved on CT 4/2019. No need for follow-up.    I had a phone conversation with Yomi 3/31 at which time he was feeling much better after switching his Lasix 40 mg TID to torsemide 60 mg daily (he was taking 20 mg TID). He was down 17#. I asked him to get a BMP before our next visit and asked him to watch for dehydration with this high dose torsemide.      INTERVAL HISTORY:  Yomi is still feeling \"really good.\"  Water is \"off his belly, knees, and legs\" and still able to walk. His appetite has improved (which is why he thinks his weight hasn't changed much).     No complaints of chest pain, pressure or tightness.  Exercise tolerance and breathing have really improved. Overall, he is feeling \"great.\" He's still not \"back to normal\" but MUCH better.    No c/o severe dizziness or lightheadedness over the last week.    Continues to wear compression stockings, which helps. No issue with fluid retention after eating pizza last week.    He's been better about going to bed to put his CPAP on instead of falling asleep in the chair.  No palpitations noted.  We've not received any alerts for recurrent VT at this time.    DIAGNOSTICS:  Remote device interrogation 3/31/2020 showed 96% BiV paced.  1 VT lasting 34 seconds, rate 179 bpm; 2 NSVT with 1 tracing; shows 16 seconds at 186 bpm. Asx'c. Violet (wife) believes that the 34s VT occurred after he had fallen asleep in the chair before bed (not using CPAP)    Echo 12/2019 showed EF 50-55% with moderate RV dilatation and mildly reduced RVEF. Severe MANJULA. Mild MAC with trace MR. Aortic sclerosis. Mild TR with RVSP " "26+RAP. Dilatation of IVC    Component      Latest Ref Rng & Units 3/6/2020 3/12/2020 4/1/2020   Sodium      133 - 144 mmol/L 138 139 139   Potassium      3.4 - 5.3 mmol/L 3.4 3.7 3.6   Chloride      94 - 109 mmol/L 105 103 103   Carbon Dioxide      20 - 32 mmol/L 29 29 31   Anion Gap      3 - 14 mmol/L 5 7 5   Glucose      70 - 99 mg/dL 85 148 (H) 94   Urea Nitrogen      7 - 30 mg/dL 19 21 20   Creatinine      0.66 - 1.25 mg/dL 0.84 0.95 0.88   GFR Estimate      >60 mL/min/1.73:m2 >90 87 >90   GFR Estimate If Black      >60 mL/min/1.73:m2 >90 >90 >90   Calcium      8.5 - 10.1 mg/dL 8.3 (L) 9.0 9.4       ASSESSMENT/PLAN:    1. R sided HF    As above, doing great on torsemide.  Still thinks he's retaining some fluid and \"not quite normal\" but appetite is back and he's feeling good.                  PLAN:    Continue torsemide 20mg TID for now - reviewed dehydration sxs and he'll back off to BID if these occur before our next telephone call    1 week telephone visit @ 3:50 on 4/17/2020    If we continue this dose, will get repeat blood work sometime the following week - will get CMP to check albumin/protein as well       2. VT    Longest episode appeared to occur when he was sleeping in chair before bed. Notes he wasn't using CPAP, but now is.    Echo 12/2019 with improved EF 50-55%                 PLAN:    Discussed restarting BB therapy. He's really hesitant to start this given how incredibly fatigued he was on metoprolol in the past.  Explained that HR would not be an issue post BiV ICD placement    Will revisit restarting low dose BB for VT once he's euvolemic    Monitor/call for lightheadedness/dizziness/palpitations    Continue CPAP use. He's very motivated      I have reviewed the note as documented above.  This accurately captures the substance of my conversation with the patient.        Phone call contact time  Call Started at 1542  Call Ended at 1601       Virginia Montilla PA-C [facility]      "

## 2020-04-17 ENCOUNTER — VIRTUAL VISIT (OUTPATIENT)
Dept: CARDIOLOGY | Facility: CLINIC | Age: 60
End: 2020-04-17
Payer: COMMERCIAL

## 2020-04-17 VITALS
WEIGHT: 216 LBS | DIASTOLIC BLOOD PRESSURE: 74 MMHG | SYSTOLIC BLOOD PRESSURE: 129 MMHG | HEART RATE: 70 BPM | BODY MASS INDEX: 38.27 KG/M2 | HEIGHT: 63 IN

## 2020-04-17 DIAGNOSIS — I50.41 ACUTE COMBINED SYSTOLIC AND DIASTOLIC (CONGESTIVE) HRT FAIL (H): Primary | ICD-10-CM

## 2020-04-17 PROCEDURE — 99213 OFFICE O/P EST LOW 20 MIN: CPT | Mod: 95 | Performed by: PHYSICIAN ASSISTANT

## 2020-04-17 ASSESSMENT — MIFFLIN-ST. JEOR: SCORE: 1689.9

## 2020-04-17 NOTE — PATIENT INSTRUCTIONS
"Yomi - so nice to talk to you today!  I'm glad you're continuing to do a lot better with the torsemide instead of the furosemide.    1. As discussed, could try keeping the torsemide at 20 mg (1tab) three times a day (though it's interrupting sleep) OR try 30 mg (1.5 tab) twice a day (with last dose not taken any later than 3PM) OR try 20 mg (1 tab) in AM and 30-40 mg (1.5-2 tabs) in afternoon, taken no later than 3 PM)     TOTALLY up to you how you take it and what works with your schedule/lifestyle!  I think you're going to need 40-60 mg (2-3 tabs) torsemide/day so you can     2. Back off on torsemide to 2 tabs daily (40 mg total for the day) if you start feeling \"dry\"/dehydrated    3. Wear compression stockings  4. KEEP USING CPAP!!!!!  5. Increase exercise as you're planning!    6. Get blood work next week before our visit on Thursday 4/23/2020 (non-fasting)  "

## 2020-04-17 NOTE — PROGRESS NOTES
"Maurice Jon is a 59 year old male who is being evaluated via a billable telephone visit.      The patient has been notified of following:     \"This telephone visit will be conducted via a call between you and your physician/provider. We have found that certain health care needs can be provided without the need for a physical exam.  This service lets us provide the care you need with a short phone conversation.  If a prescription is necessary we can send it directly to your pharmacy.  If lab work is needed we can place an order for that and you can then stop by our lab to have the test done at a later time.    Telephone visits are billed at different rates depending on your insurance coverage. During this emergency period, for some insurers they may be billed the same as an in-person visit.  Please reach out to your insurance provider with any questions.    If during the course of the call the physician/provider feels a telephone visit is not appropriate, you will not be charged for this service.\"    Patient has given verbal consent for Telephone visit?  Yes   4/17/20  - Writer spoke w/pt via phone. He is expecting the phone visit w/LOUISA Sultana LPN    How would you like to obtain your AVS? Mail a copy    CC:  Fluid overload    VITALS:  /74  Weight 216#  HR 70 bpm    Weight on 4/9 was 220lb - was 218# at telephone encounter 3/31. Was 235# 3/24/2020    BRIEF HPI:  Yomi is a 58 yo who sees Dr. Evans for his h/o:     1. Persistent AFib - first diagnosed 2013. Now s/p AVN ablation and BiV [employer] PPM (no A lead placed)  2. NAFLD with cirrhosis - followed by Dr. Roque at [facility]. Complicated by portal vein nonocclusive thrombosis, portal hypertension, thrombocytopenia, splenomegaly, splenic vein thrombosis and known esophageal varices (Upper GI last done 9/2019). Virtual Visit with Dr. Roque 3/2020.  3. HFpEF - with EF down to 25-30% and moderate reduction in RV function and " "moderate-severe TR on echo done 12/2018. EF 50-55% on echo 12/2019. Now with what sounds like R sided heart failure  3. Pulmonary nodules - noted on CTA Heart 12/21/2018, resolved on CT 4/2019. No need for follow-up.     I had a phone conversation with Yomi 4/9 at which time he was feeling much better after continuing torsemide 60 mg daily. switching his Lasix 40 mg TID to torsemide 60 mg daily (he was taking 20 mg TID).     INTERVAL HISTORY:  Is finally feeling \"pretty close\" to normal.  Legs are \"skinny\" and breathing is \"good.\" He's down another 4# (total of 19# since we started torsemide).    Still with lots of nocturia but likes taking the evening dose of torsemide      No c/o severe dizziness or lightheadedness over the last week. Doesn't feel \"dry\" at all. Thinks 60 mg torsemide is working well without causing problems.      Continues to wear compression stockings, which helps quite a bit.     He's been better about going to bed to put his CPAP on instead of falling asleep in the chair.  No palpitations noted.  We've not received any alerts for recurrent VT at this time.     DIAGNOSTICS:  Remote device interrogation 3/31/2020 showed 96% BiV paced.  1 VT lasting 34 seconds, rate 179 bpm; 2 NSVT with 1 tracing; shows 16 seconds at 186 bpm. Asx'fiordaliza Lazo (wife) believes that the 34s VT occurred after he had fallen asleep in the chair before bed (not using CPAP)     Echo 12/2019 showed EF 50-55% with moderate RV dilatation and mildly reduced RVEF. Severe MANJULA. Mild MAC with trace MR. Aortic sclerosis. Mild TR with RVSP 26+RAP. Dilatation of IVC     Component      Latest Ref Rng & Units 3/6/2020 3/12/2020 4/1/2020   Sodium      133 - 144 mmol/L 138 139 139   Potassium      3.4 - 5.3 mmol/L 3.4 3.7 3.6   Chloride      94 - 109 mmol/L 105 103 103   Carbon Dioxide      20 - 32 mmol/L 29 29 31   Anion Gap      3 - 14 mmol/L 5 7 5   Glucose      70 - 99 mg/dL 85 148 (H) 94   Urea Nitrogen      7 - 30 mg/dL 19 21 20 " "  Creatinine      0.66 - 1.25 mg/dL 0.84 0.95 0.88   GFR Estimate      >60 mL/min/1.73:m2 >90 87 >90   GFR Estimate If Black      >60 mL/min/1.73:m2 >90 >90 >90   Calcium      8.5 - 10.1 mg/dL 8.3 (L) 9.0 9.4         ASSESSMENT/PLAN:     1. R sided HF    As above, doing great on torsemide 60 mg in divided doses.  Still thinks he's retaining just a small amount of fluid and \"not yet normal\" but really feeling so much better                 PLAN:    Continue torsemide 20mg TID for now - reviewed dehydration sxs and he'll back off to BID if these occur before our next telephone call    BMP this coming week    1 week telephone visit @ 3:50 on 4/23/2020 - I've messaged schedulers        2. VT    Longest episode appeared to occur when he was sleeping in chair before bed. Notes he wasn't using CPAP, but now is and his wife Violet is good about reminding him to go to bed when he dozes off.    Echo 12/2019 with improved EF 50-55%                 PLAN:    Continue CPAP    He remains very resistant to retrying BB therapy as he was terribly fatigued when on it before. Will continue to monitor without BB therapy as long as he's wearing his CPAP      I have reviewed the note as documented above.  This accurately captures the substance of my conversation with the patient.        Phone call contact time  Call Started at 1605  Call Ended at 1627       Virginia Montilla PA-C [facility]      "

## 2020-04-20 ENCOUNTER — TELEPHONE (OUTPATIENT)
Dept: ANTICOAGULATION | Facility: CLINIC | Age: 60
End: 2020-04-20

## 2020-04-20 DIAGNOSIS — I50.41 ACUTE COMBINED SYSTOLIC AND DIASTOLIC (CONGESTIVE) HRT FAIL (H): ICD-10-CM

## 2020-04-20 DIAGNOSIS — Z79.01 LONG TERM CURRENT USE OF ANTICOAGULANT THERAPY: ICD-10-CM

## 2020-04-20 DIAGNOSIS — I48.0 PAROXYSMAL ATRIAL FIBRILLATION (H): ICD-10-CM

## 2020-04-20 DIAGNOSIS — I48.20 CHRONIC ATRIAL FIBRILLATION (H): ICD-10-CM

## 2020-04-20 LAB
ANION GAP SERPL CALCULATED.3IONS-SCNC: 6 MMOL/L (ref 3–14)
BUN SERPL-MCNC: 17 MG/DL (ref 7–30)
CALCIUM SERPL-MCNC: 8.8 MG/DL (ref 8.5–10.1)
CHLORIDE SERPL-SCNC: 104 MMOL/L (ref 94–109)
CO2 SERPL-SCNC: 29 MMOL/L (ref 20–32)
CREAT SERPL-MCNC: 0.79 MG/DL (ref 0.66–1.25)
GFR SERPL CREATININE-BSD FRML MDRD: >90 ML/MIN/{1.73_M2}
GLUCOSE SERPL-MCNC: 84 MG/DL (ref 70–99)
INR PPP: 2.6 (ref 0.86–1.14)
POTASSIUM SERPL-SCNC: 3.4 MMOL/L (ref 3.4–5.3)
SODIUM SERPL-SCNC: 139 MMOL/L (ref 133–144)

## 2020-04-20 PROCEDURE — 80048 BASIC METABOLIC PNL TOTAL CA: CPT | Performed by: PHYSICIAN ASSISTANT

## 2020-04-20 PROCEDURE — 36415 COLL VENOUS BLD VENIPUNCTURE: CPT | Performed by: PHYSICIAN ASSISTANT

## 2020-04-20 PROCEDURE — 85610 PROTHROMBIN TIME: CPT | Performed by: FAMILY MEDICINE

## 2020-04-20 NOTE — TELEPHONE ENCOUNTER
Anticoagulation Management    Unable to reach Yomi today.    Today's INR result of 2.6 is therapeutic (goal INR of 2.0-3.0).  Result received from: Clinic Lab    Follow up required to confirm warfarin dose taken and assess for changes    Left message to continue current dose of warfarin 2.5 mg tonight.      Anticoagulation clinic to follow up    Susan Ortega RN

## 2020-04-21 NOTE — TELEPHONE ENCOUNTER
Anticoagulation Management    Unable to reach Yomi today.    4/20/20 INR result of 2.60 is therapeutic (goal INR of 2.0-3.0).  Result received from: Clinic Lab    Follow up required to confirm warfarin dose taken and assess for changes    Left message to return call to [facility] once the message was received, no dosing instructions given. Tentative plan, if no changes or concerns would continue current maintenance dose and recheck INR in 4 weeks.       Anticoagulation clinic to follow up    Christina Singer RN

## 2020-04-21 NOTE — TELEPHONE ENCOUNTER
ANTICOAGULATION MANAGEMENT     Patient Name:  Maurice Jon  Date:  2020    ASSESSMENT /SUBJECTIVE:    20 INR result of 2.60 is therapeutic. Goal INR of 2.0-3.0      Warfarin dose taken: Warfarin taken as previously instructed    Diet: No new diet changes affecting INR    Medication changes/ interactions: No new medications/supplements affecting INR (pt was started on Iron and switched to torsemide over 1 month ago, not a new change)    Previous INR: Therapeutic     S/S of bleeding or thromboembolism: No    New injury or illness: No    Upcoming surgery, procedure or cardioversion: No    Additional findings: None      PLAN:    Spoke with Maurice regarding INR result and instructed:     Warfarin Dosing Instructions: Continue your current warfarin dose 1.25 mg every Fri and 2.5 mg all other days    Instructed patient to follow up no later than: 4 weeks  Lab visit scheduled    Education provided: None required      Yomi verbalizes understanding and agrees to warfarin dosing plan.    Instructed to call the Anticoagulation Clinic for any changes, questions or concerns. (#[phone])        OBJECTIVE:  INR   Date Value Ref Range Status   2020 2.60 (H) 0.86 - 1.14 Final             Anticoagulation Summary  As of 2020    INR goal:   2.0-3.0   TTR:   84.4 % (11.8 mo)   INR used for dosin.60 (2020)   Warfarin maintenance plan:   1.25 mg (2.5 mg x 0.5) every Fri; 2.5 mg (2.5 mg x 1) all other days   Full warfarin instructions:   1.25 mg every Fri; 2.5 mg all other days   Weekly warfarin total:   16.25 mg   No change documented:   Christina Singer RN   Plan last modified:   Susan Beyer RN (2018)   Next INR check:   2020   Priority:   Maintenance   Target end date:   10/4/2018    Indications    Paroxysmal atrial fibrillation (H) [I48.0]  Atrial fibrillation with rapid ventricular response (H) (Resolved) [I48.91]  Long-term (current) use of anticoagulants [Z79.01]  [Z79.01]             Anticoagulation Episode Summary     INR check location:       Preferred lab:       Send INR reminders to:   [facility]    Comments:   * anticoagulation short period surrounding ablation on 12/21/18. Cardiology to decide when to stop warfarin. has well-compensated cirrhosis      Anticoagulation Care Providers     Provider Role Specialty Phone number    Alon Pineda MD [facility] [phone]

## 2020-04-22 NOTE — PROGRESS NOTES
"Maurice Jon is a 59 year old male who is being evaluated via a billable video visit.      The patient has been notified of following:     \"This video visit will be conducted via a call between you and your physician/provider. We have found that certain health care needs can be provided without the need for an in-person physical exam.  This service lets us provide the care you need with a video conversation.  If a prescription is necessary we can send it directly to your pharmacy.  If lab work is needed we can place an order for that and you can then stop by our lab to have the test done at a later time.    Video visits are billed at different rates depending on your insurance coverage.  Please reach out to your insurance provider with any questions.    If during the course of the call the physician/provider feels a video visit is not appropriate, you will not be charged for this service.\"    Patient has given verbal consent for Video visit? YES    How would you like to obtain your AVS? [employer]    Patient would like the video invitation sent by: Text to cell phone: [phone]    Will anyone else be joining your video visit? No     Review Of Systems  Skin: NEGATIVE  Eyes:Ears/Nose/Throat: NEGATIVE  Respiratory: NEGATIVE  Cardiovascular: NEGATIVE  Gastrointestinal: NEGATIVE  Genitourinary: more frequent urinating   Musculoskeletal: hx:  leg cramps for years  Neurologic: NEGATIVE  Psychiatric: NEGATIVE  Hematologic/Lymphatic/Immunologic: NEGATIVE  Endocrine:  NEGATIVE    Vitals: Patient reports /75, Pulse 71, Wt 213 lbs    Mitra Henry LPN    CC:  HFpEF    VITALS:  /75  HR 71    213#  216# 4/17  220# 4/9  218# 3/31  235# 3/24 (in office)    BRIEF HPI:  Yomi is a 58 yo who sees Dr. Evans for his h/o:     1. Persistent AFib - first diagnosed 2013. Now s/p AVN ablation and BiV [employer] PPM (no A lead placed)  2. NAFLD with cirrhosis - followed by Dr. Roque at [facility]. Complicated " by portal vein nonocclusive thrombosis, portal hypertension, thrombocytopenia, splenomegaly, splenic vein thrombosis and known esophageal varices (Upper GI last done 9/2019). Virtual Visit with Dr. Roque 3/2020.  3. HFpEF - with EF down to 25-30% and moderate reduction in RV function and moderate-severe TR on echo done 12/2018. EF 50-55% on echo 12/2019. Now with what sounds like R sided heart failure  4. VT - noted on device interrogations when he would fall asleep watching TV before going to bed and putting his CPAP on.  5. Pulmonary nodules - noted on CTA Heart 12/21/2018, resolved on CT 4/2019. No need for follow-up.    I have been talking to Yomi weekly since late March due to HFpEF.  He has been doing really well on torsemide instead of Lasix therapy.  Currently, he's on torsemide 60 mg daily in divided doses. He's been using his CPAP therapy and we've not seen more VT. He's been very hesitant to get back on a BB as he felt terribly while on one (though before his PPM was placed).    INTERVAL HISTORY:  Weight is down another 3# on torsemide 20 mg TID - last dose is now 1400.  Unfortunately, still urinating qh at night despite this, though admits he drinks a fair amt of water each day.    No complaints of chest pain, pressure or tightness.  No orthopnea, PND or edema. No change in exercise tolerance or breathing. Overall, he is feeling well.       DIAGNOSTICS:  Remote device interrogation 3/31/2020 showed 96% BiV paced.  1 VT lasting 34 seconds, rate 179 bpm; 2 NSVT with 1 tracing; shows 16 seconds at 186 bpm. Asx'c. Violet (wife) believes that the 34s VT occurred after he had fallen asleep in the chair before bed (not using CPAP)     Echo 12/2019 showed EF 50-55% with moderate RV dilatation and mildly reduced RVEF. Severe MANJULA. Mild MAC with trace MR. Aortic sclerosis. Mild TR with RVSP 26+RAP. Dilatation of IVC    Component      Latest Ref Rng & Units 3/6/2020 3/12/2020 4/1/2020 4/20/2020   Sodium      133 -  144 mmol/L 138 139 139 139   Potassium      3.4 - 5.3 mmol/L 3.4 3.7 3.6 3.4   Chloride      94 - 109 mmol/L 105 103 103 104   Carbon Dioxide      20 - 32 mmol/L 29 29 31 29   Anion Gap      3 - 14 mmol/L 5 7 5 6   Glucose      70 - 99 mg/dL 85 148 (H) 94 84   Urea Nitrogen      7 - 30 mg/dL 19 21 20 17   Creatinine      0.66 - 1.25 mg/dL 0.84 0.95 0.88 0.79   GFR Estimate      >60 mL/min/1.73:m2 >90 87 >90 >90   GFR Estimate If Black      >60 mL/min/1.73:m2 >90 >90 >90 >90   Calcium      8.5 - 10.1 mg/dL 8.3 (L) 9.0 9.4 8.8     REVIEW OF SYSTEMS:  Negative except as noted above     PHYSICAL EXAM:  GEN: NAD. Upright. Obese body habitus  ENT/Mouth: Atraumatic and normocephalic  Eyes: No scleral icterus; normal conjunctivae  Neck: No observed thyromegaly  Chest/Lungs: No audible wheezing or cough; equal chest wall expansion. Non-labored breathing  CV: No evidence of elevated JVP. No evidence of pitting edema  Abdomen: No observed jaundice. No evidence of abdominal distention  Extremities: No cyanosis or clubbing observed  Skin: No visible rashes. Normal skin color  Neuro: Normal Arm motion bilaterally. No tremors. No visible evidence of focal defects  Psychiatric: A/O. Calm demeanor    ASSESSMENT/PLAN:    1. HFpEF    Remains on torsemide 60 mg in divided dose and really feels this is working well    BMP looks great on current medication    No idea what dry weight is ... he noted that he thought it'd be ~200# but I pointed out that he'd been 220-240# since 2007, well before his AFib/HFpEF started.     PLAN:    Continue torsemide 60 mg daily    He'd like to combine INR and BMP in next few weeks to save a trip to [place]    Video visit with me in a few weeks    He'll call is issues sooner      2. VT    No alerts     PLAN:    Continue CPAP. BB if needed    I have reviewed the note as documented above.  This accurately captures the substance of my conversation with the patient.       Virginia Montilla PA-C  MISTIAS        Video-Visit Details    Type of service:  Video Visit    Video Start Time: 3:51 PM  Video End Time: 4:22 PM    Originating Location (pt. Location): Home    Distant Location (provider location):  [facility]     Mode of Communication:  [email or web address] Henry LPN

## 2020-04-23 ENCOUNTER — VIRTUAL VISIT (OUTPATIENT)
Dept: CARDIOLOGY | Facility: CLINIC | Age: 60
End: 2020-04-23
Attending: PHYSICIAN ASSISTANT
Payer: COMMERCIAL

## 2020-04-23 VITALS
BODY MASS INDEX: 37.73 KG/M2 | HEART RATE: 71 BPM | WEIGHT: 213 LBS | SYSTOLIC BLOOD PRESSURE: 127 MMHG | DIASTOLIC BLOOD PRESSURE: 75 MMHG

## 2020-04-23 DIAGNOSIS — I48.0 PAROXYSMAL ATRIAL FIBRILLATION (H): Primary | ICD-10-CM

## 2020-04-23 DIAGNOSIS — I50.41 ACUTE COMBINED SYSTOLIC AND DIASTOLIC (CONGESTIVE) HRT FAIL (H): ICD-10-CM

## 2020-04-23 PROCEDURE — 99213 OFFICE O/P EST LOW 20 MIN: CPT | Mod: 95 | Performed by: PHYSICIAN ASSISTANT

## 2020-04-23 NOTE — PATIENT INSTRUCTIONS
Marco Antonio Celaya (and Violet!) - great to see you today    1. As agreed, no change to the torsemide 60 mg as it's working so well and it's clear you have fluid to get off of you. Try taking no doses past noon on as many days as possible.  Limit fluid intake after 5 PM given how much urination is keeping you up at night.    2. Get blood work (nonfasting) - I have ordered and OK'd INR to be done same time in ~2 weeks.     3. I'll have Sandra call you to set up appt with me in a few week  4. START EXERCISING!    [phone] if probs in interim!  Nadege

## 2020-04-23 NOTE — LETTER
4/23/2020      RE: Maurice Jon  [address] [place] [address]       Dear Colleague,    Thank you for the opportunity to participate in the care of your patient, Maurice Jon, at the [facility]. Please see a copy of my visit note below.    BRIEF HPI:  Yomi is a 60 yo who sees Dr. Evans for his h/o:     1. Persistent AFib - first diagnosed 2013. Now s/p AVN ablation and BiV [employer] PPM (no A lead placed)  2. NAFLD with cirrhosis - followed by Dr. Roque at [facility]. Complicated by portal vein nonocclusive thrombosis, portal hypertension, thrombocytopenia, splenomegaly, splenic vein thrombosis and known esophageal varices (Upper GI last done 9/2019). Virtual Visit with Dr. Roque 3/2020.  3. HFpEF - with EF down to 25-30% and moderate reduction in RV function and moderate-severe TR on echo done 12/2018. EF 50-55% on echo 12/2019. Now with what sounds like R sided heart failure  4. VT - noted on device interrogations when he would fall asleep watching TV before going to bed and putting his CPAP on.  5. Pulmonary nodules - noted on CTA Heart 12/21/2018, resolved on CT 4/2019. No need for follow-up.    I have been talking to Yomi weekly since late March due to HFpEF.  He has been doing really well on torsemide instead of Lasix therapy.  Currently, he's on torsemide 60 mg daily in divided doses. He's been using his CPAP therapy and we've not seen more VT. He's been very hesitant to get back on a BB as he felt terribly while on one (though before his PPM was placed).    INTERVAL HISTORY:  Weight is down another 3# on torsemide 20 mg TID - last dose is now 1400.  Unfortunately, still urinating qh at night despite this, though admits he drinks a fair amt of water each day.    No complaints of chest pain, pressure or tightness.  No orthopnea, PND or edema. No change in exercise tolerance or breathing.  Overall, he is feeling well.       DIAGNOSTICS:  Remote device interrogation 3/31/2020 showed 96% BiV paced.  1 VT lasting 34 seconds, rate 179 bpm; 2 NSVT with 1 tracing; shows 16 seconds at 186 bpm. Asvirgie Lazo (wife) believes that the 34s VT occurred after he had fallen asleep in the chair before bed (not using CPAP)     Echo 12/2019 showed EF 50-55% with moderate RV dilatation and mildly reduced RVEF. Severe MANJULA. Mild MAC with trace MR. Aortic sclerosis. Mild TR with RVSP 26+RAP. Dilatation of IVC    Component      Latest Ref Rng & Units 3/6/2020 3/12/2020 4/1/2020 4/20/2020   Sodium      133 - 144 mmol/L 138 139 139 139   Potassium      3.4 - 5.3 mmol/L 3.4 3.7 3.6 3.4   Chloride      94 - 109 mmol/L 105 103 103 104   Carbon Dioxide      20 - 32 mmol/L 29 29 31 29   Anion Gap      3 - 14 mmol/L 5 7 5 6   Glucose      70 - 99 mg/dL 85 148 (H) 94 84   Urea Nitrogen      7 - 30 mg/dL 19 21 20 17   Creatinine      0.66 - 1.25 mg/dL 0.84 0.95 0.88 0.79   GFR Estimate      >60 mL/min/1.73:m2 >90 87 >90 >90   GFR Estimate If Black      >60 mL/min/1.73:m2 >90 >90 >90 >90   Calcium      8.5 - 10.1 mg/dL 8.3 (L) 9.0 9.4 8.8     REVIEW OF SYSTEMS:  Negative except as noted above     PHYSICAL EXAM:  GEN: NAD. Upright. Obese body habitus  ENT/Mouth: Atraumatic and normocephalic  Eyes: No scleral icterus; normal conjunctivae  Neck: No observed thyromegaly  Chest/Lungs: No audible wheezing or cough; equal chest wall expansion. Non-labored breathing  CV: No evidence of elevated JVP. No evidence of pitting edema  Abdomen: No observed jaundice. No evidence of abdominal distention  Extremities: No cyanosis or clubbing observed  Skin: No visible rashes. Normal skin color  Neuro: Normal Arm motion bilaterally. No tremors. No visible evidence of focal defects  Psychiatric: A/O. Calm demeanor    ASSESSMENT/PLAN:    1. HFpEF    Remains on torsemide 60 mg in divided dose and really feels this is working well    BMP looks great on  current medication    No idea what dry weight is ... he noted that he thought it'd be ~200# but I pointed out that he'd been 220-240# since 2007, well before his AFib/HFpEF started.     PLAN:    Continue torsemide 60 mg daily    He'd like to combine INR and BMP in next few weeks to save a trip to [place]    Video visit with me in a few weeks    He'll call is issues sooner      2. VT    No alerts     PLAN:    Continue CPAP. BB if needed    I have reviewed the note as documented above.  This accurately captures the substance of my conversation with the patient.       Virginia Montilla PA-C MSPAS    Please do not hesitate to contact me if you have any questions/concerns.     Sincerely,     Virginia Montilla PA-C

## 2020-04-29 ENCOUNTER — VIRTUAL VISIT (OUTPATIENT)
Dept: FAMILY MEDICINE | Facility: CLINIC | Age: 60
End: 2020-04-29
Payer: COMMERCIAL

## 2020-04-29 ENCOUNTER — NURSE TRIAGE (OUTPATIENT)
Dept: NURSING | Facility: CLINIC | Age: 60
End: 2020-04-29

## 2020-04-29 DIAGNOSIS — M25.562 ACUTE PAIN OF LEFT KNEE: Primary | ICD-10-CM

## 2020-04-29 PROCEDURE — 99213 OFFICE O/P EST LOW 20 MIN: CPT | Mod: 95 | Performed by: NURSE PRACTITIONER

## 2020-04-29 ASSESSMENT — ENCOUNTER SYMPTOMS
CONSTITUTIONAL NEGATIVE: 1
COLOR CHANGE: 0

## 2020-04-29 NOTE — TELEPHONE ENCOUNTER
"58 y/o male calls about having a difficult time walking, knee joint is very sore, thinks it might be a bout with \"gout\" has had hx of cellulitis in lower extremities per patient, also might be his arthritis. The pain is in the back of the knee in the middle (pacemaker, heart patient, portal hyperension). Denies fever, can bear weight and walk but it is painful and hyperextends sometimes. Per protocol advised virtual visit in the next few hours, warm transfer to scheduling for follow up.     Marry Mejia RN - [place] Nurse Advisor  04/29/2020  5:37AM    Reason for Disposition    [1] SEVERE pain (e.g., excruciating, unable to walk) AND [2] not improved after 2 hours of pain medicine    [1] Can't move swollen joint at all AND [2] no fever    Additional Information    Negative: Sounds like a life-threatening emergency to the triager    Negative: [1] Swollen joint AND [2] fever    Negative: [1] Red area or streak AND [2] fever    Negative: Patient sounds very sick or weak to the triager    Protocols used: KNEE PAIN-A-AH      "

## 2020-04-29 NOTE — PROGRESS NOTES
"Maurice Jon is a 59 year old male who is being evaluated via a billable telephone visit.      The patient has been notified of following:     \"This telephone visit will be conducted via a call between you and your physician/provider. We have found that certain health care needs can be provided without the need for a physical exam.  This service lets us provide the care you need with a short phone conversation.  If a prescription is necessary we can send it directly to your pharmacy.  If lab work is needed we can place an order for that and you can then stop by our lab to have the test done at a later time.    Telephone visits are billed at different rates depending on your insurance coverage. During this emergency period, for some insurers they may be billed the same as an in-person visit.  Please reach out to your insurance provider with any questions.    If during the course of the call the physician/provider feels a telephone visit is not appropriate, you will not be charged for this service.\"    Patient has given verbal consent for Telephone visit?  Yes    How would you like to obtain your AVS? MyChart    Subjective     Maurice Jon is a 59 year old male who presents to clinic today for the following health issues:    HPI  Musculoskeletal problem/pain      Duration: this AM    Description  Location: behind Left knee     Intensity:  moderate    Accompanying signs and symptoms: could barely walk on it this morning    History  Previous similar problem: no   Previous evaluation:  none    Precipitating or alleviating factors:  Trauma or overuse: no   Aggravating factors include: standing, walking, climbing stairs and overuse    Therapies tried and outcome: Aspercreme, compression stockings due to cellulitis      Additional provider notes: Pain \"deep inside\" left knee is improved since this morning. This morning he states it felt \"weak\" and was worried it might \"hyperextend.\" He put aspercreme on it, put " "on compression stockings and went to work. After an hour or two symptoms improved. Has history of BLE swelling which has improved since being on torsemide. Denies leg swelling currently. Reports he had similar pain in his ankle a few years ago and was diagnosed with gout. Denies swelling, warmth or erythema to knee. Reported hx of cellulitis in legs, but denies any s/s of that currently. At work on Tuesday and Thursdays he has to do more walking and wonders if he \"tweaked\" it yesterday and didn't realize it.       Reviewed and updated as needed this visit by Provider  Tobacco  Allergies  Meds  Problems  Med Hx  Surg Hx  Fam Hx         Review of Systems   Constitutional: Negative.    Musculoskeletal:        Left knee pain; improving   Skin: Negative.  Negative for color change.             Objective   Reported vitals:  There were no vitals taken for this visit.   healthy, alert, no distress and cooperative  PSYCH: Alert and oriented times 3; coherent speech, normal   rate and volume, able to articulate logical thoughts, able   to abstract reason, no tangential thoughts, no hallucinations   or delusions  His affect is normal and pleasant  RESP: No cough, no audible wheezing, able to talk in full sentences  Remainder of exam unable to be completed due to telephone visits    Diagnostic Test Results:  Labs reviewed in Epic        Assessment/Plan:  1. Acute pain of left knee  Suspect slight muscle or joint irritation. Does not sound like cellulitis or gout as patient was stating he had a history of. Recommended continued use of aspercreme, stockings, tylenol sparingly, rest, ice/heat and elevation. Discussed signs of infection and when to call back. May need in clinic visit if symptoms persist.       Return if symptoms worsen or fail to improve.      Phone call duration:  14 minutes    Asya Fournier DNP, NP-C 4/29/2020 3:49 PM         "

## 2020-04-29 NOTE — PATIENT INSTRUCTIONS
Left knee pain:  1. Suspect slight muscle or joint irritation which could have happened from work or any type of walking done yesterday.  2. Continue aspercreme and compression stockings. Tylenol sparingly if needed.  3. Rest, ice/heat, and elevate as needed.  4. Follow-up if symptoms return or do not continue to improve. This may take a week to resolve.   5. Watch for signs of infection such as swelling, redness, and warm to the knee.

## 2020-04-30 ENCOUNTER — APPOINTMENT (OUTPATIENT)
Dept: GENERAL RADIOLOGY | Facility: CLINIC | Age: 60
End: 2020-04-30
Attending: EMERGENCY MEDICINE
Payer: COMMERCIAL

## 2020-04-30 ENCOUNTER — DOCUMENTATION ONLY (OUTPATIENT)
Dept: FAMILY MEDICINE | Facility: CLINIC | Age: 60
End: 2020-04-30

## 2020-04-30 ENCOUNTER — HOSPITAL ENCOUNTER (EMERGENCY)
Facility: CLINIC | Age: 60
Discharge: HOME | End: 2020-04-30
Attending: EMERGENCY MEDICINE | Admitting: EMERGENCY MEDICINE
Payer: COMMERCIAL

## 2020-04-30 VITALS
TEMPERATURE: 98.4 F | RESPIRATION RATE: 18 BRPM | SYSTOLIC BLOOD PRESSURE: 117 MMHG | WEIGHT: 211 LBS | HEIGHT: 63 IN | BODY MASS INDEX: 37.39 KG/M2 | HEART RATE: 67 BPM | DIASTOLIC BLOOD PRESSURE: 68 MMHG | OXYGEN SATURATION: 98 %

## 2020-04-30 DIAGNOSIS — M25.562 ACUTE PAIN OF LEFT KNEE: ICD-10-CM

## 2020-04-30 DIAGNOSIS — I48.0 PAROXYSMAL ATRIAL FIBRILLATION (H): ICD-10-CM

## 2020-04-30 DIAGNOSIS — Z79.01 LONG TERM CURRENT USE OF ANTICOAGULANT THERAPY: ICD-10-CM

## 2020-04-30 LAB
APPEARANCE FLD: NORMAL
COLOR FLD: YELLOW
CRYSTALS SNV MICRO: NORMAL
GRAM STN SPEC: NORMAL
LYMPHOCYTES NFR FLD MANUAL: 1 %
MONOS+MACROS NFR FLD MANUAL: 8 %
NEUTS BAND NFR FLD MANUAL: 91 %
RBC # FLD: NORMAL /UL
SPECIMEN SOURCE FLD: NORMAL
SPECIMEN SOURCE SNV: NORMAL
SPECIMEN SOURCE: NORMAL
WBC # FLD AUTO: NORMAL /UL

## 2020-04-30 PROCEDURE — 89051 BODY FLUID CELL COUNT: CPT | Performed by: EMERGENCY MEDICINE

## 2020-04-30 PROCEDURE — 87070 CULTURE OTHR SPECIMN AEROBIC: CPT | Performed by: EMERGENCY MEDICINE

## 2020-04-30 PROCEDURE — 20610 DRAIN/INJ JOINT/BURSA W/O US: CPT

## 2020-04-30 PROCEDURE — 20610 DRAIN/INJ JOINT/BURSA W/O US: CPT | Mod: Z6 | Performed by: EMERGENCY MEDICINE

## 2020-04-30 PROCEDURE — 40000986 XR KNEE LT 1/2 VW: Mod: LT

## 2020-04-30 PROCEDURE — 89060 EXAM SYNOVIAL FLUID CRYSTALS: CPT | Performed by: EMERGENCY MEDICINE

## 2020-04-30 PROCEDURE — 99284 EMERGENCY DEPT VISIT MOD MDM: CPT | Mod: 25 | Performed by: EMERGENCY MEDICINE

## 2020-04-30 PROCEDURE — 87015 SPECIMEN INFECT AGNT CONCNTJ: CPT | Performed by: EMERGENCY MEDICINE

## 2020-04-30 PROCEDURE — 87205 SMEAR GRAM STAIN: CPT | Performed by: EMERGENCY MEDICINE

## 2020-04-30 PROCEDURE — 25000132 ZZH RX MED GY IP 250 OP 250 PS 637: Performed by: EMERGENCY MEDICINE

## 2020-04-30 PROCEDURE — 99284 EMERGENCY DEPT VISIT MOD MDM: CPT | Mod: 25

## 2020-04-30 RX ORDER — OXYCODONE HYDROCHLORIDE 5 MG/1
5 TABLET ORAL EVERY 6 HOURS PRN
Qty: 8 TABLET | Refills: 0 | Status: SHIPPED | OUTPATIENT
Start: 2020-04-30 | End: 2020-05-02

## 2020-04-30 RX ORDER — OXYCODONE HYDROCHLORIDE 5 MG/1
10 TABLET ORAL ONCE
Status: COMPLETED | OUTPATIENT
Start: 2020-04-30 | End: 2020-04-30

## 2020-04-30 RX ADMIN — OXYCODONE HYDROCHLORIDE 10 MG: 5 TABLET ORAL at 01:32

## 2020-04-30 ASSESSMENT — MIFFLIN-ST. JEOR: SCORE: 1667.22

## 2020-04-30 NOTE — ED TRIAGE NOTES
Pt presents with left knee pain getting progressively worse for 2 days. Denies injury. Able to ambulate with a limp. States it feels like there is a lot of pressure to the knee.

## 2020-04-30 NOTE — ED AVS SNAPSHOT
[facility] Emergency Department  [address] [address]  Phone:  [phone]  Fax:  [phone]                                    Maurice Jon   MRN: [id number]    Department:  [facility] Emergency Department   Date of Visit:  4/30/2020           After Visit Summary Signature Page    I have received my discharge instructions, and my questions have been answered. I have discussed any challenges I see with this plan with the nurse or doctor.    ..........................................................................................................................................  Patient/Patient Representative Signature      ..........................................................................................................................................  Patient Representative Print Name and Relationship to Patient    ..................................................               ................................................  Date                                   Time    ..........................................................................................................................................  Reviewed by Signature/Title    ...................................................              ..............................................  Date                                               Time          22EPIC Rev 08/18

## 2020-04-30 NOTE — DISCHARGE INSTRUCTIONS
Keep the leg elevated to help with the swelling.  Use ice for 10 to 15 minutes at a time every 1-2 hours while awake.  Return to the emergency department for fevers, significantly increased swelling, redness, increasing warmth, or other concerns.  Follow-up in clinic if not better over the next w several days.  Will reach out to you if there is a change in the need for antibiotics based on the test results.    Use acetaminophen for your symptoms.  Use oxycodone as needed for pain that is not controlled by the prior two medications.    Oxycodone is an addictive opioid medication, please only take it when the pain is more than can be controlled by acetaminophen or ibuprofen alone. It will also make you lightheaded, nauseated, and constipated.  Do not drive, operate heavy machinery, or take care of young children while taking this medication. Do not mix it with other medications or drugs that will make you sleepy, such as alcohol.     Repeated studies have shown that the longer you use opioid pain medications, the longer it is until you return to normal function. It is our recommendation that you taper off opioids as quickly as possible with the goal of returning to normal function or near normal function. Long term use of opioids quickly results in growing tolerance to the medication (less of the benefits) and increased dependence (more of the bad side effects).     Pain is very difficult to treat and we can very rarely take away pain completely. If you are having difficulty with pain over several weeks after an injury, you may need to start different medications and therapies (such as physical therapy, graded exercise, massage, and acupuncture). Please talk about this with your regular doctor.     If you are interested in seeking free, confidential treatment referral and information service for individuals and families facing mental health and/or substance use disorders please call [phone] ([phone])

## 2020-04-30 NOTE — ED PROVIDER NOTES
History     Chief Complaint   Patient presents with     Knee Pain     HPI  Maurice Jon is a 59 year old male who presents for left knee pain.  The patient says that over the past 3 days he has had increasing pain and swelling of the left knee.  No known injury.  Pain is throbbing and aching, hurts to walk on it, hurts to bend the knee.  He is taken acetaminophen with minimal improvement.  Rates the pain is severe.  He denies any rash or increased warmth.  He denies fever, chills, difficulty breathing, cough, nausea, vomiting, body aches.  He does have a history of liver disease with cirrhosis and portal hypertension.  Also has a history of atrial fibrillation and is on warfarin for this.    Allergies:  Allergies   Allergen Reactions     Avelox Other (See Comments) and GI Disturbance     portal hypertension     Moxifloxacin Nausea and Vomiting, Nausea and Other (See Comments)     portal hypertension     Sotalol Itching       Problem List:    Patient Active Problem List    Diagnosis Date Noted     Health Care Home 07/01/2011     Priority: High     01/07/2014  Status:  Declined  Care Coordinator:  Salena Joel    See Letters for [facility] Care Plan (emergency care plan only)             Portal hypertension (H) 01/28/2010     Priority: High     Liver Cirrhosis 2nd ot Fatty liver, w Ascites 08/22/2005     Priority: High     Cirrhosis, idiopathic         Atypical chest pain 12/20/2019     Priority: Medium     Chest pain 12/19/2019     Priority: Medium     Persistent atrial fibrillation 11/13/2019     Priority: Medium     Added automatically from request for surgery [id number]       Cellulitis 10/06/2019     Priority: Medium     Acute combined systolic and diastolic (congestive) hrt fail (H) 01/04/2019     Priority: Medium     CAD (coronary artery disease)      Priority: Medium     Cath 12/26/18- mild nonobstructive disease       Cardiomyopathy (H)      Priority: Medium     12/23/18 Echo- EF 25-30%       Pericarditis       Priority: Medium     Acute on chronic systolic congestive heart failure (H)      Priority: Medium     Obesity (BMI 35.0-39.9) with comorbidity (H) 12/28/2018     Priority: Medium     Right heart failure (H) 12/24/2018     Priority: Medium     Chronic a-fib, S/P Ablation 12/22/18 -- on Warfarin 12/21/2018     Priority: Medium     Encounter for monitoring sotalol therapy 10/31/2018     Priority: Medium     Long-term (current) use of anticoagulants [Z79.01] 09/18/2018     Priority: Medium     Portal vein thrombosis 02/02/2017     Priority: Medium     History of MRSA now culture negative 08/06/2015     Priority: Medium     Severe sepsis (H) 07/13/2015     Priority: Medium     Problem list name updated by automated process. Provider to review       Paroxysmal atrial fibrillation (H)      Priority: Medium     S/p cardioversion       Edema 05/13/2013     Priority: Medium     CARDIOVASCULAR SCREENING; LDL GOAL LESS THAN 160 10/31/2010     Priority: Medium     GERD (gastroesophageal reflux disease) 03/25/2010     Priority: Medium     Eczema 01/28/2010     Priority: Medium     BRUCE-Mild (AHI 9; REM RDI 31) 03/20/2009     Priority: Medium     Sleep study 3/09- .0 minutes, latency 3.6 minutes. REM latency 72.0 minutes. Sleep efficiency 87.7%. The sleep architecture was disrupted with frequent sleep stage changes and arousals. Snoring: mild to loud.  RDI 27.7, AHI of 8.4. REM RDI 31.5 TLO2 saturation 83.0%. This study is suggestive of mild sleep apnea, severe during REM sleep (20% TST). Other:  PLM index was 0.0.       Compulsive behaviors 08/26/2008     Priority: Medium     erectile dysfunction 08/22/2005     Priority: Medium     Obesity 11/24/2010     Priority: Low     Thrombocytopenia (H) 08/22/2005     Priority: Low     Thrombocytopenia associated with cirrhosis and portal hypertension  Problem list name updated by automated process. Provider to review          Past Medical History:    Past Medical History:    Diagnosis Date     A-fib (H)      Acute pulmonary edema (H)      Atrial fibrillation (H)      Atrial fibrillation with rapid ventricular response (H) 5/13/2013     CAD (coronary artery disease)      Calculus of ureter 1993, 1997     Cardiomyopathy (H)      Chronic systolic CHF (congestive heart failure) (H)      Cirrhosis of liver without mention of alcohol 8/22/2005     Edema 5/13/2013     Esophageal varices with bleeding(456.0)      Gallstones      Genital herpes, unspecified 3/20/1999     GERD (gastroesophageal reflux disease)      Hematuria      Hypertension      Hypochondriasis      Obesity 11/24/2010     BRUCE (obstructive sleep apnea) 03/17/2009     Pericarditis      Portal hypertension (H) 1/28/2010     Unspecified thrombocytopenia 8/22/2005       Past Surgical History:    Past Surgical History:   Procedure Laterality Date     ANESTHESIA CARDIOVERSION N/A 1/19/2015    Procedure: ANESTHESIA CARDIOVERSION;  Surgeon: Generic Anesthesia Provider;  Location: [facility]     ANESTHESIA CARDIOVERSION N/A 2/6/2019    Procedure: ANESTHESIA CARDIOVERSION;  Surgeon: GENERIC ANESTHESIA PROVIDER;  Location:  OR     ANESTHESIA CARDIOVERSION N/A 7/5/2019    Procedure: ANESTHESIA FOR CARDIOVERSION  DR. MURGUIA);  Surgeon: GENERIC ANESTHESIA PROVIDER;  Location:  OR     COLONOSCOPY N/A 8/14/2017    Procedure: COLONOSCOPY;  Colonoscopy Called will arrive appx 12:30 Per phone call;  Surgeon: Vincent Whittington MD;  [facility]     CV HEART CATHETERIZATION WITH POSSIBLE INTERVENTION N/A 12/26/2018    Procedure: Heart Catheterization with possible Intervention;  Surgeon: Brandon Arroyo MD;  Location:  HEART CARDIAC CATH LAB     EP ABLATION AV NODE N/A 11/15/2019    Procedure: EP Ablation AV Node;  Surgeon: Ag Maloney MD;  Location:  HEART CARDIAC CATH LAB     EP ABLATION FOCAL AFIB N/A 12/21/2018    Procedure: EP Ablation Focal AFIB;  Surgeon: Kristofer Murguia MD;  Location:  HEART CARDIAC CATH LAB     EP  PACEMAKER N/A 11/15/2019    Procedure: EP PACEMAKER;  Surgeon: Ag Maloney MD;  Location:  HEART CARDIAC CATH LAB     ESOPHAGOSCOPY, GASTROSCOPY, DUODENOSCOPY (EGD), COMBINED  7/11/2011    Procedure:COMBINED ESOPHAGOSCOPY, GASTROSCOPY, DUODENOSCOPY (EGD); Surgeon:LAURA LAMAS; Location:[facility]     ESOPHAGOSCOPY, GASTROSCOPY, DUODENOSCOPY (EGD), COMBINED  9/10/2012    Procedure: COMBINED ESOPHAGOSCOPY, GASTROSCOPY, DUODENOSCOPY (EGD);;  Surgeon: Hi Roque MD;  Location:  GI     ESOPHAGOSCOPY, GASTROSCOPY, DUODENOSCOPY (EGD), COMBINED  9/9/2013    Procedure: COMBINED ESOPHAGOSCOPY, GASTROSCOPY, DUODENOSCOPY (EGD);;  Surgeon: Hi Roque MD;  Location: [facility]     ESOPHAGOSCOPY, GASTROSCOPY, DUODENOSCOPY (EGD), COMBINED N/A 9/15/2014    Procedure: COMBINED ESOPHAGOSCOPY, GASTROSCOPY, DUODENOSCOPY (EGD);  Surgeon: Hi Roque MD;  Location:  GI     ESOPHAGOSCOPY, GASTROSCOPY, DUODENOSCOPY (EGD), COMBINED N/A 10/30/2015    Procedure: COMBINED ESOPHAGOSCOPY, GASTROSCOPY, DUODENOSCOPY (EGD);  Surgeon: Feliberto Fox MD;  Location: [facility]     ESOPHAGOSCOPY, GASTROSCOPY, DUODENOSCOPY (EGD), COMBINED N/A 10/10/2016    Procedure: COMBINED ESOPHAGOSCOPY, GASTROSCOPY, DUODENOSCOPY (EGD);  Surgeon: Jose Nesbitt MD;  Location:  GI     ESOPHAGOSCOPY, GASTROSCOPY, DUODENOSCOPY (EGD), COMBINED N/A 9/26/2019    Procedure: ESOPHAGOGASTRODUODENOSCOPY (EGD);  Surgeon: Timo Soares MD;  Location:  GI     H ABLATION FOCAL AFIB  12/21/2018     HERNIA REPAIR       IRRIGATION AND DEBRIDEMENT ABSCESS SCROTUM, COMBINED  10/4/2012    Procedure: COMBINED IRRIGATION AND DEBRIDEMENT ABSCESS SCROTUM;  Irrigation and Debridement of Groin Abscess;  Surgeon: Benny Shoemaker MD;  Location: [facility]     SOFT TISSUE SURGERY       SURGICAL HISTORY OF -   1993    rt elbow     SURGICAL HISTORY OF -   1/15/98    repair of ventral and umbilical hernia     SURGICAL HISTORY OF -   2001    endoscopy        Family History:    Family History   Problem Relation Age of Onset     Lipids Mother      Hypertension Mother      Eye Disorder Mother      Heart Disease Father         CHF     Lipids Father      Obesity Father      Heart Disease Brother         MI     Eye Disorder Brother      Hypertension Brother         portal HTN     Thrombosis Sister         in her lung     Eye Disorder Sister      Cancer Other         maternal grandparent/throat cancer       Social History:  Marital Status:   [2]  Social History     Tobacco Use     Smoking status: Former Smoker     Packs/day: 0.80     Years: 6.00     Pack years: 4.80     Types: Cigarettes     Last attempt to quit: 2002     Years since quittin.3     Smokeless tobacco: Never Used   Substance Use Topics     Alcohol use: No     Alcohol/week: 0.0 standard drinks     Comment: quit in      Drug use: No        Medications:    oxyCODONE (ROXICODONE) 5 MG tablet  ACE/ARB/ARNI NOT PRESCRIBED (INTENTIONAL)  Acetaminophen 325 MG CAPS  ALBUTEROL IN  ASPIRIN NOT PRESCRIBED (INTENTIONAL)  budesonide-formoterol (SYMBICORT) 80-4.5 MCG/ACT Inhaler  calcium carb 1250 mg, 500 mg [place],/vitamin D 200 units (OSCAL WITH D) 500-200 MG-UNIT per tablet  diphenhydrAMINE HCl (BENADRYL PO)  ferrous gluconate (FERGON) 324 (38 Fe) MG tablet  Multiple Vitamins-Minerals (MENS 50+ MULTI VITAMIN/MIN PO)  mupirocin (BACTROBAN) 2 % external ointment  order for DME  order for DME  ORDER FOR DME  pantoprazole (PROTONIX) 40 MG EC tablet  spironolactone (ALDACTONE) 25 MG tablet  STATIN NOT PRESCRIBED (INTENTIONAL)  torsemide (DEMADEX) 20 MG tablet  triamcinolone (KENALOG) 0.1 % external cream  vitamin D3 (CHOLECALCIFEROL) 2000 units (50 mcg) tablet  warfarin ANTICOAGULANT (JANTOVEN ANTICOAGULANT) 2.5 MG tablet          Review of Systems  Pertinent positives and negatives listed in the HPI, all other systems reviewed and are negative.    Physical Exam   BP: 124/70  Pulse: 70  Temp: 98.4  F (36.9  " C)  Resp: 18  Height: 160 cm (5' 3\")  Weight: 95.7 kg (211 lb)  SpO2: 98 %      Physical Exam  Vitals signs and nursing note reviewed.   Constitutional:       General: He is in acute distress.      Appearance: He is well-developed. He is not diaphoretic.   HENT:      Head: Normocephalic and atraumatic.      Right Ear: External ear normal.      Left Ear: External ear normal.      Nose: Nose normal.   Eyes:      General: No scleral icterus.     Conjunctiva/sclera: Conjunctivae normal.   Neck:      Musculoskeletal: Normal range of motion.   Cardiovascular:      Rate and Rhythm: Normal rate and regular rhythm.   Pulmonary:      Effort: Pulmonary effort is normal. No respiratory distress.      Breath sounds: No stridor.   Musculoskeletal:      Comments: Left Knee: swollen tender knee without erythema or excess warmth compared to right knee; no tenderness over patella or femoral condyles; ROM limited by pain   Skin:     General: Skin is warm and dry.   Neurological:      Mental Status: He is alert and oriented to person, place, and time.   Psychiatric:         Behavior: Behavior normal.         ED Course        Arthrocentesis    Date/Time: 4/30/2020 1:35 AM  Performed by: Timo Paredes MD  Authorized by: Timo Paredes MD       LOCATION:   Location:  Knee  Knee:  L knee  ANESTHESIA (see MAR for exact dosages):   Anesthesia method:  Local infiltration  Local anesthetic:  Bupivacaine 0.5% w/o epi      PROCEDURE DETAILS:     Needle gauge:  22 G    Ultrasound guidance: no      Approach:  Medial    Aspirate amount:  40 ml    Aspirate characteristics:  Clear, yellow and blood-tinged    Steroid injected: no      Specimen collected: yes      Dressing: adhesive bandage, gauze roll and sterile dressing    PROCEDURE   Patient Tolerance:  Patient tolerated the procedure well with no immediate complications     The patient is asked to continue to rest the joint for a few more days before resuming regular activities.  " It may be more painful for the first 1-2 days.                 Critical Care time:  none               Results for orders placed or performed during the hospital encounter of 04/30/20 (from the past 24 hour(s))   Cell count with differential fluid   Result Value Ref Range    Body Fluid Analysis Source Left Knee     Color Fluid Yellow     Appearance Fluid Cloudy     RBC Fluid << Do Not Report >> /uL    WBC Fluid 23640 /uL    % Neutrophils Fluid 91 %    % Lymphocytes Fluid 1 %    % Mono/Macro Fluid 8 %   XR Knee Left 1/2 Views    Narrative    EXAM: XR KNEE LT 1/2 VW  LOCATION: [facility]  DATE/TIME: 4/30/2020 1:27 AM    INDICATION: Pain and swelling.  COMPARISON: None.      Impression    IMPRESSION: No visible fracture or dislocation. Coarse calcification inferior to the patella. Joint effusion.       Medications   oxyCODONE (ROXICODONE) tablet 10 mg (10 mg Oral Given 4/30/20 0132)       Assessments & Plan (with Medical Decision Making)   59-year-old male who presents for left knee pain.  Blood pressure is 124/70, temperature is 98.4  F, heart 70, SPO2 is 98% on room air.  He is given oxycodone for the pain.  X-ray of the knee obtained, images reviewed independently as well as radiology read reviewed, no signs of fracture or dislocation.  Arthrocentesis performed as above with 40 mL's of fluid withdrawn.  I think this is unlikely to represent septic arthritis or gout given the lack of erythema and the patient is safe to discharge with a short course of pain control and instructions to use elevation and ice to help with the symptoms.  Synovial fluid testing is currently pending.  Most likely chronic arthritis as a cause of the patient's pain and swelling. The patient is in agreement with this plan.    I have reviewed the nursing notes.    I have reviewed the findings, diagnosis, plan and need for follow up with the patient.       Discharge Medication List as of 4/30/2020  1:41 AM      START taking these  medications    Details   oxyCODONE (ROXICODONE) 5 MG tablet Take 1 tablet (5 mg) by mouth every 6 hours as needed for pain, Disp-8 tablet,R-0, E-Prescribe             Final diagnoses:   Acute pain of left knee       4/30/2020   [facility] EMERGENCY DEPARTMENT     Timo Paredes MD  04/30/20 2354

## 2020-04-30 NOTE — TELEPHONE ENCOUNTER
Patient called earlier about irritation of left knee. Had a telephone visit today with a provider. Patient states the pain during the day was easier and started to get better at work, he thinks the water pills he took helped the pain.     Tonight when patient got home from work, he developed excruciating pain and pressure in the left knee. Reports it's hard to walk and he can barely walk. Used Aspercreme. Took 400 mg of Tylenol which didn't help much (patient uses Tylenol sparingly due to his liver condition) Knee looks swollen which is a change from the visit earlier today.   No hot spots on knee. Some spots on knee are very tender. Can slightly bend the knee but says he has to work at it. No fever. No redness. Rates pain 6-7/10 while at rest and when moving.  Protocol advises to be seen within 4 hours. Patient plans to go into [facility] ED.     Christina Paul RN/[facility] Nurse Advisors    COVID 19 Nurse Triage Plan/Patient Instructions    Please be aware that novel coronavirus (COVID-19) may be circulating in the community. If you develop symptoms such as fever, cough, or SOB or if you have concerns about the presence of another infection including coronavirus (COVID-19), please contact your health care provider or visit [email or web address].     Disposition/Instructions    Patient to go to ED and follow protocol based instructions. Follow System Ambulatory Workflow for COVID 19.     Bring Your Own Device:  Please also bring your smart device(s) (smart phones, tablets, laptops) and their charging cables for your personal use and to communicate with your care team during your visit.    Thank you for limiting contact with others, wearing a simple mask to cover your cough, practice good hand hygiene habits and accessing our virtual services where possible to limit the spread of this virus.    For more information about COVID19 and options for caring for yourself at home, please visit the CDC website at  https://www.cdc.gov/coronavirus/2019-ncov/about/steps-when-sick.html  For more options for care at [facility], please visit our website at [email or web address]    For more information, please use the [place] Department of Health COVID-19 Website: https://www.health.[facility].[place]/diseases/coronavirus/index.html  [place] Department of Health ([facility]) COVID-19 Hotlines (Interpreters available):      Health questions: Phone Number: 222.727.3464 or 1-624.221.2642 and Hours: 7 a.m. to 7 p.m.    Schools and  questions: Phone Number: [phone] or [phone] and Hours 7 a.m. to 7 p.m.    Reason for Disposition    [1] SEVERE pain (e.g., excruciating, unable to walk) AND [2] not improved after 2 hours of pain medicine    Additional Information    Negative: [1] Redness AND [2] painful when touched AND [3] no fever    Negative: [1] Can't move swollen joint at all AND [2] no fever    Negative: [1] Thigh or calf pain AND [2] only 1 side AND [3] present > 1 hour    Negative: Fever    Negative: Patient sounds very sick or weak to the triager    Negative: Sounds like a life-threatening emergency to the triager    Negative: Followed a knee injury    Negative: Leg pain is main symptom    Negative: Knee swelling is main symptom    Negative: [1] Swollen joint AND [2] fever    Negative: [1] Red area or streak AND [2] fever    Negative: Patient sounds very sick or weak to the triager    Protocols used: KNEE PAIN-A-AH, KNEE SWELLING-A-AH

## 2020-04-30 NOTE — PROGRESS NOTES
ANTICOAGULATION  MANAGEMENT: Discharge Review    Maurice Jon chart reviewed for anticoagulation continuity of care    Emergency room visit on 4-30 for left knee pain.    Discharge disposition: Home    Results:    No INR drawn    Anticoagulation inpatient management:     not applicable     Anticoagulation discharge instructions:     Warfarin dosing: home regimen continued   Bridging: No   INR goal change: No      Medication changes affecting anticoagulation: No, oxycodone added    Additional factors affecting anticoagulation: Yes: Patient had 40 mL's of fluid withdrawn from knee, likely due to chronic arthritis per notes.    Plan     No adjustment to anticoagulation plan needed    Patient not contacted    Anticoagulation Calendar updated    Susan Beyer RN

## 2020-05-04 ENCOUNTER — VIRTUAL VISIT (OUTPATIENT)
Dept: FAMILY MEDICINE | Facility: CLINIC | Age: 60
End: 2020-05-04
Payer: COMMERCIAL

## 2020-05-04 DIAGNOSIS — M25.562 ACUTE PAIN OF LEFT KNEE: ICD-10-CM

## 2020-05-04 PROCEDURE — 99213 OFFICE O/P EST LOW 20 MIN: CPT | Mod: TEL | Performed by: FAMILY MEDICINE

## 2020-05-04 RX ORDER — OXYCODONE HYDROCHLORIDE 5 MG/1
5 TABLET ORAL EVERY 6 HOURS PRN
Qty: 15 TABLET | Refills: 0 | Status: SHIPPED | OUTPATIENT
Start: 2020-05-04 | End: 2021-01-01

## 2020-05-04 NOTE — LETTER
[facility]  [address] [place] [address]  [phone]    RE:  Maurice Jon  [address] [place] [address] [phone] (home)   May 4, 2020    TO WHOM IT MAY CONCERN:    Maurice Jon was spoken to on 5/4/2020.  Please excuse him from walking more 20 minutes  three times a day for 2 weeks due to injury.    Cordially,      EVELYN DE LUNA MD.

## 2020-05-04 NOTE — NURSING NOTE
Hard copy Rx is taken to the [facility] pharmacy , I called patient and advised him we had the hard copy and with our clinic closed couldn't have him  the paper copy here, he wants to  the Rx in [facility] pharmacy drive up instead.   Juli Khan RNC

## 2020-05-04 NOTE — PROGRESS NOTES
Called patient due to the Rx printing in [facility] and he agreed to pick it up at [facility] instead, so I am walking Rx to [facility] clinic.   Juli Khan RNC

## 2020-05-04 NOTE — PROGRESS NOTES
"  SUBJECTIVE:                                                    Maurice Jon is a 59 year old male who is being evaluated via a billable telephone visit.    Chief Complaint   Patient presents with     ER F/U         ED/UC Followup:    Facility: [facility]    Date of visit: 4/30/2020  Reason for visit: Left knee pain  Current Status: States that knee has loosened and is feeling slightly better, able to walk without a cane. Is off of work today, states he will probably take tomorrow off as well.     \"  Assessments & Plan (with Medical Decision Making)   59-year-old male who presents for left knee pain.  Blood pressure is 124/70, temperature is 98.4  F, heart 70, SPO2 is 98% on room air.  He is given oxycodone for the pain.  X-ray of the knee obtained, images reviewed independently as well as radiology read reviewed, no signs of fracture or dislocation.  Arthrocentesis performed as above with 40 mL's of fluid withdrawn.  I think this is unlikely to represent septic arthritis or gout given the lack of erythema and the patient is safe to discharge with a short course of pain control and instructions to use elevation and ice to help with the symptoms.  Synovial fluid testing is currently pending.  Most likely chronic arthritis as a cause of the patient's pain and swelling. The patient is in agreement with this plan.\"                 The patient has been notified of following:     \"This telephone visit will be conducted via a call between you and your physician/provider. We have found that certain health care needs can be provided without the need for a physical exam.  This service lets us provide the care you need with a short phone conversation.  If a prescription is necessary we can send it directly to your pharmacy.  If lab work is needed we can place an order for that and you can then stop by our lab to have the test done at a later time.    Telephone visits are billed at different rates " "depending on your insurance coverage. During this emergency period, for some insurers they may be billed the same as an in-person visit.  Please reach out to your insurance provider with any questions.    If during the course of the call the physician/provider feels a telephone visit is not appropriate, you will not be charged for this service.\"    Patient has given verbal consent for Telephone visit?  Yes    How would you like to obtain your AVS?         Problem list and histories reviewed & adjusted, as indicated.  Additional history: no known knee injury, joint tapped in ED and  knee fluid negative for crystals, culture negative so far.  History of liver cirrhosis with edema and heart failure with recent change form lasix to demodex with good results, 30 lbs of fluid off.  Would like FMLA form filled out and work restrictions.  Work is on and off feet, some jobs are sitting and some walking.    Patient Active Problem List   Diagnosis     Thrombocytopenia (H)     Liver Cirrhosis 2nd ot Fatty liver, w Ascites     erectile dysfunction     Compulsive behaviors     BRUCE-Mild (AHI 9; REM RDI 31)     Portal hypertension (H)     Eczema     GERD (gastroesophageal reflux disease)     CARDIOVASCULAR SCREENING; LDL GOAL LESS THAN 160     Obesity     Health Care Home     Edema     Paroxysmal atrial fibrillation (H)     Severe sepsis (H)     History of MRSA now culture negative     Portal vein thrombosis     Long-term (current) use of anticoagulants [Z79.01]     Encounter for monitoring sotalol therapy     Chronic a-fib, S/P Ablation 12/22/18 -- on Warfarin     Right heart failure (H)     Obesity (BMI 35.0-39.9) with comorbidity (H)     CAD (coronary artery disease)     Cardiomyopathy (H)     Pericarditis     Acute on chronic systolic congestive heart failure (H)     Acute combined systolic and diastolic (congestive) hrt fail (H)     Cellulitis     Persistent atrial fibrillation     Chest pain     Atypical chest pain     Past " Surgical History:   Procedure Laterality Date     ANESTHESIA CARDIOVERSION N/A 1/19/2015    Procedure: ANESTHESIA CARDIOVERSION;  Surgeon: Generic Anesthesia Provider;  Location: [facility]     ANESTHESIA CARDIOVERSION N/A 2/6/2019    Procedure: ANESTHESIA CARDIOVERSION;  Surgeon: GENERIC ANESTHESIA PROVIDER;  Location:  OR     ANESTHESIA CARDIOVERSION N/A 7/5/2019    Procedure: ANESTHESIA FOR CARDIOVERSION  DR. MURGUIA);  Surgeon: GENERIC ANESTHESIA PROVIDER;  Location:  OR     COLONOSCOPY N/A 8/14/2017    Procedure: COLONOSCOPY;  Colonoscopy Called will arrive appx 12:30 Per phone call;  Surgeon: Vincent Whittington MD;  Location: [facility]     CV HEART CATHETERIZATION WITH POSSIBLE INTERVENTION N/A 12/26/2018    Procedure: Heart Catheterization with possible Intervention;  Surgeon: Brandon Arroyo MD;  Location:  HEART CARDIAC CATH LAB     EP ABLATION AV NODE N/A 11/15/2019    Procedure: EP Ablation AV Node;  Surgeon: Ag Maloney MD;  Location:  HEART CARDIAC CATH LAB     EP ABLATION FOCAL AFIB N/A 12/21/2018    Procedure: EP Ablation Focal AFIB;  Surgeon: Kristofer Murguia MD;  Location:  HEART CARDIAC CATH LAB     EP PACEMAKER N/A 11/15/2019    Procedure: EP PACEMAKER;  Surgeon: Ag Maloney MD;  Location:  HEART CARDIAC CATH LAB     ESOPHAGOSCOPY, GASTROSCOPY, DUODENOSCOPY (EGD), COMBINED  7/11/2011    Procedure:COMBINED ESOPHAGOSCOPY, GASTROSCOPY, DUODENOSCOPY (EGD); Surgeon:LAURA LAMAS; Location:[facility]     ESOPHAGOSCOPY, GASTROSCOPY, DUODENOSCOPY (EGD), COMBINED  9/10/2012    Procedure: COMBINED ESOPHAGOSCOPY, GASTROSCOPY, DUODENOSCOPY (EGD);;  Surgeon: Hi Roque MD;  Location:  GI     ESOPHAGOSCOPY, GASTROSCOPY, DUODENOSCOPY (EGD), COMBINED  9/9/2013    Procedure: COMBINED ESOPHAGOSCOPY, GASTROSCOPY, DUODENOSCOPY (EGD);;  Surgeon: Hi Roque MD;  Location:  GI     ESOPHAGOSCOPY, GASTROSCOPY, DUODENOSCOPY (EGD), COMBINED N/A 9/15/2014    Procedure: COMBINED  ESOPHAGOSCOPY, GASTROSCOPY, DUODENOSCOPY (EGD);  Surgeon: Hi Roque MD;  Location: [facility]     ESOPHAGOSCOPY, GASTROSCOPY, DUODENOSCOPY (EGD), COMBINED N/A 10/30/2015    Procedure: COMBINED ESOPHAGOSCOPY, GASTROSCOPY, DUODENOSCOPY (EGD);  Surgeon: Feliberto Fox MD;  Location: [facility]     ESOPHAGOSCOPY, GASTROSCOPY, DUODENOSCOPY (EGD), COMBINED N/A 10/10/2016    Procedure: COMBINED ESOPHAGOSCOPY, GASTROSCOPY, DUODENOSCOPY (EGD);  Surgeon: Jose Nesbitt MD;  Location: [facility]     ESOPHAGOSCOPY, GASTROSCOPY, DUODENOSCOPY (EGD), COMBINED N/A 2019    Procedure: ESOPHAGOGASTRODUODENOSCOPY (EGD);  Surgeon: Timo Soares MD;  Location:  GI     H ABLATION FOCAL AFIB  2018     HERNIA REPAIR       IRRIGATION AND DEBRIDEMENT ABSCESS SCROTUM, COMBINED  10/4/2012    Procedure: COMBINED IRRIGATION AND DEBRIDEMENT ABSCESS SCROTUM;  Irrigation and Debridement of Groin Abscess;  Surgeon: Benny Shoemaker MD;  Location: [facility]     SOFT TISSUE SURGERY       SURGICAL HISTORY OF -       rt elbow     SURGICAL HISTORY OF -   1/15/98    repair of ventral and umbilical hernia     SURGICAL HISTORY OF -       endoscopy       Social History     Tobacco Use     Smoking status: Former Smoker     Packs/day: 0.80     Years: 6.00     Pack years: 4.80     Types: Cigarettes     Last attempt to quit: 2002     Years since quittin.3     Smokeless tobacco: Never Used   Substance Use Topics     Alcohol use: No     Alcohol/week: 0.0 standard drinks     Comment: quit in      Family History   Problem Relation Age of Onset     Lipids Mother      Hypertension Mother      Eye Disorder Mother      Heart Disease Father         CHF     Lipids Father      Obesity Father      Heart Disease Brother         MI     Eye Disorder Brother      Hypertension Brother         portal HTN     Thrombosis Sister         in her lung     Eye Disorder Sister      Cancer Other         maternal grandparent/throat cancer          Current Outpatient Medications   Medication Sig Dispense Refill     ALBUTEROL IN        calcium carb 1250 mg, 500 mg [place],/vitamin D 200 units (OSCAL WITH D) 500-200 MG-UNIT per tablet Take 2 tablets by mouth daily with food.       diphenhydrAMINE HCl (BENADRYL PO) Take 25 mg by mouth       ferrous gluconate (FERGON) 324 (38 Fe) MG tablet Take 1 tablet (324 mg) by mouth 3 times daily (with meals) 100 tablet 1     Multiple Vitamins-Minerals (MENS 50+ MULTI VITAMIN/MIN PO) Take 1 tablet by mouth daily       mupirocin (BACTROBAN) 2 % external ointment Apply topically 3 times daily Apply to open sores on lower legs. 30 g 3     oxyCODONE (ROXICODONE) 5 MG tablet Take 1 tablet (5 mg) by mouth every 6 hours as needed for pain 15 tablet 0     pantoprazole (PROTONIX) 40 MG EC tablet Take 1 tablet (40 mg) by mouth daily 30 tablet 3     spironolactone (ALDACTONE) 25 MG tablet Take 1 tablet (25 mg) by mouth daily 90 tablet 3     torsemide (DEMADEX) 20 MG tablet Take 2 tablets daily in AM and 1 tab daily at noon (Patient taking differently: 3 tablets daily) 90 tablet 1     vitamin D3 (CHOLECALCIFEROL) 2000 units (50 mcg) tablet Take 1 tablet by mouth daily       warfarin ANTICOAGULANT (JANTOVEN ANTICOAGULANT) 2.5 MG tablet 1.25 mg Fridays; 2.5mg all other days or As directed by Anticoagulation Clinic. INR DUE FOR FURTHER REFILLS 90 tablet 0     ACE/ARB/ARNI NOT PRESCRIBED (INTENTIONAL) Please choose reason not prescribed, below (Patient not taking: Reported on 3/31/2020)       Acetaminophen 325 MG CAPS Take 325-650 mg by mouth every 6 hours as needed       ASPIRIN NOT PRESCRIBED (INTENTIONAL) Please choose reason not prescribed, below       budesonide-formoterol (SYMBICORT) 80-4.5 MCG/ACT Inhaler Inhale 2 puffs into the lungs 2 times daily (Patient not taking: Reported on 2/13/2020) 10.2 g 3     order for DME Open toe size large 30/40 [facility] Compression socks Model 35587.  Or similar as per  availability/formulary.  Fax to Fax [phone]. [facility] (Patient not taking: Reported on 5/4/2020) 12 Device 0     order for DME Equipment being ordered:   New CPAP mask, tube, filters,water tray (Patient not taking: Reported on 5/4/2020) 1 Device 3     ORDER FOR DME Respironics RemStar 60 Series Auto AFlex 12-15 cm H2O with heated humidty and a modem.  Pt chose a Quattro Air FFM size medium. (Patient not taking: Reported on 5/4/2020)       STATIN NOT PRESCRIBED (INTENTIONAL) Please choose reason not prescribed, below (Patient not taking: Reported on 4/9/2020)       triamcinolone (KENALOG) 0.1 % external cream Apply topically 2 times daily (Patient not taking: Reported on 4/29/2020) 80 g 1     Allergies   Allergen Reactions     Avelox Other (See Comments) and GI Disturbance     portal hypertension     Moxifloxacin Nausea and Vomiting, Nausea and Other (See Comments)     portal hypertension     Sotalol Itching     Recent Labs   Lab Test 04/20/20  1536 04/01/20  1548  03/06/20  1359 03/04/20  1718  09/19/19  0552  01/03/19  0846  05/19/18  07/28/15  1440  02/27/13   LDL  --   --   --   --   --   --  58  --   --   --  50  --   --   --  54   HDL  --   --   --   --   --   --  54  --   --   --  42  --   --   --  59   TRIG  --   --   --   --   --   --  80  --   --   --  63  --   --   --  72   ALT  --  52  --  44 41   < > 40   < >  --    < >  --    < > 46   < > 42   CR 0.79 0.88   < > 0.84 0.76   < > 0.70   < > 0.87   < >  --    < > 0.73   < > 0.66   GFRESTIMATED >90 >90   < > >90 >90   < > >90   < > >90   < >  --    < > >90  Non  GFR Calc     < > 111   GFRESTBLACK >90 >90   < > >90 >90   < > >90   < > >90   < >  --    < > >90   GFR Calc     < > 129   POTASSIUM 3.4 3.6   < > 3.4 3.7   < > 3.7   < > 4.1   < >  --    < > 3.8   < > 3.6   TSH  --   --   --   --   --   --   --   --  5.48*  --   --   --  2.57   < >  --     < > = values in this interval not displayed.      BP Readings  from Last 3 Encounters:   04/30/20 117/68   04/23/20 127/75   04/17/20 129/74    Wt Readings from Last 3 Encounters:   04/30/20 95.7 kg (211 lb)   04/23/20 96.6 kg (213 lb)   04/17/20 98 kg (216 lb)         ROS:  Constitutional, HEENT, cardiovascular, pulmonary, gi and gu systems are negative, except as otherwise noted.    OBJECTIVE:                                                    There were no vitals taken for this visit.  GENERAL APPEARANCE ADULT: Alert, no acute distress, needy with desire to talk.  PSYCH: mentation appears normal., anxious  Diagnostic Test Results:  none      Phone call duration:  21 minutes      ASSESSMENT/PLAN:                                                    1. Acute pain of left knee  Swelling down with joint tap.  Is still using narcotic pain meds every 6 hours as cannot take tylenol or NSAIDS due to coumadin and liver failure.  Will complete FMLA  Form when they get faxed here.  Put him on work restrictions for no walking over 20 minutes three times a day for 2 weeks.  Asked that restriction letter be faxed with FMLA form   - oxyCODONE (ROXICODONE) 5 MG tablet; Take 1 tablet (5 mg) by mouth every 6 hours as needed for pain  Dispense: 15 tablet; Refill: 0    Anai Jacobs MD  [facility]

## 2020-05-05 LAB
BACTERIA SPEC CULT: NO GROWTH
SPECIMEN SOURCE: NORMAL

## 2020-05-06 DIAGNOSIS — I50.41 ACUTE COMBINED SYSTOLIC AND DIASTOLIC (CONGESTIVE) HRT FAIL (H): ICD-10-CM

## 2020-05-06 LAB
ANION GAP SERPL CALCULATED.3IONS-SCNC: 4 MMOL/L (ref 3–14)
BUN SERPL-MCNC: 16 MG/DL (ref 7–30)
CALCIUM SERPL-MCNC: 8.8 MG/DL (ref 8.5–10.1)
CHLORIDE SERPL-SCNC: 104 MMOL/L (ref 94–109)
CO2 SERPL-SCNC: 32 MMOL/L (ref 20–32)
CREAT SERPL-MCNC: 0.79 MG/DL (ref 0.66–1.25)
GFR SERPL CREATININE-BSD FRML MDRD: >90 ML/MIN/{1.73_M2}
GLUCOSE SERPL-MCNC: 84 MG/DL (ref 70–99)
POTASSIUM SERPL-SCNC: 3.7 MMOL/L (ref 3.4–5.3)
SODIUM SERPL-SCNC: 140 MMOL/L (ref 133–144)

## 2020-05-06 PROCEDURE — 36415 COLL VENOUS BLD VENIPUNCTURE: CPT | Performed by: PHYSICIAN ASSISTANT

## 2020-05-06 PROCEDURE — 80048 BASIC METABOLIC PNL TOTAL CA: CPT | Performed by: PHYSICIAN ASSISTANT

## 2020-05-06 NOTE — PROGRESS NOTES
"Maurice Jon is a 59 year old male who is being evaluated via a billable video visit.      The patient has been notified of following:     \"This video visit will be conducted via a call between you and your physician/provider. We have found that certain health care needs can be provided without the need for an in-person physical exam.  This service lets us provide the care you need with a video conversation.  If a prescription is necessary we can send it directly to your pharmacy.  If lab work is needed we can place an order for that and you can then stop by our lab to have the test done at a later time.    Video visits are billed at different rates depending on your insurance coverage.  Please reach out to your insurance provider with any questions.    If during the course of the call the physician/provider feels a video visit is not appropriate, you will not be charged for this service.\"    Patient has given verbal consent for Video visit? Yes    How would you like to obtain your AVS? My chart    Patient would like the video invitation sent by: Text to cell phone: [phone]    Will anyone else be joining your video visit? No       Spoke with pt on the phone; pt reported the following:  BP: 124/74  Pulse: 68 bpm  Wt: 215 lbs    Reviewed by JIM You, 5/7/20    CC:  HFpEF    VITALS:  124/74  HR 68  215# today 5/7  213# 4/23  216# 4/17  220# 4/9  218# 3/31  235# 3/24 (in office)  243# 3/4 (in ER)    BRIEF HPI:  Yomi is a 58 yo who sees Dr. Evans for his h/o:     1. Persistent AFib - first diagnosed 2013. Now s/p AVN ablation and BiV [employer] PPM (no A lead placed)  2. NAFLD with cirrhosis - followed by Dr. Roque at [facility]. Complicated by portal vein nonocclusive thrombosis, portal hypertension, thrombocytopenia, splenomegaly, splenic vein thrombosis and known esophageal varices (Upper GI last done 9/2019). Virtual Visit with Dr. Roque 3/2020.  3. HFpEF - with EF down to 25-30% and " "moderate reduction in RV function and moderate-severe TR on echo done 12/2018. EF 50-55% on echo 12/2019. Now with what sounds like R sided heart failure  4. VT - noted on device interrogations when he would fall asleep watching TV before going to bed and putting his CPAP on.  5. Pulmonary nodules - noted on [facility]A Heart 12/21/2018, resolved on CT 4/2019. No need for follow-up.    Yomi and I have been talking almost weekly and adjusting diuretic dosing.  When I spoke with him 4/23, he was on torsemide 60 mg in divided doses. BMP was wnl.  No changes were made and we agreed to talk again a few weeks.    Subsequently, he was in the ER 4/30 due to acute R knee pain & swelling. Knee was aspirated and short course of oxycodone was Rx'd. He met with Dr. Jacobs virtually 5/4 and joint swelling was improved but he was still in quite a bit of pain.    INTERVAL HISTORY:  Still feels like he's doing really well fluid-wise.  He ended up skipping the torsemide the day after the ER and noted some return of pitting edema but now \"totally back to normal.\"     Notes weight gain is likely due to increased calories, though does admit to eating out a bit more ([employer], [employer]) which he knows has caused him to swell more in the past.    He still thinks 3 torsemide (60 mg) is working well. No dehydration sxs.  Sometimes has significant nocturia q 1-2h if he takes his last dose of torsemide too late.    DIAGNOSTICS:  Remote device interrogation 3/31/2020 showed 96% BiV paced.  1 VT lasting 34 seconds, rate 179 bpm; 2 NSVT with 1 tracing; shows 16 seconds at 186 bpm. Asx'c. Violet (wife) believes that the 34s VT occurred after he had fallen asleep in the chair before bed (not using CPAP)  Echo 12/2019 showed EF 50-55% with moderate RV dilatation and mildly reduced RVEF. Severe MANJULA. Mild MAC with trace MR. Aortic sclerosis. Mild TR with RVSP 26+RAP. Dilatation of IVC  Component      Latest Ref Rng & Units 4/1/2020 4/20/2020 5/6/2020 "   Sodium      133 - 144 mmol/L 139 139 140   Potassium      3.4 - 5.3 mmol/L 3.6 3.4 3.7   Chloride      94 - 109 mmol/L 103 104 104   Carbon Dioxide      20 - 32 mmol/L 31 29 32   Anion Gap      3 - 14 mmol/L 5 6 4   Glucose      70 - 99 mg/dL 94 84 84   Urea Nitrogen      7 - 30 mg/dL 20 17 16   Creatinine      0.66 - 1.25 mg/dL 0.88 0.79 0.79   GFR Estimate      >60 mL/min/1.73:m2 >90 >90 >90   GFR Estimate If Black      >60 mL/min/1.73:m2 >90 >90 >90   Calcium      8.5 - 10.1 mg/dL 9.4 8.8 8.8     REVIEW OF SYSTEMS:  Negative except as noted above and Knee Pain    PHYSICAL EXAM:  GEN: NAD. Upright. Obese body habitus  ENT/Mouth: Atraumatic and normocephalic  Eyes: No scleral icterus; normal conjunctivae  Neck: No observed thyromegaly  Chest/Lungs: No audible wheezing or cough; equal chest wall expansion. Non-labored breathing  CV: No evidence of elevated JVP. No evidence of pitting edema  Abdomen: No observed jaundice. No evidence of abdominal distention  Extremities: No cyanosis or clubbing observed  Skin: No visible rashes. Normal skin color  Neuro: Normal Arm motion bilaterally. No tremors. No visible evidence of focal defects  Psychiatric: A/O. Calm demeanor    ASSESSMENT/PLAN:    1. HFpEF    Doing well on torsemide 60 mg daily in divided doses    BMP from yesterday looks good     PLAN:    Continue 60 mg torsemide daily but get last dose in no later than 2PM    Avoid fluids following evening meal to help decrease nocturia    Limit salt - encouraged to avoid eating out/fast food    3 week visit with me with CMP prior      I have reviewed the note as documented above.  This accurately captures the substance of my conversation with the patient.      Video-Visit Details    Type of service:  Video Visit    Video Start Time: 1610  Video End Time: 1640    Originating Location (pt. Location): Home    Distant Location (provider location):  Home Office    Platform used for Video Visit: Maurice Matta  LOUISA Montilla

## 2020-05-07 ENCOUNTER — VIRTUAL VISIT (OUTPATIENT)
Dept: CARDIOLOGY | Facility: CLINIC | Age: 60
End: 2020-05-07
Attending: PHYSICIAN ASSISTANT
Payer: COMMERCIAL

## 2020-05-07 VITALS
BODY MASS INDEX: 38.09 KG/M2 | WEIGHT: 215 LBS | HEIGHT: 63 IN | DIASTOLIC BLOOD PRESSURE: 74 MMHG | SYSTOLIC BLOOD PRESSURE: 124 MMHG | HEART RATE: 68 BPM

## 2020-05-07 DIAGNOSIS — I50.41 ACUTE COMBINED SYSTOLIC AND DIASTOLIC (CONGESTIVE) HRT FAIL (H): Primary | ICD-10-CM

## 2020-05-07 PROCEDURE — 99214 OFFICE O/P EST MOD 30 MIN: CPT | Mod: 95 | Performed by: PHYSICIAN ASSISTANT

## 2020-05-07 ASSESSMENT — MIFFLIN-ST. JEOR: SCORE: 1685.36

## 2020-05-07 NOTE — PATIENT INSTRUCTIONS
"Yomi - it was nice to \"see\" you today!    1. As discussed, continue the torsemide 60 mg (3 tabs) in divided doses. Don't take the last one if it's past 2 PM given you're then up all night using the bathroom.  Limit fluids in the evening, as well.    2. Keep weighing yourself.    3. AVOID FAST FOOD!  Tons of salt!    4. Call [place] lab to set up non-fasting blood test (CMP - check electrolytes, kidneys, and protein levels) in ~3 weeks. You may be able to get your INR done at that time as well.    [phone] if you need anything before our next visit Thursday 5/28 @ 3:50!  Call [phone] if you need to re-schedule this appt.    Nadege  "

## 2020-05-07 NOTE — LETTER
"5/7/2020      RE: Maurice Jon  [address] [place] [address]       Dear Colleague,    Thank you for the opportunity to participate in the care of your patient, Maurice Jon, at the [facility]. Please see a copy of my visit note below.    BRIEF HPI:  Yomi is a 58 yo who sees Dr. Evans for his h/o:     1. Persistent AFib - first diagnosed 2013. Now s/p AVN ablation and BiV [employer] PPM (no A lead placed)  2. NAFLD with cirrhosis - followed by Dr. Roque at [facility]. Complicated by portal vein nonocclusive thrombosis, portal hypertension, thrombocytopenia, splenomegaly, splenic vein thrombosis and known esophageal varices (Upper GI last done 9/2019). Virtual Visit with Dr. Roque 3/2020.  3. HFpEF - with EF down to 25-30% and moderate reduction in RV function and moderate-severe TR on echo done 12/2018. EF 50-55% on echo 12/2019. Now with what sounds like R sided heart failure  4. VT - noted on device interrogations when he would fall asleep watching TV before going to bed and putting his CPAP on.  5. Pulmonary nodules - noted on CTA Heart 12/21/2018, resolved on CT 4/2019. No need for follow-up.    Yomi and I have been talking almost weekly and adjusting diuretic dosing.  When I spoke with him 4/23, he was on torsemide 60 mg in divided doses. BMP was wnl.  No changes were made and we agreed to talk again a few weeks.    Subsequently, he was in the ER 4/30 due to acute R knee pain & swelling. Knee was aspirated and short course of oxycodone was Rx'd. He met with Dr. Jacobs virtually 5/4 and joint swelling was improved but he was still in quite a bit of pain.    INTERVAL HISTORY:  Still feels like he's doing really well fluid-wise.  He ended up skipping the torsemide the day after the ER and noted some return of pitting edema but now \"totally back to normal.\"     Notes weight gain is likely due " to increased calories, though does admit to eating out a bit more ([employer], [employer]) which he knows has caused him to swell more in the past.    He still thinks 3 torsemide (60 mg) is working well. No dehydration sxs.  Sometimes has significant nocturia q 1-2h if he takes his last dose of torsemide too late.    DIAGNOSTICS:  Remote device interrogation 3/31/2020 showed 96% BiV paced.  1 VT lasting 34 seconds, rate 179 bpm; 2 NSVT with 1 tracing; shows 16 seconds at 186 bpm. Asxaristides Lazo (wife) believes that the 34s VT occurred after he had fallen asleep in the chair before bed (not using CPAP)  Echo 12/2019 showed EF 50-55% with moderate RV dilatation and mildly reduced RVEF. Severe MANJULA. Mild MAC with trace MR. Aortic sclerosis. Mild TR with RVSP 26+RAP. Dilatation of IVC  Component      Latest Ref Rng & Units 4/1/2020 4/20/2020 5/6/2020   Sodium      133 - 144 mmol/L 139 139 140   Potassium      3.4 - 5.3 mmol/L 3.6 3.4 3.7   Chloride      94 - 109 mmol/L 103 104 104   Carbon Dioxide      20 - 32 mmol/L 31 29 32   Anion Gap      3 - 14 mmol/L 5 6 4   Glucose      70 - 99 mg/dL 94 84 84   Urea Nitrogen      7 - 30 mg/dL 20 17 16   Creatinine      0.66 - 1.25 mg/dL 0.88 0.79 0.79   GFR Estimate      >60 mL/min/1.73:m2 >90 >90 >90   GFR Estimate If Black      >60 mL/min/1.73:m2 >90 >90 >90   Calcium      8.5 - 10.1 mg/dL 9.4 8.8 8.8     REVIEW OF SYSTEMS:  Negative except as noted above and Knee Pain    PHYSICAL EXAM:  GEN: NAD. Upright. Obese body habitus  ENT/Mouth: Atraumatic and normocephalic  Eyes: No scleral icterus; normal conjunctivae  Neck: No observed thyromegaly  Chest/Lungs: No audible wheezing or cough; equal chest wall expansion. Non-labored breathing  CV: No evidence of elevated JVP. No evidence of pitting edema  Abdomen: No observed jaundice. No evidence of abdominal distention  Extremities: No cyanosis or clubbing observed  Skin: No visible rashes. Normal skin color  Neuro: Normal Arm motion  bilaterally. No tremors. No visible evidence of focal defects  Psychiatric: A/O. Calm demeanor    ASSESSMENT/PLAN:    1. HFpEF    Doing well on torsemide 60 mg daily in divided doses    BMP from yesterday looks good     PLAN:    Continue 60 mg torsemide daily but get last dose in no later than 2PM    Avoid fluids following evening meal to help decrease nocturia    Limit salt - encouraged to avoid eating out/fast food    3 week visit with me with CMP prior      I have reviewed the note as documented above.  This accurately captures the substance of my conversation with the patient.      Video-Visit Details    Type of service:  Video Visit    Video Start Time: 1610  Video End Time: 1640    Originating Location (pt. Location): Home    Distant Location (provider location):  Home Office    Platform used for Video Visit: Doximity    Please do not hesitate to contact me if you have any questions/concerns.     Sincerely,     Virginia Montilla PA-C

## 2020-05-15 DIAGNOSIS — K74.60 CIRRHOSIS OF LIVER WITHOUT ASCITES, UNSPECIFIED HEPATIC CIRRHOSIS TYPE (H): ICD-10-CM

## 2020-05-15 RX ORDER — FERROUS GLUCONATE 324(38)MG
324 TABLET ORAL
Qty: 100 TABLET | Refills: 4 | Status: SHIPPED | OUTPATIENT
Start: 2020-05-15 | End: 2021-01-01

## 2020-05-19 ENCOUNTER — ANTICOAGULATION THERAPY VISIT (OUTPATIENT)
Dept: ANTICOAGULATION | Facility: CLINIC | Age: 60
End: 2020-05-19

## 2020-05-19 DIAGNOSIS — I48.20 CHRONIC ATRIAL FIBRILLATION (H): ICD-10-CM

## 2020-05-19 DIAGNOSIS — I48.0 PAROXYSMAL ATRIAL FIBRILLATION (H): ICD-10-CM

## 2020-05-19 DIAGNOSIS — I50.41 ACUTE COMBINED SYSTOLIC AND DIASTOLIC (CONGESTIVE) HRT FAIL (H): ICD-10-CM

## 2020-05-19 DIAGNOSIS — Z79.01 LONG TERM CURRENT USE OF ANTICOAGULANT THERAPY: ICD-10-CM

## 2020-05-19 LAB
ALBUMIN SERPL-MCNC: 3.1 G/DL (ref 3.4–5)
ALP SERPL-CCNC: 95 U/L (ref 40–150)
ALT SERPL W P-5'-P-CCNC: 35 U/L (ref 0–70)
ANION GAP SERPL CALCULATED.3IONS-SCNC: 8 MMOL/L (ref 3–14)
AST SERPL W P-5'-P-CCNC: 43 U/L (ref 0–45)
BILIRUB SERPL-MCNC: 1.1 MG/DL (ref 0.2–1.3)
BUN SERPL-MCNC: 17 MG/DL (ref 7–30)
CALCIUM SERPL-MCNC: 8.7 MG/DL (ref 8.5–10.1)
CHLORIDE SERPL-SCNC: 105 MMOL/L (ref 94–109)
CO2 SERPL-SCNC: 30 MMOL/L (ref 20–32)
CREAT SERPL-MCNC: 0.77 MG/DL (ref 0.66–1.25)
GFR SERPL CREATININE-BSD FRML MDRD: >90 ML/MIN/{1.73_M2}
GLUCOSE SERPL-MCNC: 106 MG/DL (ref 70–99)
INR PPP: 2.9 (ref 0.86–1.14)
POTASSIUM SERPL-SCNC: 3.4 MMOL/L (ref 3.4–5.3)
PROT SERPL-MCNC: 6.2 G/DL (ref 6.8–8.8)
SODIUM SERPL-SCNC: 143 MMOL/L (ref 133–144)

## 2020-05-19 PROCEDURE — 99207 ZZC NO CHARGE NURSE ONLY: CPT

## 2020-05-19 PROCEDURE — 36416 COLLJ CAPILLARY BLOOD SPEC: CPT | Performed by: FAMILY MEDICINE

## 2020-05-19 PROCEDURE — 85610 PROTHROMBIN TIME: CPT | Performed by: FAMILY MEDICINE

## 2020-05-19 PROCEDURE — 80053 COMPREHEN METABOLIC PANEL: CPT | Performed by: PHYSICIAN ASSISTANT

## 2020-05-19 NOTE — PROGRESS NOTES
Anticoagulation Management    Unable to reach pt today.    Today's INR result of 2.9 is therapeutic (goal INR of 2.0-3.0).  Result received from: Clinic Lab    Follow up required to confirm warfarin dose taken and assess for changes    left message to return call to [facility] - tentative plan is to continue maintenance dose.      Anticoagulation clinic to follow up    Sondra Santos RN  ANTICOAGULATION FOLLOW-UP CLINIC VISIT    Patient Name:  Maurice Jon  Date:  2020  Contact Type:  Telephone    SUBJECTIVE:  Patient Findings     Positives:   Change in medications (iron. changed to torsemide )    Comments:   No changes in activity or diet noted. No concerns with clotting, bleeding, or increased bruising noted. Took warfarin as prescribed.  Patient is to continue maintenance warfarin plan, and check INR in 6 weeks. Pt most likely will have labs soon again, he will have INR at that time.  Patient verbalizes understanding and agrees to plan. No further questions or concerns.        Clinical Outcomes     Negatives:   Major bleeding event, Thromboembolic event, Anticoagulation-related hospital admission, Anticoagulation-related ED visit, Anticoagulation-related fatality    Comments:   No changes in activity or diet noted. No concerns with clotting, bleeding, or increased bruising noted. Took warfarin as prescribed.  Patient is to continue maintenance warfarin plan, and check INR in 6 weeks. Pt most likely will have labs soon again, he will have INR at that time.  Patient verbalizes understanding and agrees to plan. No further questions or concerns.           OBJECTIVE    Recent labs: (last 7 days)     20  1543   INR 2.90*       ASSESSMENT / PLAN  INR assessment THER    Recheck INR In: 6 WEEKS    INR Location Clinic      Anticoagulation Summary  As of 2020    INR goal:   2.0-3.0   TTR:   84.5 % (11.9 mo)   INR used for dosin.90 (2020)   Warfarin maintenance plan:   1.25 mg (2.5 mg x 0.5) every  Fri; 2.5 mg (2.5 mg x 1) all other days   Full warfarin instructions:   1.25 mg every Fri; 2.5 mg all other days   Weekly warfarin total:   16.25 mg   No change documented:   Sondra Santos RN   Plan last modified:   Susan Beyer RN (9/28/2018)   Next INR check:   6/30/2020   Priority:   Maintenance   Target end date:   10/4/2018    Indications    Paroxysmal atrial fibrillation (H) [I48.0]  Atrial fibrillation with rapid ventricular response (H) (Resolved) [I48.91]  Long-term (current) use of anticoagulants [Z79.01] [Z79.01]             Anticoagulation Episode Summary     INR check location:       Preferred lab:       Send INR reminders to:   [facility]    Comments:   * anticoagulation short period surrounding ablation on 12/21/18. Cardiology to decide when to stop warfarin. has well-compensated cirrhosis      Anticoagulation Care Providers     Provider Role Specialty Phone number    Alon Pineda MD [facility] [phone]            See the Encounter Report to view Anticoagulation Flowsheet and Dosing Calendar (Go to Encounters tab in chart review, and find the Anticoagulation Therapy Visit)        Sondra Santos, RN

## 2020-05-26 DIAGNOSIS — I50.810 RIGHT-SIDED CONGESTIVE HEART FAILURE, UNSPECIFIED HF CHRONICITY (H): ICD-10-CM

## 2020-05-26 DIAGNOSIS — I48.19 PERSISTENT ATRIAL FIBRILLATION (H): ICD-10-CM

## 2020-05-26 RX ORDER — TORSEMIDE 20 MG/1
TABLET ORAL
Qty: 90 TABLET | Refills: 3 | Status: SHIPPED | OUTPATIENT
Start: 2020-05-26 | End: 2020-01-01

## 2020-05-26 NOTE — TELEPHONE ENCOUNTER
Reason for Call:  Medication or medication refill:    Do you use a [facility]?  Name of the pharmacy and phone number for the current request:  [facility] [phone]    Name of the medication requested: Pantoprazole  Last Written Prescription Date:  11/17/19  Last Fill Quantity: 30,  # refills: 3   Last office visit: 3/17/2020 with prescribing provider:     Future Office Visit:      Other request:     Call taken on 5/26/2020 at 3:29 PM by Donya Langley

## 2020-05-26 NOTE — LETTER
[facility]  [address]  [facility] [address]  Phone: [phone]       May 27, 2020         Maurice Jon  [address] [place] [address]            Dear Maurice:    We are concerned about your health care.  We recently provided you with medication refills.  Many medications require routine follow-up with your doctor.    Your prescription(s) have been refilled for 30 days  so you may have time for the above noted follow-up. Please call to schedule soon so we can assure you have an appointment before your next refills are needed.    Thank you,      Alon Pineda MD/ Jessica GAVIRIA RN

## 2020-05-26 NOTE — TELEPHONE ENCOUNTER
Reason for Call:  Medication or medication refill:    Do you use a [facility]?  Name of the pharmacy and phone number for the current request:  [facility] [phone]    Name of the medication requested: Pt called - Needs refill on Protonix and is almost out of med.  No need to call patient back, unless there are questions or problems.      Other request:     Can we leave a detailed message on this number? YES    Phone number patient can be reached at: Home number on file [phone] (home)    Best Time: any    Call taken on 5/26/2020 at 3:51 PM by Donya Langley

## 2020-05-26 NOTE — TELEPHONE ENCOUNTER
Protonix refill.  Is this an ongoing medication?  Last written by Dr.James Elva MD, Cardiac.  Has had several virtual visits by other providers.  Advise.  Kervin

## 2020-05-27 ENCOUNTER — DOCUMENTATION ONLY (OUTPATIENT)
Dept: SLEEP MEDICINE | Facility: CLINIC | Age: 60
End: 2020-05-27
Payer: COMMERCIAL

## 2020-05-27 RX ORDER — PANTOPRAZOLE SODIUM 40 MG/1
40 TABLET, DELAYED RELEASE ORAL DAILY
Qty: 30 TABLET | Refills: 0 | Status: SHIPPED | OUTPATIENT
Start: 2020-05-27 | End: 2020-06-22

## 2020-05-27 NOTE — PROGRESS NOTES
"Maurice Jon is a 59 year old male who is being evaluated via a billable video visit.      The patient has been notified of following:     \"This video visit will be conducted via a call between you and your physician/provider. We have found that certain health care needs can be provided without the need for an in-person physical exam.  This service lets us provide the care you need with a video conversation.  If a prescription is necessary we can send it directly to your pharmacy.  If lab work is needed we can place an order for that and you can then stop by our lab to have the test done at a later time.    Video visits are billed at different rates depending on your insurance coverage.  Please reach out to your insurance provider with any questions.    If during the course of the call the physician/provider feels a video visit is not appropriate, you will not be charged for this service.\"    Patient has given verbal consent for Video visit? Yes    How would you like to obtain your AVS? [facility]    Patient would like the video invitation sent by: Text to cell phone: [phone]    Will anyone else be joining your video visit? No        Patient reported vital signs:  B/P- 121/72  P-72  Weight- 210 lbs     Claire Abrams LPN    CC:  Fluid overload    VITALS:  /72  HR 72  Weight 210#  215# 5/7  213# 4/23  216# 4/17  220# 4/9  218# 3/31  235# 3/24 (in office)  243# 3/4 (in ER)    BRIEF HPI:  Yomi is a 58 yo who sees Dr. Evans for his h/o:     1. Persistent AFib - first diagnosed 2013. Now s/p AVN ablation and BiV [employer] PPM (no A lead placed)  2. NAFLD with cirrhosis - followed by Dr. Roque at [facility]. Complicated by portal vein nonocclusive thrombosis, portal hypertension, thrombocytopenia, splenomegaly, splenic vein thrombosis and known esophageal varices (Upper GI last done 9/2019). Virtual Visit with Dr. Roque 3/2020.  3. HFpEF - with EF down to 25-30% and moderate reduction in RV function " and moderate-severe TR on echo done 12/2018. EF 50-55% on echo 12/2019. Now with what sounds like R sided heart failure  4. VT - noted on device interrogations when he would fall asleep watching TV before going to bed and putting his CPAP on.  5. Pulmonary nodules - noted on CTA Heart 12/21/2018, resolved on CT 4/2019. No need for follow-up.    I had a virtual visit with Yomi 5/7 at which time he was doing pretty well, still thinking the torsemide 60 mg daily was helping. He'd gained 2# which he thought was likely due to caloric intake. BMP done in follow-up looked fine. CMP showed a drop in his albumin and protein.     INTERVAL HISTORY:  Weight is down 5# since our last talk on 5/7. He remains on torsemide 60 mg daily. He thinks weight loss is due to a combo of fluid loss and being better with his diet.    He denies any LE/feet edema and notes knee swelling has improved. No CP, SOB.    DIAGNOSTICS:  Remote device interrogation 3/31/2020 showed 96% BiV paced.  1 VT lasting 34 seconds, rate 179 bpm; 2 NSVT with 1 tracing; shows 16 seconds at 186 bpm. Asx'c. Violet (wife) believes that the 34s VT occurred after he had fallen asleep in the chair before bed (not using CPAP)  Echo 12/2019 showed EF 50-55% with moderate RV dilatation and mildly reduced RVEF. Severe MANJULA. Mild MAC with trace MR. Aortic sclerosis. Mild TR with RVSP 26+RAP. Dilatation of IVC  Component      Latest Ref Rng & Units 4/20/2020 5/6/2020 5/19/2020   Sodium      133 - 144 mmol/L 139 140 143   Potassium      3.4 - 5.3 mmol/L 3.4 3.7 3.4   Chloride      94 - 109 mmol/L 104 104 105   Carbon Dioxide      20 - 32 mmol/L 29 32 30   Anion Gap      3 - 14 mmol/L 6 4 8   Glucose      70 - 99 mg/dL 84 84 106 (H)   Urea Nitrogen      7 - 30 mg/dL 17 16 17   Creatinine      0.66 - 1.25 mg/dL 0.79 0.79 0.77   GFR Estimate      >60 mL/min/1.73:m2 >90 >90 >90   GFR Estimate If Black      >60 mL/min/1.73:m2 >90 >90 >90   Calcium      8.5 - 10.1 mg/dL 8.8 8.8 8.7      Component      Latest Ref Rng & Units 3/4/2020 3/6/2020 4/1/2020 5/19/2020   Bilirubin Total      0.2 - 1.3 mg/dL 1.4 (H) 1.9 (H) 2.0 (H) 1.1   Albumin      3.4 - 5.0 g/dL 3.2 (L) 3.1 (L) 3.5 3.1 (L)   Protein Total      6.8 - 8.8 g/dL 5.9 (L) 5.8 (L) 6.2 (L) 6.2 (L)   Alkaline Phosphatase      40 - 150 U/L 82 80 100 95   ALT      0 - 70 U/L 41 44 52 35   AST      0 - 45 U/L 52 (H) 59 (H) 82 (H) 43   Bilirubin Direct      0.0 - 0.2 mg/dL  0.7 (H) 0.9 (H)      REVIEW OF SYSTEMS:  Negative except as noted above     PHYSICAL EXAM:  GEN: NAD. Upright. Normal body habitus. Frequent yawning  ENT/Mouth: Atraumatic and normocephalic  Eyes: No scleral icterus; normal conjunctivae  Neck: No observed thyromegaly  Chest/Lungs: No audible wheezing or cough; equal chest wall expansion. Non-labored breathing  CV: No evidence of elevated JVP. No evidence of pitting edema  Abdomen: No observed jaundice. No evidence of abdominal distention  Extremities: No cyanosis or clubbing observed  Skin: No visible rashes. Normal skin color  Neuro: Normal Arm motion bilaterally. No tremors. No visible evidence of focal defects  Psychiatric: A/O. Calm demeanor    ASSESSMENT/PLAN:    1. HFpEF    Doing really well on torsemide 60 mg in divided doses and spironolactone 25 mg daily    BMP last week looked good. Albumin and protein remain low (ran out of his protein shakes the weeks prior but is now back on them)     PLAN:    Continue current doses of torsemide, with latest dose no later than 1400 due to nocturia     Continue to limit sodium - avoid eating out      See Dr. Evans back in Summer 2020 with repeat blood work    Continue excellent CPAP compliance    2. VT    No alerts noted    Remains on CPAP    Hoping to avoid BB therapy given his side effects on them in the past (EXTREME fatigue)     PLAN:    Continue CPAP use    3. Permanent AFib    S/p AVn ablation and biV [facility] [facility] 11/2019    Remains on warfarin    Last PPM check 3/2020  showed 96% BiV paced     PLAN:    Continue CPAP use    Continue warfarin and routine device checks    I have reviewed the note as documented above.  This accurately captures the substance of my conversation with the patient.    Video-Visit Details    Type of service:  Video Visit    Video Start Time: 1546  Video End Time: 1624  Duration 38 minutes    Originating Location (pt. Location): Home    Distant Location (provider location):  Home Office    Platform used for Video Visit: Maurice Motnilla PA-C

## 2020-05-27 NOTE — PROGRESS NOTES
6 Month [facility] visit    Diagnostic AHI: 35  PSG    Data only recheck     Assessment: Pt meeting objective benchmarks.   Patient compliance is 96% the last 180 days.    Action plan:   pt to follow up per provider request    Device type: CPAP  PAP settings:     Objective measures: 14 day rolling measures     Objective measure goal  Compliance   Goal >70%  Leak   Goal < 10%  AHI  Goal < 5  Usage  Goal >240    Total time spent on accessing, reviewing and interpreting remote patient PAP therapy data:   10 minutes    Total time spent with direct patient communication :   1 minutes

## 2020-05-27 NOTE — TELEPHONE ENCOUNTER
Refilled for 1 month.  Recommend follow-up with PCP to discuss ongoing treatment with this medication.  Violet Cruz NP on 5/27/2020 at 9:17 AM

## 2020-05-28 ENCOUNTER — VIRTUAL VISIT (OUTPATIENT)
Dept: CARDIOLOGY | Facility: CLINIC | Age: 60
End: 2020-05-28
Payer: COMMERCIAL

## 2020-05-28 VITALS
HEART RATE: 72 BPM | WEIGHT: 210 LBS | BODY MASS INDEX: 37.21 KG/M2 | DIASTOLIC BLOOD PRESSURE: 72 MMHG | HEIGHT: 63 IN | SYSTOLIC BLOOD PRESSURE: 121 MMHG

## 2020-05-28 DIAGNOSIS — I50.41 ACUTE COMBINED SYSTOLIC AND DIASTOLIC (CONGESTIVE) HRT FAIL (H): ICD-10-CM

## 2020-05-28 PROCEDURE — 99214 OFFICE O/P EST MOD 30 MIN: CPT | Mod: 95 | Performed by: PHYSICIAN ASSISTANT

## 2020-05-28 ASSESSMENT — MIFFLIN-ST. JEOR: SCORE: 1662.68

## 2020-05-28 NOTE — LETTER
5/28/2020    Alon Pineda MD  [address]  [facility] [address]    RE: Maurice Jon       Dear Colleague,    I had the pleasure of seeing Maurice Jon in the [facility].    Maurice Jon is a 59 year old male who is being evaluated via a billable video visit.      BRIEF HPI:  Yomi is a 58 yo who sees Dr. Evans for his h/o:     1. Persistent AFib - first diagnosed 2013. Now s/p AVN ablation and BiV [employer] PPM (no A lead placed)  2. NAFLD with cirrhosis - followed by Dr. Roque at [facility]. Complicated by portal vein nonocclusive thrombosis, portal hypertension, thrombocytopenia, splenomegaly, splenic vein thrombosis and known esophageal varices (Upper GI last done 9/2019). Virtual Visit with Dr. Roque 3/2020.  3. HFpEF - with EF down to 25-30% and moderate reduction in RV function and moderate-severe TR on echo done 12/2018. EF 50-55% on echo 12/2019. Now with what sounds like R sided heart failure  4. VT - noted on device interrogations when he would fall asleep watching TV before going to bed and putting his CPAP on.  5. Pulmonary nodules - noted on CTA Heart 12/21/2018, resolved on CT 4/2019. No need for follow-up.    I had a virtual visit with Yomi 5/7 at which time he was doing pretty well, still thinking the torsemide 60 mg daily was helping. He'd gained 2# which he thought was likely due to caloric intake. BMP done in follow-up looked fine. CMP showed a drop in his albumin and protein.     INTERVAL HISTORY:  Weight is down 5# since our last talk on 5/7. He remains on torsemide 60 mg daily. He thinks weight loss is due to a combo of fluid loss and being better with his diet.    He denies any LE/feet edema and notes knee swelling has improved. No CP, SOB.    DIAGNOSTICS:  Remote device interrogation 3/31/2020 showed 96% BiV paced.  1 VT lasting 34 seconds, rate 179 bpm; 2 NSVT with 1 tracing; shows 16 seconds at 186 bpm. Asx'c.  Vioelt (wife) believes that the 34s VT occurred after he had fallen asleep in the chair before bed (not using CPAP)  Echo 12/2019 showed EF 50-55% with moderate RV dilatation and mildly reduced RVEF. Severe MANJULA. Mild MAC with trace MR. Aortic sclerosis. Mild TR with RVSP 26+RAP. Dilatation of IVC  Component      Latest Ref Rng & Units 4/20/2020 5/6/2020 5/19/2020   Sodium      133 - 144 mmol/L 139 140 143   Potassium      3.4 - 5.3 mmol/L 3.4 3.7 3.4   Chloride      94 - 109 mmol/L 104 104 105   Carbon Dioxide      20 - 32 mmol/L 29 32 30   Anion Gap      3 - 14 mmol/L 6 4 8   Glucose      70 - 99 mg/dL 84 84 106 (H)   Urea Nitrogen      7 - 30 mg/dL 17 16 17   Creatinine      0.66 - 1.25 mg/dL 0.79 0.79 0.77   GFR Estimate      >60 mL/min/1.73:m2 >90 >90 >90   GFR Estimate If Black      >60 mL/min/1.73:m2 >90 >90 >90   Calcium      8.5 - 10.1 mg/dL 8.8 8.8 8.7     Component      Latest Ref Rng & Units 3/4/2020 3/6/2020 4/1/2020 5/19/2020   Bilirubin Total      0.2 - 1.3 mg/dL 1.4 (H) 1.9 (H) 2.0 (H) 1.1   Albumin      3.4 - 5.0 g/dL 3.2 (L) 3.1 (L) 3.5 3.1 (L)   Protein Total      6.8 - 8.8 g/dL 5.9 (L) 5.8 (L) 6.2 (L) 6.2 (L)   Alkaline Phosphatase      40 - 150 U/L 82 80 100 95   ALT      0 - 70 U/L 41 44 52 35   AST      0 - 45 U/L 52 (H) 59 (H) 82 (H) 43   Bilirubin Direct      0.0 - 0.2 mg/dL  0.7 (H) 0.9 (H)      REVIEW OF SYSTEMS:  Negative except as noted above     PHYSICAL EXAM:  GEN: NAD. Upright. Normal body habitus. Frequent yawning  ENT/Mouth: Atraumatic and normocephalic  Eyes: No scleral icterus; normal conjunctivae  Neck: No observed thyromegaly  Chest/Lungs: No audible wheezing or cough; equal chest wall expansion. Non-labored breathing  CV: No evidence of elevated JVP. No evidence of pitting edema  Abdomen: No observed jaundice. No evidence of abdominal distention  Extremities: No cyanosis or clubbing observed  Skin: No visible rashes. Normal skin color  Neuro: Normal Arm motion bilaterally. No  tremors. No visible evidence of focal defects  Psychiatric: A/O. Calm demeanor    ASSESSMENT/PLAN:    1. HFpEF    Doing really well on torsemide 60 mg in divided doses and spironolactone 25 mg daily    BMP last week looked good. Albumin and protein remain low (ran out of his protein shakes the weeks prior but is now back on them)     PLAN:    Continue current doses of torsemide, with latest dose no later than 1400 due to nocturia     Continue to limit sodium - avoid eating out      See Dr. Evans back in Summer 2020 with repeat blood work    Continue excellent CPAP compliance    2. VT    No alerts noted    Remains on CPAP    Hoping to avoid BB therapy given his side effects on them in the past (EXTREME fatigue)     PLAN:    Continue CPAP use    3. Permanent AFib    S/p AVn ablation and biV PPM NCR 11/2019    Remains on warfarin    Last PPM check 3/2020 showed 96% BiV paced     PLAN:    Continue CPAP use    Continue warfarin and routine device checks    I have reviewed the note as documented above.  This accurately captures the substance of my conversation with the patient.        Thank you for allowing me to participate in the care of your patient.    Sincerely,     Virginia Montilla PA-C     [facility]

## 2020-05-28 NOTE — PATIENT INSTRUCTIONS
Yomi - I'm glad you continue to do so well on the divided doses of torsemide 60 mg daily. Get them in early in the day and continue to decrease fluid consumption later in the day to avoid having to go to the bathroom so much at night    As discussed, will make no changes. Continue to limit the salt in your diet!    See Dr. Evans with repeat blood work ~7/2020. CALL to have the labs done in late July at whichever lab is convenient.  Dr. Evans will likely do a video visit with you and I've requested late in the day    Call if issues! [phone]    Nadege

## 2020-06-04 ENCOUNTER — TELEPHONE (OUTPATIENT)
Dept: SLEEP MEDICINE | Facility: CLINIC | Age: 60
End: 2020-06-04

## 2020-06-04 DIAGNOSIS — G47.33 OSA (OBSTRUCTIVE SLEEP APNEA): Primary | ICD-10-CM

## 2020-06-04 NOTE — TELEPHONE ENCOUNTER
Reason for call:  Other   Patient called regarding (reason for call): call back  Additional comments: Patient has noticed that recently his cpap machine is currently running at 8 pounds of pressure where it had been running at 12 previously and he has had more of an issue breathing at night.    Phone number to reach patient:  Home number on file [phone] (home)    Best Time:  After 3 pm    Can we leave a detailed message on this number?  YES    Travel screening: Negative

## 2020-06-08 ENCOUNTER — TELEPHONE (OUTPATIENT)
Dept: SLEEP MEDICINE | Facility: CLINIC | Age: 60
End: 2020-06-08

## 2020-06-08 NOTE — TELEPHONE ENCOUNTER
I don't fully understand the message, I am assuming he wants it set to 12 cm H2O.  I will place the pressure change order.

## 2020-06-08 NOTE — TELEPHONE ENCOUNTER
I called patient today 06/08/2020 to let him know I changed his pap machine pressures to a straight 12 cm per 's order today. He asked me why it was changed to a 8 and I told him I saw that as well, but I'm not sure how or why it was changed. But tonight when he use his machine the pressure will start at 12. He thanked me and will call if there's any issues.

## 2020-06-19 ENCOUNTER — TELEPHONE (OUTPATIENT)
Dept: FAMILY MEDICINE | Facility: CLINIC | Age: 60
End: 2020-06-19

## 2020-06-19 DIAGNOSIS — I48.19 PERSISTENT ATRIAL FIBRILLATION (H): ICD-10-CM

## 2020-06-19 NOTE — TELEPHONE ENCOUNTER
Reason for Call:  Medication or medication refill:    Do you use a [facility]?  Name of the pharmacy and phone number for the current request:  [facility] [phone]    Name of the medication requested: Pantoprazole - Can pt get 90 day supply?     Pantoprazole  Last Written Prescription Date:  5/27/20  Last Fill Quantity: 30,  # refills: 0   Last office visit: 3/17/2020 with prescribing provider:     Future Office Visit:      Other request:     Call taken on 6/19/2020 at 4:36 PM by Donya Langley

## 2020-06-22 RX ORDER — PANTOPRAZOLE SODIUM 40 MG/1
40 TABLET, DELAYED RELEASE ORAL DAILY
Qty: 90 TABLET | Refills: 0 | Status: SHIPPED | OUTPATIENT
Start: 2020-06-22 | End: 2020-01-01

## 2020-06-22 NOTE — TELEPHONE ENCOUNTER
Prescription approved per [facility] Refill Protocol.    Thank you    Violet HUTCHINSON RN

## 2020-06-22 NOTE — TELEPHONE ENCOUNTER
"Requested Prescriptions   Pending Prescriptions Disp Refills     pantoprazole (PROTONIX) 40 MG EC tablet 30 tablet 0     Sig: Take 1 tablet (40 mg) by mouth daily       PPI Protocol Passed - 6/19/2020  4:55 PM        Passed - Not on Clopidogrel (unless Pantoprazole ordered)        Passed - No diagnosis of osteoporosis on record        Passed - Recent (12 mo) or future (30 days) visit within the authorizing provider's specialty     Patient has had an office visit with the authorizing provider or a provider within the authorizing providers department within the previous 12 mos or has a future within next 30 days. See \"Patient Info\" tab in inbasket, or \"Choose Columns\" in Meds & Orders section of the refill encounter.              Passed - Medication is active on med list        Passed - Patient is age 18 or older             "

## 2020-07-01 ENCOUNTER — ANCILLARY PROCEDURE (OUTPATIENT)
Dept: CARDIOLOGY | Facility: CLINIC | Age: 60
End: 2020-07-01
Attending: INTERNAL MEDICINE
Payer: COMMERCIAL

## 2020-07-01 DIAGNOSIS — Z95.0 CARDIAC PACEMAKER IN SITU: ICD-10-CM

## 2020-07-01 DIAGNOSIS — Z95.810 ICD (IMPLANTABLE CARDIOVERTER-DEFIBRILLATOR) IN PLACE: ICD-10-CM

## 2020-07-01 DIAGNOSIS — I42.9 CARDIOMYOPATHY (H): Primary | ICD-10-CM

## 2020-07-01 PROCEDURE — 93294 REM INTERROG EVL PM/LDLS PM: CPT | Performed by: INTERNAL MEDICINE

## 2020-07-01 PROCEDURE — 93296 REM INTERROG EVL PM/IDS: CPT | Performed by: INTERNAL MEDICINE

## 2020-07-07 ENCOUNTER — TELEPHONE (OUTPATIENT)
Dept: ANTICOAGULATION | Facility: CLINIC | Age: 60
End: 2020-07-07

## 2020-07-07 NOTE — TELEPHONE ENCOUNTER
Maurice Jon is overdue for INR check.      Left message for patient to call and schedule INR check as soon as possible. If returning call, please schedule.    Pt does have a dermatology appt today - may be able to have INR drawn if lab is able.  Sondra Santos RN on 7/7/2020 at 10:06 AM

## 2020-07-21 NOTE — TELEPHONE ENCOUNTER
2nd attempt - Maurice Jon is overdue for INR check.      Left message for patient to call and schedule INR check as soon as possible. If returning call, please schedule.      Sondra Santos RN on 7/21/2020 at 8:33 AM

## 2020-07-29 NOTE — PROGRESS NOTES
Pls call [facility] - [facility] showed renal function still normal, but K is low.    Still taking torsemide 60 mg/day? Spironolactone 25/day?   Anything change in his diet/alcohol use?    Also note that CMP shows protein is back down. AST is a little high. Any alcohol/Tylenol? I have cc'd Dr. Roque - has an appt with him coming up 9/2020 (liver)    Keep appt with Dr. Evans as planned  Component      Latest Ref Rng & Units 5/6/2020 5/19/2020 7/29/2020   Sodium      133 - 144 mmol/L 140 143 139   Potassium      3.4 - 5.3 mmol/L 3.7 3.4 3.2 (L)   Chloride      94 - 109 mmol/L 104 105 105   Carbon Dioxide      20 - 32 mmol/L 32 30 27   Anion Gap      3 - 14 mmol/L 4 8 7   Glucose      70 - 99 mg/dL 84 106 (H) 149 (H)   Urea Nitrogen      7 - 30 mg/dL 16 17 18   Creatinine      0.66 - 1.25 mg/dL 0.79 0.77 0.78   GFR Estimate      >60 mL/min/1.73:m2 >90 >90 >90   GFR Estimate If Black      >60 mL/min/1.73:m2 >90 >90 >90   Calcium      8.5 - 10.1 mg/dL 8.8 8.7 9.0     Component      Latest Ref Rng & Units 5/19/2020 7/29/2020   Bilirubin Total      0.2 - 1.3 mg/dL 1.1 1.4 (H)   Albumin      3.4 - 5.0 g/dL 3.1 (L) 3.3 (L)   Protein Total      6.8 - 8.8 g/dL 6.2 (L) 6.0 (L)   Alkaline Phosphatase      40 - 150 U/L 95 86   ALT      0 - 70 U/L 35 42   AST      0 - 45 U/L 43 53 (H)     ADDENDUM: LM for patient to call back to discuss. Virtual follow up with Dr Evans on 8/5/20. Melany Ewing RN Cardiology July 30, 2020, 8:05 AM

## 2020-07-29 NOTE — PROGRESS NOTES
ANTICOAGULATION FOLLOW-UP CLINIC VISIT    Patient Name:  Maurice Jon  Date:  2020  Contact Type:  Telephone    SUBJECTIVE:  Patient Findings     Comments:   No changes in medications, activity, or diet noted. No concerns with clotting, bleeding, or increased bruising noted. Took warfarin as prescribed.  Patient is to continue maintenance warfarin plan, and check INR in 6 weeks. Pt scheduled for 7 weeks since he has another lab appt. Pt understands the risks and protocol.   Patient verbalizes understanding and agrees to plan. No further questions or concerns.        Clinical Outcomes     Negatives:   Major bleeding event, Thromboembolic event, Anticoagulation-related hospital admission, Anticoagulation-related ED visit, Anticoagulation-related fatality    Comments:   No changes in medications, activity, or diet noted. No concerns with clotting, bleeding, or increased bruising noted. Took warfarin as prescribed.  Patient is to continue maintenance warfarin plan, and check INR in 6 weeks. Pt scheduled for 7 weeks since he has another lab appt. Pt understands the risks and protocol.   Patient verbalizes understanding and agrees to plan. No further questions or concerns.           OBJECTIVE    Recent labs: (last 7 days)     20  1156   INR 2.77*       ASSESSMENT / PLAN  INR assessment THER    Recheck INR In: 6 WEEKS    INR Location Clinic      Anticoagulation Summary  As of 2020    INR goal:   2.0-3.0   TTR:   84.4 % (11.9 mo)   INR used for dosin.77 (2020)   Warfarin maintenance plan:   1.25 mg (2.5 mg x 0.5) every Fri; 2.5 mg (2.5 mg x 1) all other days   Full warfarin instructions:   1.25 mg every Fri; 2.5 mg all other days   Weekly warfarin total:   16.25 mg   No change documented:   Sondra Santos RN   Plan last modified:   Susan Beyer RN (2018)   Next INR check:   2020   Priority:   Maintenance   Target end date:   10/4/2018    Indications    Paroxysmal atrial  fibrillation (H) [I48.0]  Atrial fibrillation with rapid ventricular response (H) (Resolved) [I48.91]  Long-term (current) use of anticoagulants [Z79.01] [Z79.01]             Anticoagulation Episode Summary     INR check location:       Preferred lab:       Send INR reminders to:   [facility]    Comments:   * anticoagulation short period surrounding ablation on 12/21/18. Cardiology to decide when to stop warfarin. has well-compensated cirrhosis      Anticoagulation Care Providers     Provider Role Specialty Phone number    Alon Pineda MD [facility] [phone]            See the Encounter Report to view Anticoagulation Flowsheet and Dosing Calendar (Go to Encounters tab in chart review, and find the Anticoagulation Therapy Visit)        Sondra Santos RN

## 2020-08-04 NOTE — PROGRESS NOTES
Maurice Jon is a 59 year old year old male patient here today for skin tags on armpits, groin. He notes areas will rub on clothing. No pain or bleeding. He also notes brown spot on right arm, growing in size.   Patient has no other skin complaints today.  Remainder of the HPI, Meds, PMH, Allergies, FH, and SH was reviewed in chart.    Pertinent Hx:  No personal history of skin cancer.   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 8/22/2005    Cirrhosis, idiopathic     Edema 5/13/2013     Esophageal varices with bleeding(456.0)     Pt denies     Gallstones      Genital herpes, unspecified 3/20/1999    resolving herpes progenitalis     GERD (gastroesophageal reflux disease)      Hematuria      Hypertension      Hypochondriasis     problems in past     Obesity 11/24/2010     BRUCE (obstructive sleep apnea) 03/17/2009    Mild AHI 8.4  RDI 31 - Pt refuses to wear his CPAP     Pericarditis      Portal hypertension (H) 1/28/2010     Unspecified thrombocytopenia 8/22/2005    Thrombocytopenia associated with cirrhosis and portal hypertension       Past Surgical History:   Procedure Laterality Date     ANESTHESIA CARDIOVERSION N/A 1/19/2015    Procedure: ANESTHESIA CARDIOVERSION;  Surgeon: Generic Anesthesia Provider;  Location: [facility] OR     ANESTHESIA CARDIOVERSION N/A 2/6/2019    Procedure: ANESTHESIA CARDIOVERSION;  Surgeon: GENERIC ANESTHESIA PROVIDER;  Location:  OR     ANESTHESIA CARDIOVERSION N/A 7/5/2019    Procedure: ANESTHESIA FOR CARDIOVERSION  DR. MURGUIA);  Surgeon: GENERIC ANESTHESIA  PROVIDER;  Location:  OR     COLONOSCOPY N/A 8/14/2017    Procedure: COLONOSCOPY;  Colonoscopy Called will arrive appx 12:30 Per phone call;  Surgeon: Vincent Whittington MD;  Location:  GI     CV HEART CATHETERIZATION WITH POSSIBLE INTERVENTION N/A 12/26/2018    Procedure: Heart Catheterization with possible Intervention;  Surgeon: Brandon Arroyo MD;  Location:  HEART CARDIAC CATH LAB     EP ABLATION AV NODE N/A 11/15/2019    Procedure: EP Ablation AV Node;  Surgeon: Ag Maloney MD;  Location:  HEART CARDIAC CATH LAB     EP ABLATION FOCAL AFIB N/A 12/21/2018    Procedure: EP Ablation Focal AFIB;  Surgeon: Kristofer Evans MD;  Location:  HEART CARDIAC CATH LAB     EP PACEMAKER N/A 11/15/2019    Procedure: EP PACEMAKER;  Surgeon: Ag Maloney MD;  Location:  HEART CARDIAC CATH LAB     ESOPHAGOSCOPY, GASTROSCOPY, DUODENOSCOPY (EGD), COMBINED  7/11/2011    Procedure:COMBINED ESOPHAGOSCOPY, GASTROSCOPY, DUODENOSCOPY (EGD); Surgeon:LAURA LAMAS; Location:[facility]     ESOPHAGOSCOPY, GASTROSCOPY, DUODENOSCOPY (EGD), COMBINED  9/10/2012    Procedure: COMBINED ESOPHAGOSCOPY, GASTROSCOPY, DUODENOSCOPY (EGD);;  Surgeon: Hi Roque MD;  Location:  GI     ESOPHAGOSCOPY, GASTROSCOPY, DUODENOSCOPY (EGD), COMBINED  9/9/2013    Procedure: COMBINED ESOPHAGOSCOPY, GASTROSCOPY, DUODENOSCOPY (EGD);;  Surgeon: Hi Roque MD;  Location:  GI     ESOPHAGOSCOPY, GASTROSCOPY, DUODENOSCOPY (EGD), COMBINED N/A 9/15/2014    Procedure: COMBINED ESOPHAGOSCOPY, GASTROSCOPY, DUODENOSCOPY (EGD);  Surgeon: Hi Roque MD;  Location:  GI     ESOPHAGOSCOPY, GASTROSCOPY, DUODENOSCOPY (EGD), COMBINED N/A 10/30/2015    Procedure: COMBINED ESOPHAGOSCOPY, GASTROSCOPY, DUODENOSCOPY (EGD);  Surgeon: Feliberto Fxo MD;  Location:  GI     ESOPHAGOSCOPY, GASTROSCOPY, DUODENOSCOPY (EGD), COMBINED N/A 10/10/2016    Procedure: COMBINED ESOPHAGOSCOPY, GASTROSCOPY, DUODENOSCOPY (EGD);  Surgeon: Lopez  Jose Galvin MD;  Location: [facility] GI     ESOPHAGOSCOPY, GASTROSCOPY, DUODENOSCOPY (EGD), COMBINED N/A 2019    Procedure: ESOPHAGOGASTRODUODENOSCOPY (EGD);  Surgeon: Timo Soares MD;  Location: [facility] GI     H ABLATION FOCAL AFIB  2018     HERNIA REPAIR       IRRIGATION AND DEBRIDEMENT ABSCESS SCROTUM, COMBINED  10/4/2012    Procedure: COMBINED IRRIGATION AND DEBRIDEMENT ABSCESS SCROTUM;  Irrigation and Debridement of Groin Abscess;  Surgeon: Benny Shoemaker MD;  Location: [facility] OR     SOFT TISSUE SURGERY       SURGICAL HISTORY OF -       rt elbow     SURGICAL HISTORY OF -   1/15/98    repair of ventral and umbilical hernia     SURGICAL HISTORY OF -       endoscopy        Family History   Problem Relation Age of Onset     Lipids Mother      Hypertension Mother      Eye Disorder Mother      Heart Disease Father         CHF     Lipids Father      Obesity Father      Heart Disease Brother         MI     Eye Disorder Brother      Hypertension Brother         portal HTN     Thrombosis Sister         in her lung     Eye Disorder Sister      Cancer Other         maternal grandparent/throat cancer       Social History     Socioeconomic History     Marital status:      Spouse name: Not on file     Number of children: Not on file     Years of education: Not on file     Highest education level: Not on file   Occupational History     Not on file   Social Needs     Financial resource strain: Not on file     Food insecurity     Worry: Not on file     Inability: Not on file     Transportation needs     Medical: Not on file     Non-medical: Not on file   Tobacco Use     Smoking status: Former Smoker     Packs/day: 0.80     Years: 6.00     Pack years: 4.80     Types: Cigarettes     Last attempt to quit: 2002     Years since quittin.6     Smokeless tobacco: Never Used   Substance and Sexual Activity     Alcohol use: No     Alcohol/week: 0.0 standard drinks     Comment: quit in       Drug use: No     Sexual activity: Yes     Partners: Female   Lifestyle     Physical activity     Days per week: Not on file     Minutes per session: Not on file     Stress: Not on file   Relationships     Social connections     Talks on phone: Not on file     Gets together: Not on file     Attends Restoration service: Not on file     Active member of club or organization: Not on file     Attends meetings of clubs or organizations: Not on file     Relationship status: Not on file     Intimate partner violence     Fear of current or ex partner: Not on file     Emotionally abused: Not on file     Physically abused: Not on file     Forced sexual activity: Not on file   Other Topics Concern     Parent/sibling w/ CABG, MI or angioplasty before 65F 55M? Yes     Comment: BROTHER   - MI AT AGE 44      Service Not Asked     Blood Transfusions Not Asked     Caffeine Concern Not Asked     Occupational Exposure Not Asked     Hobby Hazards Not Asked     Sleep Concern Not Asked     Stress Concern Not Asked     Weight Concern Not Asked     Special Diet Not Asked     Back Care Not Asked     Exercise No     Bike Helmet Not Asked     Seat Belt Not Asked     Self-Exams Not Asked   Social History Narrative     Not on file       Outpatient Encounter Medications as of 8/4/2020   Medication Sig Dispense Refill     Acetaminophen 325 MG CAPS Take 325-650 mg by mouth every 6 hours as needed       ALBUTEROL IN        ASPIRIN NOT PRESCRIBED (INTENTIONAL) Please choose reason not prescribed, below       calcium carb 1250 mg, 500 mg [place],/vitamin D 200 units (OSCAL WITH D) 500-200 MG-UNIT per tablet Take 2 tablets by mouth daily with food.       diphenhydrAMINE HCl (BENADRYL PO) Take 25 mg by mouth as needed        ferrous gluconate (FERGON) 324 (38 Fe) MG tablet Take 1 tablet (324 mg) by mouth 3 times daily (with meals) 100 tablet 4     Multiple Vitamins-Minerals (MENS 50+ MULTI VITAMIN/MIN PO) Take 1 tablet by mouth daily       mupirocin  (BACTROBAN) 2 % external ointment Apply topically 3 times daily Apply to open sores on lower legs. 30 g 3     order for DME Open toe size large 30/40 [facility] Compression socks Model 48741.  Or similar as per availability/formulary.  Fax to Fax [phone]. [facility] medical 12 Device 0     order for DME Equipment being ordered:   New CPAP mask, tube, filters,water tray 1 Device 3     ORDER FOR DME Respironics RemStar 60 Series Auto AFlex 12-15 cm H2O with heated humidty and a modem.  Pt chose a [employer] Air FFM size medium.       oxyCODONE (ROXICODONE) 5 MG tablet Take 1 tablet (5 mg) by mouth every 6 hours as needed for pain 15 tablet 0     pantoprazole (PROTONIX) 40 MG EC tablet Take 1 tablet (40 mg) by mouth daily 90 tablet 0     spironolactone (ALDACTONE) 25 MG tablet Take 1 tablet (25 mg) by mouth daily 90 tablet 3     torsemide (DEMADEX) 20 MG tablet Take 2 tablets daily in AM and 1 tab daily at noon 90 tablet 3     vitamin D3 (CHOLECALCIFEROL) 2000 units (50 mcg) tablet Take 1 tablet by mouth daily       warfarin ANTICOAGULANT (JANTOVEN ANTICOAGULANT) 2.5 MG tablet 1.25 mg Fridays; 2.5mg all other days or As directed by Anticoagulation Clinic. INR DUE FOR FURTHER REFILLS 26 tablet 0     ACE/ARB/ARNI NOT PRESCRIBED (INTENTIONAL) Please choose reason not prescribed, below (Patient not taking: Reported on 3/31/2020)       budesonide-formoterol (SYMBICORT) 80-4.5 MCG/ACT Inhaler Inhale 2 puffs into the lungs 2 times daily (Patient not taking: Reported on 2/13/2020) 10.2 g 3     STATIN NOT PRESCRIBED (INTENTIONAL) Please choose reason not prescribed, below (Patient not taking: Reported on 4/9/2020)       triamcinolone (KENALOG) 0.1 % external cream Apply topically 2 times daily (Patient not taking: Reported on 4/29/2020) 80 g 1     No facility-administered encounter medications on file as of 8/4/2020.              Review Of Systems  Skin: As above  Eyes: negative  Ears/Nose/Throat:  "negative  Respiratory: No shortness of breath, dyspnea on exertion, cough, or hemoptysis  Cardiovascular: negative  Gastrointestinal: negative  Genitourinary: negative  Musculoskeletal: negative  Neurologic: negative  Psychiatric: negative  Hematologic/Lymphatic/Immunologic: negative  Endocrine: negative      O:   NAD, WDWN, Alert & Oriented, Mood & Affect wnl, Vitals stable   Here today alone   /57   Pulse 70   Ht 1.613 m (5' 3.5\")   BMI 36.62 kg/m     General appearance normal   Vitals stable   Alert, oriented and in no acute distress     1.6 cm x 1.6 cm brown thin plaque on right bicep  Skin colored pedunculated papules on axilla and groin    Stuck on papules and brown macules on trunk and ext   Red papules on trunk  Flesh colored papules on trunk     The remainder of skin exam is normal     Eyes: Conjunctivae/lids:Normal     ENT: Lips: normal    MSK:Normal    Cardiovascular: peripheral edema none    Pulm: Breathing Normal    Neuro/Psych: Orientation:Alert and Orientedx3 ; Mood/Affect:normal   A/P:  1. R/O macular sk vs mis on right bicep   TANGENTIAL BIOPSY SENT OUT:  After consent, anesthesia with LEC and prep, tangential excision performed and specimen sent out for permanent section histology.  No complications and routine wound care. Patient told to call our office in 1-2 weeks for result.      2. Inflamed skin tags axilla and groin x 14  LN2:  Treated with LN2 for 5s for 1-2 cycles. Warned risks of blistering, pain, pigment change, scarring, and incomplete resolution.  Advised patient to return if lesions do not completely resolve.  Wound care sheet given.  3. Seborrheic keratosis, lentigo, angioma, benign nevi   BENIGN LESIONS DISCUSSED WITH PATIENT:  I discussed the specifics of tumor, prognosis, and genetics of benign lesions.  I explained that treatment of these lesions would be purely cosmetic and not medically neccessary.  I discussed with patient different removal options including excision, " cautery and /or laser.      Nature and genetics of benign skin lesions dicussed with patient.  Signs and Symptoms of skin cancer discussed with patient.  ABCDEs of melanoma reviewed with patient.  Patient encouraged to perform monthly skin exams.  UV precautions reviewed with patient.   Risks of non-melanoma skin cancer discussed with patient   Return to clinic pending biopsy results.

## 2020-08-04 NOTE — NURSING NOTE
Chief Complaint   Patient presents with     Skin Check       Vitals:    08/04/20 1550   BP: 119/57   Pulse: 70     Wt Readings from Last 1 Encounters:   05/28/20 95.3 kg (210 lb)       Kylah Kelly LPN.................8/4/2020

## 2020-08-04 NOTE — PATIENT INSTRUCTIONS
Wound Care Instructions     FOR SUPERFICIAL WOUNDS     [facility] [phone]    [facility] [phone]    Right bicep                   AFTER 24 HOURS YOU SHOULD REMOVE THE BANDAGE AND BEGIN DAILY DRESSING CHANGES AS FOLLOWS:     1) Remove Dressing.     2) Clean and dry the area with tap water using a Q-tip or sterile gauze pad.     3) Apply Vaseline, Aquaphor, Polysporin ointment or Bacitracin ointment over entire wound.  Do NOT use Neosporin ointment.     4) Cover the wound with a band-aid, or a sterile non-stick gauze pad and micropore paper tape      REPEAT THESE INSTRUCTIONS AT LEAST ONCE A DAY UNTIL THE WOUND HAS COMPLETELY HEALED.    It is an old wives tale that a wound heals better when it is exposed to air and allowed to dry out. The wound will heal faster with a better cosmetic result if it is kept moist with ointment and covered with a bandage.    **Do not let the wound dry out.**      Supplies Needed:      *Cotton tipped applicators (Q-tips)    *Polysporin Ointment or Bacitracin Ointment (NOT NEOSPORIN)    *Band-aids or non-stick gauze pads and micropore paper tape.      PATIENT INFORMATION:    During the healing process you will notice a number of changes. All wounds develop a small halo of redness surrounding the wound.  This means healing is occurring. Severe itching with extensive redness usually indicates sensitivity to the ointment or bandage tape used to dress the wound.  You should call our office if this develops.      Swelling  and/or discoloration around your surgical site is common, particularly when performed around the eye.    All wounds normally drain.  The larger the wound the more drainage there will be.  After 7-10 days, you will notice the wound beginning to shrink and new skin will begin to grow.  The wound is healed when you can see skin has formed over the entire area.  A healed wound has a healthy, shiny look to the surface and is red to dark  pink in color to normalize.  Wounds may take approximately 4-6 weeks to heal.  Larger wounds may take 6-8 weeks.  After the wound is healed you may discontinue dressing changes.    You may experience a sensation of tightness as your wound heals. This is normal and will gradually subside.    Your healed wound may be sensitive to temperature changes. This sensitivity improves with time, but if you re having a lot of discomfort, try to avoid temperature extremes.    Patients frequently experience itching after their wound appears to have healed because of the continue healing under the skin.  Plain Vaseline will help relieve the itching.        POSSIBLE COMPLICATIONS    BLEEDIN. Leave the bandage in place.  2. Use tightly rolled up gauze or a cloth to apply direct pressure over the bandage for 30  minutes.  3. Reapply pressure for an additional 30 minutes if necessary  4. Use additional gauze and tape to maintain pressure once the bleeding has stopped.

## 2020-08-04 NOTE — LETTER
8/4/2020         RE: Maurice Jon  [address] [facility] [address]        Dear Colleague,    Thank you for referring your patient, Maurice Jon, to the [facility]. Please see a copy of my visit note below.    Maurice Jon is a 59 year old year old male patient here today for skin tags on armpits, groin. He notes areas will rub on clothing. No pain or bleeding. He also notes brown spot on right arm, growing in size.   Patient has no other skin complaints today.  Remainder of the HPI, Meds, PMH, Allergies, FH, and SH was reviewed in chart.    Pertinent Hx:  No personal history of skin cancer.   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 8/22/2005    Cirrhosis, idiopathic     Edema 5/13/2013     Esophageal varices with bleeding(456.0)     Pt denies     Gallstones      Genital herpes, unspecified 3/20/1999    resolving herpes progenitalis     GERD (gastroesophageal reflux disease)      Hematuria      Hypertension      Hypochondriasis     problems in past     Obesity 11/24/2010     RBUCE (obstructive sleep apnea) 03/17/2009    Mild AHI 8.4  RDI 31 - Pt refuses to wear his CPAP     Pericarditis      Portal hypertension (H) 1/28/2010     Unspecified thrombocytopenia 8/22/2005    Thrombocytopenia associated with cirrhosis and portal hypertension       Past Surgical History:   Procedure Laterality Date     ANESTHESIA CARDIOVERSION N/A 1/19/2015    Procedure: ANESTHESIA CARDIOVERSION;  Surgeon: Generic Anesthesia Provider;  Location: [facility]     ANESTHESIA  CARDIOVERSION N/A 2/6/2019    Procedure: ANESTHESIA CARDIOVERSION;  Surgeon: GENERIC ANESTHESIA PROVIDER;  Location:  OR     ANESTHESIA CARDIOVERSION N/A 7/5/2019    Procedure: ANESTHESIA FOR CARDIOVERSION  DR. MURGUIA);  Surgeon: GENERIC ANESTHESIA PROVIDER;  Location:  OR     COLONOSCOPY N/A 8/14/2017    Procedure: COLONOSCOPY;  Colonoscopy Called will arrive appx 12:30 Per phone call;  Surgeon: Vincent Whittington MD;  [facility]     CV HEART CATHETERIZATION WITH POSSIBLE INTERVENTION N/A 12/26/2018    Procedure: Heart Catheterization with possible Intervention;  Surgeon: Brandon Arroyo MD;  Location:  HEART CARDIAC CATH LAB     EP ABLATION AV NODE N/A 11/15/2019    Procedure: EP Ablation AV Node;  Surgeon: Ag Maloney MD;  Location:  HEART CARDIAC CATH LAB     EP ABLATION FOCAL AFIB N/A 12/21/2018    Procedure: EP Ablation Focal AFIB;  Surgeon: Kristofer Murguia MD;  Location:  HEART CARDIAC CATH LAB     EP PACEMAKER N/A 11/15/2019    Procedure: EP PACEMAKER;  Surgeon: Ag Maloney MD;  Location:  HEART CARDIAC CATH LAB     ESOPHAGOSCOPY, GASTROSCOPY, DUODENOSCOPY (EGD), COMBINED  7/11/2011    Procedure:COMBINED ESOPHAGOSCOPY, GASTROSCOPY, DUODENOSCOPY (EGD); Surgeon:LAURA LAMAS; Location:[facility]     ESOPHAGOSCOPY, GASTROSCOPY, DUODENOSCOPY (EGD), COMBINED  9/10/2012    Procedure: COMBINED ESOPHAGOSCOPY, GASTROSCOPY, DUODENOSCOPY (EGD);;  Surgeon: Hi Roque MD;  Location:  GI     ESOPHAGOSCOPY, GASTROSCOPY, DUODENOSCOPY (EGD), COMBINED  9/9/2013    Procedure: COMBINED ESOPHAGOSCOPY, GASTROSCOPY, DUODENOSCOPY (EGD);;  Surgeon: Hi Roque MD;  Location:  GI     ESOPHAGOSCOPY, GASTROSCOPY, DUODENOSCOPY (EGD), COMBINED N/A 9/15/2014    Procedure: COMBINED ESOPHAGOSCOPY, GASTROSCOPY, DUODENOSCOPY (EGD);  Surgeon: Hi Roque MD;  Location:  GI     ESOPHAGOSCOPY, GASTROSCOPY, DUODENOSCOPY (EGD), COMBINED N/A 10/30/2015    Procedure: COMBINED ESOPHAGOSCOPY,  GASTROSCOPY, DUODENOSCOPY (EGD);  Surgeon: Feliberto Fox MD;  [facility]     ESOPHAGOSCOPY, GASTROSCOPY, DUODENOSCOPY (EGD), COMBINED N/A 10/10/2016    Procedure: COMBINED ESOPHAGOSCOPY, GASTROSCOPY, DUODENOSCOPY (EGD);  Surgeon: Jose Nesbitt MD;  [facility]     ESOPHAGOSCOPY, GASTROSCOPY, DUODENOSCOPY (EGD), COMBINED N/A 9/26/2019    Procedure: ESOPHAGOGASTRODUODENOSCOPY (EGD);  Surgeon: Timo Soares MD;  [facility]     H ABLATION FOCAL AFIB  12/21/2018     HERNIA REPAIR       IRRIGATION AND DEBRIDEMENT ABSCESS SCROTUM, COMBINED  10/4/2012    Procedure: COMBINED IRRIGATION AND DEBRIDEMENT ABSCESS SCROTUM;  Irrigation and Debridement of Groin Abscess;  Surgeon: Benny Shoemaker MD;  Location: [facility]     SOFT TISSUE SURGERY       SURGICAL HISTORY OF -   1993    rt elbow     SURGICAL HISTORY OF -   1/15/98    repair of ventral and umbilical hernia     SURGICAL HISTORY OF -   2001    endoscopy        Family History   Problem Relation Age of Onset     Lipids Mother      Hypertension Mother      Eye Disorder Mother      Heart Disease Father         CHF     Lipids Father      Obesity Father      Heart Disease Brother         MI     Eye Disorder Brother      Hypertension Brother         portal HTN     Thrombosis Sister         in her lung     Eye Disorder Sister      Cancer Other         maternal grandparent/throat cancer       Social History     Socioeconomic History     Marital status:      Spouse name: Not on file     Number of children: Not on file     Years of education: Not on file     Highest education level: Not on file   Occupational History     Not on file   Social Needs     Financial resource strain: Not on file     Food insecurity     Worry: Not on file     Inability: Not on file     Transportation needs     Medical: Not on file     Non-medical: Not on file   Tobacco Use     Smoking status: Former Smoker     Packs/day: 0.80     Years: 6.00     Pack years:  4.80     Types: Cigarettes     Last attempt to quit: 2002     Years since quittin.6     Smokeless tobacco: Never Used   Substance and Sexual Activity     Alcohol use: No     Alcohol/week: 0.0 standard drinks     Comment: quit in      Drug use: No     Sexual activity: Yes     Partners: Female   Lifestyle     Physical activity     Days per week: Not on file     Minutes per session: Not on file     Stress: Not on file   Relationships     Social connections     Talks on phone: Not on file     Gets together: Not on file     Attends Church service: Not on file     Active member of club or organization: Not on file     Attends meetings of clubs or organizations: Not on file     Relationship status: Not on file     Intimate partner violence     Fear of current or ex partner: Not on file     Emotionally abused: Not on file     Physically abused: Not on file     Forced sexual activity: Not on file   Other Topics Concern     Parent/sibling w/ CABG, MI or angioplasty before 65F 55M? Yes     Comment: BROTHER   - MI AT AGE 44      Service Not Asked     Blood Transfusions Not Asked     Caffeine Concern Not Asked     Occupational Exposure Not Asked     Hobby Hazards Not Asked     Sleep Concern Not Asked     Stress Concern Not Asked     Weight Concern Not Asked     Special Diet Not Asked     Back Care Not Asked     Exercise No     Bike Helmet Not Asked     Seat Belt Not Asked     Self-Exams Not Asked   Social History Narrative     Not on file       Outpatient Encounter Medications as of 2020   Medication Sig Dispense Refill     Acetaminophen 325 MG CAPS Take 325-650 mg by mouth every 6 hours as needed       ALBUTEROL IN        ASPIRIN NOT PRESCRIBED (INTENTIONAL) Please choose reason not prescribed, below       calcium carb 1250 mg, 500 mg [place],/vitamin D 200 units (OSCAL WITH D) 500-200 MG-UNIT per tablet Take 2 tablets by mouth daily with food.       diphenhydrAMINE HCl (BENADRYL PO) Take 25 mg by mouth  as needed        ferrous gluconate (FERGON) 324 (38 Fe) MG tablet Take 1 tablet (324 mg) by mouth 3 times daily (with meals) 100 tablet 4     Multiple Vitamins-Minerals (MENS 50+ MULTI VITAMIN/MIN PO) Take 1 tablet by mouth daily       mupirocin (BACTROBAN) 2 % external ointment Apply topically 3 times daily Apply to open sores on lower legs. 30 g 3     order for DME Open toe size large 30/40 [facility] [employer] Compression socks Model 54155.  Or similar as per availability/formulary.  Fax to Fax [phone]. [facility] Device 0     order for DME Equipment being ordered:   New CPAP mask, tube, filters,water tray 1 Device 3     ORDER FOR DME Respironics RemStar 60 Series Auto AFlex 12-15 cm H2O with heated humidty and a modem.  Pt chose a [employer] Air FFM size medium.       oxyCODONE (ROXICODONE) 5 MG tablet Take 1 tablet (5 mg) by mouth every 6 hours as needed for pain 15 tablet 0     pantoprazole (PROTONIX) 40 MG EC tablet Take 1 tablet (40 mg) by mouth daily 90 tablet 0     spironolactone (ALDACTONE) 25 MG tablet Take 1 tablet (25 mg) by mouth daily 90 tablet 3     torsemide (DEMADEX) 20 MG tablet Take 2 tablets daily in AM and 1 tab daily at noon 90 tablet 3     vitamin D3 (CHOLECALCIFEROL) 2000 units (50 mcg) tablet Take 1 tablet by mouth daily       warfarin ANTICOAGULANT (JANTOVEN ANTICOAGULANT) 2.5 MG tablet 1.25 mg Fridays; 2.5mg all other days or As directed by Anticoagulation Clinic. INR DUE FOR FURTHER REFILLS 26 tablet 0     ACE/ARB/ARNI NOT PRESCRIBED (INTENTIONAL) Please choose reason not prescribed, below (Patient not taking: Reported on 3/31/2020)       budesonide-formoterol (SYMBICORT) 80-4.5 MCG/ACT Inhaler Inhale 2 puffs into the lungs 2 times daily (Patient not taking: Reported on 2/13/2020) 10.2 g 3     STATIN NOT PRESCRIBED (INTENTIONAL) Please choose reason not prescribed, below (Patient not taking: Reported on 4/9/2020)       triamcinolone (KENALOG) 0.1 % external cream Apply  "topically 2 times daily (Patient not taking: Reported on 4/29/2020) 80 g 1     No facility-administered encounter medications on file as of 8/4/2020.              Review Of Systems  Skin: As above  Eyes: negative  Ears/Nose/Throat: negative  Respiratory: No shortness of breath, dyspnea on exertion, cough, or hemoptysis  Cardiovascular: negative  Gastrointestinal: negative  Genitourinary: negative  Musculoskeletal: negative  Neurologic: negative  Psychiatric: negative  Hematologic/Lymphatic/Immunologic: negative  Endocrine: negative      O:   NAD, WDWN, Alert & Oriented, Mood & Affect wnl, Vitals stable   Here today alone   /57   Pulse 70   Ht 1.613 m (5' 3.5\")   BMI 36.62 kg/m     General appearance normal   Vitals stable   Alert, oriented and in no acute distress     1.6 cm x 1.6 cm brown thin plaque on right bicep  Skin colored pedunculated papules on axilla and groin    Stuck on papules and brown macules on trunk and ext   Red papules on trunk  Flesh colored papules on trunk     The remainder of skin exam is normal     Eyes: Conjunctivae/lids:Normal     ENT: Lips: normal    MSK:Normal    Cardiovascular: peripheral edema none    Pulm: Breathing Normal    Neuro/Psych: Orientation:Alert and Orientedx3 ; Mood/Affect:normal   A/P:  1. R/O macular sk vs mis on right bicep   TANGENTIAL BIOPSY SENT OUT:  After consent, anesthesia with LEC and prep, tangential excision performed and specimen sent out for permanent section histology.  No complications and routine wound care. Patient told to call our office in 1-2 weeks for result.      2. Inflamed skin tags axilla and groin x 14  LN2:  Treated with LN2 for 5s for 1-2 cycles. Warned risks of blistering, pain, pigment change, scarring, and incomplete resolution.  Advised patient to return if lesions do not completely resolve.  Wound care sheet given.  3. Seborrheic keratosis, lentigo, angioma, benign nevi   BENIGN LESIONS DISCUSSED WITH PATIENT:  I discussed the " specifics of tumor, prognosis, and genetics of benign lesions.  I explained that treatment of these lesions would be purely cosmetic and not medically neccessary.  I discussed with patient different removal options including excision, cautery and /or laser.      Nature and genetics of benign skin lesions dicussed with patient.  Signs and Symptoms of skin cancer discussed with patient.  ABCDEs of melanoma reviewed with patient.  Patient encouraged to perform monthly skin exams.  UV precautions reviewed with patient.   Risks of non-melanoma skin cancer discussed with patient   Return to clinic pending biopsy results.       Again, thank you for allowing me to participate in the care of your patient.        Sincerely,        Diana Youssef PA-C

## 2020-08-06 NOTE — TELEPHONE ENCOUNTER
Pt asking for #100 tabs so he stops running out of med every 2 weeks.  Would like Rx TODAY  Joceline Solomon [facility]  No need to call patient back, unless there are questions or problems.

## 2020-08-06 NOTE — TELEPHONE ENCOUNTER
Warfarin refill.  Pt is seen by INR Clinic regularly.  Refill given to cover x 1 month when seen in INR.  Lamin

## 2020-08-06 NOTE — TELEPHONE ENCOUNTER
----- Message from Kristofer Brown MD sent at 8/5/2020  4:53 PM CDT -----  Please schedule bmp in a week at [facility] due to KCL supplement.

## 2020-08-06 NOTE — TELEPHONE ENCOUNTER
Attempted to call pt - unable to LM.    [employer] message sent  .DESTIN WISE RN on 8/6/2020 at 8:49 AM

## 2020-09-08 NOTE — NURSING NOTE
"Initial /65 (BP Location: Left arm, Patient Position: Sitting, Cuff Size: Adult Large)   Pulse 70   Resp 16   SpO2 99%  Estimated body mass index is 36.62 kg/m  as calculated from the following:    Height as of 8/4/20: 1.613 m (5' 3.5\").    Weight as of 5/28/20: 95.3 kg (210 lb). .    Salena Jiménez, [facility]    "

## 2020-09-08 NOTE — LETTER
9/8/2020         RE: Maurice Jon  [address] [address] [facility] [id number]        Dear Colleague,    Thank you for referring your patient, Maurice Jon, to the [facility]. Please see a copy of my visit note below.    Maurice Jon is a 59 year old year old male patient here today for retreat skin tags on armpits, groin. He notes most resolved with cryo last office and he would like to treat a few more. Patient has no other skin complaints today.  Remainder of the HPI, Meds, PMH, Allergies, FH, and SH was reviewed in chart.    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 8/22/2005    Cirrhosis, idiopathic     Edema 5/13/2013     Esophageal varices with bleeding(456.0)     Pt denies     Gallstones      Genital herpes, unspecified 3/20/1999    resolving herpes progenitalis     GERD (gastroesophageal reflux disease)      Hematuria      Hypertension      Hypochondriasis     problems in past     Obesity 11/24/2010     BRUCE (obstructive sleep apnea) 03/17/2009    Mild AHI 8.4  RDI 31 - Pt refuses to wear his CPAP     Pericarditis      Portal hypertension (H) 1/28/2010     Unspecified thrombocytopenia 8/22/2005    Thrombocytopenia associated with cirrhosis and portal hypertension       Past Surgical History:   Procedure Laterality Date     ANESTHESIA CARDIOVERSION N/A 1/19/2015    Procedure: ANESTHESIA CARDIOVERSION;  Surgeon: Generic Anesthesia Provider;  Location: [facility]     ANESTHESIA CARDIOVERSION N/A 2/6/2019    Procedure: ANESTHESIA CARDIOVERSION;  Surgeon: GENERIC  ANESTHESIA PROVIDER;  Location:  OR     ANESTHESIA CARDIOVERSION N/A 7/5/2019    Procedure: ANESTHESIA FOR CARDIOVERSION  DR. MURGUIA);  Surgeon: GENERIC ANESTHESIA PROVIDER;  Location:  OR     COLONOSCOPY N/A 8/14/2017    Procedure: COLONOSCOPY;  Colonoscopy Called will arrive appx 12:30 Per phone call;  Surgeon: Vincent Whittington MD;  [facility]     CV HEART CATHETERIZATION WITH POSSIBLE INTERVENTION N/A 12/26/2018    Procedure: Heart Catheterization with possible Intervention;  Surgeon: Brandon Arroyo MD;  Location:  HEART CARDIAC CATH LAB     EP ABLATION AV NODE N/A 11/15/2019    Procedure: EP Ablation AV Node;  Surgeon: Ag Maloney MD;  Location:  HEART CARDIAC CATH LAB     EP ABLATION FOCAL AFIB N/A 12/21/2018    Procedure: EP Ablation Focal AFIB;  Surgeon: Kristofer Murguia MD;  Location:  HEART CARDIAC CATH LAB     EP PACEMAKER N/A 11/15/2019    Procedure: EP PACEMAKER;  Surgeon: Ag Maloney MD;  Location:  HEART CARDIAC CATH LAB     ESOPHAGOSCOPY, GASTROSCOPY, DUODENOSCOPY (EGD), COMBINED  7/11/2011    Procedure:COMBINED ESOPHAGOSCOPY, GASTROSCOPY, DUODENOSCOPY (EGD); Surgeon:LAURA LAMAS; [facility]     ESOPHAGOSCOPY, GASTROSCOPY, DUODENOSCOPY (EGD), COMBINED  9/10/2012    Procedure: COMBINED ESOPHAGOSCOPY, GASTROSCOPY, DUODENOSCOPY (EGD);;  Surgeon: Hi Roque MD;  Location:  GI     ESOPHAGOSCOPY, GASTROSCOPY, DUODENOSCOPY (EGD), COMBINED  9/9/2013    Procedure: COMBINED ESOPHAGOSCOPY, GASTROSCOPY, DUODENOSCOPY (EGD);;  Surgeon: Hi Roque MD;  Location:  GI     ESOPHAGOSCOPY, GASTROSCOPY, DUODENOSCOPY (EGD), COMBINED N/A 9/15/2014    Procedure: COMBINED ESOPHAGOSCOPY, GASTROSCOPY, DUODENOSCOPY (EGD);  Surgeon: Hi Roque MD;  Location:  GI     ESOPHAGOSCOPY, GASTROSCOPY, DUODENOSCOPY (EGD), COMBINED N/A 10/30/2015    Procedure: COMBINED ESOPHAGOSCOPY, GASTROSCOPY, DUODENOSCOPY (EGD);  Surgeon: Feliberto Fox MD;  [facility]: [facility]      ESOPHAGOSCOPY, GASTROSCOPY, DUODENOSCOPY (EGD), COMBINED N/A 10/10/2016    Procedure: COMBINED ESOPHAGOSCOPY, GASTROSCOPY, DUODENOSCOPY (EGD);  Surgeon: Jose Nesbitt MD;  [facility]     ESOPHAGOSCOPY, GASTROSCOPY, DUODENOSCOPY (EGD), COMBINED N/A 9/26/2019    Procedure: ESOPHAGOGASTRODUODENOSCOPY (EGD);  Surgeon: Timo Soares MD;  [facility]     H ABLATION FOCAL AFIB  12/21/2018     HERNIA REPAIR       IRRIGATION AND DEBRIDEMENT ABSCESS SCROTUM, COMBINED  10/4/2012    Procedure: COMBINED IRRIGATION AND DEBRIDEMENT ABSCESS SCROTUM;  Irrigation and Debridement of Groin Abscess;  Surgeon: Benny Shoemaker MD;  Location: [facility]     SOFT TISSUE SURGERY       SURGICAL HISTORY OF -   1993    rt elbow     SURGICAL HISTORY OF -   1/15/98    repair of ventral and umbilical hernia     SURGICAL HISTORY OF -   2001    endoscopy        Family History   Problem Relation Age of Onset     Lipids Mother      Hypertension Mother      Eye Disorder Mother      Heart Disease Father         CHF     Lipids Father      Obesity Father      Heart Disease Brother         MI     Eye Disorder Brother      Hypertension Brother         portal HTN     Thrombosis Sister         in her lung     Eye Disorder Sister      Cancer Other         maternal grandparent/throat cancer       Social History     Socioeconomic History     Marital status:      Spouse name: Not on file     Number of children: Not on file     Years of education: Not on file     Highest education level: Not on file   Occupational History     Not on file   Social Needs     Financial resource strain: Not on file     Food insecurity     Worry: Not on file     Inability: Not on file     Transportation needs     Medical: Not on file     Non-medical: Not on file   Tobacco Use     Smoking status: Former Smoker     Packs/day: 0.80     Years: 6.00     Pack years: 4.80     Types: Cigarettes     Last attempt to quit: 1/1/2002     Years since quitting:  18.7     Smokeless tobacco: Never Used   Substance and Sexual Activity     Alcohol use: No     Alcohol/week: 0.0 standard drinks     Comment: quit in 2007     Drug use: No     Sexual activity: Yes     Partners: Female   Lifestyle     Physical activity     Days per week: Not on file     Minutes per session: Not on file     Stress: Not on file   Relationships     Social connections     Talks on phone: Not on file     Gets together: Not on file     Attends Christian service: Not on file     Active member of club or organization: Not on file     Attends meetings of clubs or organizations: Not on file     Relationship status: Not on file     Intimate partner violence     Fear of current or ex partner: Not on file     Emotionally abused: Not on file     Physically abused: Not on file     Forced sexual activity: Not on file   Other Topics Concern     Parent/sibling w/ CABG, MI or angioplasty before 65F 55M? Yes     Comment: BROTHER   - MI AT AGE 44      Service Not Asked     Blood Transfusions Not Asked     Caffeine Concern Not Asked     Occupational Exposure Not Asked     Hobby Hazards Not Asked     Sleep Concern Not Asked     Stress Concern Not Asked     Weight Concern Not Asked     Special Diet Not Asked     Back Care Not Asked     Exercise No     Bike Helmet Not Asked     Seat Belt Not Asked     Self-Exams Not Asked   Social History Narrative     Not on file       Outpatient Encounter Medications as of 9/8/2020   Medication Sig Dispense Refill     Acetaminophen 325 MG CAPS Take 325-650 mg by mouth every 6 hours as needed       ALBUTEROL IN        ASPIRIN NOT PRESCRIBED (INTENTIONAL) Please choose reason not prescribed, below       calcium carb 1250 mg, 500 mg [place],/vitamin D 200 units (OSCAL WITH D) 500-200 MG-UNIT per tablet Take 2 tablets by mouth daily with food.       diphenhydrAMINE HCl (BENADRYL PO) Take 25 mg by mouth as needed        ferrous gluconate (FERGON) 324 (38 Fe) MG tablet Take 1 tablet (324  mg) by mouth 3 times daily (with meals) 100 tablet 4     metoprolol succinate ER (TOPROL XL) 25 MG 24 hr tablet Take 1 tablet (25 mg) by mouth daily 90 tablet 3     Multiple Vitamins-Minerals (MENS 50+ MULTI VITAMIN/MIN PO) Take 1 tablet by mouth daily       mupirocin (BACTROBAN) 2 % external ointment Apply topically 3 times daily Apply to open sores on lower legs. 30 g 3     order for DME Open toe size large 30/40 [facility] Assure Medical Compression socks Model 85637.  Or similar as per availability/formulary.  Fax to Fax [phone]. [facility] medical 12 Device 0     order for DME Equipment being ordered:   New CPAP mask, tube, filters,water tray 1 Device 3     ORDER FOR DME Respironics RemStar 60 Series Auto AFlex 12-15 cm H2O with heated humidty and a modem.  Pt chose a [employer] Air FFM size medium.       oxyCODONE (ROXICODONE) 5 MG tablet Take 1 tablet (5 mg) by mouth every 6 hours as needed for pain 15 tablet 0     pantoprazole (PROTONIX) 40 MG EC tablet Take 1 tablet (40 mg) by mouth daily 90 tablet 0     potassium chloride ER (K-TAB) 20 MEQ CR tablet Take 1 tablet (20 mEq) by mouth daily 90 tablet 3     spironolactone (ALDACTONE) 25 MG tablet Take 1 tablet (25 mg) by mouth daily 90 tablet 3     vitamin D3 (CHOLECALCIFEROL) 2000 units (50 mcg) tablet Take 1 tablet by mouth daily       warfarin ANTICOAGULANT (JANTOVEN ANTICOAGULANT) 2.5 MG tablet 1.25 mg Fridays; 2.5mg all other days or As directed by Anticoagulation Clinic. INR DUE FOR FURTHER REFILLS 100 tablet 0     [DISCONTINUED] torsemide (DEMADEX) 20 MG tablet Take 2 tablets daily in AM and 1 tab daily at noon 90 tablet 3     ACE/ARB/ARNI NOT PRESCRIBED (INTENTIONAL) Please choose reason not prescribed, below (Patient not taking: Reported on 3/31/2020)       budesonide-formoterol (SYMBICORT) 80-4.5 MCG/ACT Inhaler Inhale 2 puffs into the lungs 2 times daily (Patient not taking: Reported on 2/13/2020) 10.2 g 3     STATIN NOT PRESCRIBED (INTENTIONAL)  Please choose reason not prescribed, below (Patient not taking: Reported on 4/9/2020)       triamcinolone (KENALOG) 0.1 % external cream Apply topically 2 times daily (Patient not taking: Reported on 4/29/2020) 80 g 1     No facility-administered encounter medications on file as of 9/8/2020.              Review Of Systems  Skin: As above  Eyes: negative  Ears/Nose/Throat: negative  Respiratory: No shortness of breath, dyspnea on exertion, cough, or hemoptysis  Cardiovascular: negative  Gastrointestinal: negative  Genitourinary: negative  Musculoskeletal: negative  Neurologic: negative  Psychiatric: negative  Hematologic/Lymphatic/Immunologic: negative  Endocrine: negative      O:   NAD, WDWN, Alert & Oriented, Mood & Affect wnl, Vitals stable   Here today alone   /65 (BP Location: Left arm, Patient Position: Sitting, Cuff Size: Adult Large)   Pulse 70   Resp 16   SpO2 99%    General appearance normal   Vitals stable   Alert, oriented and in no acute distress     Skin colored pedunculated papules on axilla and left groin     Eyes: Conjunctivae/lids:Normal     ENT: Lips: normal    MSK:Normal    Pulm: Breathing Normal    Neuro/Psych: Orientation:Alert and Orientedx3 ; Mood/Affect:normal   A/P:  1. Inflamed skin tag x axilla and groin x 4  LN2:  Treated with LN2 for 5s for 1-2 cycles. Warned risks of blistering, pain, pigment change, scarring, and incomplete resolution.  Advised patient to return if lesions do not completely resolve.  Wound care sheet given.      Again, thank you for allowing me to participate in the care of your patient.        Sincerely,        Diana Youssef PA-C

## 2020-09-14 NOTE — TELEPHONE ENCOUNTER
Pres[place] per [facility] [place].  Due to see Dr. Pineda soon. Has not seen her specifically since 11/6/2019.    Laurie CASAREZ, RN, BSN

## 2020-09-14 NOTE — PROGRESS NOTES
Maurice Jon is a 59 year old year old male patient here today for retreat skin tags on armpits, groin. He notes most resolved with cryo last office and he would like to treat a few more. Patient has no other skin complaints today.  Remainder of the HPI, Meds, PMH, Allergies, FH, and SH was reviewed in chart.    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 8/22/2005    Cirrhosis, idiopathic     Edema 5/13/2013     Esophageal varices with bleeding(456.0)     Pt denies     Gallstones      Genital herpes, unspecified 3/20/1999    resolving herpes progenitalis     GERD (gastroesophageal reflux disease)      Hematuria      Hypertension      Hypochondriasis     problems in past     Obesity 11/24/2010     BRUCE (obstructive sleep apnea) 03/17/2009    Mild AHI 8.4  RDI 31 - Pt refuses to wear his CPAP     Pericarditis      Portal hypertension (H) 1/28/2010     Unspecified thrombocytopenia 8/22/2005    Thrombocytopenia associated with cirrhosis and portal hypertension       Past Surgical History:   Procedure Laterality Date     ANESTHESIA CARDIOVERSION N/A 1/19/2015    Procedure: ANESTHESIA CARDIOVERSION;  Surgeon: Generic Anesthesia Provider;  Location: [facility]     ANESTHESIA CARDIOVERSION N/A 2/6/2019    Procedure: ANESTHESIA CARDIOVERSION;  Surgeon: GENERIC ANESTHESIA PROVIDER;  Location:  OR     ANESTHESIA CARDIOVERSION N/A 7/5/2019    Procedure: ANESTHESIA FOR CARDIOVERSION  DR. MURGUIA);  Surgeon: GENERIC ANESTHESIA PROVIDER;  Location:  OR     COLONOSCOPY N/A 8/14/2017    Procedure: COLONOSCOPY;   Colonoscopy Called will arrive appx 12:30 Per phone call;  Surgeon: Vincent Whittington MD;  [facility]     CV HEART CATHETERIZATION WITH POSSIBLE INTERVENTION N/A 12/26/2018    Procedure: Heart Catheterization with possible Intervention;  Surgeon: Brandon Arroyo MD;  Location:  HEART CARDIAC CATH LAB     EP ABLATION AV NODE N/A 11/15/2019    Procedure: EP Ablation AV Node;  Surgeon: Ag Maloney MD;  Location:  HEART CARDIAC CATH LAB     EP ABLATION FOCAL AFIB N/A 12/21/2018    Procedure: EP Ablation Focal AFIB;  Surgeon: Kristofer Evans MD;  Location:  HEART CARDIAC CATH LAB     EP PACEMAKER N/A 11/15/2019    Procedure: EP PACEMAKER;  Surgeon: Ag Maloney MD;  Location:  HEART CARDIAC CATH LAB     ESOPHAGOSCOPY, GASTROSCOPY, DUODENOSCOPY (EGD), COMBINED  7/11/2011    Procedure:COMBINED ESOPHAGOSCOPY, GASTROSCOPY, DUODENOSCOPY (EGD); Surgeon:LAURA LAMAS; [facility]     ESOPHAGOSCOPY, GASTROSCOPY, DUODENOSCOPY (EGD), COMBINED  9/10/2012    Procedure: COMBINED ESOPHAGOSCOPY, GASTROSCOPY, DUODENOSCOPY (EGD);;  Surgeon: Hi Roque MD;  Location:  GI     ESOPHAGOSCOPY, GASTROSCOPY, DUODENOSCOPY (EGD), COMBINED  9/9/2013    Procedure: COMBINED ESOPHAGOSCOPY, GASTROSCOPY, DUODENOSCOPY (EGD);;  Surgeon: Hi Roque MD;  Location:  GI     ESOPHAGOSCOPY, GASTROSCOPY, DUODENOSCOPY (EGD), COMBINED N/A 9/15/2014    Procedure: COMBINED ESOPHAGOSCOPY, GASTROSCOPY, DUODENOSCOPY (EGD);  Surgeon: Hi Roque MD;  Location:  GI     ESOPHAGOSCOPY, GASTROSCOPY, DUODENOSCOPY (EGD), COMBINED N/A 10/30/2015    Procedure: COMBINED ESOPHAGOSCOPY, GASTROSCOPY, DUODENOSCOPY (EGD);  Surgeon: Feliberto Fox MD;  Location:  GI     ESOPHAGOSCOPY, GASTROSCOPY, DUODENOSCOPY (EGD), COMBINED N/A 10/10/2016    Procedure: COMBINED ESOPHAGOSCOPY, GASTROSCOPY, DUODENOSCOPY (EGD);  Surgeon: Jose Nesbitt MD;  Location:  GI     ESOPHAGOSCOPY, GASTROSCOPY, DUODENOSCOPY (EGD),  COMBINED N/A 2019    Procedure: ESOPHAGOGASTRODUODENOSCOPY (EGD);  Surgeon: Timo Soares MD;  [facility]     H ABLATION FOCAL AFIB  2018     HERNIA REPAIR       IRRIGATION AND DEBRIDEMENT ABSCESS SCROTUM, COMBINED  10/4/2012    Procedure: COMBINED IRRIGATION AND DEBRIDEMENT ABSCESS SCROTUM;  Irrigation and Debridement of Groin Abscess;  Surgeon: Benny Shoemaker MD;  Location: [facility]     SOFT TISSUE SURGERY       SURGICAL HISTORY OF -       rt elbow     SURGICAL HISTORY OF -   1/15/98    repair of ventral and umbilical hernia     SURGICAL HISTORY OF -       endoscopy        Family History   Problem Relation Age of Onset     Lipids Mother      Hypertension Mother      Eye Disorder Mother      Heart Disease Father         CHF     Lipids Father      Obesity Father      Heart Disease Brother         MI     Eye Disorder Brother      Hypertension Brother         portal HTN     Thrombosis Sister         in her lung     Eye Disorder Sister      Cancer Other         maternal grandparent/throat cancer       Social History     Socioeconomic History     Marital status:      Spouse name: Not on file     Number of children: Not on file     Years of education: Not on file     Highest education level: Not on file   Occupational History     Not on file   Social Needs     Financial resource strain: Not on file     Food insecurity     Worry: Not on file     Inability: Not on file     Transportation needs     Medical: Not on file     Non-medical: Not on file   Tobacco Use     Smoking status: Former Smoker     Packs/day: 0.80     Years: 6.00     Pack years: 4.80     Types: Cigarettes     Last attempt to quit: 2002     Years since quittin.7     Smokeless tobacco: Never Used   Substance and Sexual Activity     Alcohol use: No     Alcohol/week: 0.0 standard drinks     Comment: quit in      Drug use: No     Sexual activity: Yes     Partners: Female   Lifestyle     Physical  activity     Days per week: Not on file     Minutes per session: Not on file     Stress: Not on file   Relationships     Social connections     Talks on phone: Not on file     Gets together: Not on file     Attends Anabaptism service: Not on file     Active member of club or organization: Not on file     Attends meetings of clubs or organizations: Not on file     Relationship status: Not on file     Intimate partner violence     Fear of current or ex partner: Not on file     Emotionally abused: Not on file     Physically abused: Not on file     Forced sexual activity: Not on file   Other Topics Concern     Parent/sibling w/ CABG, MI or angioplasty before 65F 55M? Yes     Comment: BROTHER   - MI AT AGE 44      Service Not Asked     Blood Transfusions Not Asked     Caffeine Concern Not Asked     Occupational Exposure Not Asked     Hobby Hazards Not Asked     Sleep Concern Not Asked     Stress Concern Not Asked     Weight Concern Not Asked     Special Diet Not Asked     Back Care Not Asked     Exercise No     Bike Helmet Not Asked     Seat Belt Not Asked     Self-Exams Not Asked   Social History Narrative     Not on file       Outpatient Encounter Medications as of 9/8/2020   Medication Sig Dispense Refill     Acetaminophen 325 MG CAPS Take 325-650 mg by mouth every 6 hours as needed       ALBUTEROL IN        ASPIRIN NOT PRESCRIBED (INTENTIONAL) Please choose reason not prescribed, below       calcium carb 1250 mg, 500 mg [place],/vitamin D 200 units (OSCAL WITH D) 500-200 MG-UNIT per tablet Take 2 tablets by mouth daily with food.       diphenhydrAMINE HCl (BENADRYL PO) Take 25 mg by mouth as needed        ferrous gluconate (FERGON) 324 (38 Fe) MG tablet Take 1 tablet (324 mg) by mouth 3 times daily (with meals) 100 tablet 4     metoprolol succinate ER (TOPROL XL) 25 MG 24 hr tablet Take 1 tablet (25 mg) by mouth daily 90 tablet 3     Multiple Vitamins-Minerals (MENS 50+ MULTI VITAMIN/MIN PO) Take 1 tablet by  mouth daily       mupirocin (BACTROBAN) 2 % external ointment Apply topically 3 times daily Apply to open sores on lower legs. 30 g 3     order for DME Open toe size large 30/40 [facility] [employer] Medical Compression socks Model 70023.  Or similar as per availability/formulary.  Fax to Fax [phone]. [facility] medical 12 Device 0     order for DME Equipment being ordered:   New CPAP mask, tube, filters,water tray 1 Device 3     ORDER FOR DME Respironics RemStar 60 Series Auto AFlex 12-15 cm H2O with heated humidty and a modem.  Pt chose a [employer] Air FFM size medium.       oxyCODONE (ROXICODONE) 5 MG tablet Take 1 tablet (5 mg) by mouth every 6 hours as needed for pain 15 tablet 0     pantoprazole (PROTONIX) 40 MG EC tablet Take 1 tablet (40 mg) by mouth daily 90 tablet 0     potassium chloride ER (K-TAB) 20 MEQ CR tablet Take 1 tablet (20 mEq) by mouth daily 90 tablet 3     spironolactone (ALDACTONE) 25 MG tablet Take 1 tablet (25 mg) by mouth daily 90 tablet 3     vitamin D3 (CHOLECALCIFEROL) 2000 units (50 mcg) tablet Take 1 tablet by mouth daily       warfarin ANTICOAGULANT (JANTOVEN ANTICOAGULANT) 2.5 MG tablet 1.25 mg Fridays; 2.5mg all other days or As directed by Anticoagulation Clinic. INR DUE FOR FURTHER REFILLS 100 tablet 0     [DISCONTINUED] torsemide (DEMADEX) 20 MG tablet Take 2 tablets daily in AM and 1 tab daily at noon 90 tablet 3     ACE/ARB/ARNI NOT PRESCRIBED (INTENTIONAL) Please choose reason not prescribed, below (Patient not taking: Reported on 3/31/2020)       budesonide-formoterol (SYMBICORT) 80-4.5 MCG/ACT Inhaler Inhale 2 puffs into the lungs 2 times daily (Patient not taking: Reported on 2/13/2020) 10.2 g 3     STATIN NOT PRESCRIBED (INTENTIONAL) Please choose reason not prescribed, below (Patient not taking: Reported on 4/9/2020)       triamcinolone (KENALOG) 0.1 % external cream Apply topically 2 times daily (Patient not taking: Reported on 4/29/2020) 80 g 1     No  facility-administered encounter medications on file as of 9/8/2020.              Review Of Systems  Skin: As above  Eyes: negative  Ears/Nose/Throat: negative  Respiratory: No shortness of breath, dyspnea on exertion, cough, or hemoptysis  Cardiovascular: negative  Gastrointestinal: negative  Genitourinary: negative  Musculoskeletal: negative  Neurologic: negative  Psychiatric: negative  Hematologic/Lymphatic/Immunologic: negative  Endocrine: negative      O:   NAD, WDWN, Alert & Oriented, Mood & Affect wnl, Vitals stable   Here today alone   /65 (BP Location: Left arm, Patient Position: Sitting, Cuff Size: Adult Large)   Pulse 70   Resp 16   SpO2 99%    General appearance normal   Vitals stable   Alert, oriented and in no acute distress     Skin colored pedunculated papules on axilla and left groin     Eyes: Conjunctivae/lids:Normal     ENT: Lips: normal    MSK:Normal    Pulm: Breathing Normal    Neuro/Psych: Orientation:Alert and Orientedx3 ; Mood/Affect:normal   A/P:  1. Inflamed skin tag x axilla and groin x 4  LN2:  Treated with LN2 for 5s for 1-2 cycles. Warned risks of blistering, pain, pigment change, scarring, and incomplete resolution.  Advised patient to return if lesions do not completely resolve.  Wound care sheet given.

## 2020-09-14 NOTE — TELEPHONE ENCOUNTER
"Requested Prescriptions   Pending Prescriptions Disp Refills     pantoprazole (PROTONIX) 40 MG EC tablet [Pharmacy Med Name: pantoprazole 40 mg tablet,delayed release] 90 tablet 0     Sig: Take 1 tablet (40 mg) by mouth daily       PPI Protocol Passed - 9/11/2020  6:45 PM        Passed - Not on Clopidogrel (unless Pantoprazole ordered)        Passed - No diagnosis of osteoporosis on record        Passed - Recent (12 mo) or future (30 days) visit within the authorizing provider's specialty     Patient has had an office visit with the authorizing provider or a provider within the authorizing providers department within the previous 12 mos or has a future within next 30 days. See \"Patient Info\" tab in inbasket, or \"Choose Columns\" in Meds & Orders section of the refill encounter.              Passed - Medication is active on med list        Passed - Patient is age 18 or older             "

## 2020-09-15 NOTE — TELEPHONE ENCOUNTER
"PT called very anxious to have lab rechecked.  He has lab appt for other labs tomorrow 9/16/20 and wants another BMP done.  Last BMP 8/17/20 Normal (reviewed by Dr Evans).  There is no order/recommendation for recheck.  Pt adamant to request another BMP be done tomorrow.    Per Dr Evans visit note 8/5/20:  \"Pt has been followed closely by Nadege for weight gain, bloating and pedal edema. His Lasix was switched to Demadex with a loss of 30 lbs and resolution of edema. Last K 3.2. historically k 3.4-3.7 past 6 months despite being on chronic Aldactone for portal htn. Tolerating warfarin ok with permission from GI as well.     Yomi denies sob, orthopnea, pnd and improvement of abd bloating and pedal edema on current demadex. Since pt likely would continue with current diurectic and K was 3.2 I prescribed kcl 20 meq daily with bmp check in a week.\"    Will route to Nadege for recommendation.  DESTIN WISE RN on 9/15/2020 at 1:28 PM    "

## 2020-09-16 PROBLEM — I48.91 ATRIAL FIBRILLATION WITH RAPID VENTRICULAR RESPONSE (H): Status: ACTIVE | Noted: 2020-01-01

## 2020-09-16 PROBLEM — I48.20 CHRONIC ATRIAL FIBRILLATION (H): Status: ACTIVE | Noted: 2020-01-01

## 2020-09-16 NOTE — TELEPHONE ENCOUNTER
ANTICOAGULATION MANAGEMENT      Maurice Jon due for annual renewal of referral to anticoagulation monitoring. Order pended for your review and signature.      ANTICOAGULATION SUMMARY      Warfarin indication(s)     Atrial fibrillation   A Fib w/ rapid ventricular response    Heart valve present?  NO       Current goal range   INR: 2.0-3.0     Goal appropriate for indication? Yes, INR 2-3 appropriate for hx of DVT, PE, hypercoagulable state, Afib, LVAD, or bileaflet AVR without risk factors     Current duration of therapy Indefinite/long term therapy   Time in Therapeutic Range (TTR)  (Goal > 60%) 84.4 %       Office visit with referring provider's group within last year yes on 5/4/2020       Sondra Santos RN

## 2020-09-16 NOTE — PROGRESS NOTES
Anticoagulation Management    Unable to reach pt today.    Today's INR result of 2.6 is therapeutic (goal INR of 2.0-3.0).  Result received from: Clinic Lab    Follow up required to confirm warfarin dose taken and assess for changes    Left message to return call - pt may still be at clinic as he had other appointments this afternoon.  Tentative plan: recheck INR in 6 weeks.    Anticoagulation clinic to follow up  Sondra Santos RN on 9/16/2020 at 4:47 PM

## 2020-09-17 NOTE — PROGRESS NOTES
"Please text through Doximity     Maurice Jon is a 59 year old male who is being evaluated via a billable video visit.      The patient has been notified of following:     \"This video visit will be conducted via a call between you and your physician/provider. We have found that certain health care needs can be provided without the need for an in-person physical exam.  This service lets us provide the care you need with a video conversation.  If a prescription is necessary we can send it directly to your pharmacy.  If lab work is needed we can place an order for that and you can then stop by our lab to have the test done at a later time.    Video visits are billed at different rates depending on your insurance coverage.  Please reach out to your insurance provider with any questions.    If during the course of the call the physician/provider feels a video visit is not appropriate, you will not be charged for this service.\"    Patient has given verbal consent for Video visit? Yes  How would you like to obtain your AVS? MyChart  If you are dropped from the video visit, the video invite should be resent to: Text to cell phone: [phone]  Will anyone else be joining your video visit? No        Video-Visit Details    I have the pleasure of seeing Maurice Jon for follow-up in the liver clinic via video visit on September 17, 2020.   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  He is doing well.  He does note occasional mild abdominal pain, he has no itching or skin rash.  He has mild fatigue, but is working full time.  He denies any increased abdominal girth or lower extremity edema.  He denies any gastrointestinal bleeding or any overt signs of encephalopathy.     He denies any fevers or chills, cough or shortness of breath.  He denies any nausea or vomiting, or diarrhea.  He does occasionally have some constipation.  He reports his appetite is " good and his weight is down significantly which she attributes to better control of his fluid retention.      Current Outpatient Medications   Medication     Acetaminophen 325 MG CAPS     ALBUTEROL IN     calcium carb 1250 mg, 500 mg [place],/vitamin D 200 units (OSCAL WITH D) 500-200 MG-UNIT per tablet     diphenhydrAMINE HCl (BENADRYL PO)     ferrous gluconate (FERGON) 324 (38 Fe) MG tablet     metoprolol succinate ER (TOPROL XL) 25 MG 24 hr tablet     Multiple Vitamins-Minerals (MENS 50+ MULTI VITAMIN/MIN PO)     pantoprazole (PROTONIX) 40 MG EC tablet     potassium chloride ER (K-TAB) 20 MEQ CR tablet     spironolactone (ALDACTONE) 25 MG tablet     vitamin D3 (CHOLECALCIFEROL) 2000 units (50 mcg) tablet     warfarin ANTICOAGULANT (JANTOVEN ANTICOAGULANT) 2.5 MG tablet     ACE/ARB/ARNI NOT PRESCRIBED (INTENTIONAL)     ASPIRIN NOT PRESCRIBED (INTENTIONAL)     budesonide-formoterol (SYMBICORT) 80-4.5 MCG/ACT Inhaler     mupirocin (BACTROBAN) 2 % external ointment     order for DME     order for DME     ORDER FOR DME     oxyCODONE (ROXICODONE) 5 MG tablet     STATIN NOT PRESCRIBED (INTENTIONAL)     torsemide (DEMADEX) 20 MG tablet     triamcinolone (KENALOG) 0.1 % external cream     No current facility-administered medications for this visit.      On physical examination, he looks well.  HEENT exam shows no scleral icterus or temporal muscle wasting.  Neurologic exam shows no asterixis.      Recent Results (from the past 168 hour(s))   Basic metabolic panel    Collection Time: 09/16/20  3:30 PM   Result Value Ref Range    Sodium 140 133 - 144 mmol/L    Potassium 3.4 3.4 - 5.3 mmol/L    Chloride 104 94 - 109 mmol/L    Carbon Dioxide 28 20 - 32 mmol/L    Anion Gap 8 3 - 14 mmol/L    Glucose 65 (L) 70 - 99 mg/dL    Urea Nitrogen 17 7 - 30 mg/dL    Creatinine 0.87 0.66 - 1.25 mg/dL    GFR Estimate >90 >60 mL/min/[1.73_m2]    GFR Estimate If Black >90 >60 mL/min/[1.73_m2]    Calcium 10.0 8.5 - 10.1 mg/dL   Hepatic panel     Collection Time: 09/16/20  3:31 PM   Result Value Ref Range    Bilirubin Direct 0.6 (H) 0.0 - 0.2 mg/dL    Bilirubin Total 1.9 (H) 0.2 - 1.3 mg/dL    Albumin 3.8 3.4 - 5.0 g/dL    Protein Total 6.9 6.8 - 8.8 g/dL    Alkaline Phosphatase 89 40 - 150 U/L    ALT 47 0 - 70 U/L    AST 63 (H) 0 - 45 U/L   CBC with platelets    Collection Time: 09/16/20  3:31 PM   Result Value Ref Range    WBC 4.6 4.0 - 11.0 10e9/L    RBC Count 4.14 (L) 4.4 - 5.9 10e12/L    Hemoglobin 15.0 13.3 - 17.7 g/dL    Hematocrit 42.8 40.0 - 53.0 %     (H) 78 - 100 fl    MCH 36.2 (H) 26.5 - 33.0 pg    MCHC 35.0 31.5 - 36.5 g/dL    RDW 13.3 10.0 - 15.0 %    Platelet Count 80 (L) 150 - 450 10e9/L   INR    Collection Time: 09/16/20  3:32 PM   Result Value Ref Range    INR 2.60 (H) 0.86 - 1.14      US ABDOMEN COMPLETE 9/16/2020 5:11 PM      HISTORY: Cirrhosis of liver without ascites, unspecified hepatic  cirrhosis type (H).      FINDINGS:  Liver is course in echotexture and increased in  echogenicity without focal lesions. Gallbladder contains a few small  gallstones but there is no associated gallbladder wall thickening or  pericholecystic fluid. Common bile duct is normal in diameter.  Pancreas is normal where visualized. Spleen is borderline enlarged at  12 cm. Collateral vessels are noted in the hilum of the spleen.  Kidneys are normal in size. Nonobstructing calculi are noted within  the kidneys. There is no hydronephrosis. Proximal abdominal aorta and  IVC are within normal limits.                                                                      IMPRESSION:    1. Cirrhotic liver morphology. No evidence of ascites. Borderline  splenomegaly. Findings would suggest portal venous hypertension.  2. Cholelithiasis without ultrasound evidence of cholecystitis.  3. Nonobstructing renal collecting system stones. No hydronephrosis.    My impression is that Mr. 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  His liver disease  remains well compensated.  He is up-to-date with regard to vaccines and cancer screening.  I will not be making any change to his medical regimen at this visit.  He is now iron replete and I discontinued his iron pills.     I will see him back in the clinic in 6 months for repeat imaging and blood work.     Thank you very much for allowing me to participate in the care of this patient.  If you have any questions regarding my recommendations, please do not hesitate to contact me.        Hi Roque MD      Professor of Medicine  [employer]      Executive Medical Director, Solid Organ Transplant Program  [facility]    Type of service:  Video Visit    Video Start Time: 8:08 AM  Video End Time: 8:22 AM    Originating Location (pt. Location): Home    Distant Location (provider location):  [facility] HEPATOLOGY     Platform used for Video Visit: [employer] Reklaim

## 2020-09-17 NOTE — PROGRESS NOTES
REviewed results with pt.  Confirmed med doses.  BMP ordered.  PT to call at the end of the year to schedule follow up in 2/2021.  DESTIN WISE RN on 9/17/2020 at 8:32 AM

## 2020-09-17 NOTE — LETTER
"    9/17/2020         RE: Maurice Jon  [address] [place] [address]        Dear Colleague,    Thank you for referring your patient, Maurice Jon, to the [facility] HEPATOLOG[facility]. Please see a copy of my visit note below.    Please text through Doximity     Maurice Jon is a 59 year old male who is being evaluated via a billable video visit.      The patient has been notified of following:     \"This video visit will be conducted via a call between you and your physician/provider. We have found that certain health care needs can be provided without the need for an in-person physical exam.  This service lets us provide the care you need with a video conversation.  If a prescription is necessary we can send it directly to your pharmacy.  If lab work is needed we can place an order for that and you can then stop by our lab to have the test done at a later time.    Video visits are billed at different rates depending on your insurance coverage.  Please reach out to your insurance provider with any questions.    If during the course of the call the physician/provider feels a video visit is not appropriate, you will not be charged for this service.\"    Patient has given verbal consent for Video visit? Yes  How would you like to obtain your AVS? MyChart  If you are dropped from the video visit, the video invite should be resent to: Text to cell phone: [phone]  Will anyone else be joining your video visit? No        Video-Visit Details    I have the pleasure of seeing Maurice Jon for follow-up in the liver clinic via video visit on September 17, 2020.   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  He is doing well.  He does note occasional mild abdominal pain, he has no itching or skin rash.  He has mild fatigue, but is working full time.  He denies any increased abdominal girth or lower extremity edema.  He denies any " gastrointestinal bleeding or any overt signs of encephalopathy.     He denies any fevers or chills, cough or shortness of breath.  He denies any nausea or vomiting, or diarrhea.  He does occasionally have some constipation.  He reports his appetite is good and his weight is down significantly which she attributes to better control of his fluid retention.      Current Outpatient Medications   Medication     Acetaminophen 325 MG CAPS     ALBUTEROL IN     calcium carb 1250 mg, 500 mg [place],/vitamin D 200 units (OSCAL WITH D) 500-200 MG-UNIT per tablet     diphenhydrAMINE HCl (BENADRYL PO)     ferrous gluconate (FERGON) 324 (38 Fe) MG tablet     metoprolol succinate ER (TOPROL XL) 25 MG 24 hr tablet     Multiple Vitamins-Minerals (MENS 50+ MULTI VITAMIN/MIN PO)     pantoprazole (PROTONIX) 40 MG EC tablet     potassium chloride ER (K-TAB) 20 MEQ CR tablet     spironolactone (ALDACTONE) 25 MG tablet     vitamin D3 (CHOLECALCIFEROL) 2000 units (50 mcg) tablet     warfarin ANTICOAGULANT (JANTOVEN ANTICOAGULANT) 2.5 MG tablet     ACE/ARB/ARNI NOT PRESCRIBED (INTENTIONAL)     ASPIRIN NOT PRESCRIBED (INTENTIONAL)     budesonide-formoterol (SYMBICORT) 80-4.5 MCG/ACT Inhaler     mupirocin (BACTROBAN) 2 % external ointment     order for DME     order for DME     ORDER FOR DME     oxyCODONE (ROXICODONE) 5 MG tablet     STATIN NOT PRESCRIBED (INTENTIONAL)     torsemide (DEMADEX) 20 MG tablet     triamcinolone (KENALOG) 0.1 % external cream     No current facility-administered medications for this visit.      On physical examination, he looks well.  HEENT exam shows no scleral icterus or temporal muscle wasting.  Neurologic exam shows no asterixis.      Recent Results (from the past 168 hour(s))   Basic metabolic panel    Collection Time: 09/16/20  3:30 PM   Result Value Ref Range    Sodium 140 133 - 144 mmol/L    Potassium 3.4 3.4 - 5.3 mmol/L    Chloride 104 94 - 109 mmol/L    Carbon Dioxide 28 20 - 32 mmol/L    Anion Gap 8 3 - 14  mmol/L    Glucose 65 (L) 70 - 99 mg/dL    Urea Nitrogen 17 7 - 30 mg/dL    Creatinine 0.87 0.66 - 1.25 mg/dL    GFR Estimate >90 >60 mL/min/[1.73_m2]    GFR Estimate If Black >90 >60 mL/min/[1.73_m2]    Calcium 10.0 8.5 - 10.1 mg/dL   Hepatic panel    Collection Time: 09/16/20  3:31 PM   Result Value Ref Range    Bilirubin Direct 0.6 (H) 0.0 - 0.2 mg/dL    Bilirubin Total 1.9 (H) 0.2 - 1.3 mg/dL    Albumin 3.8 3.4 - 5.0 g/dL    Protein Total 6.9 6.8 - 8.8 g/dL    Alkaline Phosphatase 89 40 - 150 U/L    ALT 47 0 - 70 U/L    AST 63 (H) 0 - 45 U/L   CBC with platelets    Collection Time: 09/16/20  3:31 PM   Result Value Ref Range    WBC 4.6 4.0 - 11.0 10e9/L    RBC Count 4.14 (L) 4.4 - 5.9 10e12/L    Hemoglobin 15.0 13.3 - 17.7 g/dL    Hematocrit 42.8 40.0 - 53.0 %     (H) 78 - 100 fl    MCH 36.2 (H) 26.5 - 33.0 pg    MCHC 35.0 31.5 - 36.5 g/dL    RDW 13.3 10.0 - 15.0 %    Platelet Count 80 (L) 150 - 450 10e9/L   INR    Collection Time: 09/16/20  3:32 PM   Result Value Ref Range    INR 2.60 (H) 0.86 - 1.14      US ABDOMEN COMPLETE 9/16/2020 5:11 PM      HISTORY: Cirrhosis of liver without ascites, unspecified hepatic  cirrhosis type (H).      FINDINGS:  Liver is course in echotexture and increased in  echogenicity without focal lesions. Gallbladder contains a few small  gallstones but there is no associated gallbladder wall thickening or  pericholecystic fluid. Common bile duct is normal in diameter.  Pancreas is normal where visualized. Spleen is borderline enlarged at  12 cm. Collateral vessels are noted in the hilum of the spleen.  Kidneys are normal in size. Nonobstructing calculi are noted within  the kidneys. There is no hydronephrosis. Proximal abdominal aorta and  IVC are within normal limits.                                                                      IMPRESSION:    1. Cirrhotic liver morphology. No evidence of ascites. Borderline  splenomegaly. Findings would suggest portal venous  hypertension.  2. Cholelithiasis without ultrasound evidence of cholecystitis.  3. Nonobstructing renal collecting system stones. No hydronephrosis.    My impression is that Mr. 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  His liver disease remains well compensated.  He is up-to-date with regard to vaccines and cancer screening.  I will not be making any change to his medical regimen at this visit.  He is now iron replete and I discontinued his iron pills.     I will see him back in the clinic in 6 months for repeat imaging and blood work.     Thank you very much for allowing me to participate in the care of this patient.  If you have any questions regarding my recommendations, please do not hesitate to contact me.        Hi Roque MD      Professor of Medicine  [employer]      Executive Medical Director, Solid Organ Transplant Program  [facility]    Type of service:  Video Visit    Video Start Time: 8:08 AM  Video End Time: 8:22 AM    Originating Location (pt. Location): Home    Distant Location (provider location):  [email or web address] HEPATOLOG[facility]     Platform used for Video Visit: ZAO Begun

## 2020-09-17 NOTE — PROGRESS NOTES
ANTICOAGULATION FOLLOW-UP CLINIC VISIT    Patient Name:  Maurice Jon  Date:  2020  Contact Type:  Telephone    SUBJECTIVE:  Patient Findings     Positives:   Change in medications (iron discontinued for now)    Comments:   No changes in medications, activity, or diet noted. No concerns with clotting, bleeding, or increased bruising noted. Took warfarin as prescribed.  Patient is to continue maintenance warfarin plan, and check INR in 6 weeks unless concerns arise.  Patient verbalizes understanding and agrees to plan. No further questions or concerns.        Clinical Outcomes     Negatives:   Major bleeding event, Thromboembolic event, Anticoagulation-related hospital admission, Anticoagulation-related ED visit, Anticoagulation-related fatality    Comments:   No changes in medications, activity, or diet noted. No concerns with clotting, bleeding, or increased bruising noted. Took warfarin as prescribed.  Patient is to continue maintenance warfarin plan, and check INR in 6 weeks unless concerns arise.  Patient verbalizes understanding and agrees to plan. No further questions or concerns.           OBJECTIVE    Recent labs: (last 7 days)     20  1532   INR 2.60*       ASSESSMENT / PLAN  INR assessment THER    Recheck INR In: 6 WEEKS    INR Location Clinic      Anticoagulation Summary  As of 2020    INR goal:   2.0-3.0   TTR:   84.4 % (11.8 mo)   INR used for dosin.60 (2020)   Warfarin maintenance plan:   1.25 mg (2.5 mg x 0.5) every Fri; 2.5 mg (2.5 mg x 1) all other days   Full warfarin instructions:   1.25 mg every Fri; 2.5 mg all other days   Weekly warfarin total:   16.25 mg   No change documented:   Sondra Santos RN   Plan last modified:   Susan Beyer RN (2018)   Next INR check:   10/27/2020   Priority:   Maintenance   Target end date:   10/4/2018    Indications    Paroxysmal atrial fibrillation (H) [I48.0]  Atrial fibrillation with rapid ventricular response (H)  (Resolved) [I48.91]  Long-term (current) use of anticoagulants [Z79.01] [Z79.01]  Chronic atrial fibrillation (H) [I48.20]  Atrial fibrillation with rapid ventricular response (H) [I48.91]             Anticoagulation Episode Summary     INR check location:       Preferred lab:       Send INR reminders to:   [facility]    Comments:   * anticoagulation short period surrounding ablation on 12/21/18. Cardiology to decide when to stop warfarin. has well-compensated cirrhosis      Anticoagulation Care Providers     Provider Role Specialty Phone number    Alon Pineda MD [facility] [phone]            See the Encounter Report to view Anticoagulation Flowsheet and Dosing Calendar (Go to Encounters tab in chart review, and find the Anticoagulation Therapy Visit)        Sondra Santos RN

## 2020-09-17 NOTE — PROGRESS NOTES
Pls let Yomi know that BMP looked fine.  K is still on the low end of nl.    Pls confirm K and torsemide dosing and update Epic    I'm due to see him 2/2021. Pls add BMP to that visit    Tyson Light

## 2020-10-06 NOTE — PROGRESS NOTES
Pls let Yomi know that he had 2 more VT episodes -     One was Sat 8/15 @ 1046 which was SUPER fast 319 BPM x 18 beats!         Was he napping at that time? USING CPAP????????       Any sxs?    One was Fri 9/25 @ 2022 @181 BPM x 14 beats   Was he sleeping?   Any sxs    His BMPs 9/17 and 8/17 were both OK, so doesn't look like electrolyte abnl is a cause of this.    Pls let me know if he was sleeping and might not have been using his CPAP.    Tyson Light    ADDENDUM: LM for patient with dates and times and to call back to discuss. Melany Ewing RN Cardiology October 7, 2020, 8:58 AM'

## 2020-10-12 NOTE — PROGRESS NOTES
In the past, wife Delano would wake him up when he fell asleep in chair and tell him to go to bed/use CPAP.   Pls tell Yomi that his heart really doesn't like it when he's not using CPAP, as evidenced by the super fast VT.    I know he doesn't want to go back on BB therapy (felt tired on it) but if we see more VT Dr. Evans recommend that.    For now, have Delano start waking him up again!    He can see me with annual device check/labs if he'd like. Otherwise, will need device check  from labs/Dr. Cristina Light    ADDENDUM:  [employer] message sent to pt.  Will have scheduling call to set up appts.  DESTIN WISE RN on 10/13/2020 at 8:37 AM

## 2020-10-12 NOTE — PROGRESS NOTES
LM for pt to call back.   Also sent [employer] message.  DESTIN WISE RN on 10/12/2020 at 12:49 PM    ADDENDUM:  Pt called back stating he thinks these VT episodes happened when he fell asleep in the chair.  He was not using CPAP.  States he did not have any symptoms that he can remember in August but did feel palpitations on 9/25/20 event.  Pt due for lab and follow up with Dr Evans.  Device check due as well but no appointments until January.    Will route to Nadege Montilla.  DESTIN WISE RN on 10/12/2020 at 3:28 PM

## 2020-10-19 NOTE — TELEPHONE ENCOUNTER
Patient requesting DME order for compression stockings.   Last seen by Dr Pineda in Nov 2019-   Thanks,   Juli Khan RNC

## 2020-10-19 NOTE — TELEPHONE ENCOUNTER
Reason for Call: Request for an order or referral:    Order or referral being requested: Pt would like a DME order for 12 pair of compression stockings sent to [facility] in [facility]. Please call patient and advise.    Date needed: at your convenience    Has the patient been seen by the PCP for this problem? NO    Additional comments:     Phone number Patient can be reached at:  Home number on file [phone] (home)    Best Time:  any    Can we leave a detailed message on this number?  YES    Call taken on 10/19/2020 at 3:24 PM by Donya Langley

## 2020-10-29 NOTE — PATIENT INSTRUCTIONS
Our Clinic hours are:  Mondays    7:20 am - 7 pm  Tues -  Fri  7:20 am - 5 pm    Clinic Phone: [phone]    The clinic lab opens at 7:30 am Mon - Fri and appointments are required.    [facility]. [phone]  Monday  8 am - 7pm  Tues - Fri 8 am - 5:30 pm

## 2020-10-29 NOTE — PROGRESS NOTES
"Maurice Jon is a 59 year old male who is being evaluated via a billable video visit.      The patient has been notified of following:     \"This video visit will be conducted via a call between you and your physician/provider. We have found that certain health care needs can be provided without the need for an in-person physical exam.  This service lets us provide the care you need with a video conversation.  If a prescription is necessary we can send it directly to your pharmacy.  If lab work is needed we can place an order for that and you can then stop by our lab to have the test done at a later time.    Video visits are billed at different rates depending on your insurance coverage.  Please reach out to your insurance provider with any questions.    If during the course of the call the physician/provider feels a video visit is not appropriate, you will not be charged for this service.\"    Patient has given verbal consent for Video visit? Yes  How would you like to obtain your AVS? MyChart  If you are dropped from the video visit, the video invite should be resent to: Text to cell phone: [phone]  Will anyone else be joining your video visit? No      Subjective     Maurice Jon is a 59 year old male who presents today via video visit for the following health issues:    HPI     Problem:   Chief Complaint   Patient presents with     Refill Request     Albuterol inhaler           Video Start Time: 3:37      Review of Systems   Constitutional, neuro, ENT, endocrine, pulmonary, cardiac, gastrointestinal, genitourinary, musculoskeletal, integument and psychiatric systems are negative, except as otherwise noted.       Objective           Vitals:  No vitals were obtained today due to virtual visit.    Physical Exam     GENERAL: Healthy, alert and no distress  EYES: Eyes grossly normal to inspection.  No discharge or erythema, or obvious scleral/conjunctival abnormalities.  RESP: No audible wheeze, cough, " "or visible cyanosis.  No visible retractions or increased work of breathing.    SKIN: Visible skin clear. No significant rash, abnormal pigmentation or lesions.  NEURO: Cranial nerves grossly intact.  Mentation and speech appropriate for age.  PSYCH: Mentation appears normal, affect normal/bright, judgement and insight intact, normal speech and appearance well-groomed.            Assessment & Plan     Seasonal allergic rhinitis, unspecified trigger  Well controlled. Refilled medication.     - albuterol (PROAIR RESPICLICK) 108 (90 Base) MCG/ACT inhaler; Inhale 2 puffs into the lungs every 6 hours as needed for shortness of breath / dyspnea or wheezing     BMI:   Estimated body mass index is 36.62 kg/m  as calculated from the following:    Height as of 8/4/20: 1.613 m (5' 3.5\").    Weight as of 5/28/20: 95.3 kg (210 lb).              No follow-ups on file.    Alon Pineda MD  [facility]      Video-Visit Details    Type of service:  Video Visit    Video End Time:3:57    Originating Location (pt. Location): Home    Distant Location (provider location):  [facility]     Platform used for Video Visit: [employer]        "

## 2020-11-04 NOTE — PROGRESS NOTES
Chief Complaint   Patient presents with     Skin Tags       Vitals:    11/04/20 1540   BP: 122/66   BP Location: Left arm   Patient Position: Sitting   Cuff Size: Adult Regular   Pulse: 72   Resp: 20     Wt Readings from Last 1 Encounters:   05/28/20 95.3 kg (210 lb)       Tomás BRIONES RN   [facility]

## 2020-11-04 NOTE — PROGRESS NOTES
Maurice Jon is a 59 year old year old male patient here today for recheck skin tags.He notes he has a few more areas he would like treated with cryo. Patient has no other skin complaints today.  Remainder of the HPI, Meds, PMH, Allergies, FH, and SH was reviewed in chart.   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 8/22/2005    Cirrhosis, idiopathic     Edema 5/13/2013     Esophageal varices with bleeding(456.0)     Pt denies     Gallstones      Genital herpes, unspecified 3/20/1999    resolving herpes progenitalis     GERD (gastroesophageal reflux disease)      Hematuria      Hypertension      Hypochondriasis     problems in past     Obesity 11/24/2010     BRUCE (obstructive sleep apnea) 03/17/2009    Mild AHI 8.4  RDI 31 - Pt refuses to wear his CPAP     Pericarditis      Portal hypertension (H) 1/28/2010     Unspecified thrombocytopenia 8/22/2005    Thrombocytopenia associated with cirrhosis and portal hypertension       Past Surgical History:   Procedure Laterality Date     ANESTHESIA CARDIOVERSION N/A 1/19/2015    Procedure: ANESTHESIA CARDIOVERSION;  Surgeon: Generic Anesthesia Provider;  Location: [facility]     ANESTHESIA CARDIOVERSION N/A 2/6/2019    Procedure: ANESTHESIA CARDIOVERSION;  Surgeon: GENERIC ANESTHESIA PROVIDER;  Location:  OR     ANESTHESIA CARDIOVERSION N/A 7/5/2019    Procedure: ANESTHESIA FOR CARDIOVERSION  DR. MURGUIA);  Surgeon: GENERIC ANESTHESIA PROVIDER;  Location:  OR     COLONOSCOPY N/A 8/14/2017    Procedure: COLONOSCOPY;  Colonoscopy Called will arrive appx  12:30 Per phone call;  Surgeon: Vincent Whittington MD;  Location: [facility]     CV HEART CATHETERIZATION WITH POSSIBLE INTERVENTION N/A 12/26/2018    Procedure: Heart Catheterization with possible Intervention;  Surgeon: Brandon Arroyo MD;  Location:  HEART CARDIAC CATH LAB     EP ABLATION AV NODE N/A 11/15/2019    Procedure: EP Ablation AV Node;  Surgeon: Ag Maloney MD;  Location:  HEART CARDIAC CATH LAB     EP ABLATION FOCAL AFIB N/A 12/21/2018    Procedure: EP Ablation Focal AFIB;  Surgeon: Kristofer Evans MD;  Location:  HEART CARDIAC CATH LAB     EP PACEMAKER N/A 11/15/2019    Procedure: EP PACEMAKER;  Surgeon: Ag Maloney MD;  Location:  HEART CARDIAC CATH LAB     ESOPHAGOSCOPY, GASTROSCOPY, DUODENOSCOPY (EGD), COMBINED  7/11/2011    Procedure:COMBINED ESOPHAGOSCOPY, GASTROSCOPY, DUODENOSCOPY (EGD); Surgeon:LAURA LAMAS; Location:[facility]     ESOPHAGOSCOPY, GASTROSCOPY, DUODENOSCOPY (EGD), COMBINED  9/10/2012    Procedure: COMBINED ESOPHAGOSCOPY, GASTROSCOPY, DUODENOSCOPY (EGD);;  Surgeon: Hi Roque MD;  Location:  GI     ESOPHAGOSCOPY, GASTROSCOPY, DUODENOSCOPY (EGD), COMBINED  9/9/2013    Procedure: COMBINED ESOPHAGOSCOPY, GASTROSCOPY, DUODENOSCOPY (EGD);;  Surgeon: Hi Roque MD;  Location:  GI     ESOPHAGOSCOPY, GASTROSCOPY, DUODENOSCOPY (EGD), COMBINED N/A 9/15/2014    Procedure: COMBINED ESOPHAGOSCOPY, GASTROSCOPY, DUODENOSCOPY (EGD);  Surgeon: Hi Roque MD;  Location:  GI     ESOPHAGOSCOPY, GASTROSCOPY, DUODENOSCOPY (EGD), COMBINED N/A 10/30/2015    Procedure: COMBINED ESOPHAGOSCOPY, GASTROSCOPY, DUODENOSCOPY (EGD);  Surgeon: Feliberto Fox MD;  Location:  GI     ESOPHAGOSCOPY, GASTROSCOPY, DUODENOSCOPY (EGD), COMBINED N/A 10/10/2016    Procedure: COMBINED ESOPHAGOSCOPY, GASTROSCOPY, DUODENOSCOPY (EGD);  Surgeon: Jose Nesbitt MD;  Location:  GI     ESOPHAGOSCOPY, GASTROSCOPY, DUODENOSCOPY (EGD), COMBINED N/A 9/26/2019    Procedure:  ESOPHAGOGASTRODUODENOSCOPY (EGD);  Surgeon: Timo Soares MD;  Location: [facility]     H ABLATION FOCAL AFIB  2018     HERNIA REPAIR       IRRIGATION AND DEBRIDEMENT ABSCESS SCROTUM, COMBINED  10/4/2012    Procedure: COMBINED IRRIGATION AND DEBRIDEMENT ABSCESS SCROTUM;  Irrigation and Debridement of Groin Abscess;  Surgeon: Benny Shoemaker MD;  Location: [facility] OR     SOFT TISSUE SURGERY       SURGICAL HISTORY OF -       rt elbow     SURGICAL HISTORY OF -   1/15/98    repair of ventral and umbilical hernia     SURGICAL HISTORY OF -       endoscopy        Family History   Problem Relation Age of Onset     Lipids Mother      Hypertension Mother      Eye Disorder Mother      Heart Disease Father         CHF     Lipids Father      Obesity Father      Heart Disease Brother         MI     Eye Disorder Brother      Hypertension Brother         portal HTN     Thrombosis Sister         in her lung     Eye Disorder Sister      Cancer Other         maternal grandparent/throat cancer       Social History     Socioeconomic History     Marital status:      Spouse name: Not on file     Number of children: Not on file     Years of education: Not on file     Highest education level: Not on file   Occupational History     Not on file   Social Needs     Financial resource strain: Not on file     Food insecurity     Worry: Not on file     Inability: Not on file     Transportation needs     Medical: Not on file     Non-medical: Not on file   Tobacco Use     Smoking status: Former Smoker     Packs/day: 0.80     Years: 6.00     Pack years: 4.80     Types: Cigarettes     Quit date: 2002     Years since quittin.8     Smokeless tobacco: Never Used   Substance and Sexual Activity     Alcohol use: No     Alcohol/week: 0.0 standard drinks     Comment: quit in      Drug use: No     Sexual activity: Yes     Partners: Female   Lifestyle     Physical activity     Days per week: Not on file     Minutes per  session: Not on file     Stress: Not on file   Relationships     Social connections     Talks on phone: Not on file     Gets together: Not on file     Attends Shinto service: Not on file     Active member of club or organization: Not on file     Attends meetings of clubs or organizations: Not on file     Relationship status: Not on file     Intimate partner violence     Fear of current or ex partner: Not on file     Emotionally abused: Not on file     Physically abused: Not on file     Forced sexual activity: Not on file   Other Topics Concern     Parent/sibling w/ CABG, MI or angioplasty before 65F 55M? Yes     Comment: BROTHER   - MI AT AGE 44      Service Not Asked     Blood Transfusions Not Asked     Caffeine Concern Not Asked     Occupational Exposure Not Asked     Hobby Hazards Not Asked     Sleep Concern Not Asked     Stress Concern Not Asked     Weight Concern Not Asked     Special Diet Not Asked     Back Care Not Asked     Exercise No     Bike Helmet Not Asked     Seat Belt Not Asked     Self-Exams Not Asked   Social History Narrative     Not on file       Outpatient Encounter Medications as of 11/4/2020   Medication Sig Dispense Refill     ACE/ARB/ARNI NOT PRESCRIBED (INTENTIONAL) Please choose reason not prescribed, below       Acetaminophen 325 MG CAPS Take 325-650 mg by mouth every 6 hours as needed       albuterol (PROAIR RESPICLICK) 108 (90 Base) MCG/ACT inhaler Inhale 2 puffs into the lungs every 6 hours as needed for shortness of breath / dyspnea or wheezing 1 each 4     ASPIRIN NOT PRESCRIBED (INTENTIONAL) Please choose reason not prescribed, below       calcium carb 1250 mg, 500 mg [place],/vitamin D 200 units (OSCAL WITH D) 500-200 MG-UNIT per tablet Take 2 tablets by mouth daily with food.       diphenhydrAMINE HCl (BENADRYL PO) Take 25 mg by mouth as needed        metoprolol succinate ER (TOPROL XL) 25 MG 24 hr tablet Take 1 tablet (25 mg) by mouth daily 90 tablet 3     Multiple  Vitamins-Minerals (MENS 50+ MULTI VITAMIN/MIN PO) Take 1 tablet by mouth daily       order for DME Open toe size large 30/40 [facility] [employer] Compression socks Model 26194.  Or similar as per availability/formulary.  Fax to Fax [phone]. [facility] medical 12 Device 0     order for DME Equipment being ordered:   New CPAP mask, tube, filters,water tray 1 Device 3     ORDER FOR DME Respironics RemStar 60 Series Auto AFlex 12-15 cm H2O with heated humidty and a modem.  Pt chose a Quattro Air FFM size medium.       pantoprazole (PROTONIX) 40 MG EC tablet Take 1 tablet (40 mg) by mouth daily 90 tablet 0     potassium chloride ER (K-TAB) 20 MEQ CR tablet Take 1 tablet (20 mEq) by mouth daily 90 tablet 3     spironolactone (ALDACTONE) 25 MG tablet Take 1 tablet (25 mg) by mouth daily 90 tablet 3     STATIN NOT PRESCRIBED (INTENTIONAL) Please choose reason not prescribed, below       torsemide (DEMADEX) 20 MG tablet Take 2 tablets daily in AM and 1 tab daily at noon 90 tablet 2     vitamin D3 (CHOLECALCIFEROL) 2000 units (50 mcg) tablet Take 1 tablet by mouth daily       warfarin ANTICOAGULANT (JANTOVEN ANTICOAGULANT) 2.5 MG tablet 1.25 mg Fridays; 2.5mg all other days or As directed by Anticoagulation Clinic. INR DUE FOR FURTHER REFILLS 100 tablet 0     ferrous gluconate (FERGON) 324 (38 Fe) MG tablet Take 1 tablet (324 mg) by mouth 3 times daily (with meals) (Patient not taking: Reported on 10/29/2020) 100 tablet 4     mupirocin (BACTROBAN) 2 % external ointment Apply topically 3 times daily Apply to open sores on lower legs. (Patient not taking: Reported on 9/17/2020) 30 g 3     oxyCODONE (ROXICODONE) 5 MG tablet Take 1 tablet (5 mg) by mouth every 6 hours as needed for pain (Patient not taking: Reported on 9/17/2020) 15 tablet 0     triamcinolone (KENALOG) 0.1 % external cream Apply topically 2 times daily (Patient not taking: Reported on 11/4/2020) 80 g 1     No facility-administered encounter medications on  file as of 11/4/2020.              Review Of Systems  Skin: As above  Eyes: negative  Ears/Nose/Throat: negative  Respiratory: No shortness of breath, dyspnea on exertion, cough      O:   NAD, WDWN, Alert & Oriented, Mood & Affect wnl, Vitals stable   Here today alone   /66 (BP Location: Left arm, Patient Position: Sitting, Cuff Size: Adult Regular)   Pulse 72   Resp 20    General appearance normal   Vitals stable   Alert, oriented and in no acute distress     Skin colored pedunculated papules on bilateral axilla x 6      Eyes: Conjunctivae/lids:Normal     ENT: Lips: normal    MSK:Normal    Pulm: Breathing Normal    Neuro/Psych: Orientation:Alert and Orientedx3 ; Mood/Affect:normal     A/P:  1. Inflamed skin tags x 6 total  LN2:  Treated with LN2 for 5s for 1-2 cycles. Warned risks of blistering, pain, pigment change, scarring, and incomplete resolution.  Advised patient to return if lesions do not completely resolve.  Wound care sheet given.

## 2020-11-04 NOTE — LETTER
11/4/2020         RE: Maurice Jon  [address] [place] [address]        Dear Colleague,    Thank you for referring your patient, Maurice Jon, to the [facility]. Please see a copy of my visit note below.    Chief Complaint   Patient presents with     Skin Tags       Vitals:    11/04/20 1540   BP: 122/66   BP Location: Left arm   Patient Position: Sitting   Cuff Size: Adult Regular   Pulse: 72   Resp: 20     Wt Readings from Last 1 Encounters:   05/28/20 95.3 kg (210 lb)       Tomás BRIONES RN   Specialty Clinics     Maurice Jon is a 59 year old year old male patient here today for recheck skin tags.He notes he has a few more areas he would like treated with cryo. Patient has no other skin complaints today.  Remainder of the HPI, Meds, PMH, Allergies, FH, and SH was reviewed in chart.   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 8/22/2005    Cirrhosis, idiopathic     Edema 5/13/2013     Esophageal varices with bleeding(456.0)     Pt denies     Gallstones      Genital herpes, unspecified 3/20/1999    resolving herpes progenitalis     GERD (gastroesophageal reflux disease)      Hematuria      Hypertension      Hypochondriasis     problems in past     Obesity 11/24/2010     BRUCE (obstructive sleep apnea) 03/17/2009    Mild AHI 8.4  RDI 31 - Pt refuses to wear his CPAP     Pericarditis      Portal hypertension (H) 1/28/2010     Unspecified thrombocytopenia 8/22/2005    Thrombocytopenia associated with cirrhosis and portal hypertension       Past  Surgical History:   Procedure Laterality Date     ANESTHESIA CARDIOVERSION N/A 1/19/2015    Procedure: ANESTHESIA CARDIOVERSION;  Surgeon: Generic Anesthesia Provider;  Location: [facility]     ANESTHESIA CARDIOVERSION N/A 2/6/2019    Procedure: ANESTHESIA CARDIOVERSION;  Surgeon: GENERIC ANESTHESIA PROVIDER;  Location:  OR     ANESTHESIA CARDIOVERSION N/A 7/5/2019    Procedure: ANESTHESIA FOR CARDIOVERSION  DR. MURGUIA);  Surgeon: GENERIC ANESTHESIA PROVIDER;  Location:  OR     COLONOSCOPY N/A 8/14/2017    Procedure: COLONOSCOPY;  Colonoscopy Called will arrive appx 12:30 Per phone call;  Surgeon: Vincent Whittington MD;  Location: [facility]     CV HEART CATHETERIZATION WITH POSSIBLE INTERVENTION N/A 12/26/2018    Procedure: Heart Catheterization with possible Intervention;  Surgeon: Brandon Arroyo MD;  Location:  HEART CARDIAC CATH LAB     EP ABLATION AV NODE N/A 11/15/2019    Procedure: EP Ablation AV Node;  Surgeon: Ag Maloney MD;  Location:  HEART CARDIAC CATH LAB     EP ABLATION FOCAL AFIB N/A 12/21/2018    Procedure: EP Ablation Focal AFIB;  Surgeon: Kristofer Murguia MD;  Location:  HEART CARDIAC CATH LAB     EP PACEMAKER N/A 11/15/2019    Procedure: EP PACEMAKER;  Surgeon: Ag Maloney MD;  Location:  HEART CARDIAC CATH LAB     ESOPHAGOSCOPY, GASTROSCOPY, DUODENOSCOPY (EGD), COMBINED  7/11/2011    Procedure:COMBINED ESOPHAGOSCOPY, GASTROSCOPY, DUODENOSCOPY (EGD); Surgeon:LAURA LAMAS; Location:[facility]     ESOPHAGOSCOPY, GASTROSCOPY, DUODENOSCOPY (EGD), COMBINED  9/10/2012    Procedure: COMBINED ESOPHAGOSCOPY, GASTROSCOPY, DUODENOSCOPY (EGD);;  Surgeon: Hi Roque MD;  Location:  GI     ESOPHAGOSCOPY, GASTROSCOPY, DUODENOSCOPY (EGD), COMBINED  9/9/2013    Procedure: COMBINED ESOPHAGOSCOPY, GASTROSCOPY, DUODENOSCOPY (EGD);;  Surgeon: Hi Roque MD;  Location:  GI     ESOPHAGOSCOPY, GASTROSCOPY, DUODENOSCOPY (EGD), COMBINED N/A 9/15/2014    Procedure: COMBINED  ESOPHAGOSCOPY, GASTROSCOPY, DUODENOSCOPY (EGD);  Surgeon: Hi Roque MD;  [facility]: [facility]     ESOPHAGOSCOPY, GASTROSCOPY, DUODENOSCOPY (EGD), COMBINED N/A 10/30/2015    Procedure: COMBINED ESOPHAGOSCOPY, GASTROSCOPY, DUODENOSCOPY (EGD);  Surgeon: Feliberto Fox MD;  Location: [facility]     ESOPHAGOSCOPY, GASTROSCOPY, DUODENOSCOPY (EGD), COMBINED N/A 10/10/2016    Procedure: COMBINED ESOPHAGOSCOPY, GASTROSCOPY, DUODENOSCOPY (EGD);  Surgeon: Jose Nesbitt MD;  Location: [facility]     ESOPHAGOSCOPY, GASTROSCOPY, DUODENOSCOPY (EGD), COMBINED N/A 9/26/2019    Procedure: ESOPHAGOGASTRODUODENOSCOPY (EGD);  Surgeon: Timo Soares MD;  [facility]     H ABLATION FOCAL AFIB  12/21/2018     HERNIA REPAIR       IRRIGATION AND DEBRIDEMENT ABSCESS SCROTUM, COMBINED  10/4/2012    Procedure: COMBINED IRRIGATION AND DEBRIDEMENT ABSCESS SCROTUM;  Irrigation and Debridement of Groin Abscess;  Surgeon: Benny Shoemaker MD;  Location: [facility]     SOFT TISSUE SURGERY       SURGICAL HISTORY OF -   1993    rt elbow     SURGICAL HISTORY OF -   1/15/98    repair of ventral and umbilical hernia     SURGICAL HISTORY OF -   2001    endoscopy        Family History   Problem Relation Age of Onset     Lipids Mother      Hypertension Mother      Eye Disorder Mother      Heart Disease Father         CHF     Lipids Father      Obesity Father      Heart Disease Brother         MI     Eye Disorder Brother      Hypertension Brother         portal HTN     Thrombosis Sister         in her lung     Eye Disorder Sister      Cancer Other         maternal grandparent/throat cancer       Social History     Socioeconomic History     Marital status:      Spouse name: Not on file     Number of children: Not on file     Years of education: Not on file     Highest education level: Not on file   Occupational History     Not on file   Social Needs     Financial resource strain: Not on file     Food insecurity     Worry: Not on file      Inability: Not on file     Transportation needs     Medical: Not on file     Non-medical: Not on file   Tobacco Use     Smoking status: Former Smoker     Packs/day: 0.80     Years: 6.00     Pack years: 4.80     Types: Cigarettes     Quit date: 2002     Years since quittin.8     Smokeless tobacco: Never Used   Substance and Sexual Activity     Alcohol use: No     Alcohol/week: 0.0 standard drinks     Comment: quit in      Drug use: No     Sexual activity: Yes     Partners: Female   Lifestyle     Physical activity     Days per week: Not on file     Minutes per session: Not on file     Stress: Not on file   Relationships     Social connections     Talks on phone: Not on file     Gets together: Not on file     Attends Jewish service: Not on file     Active member of club or organization: Not on file     Attends meetings of clubs or organizations: Not on file     Relationship status: Not on file     Intimate partner violence     Fear of current or ex partner: Not on file     Emotionally abused: Not on file     Physically abused: Not on file     Forced sexual activity: Not on file   Other Topics Concern     Parent/sibling w/ CABG, MI or angioplasty before 65F 55M? Yes     Comment: BROTHER   - MI AT AGE 44      Service Not Asked     Blood Transfusions Not Asked     Caffeine Concern Not Asked     Occupational Exposure Not Asked     Hobby Hazards Not Asked     Sleep Concern Not Asked     Stress Concern Not Asked     Weight Concern Not Asked     Special Diet Not Asked     Back Care Not Asked     Exercise No     Bike Helmet Not Asked     Seat Belt Not Asked     Self-Exams Not Asked   Social History Narrative     Not on file       Outpatient Encounter Medications as of 2020   Medication Sig Dispense Refill     ACE/ARB/ARNI NOT PRESCRIBED (INTENTIONAL) Please choose reason not prescribed, below       Acetaminophen 325 MG CAPS Take 325-650 mg by mouth every 6 hours as needed       albuterol  (PROAIR RESPICLICK) 108 (90 Base) MCG/ACT inhaler Inhale 2 puffs into the lungs every 6 hours as needed for shortness of breath / dyspnea or wheezing 1 each 4     ASPIRIN NOT PRESCRIBED (INTENTIONAL) Please choose reason not prescribed, below       calcium carb 1250 mg, 500 mg [place],/vitamin D 200 units (OSCAL WITH D) 500-200 MG-UNIT per tablet Take 2 tablets by mouth daily with food.       diphenhydrAMINE HCl (BENADRYL PO) Take 25 mg by mouth as needed        metoprolol succinate ER (TOPROL XL) 25 MG 24 hr tablet Take 1 tablet (25 mg) by mouth daily 90 tablet 3     Multiple Vitamins-Minerals (MENS 50+ MULTI VITAMIN/MIN PO) Take 1 tablet by mouth daily       order for DME Open toe size large 30/40 Med[employer] Assure Medical Compression socks Model 08398.  Or similar as per availability/formulary.  Fax to Fax [phone]. [facility] [employer] medical 12 Device 0     order for DME Equipment being ordered:   New CPAP mask, tube, filters,water tray 1 Device 3     ORDER FOR DME Respironics RemStar 60 Series Auto AFlex 12-15 cm H2O with heated humidty and a modem.  Pt chose a Quattro Air FFM size medium.       pantoprazole (PROTONIX) 40 MG EC tablet Take 1 tablet (40 mg) by mouth daily 90 tablet 0     potassium chloride ER (K-TAB) 20 MEQ CR tablet Take 1 tablet (20 mEq) by mouth daily 90 tablet 3     spironolactone (ALDACTONE) 25 MG tablet Take 1 tablet (25 mg) by mouth daily 90 tablet 3     STATIN NOT PRESCRIBED (INTENTIONAL) Please choose reason not prescribed, below       torsemide (DEMADEX) 20 MG tablet Take 2 tablets daily in AM and 1 tab daily at noon 90 tablet 2     vitamin D3 (CHOLECALCIFEROL) 2000 units (50 mcg) tablet Take 1 tablet by mouth daily       warfarin ANTICOAGULANT (JANTOVEN ANTICOAGULANT) 2.5 MG tablet 1.25 mg Fridays; 2.5mg all other days or As directed by Anticoagulation Clinic. INR DUE FOR FURTHER REFILLS 100 tablet 0     ferrous gluconate (FERGON) 324 (38 Fe) MG tablet Take 1 tablet (324 mg) by mouth 3 times  daily (with meals) (Patient not taking: Reported on 10/29/2020) 100 tablet 4     mupirocin (BACTROBAN) 2 % external ointment Apply topically 3 times daily Apply to open sores on lower legs. (Patient not taking: Reported on 9/17/2020) 30 g 3     oxyCODONE (ROXICODONE) 5 MG tablet Take 1 tablet (5 mg) by mouth every 6 hours as needed for pain (Patient not taking: Reported on 9/17/2020) 15 tablet 0     triamcinolone (KENALOG) 0.1 % external cream Apply topically 2 times daily (Patient not taking: Reported on 11/4/2020) 80 g 1     No facility-administered encounter medications on file as of 11/4/2020.              Review Of Systems  Skin: As above  Eyes: negative  Ears/Nose/Throat: negative  Respiratory: No shortness of breath, dyspnea on exertion, cough      O:   NAD, WDWN, Alert & Oriented, Mood & Affect wnl, Vitals stable   Here today alone   /66 (BP Location: Left arm, Patient Position: Sitting, Cuff Size: Adult Regular)   Pulse 72   Resp 20    General appearance normal   Vitals stable   Alert, oriented and in no acute distress     Skin colored pedunculated papules on bilateral axilla x 6      Eyes: Conjunctivae/lids:Normal     ENT: Lips: normal    MSK:Normal    Pulm: Breathing Normal    Neuro/Psych: Orientation:Alert and Orientedx3 ; Mood/Affect:normal     A/P:  1. Inflamed skin tags x 6 total  LN2:  Treated with LN2 for 5s for 1-2 cycles. Warned risks of blistering, pain, pigment change, scarring, and incomplete resolution.  Advised patient to return if lesions do not completely resolve.  Wound care sheet given.        Again, thank you for allowing me to participate in the care of your patient.        Sincerely,        Diana Youssef PA-C

## 2020-11-05 NOTE — TELEPHONE ENCOUNTER
Received message that patient was hoping to move his PPM appointment to December due to insurance purposes.  Patient is currently scheduled for 1/12/21 in [place].  There was a cancellation for 12/8/20 at 0815 in [place].  Called and left message for patient asking that he call back and let us know if he would like his appointment moved, device clinic phone number provided.  Temporary hold placed on appointment on 12/8/20 while waiting for patient to call back.      RIKI Yadav

## 2020-11-11 NOTE — PROGRESS NOTES
ANTICOAGULATION FOLLOW-UP CLINIC VISIT    Patient Name:  Maurice Jon  Date:  2020  Contact Type:  Telephone    SUBJECTIVE:  Patient Findings     Positives:  Change in medications (Potassium daily)    Comments:  No changes in activity or diet noted. No concerns with clotting, bleeding, or increased bruising noted. Took warfarin as prescribed.  Patient is to continue maintenance warfarin plan, and check INR in 6 weeks.  Patient verbalizes understanding and agrees to plan. No further questions or concerns.        Clinical Outcomes     Negatives:  Major bleeding event, Thromboembolic event, Anticoagulation-related hospital admission, Anticoagulation-related ED visit, Anticoagulation-related fatality    Comments:  No changes in activity or diet noted. No concerns with clotting, bleeding, or increased bruising noted. Took warfarin as prescribed.  Patient is to continue maintenance warfarin plan, and check INR in 6 weeks.  Patient verbalizes understanding and agrees to plan. No further questions or concerns.           OBJECTIVE    Recent labs: (last 7 days)     20  1533   INR 2.60*       ASSESSMENT / PLAN  INR assessment THER    Recheck INR In: 6 WEEKS    INR Location Clinic      Anticoagulation Summary  As of 2020    INR goal:  2.0-3.0   TTR:  90.6 % (1 y)   INR used for dosin.60 (2020)   Warfarin maintenance plan:  1.25 mg (2.5 mg x 0.5) every Fri; 2.5 mg (2.5 mg x 1) all other days   Full warfarin instructions:  1.25 mg every Fri; 2.5 mg all other days   Weekly warfarin total:  16.25 mg   No change documented:  Sondra Santos RN   Plan last modified:  Susan Beyer RN (2018)   Next INR check:  2020   Priority:  Maintenance   Target end date:  10/4/2018    Indications    Paroxysmal atrial fibrillation (H) [I48.0]  Atrial fibrillation with rapid ventricular response (H) (Resolved) [I48.91]  Long-term (current) use of anticoagulants [Z79.01] [Z79.01]  Chronic atrial  fibrillation (H) [I48.20]  Atrial fibrillation with rapid ventricular response (H) [I48.91]             Anticoagulation Episode Summary     INR check location:      Preferred lab:      Send INR reminders to:  [facility]    Comments:  * anticoagulation short period surrounding ablation on 12/21/18. Cardiology to decide when to stop warfarin. has well-compensated cirrhosis      Anticoagulation Care Providers     Provider Role Specialty Phone number    Alon Pineda MD Referring Family Medicine [phone]            See the Encounter Report to view Anticoagulation Flowsheet and Dosing Calendar (Go to Encounters tab in chart review, and find the Anticoagulation Therapy Visit)        Sondra Santos RN

## 2020-12-07 NOTE — TELEPHONE ENCOUNTER
"Requested Prescriptions   Pending Prescriptions Disp Refills     pantoprazole (PROTONIX) 40 MG EC tablet [Pharmacy Med Name: pantoprazole 40 mg tablet,delayed release] 90 tablet 0     Sig: Take 1 tablet (40 mg) by mouth daily       PPI Protocol Passed - 12/5/2020  9:29 AM        Passed - Not on Clopidogrel (unless Pantoprazole ordered)        Passed - No diagnosis of osteoporosis on record        Passed - Recent (12 mo) or future (30 days) visit within the authorizing provider's specialty     Patient has had an office visit with the authorizing provider or a provider within the authorizing providers department within the previous 12 mos or has a future within next 30 days. See \"Patient Info\" tab in inbasket, or \"Choose Columns\" in Meds & Orders section of the refill encounter.              Passed - Medication is active on med list        Passed - Patient is age 18 or older           "

## 2020-12-07 NOTE — TELEPHONE ENCOUNTER
outing refill request to provider for review/approval because:  Was due to be seen in Nov 2020. Courtesy refill given on 9.11.20    Araseli Mcgraw RN

## 2020-12-21 NOTE — PROGRESS NOTES
ANTICOAGULATION MANAGEMENT     Patient Name:  Maurice Jon  Date:  2020    ASSESSMENT /SUBJECTIVE:    Today's INR result of 2.40 is therapeutic. Goal INR of 2.0-3.0      Warfarin dose taken: Warfarin taken as instructed    Diet: No new diet changes affecting INR    Medication changes/ interactions: No new medications/supplements affecting INR    Previous INR: Therapeutic     S/S of bleeding or thromboembolism: No    New injury or illness: No    Upcoming surgery, procedure or cardioversion: No    Additional findings: None      PLAN:    Telephone call with Maurice regarding INR result and instructed:     Warfarin Dosing Instructions: Continue your current warfarin dose 1.25 mg every Fri; 2.5 mg all other days    Instructed patient to follow up no later than: 6 weeks  Lab visit scheduled    Education provided: Target INR goal and significance of current INR result      Yomi verbalizes understanding and agrees to warfarin dosing plan.    Instructed to call the Anticoagulation Clinic for any changes, questions or concerns. (#[phone])        Christina Singer RN      OBJECTIVE:  Recent labs: (last 7 days)     20  1602   INR 2.40*         No question data found.  Anticoagulation Summary  As of 2020    INR goal:  2.0-3.0   TTR:  92.5 % (1 y)   INR used for dosin.40 (2020)   Warfarin maintenance plan:  1.25 mg (2.5 mg x 0.5) every Fri; 2.5 mg (2.5 mg x 1) all other days   Full warfarin instructions:  1.25 mg every Fri; 2.5 mg all other days   Weekly warfarin total:  16.25 mg   No change documented:  Christina Singer RN   Plan last modified:  Susan Beyer RN (2018)   Next INR check:  2021   Priority:  Maintenance   Target end date:  10/4/2018    Indications    Paroxysmal atrial fibrillation (H) [I48.0]  Atrial fibrillation with rapid ventricular response (H) (Resolved) [I48.91]  Long-term (current) use of anticoagulants [Z79.01] [Z79.01]  Chronic atrial fibrillation  (H) [I48.20]  Atrial fibrillation with rapid ventricular response (H) [I48.91]             Anticoagulation Episode Summary     INR check location:      Preferred lab:      Send INR reminders to:  [facility]    Comments:  * anticoagulation short period surrounding ablation on 12/21/18. Cardiology to decide when to stop warfarin. has well-compensated cirrhosis      Anticoagulation Care Providers     Provider Role Specialty Phone number    Alon Pineda MD Referring Family Medicine [phone]

## 2021-01-01 ENCOUNTER — TELEPHONE (OUTPATIENT)
Dept: CARDIOLOGY | Facility: CLINIC | Age: 61
End: 2021-01-01

## 2021-01-01 ENCOUNTER — HOSPITAL ENCOUNTER (OUTPATIENT)
Dept: ULTRASOUND IMAGING | Facility: CLINIC | Age: 61
Discharge: HOME | End: 2021-04-29
Attending: INTERNAL MEDICINE | Admitting: INTERNAL MEDICINE
Payer: COMMERCIAL

## 2021-01-01 ENCOUNTER — NURSE TRIAGE (OUTPATIENT)
Dept: NURSING | Facility: CLINIC | Age: 61
End: 2021-01-01

## 2021-01-01 ENCOUNTER — DOCUMENTATION ONLY (OUTPATIENT)
Dept: ANTICOAGULATION | Facility: CLINIC | Age: 61
End: 2021-01-01

## 2021-01-01 ENCOUNTER — ANTICOAGULATION THERAPY VISIT (OUTPATIENT)
Dept: NURSING | Facility: CLINIC | Age: 61
End: 2021-01-01

## 2021-01-01 ENCOUNTER — ANTICOAGULATION THERAPY VISIT (OUTPATIENT)
Dept: ANTICOAGULATION | Facility: CLINIC | Age: 61
End: 2021-01-01

## 2021-01-01 ENCOUNTER — HEALTH MAINTENANCE LETTER (OUTPATIENT)
Age: 61
End: 2021-01-01

## 2021-01-01 ENCOUNTER — TELEPHONE (OUTPATIENT)
Dept: FAMILY MEDICINE | Facility: CLINIC | Age: 61
End: 2021-01-01

## 2021-01-01 ENCOUNTER — VIRTUAL VISIT (OUTPATIENT)
Dept: FAMILY MEDICINE | Facility: CLINIC | Age: 61
End: 2021-01-01
Payer: COMMERCIAL

## 2021-01-01 ENCOUNTER — OFFICE VISIT (OUTPATIENT)
Dept: FAMILY MEDICINE | Facility: CLINIC | Age: 61
End: 2021-01-01
Payer: COMMERCIAL

## 2021-01-01 ENCOUNTER — OFFICE VISIT (OUTPATIENT)
Dept: FAMILY MEDICINE | Facility: CLINIC | Age: 61
End: 2021-01-01
Payer: OTHER MISCELLANEOUS

## 2021-01-01 ENCOUNTER — ANCILLARY PROCEDURE (OUTPATIENT)
Dept: CARDIOLOGY | Facility: CLINIC | Age: 61
End: 2021-01-01
Attending: INTERNAL MEDICINE
Payer: COMMERCIAL

## 2021-01-01 ENCOUNTER — APPOINTMENT (OUTPATIENT)
Dept: CT IMAGING | Facility: CLINIC | Age: 61
DRG: 441 | End: 2021-01-01
Attending: EMERGENCY MEDICINE
Payer: COMMERCIAL

## 2021-01-01 ENCOUNTER — APPOINTMENT (OUTPATIENT)
Dept: CT IMAGING | Facility: CLINIC | Age: 61
End: 2021-01-01
Attending: FAMILY MEDICINE
Payer: COMMERCIAL

## 2021-01-01 ENCOUNTER — VIRTUAL VISIT (OUTPATIENT)
Dept: GASTROENTEROLOGY | Facility: CLINIC | Age: 61
End: 2021-01-01
Attending: INTERNAL MEDICINE
Payer: COMMERCIAL

## 2021-01-01 ENCOUNTER — HOSPITAL ENCOUNTER (EMERGENCY)
Facility: CLINIC | Age: 61
End: 2021-07-17
Attending: FAMILY MEDICINE | Admitting: FAMILY MEDICINE
Payer: COMMERCIAL

## 2021-01-01 ENCOUNTER — ANTICOAGULATION THERAPY VISIT (OUTPATIENT)
Dept: FAMILY MEDICINE | Facility: CLINIC | Age: 61
End: 2021-01-01

## 2021-01-01 ENCOUNTER — PATIENT OUTREACH (OUTPATIENT)
Dept: NURSING | Facility: CLINIC | Age: 61
End: 2021-01-01
Payer: COMMERCIAL

## 2021-01-01 ENCOUNTER — TELEPHONE (OUTPATIENT)
Dept: EMERGENCY MEDICINE | Facility: CLINIC | Age: 61
End: 2021-01-01

## 2021-01-01 ENCOUNTER — MYC MEDICAL ADVICE (OUTPATIENT)
Dept: GASTROENTEROLOGY | Facility: CLINIC | Age: 61
End: 2021-01-01
Payer: COMMERCIAL

## 2021-01-01 ENCOUNTER — APPOINTMENT (OUTPATIENT)
Dept: ULTRASOUND IMAGING | Facility: CLINIC | Age: 61
DRG: 441 | End: 2021-01-01
Attending: PHYSICIAN ASSISTANT
Payer: COMMERCIAL

## 2021-01-01 ENCOUNTER — HOSPITAL ENCOUNTER (OUTPATIENT)
Facility: CLINIC | Age: 61
End: 2021-01-01
Attending: INTERNAL MEDICINE | Admitting: INTERNAL MEDICINE
Payer: COMMERCIAL

## 2021-01-01 ENCOUNTER — HOSPITAL ENCOUNTER (EMERGENCY)
Facility: CLINIC | Age: 61
Discharge: HOME | End: 2021-01-23
Attending: FAMILY MEDICINE | Admitting: FAMILY MEDICINE
Payer: COMMERCIAL

## 2021-01-01 ENCOUNTER — TELEPHONE (OUTPATIENT)
Dept: GASTROENTEROLOGY | Facility: CLINIC | Age: 61
End: 2021-01-01

## 2021-01-01 ENCOUNTER — APPOINTMENT (OUTPATIENT)
Dept: GENERAL RADIOLOGY | Facility: CLINIC | Age: 61
DRG: 441 | End: 2021-01-01
Attending: EMERGENCY MEDICINE
Payer: COMMERCIAL

## 2021-01-01 ENCOUNTER — TELEPHONE (OUTPATIENT)
Dept: LAB | Facility: CLINIC | Age: 61
End: 2021-01-01

## 2021-01-01 ENCOUNTER — HOSPITAL ENCOUNTER (INPATIENT)
Facility: CLINIC | Age: 61
LOS: 2 days | Discharge: HOME | DRG: 441 | End: 2021-03-23
Attending: EMERGENCY MEDICINE | Admitting: FAMILY MEDICINE
Payer: COMMERCIAL

## 2021-01-01 ENCOUNTER — ANCILLARY PROCEDURE (OUTPATIENT)
Dept: GENERAL RADIOLOGY | Facility: CLINIC | Age: 61
End: 2021-01-01
Attending: FAMILY MEDICINE
Payer: COMMERCIAL

## 2021-01-01 ENCOUNTER — HOSPITAL ENCOUNTER (OUTPATIENT)
Dept: CARDIOLOGY | Facility: CLINIC | Age: 61
Discharge: HOME | End: 2021-07-02
Attending: INTERNAL MEDICINE | Admitting: INTERNAL MEDICINE
Payer: COMMERCIAL

## 2021-01-01 VITALS
DIASTOLIC BLOOD PRESSURE: 47 MMHG | OXYGEN SATURATION: 98 % | BODY MASS INDEX: 37.61 KG/M2 | RESPIRATION RATE: 18 BRPM | WEIGHT: 212.3 LBS | TEMPERATURE: 98 F | SYSTOLIC BLOOD PRESSURE: 109 MMHG | HEART RATE: 70 BPM

## 2021-01-01 VITALS
WEIGHT: 217 LBS | TEMPERATURE: 98.6 F | BODY MASS INDEX: 38.45 KG/M2 | OXYGEN SATURATION: 96 % | HEART RATE: 71 BPM | SYSTOLIC BLOOD PRESSURE: 102 MMHG | HEIGHT: 63 IN | RESPIRATION RATE: 16 BRPM | DIASTOLIC BLOOD PRESSURE: 60 MMHG

## 2021-01-01 VITALS
SYSTOLIC BLOOD PRESSURE: 112 MMHG | DIASTOLIC BLOOD PRESSURE: 60 MMHG | TEMPERATURE: 98.4 F | RESPIRATION RATE: 16 BRPM | WEIGHT: 230 LBS | OXYGEN SATURATION: 96 % | HEART RATE: 73 BPM | BODY MASS INDEX: 40.1 KG/M2

## 2021-01-01 VITALS
HEIGHT: 63 IN | HEART RATE: 74 BPM | OXYGEN SATURATION: 97 % | TEMPERATURE: 97.4 F | RESPIRATION RATE: 16 BRPM | SYSTOLIC BLOOD PRESSURE: 110 MMHG | BODY MASS INDEX: 38.8 KG/M2 | DIASTOLIC BLOOD PRESSURE: 60 MMHG | WEIGHT: 219 LBS

## 2021-01-01 VITALS
OXYGEN SATURATION: 97 % | TEMPERATURE: 97.6 F | RESPIRATION RATE: 16 BRPM | BODY MASS INDEX: 35.85 KG/M2 | HEART RATE: 70 BPM | SYSTOLIC BLOOD PRESSURE: 112 MMHG | DIASTOLIC BLOOD PRESSURE: 60 MMHG | HEIGHT: 64 IN | WEIGHT: 210 LBS

## 2021-01-01 DIAGNOSIS — I48.0 PAROXYSMAL ATRIAL FIBRILLATION (H): ICD-10-CM

## 2021-01-01 DIAGNOSIS — Z95.0 CARDIAC PACEMAKER IN SITU: ICD-10-CM

## 2021-01-01 DIAGNOSIS — Z79.01 LONG TERM CURRENT USE OF ANTICOAGULANT THERAPY: ICD-10-CM

## 2021-01-01 DIAGNOSIS — I48.20 CHRONIC ATRIAL FIBRILLATION (H): ICD-10-CM

## 2021-01-01 DIAGNOSIS — I47.29 NSVT (NONSUSTAINED VENTRICULAR TACHYCARDIA) (H): Primary | ICD-10-CM

## 2021-01-01 DIAGNOSIS — I48.91 ATRIAL FIBRILLATION WITH RAPID VENTRICULAR RESPONSE (H): ICD-10-CM

## 2021-01-01 DIAGNOSIS — D69.6 THROMBOCYTOPENIA (H): ICD-10-CM

## 2021-01-01 DIAGNOSIS — K74.60 LIVER CIRRHOSIS SECONDARY TO NONALCOHOLIC STEATOHEPATITIS (NASH) (H): ICD-10-CM

## 2021-01-01 DIAGNOSIS — G93.40 ENCEPHALOPATHY: Primary | ICD-10-CM

## 2021-01-01 DIAGNOSIS — I50.23 ACUTE ON CHRONIC SYSTOLIC CONGESTIVE HEART FAILURE (H): ICD-10-CM

## 2021-01-01 DIAGNOSIS — K74.60 CIRRHOSIS OF LIVER WITHOUT ASCITES, UNSPECIFIED HEPATIC CIRRHOSIS TYPE (H): Primary | ICD-10-CM

## 2021-01-01 DIAGNOSIS — I50.41 ACUTE COMBINED SYSTOLIC AND DIASTOLIC (CONGESTIVE) HRT FAIL (H): ICD-10-CM

## 2021-01-01 DIAGNOSIS — A49.9 UTI (URINARY TRACT INFECTION), BACTERIAL: ICD-10-CM

## 2021-01-01 DIAGNOSIS — I47.29 NSVT (NONSUSTAINED VENTRICULAR TACHYCARDIA) (H): ICD-10-CM

## 2021-01-01 DIAGNOSIS — K74.60 CIRRHOSIS OF LIVER WITHOUT ASCITES, UNSPECIFIED HEPATIC CIRRHOSIS TYPE (H): ICD-10-CM

## 2021-01-01 DIAGNOSIS — J44.9 CHRONIC OBSTRUCTIVE PULMONARY DISEASE, UNSPECIFIED COPD TYPE (H): ICD-10-CM

## 2021-01-01 DIAGNOSIS — R79.89 HYPOURICEMIA: ICD-10-CM

## 2021-01-01 DIAGNOSIS — N39.0 URINARY TRACT INFECTION: ICD-10-CM

## 2021-01-01 DIAGNOSIS — I42.9 CARDIOMYOPATHY (H): ICD-10-CM

## 2021-01-01 DIAGNOSIS — M54.9 MUSCULOSKELETAL BACK PAIN: ICD-10-CM

## 2021-01-01 DIAGNOSIS — Z11.59 ENCOUNTER FOR SCREENING FOR OTHER VIRAL DISEASES: ICD-10-CM

## 2021-01-01 DIAGNOSIS — R79.1 ELEVATED INR: ICD-10-CM

## 2021-01-01 DIAGNOSIS — I50.810 RIGHT-SIDED CONGESTIVE HEART FAILURE, UNSPECIFIED HF CHRONICITY (H): ICD-10-CM

## 2021-01-01 DIAGNOSIS — S22.000A COMPRESSION FRACTURE OF THORACIC VERTEBRA, INITIAL ENCOUNTER, UNSPECIFIED THORACIC VERTEBRAL LEVEL: ICD-10-CM

## 2021-01-01 DIAGNOSIS — K76.6 PORTAL HYPERTENSION (H): ICD-10-CM

## 2021-01-01 DIAGNOSIS — R41.82 ALTERED MENTAL STATUS, UNSPECIFIED ALTERED MENTAL STATUS TYPE: ICD-10-CM

## 2021-01-01 DIAGNOSIS — K75.81 LIVER CIRRHOSIS SECONDARY TO NONALCOHOLIC STEATOHEPATITIS (NASH) (H): Primary | ICD-10-CM

## 2021-01-01 DIAGNOSIS — M54.9 MUSCULOSKELETAL BACK PAIN: Primary | ICD-10-CM

## 2021-01-01 DIAGNOSIS — I50.41 ACUTE COMBINED SYSTOLIC AND DIASTOLIC (CONGESTIVE) HRT FAIL (H): Primary | ICD-10-CM

## 2021-01-01 DIAGNOSIS — K21.9 GASTROESOPHAGEAL REFLUX DISEASE WITHOUT ESOPHAGITIS: ICD-10-CM

## 2021-01-01 DIAGNOSIS — N39.0 UTI (URINARY TRACT INFECTION), BACTERIAL: ICD-10-CM

## 2021-01-01 DIAGNOSIS — K74.60 LIVER CIRRHOSIS SECONDARY TO NONALCOHOLIC STEATOHEPATITIS (NASH) (H): Primary | ICD-10-CM

## 2021-01-01 DIAGNOSIS — I25.10 CORONARY ARTERY DISEASE INVOLVING NATIVE CORONARY ARTERY OF NATIVE HEART WITHOUT ANGINA PECTORIS: ICD-10-CM

## 2021-01-01 DIAGNOSIS — I42.9 CARDIOMYOPATHY, UNSPECIFIED TYPE (H): ICD-10-CM

## 2021-01-01 DIAGNOSIS — Z95.0 BIVENTRICULAR CARDIAC PACEMAKER IN SITU: ICD-10-CM

## 2021-01-01 DIAGNOSIS — R79.89 INCREASED AMMONIA LEVEL: ICD-10-CM

## 2021-01-01 DIAGNOSIS — R07.9 CHEST PAIN, UNSPECIFIED TYPE: ICD-10-CM

## 2021-01-01 DIAGNOSIS — S22.000A COMPRESSION FRACTURE OF THORACIC VERTEBRA, INITIAL ENCOUNTER, UNSPECIFIED THORACIC VERTEBRAL LEVEL: Primary | ICD-10-CM

## 2021-01-01 DIAGNOSIS — U07.1 2019 NOVEL CORONAVIRUS DISEASE (COVID-19): Primary | ICD-10-CM

## 2021-01-01 DIAGNOSIS — K75.81 LIVER CIRRHOSIS SECONDARY TO NONALCOHOLIC STEATOHEPATITIS (NASH) (H): ICD-10-CM

## 2021-01-01 DIAGNOSIS — R79.1 ABNORMAL COAGULATION PROFILE: ICD-10-CM

## 2021-01-01 DIAGNOSIS — E66.01 MORBID OBESITY (H): ICD-10-CM

## 2021-01-01 DIAGNOSIS — I46.9 CARDIAC ARREST (H): ICD-10-CM

## 2021-01-01 DIAGNOSIS — I48.0 PAROXYSMAL ATRIAL FIBRILLATION (H): Primary | ICD-10-CM

## 2021-01-01 DIAGNOSIS — Z11.52 ENCOUNTER FOR SCREENING LABORATORY TESTING FOR SEVERE ACUTE RESPIRATORY SYNDROME CORONAVIRUS 2 (SARS-COV-2): ICD-10-CM

## 2021-01-01 LAB
AFP SERPL-MCNC: 2.2 UG/L (ref 0–8)
ALBUMIN SERPL-MCNC: 2.8 G/DL (ref 3.4–5)
ALBUMIN SERPL-MCNC: 2.9 G/DL (ref 3.4–5)
ALBUMIN SERPL-MCNC: 3.2 G/DL (ref 3.4–5)
ALBUMIN SERPL-MCNC: 3.5 G/DL (ref 3.4–5)
ALBUMIN SERPL-MCNC: 3.5 G/DL (ref 3.4–5)
ALBUMIN UR-MCNC: NEGATIVE MG/DL
ALP SERPL-CCNC: 109 U/L (ref 40–150)
ALP SERPL-CCNC: 74 U/L (ref 40–150)
ALP SERPL-CCNC: 79 U/L (ref 40–150)
ALP SERPL-CCNC: 91 U/L (ref 40–150)
ALP SERPL-CCNC: 99 U/L (ref 40–150)
ALT SERPL W P-5'-P-CCNC: 44 U/L (ref 0–70)
ALT SERPL W P-5'-P-CCNC: 45 U/L (ref 0–70)
ALT SERPL W P-5'-P-CCNC: 48 U/L (ref 0–70)
ALT SERPL W P-5'-P-CCNC: 57 U/L (ref 0–70)
ALT SERPL W P-5'-P-CCNC: 58 U/L (ref 0–70)
AMMONIA PLAS-SCNC: 58 UMOL/L (ref 10–50)
AMMONIA PLAS-SCNC: 60 UMOL/L (ref 10–50)
AMMONIA PLAS-SCNC: 84 UMOL/L (ref 10–50)
ANION GAP SERPL CALCULATED.3IONS-SCNC: 10 MMOL/L (ref 3–14)
ANION GAP SERPL CALCULATED.3IONS-SCNC: 4 MMOL/L (ref 3–14)
ANION GAP SERPL CALCULATED.3IONS-SCNC: 5 MMOL/L (ref 3–14)
ANION GAP SERPL CALCULATED.3IONS-SCNC: 6 MMOL/L (ref 3–14)
ANION GAP SERPL CALCULATED.3IONS-SCNC: 7 MMOL/L (ref 3–14)
ANION GAP SERPL CALCULATED.3IONS-SCNC: 8 MMOL/L (ref 3–14)
APPEARANCE UR: CLEAR
AST SERPL W P-5'-P-CCNC: 43 U/L (ref 0–45)
AST SERPL W P-5'-P-CCNC: 50 U/L (ref 0–45)
AST SERPL W P-5'-P-CCNC: 54 U/L (ref 0–45)
AST SERPL W P-5'-P-CCNC: 73 U/L (ref 0–45)
AST SERPL W P-5'-P-CCNC: 76 U/L (ref 0–45)
BACTERIA SPEC CULT: ABNORMAL
BACTERIA SPEC CULT: ABNORMAL
BACTERIA SPEC CULT: NO GROWTH
BACTERIA SPEC CULT: NO GROWTH
BASE EXCESS BLDV CALC-SCNC: 3.1 MMOL/L
BASOPHILS # BLD AUTO: 0 10E9/L (ref 0–0.2)
BASOPHILS NFR BLD AUTO: 0.2 %
BASOPHILS NFR BLD AUTO: 0.3 %
BASOPHILS NFR BLD AUTO: 0.3 %
BASOPHILS NFR BLD AUTO: 0.5 %
BILIRUB DIRECT SERPL-MCNC: 0.5 MG/DL (ref 0–0.2)
BILIRUB SERPL-MCNC: 1 MG/DL (ref 0.2–1.3)
BILIRUB SERPL-MCNC: 1.1 MG/DL (ref 0.2–1.3)
BILIRUB SERPL-MCNC: 1.2 MG/DL (ref 0.2–1.3)
BILIRUB SERPL-MCNC: 1.3 MG/DL (ref 0.2–1.3)
BILIRUB SERPL-MCNC: 1.3 MG/DL (ref 0.2–1.3)
BILIRUB UR QL STRIP: NEGATIVE
BUN SERPL-MCNC: 17 MG/DL (ref 7–30)
BUN SERPL-MCNC: 17 MG/DL (ref 7–30)
BUN SERPL-MCNC: 18 MG/DL (ref 7–30)
BUN SERPL-MCNC: 19 MG/DL (ref 7–30)
BUN SERPL-MCNC: 20 MG/DL (ref 7–30)
BUN SERPL-MCNC: 23 MG/DL (ref 7–30)
CALCIUM SERPL-MCNC: 7.7 MG/DL (ref 8.5–10.1)
CALCIUM SERPL-MCNC: 7.9 MG/DL (ref 8.5–10.1)
CALCIUM SERPL-MCNC: 8 MG/DL (ref 8.5–10.1)
CALCIUM SERPL-MCNC: 8.4 MG/DL (ref 8.5–10.1)
CALCIUM SERPL-MCNC: 8.5 MG/DL (ref 8.5–10.1)
CALCIUM SERPL-MCNC: 8.7 MG/DL (ref 8.5–10.1)
CALCIUM SERPL-MCNC: 9.3 MG/DL (ref 8.5–10.1)
CALCIUM SERPL-MCNC: 9.8 MG/DL (ref 8.5–10.1)
CAPILLARY BLOOD COLLECTION: NORMAL
CHLORIDE SERPL-SCNC: 100 MMOL/L (ref 94–109)
CHLORIDE SERPL-SCNC: 102 MMOL/L (ref 94–109)
CHLORIDE SERPL-SCNC: 102 MMOL/L (ref 94–109)
CHLORIDE SERPL-SCNC: 103 MMOL/L (ref 94–109)
CHLORIDE SERPL-SCNC: 104 MMOL/L (ref 94–109)
CHLORIDE SERPL-SCNC: 105 MMOL/L (ref 94–109)
CHLORIDE SERPL-SCNC: 106 MMOL/L (ref 94–109)
CHLORIDE SERPL-SCNC: 110 MMOL/L (ref 94–109)
CO2 SERPL-SCNC: 25 MMOL/L (ref 20–32)
CO2 SERPL-SCNC: 26 MMOL/L (ref 20–32)
CO2 SERPL-SCNC: 27 MMOL/L (ref 20–32)
CO2 SERPL-SCNC: 28 MMOL/L (ref 20–32)
CO2 SERPL-SCNC: 29 MMOL/L (ref 20–32)
CO2 SERPL-SCNC: 29 MMOL/L (ref 20–32)
CO2 SERPL-SCNC: 31 MMOL/L (ref 20–32)
CO2 SERPL-SCNC: 32 MMOL/L (ref 20–32)
COLOR UR AUTO: ABNORMAL
CREAT SERPL-MCNC: 0.77 MG/DL (ref 0.66–1.25)
CREAT SERPL-MCNC: 0.85 MG/DL (ref 0.66–1.25)
CREAT SERPL-MCNC: 0.88 MG/DL (ref 0.66–1.25)
CREAT SERPL-MCNC: 0.88 MG/DL (ref 0.66–1.25)
CREAT SERPL-MCNC: 0.9 MG/DL (ref 0.66–1.25)
CREAT SERPL-MCNC: 0.91 MG/DL (ref 0.66–1.25)
CREAT SERPL-MCNC: 0.93 MG/DL (ref 0.66–1.25)
CREAT SERPL-MCNC: 0.96 MG/DL (ref 0.66–1.25)
CRP SERPL-MCNC: 4.1 MG/L (ref 0–8)
DIFFERENTIAL METHOD BLD: ABNORMAL
EOSINOPHIL # BLD AUTO: 0.2 10E9/L (ref 0–0.7)
EOSINOPHIL # BLD AUTO: 0.3 10E9/L (ref 0–0.7)
EOSINOPHIL # BLD AUTO: 0.4 10E9/L (ref 0–0.7)
EOSINOPHIL # BLD AUTO: 0.4 10E9/L (ref 0–0.7)
EOSINOPHIL NFR BLD AUTO: 10.5 %
EOSINOPHIL NFR BLD AUTO: 12.4 %
EOSINOPHIL NFR BLD AUTO: 5.6 %
EOSINOPHIL NFR BLD AUTO: 7.4 %
ERYTHROCYTE [DISTWIDTH] IN BLOOD BY AUTOMATED COUNT: 13.9 % (ref 10–15)
ERYTHROCYTE [DISTWIDTH] IN BLOOD BY AUTOMATED COUNT: 16.8 % (ref 10–15)
ERYTHROCYTE [DISTWIDTH] IN BLOOD BY AUTOMATED COUNT: 16.9 % (ref 10–15)
ERYTHROCYTE [DISTWIDTH] IN BLOOD BY AUTOMATED COUNT: 17 % (ref 10–15)
ERYTHROCYTE [DISTWIDTH] IN BLOOD BY AUTOMATED COUNT: 17 % (ref 10–15)
FLUAV RNA RESP QL NAA+PROBE: NEGATIVE
FLUBV RNA RESP QL NAA+PROBE: NEGATIVE
GFR SERPL CREATININE-BSD FRML MDRD: 85 ML/MIN/{1.73_M2}
GFR SERPL CREATININE-BSD FRML MDRD: 88 ML/MIN/{1.73_M2}
GFR SERPL CREATININE-BSD FRML MDRD: >90 ML/MIN/{1.73_M2}
GLUCOSE SERPL-MCNC: 104 MG/DL (ref 70–99)
GLUCOSE SERPL-MCNC: 115 MG/DL (ref 70–99)
GLUCOSE SERPL-MCNC: 120 MG/DL (ref 70–99)
GLUCOSE SERPL-MCNC: 125 MG/DL (ref 70–99)
GLUCOSE SERPL-MCNC: 129 MG/DL (ref 70–99)
GLUCOSE SERPL-MCNC: 95 MG/DL (ref 70–99)
GLUCOSE SERPL-MCNC: 95 MG/DL (ref 70–99)
GLUCOSE SERPL-MCNC: 97 MG/DL (ref 70–99)
GLUCOSE UR STRIP-MCNC: NEGATIVE MG/DL
HCO3 BLDV-SCNC: 28 MMOL/L (ref 21–28)
HCT VFR BLD AUTO: 27.7 % (ref 40–53)
HCT VFR BLD AUTO: 28.8 % (ref 40–53)
HCT VFR BLD AUTO: 31.1 % (ref 40–53)
HCT VFR BLD AUTO: 32.8 % (ref 40–53)
HCT VFR BLD AUTO: 35.5 % (ref 40–53)
HGB BLD-MCNC: 10.2 G/DL (ref 13.3–17.7)
HGB BLD-MCNC: 11.8 G/DL (ref 13.3–17.7)
HGB BLD-MCNC: 8.7 G/DL (ref 13.3–17.7)
HGB BLD-MCNC: 9 G/DL (ref 13.3–17.7)
HGB BLD-MCNC: 9.7 G/DL (ref 13.3–17.7)
HGB UR QL STRIP: ABNORMAL
IMM GRANULOCYTES # BLD: 0 10E9/L (ref 0–0.4)
IMM GRANULOCYTES NFR BLD: 0.2 %
IMM GRANULOCYTES NFR BLD: 0.3 %
IMM GRANULOCYTES NFR BLD: 0.3 %
IMM GRANULOCYTES NFR BLD: 0.5 %
INR PPP: 2.1 (ref 0.86–1.14)
INR PPP: 2.3 (ref 0.86–1.14)
INR PPP: 2.34 (ref 0.86–1.14)
INR PPP: 2.39 (ref 0.86–1.14)
INR PPP: 2.39 (ref 0.86–1.14)
INR PPP: 2.5 (ref 0.86–1.14)
INR PPP: 2.58 (ref 0.86–1.14)
INR PPP: 2.69 (ref 0.86–1.14)
INR PPP: 2.7 (ref 0.86–1.14)
INR PPP: 2.8 (ref 0.86–1.14)
INR PPP: 3 (ref 0.86–1.14)
INR PPP: 3.1 (ref 0.86–1.14)
INR PPP: 3.1 (ref 0.86–1.14)
KETONES UR STRIP-MCNC: NEGATIVE MG/DL
LABORATORY COMMENT REPORT: NORMAL
LABORATORY COMMENT REPORT: NORMAL
LACTATE BLD-SCNC: 2.3 MMOL/L (ref 0.7–2)
LEUKOCYTE ESTERASE UR QL STRIP: ABNORMAL
LIPASE SERPL-CCNC: 142 U/L (ref 73–393)
LYMPHOCYTES # BLD AUTO: 0.5 10E9/L (ref 0.8–5.3)
LYMPHOCYTES # BLD AUTO: 0.5 10E9/L (ref 0.8–5.3)
LYMPHOCYTES # BLD AUTO: 0.6 10E9/L (ref 0.8–5.3)
LYMPHOCYTES # BLD AUTO: 0.9 10E9/L (ref 0.8–5.3)
LYMPHOCYTES NFR BLD AUTO: 11.2 %
LYMPHOCYTES NFR BLD AUTO: 13 %
LYMPHOCYTES NFR BLD AUTO: 20.9 %
LYMPHOCYTES NFR BLD AUTO: 22.1 %
Lab: ABNORMAL
MAGNESIUM SERPL-MCNC: 1.7 MG/DL (ref 1.6–2.3)
MCH RBC QN AUTO: 27.6 PG (ref 26.5–33)
MCH RBC QN AUTO: 27.8 PG (ref 26.5–33)
MCH RBC QN AUTO: 28 PG (ref 26.5–33)
MCH RBC QN AUTO: 28.5 PG (ref 26.5–33)
MCH RBC QN AUTO: 31.3 PG (ref 26.5–33)
MCHC RBC AUTO-ENTMCNC: 31.1 G/DL (ref 31.5–36.5)
MCHC RBC AUTO-ENTMCNC: 31.2 G/DL (ref 31.5–36.5)
MCHC RBC AUTO-ENTMCNC: 31.3 G/DL (ref 31.5–36.5)
MCHC RBC AUTO-ENTMCNC: 31.4 G/DL (ref 31.5–36.5)
MCHC RBC AUTO-ENTMCNC: 33.2 G/DL (ref 31.5–36.5)
MCV RBC AUTO: 89 FL (ref 78–100)
MCV RBC AUTO: 92 FL (ref 78–100)
MCV RBC AUTO: 94 FL (ref 78–100)
MDC_IDC_EPISODE_DTM: NORMAL
MDC_IDC_EPISODE_DURATION: 13 S
MDC_IDC_EPISODE_DURATION: 13 S
MDC_IDC_EPISODE_DURATION: 14 S
MDC_IDC_EPISODE_DURATION: 15 S
MDC_IDC_EPISODE_DURATION: 16 S
MDC_IDC_EPISODE_DURATION: 16 S
MDC_IDC_EPISODE_DURATION: 17 S
MDC_IDC_EPISODE_DURATION: 18 S
MDC_IDC_EPISODE_ID: NORMAL
MDC_IDC_EPISODE_TYPE: NORMAL
MDC_IDC_LEAD_IMPLANT_DT: NORMAL
MDC_IDC_LEAD_LOCATION: NORMAL
MDC_IDC_LEAD_LOCATION_DETAIL_1: NORMAL
MDC_IDC_LEAD_MFG: NORMAL
MDC_IDC_LEAD_MODEL: NORMAL
MDC_IDC_LEAD_POLARITY_TYPE: NORMAL
MDC_IDC_LEAD_SERIAL: NORMAL
MDC_IDC_MSMT_BATTERY_DTM: NORMAL
MDC_IDC_MSMT_BATTERY_DTM: NORMAL
MDC_IDC_MSMT_BATTERY_REMAINING_LONGEVITY: 108 MO
MDC_IDC_MSMT_BATTERY_REMAINING_LONGEVITY: 126 MO
MDC_IDC_MSMT_BATTERY_REMAINING_PERCENTAGE: 100 %
MDC_IDC_MSMT_BATTERY_REMAINING_PERCENTAGE: 100 %
MDC_IDC_MSMT_BATTERY_STATUS: NORMAL
MDC_IDC_MSMT_BATTERY_STATUS: NORMAL
MDC_IDC_MSMT_LEADCHNL_LV_IMPEDANCE_VALUE: 624 OHM
MDC_IDC_MSMT_LEADCHNL_LV_IMPEDANCE_VALUE: 679 OHM
MDC_IDC_MSMT_LEADCHNL_LV_PACING_THRESHOLD_AMPLITUDE: 0.4 V
MDC_IDC_MSMT_LEADCHNL_LV_PACING_THRESHOLD_AMPLITUDE: 0.4 V
MDC_IDC_MSMT_LEADCHNL_LV_PACING_THRESHOLD_PULSEWIDTH: 0.5 MS
MDC_IDC_MSMT_LEADCHNL_LV_PACING_THRESHOLD_PULSEWIDTH: 0.5 MS
MDC_IDC_MSMT_LEADCHNL_RV_IMPEDANCE_VALUE: 719 OHM
MDC_IDC_MSMT_LEADCHNL_RV_IMPEDANCE_VALUE: 776 OHM
MDC_IDC_MSMT_LEADCHNL_RV_PACING_THRESHOLD_AMPLITUDE: 1.4 V
MDC_IDC_MSMT_LEADCHNL_RV_PACING_THRESHOLD_AMPLITUDE: 1.9 V
MDC_IDC_MSMT_LEADCHNL_RV_PACING_THRESHOLD_PULSEWIDTH: 0.4 MS
MDC_IDC_MSMT_LEADCHNL_RV_PACING_THRESHOLD_PULSEWIDTH: 0.4 MS
MDC_IDC_PG_IMPLANT_DTM: NORMAL
MDC_IDC_PG_IMPLANT_DTM: NORMAL
MDC_IDC_PG_MFG: NORMAL
MDC_IDC_PG_MFG: NORMAL
MDC_IDC_PG_MODEL: NORMAL
MDC_IDC_PG_MODEL: NORMAL
MDC_IDC_PG_SERIAL: NORMAL
MDC_IDC_PG_SERIAL: NORMAL
MDC_IDC_PG_TYPE: NORMAL
MDC_IDC_PG_TYPE: NORMAL
MDC_IDC_SESS_CLINIC_NAME: NORMAL
MDC_IDC_SESS_CLINIC_NAME: NORMAL
MDC_IDC_SESS_DTM: NORMAL
MDC_IDC_SESS_DTM: NORMAL
MDC_IDC_SESS_TYPE: NORMAL
MDC_IDC_SESS_TYPE: NORMAL
MDC_IDC_SET_BRADY_AT_MODE_SWITCH_RATE: 170 {BEATS}/MIN
MDC_IDC_SET_BRADY_AT_MODE_SWITCH_RATE: 170 {BEATS}/MIN
MDC_IDC_SET_BRADY_LOWRATE: 70 {BEATS}/MIN
MDC_IDC_SET_BRADY_LOWRATE: 70 {BEATS}/MIN
MDC_IDC_SET_BRADY_MAX_SENSOR_RATE: 130 {BEATS}/MIN
MDC_IDC_SET_BRADY_MAX_SENSOR_RATE: 130 {BEATS}/MIN
MDC_IDC_SET_BRADY_MODE: NORMAL
MDC_IDC_SET_BRADY_MODE: NORMAL
MDC_IDC_SET_CRT_LVRV_DELAY: 40 MS
MDC_IDC_SET_CRT_LVRV_DELAY: 40 MS
MDC_IDC_SET_CRT_PACED_CHAMBERS: NORMAL
MDC_IDC_SET_CRT_PACED_CHAMBERS: NORMAL
MDC_IDC_SET_LEADCHNL_LV_PACING_AMPLITUDE: 2 V
MDC_IDC_SET_LEADCHNL_LV_PACING_AMPLITUDE: 2 V
MDC_IDC_SET_LEADCHNL_LV_PACING_ANODE_ELECTRODE_1: NORMAL
MDC_IDC_SET_LEADCHNL_LV_PACING_ANODE_LOCATION_1: NORMAL
MDC_IDC_SET_LEADCHNL_LV_PACING_CATHODE_ELECTRODE_1: NORMAL
MDC_IDC_SET_LEADCHNL_LV_PACING_CATHODE_LOCATION_1: NORMAL
MDC_IDC_SET_LEADCHNL_LV_PACING_PULSEWIDTH: 0.5 MS
MDC_IDC_SET_LEADCHNL_LV_PACING_PULSEWIDTH: 0.5 MS
MDC_IDC_SET_LEADCHNL_LV_SENSING_ADAPTATION_MODE: NORMAL
MDC_IDC_SET_LEADCHNL_LV_SENSING_ADAPTATION_MODE: NORMAL
MDC_IDC_SET_LEADCHNL_LV_SENSING_ANODE_ELECTRODE_1: NORMAL
MDC_IDC_SET_LEADCHNL_LV_SENSING_ANODE_LOCATION_1: NORMAL
MDC_IDC_SET_LEADCHNL_LV_SENSING_CATHODE_ELECTRODE_1: NORMAL
MDC_IDC_SET_LEADCHNL_LV_SENSING_CATHODE_LOCATION_1: NORMAL
MDC_IDC_SET_LEADCHNL_LV_SENSING_SENSITIVITY: 2.5 MV
MDC_IDC_SET_LEADCHNL_LV_SENSING_SENSITIVITY: 2.5 MV
MDC_IDC_SET_LEADCHNL_RA_SENSING_ADAPTATION_MODE: NORMAL
MDC_IDC_SET_LEADCHNL_RA_SENSING_ADAPTATION_MODE: NORMAL
MDC_IDC_SET_LEADCHNL_RA_SENSING_SENSITIVITY: 0.75 MV
MDC_IDC_SET_LEADCHNL_RA_SENSING_SENSITIVITY: 0.75 MV
MDC_IDC_SET_LEADCHNL_RV_PACING_AMPLITUDE: 3 V
MDC_IDC_SET_LEADCHNL_RV_PACING_AMPLITUDE: 3.8 V
MDC_IDC_SET_LEADCHNL_RV_PACING_CAPTURE_MODE: NORMAL
MDC_IDC_SET_LEADCHNL_RV_PACING_CAPTURE_MODE: NORMAL
MDC_IDC_SET_LEADCHNL_RV_PACING_POLARITY: NORMAL
MDC_IDC_SET_LEADCHNL_RV_PACING_POLARITY: NORMAL
MDC_IDC_SET_LEADCHNL_RV_PACING_PULSEWIDTH: 0.4 MS
MDC_IDC_SET_LEADCHNL_RV_PACING_PULSEWIDTH: 0.4 MS
MDC_IDC_SET_LEADCHNL_RV_SENSING_ADAPTATION_MODE: NORMAL
MDC_IDC_SET_LEADCHNL_RV_SENSING_ADAPTATION_MODE: NORMAL
MDC_IDC_SET_LEADCHNL_RV_SENSING_POLARITY: NORMAL
MDC_IDC_SET_LEADCHNL_RV_SENSING_POLARITY: NORMAL
MDC_IDC_SET_LEADCHNL_RV_SENSING_SENSITIVITY: 2.5 MV
MDC_IDC_SET_LEADCHNL_RV_SENSING_SENSITIVITY: 2.5 MV
MDC_IDC_SET_ZONE_DETECTION_INTERVAL: 375 MS
MDC_IDC_SET_ZONE_DETECTION_INTERVAL: 375 MS
MDC_IDC_SET_ZONE_TYPE: NORMAL
MDC_IDC_SET_ZONE_TYPE: NORMAL
MDC_IDC_SET_ZONE_VENDOR_TYPE: NORMAL
MDC_IDC_SET_ZONE_VENDOR_TYPE: NORMAL
MDC_IDC_STAT_BRADY_DTM_END: NORMAL
MDC_IDC_STAT_BRADY_DTM_END: NORMAL
MDC_IDC_STAT_BRADY_DTM_START: NORMAL
MDC_IDC_STAT_BRADY_DTM_START: NORMAL
MDC_IDC_STAT_BRADY_RA_PERCENT_PACED: 0 %
MDC_IDC_STAT_BRADY_RA_PERCENT_PACED: 0 %
MDC_IDC_STAT_BRADY_RV_PERCENT_PACED: 94 %
MDC_IDC_STAT_BRADY_RV_PERCENT_PACED: 95 %
MDC_IDC_STAT_CRT_DTM_END: NORMAL
MDC_IDC_STAT_CRT_DTM_END: NORMAL
MDC_IDC_STAT_CRT_DTM_START: NORMAL
MDC_IDC_STAT_CRT_DTM_START: NORMAL
MDC_IDC_STAT_CRT_LV_PERCENT_PACED: 94 %
MDC_IDC_STAT_CRT_LV_PERCENT_PACED: 95 %
MDC_IDC_STAT_EPISODE_RECENT_COUNT: 0
MDC_IDC_STAT_EPISODE_RECENT_COUNT: 1
MDC_IDC_STAT_EPISODE_RECENT_COUNT: 21
MDC_IDC_STAT_EPISODE_RECENT_COUNT: 6
MDC_IDC_STAT_EPISODE_RECENT_COUNT_DTM_END: NORMAL
MDC_IDC_STAT_EPISODE_RECENT_COUNT_DTM_START: NORMAL
MDC_IDC_STAT_EPISODE_TYPE: NORMAL
MDC_IDC_STAT_EPISODE_VENDOR_TYPE: NORMAL
MONOCYTES # BLD AUTO: 0.5 10E9/L (ref 0–1.3)
MONOCYTES # BLD AUTO: 0.6 10E9/L (ref 0–1.3)
MONOCYTES # BLD AUTO: 0.7 10E9/L (ref 0–1.3)
MONOCYTES # BLD AUTO: 0.7 10E9/L (ref 0–1.3)
MONOCYTES NFR BLD AUTO: 15.4 %
MONOCYTES NFR BLD AUTO: 16 %
MONOCYTES NFR BLD AUTO: 17 %
MONOCYTES NFR BLD AUTO: 18.6 %
MUCOUS THREADS #/AREA URNS LPF: PRESENT /LPF
NEUTROPHILS # BLD AUTO: 1.5 10E9/L (ref 1.6–8.3)
NEUTROPHILS # BLD AUTO: 2 10E9/L (ref 1.6–8.3)
NEUTROPHILS # BLD AUTO: 2.3 10E9/L (ref 1.6–8.3)
NEUTROPHILS # BLD AUTO: 2.8 10E9/L (ref 1.6–8.3)
NEUTROPHILS NFR BLD AUTO: 49.7 %
NEUTROPHILS NFR BLD AUTO: 50.1 %
NEUTROPHILS NFR BLD AUTO: 60.4 %
NEUTROPHILS NFR BLD AUTO: 67.1 %
NITRATE UR QL: NEGATIVE
NRBC # BLD AUTO: 0 10*3/UL
NRBC BLD AUTO-RTO: 0 /100
NT-PROBNP SERPL-MCNC: 320 PG/ML (ref 0–900)
NT-PROBNP SERPL-MCNC: 547 PG/ML (ref 0–900)
O2/TOTAL GAS SETTING VFR VENT: NORMAL %
PCO2 BLDV: 43 MM HG (ref 40–50)
PH BLDV: 7.42 PH (ref 7.32–7.43)
PH UR STRIP: 6 PH (ref 5–7)
PLATELET # BLD AUTO: 109 10E9/L (ref 150–450)
PLATELET # BLD AUTO: 126 10E9/L (ref 150–450)
PLATELET # BLD AUTO: 56 10E9/L (ref 150–450)
PLATELET # BLD AUTO: 57 10E9/L (ref 150–450)
PLATELET # BLD AUTO: 72 10E9/L (ref 150–450)
PO2 BLDV: 41 MM HG (ref 25–47)
POTASSIUM SERPL-SCNC: 3 MMOL/L (ref 3.4–5.3)
POTASSIUM SERPL-SCNC: 3 MMOL/L (ref 3.4–5.3)
POTASSIUM SERPL-SCNC: 3.1 MMOL/L (ref 3.4–5.3)
POTASSIUM SERPL-SCNC: 3.3 MMOL/L (ref 3.4–5.3)
POTASSIUM SERPL-SCNC: 3.5 MMOL/L (ref 3.4–5.3)
POTASSIUM SERPL-SCNC: 3.5 MMOL/L (ref 3.4–5.3)
POTASSIUM SERPL-SCNC: 3.6 MMOL/L (ref 3.4–5.3)
POTASSIUM SERPL-SCNC: 3.6 MMOL/L (ref 3.4–5.3)
POTASSIUM SERPL-SCNC: 3.8 MMOL/L (ref 3.4–5.3)
POTASSIUM SERPL-SCNC: 3.8 MMOL/L (ref 3.4–5.3)
POTASSIUM SERPL-SCNC: 3.9 MMOL/L (ref 3.4–5.3)
PROT SERPL-MCNC: 5.2 G/DL (ref 6.8–8.8)
PROT SERPL-MCNC: 5.2 G/DL (ref 6.8–8.8)
PROT SERPL-MCNC: 5.9 G/DL (ref 6.8–8.8)
PROT SERPL-MCNC: 6.2 G/DL (ref 6.8–8.8)
PROT SERPL-MCNC: 6.5 G/DL (ref 6.8–8.8)
RBC # BLD AUTO: 3.13 10E12/L (ref 4.4–5.9)
RBC # BLD AUTO: 3.22 10E12/L (ref 4.4–5.9)
RBC # BLD AUTO: 3.4 10E12/L (ref 4.4–5.9)
RBC # BLD AUTO: 3.7 10E12/L (ref 4.4–5.9)
RBC # BLD AUTO: 3.77 10E12/L (ref 4.4–5.9)
RBC #/AREA URNS AUTO: 10 /HPF (ref 0–2)
RSV RNA SPEC QL NAA+PROBE: NORMAL
SARS-COV-2 RNA RESP QL NAA+PROBE: NEGATIVE
SARS-COV-2 RNA RESP QL NAA+PROBE: NEGATIVE
SODIUM SERPL-SCNC: 135 MMOL/L (ref 133–144)
SODIUM SERPL-SCNC: 136 MMOL/L (ref 133–144)
SODIUM SERPL-SCNC: 138 MMOL/L (ref 133–144)
SODIUM SERPL-SCNC: 139 MMOL/L (ref 133–144)
SODIUM SERPL-SCNC: 140 MMOL/L (ref 133–144)
SODIUM SERPL-SCNC: 140 MMOL/L (ref 133–144)
SODIUM SERPL-SCNC: 141 MMOL/L (ref 133–144)
SODIUM SERPL-SCNC: 142 MMOL/L (ref 133–144)
SOURCE: ABNORMAL
SP GR UR STRIP: 1.01 (ref 1–1.03)
SPECIMEN SOURCE: ABNORMAL
SPECIMEN SOURCE: NORMAL
TROPONIN I SERPL-MCNC: 0.02 UG/L (ref 0–0.04)
TROPONIN I SERPL-MCNC: 0.03 UG/L (ref 0–0.04)
UROBILINOGEN UR STRIP-MCNC: 0 MG/DL (ref 0–2)
WBC # BLD AUTO: 3.1 10E9/L (ref 4–11)
WBC # BLD AUTO: 3.6 10E9/L (ref 4–11)
WBC # BLD AUTO: 3.8 10E9/L (ref 4–11)
WBC # BLD AUTO: 4.1 10E9/L (ref 4–11)
WBC # BLD AUTO: 4.1 10E9/L (ref 4–11)
WBC #/AREA URNS AUTO: 66 /HPF (ref 0–5)

## 2021-01-01 PROCEDURE — G0378 HOSPITAL OBSERVATION PER HR: HCPCS

## 2021-01-01 PROCEDURE — 93294 REM INTERROG EVL PM/LDLS PM: CPT | Performed by: INTERNAL MEDICINE

## 2021-01-01 PROCEDURE — 36416 COLLJ CAPILLARY BLOOD SPEC: CPT | Performed by: FAMILY MEDICINE

## 2021-01-01 PROCEDURE — 85610 PROTHROMBIN TIME: CPT | Performed by: INTERNAL MEDICINE

## 2021-01-01 PROCEDURE — 250N000013 HC RX MED GY IP 250 OP 250 PS 637: Performed by: INTERNAL MEDICINE

## 2021-01-01 PROCEDURE — 76705 ECHO EXAM OF ABDOMEN: CPT

## 2021-01-01 PROCEDURE — 80076 HEPATIC FUNCTION PANEL: CPT | Performed by: INTERNAL MEDICINE

## 2021-01-01 PROCEDURE — 81001 URINALYSIS AUTO W/SCOPE: CPT | Performed by: FAMILY MEDICINE

## 2021-01-01 PROCEDURE — 85610 PROTHROMBIN TIME: CPT | Performed by: FAMILY MEDICINE

## 2021-01-01 PROCEDURE — 71046 X-RAY EXAM CHEST 2 VIEWS: CPT

## 2021-01-01 PROCEDURE — 83880 ASSAY OF NATRIURETIC PEPTIDE: CPT | Performed by: EMERGENCY MEDICINE

## 2021-01-01 PROCEDURE — 93296 REM INTERROG EVL PM/IDS: CPT | Performed by: INTERNAL MEDICINE

## 2021-01-01 PROCEDURE — 82140 ASSAY OF AMMONIA: CPT | Performed by: NURSE PRACTITIONER

## 2021-01-01 PROCEDURE — 99213 OFFICE O/P EST LOW 20 MIN: CPT | Mod: TEL | Performed by: NURSE PRACTITIONER

## 2021-01-01 PROCEDURE — 85025 COMPLETE CBC W/AUTO DIFF WBC: CPT | Performed by: PHYSICIAN ASSISTANT

## 2021-01-01 PROCEDURE — 250N000011 HC RX IP 250 OP 636: Performed by: FAMILY MEDICINE

## 2021-01-01 PROCEDURE — 999N000157 HC STATISTIC RCP TIME EA 10 MIN

## 2021-01-01 PROCEDURE — C9803 HOPD COVID-19 SPEC COLLECT: HCPCS | Performed by: EMERGENCY MEDICINE

## 2021-01-01 PROCEDURE — 99233 SBSQ HOSP IP/OBS HIGH 50: CPT | Performed by: INTERNAL MEDICINE

## 2021-01-01 PROCEDURE — 80053 COMPREHEN METABOLIC PANEL: CPT | Performed by: PHYSICIAN ASSISTANT

## 2021-01-01 PROCEDURE — 250N000013 HC RX MED GY IP 250 OP 250 PS 637: Performed by: PHYSICIAN ASSISTANT

## 2021-01-01 PROCEDURE — 99495 TRANSJ CARE MGMT MOD F2F 14D: CPT | Performed by: NURSE PRACTITIONER

## 2021-01-01 PROCEDURE — 83880 ASSAY OF NATRIURETIC PEPTIDE: CPT | Performed by: FAMILY MEDICINE

## 2021-01-01 PROCEDURE — 80048 BASIC METABOLIC PNL TOTAL CA: CPT | Performed by: PHYSICIAN ASSISTANT

## 2021-01-01 PROCEDURE — 93306 TTE W/DOPPLER COMPLETE: CPT

## 2021-01-01 PROCEDURE — 82105 ALPHA-FETOPROTEIN SERUM: CPT | Performed by: INTERNAL MEDICINE

## 2021-01-01 PROCEDURE — 93010 ELECTROCARDIOGRAM REPORT: CPT | Performed by: EMERGENCY MEDICINE

## 2021-01-01 PROCEDURE — 83735 ASSAY OF MAGNESIUM: CPT | Performed by: INTERNAL MEDICINE

## 2021-01-01 PROCEDURE — 99285 EMERGENCY DEPT VISIT HI MDM: CPT | Mod: 25 | Performed by: EMERGENCY MEDICINE

## 2021-01-01 PROCEDURE — 99207 PR APP CREDIT; MD BILLING SHARED VISIT: CPT | Performed by: PHYSICIAN ASSISTANT

## 2021-01-01 PROCEDURE — 87186 SC STD MICRODIL/AGAR DIL: CPT | Performed by: FAMILY MEDICINE

## 2021-01-01 PROCEDURE — 80053 COMPREHEN METABOLIC PANEL: CPT | Performed by: FAMILY MEDICINE

## 2021-01-01 PROCEDURE — 36415 COLL VENOUS BLD VENIPUNCTURE: CPT | Performed by: INTERNAL MEDICINE

## 2021-01-01 PROCEDURE — 87040 BLOOD CULTURE FOR BACTERIA: CPT | Performed by: FAMILY MEDICINE

## 2021-01-01 PROCEDURE — 99285 EMERGENCY DEPT VISIT HI MDM: CPT | Mod: 25 | Performed by: FAMILY MEDICINE

## 2021-01-01 PROCEDURE — 99214 OFFICE O/P EST MOD 30 MIN: CPT | Performed by: FAMILY MEDICINE

## 2021-01-01 PROCEDURE — 85025 COMPLETE CBC W/AUTO DIFF WBC: CPT | Performed by: FAMILY MEDICINE

## 2021-01-01 PROCEDURE — 93005 ELECTROCARDIOGRAM TRACING: CPT | Performed by: FAMILY MEDICINE

## 2021-01-01 PROCEDURE — 999N000156 HC STATISTIC RCP CONSULT EA 30 MIN

## 2021-01-01 PROCEDURE — 99213 OFFICE O/P EST LOW 20 MIN: CPT | Performed by: FAMILY MEDICINE

## 2021-01-01 PROCEDURE — 87636 SARSCOV2 & INF A&B AMP PRB: CPT | Performed by: FAMILY MEDICINE

## 2021-01-01 PROCEDURE — 76700 US EXAM ABDOM COMPLETE: CPT

## 2021-01-01 PROCEDURE — 84484 ASSAY OF TROPONIN QUANT: CPT | Performed by: PHYSICIAN ASSISTANT

## 2021-01-01 PROCEDURE — 36416 COLLJ CAPILLARY BLOOD SPEC: CPT | Performed by: INTERNAL MEDICINE

## 2021-01-01 PROCEDURE — 36415 COLL VENOUS BLD VENIPUNCTURE: CPT | Performed by: PHYSICIAN ASSISTANT

## 2021-01-01 PROCEDURE — 82803 BLOOD GASES ANY COMBINATION: CPT | Performed by: FAMILY MEDICINE

## 2021-01-01 PROCEDURE — 36415 COLL VENOUS BLD VENIPUNCTURE: CPT | Performed by: NURSE PRACTITIONER

## 2021-01-01 PROCEDURE — 87088 URINE BACTERIA CULTURE: CPT | Performed by: FAMILY MEDICINE

## 2021-01-01 PROCEDURE — 82140 ASSAY OF AMMONIA: CPT | Performed by: EMERGENCY MEDICINE

## 2021-01-01 PROCEDURE — 84484 ASSAY OF TROPONIN QUANT: CPT | Mod: 91 | Performed by: EMERGENCY MEDICINE

## 2021-01-01 PROCEDURE — 80048 BASIC METABOLIC PNL TOTAL CA: CPT | Performed by: INTERNAL MEDICINE

## 2021-01-01 PROCEDURE — 70450 CT HEAD/BRAIN W/O DYE: CPT

## 2021-01-01 PROCEDURE — 99220 PR INITIAL OBSERVATION CARE,LEVEL III: CPT | Performed by: FAMILY MEDICINE

## 2021-01-01 PROCEDURE — 96366 THER/PROPH/DIAG IV INF ADDON: CPT | Performed by: FAMILY MEDICINE

## 2021-01-01 PROCEDURE — 120N000001 HC R&B MED SURG/OB

## 2021-01-01 PROCEDURE — 86140 C-REACTIVE PROTEIN: CPT | Performed by: FAMILY MEDICINE

## 2021-01-01 PROCEDURE — 93005 ELECTROCARDIOGRAM TRACING: CPT | Performed by: EMERGENCY MEDICINE

## 2021-01-01 PROCEDURE — 84132 ASSAY OF SERUM POTASSIUM: CPT | Performed by: INTERNAL MEDICINE

## 2021-01-01 PROCEDURE — 84484 ASSAY OF TROPONIN QUANT: CPT | Performed by: FAMILY MEDICINE

## 2021-01-01 PROCEDURE — 99239 HOSP IP/OBS DSCHRG MGMT >30: CPT | Performed by: INTERNAL MEDICINE

## 2021-01-01 PROCEDURE — 72080 X-RAY EXAM THORACOLMB 2/> VW: CPT | Mod: FY | Performed by: RADIOLOGY

## 2021-01-01 PROCEDURE — 83605 ASSAY OF LACTIC ACID: CPT | Performed by: FAMILY MEDICINE

## 2021-01-01 PROCEDURE — 93306 TTE W/DOPPLER COMPLETE: CPT | Mod: 26 | Performed by: INTERNAL MEDICINE

## 2021-01-01 PROCEDURE — 83690 ASSAY OF LIPASE: CPT | Performed by: FAMILY MEDICINE

## 2021-01-01 PROCEDURE — 82140 ASSAY OF AMMONIA: CPT | Performed by: INTERNAL MEDICINE

## 2021-01-01 PROCEDURE — 87086 URINE CULTURE/COLONY COUNT: CPT | Performed by: FAMILY MEDICINE

## 2021-01-01 PROCEDURE — 71250 CT THORAX DX C-: CPT

## 2021-01-01 PROCEDURE — 99214 OFFICE O/P EST MOD 30 MIN: CPT | Mod: 95 | Performed by: INTERNAL MEDICINE

## 2021-01-01 PROCEDURE — 99207 PR NO CHARGE NURSE ONLY: CPT

## 2021-01-01 PROCEDURE — C9803 HOPD COVID-19 SPEC COLLECT: HCPCS | Performed by: FAMILY MEDICINE

## 2021-01-01 PROCEDURE — 250N000013 HC RX MED GY IP 250 OP 250 PS 637: Performed by: EMERGENCY MEDICINE

## 2021-01-01 PROCEDURE — 93005 ELECTROCARDIOGRAM TRACING: CPT | Mod: 76 | Performed by: EMERGENCY MEDICINE

## 2021-01-01 PROCEDURE — 99285 EMERGENCY DEPT VISIT HI MDM: CPT | Performed by: FAMILY MEDICINE

## 2021-01-01 PROCEDURE — 85027 COMPLETE CBC AUTOMATED: CPT | Performed by: INTERNAL MEDICINE

## 2021-01-01 PROCEDURE — 99283 EMERGENCY DEPT VISIT LOW MDM: CPT | Performed by: FAMILY MEDICINE

## 2021-01-01 PROCEDURE — 85610 PROTHROMBIN TIME: CPT | Performed by: PHYSICIAN ASSISTANT

## 2021-01-01 PROCEDURE — 96365 THER/PROPH/DIAG IV INF INIT: CPT | Performed by: FAMILY MEDICINE

## 2021-01-01 PROCEDURE — 99232 SBSQ HOSP IP/OBS MODERATE 35: CPT | Performed by: INTERNAL MEDICINE

## 2021-01-01 PROCEDURE — 99207 PR CDG-CODE CATEGORY CHANGED: CPT | Performed by: FAMILY MEDICINE

## 2021-01-01 PROCEDURE — 85610 PROTHROMBIN TIME: CPT | Performed by: EMERGENCY MEDICINE

## 2021-01-01 PROCEDURE — 93010 ELECTROCARDIOGRAM REPORT: CPT | Performed by: FAMILY MEDICINE

## 2021-01-01 PROCEDURE — 87635 SARS-COV-2 COVID-19 AMP PRB: CPT | Performed by: EMERGENCY MEDICINE

## 2021-01-01 RX ORDER — METOPROLOL SUCCINATE 25 MG/1
25 TABLET, EXTENDED RELEASE ORAL DAILY
Status: DISCONTINUED | OUTPATIENT
Start: 2021-01-01 | End: 2021-01-01 | Stop reason: HOSPADM

## 2021-01-01 RX ORDER — TORSEMIDE 20 MG/1
TABLET ORAL
Qty: 270 TABLET | Refills: 0 | Status: SHIPPED | OUTPATIENT
Start: 2021-01-01

## 2021-01-01 RX ORDER — OXYCODONE HYDROCHLORIDE 5 MG/1
5 TABLET ORAL EVERY 6 HOURS PRN
Qty: 20 TABLET | Refills: 0 | Status: CANCELLED | OUTPATIENT
Start: 2021-01-01

## 2021-01-01 RX ORDER — NALOXONE HYDROCHLORIDE 0.4 MG/ML
0.2 INJECTION, SOLUTION INTRAMUSCULAR; INTRAVENOUS; SUBCUTANEOUS
Status: DISCONTINUED | OUTPATIENT
Start: 2021-01-01 | End: 2021-01-01 | Stop reason: HOSPADM

## 2021-01-01 RX ORDER — NALOXONE HYDROCHLORIDE 0.4 MG/ML
0.4 INJECTION, SOLUTION INTRAMUSCULAR; INTRAVENOUS; SUBCUTANEOUS
Status: DISCONTINUED | OUTPATIENT
Start: 2021-01-01 | End: 2021-01-01 | Stop reason: HOSPADM

## 2021-01-01 RX ORDER — POTASSIUM CHLORIDE 1500 MG/1
20 TABLET, EXTENDED RELEASE ORAL DAILY
Qty: 90 TABLET | Refills: 0 | Status: SHIPPED | OUTPATIENT
Start: 2021-01-01

## 2021-01-01 RX ORDER — SPIRONOLACTONE 25 MG/1
25 TABLET ORAL DAILY
Status: DISCONTINUED | OUTPATIENT
Start: 2021-01-01 | End: 2021-01-01

## 2021-01-01 RX ORDER — ALBUTEROL SULFATE 0.83 MG/ML
2.5 SOLUTION RESPIRATORY (INHALATION) EVERY 6 HOURS PRN
Status: DISCONTINUED | OUTPATIENT
Start: 2021-01-01 | End: 2021-01-01 | Stop reason: HOSPADM

## 2021-01-01 RX ORDER — WARFARIN SODIUM 2.5 MG/1
2.5 TABLET ORAL
Status: COMPLETED | OUTPATIENT
Start: 2021-01-01 | End: 2021-01-01

## 2021-01-01 RX ORDER — MAGNESIUM OXIDE 400 MG/1
400 TABLET ORAL AT BEDTIME
Status: DISCONTINUED | OUTPATIENT
Start: 2021-01-01 | End: 2021-01-01 | Stop reason: HOSPADM

## 2021-01-01 RX ORDER — SPIRONOLACTONE 25 MG/1
50 TABLET ORAL DAILY
Status: DISCONTINUED | OUTPATIENT
Start: 2021-01-01 | End: 2021-01-01 | Stop reason: HOSPADM

## 2021-01-01 RX ORDER — WARFARIN SODIUM 2.5 MG/1
2.5 TABLET ORAL
Status: DISCONTINUED | OUTPATIENT
Start: 2021-01-01 | End: 2021-01-01 | Stop reason: HOSPADM

## 2021-01-01 RX ORDER — CEFTRIAXONE SODIUM 1 G/50ML
1 INJECTION, SOLUTION INTRAVENOUS ONCE
Status: COMPLETED | OUTPATIENT
Start: 2021-01-01 | End: 2021-01-01

## 2021-01-01 RX ORDER — ONDANSETRON 4 MG/1
4 TABLET, ORALLY DISINTEGRATING ORAL EVERY 6 HOURS PRN
Status: DISCONTINUED | OUTPATIENT
Start: 2021-01-01 | End: 2021-01-01 | Stop reason: HOSPADM

## 2021-01-01 RX ORDER — PROCHLORPERAZINE MALEATE 5 MG
10 TABLET ORAL EVERY 6 HOURS PRN
Status: DISCONTINUED | OUTPATIENT
Start: 2021-01-01 | End: 2021-01-01 | Stop reason: HOSPADM

## 2021-01-01 RX ORDER — METOPROLOL SUCCINATE 25 MG/1
25 TABLET, EXTENDED RELEASE ORAL AT BEDTIME
Qty: 90 TABLET | Refills: 3
Start: 2021-01-01

## 2021-01-01 RX ORDER — LIDOCAINE 40 MG/G
CREAM TOPICAL
Status: DISCONTINUED | OUTPATIENT
Start: 2021-01-01 | End: 2021-01-01 | Stop reason: HOSPADM

## 2021-01-01 RX ORDER — OXYCODONE HYDROCHLORIDE 5 MG/1
5-10 TABLET ORAL
Status: DISCONTINUED | OUTPATIENT
Start: 2021-01-01 | End: 2021-01-01 | Stop reason: HOSPADM

## 2021-01-01 RX ORDER — LACTULOSE 10 G/15ML
10 SOLUTION ORAL 2 TIMES DAILY
Qty: 946 ML | Refills: 3 | Status: SHIPPED | OUTPATIENT
Start: 2021-01-01

## 2021-01-01 RX ORDER — POTASSIUM CHLORIDE 1500 MG/1
20 TABLET, EXTENDED RELEASE ORAL DAILY
Status: DISCONTINUED | OUTPATIENT
Start: 2021-01-01 | End: 2021-01-01 | Stop reason: HOSPADM

## 2021-01-01 RX ORDER — TORSEMIDE 20 MG/1
40 TABLET ORAL EVERY MORNING
Status: DISCONTINUED | OUTPATIENT
Start: 2021-01-01 | End: 2021-01-01 | Stop reason: HOSPADM

## 2021-01-01 RX ORDER — POTASSIUM CHLORIDE 1500 MG/1
40 TABLET, EXTENDED RELEASE ORAL ONCE
Status: COMPLETED | OUTPATIENT
Start: 2021-01-01 | End: 2021-01-01

## 2021-01-01 RX ORDER — LACTULOSE 10 G/15ML
20 SOLUTION ORAL ONCE
Status: COMPLETED | OUTPATIENT
Start: 2021-01-01 | End: 2021-01-01

## 2021-01-01 RX ORDER — PANTOPRAZOLE SODIUM 40 MG/1
40 TABLET, DELAYED RELEASE ORAL DAILY
Qty: 90 TABLET | Refills: 3 | Status: SHIPPED | OUTPATIENT
Start: 2021-01-01

## 2021-01-01 RX ORDER — WARFARIN SODIUM 2.5 MG/1
TABLET ORAL
Qty: 88 TABLET | Refills: 0 | Status: SHIPPED | OUTPATIENT
Start: 2021-01-01

## 2021-01-01 RX ORDER — SPIRONOLACTONE 25 MG/1
25 TABLET ORAL DAILY
Qty: 90 TABLET | Refills: 0 | Status: ON HOLD | OUTPATIENT
Start: 2021-01-01 | End: 2021-01-01

## 2021-01-01 RX ORDER — ONDANSETRON 2 MG/ML
4 INJECTION INTRAMUSCULAR; INTRAVENOUS EVERY 6 HOURS PRN
Status: DISCONTINUED | OUTPATIENT
Start: 2021-01-01 | End: 2021-01-01 | Stop reason: HOSPADM

## 2021-01-01 RX ORDER — PROCHLORPERAZINE 25 MG
25 SUPPOSITORY, RECTAL RECTAL EVERY 12 HOURS PRN
Status: DISCONTINUED | OUTPATIENT
Start: 2021-01-01 | End: 2021-01-01 | Stop reason: HOSPADM

## 2021-01-01 RX ORDER — SULFAMETHOXAZOLE/TRIMETHOPRIM 800-160 MG
1 TABLET ORAL 2 TIMES DAILY
Qty: 20 TABLET | Refills: 0 | Status: SHIPPED | OUTPATIENT
Start: 2021-01-01 | End: 2021-01-01

## 2021-01-01 RX ORDER — POTASSIUM CHLORIDE 1500 MG/1
20 TABLET, EXTENDED RELEASE ORAL ONCE
Status: COMPLETED | OUTPATIENT
Start: 2021-01-01 | End: 2021-01-01

## 2021-01-01 RX ORDER — DOXYCYCLINE 100 MG/1
100 CAPSULE ORAL 2 TIMES DAILY
Qty: 20 CAPSULE | Refills: 0 | Status: SHIPPED | OUTPATIENT
Start: 2021-01-01 | End: 2021-01-01

## 2021-01-01 RX ORDER — SPIRONOLACTONE 25 MG/1
50 TABLET ORAL DAILY
Qty: 180 TABLET | Refills: 1 | Status: SHIPPED | OUTPATIENT
Start: 2021-01-01

## 2021-01-01 RX ORDER — LACTULOSE 10 G/15ML
10 SOLUTION ORAL 2 TIMES DAILY
Status: DISCONTINUED | OUTPATIENT
Start: 2021-01-01 | End: 2021-01-01 | Stop reason: HOSPADM

## 2021-01-01 RX ORDER — SPIRONOLACTONE 25 MG/1
25 TABLET ORAL ONCE
Status: COMPLETED | OUTPATIENT
Start: 2021-01-01 | End: 2021-01-01

## 2021-01-01 RX ORDER — SPIRONOLACTONE 25 MG/1
50 TABLET ORAL DAILY
Qty: 60 TABLET | Refills: 1 | Status: SHIPPED | OUTPATIENT
Start: 2021-01-01 | End: 2021-01-01

## 2021-01-01 RX ORDER — MAGNESIUM OXIDE 400 MG/1
400 TABLET ORAL DAILY
Status: DISCONTINUED | OUTPATIENT
Start: 2021-01-01 | End: 2021-01-01 | Stop reason: HOSPADM

## 2021-01-01 RX ORDER — TORSEMIDE 20 MG/1
20 TABLET ORAL
Status: DISCONTINUED | OUTPATIENT
Start: 2021-01-01 | End: 2021-01-01 | Stop reason: HOSPADM

## 2021-01-01 RX ORDER — DIPHENHYDRAMINE HCL 25 MG
25 CAPSULE ORAL EVERY 6 HOURS PRN
Status: DISCONTINUED | OUTPATIENT
Start: 2021-01-01 | End: 2021-01-01 | Stop reason: HOSPADM

## 2021-01-01 RX ORDER — PANTOPRAZOLE SODIUM 20 MG/1
40 TABLET, DELAYED RELEASE ORAL DAILY
Status: DISCONTINUED | OUTPATIENT
Start: 2021-01-01 | End: 2021-01-01 | Stop reason: HOSPADM

## 2021-01-01 RX ORDER — SPIRONOLACTONE 25 MG/1
50 TABLET ORAL DAILY
Qty: 90 TABLET | Refills: 0
Start: 2021-01-01 | End: 2021-01-01

## 2021-01-01 RX ADMIN — Medication 1 MG: at 02:47

## 2021-01-01 RX ADMIN — CEFTRIAXONE SODIUM 1 G: 1 INJECTION, SOLUTION INTRAVENOUS at 17:22

## 2021-01-01 RX ADMIN — PANTOPRAZOLE SODIUM 40 MG: 20 TABLET, DELAYED RELEASE ORAL at 08:05

## 2021-01-01 RX ADMIN — PANTOPRAZOLE SODIUM 40 MG: 20 TABLET, DELAYED RELEASE ORAL at 08:54

## 2021-01-01 RX ADMIN — TORSEMIDE 40 MG: 20 TABLET ORAL at 08:54

## 2021-01-01 RX ADMIN — METOPROLOL SUCCINATE 25 MG: 25 TABLET, EXTENDED RELEASE ORAL at 08:54

## 2021-01-01 RX ADMIN — PANTOPRAZOLE SODIUM 40 MG: 20 TABLET, DELAYED RELEASE ORAL at 09:00

## 2021-01-01 RX ADMIN — LACTULOSE 10 G: 10 SOLUTION ORAL at 08:55

## 2021-01-01 RX ADMIN — Medication 400 MG: at 08:05

## 2021-01-01 RX ADMIN — Medication 400 MG: at 20:31

## 2021-01-01 RX ADMIN — DIPHENHYDRAMINE HYDROCHLORIDE 25 MG: 25 CAPSULE ORAL at 02:33

## 2021-01-01 RX ADMIN — POTASSIUM CHLORIDE 40 MEQ: 20 TABLET, EXTENDED RELEASE ORAL at 09:58

## 2021-01-01 RX ADMIN — POTASSIUM CHLORIDE 20 MEQ: 20 TABLET, EXTENDED RELEASE ORAL at 09:00

## 2021-01-01 RX ADMIN — TORSEMIDE 20 MG: 20 TABLET ORAL at 12:42

## 2021-01-01 RX ADMIN — WARFARIN SODIUM 2.5 MG: 2.5 TABLET ORAL at 17:47

## 2021-01-01 RX ADMIN — SPIRONOLACTONE 50 MG: 25 TABLET ORAL at 08:54

## 2021-01-01 RX ADMIN — POTASSIUM CHLORIDE 20 MEQ: 20 TABLET, EXTENDED RELEASE ORAL at 08:05

## 2021-01-01 RX ADMIN — Medication 400 MG: at 08:55

## 2021-01-01 RX ADMIN — LACTULOSE 20 G: 10 SOLUTION ORAL at 19:16

## 2021-01-01 RX ADMIN — LACTULOSE 10 G: 10 SOLUTION ORAL at 20:26

## 2021-01-01 RX ADMIN — SPIRONOLACTONE 25 MG: 25 TABLET ORAL at 16:14

## 2021-01-01 RX ADMIN — TORSEMIDE 20 MG: 20 TABLET ORAL at 12:03

## 2021-01-01 RX ADMIN — DIPHENHYDRAMINE HYDROCHLORIDE 25 MG: 25 CAPSULE ORAL at 20:31

## 2021-01-01 RX ADMIN — POTASSIUM CHLORIDE 40 MEQ: 20 TABLET, EXTENDED RELEASE ORAL at 08:54

## 2021-01-01 RX ADMIN — SPIRONOLACTONE 25 MG: 25 TABLET, FILM COATED ORAL at 09:02

## 2021-01-01 RX ADMIN — POTASSIUM CHLORIDE 20 MEQ: 20 TABLET, EXTENDED RELEASE ORAL at 12:03

## 2021-01-01 RX ADMIN — TORSEMIDE 40 MG: 20 TABLET ORAL at 08:06

## 2021-01-01 RX ADMIN — WARFARIN SODIUM 2.5 MG: 2.5 TABLET ORAL at 18:34

## 2021-01-01 RX ADMIN — LACTULOSE 10 G: 10 SOLUTION ORAL at 20:18

## 2021-01-01 RX ADMIN — LACTULOSE 10 G: 10 SOLUTION ORAL at 08:59

## 2021-01-01 RX ADMIN — LACTULOSE 10 G: 10 SOLUTION ORAL at 08:05

## 2021-01-01 RX ADMIN — Medication 400 MG: at 10:47

## 2021-01-01 RX ADMIN — TORSEMIDE 40 MG: 20 TABLET ORAL at 09:02

## 2021-01-01 RX ADMIN — TORSEMIDE 20 MG: 20 TABLET ORAL at 12:50

## 2021-01-01 RX ADMIN — METOPROLOL SUCCINATE 25 MG: 25 TABLET, EXTENDED RELEASE ORAL at 08:05

## 2021-01-01 RX ADMIN — SPIRONOLACTONE 25 MG: 25 TABLET, FILM COATED ORAL at 08:06

## 2021-01-01 RX ADMIN — POTASSIUM CHLORIDE 40 MEQ: 20 TABLET, EXTENDED RELEASE ORAL at 06:51

## 2021-01-01 SDOH — ECONOMIC STABILITY: TRANSPORTATION INSECURITY
IN THE PAST 12 MONTHS, HAS THE LACK OF TRANSPORTATION KEPT YOU FROM MEDICAL APPOINTMENTS OR FROM GETTING MEDICATIONS?: NOT ASKED

## 2021-01-01 SDOH — ECONOMIC STABILITY: INCOME INSECURITY: HOW HARD IS IT FOR YOU TO PAY FOR THE VERY BASICS LIKE FOOD, HOUSING, MEDICAL CARE, AND HEATING?: NOT ASKED

## 2021-01-01 SDOH — ECONOMIC STABILITY: FOOD INSECURITY: WITHIN THE PAST 12 MONTHS, THE FOOD YOU BOUGHT JUST DIDN'T LAST AND YOU DIDN'T HAVE MONEY TO GET MORE.: NOT ASKED

## 2021-01-01 SDOH — ECONOMIC STABILITY: FOOD INSECURITY: WITHIN THE PAST 12 MONTHS, YOU WORRIED THAT YOUR FOOD WOULD RUN OUT BEFORE YOU GOT MONEY TO BUY MORE.: NOT ASKED

## 2021-01-01 SDOH — ECONOMIC STABILITY: TRANSPORTATION INSECURITY
IN THE PAST 12 MONTHS, HAS LACK OF TRANSPORTATION KEPT YOU FROM MEETINGS, WORK, OR FROM GETTING THINGS NEEDED FOR DAILY LIVING?: NOT ASKED

## 2021-01-01 ASSESSMENT — ACTIVITIES OF DAILY LIVING (ADL)
DEPENDENT_IADLS:: INDEPENDENT
ADLS_ACUITY_SCORE: 13
ADLS_ACUITY_SCORE: 14
TOILETING_ISSUES: NO
ADLS_ACUITY_SCORE: 13
ADLS_ACUITY_SCORE: 13
DRESSING/BATHING_DIFFICULTY: NO
VISION_MANAGEMENT: GLASSES
ADLS_ACUITY_SCORE: 14
ADLS_ACUITY_SCORE: 13
DOING_ERRANDS_INDEPENDENTLY_DIFFICULTY: NO
DIFFICULTY_COMMUNICATING: NO
FALL_HISTORY_WITHIN_LAST_SIX_MONTHS: NO
ADLS_ACUITY_SCORE: 13
ADLS_ACUITY_SCORE: 13
CONCENTRATING,_REMEMBERING_OR_MAKING_DECISIONS_DIFFICULTY: NO
ADLS_ACUITY_SCORE: 14
ADLS_ACUITY_SCORE: 13
DEPENDENT_IADLS:: INDEPENDENT
ADLS_ACUITY_SCORE: 14
ADLS_ACUITY_SCORE: 14
WALKING_OR_CLIMBING_STAIRS_DIFFICULTY: NO
WEAR_GLASSES_OR_BLIND: YES
DIFFICULTY_EATING/SWALLOWING: NO
ADLS_ACUITY_SCORE: 13
ADLS_ACUITY_SCORE: 14
ADLS_ACUITY_SCORE: 14

## 2021-01-01 ASSESSMENT — ENCOUNTER SYMPTOMS
PALPITATIONS: 0
DYSURIA: 0
NUMBNESS: 0
SORE THROAT: 0
CONFUSION: 1
HEADACHES: 0
BACK PAIN: 0
NAUSEA: 1
APPETITE CHANGE: 0
LIGHT-HEADEDNESS: 0
COUGH: 1
DIARRHEA: 0
ABDOMINAL PAIN: 0
ACTIVITY CHANGE: 1
WEAKNESS: 0
CONSTIPATION: 1
FEVER: 0
UNEXPECTED WEIGHT CHANGE: 0
VOMITING: 0
SHORTNESS OF BREATH: 1
ABDOMINAL DISTENTION: 1
CHEST TIGHTNESS: 0
CHILLS: 0
DIAPHORESIS: 0
FATIGUE: 1

## 2021-01-01 ASSESSMENT — PAIN SCALES - GENERAL
PAINLEVEL: NO PAIN (1)
PAINLEVEL: NO PAIN (0)
PAINLEVEL: SEVERE PAIN (6)

## 2021-01-01 ASSESSMENT — MIFFLIN-ST. JEOR
SCORE: 1673.55
SCORE: 1698.51
SCORE: 1689.44

## 2021-01-05 NOTE — TELEPHONE ENCOUNTER
Reason for Call:  Other  Covid vaccine    Detailed comments: Discuss covid-19 vaccine  talking points with patient. 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    Phone Number Patient can be reached at: Cell number on file:    Telephone Information:   Mobile [phone]       Best Time: After 5pm or can leave message     Can we leave a detailed message on this number? YES    Call taken on 1/5/2021 at 5:28 PM by Safia Andrade

## 2021-01-05 NOTE — TELEPHONE ENCOUNTER
Pt called wanting to know about vaccinations.  Advised to contact primary provider.  DESTIN WISE RN on 1/5/2021 at 9:06 AM

## 2021-01-06 NOTE — TELEPHONE ENCOUNTER
Left message for patient that we do not have information yet on what the roll out will look like or when that will be.  No information on this yet.     Anna Chacon RN

## 2021-01-12 NOTE — PROGRESS NOTES
ANTICOAGULATION MANAGEMENT     Patient Name:  Maurice Jon  Date:  2021    ASSESSMENT /SUBJECTIVE:    Today's INR result of 2.8 is therapeutic. Goal INR of 2.0-3.0      Warfarin dose taken: Warfarin taken as instructed    Diet: No new diet changes affecting INR    Medication changes/ interactions: No new medications/supplements affecting INR    Previous INR: Therapeutic     S/S of bleeding or thromboembolism: No    New injury or illness: No    Upcoming surgery, procedure or cardioversion: No    Additional findings: Patient states today he is feeling increased shortness of breath, loss of appetite      PLAN:    Telephone call with Maurice regarding INR result and instructed:     Warfarin Dosing Instructions: Continue your current warfarin dose 1.25 mg Fri and 2.5 mg ROW    Instructed patient to follow up no later than: 6 weeks  Lab visit scheduled    Education provided: Target INR goal and significance of current INR result      Yomi verbalizes understanding and agrees to warfarin dosing plan.    Instructed to call the Anticoagulation Clinic for any changes, questions or concerns. (#[phone])        Karen Dalton RN      OBJECTIVE:  Recent labs: (last 7 days)     21  1542   INR 2.80*         No question data found.  Anticoagulation Summary  As of 2021    INR goal:  2.0-3.0   TTR:  95.1 % (1 y)   INR used for dosin.80 (2021)   Warfarin maintenance plan:  1.25 mg (2.5 mg x 0.5) every Fri; 2.5 mg (2.5 mg x 1) all other days   Full warfarin instructions:  1.25 mg every Fri; 2.5 mg all other days   Weekly warfarin total:  16.25 mg   No change documented:  Karen Dalton RN   Plan last modified:  Susan Beyer RN (2018)   Next INR check:  2021   Priority:  Maintenance   Target end date:  10/4/2018    Indications    Paroxysmal atrial fibrillation (H) [I48.0]  Atrial fibrillation with rapid ventricular response (H) (Resolved) [I48.91]  Long-term (current) use of  anticoagulants [Z79.01] [Z79.01]  Chronic atrial fibrillation (H) [I48.20]  Atrial fibrillation with rapid ventricular response (H) [I48.91]             Anticoagulation Episode Summary     INR check location:      Preferred lab:      Send INR reminders to:  [facility]    Comments:  * anticoagulation short period surrounding ablation on 12/21/18. Cardiology to decide when to stop warfarin. has well-compensated cirrhosis      Anticoagulation Care Providers     Provider Role Specialty Phone number    Alon Pineda MD Referring Family Medicine [phone]

## 2021-01-12 NOTE — PROGRESS NOTES
Anticoagulation Management    Unable to reach Yomi today.    Today's INR result of 2.8 is therapeutic (goal INR of 2.0-3.0).  Result received from: Clinic Lab    Follow up required to confirm warfarin dose taken and assess for changes    Left message to continue current dose of warfarin 2.5 mg tonight.      Anticoagulation clinic to follow up    Karen Dalton RN

## 2021-01-13 NOTE — PROGRESS NOTES
BMP noted.    Follow-up appt ordered 6/2021. Will get labs again at that time as well (ordered).    Nadege

## 2021-01-23 NOTE — ED NOTES
States having sob for a few days. Lower back pain but no pain with urination. Chills last night. Denies cp. Had pacemaker implanted 1 year ago

## 2021-01-23 NOTE — ED TRIAGE NOTES
Low back pain for the past two days, feels as he may have a UTI, also having some SOB x 3 days with activity which is new for him

## 2021-01-23 NOTE — ED PROVIDER NOTES
History     Chief Complaint   Patient presents with     Back Pain     Shortness of Breath     HPI  Maurice Jon is a 60 year old male, past medical history is significant for portal hypertension, cirrhosis with ascites, chronic atrial fibrillation, congestive heart failure, ASCVD, cardiomyopathy, pericarditis, obesity, history of severe sepsis, GERD, eczema, BRUCE, erectile dysfunction, thrombocytopenia, presents to the emergency department concerns of back pain and shortness of breath.  History is obtained from the patient who states that beginning about 3 days ago he noted bilateral low back pain worse with movement and ambulation and at the same time noted some mild shortness of breath in the same context of mild exertion.  There was no chest pain associated with either of these symptoms however he does over the last 3 days get episodic left upper chest discomfort where his pacemaker was previously.  The pain is sharp intermittent lasting only a few seconds once or twice a day.  He is not sure what to make of it.  There is no associated nausea or vomiting he has been eating and drinking normally.  He notes no urinary symptoms such as frequency, urgency or dysuria.  No hematuria as he has noticed in the past when he had kidney stones.  He is not sure if the pain in his flanks feels like kidney stone or not.  He is taken no pain medication at all for any of his symptoms.  Last night he noted chills and was unable to stay warm despite having 4 blankets on top of him.  He did not document a fever at home.  He notes no abdominal pain.  There has been no change in bowel habit and specifically no diarrhea.      Allergies:  Allergies   Allergen Reactions     Avelox Other (See Comments) and GI Disturbance     portal hypertension     Moxifloxacin Nausea and Vomiting, Nausea and Other (See Comments)     portal hypertension     Sotalol Itching       Problem List:    Patient Active Problem List    Diagnosis Date Noted      Health Care Home 07/01/2011     Priority: High     01/07/2014  Status:  Declined  Care Coordinator:  Salena Joel    See Letters for [facility] Care Plan (emergency care plan only)             Portal hypertension (H) 01/28/2010     Priority: High     Liver Cirrhosis 2nd ot Fatty liver, w Ascites 08/22/2005     Priority: High     Cirrhosis, idiopathic         Chronic atrial fibrillation (H) 09/16/2020     Priority: Medium     Atrial fibrillation with rapid ventricular response (H) 09/16/2020     Priority: Medium     Atypical chest pain 12/20/2019     Priority: Medium     Chest pain 12/19/2019     Priority: Medium     Persistent atrial fibrillation (H) 11/13/2019     Priority: Medium     Added automatically from request for surgery [id number]       Cellulitis 10/06/2019     Priority: Medium     Acute combined systolic and diastolic (congestive) hrt fail (H) 01/04/2019     Priority: Medium     CAD (coronary artery disease)      Priority: Medium     Cath 12/26/18- mild nonobstructive disease       Cardiomyopathy (H)      Priority: Medium     12/23/18 Echo- EF 25-30%       Pericarditis      Priority: Medium     Acute on chronic systolic congestive heart failure (H)      Priority: Medium     Obesity (BMI 35.0-39.9) with comorbidity (H) 12/28/2018     Priority: Medium     Right heart failure (H) 12/24/2018     Priority: Medium     Chronic a-fib, S/P Ablation 12/22/18 -- on Warfarin 12/21/2018     Priority: Medium     Encounter for monitoring sotalol therapy 10/31/2018     Priority: Medium     Long-term (current) use of anticoagulants [Z79.01] 09/18/2018     Priority: Medium     Portal vein thrombosis 02/02/2017     Priority: Medium     History of MRSA now culture negative 08/06/2015     Priority: Medium     Severe sepsis (H) 07/13/2015     Priority: Medium     Problem list name updated by automated process. Provider to review       Paroxysmal atrial fibrillation (H)      Priority: Medium     S/p cardioversion       Edema  05/13/2013     Priority: Medium     CARDIOVASCULAR SCREENING; LDL GOAL LESS THAN 160 10/31/2010     Priority: Medium     GERD (gastroesophageal reflux disease) 03/25/2010     Priority: Medium     Eczema 01/28/2010     Priority: Medium     BRUCE-Mild (AHI 9; REM RDI 31) 03/20/2009     Priority: Medium     Sleep study 3/09- .0 minutes, latency 3.6 minutes. REM latency 72.0 minutes. Sleep efficiency 87.7%. The sleep architecture was disrupted with frequent sleep stage changes and arousals. Snoring: mild to loud.  RDI 27.7, AHI of 8.4. REM RDI 31.5 TLO2 saturation 83.0%. This study is suggestive of mild sleep apnea, severe during REM sleep (20% TST). Other:  PLM index was 0.0.       Compulsive behaviors 08/26/2008     Priority: Medium     erectile dysfunction 08/22/2005     Priority: Medium     Obesity 11/24/2010     Priority: Low     Thrombocytopenia (H) 08/22/2005     Priority: Low     Thrombocytopenia associated with cirrhosis and portal hypertension  Problem list name updated by automated process. Provider to review          Past Medical History:    Past Medical History:   Diagnosis Date     A-fib (H)      Acute pulmonary edema (H)      Atrial fibrillation (H)      Atrial fibrillation with rapid ventricular response (H) 5/13/2013     CAD (coronary artery disease)      Calculus of ureter 1993, 1997     Cardiomyopathy (H)      Chronic systolic CHF (congestive heart failure) (H)      Cirrhosis of liver without mention of alcohol 8/22/2005     Edema 5/13/2013     Esophageal varices with bleeding(456.0)      Gallstones      Genital herpes, unspecified 3/20/1999     GERD (gastroesophageal reflux disease)      Hematuria      Hypertension      Hypochondriasis      Obesity 11/24/2010     BRUCE (obstructive sleep apnea) 03/17/2009     Pericarditis      Portal hypertension (H) 1/28/2010     Unspecified thrombocytopenia 8/22/2005       Past Surgical History:    Past Surgical History:   Procedure Laterality Date      ANESTHESIA CARDIOVERSION N/A 1/19/2015    Procedure: ANESTHESIA CARDIOVERSION;  Surgeon: Generic Anesthesia Provider;  Location: [facility] OR     ANESTHESIA CARDIOVERSION N/A 2/6/2019    Procedure: ANESTHESIA CARDIOVERSION;  Surgeon: GENERIC ANESTHESIA PROVIDER;  Location:  OR     ANESTHESIA CARDIOVERSION N/A 7/5/2019    Procedure: ANESTHESIA FOR CARDIOVERSION  DR. MURGUIA);  Surgeon: GENERIC ANESTHESIA PROVIDER;  Location:  OR     COLONOSCOPY N/A 8/14/2017    Procedure: COLONOSCOPY;  Colonoscopy Called will arrive appx 12:30 Per phone call;  Surgeon: Vincent Whittington MD;  Location:  GI     CV HEART CATHETERIZATION WITH POSSIBLE INTERVENTION N/A 12/26/2018    Procedure: Heart Catheterization with possible Intervention;  Surgeon: Brandon Arroyo MD;  Location:  HEART CARDIAC CATH LAB     EP ABLATION AV NODE N/A 11/15/2019    Procedure: EP Ablation AV Node;  Surgeon: Ag Maloney MD;  Location:  HEART CARDIAC CATH LAB     EP ABLATION FOCAL AFIB N/A 12/21/2018    Procedure: EP Ablation Focal AFIB;  Surgeon: Kristofer Murguia MD;  Location:  HEART CARDIAC CATH LAB     EP PACEMAKER N/A 11/15/2019    Procedure: EP PACEMAKER;  Surgeon: Ag Maloney MD;  Location:  HEART CARDIAC CATH LAB     ESOPHAGOSCOPY, GASTROSCOPY, DUODENOSCOPY (EGD), COMBINED  7/11/2011    Procedure:COMBINED ESOPHAGOSCOPY, GASTROSCOPY, DUODENOSCOPY (EGD); Surgeon:LAURA LAMAS; Location:[facility]     ESOPHAGOSCOPY, GASTROSCOPY, DUODENOSCOPY (EGD), COMBINED  9/10/2012    Procedure: COMBINED ESOPHAGOSCOPY, GASTROSCOPY, DUODENOSCOPY (EGD);;  Surgeon: Hi Roque MD;  Location:  GI     ESOPHAGOSCOPY, GASTROSCOPY, DUODENOSCOPY (EGD), COMBINED  9/9/2013    Procedure: COMBINED ESOPHAGOSCOPY, GASTROSCOPY, DUODENOSCOPY (EGD);;  Surgeon: Hi Roque MD;  Location:  GI     ESOPHAGOSCOPY, GASTROSCOPY, DUODENOSCOPY (EGD), COMBINED N/A 9/15/2014    Procedure: COMBINED ESOPHAGOSCOPY, GASTROSCOPY, DUODENOSCOPY (EGD);  Surgeon:  Hi Roque MD;  Location:  GI     ESOPHAGOSCOPY, GASTROSCOPY, DUODENOSCOPY (EGD), COMBINED N/A 10/30/2015    Procedure: COMBINED ESOPHAGOSCOPY, GASTROSCOPY, DUODENOSCOPY (EGD);  Surgeon: Feliberto Fox MD;  Location:  GI     ESOPHAGOSCOPY, GASTROSCOPY, DUODENOSCOPY (EGD), COMBINED N/A 10/10/2016    Procedure: COMBINED ESOPHAGOSCOPY, GASTROSCOPY, DUODENOSCOPY (EGD);  Surgeon: Jose Nesbitt MD;  Location:  GI     ESOPHAGOSCOPY, GASTROSCOPY, DUODENOSCOPY (EGD), COMBINED N/A 2019    Procedure: ESOPHAGOGASTRODUODENOSCOPY (EGD);  Surgeon: Timo Soares MD;  Location:  GI     H ABLATION FOCAL AFIB  2018     HERNIA REPAIR       IRRIGATION AND DEBRIDEMENT ABSCESS SCROTUM, COMBINED  10/4/2012    Procedure: COMBINED IRRIGATION AND DEBRIDEMENT ABSCESS SCROTUM;  Irrigation and Debridement of Groin Abscess;  Surgeon: Benny Shoemaker MD;  Location: [facility] OR     SOFT TISSUE SURGERY       SURGICAL HISTORY OF -       rt elbow     SURGICAL HISTORY OF -   1/15/98    repair of ventral and umbilical hernia     SURGICAL HISTORY OF -       endoscopy       Family History:    Family History   Problem Relation Age of Onset     Lipids Mother      Hypertension Mother      Eye Disorder Mother      Heart Disease Father         CHF     Lipids Father      Obesity Father      Heart Disease Brother         MI     Eye Disorder Brother      Hypertension Brother         portal HTN     Thrombosis Sister         in her lung     Eye Disorder Sister      Cancer Other         maternal grandparent/throat cancer       Social History:  Marital Status:   [2]  Social History     Tobacco Use     Smoking status: Former Smoker     Packs/day: 0.80     Years: 6.00     Pack years: 4.80     Types: Cigarettes     Quit date: 2002     Years since quittin.0     Smokeless tobacco: Never Used   Substance Use Topics     Alcohol use: No     Alcohol/week: 0.0 standard drinks     Comment: quit in       Drug use: No        Medications:         sulfamethoxazole-trimethoprim (BACTRIM DS) 800-160 MG tablet       ACE/ARB/ARNI NOT PRESCRIBED (INTENTIONAL)       Acetaminophen 325 MG CAPS       albuterol (PROAIR RESPICLICK) 108 (90 Base) MCG/ACT inhaler       ASPIRIN NOT PRESCRIBED (INTENTIONAL)       calcium carb 1250 mg, 500 mg [place],/vitamin D 200 units (OSCAL WITH D) 500-200 MG-UNIT per tablet       diphenhydrAMINE HCl (BENADRYL PO)       ferrous gluconate (FERGON) 324 (38 Fe) MG tablet       metoprolol succinate ER (TOPROL XL) 25 MG 24 hr tablet       Multiple Vitamins-Minerals (MENS 50+ MULTI VITAMIN/MIN PO)       mupirocin (BACTROBAN) 2 % external ointment       order for DME       order for DME       ORDER FOR DME       oxyCODONE (ROXICODONE) 5 MG tablet       pantoprazole (PROTONIX) 40 MG EC tablet       potassium chloride ER (K-TAB) 20 MEQ CR tablet       spironolactone (ALDACTONE) 25 MG tablet       STATIN NOT PRESCRIBED (INTENTIONAL)       torsemide (DEMADEX) 20 MG tablet       triamcinolone (KENALOG) 0.1 % external cream       vitamin D3 (CHOLECALCIFEROL) 2000 units (50 mcg) tablet       warfarin ANTICOAGULANT (JANTOVEN ANTICOAGULANT) 2.5 MG tablet          Review of Systems   All other systems reviewed and are negative.      Physical Exam   BP: (!) 144/84  Pulse: 70  Temp: 97.6  F (36.4  C)  Resp: 17  Weight: 104.3 kg (230 lb)  SpO2: 96 %      Physical Exam  Vitals signs and nursing note reviewed.   Constitutional:       General: He is not in acute distress.     Appearance: He is well-developed. He is obese. He is not ill-appearing.   HENT:      Head: Normocephalic and atraumatic.      Mouth/Throat:      Mouth: Mucous membranes are moist.      Pharynx: Oropharynx is clear.   Eyes:      Extraocular Movements: Extraocular movements intact.      Pupils: Pupils are equal, round, and reactive to light.   Neck:      Musculoskeletal: Normal range of motion and neck supple.   Cardiovascular:      Rate and Rhythm:  Normal rate and regular rhythm.   Pulmonary:      Effort: Pulmonary effort is normal.      Breath sounds: Normal breath sounds.   Abdominal:      General: Bowel sounds are normal.      Palpations: Abdomen is soft.   Musculoskeletal: Normal range of motion.   Skin:     General: Skin is warm and dry.      Capillary Refill: Capillary refill takes less than 2 seconds.   Neurological:      General: No focal deficit present.      Mental Status: He is alert.   Psychiatric:         Mood and Affect: Mood normal.         Behavior: Behavior normal.         ED Course        Procedures       EKG Interpretation:      Interpreted by Kevin Arriaga MD  Time reviewed: Time obtained 1637 time interpreted 1644 the machine interpretation is incorrect this is not a sinus rhythm it is 100% paced is most clearly evidence with the pacing spike preceding every QRS complex and V6.      Critical Care time:  none   The Lactic acid level is elevated due to Dehydration, possible early UTI, at this time there is no sign of severe sepsis or septic shock.                Results for orders placed or performed during the hospital encounter of 01/23/21 (from the past 24 hour(s))   Blood gas venous   Result Value Ref Range    Ph Venous 7.42 7.32 - 7.43 pH    PCO2 Venous 43 40 - 50 mm Hg    PO2 Venous 41 25 - 47 mm Hg    Bicarbonate Venous 28 21 - 28 mmol/L    Base Excess Venous 3.1 mmol/L    FIO2 97%    Lactic acid whole blood   Result Value Ref Range    Lactic Acid 2.3 (H) 0.7 - 2.0 mmol/L   CBC with platelets differential   Result Value Ref Range    WBC 4.1 4.0 - 11.0 10e9/L    RBC Count 3.77 (L) 4.4 - 5.9 10e12/L    Hemoglobin 11.8 (L) 13.3 - 17.7 g/dL    Hematocrit 35.5 (L) 40.0 - 53.0 %    MCV 94 78 - 100 fl    MCH 31.3 26.5 - 33.0 pg    MCHC 33.2 31.5 - 36.5 g/dL    RDW 13.9 10.0 - 15.0 %    Platelet Count 109 (L) 150 - 450 10e9/L    Diff Method Automated Method     % Neutrophils 49.7 %    % Lymphocytes 22.1 %    % Monocytes 17.0 %    %  Eosinophils 10.5 %    % Basophils 0.5 %    % Immature Granulocytes 0.2 %    Nucleated RBCs 0 0 /100    Absolute Neutrophil 2.0 1.6 - 8.3 10e9/L    Absolute Lymphocytes 0.9 0.8 - 5.3 10e9/L    Absolute Monocytes 0.7 0.0 - 1.3 10e9/L    Absolute Eosinophils 0.4 0.0 - 0.7 10e9/L    Absolute Basophils 0.0 0.0 - 0.2 10e9/L    Abs Immature Granulocytes 0.0 0 - 0.4 10e9/L    Absolute Nucleated RBC 0.0    CRP inflammation   Result Value Ref Range    CRP Inflammation 4.1 0.0 - 8.0 mg/L   INR   Result Value Ref Range    INR 2.69 (H) 0.86 - 1.14   Comprehensive metabolic panel   Result Value Ref Range    Sodium 139 133 - 144 mmol/L    Potassium 3.5 3.4 - 5.3 mmol/L    Chloride 106 94 - 109 mmol/L    Carbon Dioxide 27 20 - 32 mmol/L    Anion Gap 6 3 - 14 mmol/L    Glucose 129 (H) 70 - 99 mg/dL    Urea Nitrogen 19 7 - 30 mg/dL    Creatinine 0.91 0.66 - 1.25 mg/dL    GFR Estimate >90 >60 mL/min/[1.73_m2]    GFR Estimate If Black >90 >60 mL/min/[1.73_m2]    Calcium 8.7 8.5 - 10.1 mg/dL    Bilirubin Total 1.1 0.2 - 1.3 mg/dL    Albumin 3.5 3.4 - 5.0 g/dL    Protein Total 6.5 (L) 6.8 - 8.8 g/dL    Alkaline Phosphatase 109 40 - 150 U/L    ALT 58 0 - 70 U/L    AST 76 (H) 0 - 45 U/L   Lipase   Result Value Ref Range    Lipase 142 73 - 393 U/L   Troponin I   Result Value Ref Range    Troponin I ES 0.021 0.000 - 0.045 ug/L   Nt probnp inpatient (BNP)   Result Value Ref Range    N-Terminal Pro BNP Inpatient 320 0 - 900 pg/mL   UA with Microscopic reflex to Culture    Specimen: Unspecified Urine   Result Value Ref Range    Color Urine Straw     Appearance Urine Clear     Glucose Urine Negative NEG^Negative mg/dL    Bilirubin Urine Negative NEG^Negative    Ketones Urine Negative NEG^Negative mg/dL    Specific Gravity Urine 1.008 1.003 - 1.035    Blood Urine Small (A) NEG^Negative    pH Urine 6.0 5.0 - 7.0 pH    Protein Albumin Urine Negative NEG^Negative mg/dL    Urobilinogen mg/dL 0.0 0.0 - 2.0 mg/dL    Nitrite Urine Negative NEG^Negative     Leukocyte Esterase Urine Moderate (A) NEG^Negative    Source Unspecified Urine     WBC Urine 66 (H) 0 - 5 /HPF    RBC Urine 10 (H) 0 - 2 /HPF    Mucous Urine Present (A) NEG^Negative /LPF   Urine Culture Aerobic Bacterial    Specimen: Midstream Urine   Result Value Ref Range    Specimen Description Midstream Urine     Special Requests Specimen received in preservative     Culture Micro PENDING    Symptomatic Influenza A/B & SARS-CoV2 (COVID-19) Virus PCR Multiplex    Specimen: Nasopharyngeal   Result Value Ref Range    Flu A/B & SARS-COV-2 PCR Source Nasopharyngeal     SARS-CoV-2 PCR Result NEGATIVE     Influenza A PCR Negative NEG^Negative    Influenza B PCR Negative NEG^Negative    Respiratory Syncytial Virus PCR (Note)     Flu A/B & SARS-CoV-2 PCR Comment (Note)    CT Chest Abdomen Pelvis w/o Contrast    Narrative    EXAM: CT CHEST ABDOMEN PELVIS W/O CONTRAST  LOCATION: [facility]  DATE/TIME: 1/23/2021 6:19 PM    INDICATION: Shortness of breath with flank pain. Possible COVID exposure.  COMPARISON: 03/06/2020 unenhanced CT abdomen pelvis.  TECHNIQUE: CT scan of the chest, abdomen, and pelvis was performed without IV contrast. Multiplanar reformats were obtained. Dose reduction techniques were used.   CONTRAST: None.    FINDINGS:   LUNGS AND PLEURA: Scattered calcified granulomata. In addition, there are a few small scattered noncalcified nodules biapical scarring.    MEDIASTINUM/AXILLAE: Cardiac enlargement. Pacemaker. Gastroesophageal varices.    HEPATOBILIARY: Cirrhosis. Tiny gallstone. Enlargement of the intrahepatic IVC suggesting right heart dysfunction. Calcification within the splenic vein and superior mesenteric vein compatible with chronic portal hypertension. Multiple splenorenal varices   with significant enlargement of the left renal vein.    PANCREAS: Normal.    SPLEEN: Normal.    ADRENAL GLANDS: Normal.    KIDNEYS/BLADDER: Stable bilateral nonobstructing renal stones. No ureteric  stone or hydronephrosis.    BOWEL: Normal.    LYMPH NODES: Normal.    VASCULATURE: Unremarkable.    PELVIC ORGANS: Prostatic calcification.    MUSCULOSKELETAL: Normal.      Impression    IMPRESSION:  1.  Cirrhosis with a few tiny hypodensities in the liver which are stable. Evidence of portal hypertension with multiple varices, including large varices in the left side of the abdomen, enlargement of the left renal vein compatible with splenorenal   renal shunt, calcification within the splenic vein and superior mesenteric vein compatible with chronic hypertension and thrombus.    2.  Stable nonobstructing renal stones. No ureteric stone or hydronephrosis.    3.  Cardiac enlargement with pacemaker.    4.  Multiple calcified granulomata in the lungs. In addition, there are a few small uncalcified pulmonary nodules which are unchanged.   7:32 PM  Results reviewed in the room with the patient.  I answered his questions.  He feels comfortable with plan for disposition to home.    Medications   cefTRIAXone in d5w (ROCEPHIN) intermittent infusion 1 g (1 g Intravenous New Bag 1/23/21 1722)       Assessments & Plan (with Medical Decision Making)   60-year-old male past medical history reviewed as above, presents to the emergency department with concerns of back pain and shortness of air of approximately 3 days duration as discussed in the HPI.  On exam he is alert and in no acute distress with normal range adult vital signs without fever, tachycardia, tachypnea or hypotension.  Normal oxygen saturation on room air.  Nonfocal exam.  Parental diagnostic considerations especially with the patient's comorbidities was discussed before proceeding with the work-up which includes CT chest abdomen pelvis without contrast, lab diagnostics including Covid testing.  No evidence of injury thoracic acute abnormality, stable cirrhosis and cirrhotic changes on CT, stable nonobstructing renal stones, negative Covid swab, urinalysis revealed  increased white cells, red cells and leukocyte esterase and is sent for culture.  Troponin is negative BNP is normal range, INR is therapeutic at 2.69, CBC revealed nonelevation of white cell count and anemia consistent with patient's previous baseline.  I think the most likely explanation for his presentation here today is in fact a urinary tract infection based upon his history and his comprehensive evaluation today.  No evidence to suggest an thoracic process such as pneumonia bacterial or otherwise at this point.  I plan to place the patient on oral antibiotics for his urinary tract infection.  As noted urine is sent for culture and if changes need to be made to the antibiotic regimen will be contacted when culture results are available.  Otherwise he is instructed to push fluids, oral antibiotics, I recommended nonnarcotic over-the-counter pain medications for him.  He will return if not improving or worse.      Disclaimer: This note consists of symbols derived from keyboarding, dictation and/or voice recognition software. As a result, there may be errors in the script that have gone undetected. Please consider this when interpreting information found in this chart.      I have reviewed the nursing notes.    I have reviewed the findings, diagnosis, plan and need for follow up with the patient.          New Prescriptions    SULFAMETHOXAZOLE-TRIMETHOPRIM (BACTRIM DS) 800-160 MG TABLET    Take 1 tablet by mouth 2 times daily for 10 days       Final diagnoses:   UTI (urinary tract infection), bacterial       1/23/2021   [facility] EMERGENCY DEPT     Kevin Arriaga MD  01/23/21 1936

## 2021-01-23 NOTE — TELEPHONE ENCOUNTER
Triage Call:Pt is calling starting yesterday he started with moderate sob, chills and a dull ache in his lower back. Pt states symptoms continue. Pain is interrupting sleep and is increased when standing, currently 5/10. Pt states occasionally having a dull ache were his pacemaker is but he does not currently have it. Pt stated he has a hx of kidney stones, no blood in urine and denied burning with urination. Pt has not taken his temp. Pt was advised to be seen in the ED, pt stated understanding and will go there now. Pt is going to the [facility] in [place].     COVID 19 Nurse Triage Plan/Patient Instructions    Please be aware that novel coronavirus (COVID-19) may be circulating in the community. If you develop symptoms such as fever, cough, or SOB or if you have concerns about the presence of another infection including coronavirus (COVID-19), please contact your health care provider or visit [email or web address].     Disposition/Instructions    ED Visit recommended. Follow protocol based instructions.     Bring Your Own Device:  Please also bring your smart device(s) (smart phones, tablets, laptops) and their charging cables for your personal use and to communicate with your care team during your visit.    Thank you for taking steps to prevent the spread of this virus.  o Limit your contact with others.  o Wear a simple mask to cover your cough.  o Wash your hands well and often.    Resources    [facility]: About COVID-19: [email or web address]    CDC: What to Do If You're Sick: www.cdc.gov/coronavirus/2019-ncov/about/steps-when-sick.html    CDC: Ending Home Isolation: www.cdc.gov/coronavirus/2019-ncov/hcp/disposition-in-home-patients.html     CDC: Caring for Someone: www.cdc.gov/coronavirus/2019-ncov/if-you-are-sick/care-for-someone.html     MD: Interim Guidance for Hospital Discharge to Home: www.health.[facility].mn.us/diseases/coronavirus/hcp/hospdischarge.pdf    [facility]  clinical trials (COVID-19 research studies): clinicalaffairs.[facility]-clinical-trials     Below are the COVID-19 hotlines at the [place] [facility] ([facility]). Interpreters are available.   o For health questions: Call 872-394-8473 or 1-433.129.9197 (7 a.m. to 7 p.m.)  o For questions about schools and childcare: Call 662-623-9153 or 1-443.605.3192 (7 a.m. to 7 p.m.)     Delores Lewis RN Nursing Advisor 1/23/2021 3:41 PM     Reason for Disposition    [1] MODERATE difficulty breathing (e.g., speaks in phrases, SOB even at rest, pulse 100-120) AND [2] NEW-onset or WORSE than normal    Additional Information    Negative: [1] Breathing stopped AND [2] hasn't returned    Negative: Choking on something    Negative: Severe difficulty breathing (e.g., struggling for each breath, speaks in single words)    Negative: Bluish (or gray) lips or face now    Negative: Difficult to awaken or acting confused (e.g., disoriented, slurred speech)    Negative: Passed out (i.e., lost consciousness, collapsed and was not responding)    Negative: Wheezing started suddenly after medicine, an allergic food or bee sting    Negative: Stridor    Negative: Slow, shallow and weak breathing    Negative: Sounds like a life-threatening emergency to the triager    Negative: Chest pain    Negative: [1] Wheezing (high pitched whistling sound) AND [2] previous asthma attacks or use of asthma medicines    Negative: [1] Difficulty breathing AND [2] only present when coughing    Negative: [1] Difficulty breathing AND [2] only from stuffy or runny nose    Protocols used: BREATHING DIFFICULTY-A-AH

## 2021-01-24 NOTE — DISCHARGE INSTRUCTIONS
Push fluids, rest.  Tylenol/ibuprofen as needed for pain.  Septra DS 1 tab p.o. twice daily x10 days.  Warm pack to the low back area as needed.  Return to the emergency department if worse or changes.  As discussed with you the urine was sent for culture and if results indicate a need to change to a different antibiotic you will be contacted by phone about this.

## 2021-01-25 NOTE — TELEPHONE ENCOUNTER
ADDENDUM: Patient scheduled for next INR on Wed 1/27 at the [facility] lab.         ADDENDUM: Patient called back. His antibiotic was switched today from Bactrim to Doxycycline for 10 days. Writer advised patient of the need to check INR on Tue/Wed and patient is wanting to get another urine culture done on Wed. He has reached out to his PCP to order this and prefers to wait to schedule the lab appointment until he can get the order and do both the urine culture and INR together. Patient will call [facility] back to schedule his lab appointment.       ANTICOAGULATION  MANAGEMENT     Interacting Medication Review    Interacting medication(s): Sulfamethoxazole-Trimethoprim (Bactrim) with warfarin.    Duration: 10 days  (1/23 to 2/2)    Indication: UTI    New medication?: Yes, interaction may increase INR and risk of bleeding       PLAN     Continue current warfarin dose. Recommend to check INR on 1/26 or 1/27 at the latest    Left a detailed message for Maurice and requested call back to [facility] to schedule INR.    Anticoagulation Calendar updated    Christina Singer RN        ANTICOAGULATION  MANAGEMENT: Discharge Review    Maurice Jon chart reviewed for anticoagulation continuity of care    Emergency room visit on 1/23/21 for UTI.    Discharge disposition: Home    Results:    Recent labs: (last 7 days)     01/23/21  1622   INR 2.69*     Anticoagulation inpatient management:     not applicable     Anticoagulation discharge instructions:     Warfarin dosing: home regimen continued   Bridging: No   INR goal change: No      Medication changes affecting anticoagulation: Yes: Bactrim for 10 days     Additional factors affecting anticoagulation: Yes: infection, patient also was reporting SOB    Plan     Recommend to check INR on 1/26 or 1/27    Left a detailed message for Yomi. Requested return call to [facility] when message was received.     Anticoagulation Calendar updated    Christina Singer RN

## 2021-01-25 NOTE — TELEPHONE ENCOUNTER
RN Recommendations/Instructions per [place] ED provider  12:13PM: Patient notified of ED providers treatment recommendations as noted below.  Rx for Doxycycline 100 mg PO tablet, 1 tablet (100 mg) by mouth 2 times daily for 10 days sent to [Pharmacy - [facility]].  Advised to contact his clinic nurse now to discuss his Warfarin dosing and when to get his INR rechecked and to discuss getting a clinic follow up and his urine rechecked in 2 days per the ED provider recommendation.   Advised to stop Bactrim and start Doxycycline.      Please Contact your PCP clinic or return to the Emergency department if your:    Symptoms worsen or other concerning symptom's.    PCP follow-up Questions asked: YES       [RN Name]  Emily Torres RN  [facility] RN  Lung Nodule and ED Lab Result RN  Epic pool (ED late result f/u RN): [id number]  [facility] INCIDENTAL RADIOLOGY F/U NURSES: [id number]  # [phone]

## 2021-01-25 NOTE — TELEPHONE ENCOUNTER
[facility] Emergency Department Lab result notification [Adult-Male]    [place] ED lab result protocol used  Urine culture    Reason for call  Notify of lab results, assess symptoms,  review ED providers recommendations/discharge instructions (if necessary) and advise per ED lab result f/u protocol    Lab Result (including Rx patient on, if applicable)  Final Urine Culture Report on 1/25/21  Emergency Dept/Urgent Care discharge antibiotic prescribed: Sulfamethoxazole-Trimethoprim (Bactrim DS, Septra DS) 800-160 mg PO tablet, 1 tablet by mouth 2 times daily for 10 days  #1. Bacteria, 50,000 to 100,000 colonies/mL Staphylococcus epidermidis, is [RESISTANT] to antibiotic.   Change in treatment as per [place] ED Lab result protocol.    Information table from ED Provider visit on 1/23/21  Symptoms reported at ED visit (Chief complaint, HPI) Back Pain      Shortness of Breath      HPI  Maurice Jon is a 60 year old male, past medical history is significant for portal hypertension, cirrhosis with ascites, chronic atrial fibrillation, congestive heart failure, ASCVD, cardiomyopathy, pericarditis, obesity, history of severe sepsis, GERD, eczema, BRUCE, erectile dysfunction, thrombocytopenia, presents to the emergency department concerns of back pain and shortness of breath.  History is obtained from the patient who states that beginning about 3 days ago he noted bilateral low back pain worse with movement and ambulation and at the same time noted some mild shortness of breath in the same context of mild exertion.  There was no chest pain associated with either of these symptoms however he does over the last 3 days get episodic left upper chest discomfort where his pacemaker was previously.  The pain is sharp intermittent lasting only a few seconds once or twice a day.  He is not sure what to make of it.  There is no associated nausea or vomiting he has been eating and drinking normally.  He notes no  urinary symptoms such as frequency, urgency or dysuria.  No hematuria as he has noticed in the past when he had kidney stones.  He is not sure if the pain in his flanks feels like kidney stone or not.  He is taken no pain medication at all for any of his symptoms.  Last night he noted chills and was unable to stay warm despite having 4 blankets on top of him.  He did not document a fever at home.  He notes no abdominal pain.  There has been no change in bowel habit and specifically no diarrhea.     Significant Medical hx, if applicable (i.e. CKD, diabetes) Reviewed   Allergies Allergies   Allergen Reactions     Avelox Other (See Comments) and GI Disturbance     portal hypertension     Moxifloxacin Nausea and Vomiting, Nausea and Other (See Comments)     portal hypertension     Sotalol Itching      Weight, if applicable Wt Readings from Last 2 Encounters:   01/23/21 104.3 kg (230 lb)   05/28/20 95.3 kg (210 lb)      Coumadin/Warfarin [Yes /No] Yes   Creatinine Level (mg/dl) Creatinine   Date Value Ref Range Status   01/23/2021 0.91 0.66 - 1.25 mg/dL Final      Creatinine clearance (ml/min), if applicable Serum creatinine: 0.91 mg/dL 01/23/21 1622  Estimated creatinine clearance: 93.5 mL/min   ED providers Impression and Plan (applicable information) 60-year-old male past medical history reviewed as above, presents to the emergency department with concerns of back pain and shortness of air of approximately 3 days duration as discussed in the HPI.  On exam he is alert and in no acute distress with normal range adult vital signs without fever, tachycardia, tachypnea or hypotension.  Normal oxygen saturation on room air.  Nonfocal exam.  Parental diagnostic considerations especially with the patient's comorbidities was discussed before proceeding with the work-up which includes CT chest abdomen pelvis without contrast, lab diagnostics including Covid testing.  No evidence of injury thoracic acute abnormality, stable  "cirrhosis and cirrhotic changes on CT, stable nonobstructing renal stones, negative Covid swab, urinalysis revealed increased white cells, red cells and leukocyte esterase and is sent for culture.  Troponin is negative BNP is normal range, INR is therapeutic at 2.69, CBC revealed nonelevation of white cell count and anemia consistent with patient's previous baseline.  I think the most likely explanation for his presentation here today is in fact a urinary tract infection based upon his history and his comprehensive evaluation today.  No evidence to suggest an thoracic process such as pneumonia bacterial or otherwise at this point.  I plan to place the patient on oral antibiotics for his urinary tract infection.  As noted urine is sent for culture and if changes need to be made to the antibiotic regimen will be contacted when culture results are available.  Otherwise he is instructed to push fluids, oral antibiotics, I recommended nonnarcotic over-the-counter pain medications for him.  He will return if not improving or worse.   ED diagnosis UTI (urinary tract infection), bacterial   ED provider Kevin Arriaga MD RN Assessment (Patient s current Symptoms), include time called.  [Insert Left message here if message left]  11:47AM: Spoke with patient. He states he is still having the same lower bilateral back stiffness and achiness, \"it feels like it's in my kidneys.\"  Rates pain 5-6/10 currently. Has occasional chills, no fever. He's unsure if he has any shortness of breath as he hasn't gotten out of bed yet today. Denies any chest pain, abdominal pain, nausea, vomiting, diarrhea or any urinary symptoms, no blood in the urine. Is on Warfarin.     RN Recommendations/Instructions per [place] ED lab result protocol  Patient notified of lab result and treatment recommendations.    Advised that I would consult with the ED provider and call him back.   [place] Emergency Department Provider Name & " Recommendations (included time consulted)  12:02PM: Consulted with [employer] [facility] ED provider Dr. Jefry Lilly. Reviewed patient's history, current symptoms and culture results. Dr. Lilly advises that the patient start Doxycyline 100mg twice a day for 10 days, stop Bactrim. Be seen in the clinic in 2 days and have his urine rechecked.        [RN Name]  Emily Torres RN  [facility] RN  Lung Nodule and ED Lab Result RN  Epic pool (ED late result f/u RN): [id number]  [facility] INCIDENTAL RADIOLOGY F/U NURSES: [id number]  # [phone]      Copy of Lab result  Urine Culture Aerobic Bacterial  Order: [id number]  Status:  Final result   Visible to patient:  Yes (MyChart) Dx:  UTI (urinary tract infection), bacterial  Specimen Information: Midstream Urine        (important suggestion)  Newer results are available. Click to view them now.   Component 2d ago   Specimen Description Midstream Urine    Special Requests Specimen received in preservative    Culture Micro Abnormal   50,000 to 100,000 colonies/mL   Staphylococcus epidermidis     Culture Micro <10,000 colonies/mL   mixed urogenital donnell   Susceptibility testing not routinely done    [facility]   Susceptibility     Staphylococcus epidermidis     LAYA     CIPROFLOXACIN <=0.5 ug/mL Sensitive     GENTAMICIN <=0.5 ug/mL Sensitive     LEVOFLOXACIN <=0.12 ug/mL Sensitive     NITROFURANTOIN <=16 ug/mL Sensitive     OXACILLIN >=4 ug/mL Resistant     TETRACYCLINE 2 ug/mL Sensitive     Trimethoprim/Sulfa  Resistant     VANCOMYCIN 1 ug/mL Sensitive                 Specimen Collected: 01/23/21  4:22 PM Last Resulted: 01/25/21  7:44 AM

## 2021-01-25 NOTE — TELEPHONE ENCOUNTER
Reason for call:  Patient reporting a symptom    Symptom or request: Pt was seen 2 days ago in the ED for back pain/UTI and wants an order placed to recheck his urine on Weds? Pt was originally put on Bactrim and then it was changed to Doxy Rx.   Please call patient and advise.      Duration (how long have symptoms been present): onoging    Have you been treated for this before? Yes    Additional comments:     Phone Number patient can be reached at:  Home number on file [phone] (home)    Best Time:  any    Can we leave a detailed message on this number:  YES    Call taken on 1/25/2021 at 12:21 PM by Donya Shook

## 2021-01-25 NOTE — TELEPHONE ENCOUNTER
Pt to repeat Urine Culture 2 days after starting 10 day course of Doxycycline? Per .  Starting Doxy 1/25/21 and requesting lab appt for 1/27/21  See ER PHONE note for explanation.    Leave detailed msg with plan.  Advise.  Karolyn

## 2021-01-26 NOTE — TELEPHONE ENCOUNTER
Pt calling back again today - Wants to know if he can take old Oxycodone Rx for ongoing urinary pain.  Please call patient and advise.

## 2021-01-26 NOTE — TELEPHONE ENCOUNTER
Yomi calling with questions about the Doxycycline.  Continues to have stiffness in his back which is interfering with sleep.  He denies having a fever.  He would like to know if he can take the Oxycodone with the Doxy.  Encouraged to discuss with pharnacist as well as instructed to take as prescribed.    Contact your PCP clinic or return to the Emergency department if your:    Symptoms do not improved after 3 days on antibiotic.    Symptoms do not resolve after completing antibiotic.    Symptoms worsen or other concerning symptom's.    Antony Mcneill RN  [email or web address]  Emergency Dept Lab Result RN  Ph# [phone]

## 2021-01-26 NOTE — TELEPHONE ENCOUNTER
"See notes below.  Pt asking for  to ok  Oxycodone for his urinary pain?  Pt has Oxycodone @ home and took for his knee.  Recommended Tylenol or Ibuprofen and pt states \"he is to stay away from them due to his Liver and his kidneys?    See below request for Urine Culture order?    Pt started on Doxycycline this AM.    Advise.  Lamin    "

## 2021-01-27 NOTE — PROGRESS NOTES
ANTICOAGULATION FOLLOW-UP CLINIC VISIT    Patient Name:  Maurice Jon  Date:  1/27/2021  Contact Type:  Telephone    SUBJECTIVE:  Patient Findings     Positives:  Change in medications (doxy thru 2-4 for UTI), Other complaints (lower back pain)    Comments:  Patient has a UTI and is on doxycycline. He sent a mychart yesterday about his lower back pain and is waiting for an answer on that. Could be related to his UTI. Patient is educated to call [facility] if his abx gets changed again. Otherwise, he does have a lab next week on Tues already scheduled. No other changes or concerns.         Clinical Outcomes     Comments:  Patient has a UTI and is on doxycycline. He sent a mychart yesterday about his lower back pain and is waiting for an answer on that. Could be related to his UTI. Patient is educated to call [facility] if his abx gets changed again. Otherwise, he does have a lab next week on Tues already scheduled. No other changes or concerns.            OBJECTIVE    Recent labs: (last 7 days)     01/27/21  1541   INR 3.10*       ASSESSMENT / PLAN  INR assessment SUPRA    Recheck INR In: 1 WEEK    INR Location Clinic      Anticoagulation Summary  As of 1/27/2021    INR goal:  2.0-3.0   TTR:  94.9 % (1 y)   INR used for dosing:  3.10 (1/27/2021)   Warfarin maintenance plan:  1.25 mg (2.5 mg x 0.5) every Fri; 2.5 mg (2.5 mg x 1) all other days   Full warfarin instructions:  1.25 mg every Fri; 2.5 mg all other days   Weekly warfarin total:  16.25 mg   No change documented:  Susan Beyer RN   Plan last modified:  Susan Beyer RN (9/28/2018)   Next INR check:  2/2/2021   Priority:  High   Target end date:  10/4/2018    Indications    Paroxysmal atrial fibrillation (H) [I48.0]  Atrial fibrillation with rapid ventricular response (H) (Resolved) [I48.91]  Long-term (current) use of anticoagulants [Z79.01] [Z79.01]  Chronic atrial fibrillation (H) [I48.20]  Atrial fibrillation with rapid ventricular response (H) [I48.91]              Anticoagulation Episode Summary     INR check location:      Preferred lab:      Send INR reminders to:  [facility]    Comments:  * anticoagulation short period surrounding ablation on 12/21/18. Cardiology to decide when to stop warfarin. has well-compensated cirrhosis      Anticoagulation Care Providers     Provider Role Specialty Phone number    Alon Pineda MD Referring Family Medicine [phone]            See the Encounter Report to view Anticoagulation Flowsheet and Dosing Calendar (Go to Encounters tab in chart review, and find the Anticoagulation Therapy Visit)        Susan Beyer RN

## 2021-01-28 NOTE — TELEPHONE ENCOUNTER
It is extremely rare to have pain requiring narcotics with a UTI. I would recommend over the counter pyridium for symptoms. (That's ok to take with his liver issues).  If that's not helpful, I would be concerned that something more serious is going on if pain is severe enough to need narcotic.  I would recommend urgent care or ER assessment if pain is indeed that severe.

## 2021-01-28 NOTE — TELEPHONE ENCOUNTER
Pt continues on ABX for UTI but is having lower back muscle pain.  Scheduled appt to assess back pain.  Karolyn

## 2021-01-29 NOTE — PATIENT INSTRUCTIONS
Our Clinic hours are:  Mondays    7:20 am - 7 pm  Tues -  Fri  7:20 am - 5 pm    Clinic Phone: [phone]    The clinic lab opens at 7:30 am Mon - Fri and appointments are required.    [facility]. [phone]  Monday  8 am - 7pm  Tues - Fri 8 am - 5:30 pm

## 2021-01-29 NOTE — PROGRESS NOTES
"  Assessment & Plan     Musculoskeletal back pain  Lumbar pain likely musculoskeletal in etiology from the fall.    Compression fracture of thoracic vertebra, initial encounter, unspecified thoracic vertebral level (H)  Reviewed imaging. Discussed likely acute thoracic compression fracture given history and exam.  Discussed management. Follow-up if not improving or if worsening.                BMI:   Estimated body mass index is 38.79 kg/m  as calculated from the following:    Height as of this encounter: 1.6 m (5' 3\").    Weight as of this encounter: 99.3 kg (219 lb).           No follow-ups on file.    Alon Pineda MD  [facility]    Samara Celaya is a 60 year old who presents to clinic today for the following health issues  accompanied by his self:    HPI     Chief Complaint   Patient presents with     Back Pain     States he slipped on the ice in the parking lot at work 3 weeks ago.   Was seen for low back pain at [facility] ED 1/23/2021 and treated for a UTI, Currently on an antibiotic. CT scan was also done.       ED/UC Followup:    Facility:  [facility]  Date of visit: 1/23/2021  Reason for visit: back pain  Current Status: still has back pain, and is not getting any better.           Review of Systems   Constitutional, neuro, ENT, endocrine, pulmonary, cardiac, gastrointestinal, genitourinary, musculoskeletal, integument and psychiatric systems are negative, except as otherwise noted.       Objective    /60 (BP Location: Right arm)   Pulse 74   Temp 97.4  F (36.3  C) (Tympanic)   Resp 16   Ht 1.6 m (5' 3\")   Wt 99.3 kg (219 lb)   SpO2 97%   BMI 38.79 kg/m    Body mass index is 38.79 kg/m .  Physical Exam   GENERAL: Pleasant, well appearing male.  MUSCULOSKELETAL:  Tenderness to palpation over lower thoracic vertebra. Lumbar paravertebral tenderness and tightness.    Recent Results (from the past 744 hour(s))   CT Chest Abdomen Pelvis w/o " Contrast    Narrative    EXAM: CT CHEST ABDOMEN PELVIS W/O CONTRAST  LOCATION: [facility]  DATE/TIME: 1/23/2021 6:19 PM    INDICATION: Shortness of breath with flank pain. Possible COVID exposure.  COMPARISON: 03/06/2020 unenhanced CT abdomen pelvis.  TECHNIQUE: CT scan of the chest, abdomen, and pelvis was performed without IV contrast. Multiplanar reformats were obtained. Dose reduction techniques were used.   CONTRAST: None.    FINDINGS:   LUNGS AND PLEURA: Scattered calcified granulomata. In addition, there are a few small scattered noncalcified nodules biapical scarring.    MEDIASTINUM/AXILLAE: Cardiac enlargement. Pacemaker. Gastroesophageal varices.    HEPATOBILIARY: Cirrhosis. Tiny gallstone. Enlargement of the intrahepatic IVC suggesting right heart dysfunction. Calcification within the splenic vein and superior mesenteric vein compatible with chronic portal hypertension. Multiple splenorenal varices   with significant enlargement of the left renal vein.    PANCREAS: Normal.    SPLEEN: Normal.    ADRENAL GLANDS: Normal.    KIDNEYS/BLADDER: Stable bilateral nonobstructing renal stones. No ureteric stone or hydronephrosis.    BOWEL: Normal.    LYMPH NODES: Normal.    VASCULATURE: Unremarkable.    PELVIC ORGANS: Prostatic calcification.    MUSCULOSKELETAL: Normal.      Impression    IMPRESSION:  1.  Cirrhosis with a few tiny hypodensities in the liver which are stable. Evidence of portal hypertension with multiple varices, including large varices in the left side of the abdomen, enlargement of the left renal vein compatible with splenorenal   renal shunt, calcification within the splenic vein and superior mesenteric vein compatible with chronic hypertension and thrombus.    2.  Stable nonobstructing renal stones. No ureteric stone or hydronephrosis.    3.  Cardiac enlargement with pacemaker.    4.  Multiple calcified granulomata in the lungs. In addition, there are a few small uncalcified  pulmonary nodules which are unchanged.   XR Thoracic Lumbar Spine 2 Views    Narrative    XR THORACIC LUMBAR SPINE TWO VIEWS   1/29/2021 4:55 PM     HISTORY: Fell on the ice and landed on his back. Musculoskeletal back  pain.    COMPARISON: None.      Impression    IMPRESSION: There is exaggerated lumbar lordosis. Posterior alignment  is normal. Vertebral body heights are maintained. Degenerative facet  arthropathy is noted in the lower lumbar spine. There is some  degenerative disc disease in the visualized lower thoracic spine.  There is minimal anterior wedging of T11 but this appears chronic. If  there is concern for an acute fracture, CT of the thoracic spine might  be helpful in further evaluation.    BRIANNA VAUGHAN MD

## 2021-01-29 NOTE — LETTER
[facility]  [address]  [facility] [address]  Phone: [phone]    January 29, 2021        Maurice Trujilloon  [address] [place] [address]          To whom it may concern:    RE: Maurice LISBETH Danay    Patient was seen and treated today at our clinic.  Patient may return to work 1/29/2021 with the following:  Light duty-unable to lift more than 10 pounds    Please contact me for questions or concerns.      Sincerely,        Alon Pineda MD

## 2021-02-02 NOTE — PROGRESS NOTES
Anticoagulation Management    Unable to reach Yomi today.    Today's INR result of 2.80 is therapeutic (goal INR of 2.0-3.0).  Result received from: Clinic Lab    Follow up required to confirm warfarin dose taken and assess for changes    Left message to continue current dose of warfarin 2.5 mg tonight.      Anticoagulation clinic to follow up    Susan Ortega RN  [phone]

## 2021-02-02 NOTE — PROGRESS NOTES
ANTICOAGULATION MANAGEMENT     Patient Name:  Maurice Jon  Date:  2021    ASSESSMENT /SUBJECTIVE:    Today's INR result of 2.80 is therapeutic. Goal INR of 2.0-3.0      Warfarin dose taken: Warfarin taken as instructed    Diet: No new diet changes affecting INR    Medication changes/ interactions: taking oxycodone    Previous INR: Supratherapeutic     S/S of bleeding or thromboembolism: No    New injury or illness: No    Upcoming surgery, procedure or cardioversion: No    Additional findings: None      PLAN:    Telephone call with Maurice regarding INR result and instructed:     Warfarin Dosing Instructions: Continue your current warfarin dose 1.25 mg Friday; 2.5 mg all other days    Instructed patient to follow up no later than: 1 week  Lab visit scheduled    Education provided: None required      Yomi verbalizes understanding and agrees to warfarin dosing plan.    Instructed to call the Anticoagulation Clinic for any changes, questions or concerns. (#[phone])        Susan Ortega RN      OBJECTIVE:  Recent labs: (last 7 days)     21  1541 21  1541   INR 3.10* 2.80*         INR assessment THER    Recheck INR In: 1 WEEK    INR Location Clinic      Anticoagulation Summary  As of 2021    INR goal:  2.0-3.0   TTR:  94.3 % (1 y)   INR used for dosin.80 (2021)   Warfarin maintenance plan:  1.25 mg (2.5 mg x 0.5) every Fri; 2.5 mg (2.5 mg x 1) all other days   Full warfarin instructions:  1.25 mg every Fri; 2.5 mg all other days   Weekly warfarin total:  16.25 mg   No change documented:  Susan Ortega RN   Plan last modified:  Susan Beyer RN (2018)   Next INR check:  2021   Priority:  High   Target end date:  10/4/2018    Indications    Paroxysmal atrial fibrillation (H) [I48.0]  Atrial fibrillation with rapid ventricular response (H) (Resolved) [I48.91]  Long-term (current) use of anticoagulants [Z79.01] [Z79.01]  Chronic atrial fibrillation (H)  [I48.20]  Atrial fibrillation with rapid ventricular response (H) [I48.91]             Anticoagulation Episode Summary     INR check location:      Preferred lab:      Send INR reminders to:  [facility]    Comments:  * anticoagulation short period surrounding ablation on 12/21/18. Cardiology to decide when to stop warfarin. has well-compensated cirrhosis      Anticoagulation Care Providers     Provider Role Specialty Phone number    Alon Pineda MD Referring Family Medicine [phone]

## 2021-02-09 NOTE — PROGRESS NOTES
ANTICOAGULATION MANAGEMENT     Patient Name:  Maurice Jon  Date:  2/9/2021    ASSESSMENT /SUBJECTIVE:    Today's INR result of 3.10 is supratherapeutic. Goal INR of 2.0-3.0      Warfarin dose taken: Warfarin taken as instructed    Diet: No new diet changes affecting INR    Medication changes/ interactions: No interaction expected between oxycodone and warfarin    Previous INR: Therapeutic     S/S of bleeding or thromboembolism: No    New injury or illness: Yes: compression fracture (fall about a month ago) taking oxycodone PRN for that, states is slowly improving    Upcoming surgery, procedure or cardioversion: No    Additional findings: Finished doxycycline on Sat for UTI      PLAN:    Telephone call with Maurice regarding INR result and instructed:     Warfarin Dosing Instructions: Continue your current warfarin dose 1.25 mg every Fri; 2.5 mg all other days    Instructed patient to follow up no later than: 2 weeks  Lab visit scheduled    Education provided: Target INR goal and significance of current INR result and Monitoring for bleeding signs and symptoms      Yomi verbalizes understanding and agrees to warfarin dosing plan.    Instructed to call the Anticoagulation Clinic for any changes, questions or concerns. (#[phone])        Christina Singer RN      OBJECTIVE:  Recent labs: (last 7 days)     02/09/21  1544   INR 3.10*         No question data found.  Anticoagulation Summary  As of 2/9/2021    INR goal:  2.0-3.0   TTR:  93.7 % (1 y)   INR used for dosing:  3.10 (2/9/2021)   Warfarin maintenance plan:  1.25 mg (2.5 mg x 0.5) every Fri; 2.5 mg (2.5 mg x 1) all other days   Full warfarin instructions:  1.25 mg every Fri; 2.5 mg all other days   Weekly warfarin total:  16.25 mg   No change documented:  Christina Singer RN   Plan last modified:  Susan Beyer RN (9/28/2018)   Next INR check:  2/24/2021   Priority:  High   Target end date:  10/4/2018    Indications    Paroxysmal atrial  fibrillation (H) [I48.0]  Atrial fibrillation with rapid ventricular response (H) (Resolved) [I48.91]  Long-term (current) use of anticoagulants [Z79.01] [Z79.01]  Chronic atrial fibrillation (H) [I48.20]  Atrial fibrillation with rapid ventricular response (H) [I48.91]             Anticoagulation Episode Summary     INR check location:      Preferred lab:      Send INR reminders to:  [facility]    Comments:  * anticoagulation short period surrounding ablation on 12/21/18. Cardiology to decide when to stop warfarin. has well-compensated cirrhosis      Anticoagulation Care Providers     Provider Role Specialty Phone number    Alon Pineda MD Referring Family Medicine [phone]             Anticoagulation Management    Unable to reach Yomi today.    Today's INR result of 3.10 is supratherapeutic (goal INR of 2.0-3.0).  Result received from: Clinic Lab    Follow up required to discuss out of range INR     Left message to continue current dose of warfarin 2.5 mg tonight.      Anticoagulation clinic to follow up    Christina Singer RN

## 2021-02-20 NOTE — TELEPHONE ENCOUNTER
Reason for call:  Medication   If this is a refill request, has the caller requested the refill from the pharmacy already? YES  Will the patient be using a [facility]? No  Name of the pharmacy and phone number for the current request:  [facility] [address] [place] [address] [phone]    Name of the medication requested: jantoven 2.5 mg     Other request: Patient is out of medication    Phone number to reach patient:  Home number on file [phone] (home)    Best Time:  Any time    Can we leave a detailed message on this number?  YES    Travel screening: Not Applicable

## 2021-02-24 NOTE — PROGRESS NOTES
ANTICOAGULATION MANAGEMENT     Patient Name:  Maurice Jon  Date:  2/24/2021    ASSESSMENT /SUBJECTIVE:    Today's INR result of 2.80 is therapeutic. Goal INR of 2.0-3.0      Warfarin dose taken: Warfarin taken as instructed    Diet: No new diet changes affecting INR    Medication changes/ interactions: No new medications/supplements affecting INR    Previous INR: Supratherapeutic     S/S of bleeding or thromboembolism: No    New injury or illness: No    Upcoming surgery, procedure or cardioversion: No    Additional findings: None      PLAN:    Telephone call with Maurice regarding INR result and instructed:     Warfarin Dosing Instructions: Continue your current warfarin dose 1.25mg every Fri; 2.5mg all other days of the week.     Instructed patient to follow up no later than: 3 weeks  Lab visit scheduled    Education provided: Target INR goal and significance of current INR result, Importance of therapeutic range and Importance of following up for INR monitoring at instructed interval      Yomi verbalizes understanding and agrees to warfarin dosing plan.    Instructed to call the Anticoagulation Clinic for any changes, questions or concerns. (#[phone])        Alek Galicia RN      OBJECTIVE:  Recent labs: (last 7 days)     02/24/21  1550   INR 2.80*         No question data found.  Anticoagulation Summary  As of 2/24/2021    INR goal:  2.0-3.0   TTR:  93.9 % (1 y)   INR used for dosing:     Plan last modified:  Susan Beyer RN (9/28/2018)   Next INR check:     Target end date:  10/4/2018    Indications    Paroxysmal atrial fibrillation (H) [I48.0]  Atrial fibrillation with rapid ventricular response (H) (Resolved) [I48.91]  Long-term (current) use of anticoagulants [Z79.01] [Z79.01]  Chronic atrial fibrillation (H) [I48.20]  Atrial fibrillation with rapid ventricular response (H) [I48.91]             Anticoagulation Episode Summary     INR check location:      Preferred lab:      Send INR reminders  to:  [facility]    Comments:  * anticoagulation short period surrounding ablation on 12/21/18. Cardiology to decide when to stop warfarin. has well-compensated cirrhosis      Anticoagulation Care Providers     Provider Role Specialty Phone number    Alon Pineda MD Referring Family Medicine [phone]

## 2021-02-26 NOTE — PROGRESS NOTES
"    Assessment & Plan     Compression fracture of thoracic vertebra, initial encounter, unspecified thoracic vertebral level (H)  Cleared to return to work without restrictions.               BMI:   Estimated body mass index is 38.44 kg/m  as calculated from the following:    Height as of this encounter: 1.6 m (5' 3\").    Weight as of this encounter: 98.4 kg (217 lb).   Weight management plan: See AVS        No follow-ups on file.    Alon Pineda MD  [facility]    Samara Celaya is a 60 year old who presents for the following health issues  accompanied by his self :    HPI       Back Pain  Onset/Duration: low back   Description:   Location of pain: low back bilateral  Character of pain: dull ache and stiffness  Pain radiation: none  New numbness or weakness in legs, not attributed to pain: no   Intensity: mild  Progression of Symptoms: improving  History:   Specific cause: turning/bending, fall   Pain interferes with job: YES  History of back problems: recurrent self limited episodes of low back pain in the past  Any previous MRI or X-rays: Yes- at [facility].  Date 1/21  Sees a specialist for back pain: No  Alleviating factors:   Improved by: cold, heat and opioids    Precipitating factors:  Worsened by: Nothing  Therapies tried and outcome: see above     Accompanying Signs & Symptoms:  Risk of Fracture: current fracture  Risk of Cauda Equina: None  Risk of Infection: None  Risk of Cancer: None  Risk of Ankylosing Spondylitis: Onset at age <35, male, AND morning back stiffness  no             Review of Systems   Constitutional, neuro, ENT, endocrine, pulmonary, cardiac, gastrointestinal, genitourinary, musculoskeletal, integument and psychiatric systems are negative, except as otherwise noted.       Objective    /60 (BP Location: Right arm, Patient Position: Chair, Cuff Size: Adult Large)   Pulse 71   Temp 98.6  F (37  C)   Resp 16   Ht 1.6 m (5' 3\")   Wt 98.4 kg " (217 lb)   SpO2 96%   BMI 38.44 kg/m    Body mass index is 38.44 kg/m .  Physical Exam   GENERAL: Pleasant, well appearing male.  MUSCULOSKELETAL:  No midline vertebral tenderness to palpation.

## 2021-02-26 NOTE — PATIENT INSTRUCTIONS
Our Clinic hours are:  Mondays    7:20 am - 7 pm  Tues -  Fri  7:20 am - 5 pm    Clinic Phone: [phone]    The clinic lab opens at 7:30 am Mon - Fri and appointments are required.    [facility]. [phone]  Monday  8 am - 7pm  Tues - Fri 8 am - 5:30 pm

## 2021-03-17 NOTE — TELEPHONE ENCOUNTER
"Requested Prescriptions   Pending Prescriptions Disp Refills     pantoprazole (PROTONIX) 40 MG EC tablet [Pharmacy Med Name: pantoprazole 40 mg tablet,delayed release] 90 tablet 0     Sig: Take 1 tablet (40 mg) by mouth daily       PPI Protocol Passed - 3/17/2021  8:17 AM        Passed - Not on Clopidogrel (unless Pantoprazole ordered)        Passed - No diagnosis of osteoporosis on record        Passed - Recent (12 mo) or future (30 days) visit within the authorizing provider's specialty     Patient has had an office visit with the authorizing provider or a provider within the authorizing providers department within the previous 12 mos or has a future within next 30 days. See \"Patient Info\" tab in inbasket, or \"Choose Columns\" in Meds & Orders section of the refill encounter.              Passed - Medication is active on med list        Passed - Patient is age 18 or older             "

## 2021-03-20 PROBLEM — R41.82 ALTERED MENTAL STATUS, UNSPECIFIED ALTERED MENTAL STATUS TYPE: Status: ACTIVE | Noted: 2021-01-01

## 2021-03-20 NOTE — LETTER
3/23/2021     RE:  Maurice Jon                                                                                                                                                       [address] [place] [address]            To whom it may concern:    Maurice Jon has been hospitalized under my professional care from 3/20/2021 to 3/23/2021. I have recommended that he not return to work until 3/25/2021 at which time he may return to work without restriction.     Mr Jon asked that I report for your information the result of his COVID-19 testing which was negative on 3/20/2021.       Sincerely,        Nacho Morse MD   [facility]

## 2021-03-20 NOTE — ED PROVIDER NOTES
History     Chief Complaint   Patient presents with     Chest Pain     HPI  Maurice Jon is a 60 year old male with history of cirrhosis with ascites, portal hypertension, atrial fibrillation on Coumadin, coronary artery disease, and cardiomyopathy presenting for evaluation of fatigue and weakness, increasing abdominal distention and lower extremity edema as well as some chest pain with radiation up to the neck.  Patient reports several days of fatigue and weakness with some increasing confusion and abdominal distention.  Patient reports that today while walking he experienced increasing central chest pressure with some radiation up into his neck.  Symptoms seem to subside when he would rest.  He also tried nitro at one time which improved his symptoms.  Denies any diaphoresis but also reports notable exertional dyspnea which has been worsening over the past several days.  Has also had increasing generalized foggy thoughts and confusion similar to when he has had problems with his liver in the past.  Patient denies fever chills.  Denies injury.  Denies headache or vision changes.  Reports some mild nausea but no vomiting.  Has been eating and drinking relatively normally.  Normal bowel movements and urination.  Has an occasional cough but not significant change from baseline.  Does report some increasing abdominal distention along with lower extremity swelling but is still taking his diuretic as prescribed.  Patient finally came in due to the combination of all the symptoms including generalized fatigue which continues to worsen.      ==================================================================    CHART REVIEW:      CT angiogram Coronary artery 12/19/19:    IMPRESSION:  1.  Minimal non-obstructive coronary artery disease.  2.  Total Agatston score 37.4 placing the patient in the 56th  percentile when compared to age and gender matched control group.  3.  Please review Radiology report for incidental  noncardiac findings  that will follow separately.    ADDITIONAL FINDINGS:   The proximal ascending aorta is normal in size.   Device leads are present in the right ventricle and the coronary  sinus.    Normal pulmonary venous anatomy with all four pulmonary veins draining  into the left atrium.    There is no left ventricular mass or thrombus.   There is no thrombus in the left atrial appendage.   Normal pericardial thickness. There is no pericardial effusion.  Please review Radiology report for incidental noncardiac findings that  will follow separately.      Echo 12/19/19:    Interpretation Summary  Borderline (EF 50-55%) reduced left ventricular function is present.  Moderate right ventricular dilation is present. Global right ventricular  function is mildly reduced.  Trace aortic insufficiency is present.  Estimated pulmonary artery systolic pressure is 26 mmHg plus right atrial  pressure.  IVC diameter >2.1 cm collapsing <50% with sniff suggests a high RA pressure  estimated at 15 mmHg or greater.  No pericardial effusion is present.     This study was compared with the study from 6/21/19. Patient is now in a paced  rhythm. Minimal change in biventricular function.      END CHART REVIEW  ==================================================================      Allergies:  Allergies   Allergen Reactions     Avelox Other (See Comments) and GI Disturbance     portal hypertension     Moxifloxacin Nausea and Vomiting, Nausea and Other (See Comments)     portal hypertension     Sotalol Itching       Problem List:    Patient Active Problem List    Diagnosis Date Noted     Health Care Home 07/01/2011     Priority: High     01/07/2014  Status:  Declined  Care Coordinator:  Salena Joel    See Letters for H Care Plan (emergency care plan only)             Portal hypertension (H) 01/28/2010     Priority: High     Liver Cirrhosis 2nd ot Fatty liver, w Ascites 08/22/2005     Priority: High     Cirrhosis, idiopathic          Altered mental status, unspecified altered mental status type 03/20/2021     Priority: Medium     Chronic atrial fibrillation (H) 09/16/2020     Priority: Medium     Atrial fibrillation with rapid ventricular response (H) 09/16/2020     Priority: Medium     Atypical chest pain 12/20/2019     Priority: Medium     Chest pain 12/19/2019     Priority: Medium     Persistent atrial fibrillation (H) 11/13/2019     Priority: Medium     Added automatically from request for surgery [id number]       Cellulitis 10/06/2019     Priority: Medium     Acute combined systolic and diastolic (congestive) hrt fail (H) 01/04/2019     Priority: Medium     CAD (coronary artery disease)      Priority: Medium     Cath 12/26/18- mild nonobstructive disease       Cardiomyopathy (H)      Priority: Medium     12/23/18 Echo- EF 25-30%       Pericarditis      Priority: Medium     Acute on chronic systolic congestive heart failure (H)      Priority: Medium     Obesity (BMI 35.0-39.9) with comorbidity (H) 12/28/2018     Priority: Medium     Right heart failure (H) 12/24/2018     Priority: Medium     Chronic a-fib, S/P Ablation 12/22/18 -- on Warfarin 12/21/2018     Priority: Medium     Encounter for monitoring sotalol therapy 10/31/2018     Priority: Medium     Long-term (current) use of anticoagulants [Z79.01] 09/18/2018     Priority: Medium     Portal vein thrombosis 02/02/2017     Priority: Medium     History of MRSA now culture negative 08/06/2015     Priority: Medium     Severe sepsis (H) 07/13/2015     Priority: Medium     Problem list name updated by automated process. Provider to review       Paroxysmal atrial fibrillation (H)      Priority: Medium     S/p cardioversion       Edema 05/13/2013     Priority: Medium     CARDIOVASCULAR SCREENING; LDL GOAL LESS THAN 160 10/31/2010     Priority: Medium     GERD (gastroesophageal reflux disease) 03/25/2010     Priority: Medium     Eczema 01/28/2010     Priority: Medium     BRUCE-Mild (AHI 9; REM RDI  31) 03/20/2009     Priority: Medium     Sleep study 3/09- .0 minutes, latency 3.6 minutes. REM latency 72.0 minutes. Sleep efficiency 87.7%. The sleep architecture was disrupted with frequent sleep stage changes and arousals. Snoring: mild to loud.  RDI 27.7, AHI of 8.4. REM RDI 31.5 TLO2 saturation 83.0%. This study is suggestive of mild sleep apnea, severe during REM sleep (20% TST). Other:  PLM index was 0.0.       Compulsive behaviors 08/26/2008     Priority: Medium     erectile dysfunction 08/22/2005     Priority: Medium     Obesity 11/24/2010     Priority: Low     Thrombocytopenia (H) 08/22/2005     Priority: Low     Thrombocytopenia associated with cirrhosis and portal hypertension  Problem list name updated by automated process. Provider to review          Past Medical History:    Past Medical History:   Diagnosis Date     A-fib (H)      Acute pulmonary edema (H)      Atrial fibrillation (H)      Atrial fibrillation with rapid ventricular response (H) 5/13/2013     CAD (coronary artery disease)      Calculus of ureter 1993, 1997     Cardiomyopathy (H)      Chronic systolic CHF (congestive heart failure) (H)      Cirrhosis of liver without mention of alcohol 8/22/2005     Edema 5/13/2013     Esophageal varices with bleeding(456.0)      Gallstones      Genital herpes, unspecified 3/20/1999     GERD (gastroesophageal reflux disease)      Hematuria      Hypertension      Hypochondriasis      Obesity 11/24/2010     BRUCE (obstructive sleep apnea) 03/17/2009     Pericarditis      Portal hypertension (H) 1/28/2010     Unspecified thrombocytopenia 8/22/2005       Past Surgical History:    Past Surgical History:   Procedure Laterality Date     ANESTHESIA CARDIOVERSION N/A 1/19/2015    Procedure: ANESTHESIA CARDIOVERSION;  Surgeon: Generic Anesthesia Provider;  Location: [facility] OR     ANESTHESIA CARDIOVERSION N/A 2/6/2019    Procedure: ANESTHESIA CARDIOVERSION;  Surgeon: GENERIC ANESTHESIA PROVIDER;  Location:  OR      ANESTHESIA CARDIOVERSION N/A 7/5/2019    Procedure: ANESTHESIA FOR CARDIOVERSION  DR. MURGUIA);  Surgeon: GENERIC ANESTHESIA PROVIDER;  Location:  OR     COLONOSCOPY N/A 8/14/2017    Procedure: COLONOSCOPY;  Colonoscopy Called will arrive appx 12:30 Per phone call;  Surgeon: Vincent Whittington MD;  Location:  GI     CV HEART CATHETERIZATION WITH POSSIBLE INTERVENTION N/A 12/26/2018    Procedure: Heart Catheterization with possible Intervention;  Surgeon: Brandon Arroyo MD;  Location:  HEART CARDIAC CATH LAB     EP ABLATION AV NODE N/A 11/15/2019    Procedure: EP Ablation AV Node;  Surgeon: Ag Maloney MD;  Location:  HEART CARDIAC CATH LAB     EP ABLATION FOCAL AFIB N/A 12/21/2018    Procedure: EP Ablation Focal AFIB;  Surgeon: Kristofer Murguia MD;  Location:  HEART CARDIAC CATH LAB     EP PACEMAKER N/A 11/15/2019    Procedure: EP PACEMAKER;  Surgeon: Ag Maloney MD;  Location:  HEART CARDIAC CATH LAB     ESOPHAGOSCOPY, GASTROSCOPY, DUODENOSCOPY (EGD), COMBINED  7/11/2011    Procedure:COMBINED ESOPHAGOSCOPY, GASTROSCOPY, DUODENOSCOPY (EGD); Surgeon:LAURA LAMAS; Location:[facility]     ESOPHAGOSCOPY, GASTROSCOPY, DUODENOSCOPY (EGD), COMBINED  9/10/2012    Procedure: COMBINED ESOPHAGOSCOPY, GASTROSCOPY, DUODENOSCOPY (EGD);;  Surgeon: Hi Roque MD;  Location:  GI     ESOPHAGOSCOPY, GASTROSCOPY, DUODENOSCOPY (EGD), COMBINED  9/9/2013    Procedure: COMBINED ESOPHAGOSCOPY, GASTROSCOPY, DUODENOSCOPY (EGD);;  Surgeon: Hi Roque MD;  Location:  GI     ESOPHAGOSCOPY, GASTROSCOPY, DUODENOSCOPY (EGD), COMBINED N/A 9/15/2014    Procedure: COMBINED ESOPHAGOSCOPY, GASTROSCOPY, DUODENOSCOPY (EGD);  Surgeon: Hi Roque MD;  Location:  GI     ESOPHAGOSCOPY, GASTROSCOPY, DUODENOSCOPY (EGD), COMBINED N/A 10/30/2015    Procedure: COMBINED ESOPHAGOSCOPY, GASTROSCOPY, DUODENOSCOPY (EGD);  Surgeon: Feliberto Fox MD;  Location:  GI     ESOPHAGOSCOPY, GASTROSCOPY,  DUODENOSCOPY (EGD), COMBINED N/A 10/10/2016    Procedure: COMBINED ESOPHAGOSCOPY, GASTROSCOPY, DUODENOSCOPY (EGD);  Surgeon: Jose Nesbitt MD;  Location: [facility] GI     ESOPHAGOSCOPY, GASTROSCOPY, DUODENOSCOPY (EGD), COMBINED N/A 2019    Procedure: ESOPHAGOGASTRODUODENOSCOPY (EGD);  Surgeon: Timo Soares MD;  Location: [facility] GI     H ABLATION FOCAL AFIB  2018     HERNIA REPAIR       IRRIGATION AND DEBRIDEMENT ABSCESS SCROTUM, COMBINED  10/4/2012    Procedure: COMBINED IRRIGATION AND DEBRIDEMENT ABSCESS SCROTUM;  Irrigation and Debridement of Groin Abscess;  Surgeon: Benny Shoemaker MD;  Location: [facility] OR     SOFT TISSUE SURGERY       SURGICAL HISTORY OF -       rt elbow     SURGICAL HISTORY OF -   1/15/98    repair of ventral and umbilical hernia     SURGICAL HISTORY OF -       endoscopy       Family History:    Family History   Problem Relation Age of Onset     Lipids Mother      Hypertension Mother      Eye Disorder Mother      Heart Disease Father         CHF     Lipids Father      Obesity Father      Heart Disease Brother         MI     Eye Disorder Brother      Hypertension Brother         portal HTN     Thrombosis Sister         in her lung     Eye Disorder Sister      Cancer Other         maternal grandparent/throat cancer       Social History:  Marital Status:   [2]  Social History     Tobacco Use     Smoking status: Former Smoker     Packs/day: 0.80     Years: 6.00     Pack years: 4.80     Types: Cigarettes     Quit date: 2002     Years since quittin.2     Smokeless tobacco: Never Used   Substance Use Topics     Alcohol use: No     Alcohol/week: 0.0 standard drinks     Comment: quit in      Drug use: No        Medications:    ACE/ARB/ARNI NOT PRESCRIBED (INTENTIONAL)  Acetaminophen 325 MG CAPS  albuterol (PROAIR RESPICLICK) 108 (90 Base) MCG/ACT inhaler  ASPIRIN NOT PRESCRIBED (INTENTIONAL)  calcium carb 1250 mg, 500 mg [place],/vitamin D 200 units (OSCAL  WITH D) 500-200 MG-UNIT per tablet  diphenhydrAMINE HCl (BENADRYL PO)  metoprolol succinate ER (TOPROL XL) 25 MG 24 hr tablet  Multiple Vitamins-Minerals (MENS 50+ MULTI VITAMIN/MIN PO)  order for DME  order for DME  ORDER FOR DME  oxyCODONE (ROXICODONE) 5 MG tablet  pantoprazole (PROTONIX) 40 MG EC tablet  potassium chloride ER (K-TAB) 20 MEQ CR tablet  spironolactone (ALDACTONE) 25 MG tablet  STATIN NOT PRESCRIBED (INTENTIONAL)  torsemide (DEMADEX) 20 MG tablet  triamcinolone (KENALOG) 0.1 % external cream  vitamin D3 (CHOLECALCIFEROL) 2000 units (50 mcg) tablet  warfarin ANTICOAGULANT (JANTOVEN ANTICOAGULANT) 2.5 MG tablet          Review of Systems   Constitutional: Positive for activity change and fatigue. Negative for appetite change, chills, diaphoresis, fever and unexpected weight change.   HENT: Negative for congestion and sore throat.    Eyes: Negative for visual disturbance.   Respiratory: Positive for cough (Occasional) and shortness of breath (Mild at baseline but notably worse with exertion). Negative for chest tightness.    Cardiovascular: Positive for chest pain (Described as a pressure sensation) and leg swelling. Negative for palpitations.   Gastrointestinal: Positive for abdominal distention, constipation and nausea (Mild). Negative for abdominal pain, diarrhea and vomiting.   Genitourinary: Negative for decreased urine volume and dysuria.   Musculoskeletal: Negative for back pain.   Skin: Negative for rash.   Neurological: Negative for weakness, light-headedness, numbness and headaches.   Psychiatric/Behavioral: Positive for confusion.   All other systems reviewed and are negative.      Physical Exam   BP: 136/77  Pulse: 70  Temp: 98.9  F (37.2  C)  Resp: 20  Weight: 100.7 kg (222 lb)  SpO2: 98 %      Physical Exam  Vitals signs and nursing note reviewed.   Constitutional:       Appearance: He is obese. He is not ill-appearing or diaphoretic.   HENT:      Head: Atraumatic.      Nose: Nose normal.       Mouth/Throat:      Mouth: Mucous membranes are moist.   Eyes:      Conjunctiva/sclera: Conjunctivae normal.   Cardiovascular:      Rate and Rhythm: Normal rate and regular rhythm.      Pulses: Normal pulses.   Pulmonary:      Effort: Pulmonary effort is normal.      Breath sounds: Rhonchi (Mild in the bases ) present.   Abdominal:      General: There is distension.      Palpations: Abdomen is soft.      Tenderness: There is no abdominal tenderness. There is no guarding.   Musculoskeletal: Normal range of motion.      Right lower leg: Edema present.      Left lower leg: Edema present.      Comments: Bilateral lower extremity edema, left slightly greater than right.  Pitting about 2+   Skin:     General: Skin is warm and dry.      Capillary Refill: Capillary refill takes less than 2 seconds.   Neurological:      Mental Status: He is alert and oriented to person, place, and time.   Psychiatric:         Mood and Affect: Mood normal.         ED Course        Procedures               EKG Interpretation:      Interpreted by Anderson Smith MD  Time reviewed: 1620  Symptoms at time of EKG: chest pain   Rhythm: Ventricular paced rhythm  Rate: Normal  Axis: Left Axis Deviation  Ectopy: PVC  Conduction: nonspecific interventricular conduction block  ST Segments/ T Waves: No acute ischemic changes  Q Waves: none  Comparison to prior: Not significantly changed compared EKG 1/23/2021    Clinical Impression: Ventricular paced rhythm, similar to previous                 Results for orders placed or performed during the hospital encounter of 03/20/21 (from the past 24 hour(s))   CBC with platelets differential   Result Value Ref Range    WBC 3.8 (L) 4.0 - 11.0 10e9/L    RBC Count 3.40 (L) 4.4 - 5.9 10e12/L    Hemoglobin 9.7 (L) 13.3 - 17.7 g/dL    Hematocrit 31.1 (L) 40.0 - 53.0 %    MCV 92 78 - 100 fl    MCH 28.5 26.5 - 33.0 pg    MCHC 31.2 (L) 31.5 - 36.5 g/dL    RDW 16.8 (H) 10.0 - 15.0 %    Platelet Count 72 (L) 150  - 450 10e9/L    Diff Method Automated Method     % Neutrophils 60.4 %    % Lymphocytes 13.0 %    % Monocytes 18.6 %    % Eosinophils 7.4 %    % Basophils 0.3 %    % Immature Granulocytes 0.3 %    Nucleated RBCs 0 0 /100    Absolute Neutrophil 2.3 1.6 - 8.3 10e9/L    Absolute Lymphocytes 0.5 (L) 0.8 - 5.3 10e9/L    Absolute Monocytes 0.7 0.0 - 1.3 10e9/L    Absolute Eosinophils 0.3 0.0 - 0.7 10e9/L    Absolute Basophils 0.0 0.0 - 0.2 10e9/L    Abs Immature Granulocytes 0.0 0 - 0.4 10e9/L    Absolute Nucleated RBC 0.0    Comprehensive metabolic panel   Result Value Ref Range    Sodium 140 133 - 144 mmol/L    Potassium 3.5 3.4 - 5.3 mmol/L    Chloride 105 94 - 109 mmol/L    Carbon Dioxide 25 20 - 32 mmol/L    Anion Gap 10 3 - 14 mmol/L    Glucose 95 70 - 99 mg/dL    Urea Nitrogen 18 7 - 30 mg/dL    Creatinine 0.90 0.66 - 1.25 mg/dL    GFR Estimate >90 >60 mL/min/[1.73_m2]    GFR Estimate If Black >90 >60 mL/min/[1.73_m2]    Calcium 8.4 (L) 8.5 - 10.1 mg/dL    Bilirubin Total 1.2 0.2 - 1.3 mg/dL    Albumin 3.2 (L) 3.4 - 5.0 g/dL    Protein Total 5.9 (L) 6.8 - 8.8 g/dL    Alkaline Phosphatase 99 40 - 150 U/L    ALT 57 0 - 70 U/L    AST 73 (H) 0 - 45 U/L   Troponin I   Result Value Ref Range    Troponin I ES 0.032 0.000 - 0.045 ug/L   INR   Result Value Ref Range    INR 2.58 (H) 0.86 - 1.14   Ammonia (on ice)   Result Value Ref Range    Ammonia 60 (H) 10 - 50 umol/L   Nt probnp inpatient (BNP)   Result Value Ref Range    N-Terminal Pro BNP Inpatient 547 0 - 900 pg/mL   XR Chest 2 Views    Narrative    CHEST TWO VIEWS 3/20/2021 5:54 PM     HISTORY: Dyspnea.    COMPARISON: March 6, 2020       Impression    IMPRESSION: There are no acute infiltrates. The cardiac silhouette is  not enlarged. Pulmonary vasculature is unremarkable.    DAT CHOU MD   Troponin I (second draw)   Result Value Ref Range    Troponin I ES 0.034 0.000 - 0.045 ug/L   Asymptomatic SARS-CoV-2 COVID-19 Virus (Coronavirus) by PCR    Specimen:  Nasopharyngeal   Result Value Ref Range    SARS-CoV-2 Virus Specimen Source Nasopharyngeal     SARS-CoV-2 PCR Result NEGATIVE     SARS-CoV-2 PCR Comment (Note)    CT Head w/o Contrast    Narrative    EXAM: CT HEAD W/O CONTRAST  LOCATION: [facility]  DATE/TIME: 3/20/2021 9:38 PM    INDICATION: Mental status change, unknown cause  COMPARISON: None.  TECHNIQUE: Routine CT Head without IV contrast. Multiplanar reformats. Dose reduction techniques were used.    FINDINGS:  INTRACRANIAL CONTENTS: No intracranial hemorrhage, extraaxial collection, or mass effect.  No CT evidence of acute infarct. Normal parenchymal attenuation. Normal ventricles and sulci. The cerebellar tonsils are at the level the foramen magnum.    VISUALIZED ORBITS/SINUSES/MASTOIDS: No intraorbital abnormality. No paranasal sinus mucosal disease. No middle ear or mastoid effusion.    BONES/SOFT TISSUES: No acute abnormality.      Impression    IMPRESSION:  1.  No CT finding of a mass, hemorrhage or focal area suggestive of acute infarct.     *Note: Due to a large number of results and/or encounters for the requested time period, some results have not been displayed. A complete set of results can be found in Results Review.       Medications   lactulose (CHRONULAC) solution 20 g (20 g Oral Given 3/20/21 1916)       6:34 PM Patient re-assessed: Resting comfortably.  No symptoms currently.  Reviewed labs and x-ray.  Ammonia slightly elevated.  Hemoglobin trending downward at 9.7.  Although ammonia is not very high, could be contributing to his symptoms.  Patient does admit to some degree of constipation.  We will also repeat troponin to be sure it is not trending upwards and if stable, will plan of for admission for observation.    8:43 PM: Discussed with Kirsten Nielson.     Assessments & Plan (with Medical Decision Making)  60-year-old male with history of cirrhosis with ascites, portal hypertension, and atrial fibrillation on Coumadin  as well as coronary disease and cardiomyopathy presenting for evaluation of weakness, fatigue, exertional dyspnea, chest pain, abdominal distention with lower extremity edema.  Symptoms have been present for several days but worse today leading to coming in for further evaluation.  Patient did take nitro for some of his chest pain earlier which did seem to improve the symptoms.  Has also had increasing confusion and difficulty with thinking clearly.  Nontoxic-appearing in the ED but with some mild confusion.  EKG nonischemic and troponin x2 -.  X-ray without evidence of acute abnormality.  CT also without acute abnormality.  Labs did show mild elevated ammonia of 60 which may be contributing to his foggy thoughts and confusion.  I also speculate that his increased ascites is contributing to his exertional dyspnea although cardiac etiology not entirely excluded he does not have evidence of acute coronary disease.  Admitted for observation after starting on lactulose with consideration for further evaluation of the abdominal ascites while hospitalized.     I have reviewed the nursing notes.    I have reviewed the findings, diagnosis, plan and need for follow up with the patient.      New Prescriptions    No medications on file       Final diagnoses:   Altered mental status, unspecified altered mental status type   Chest pain, unspecified type   Increased ammonia level   Elevated INR       3/20/2021   [facility] EMERGENCY DEPT     Smith, Anderson [place] MD  03/20/21 [id number]

## 2021-03-20 NOTE — ED TRIAGE NOTES
Chest pain that radiates to neck, nausea, and shortness of breath that started earlier today.  Patient took 1 nitroglycerin today with some improvement.  Patient has a history of MI and heart failure.  Patient has a pacemaker.

## 2021-03-20 NOTE — ED NOTES
Pt states that he took a nitroglycerin at 1505 today Pt is very jaundiced and states that he has a liver issue.

## 2021-03-21 PROBLEM — I50.810 RIGHT HEART FAILURE (H): Status: ACTIVE | Noted: 2018-12-24

## 2021-03-21 PROBLEM — Z95.0 CARDIAC PACEMAKER IN SITU: Status: ACTIVE | Noted: 2021-01-01

## 2021-03-21 PROBLEM — R07.9 CHEST PAIN: Status: ACTIVE | Noted: 2019-12-19

## 2021-03-21 PROBLEM — G93.40 ENCEPHALOPATHY: Status: ACTIVE | Noted: 2021-01-01

## 2021-03-21 PROBLEM — I48.20 CHRONIC A-FIB (H): Status: ACTIVE | Noted: 2018-12-21

## 2021-03-21 PROBLEM — R07.89 ATYPICAL CHEST PAIN: Status: ACTIVE | Noted: 2019-12-20

## 2021-03-21 PROBLEM — R79.1 ELEVATED INR: Status: ACTIVE | Noted: 2021-01-01

## 2021-03-21 PROBLEM — R79.89 INCREASED AMMONIA LEVEL: Status: ACTIVE | Noted: 2021-01-01

## 2021-03-21 PROBLEM — Z79.01 LONG TERM CURRENT USE OF ANTICOAGULANT THERAPY: Status: ACTIVE | Noted: 2018-09-18

## 2021-03-21 NOTE — H&P
"[facility]    History and Physical - Hospitalist Service       Date of Admission:  3/20/2021    Assessment & Plan   Maurice Jon is a 60 year old male admitted on 3/20/2021. He presented to the emergency department for evaluation of \"foggy thinking, fatigue, and chest pressure\" and was thought to possibly have hepatic encephalopathy as well as atypical chest pressure for which he is being admitted for further evaluation and treatment.     Encephalopathy, mild - likely hepatic encephalopathy   Increased ammonia level  Reports \"foggy thinking\" and slow though process x 4 days. Occurs in the setting of known cirrhosis with worsening abdominal distention and jaundice, admit ammonia elevated (60). Head CT negative for acute intracranial abnormality.  Was given lactulose 20 g in the emergency department and subsequent bowel movement x 4, noted cognitive improvement. Was A&O during admission encounter.  - Continue lactulose 10 g bid, titrate with a goal of 2-3 bowel movements per day  - Neuro checks q 4 hours    Atypical chest pain / pressure  Present 4 days prior to admission. See below regarding cardiac history. Troponin normal x 2 (0.032 -> 0.034). EKG with paced rhythm, no obvious ST changes but also difficult to evaluate on paced rhythm. Chest x-ray unremarkable. Suspect the chest pressure is related to cirrhosis / ascites and increased fluid volume rather than acute coronary syndrome.  - Repeat troponin in am   - Address cirrhosis / ascites and other cardiac conditions as noted elsewhere    Liver Cirrhosis due to fatty liver, with Ascites  Portal hypertension  Portal vein thrombosis  Previously diagnosed, follows with Dr. Roque (hepatology). Patient does not drink alcohol, cirrhosis thought to be due to fatty liver vs possibly hereditary. Recent increased abdominal distention but no pain or fever. Has never needed paracentesis in the past. MELD-Na = 18 (although possibly falsely " elevated due to elevated INR from patient being on coumadin). Managed prior to admission with spironolactone 25 mg daily and torsemide (40 mg q am / 20 mg at noon); patient reports difficulty with consistent compliance with torsemide.   Appears to likely have worsening ascites, possible paracentesis needed. Low clinical suspicion for SBP.    - Continue spironolactone and torsemide   - RUQ ultrasound pending to evaluate amount of ascites present, may need to be set up for paracentesis (unavailable until Monday 3/22).   - Daily weights, I&Os  - Low sodium diet    Chronic a-fib, S/P Ablation 12/22/18 -- on Warfarin  Cardiac pacemaker in situ  Long-term (current) use of anticoagulants  Follows cardiology Dr. Evans and IRMA Diaz. Has pacemaker in situ after prior ablation in 2018. Apparently has episodes of VT, typically occur when he is asleep not using his CPAP. Rate controlled prior to admission with metoprolol XL 25 mg daily (which he does not consistently take). Anticoagulated with coumadin, admit INR 2.58.   - Continue / resume metoprolol XL daily with holding parameters  - Pharmacy consult for coumadin management     Right heart failure / HFpEF  Cardiomyopathy  Last echo 2019 with EF 50-55%, reduced left ventricular function, moderate right ventricular dilation and global reduction in right ventricular function. Likely due to known portal hypertension. Chest x-ray does not demonstrate abnormal pulmonary vascularity. BNP normal. Does not appear to be in florid heart failure on admission, although does report some inconsistent use of his torsemide and metoprolol, which may be making his heart failure worse.   - Resume regular use of beta blocker and torsemide  - Consider echo if clinically not improving    CAD (coronary artery disease)  Last angiogram in 2019, minimal non-obstructive coronary artery disease. Managed prior to admission with beta blocker, but is not on aspirin, statin, or ACEi. See above  "regarding atypical chest pain - do not suspect acute coronary syndrome at this time.    - See above regarding atypical chest pain   - Defer to outpatient cardiology management    GERD (gastroesophageal reflux disease)  Managed prior to admission with Protonix 40 mg daily, continue.     BRUCE-Mild (AHI 9; REM RDI 31)  Patient uses CPAP at home, develops runs of VT / SVT on occasion when he does not use it. He does not have his CPAP with him on admission.   - May use CPAP with home settings (will see if RT has a CPAP to use in the hospital)  - Recommend family member bring CPAP when visiting if able    COVID status - negative   Tested as asymptomatic COVID screen based on hospital admission criteria. No concern for active COVID infection on admission.  - COVID PCR - negative 3/20/21  - No indication for COVID precautions or repeat testing       Diet: Combination Diet 2 gm NA Diet  DVT Prophylaxis: Warfarin  Harrison Catheter: not present  Code Status: Full Code  - discussed with patient on admission          Disposition Plan   Expected discharge: 1-2 more days, recommended to prior living arrangement once work-up completed, returns to baseline cognitive status, clinically improving.  Entered: Renee De Leon PA-C 03/21/2021, 1:40 AM     The patient's care was discussed with the Attending Physician, Dr. Kevin Rooney, Bedside Nurse and Patient.    Renee De Leon PA-C  [facility]  Contact information available via [facility] Paging/Directory      ______________________________________________________________________    Chief Complaint   \"I had brain fog and was tired, and my abdomen is getting bigger, I've had some chest pressure.\"    History is obtained from the patient, review of EMR, and emergency department sign out from Dr. Froy Smith.    History of Present Illness   Maurice Jon is a 60 year old male who presented to the emergency department for evaluation of brain fog, fatigue, " "abdominal distention, and chest pressure. He has a few different complaints that are possibly unrelated to each other, but presented when in general he was not improving.     Symptoms started about 4 days ago and have been worsening since onset.  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     He has had some chest pressure for the past 4 days, located in the middle of the chest and sometimes into the neck (but not jaw / shoulder / arms).   Pressure sometimes worse with exertion, rated 7/10 yesterday, down to 5/10 currently.   Other than some exertion, cannot identify aggravating / alleviating factors.   No palpitations, nausea, diaphoresis, or dizziness directly associated with symptoms.    Patient has known liver disease and follows with Dr. Roque in hepatology.   His wife noted that his abdomen seems to be getting bigger and retaining fluid, also noted some mild jaundice.  Patient thinks he has gained some weight.  No lower extremity edema. No abdominal pain or fevers.   Does have some nausea but no emesis.   Appetite maintained, able to eat and drink.   Typically has daily bowel movements but needs to strain and often feels constipated.  He was given lactulose in the emergency department with subsequent bowel movement x 4, feeling improvement in his cognition.     Patient admits that he does not take his metoprolol on a regular basis.   He also does not take torsemide as prescribed due to  work restraints (is unable to take frequent enough brakes at work to urinate as often as he needs to when he takes torsemide as prescribed).    He has been having recent leg cramps, worse at night. Has been using magnesium and drinking PowerAid electrolyte drinks with some improvement.   He has a pacemaker and uses his CPAP. On pacemaker " interrogation he has a history of VT / SVT, which are often at night when he is asleep and not using his CPAP.   He also mentions a problem with the drainage in his eyes; it sounds as if there was prior concern for possible glaucoma and/or poor drainage of vitreal fluid. He had a procedure through [facility] but has not been able to follow-up as instructed and is concerned about this.       Review of Systems    The 10 point Review of Systems is negative other than noted in the HPI or here.     Past Medical History    I have reviewed this patient's medical history and updated it with pertinent information if needed.   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 8/22/2005    Cirrhosis, idiopathic     Edema 5/13/2013     Esophageal varices with bleeding(456.0)     Pt denies     Gallstones      Genital herpes, unspecified 3/20/1999    resolving herpes progenitalis     GERD (gastroesophageal reflux disease)      Hematuria      Hypertension      Hypochondriasis     problems in past     Obesity 11/24/2010     BRUCE (obstructive sleep apnea) 03/17/2009    Mild AHI 8.4  RDI 31 - Pt refuses to wear his CPAP     Pericarditis      Portal hypertension (H) 1/28/2010     Unspecified thrombocytopenia 8/22/2005    Thrombocytopenia associated with cirrhosis and portal hypertension       Past Surgical History   I have reviewed this patient's surgical history and updated it with pertinent information if needed.  Past Surgical History:   Procedure Laterality Date     ANESTHESIA CARDIOVERSION N/A 1/19/2015     Procedure: ANESTHESIA CARDIOVERSION;  Surgeon: Generic Anesthesia Provider;  Location: [facility] OR     ANESTHESIA CARDIOVERSION N/A 2/6/2019    Procedure: ANESTHESIA CARDIOVERSION;  Surgeon: GENERIC ANESTHESIA PROVIDER;  Location:  OR     ANESTHESIA CARDIOVERSION N/A 7/5/2019    Procedure: ANESTHESIA FOR CARDIOVERSION  DR. MURGUIA);  Surgeon: GENERIC ANESTHESIA PROVIDER;  Location:  OR     COLONOSCOPY N/A 8/14/2017    Procedure: COLONOSCOPY;  Colonoscopy Called will arrive appx 12:30 Per phone call;  Surgeon: Vincent Whittington MD;  Location: [facility]     CV HEART CATHETERIZATION WITH POSSIBLE INTERVENTION N/A 12/26/2018    Procedure: Heart Catheterization with possible Intervention;  Surgeon: Brandon Arroyo MD;  Location:  HEART CARDIAC CATH LAB     EP ABLATION AV NODE N/A 11/15/2019    Procedure: EP Ablation AV Node;  Surgeon: Ag Maloney MD;  Location:  HEART CARDIAC CATH LAB     EP ABLATION FOCAL AFIB N/A 12/21/2018    Procedure: EP Ablation Focal AFIB;  Surgeon: Kristofer Murguia MD;  Location:  HEART CARDIAC CATH LAB     EP PACEMAKER N/A 11/15/2019    Procedure: EP PACEMAKER;  Surgeon: Ag Maloney MD;  Location:  HEART CARDIAC CATH LAB     ESOPHAGOSCOPY, GASTROSCOPY, DUODENOSCOPY (EGD), COMBINED  7/11/2011    Procedure:COMBINED ESOPHAGOSCOPY, GASTROSCOPY, DUODENOSCOPY (EGD); Surgeon:LAURA LAMAS; Location:[facility]     ESOPHAGOSCOPY, GASTROSCOPY, DUODENOSCOPY (EGD), COMBINED  9/10/2012    Procedure: COMBINED ESOPHAGOSCOPY, GASTROSCOPY, DUODENOSCOPY (EGD);;  Surgeon: Hi Roque MD;  Location:  GI     ESOPHAGOSCOPY, GASTROSCOPY, DUODENOSCOPY (EGD), COMBINED  9/9/2013    Procedure: COMBINED ESOPHAGOSCOPY, GASTROSCOPY, DUODENOSCOPY (EGD);;  Surgeon: Hi Roque MD;  Location:  GI     ESOPHAGOSCOPY, GASTROSCOPY, DUODENOSCOPY (EGD), COMBINED N/A 9/15/2014    Procedure: COMBINED ESOPHAGOSCOPY, GASTROSCOPY, DUODENOSCOPY (EGD);  Surgeon: Hi Roque MD;  Location: [facility]      ESOPHAGOSCOPY, GASTROSCOPY, DUODENOSCOPY (EGD), COMBINED N/A 10/30/2015    Procedure: COMBINED ESOPHAGOSCOPY, GASTROSCOPY, DUODENOSCOPY (EGD);  Surgeon: Feliberto Fox MD;  Location:  GI     ESOPHAGOSCOPY, GASTROSCOPY, DUODENOSCOPY (EGD), COMBINED N/A 10/10/2016    Procedure: COMBINED ESOPHAGOSCOPY, GASTROSCOPY, DUODENOSCOPY (EGD);  Surgeon: Jose Nesbitt MD;  Location:  GI     ESOPHAGOSCOPY, GASTROSCOPY, DUODENOSCOPY (EGD), COMBINED N/A 2019    Procedure: ESOPHAGOGASTRODUODENOSCOPY (EGD);  Surgeon: Timo Soares MD;  Location:  GI     H ABLATION FOCAL AFIB  2018     HERNIA REPAIR       IRRIGATION AND DEBRIDEMENT ABSCESS SCROTUM, COMBINED  10/4/2012    Procedure: COMBINED IRRIGATION AND DEBRIDEMENT ABSCESS SCROTUM;  Irrigation and Debridement of Groin Abscess;  Surgeon: Benny Shoemaker MD;  Location: [facility] OR     SOFT TISSUE SURGERY       SURGICAL HISTORY OF -       rt elbow     SURGICAL HISTORY OF -   1/15/98    repair of ventral and umbilical hernia     SURGICAL HISTORY OF -       endoscopy       Social History   I have reviewed this patient's social history and updated it with pertinent information if needed.  Social History     Tobacco Use     Smoking status: Former Smoker     Packs/day: 0.80     Years: 6.00     Pack years: 4.80     Types: Cigarettes     Quit date: 2002     Years since quittin.2     Smokeless tobacco: Never Used   Substance Use Topics     Alcohol use: No     Alcohol/week: 0.0 standard drinks     Comment: quit in      Drug use: No       Family History   I have reviewed this patient's family history and updated it with pertinent information if needed.  Family History   Problem Relation Age of Onset     Lipids Mother      Hypertension Mother      Eye Disorder Mother      Heart Disease Father         CHF     Lipids Father      Obesity Father      Heart Disease Brother         MI     Eye Disorder Brother      Hypertension Brother          portal HTN     Liver Disease Brother         Not related to alcohol     Thrombosis Sister         in her lung     Eye Disorder Sister      Cancer Other         maternal grandparent/throat cancer       Prior to Admission Medications   Prior to Admission Medications   Prescriptions Last Dose Informant Patient Reported? Taking?   ACE/ARB/ARNI NOT PRESCRIBED (INTENTIONAL)  Self No No   Sig: Please choose reason not prescribed, below   ASPIRIN NOT PRESCRIBED (INTENTIONAL)  Self Yes No   Sig: Please choose reason not prescribed, below   Acetaminophen 325 MG CAPS Unknown at Unknown time Self Yes No   Sig: Take 325-650 mg by mouth every 6 hours as needed   Multiple Vitamins-Minerals (MENS 50+ MULTI VITAMIN/MIN PO) 3/20/2021 at 0630 Self Yes Yes   Sig: Take 1 tablet by mouth daily   ORDER FOR DME  Self No No   Sig: Respironics RemStar 60 Series Auto AFlex 12-15 cm H2O with heated humidty and a modem.  Pt chose a [employer] Air FFM size medium.   STATIN NOT PRESCRIBED (INTENTIONAL)  Self No No   Sig: Please choose reason not prescribed, below   albuterol (PROAIR RESPICLICK) 108 (90 Base) MCG/ACT inhaler 3/20/2021 at 1000  No Yes   Sig: Inhale 2 puffs into the lungs every 6 hours as needed for shortness of breath / dyspnea or wheezing   calcium carb 1250 mg, 500 mg [place],/vitamin D 200 units (OSCAL WITH D) 500-200 MG-UNIT per tablet 3/20/2021 at 0630 Self Yes Yes   Sig: Take 2 tablets by mouth daily with food.   diphenhydrAMINE HCl (BENADRYL PO) 3/19/2021 at 2200  Yes Yes   Sig: Take 25 mg by mouth as needed    metoprolol succinate ER (TOPROL XL) 25 MG 24 hr tablet Unknown at Unknown time  No No   Sig: Take 1 tablet (25 mg) by mouth daily   order for DME  Self No No   Sig: Equipment being ordered:   New CPAP mask, tube, filters,water tray   order for DME  Self No No   Sig: Open toe size large 30/40 [facility] [employer] Compression socks Model [id number].  Or similar as per availability/formulary.  Fax to Fax [phone]. [facility]  home medical   oxyCODONE (ROXICODONE) 5 MG tablet More than a month at Unknown time  No No   Sig: Take 1 tablet (5 mg) by mouth every 6 hours as needed for severe pain   pantoprazole (PROTONIX) 40 MG EC tablet 3/20/2021 at 0630  No Yes   Sig: Take 1 tablet (40 mg) by mouth daily   potassium chloride ER (K-TAB) 20 MEQ CR tablet 3/20/2021 at 0630  No Yes   Sig: Take 1 tablet (20 mEq) by mouth daily   spironolactone (ALDACTONE) 25 MG tablet 3/20/2021 at 1000  No Yes   Sig: Take 1 tablet (25 mg) by mouth daily You are overdue for follow up. Call [phone] to schedule an appointment.   torsemide (DEMADEX) 20 MG tablet 3/20/2021 at 1300  No Yes   Sig: Take 2 tablets daily in AM and 1 tab daily at noon   Patient taking differently: Take 2 tablets daily in AM and 1 tab daily at noon  Patient takes one tablet three times daily.   triamcinolone (KENALOG) 0.1 % external cream   No No   Sig: Apply topically 2 times daily   Patient not taking: Reported on 2020   vitamin D3 (CHOLECALCIFEROL) 2000 units (50 mcg) tablet 3/20/2021 at 0630 Self Yes Yes   Sig: Take 1 tablet by mouth daily   warfarin ANTICOAGULANT (JANTOVEN ANTICOAGULANT) 2.5 MG tablet 3/20/2021 at 0630  No Yes   Si.25 mg ; 2.5mg all other days or As directed by Anticoagulation Clinic.      Facility-Administered Medications: None     Allergies   Allergies   Allergen Reactions     Avelox Other (See Comments) and GI Disturbance     portal hypertension     Moxifloxacin Nausea and Vomiting, Nausea and Other (See Comments)     portal hypertension     Sotalol Itching       Physical Exam   Vital Signs: Temp: 98.1  F (36.7  C) Temp src: Oral BP: 122/54 Pulse: 71   Resp: 18 SpO2: 100 % O2 Device: None (Room air)    Weight: 216 lbs 4.34 oz    Constitutional: Alert, oriented x 4, cooperative. No apparent distress, appears nontoxic. Speaking in full coherent sentences.     Eyes: Eyes are clear, pupils are reactive. No scleral icterus.    HEENT: Oropharynx is  clear and moist, no lesions. Normocephalic, no evidence of cranial trauma.      Cardiovascular: Irregularly irregular rhythm, normal S1 and S2. Systolic 2/6 murmur present, no rubs or gallops. Peripheral pulses intact bilaterally. Trace bilateral non-pitting lower extremity edema.    Respiratory: Lung sounds are clear to auscultation bilaterally without wheezes, rhonchi, or crackles.    GI: Tympanic to percussion in upper quadrants, dull in lower quadrants. Soft but distended, not taut. Non-tender, no rebound or guarding. No hepatosplenomegaly or masses appreciated, although difficult to palpate due to distention. Diminished bowel sounds. Possibly positive fluid wave.    Musculoskeletal: Without obvious deformity, normal range of motion. Normal muscle bulk and tone. Distal CMS intact.      Skin: Warm and dry, no rashes or ecchymoses. No mottling of skin. Slight jaundiced appearance.      Neurologic: Patient moves all extremities.  is symmetric. Gross strength and sensation are equal bilaterally.    Genitourinary: Deferred      Data   Data reviewed today: I reviewed all medications, new labs and imaging results over the last 24 hours. I personally reviewed the EKG tracing showing paced rhythm and the chest x-ray image(s) showing pacemaker present, no infiltrate, effusion, consolidation, or increased pulmonary vascularity appreciated.    Recent Labs   Lab 03/20/21 1917 03/20/21  1615   WBC  --  3.8*   HGB  --  9.7*   MCV  --  92   PLT  --  72*   INR  --  2.58*   NA  --  140   POTASSIUM  --  3.5   CHLORIDE  --  105   CO2  --  25   BUN  --  18   CR  --  0.90   ANIONGAP  --  10   HALEY  --  8.4*   GLC  --  95   ALBUMIN  --  3.2*   PROTTOTAL  --  5.9*   BILITOTAL  --  1.2   ALKPHOS  --  99   ALT  --  57   AST  --  73*   TROPI 0.034 0.032     Recent Results (from the past 24 hour(s))   XR Chest 2 Views    Narrative    CHEST TWO VIEWS 3/20/2021 5:54 PM     HISTORY: Dyspnea.    COMPARISON: March 6, 2020       Impression     IMPRESSION: There are no acute infiltrates. The cardiac silhouette is  not enlarged. Pulmonary vasculature is unremarkable.    DAT CHOU MD   CT Head w/o Contrast    Narrative    EXAM: CT HEAD W/O CONTRAST  LOCATION: [facility]  DATE/TIME: 3/20/2021 9:38 PM    INDICATION: Mental status change, unknown cause  COMPARISON: None.  TECHNIQUE: Routine CT Head without IV contrast. Multiplanar reformats. Dose reduction techniques were used.    FINDINGS:  INTRACRANIAL CONTENTS: No intracranial hemorrhage, extraaxial collection, or mass effect.  No CT evidence of acute infarct. Normal parenchymal attenuation. Normal ventricles and sulci. The cerebellar tonsils are at the level the foramen magnum.    VISUALIZED ORBITS/SINUSES/MASTOIDS: No intraorbital abnormality. No paranasal sinus mucosal disease. No middle ear or mastoid effusion.    BONES/SOFT TISSUES: No acute abnormality.      Impression    IMPRESSION:  1.  No CT finding of a mass, hemorrhage or focal area suggestive of acute infarct.

## 2021-03-21 NOTE — CONSULTS
Care Management Initial Consult    General Information  Assessment completed with: Yomi Ferraro  Type of CM/SW Visit: Offer D/C Planning    Primary Care Provider verified and updated as needed: Yes   Readmission within the last 30 days: no previous admission in last 30 days      Reason for Consult: discharge planning  Advance Care Planning:     Not present on chart     Communication Assessment  Patient's communication style: spoken language (English or Bilingual)    Hearing Difficulty or Deaf: no   Wear Glasses or Blind: yes    Cognitive  Cognitive/Neuro/Behavioral: .WDL except  Level of Consciousness: alert  Arousal Level: opens eyes spontaneously  Orientation: oriented x 4  Mood/Behavior: cooperative  Best Language: 0 - No aphasia  Speech: clear    Living Environment:   People in home: spouse  Violet  Current living Arrangements: house      Able to return to prior arrangements: yes     Family/Social Support:  Care provided by: self  Provides care for: no one  Marital Status:   Wife, Children  Violet       Description of Support System: Supportive, Involved    Support Assessment: Adequate family and caregiver support, Adequate social supports    Current Resources:   Patient receiving home care services: No  Community Resources: None  Equipment currently used at home: none  Supplies currently used at home: None    Employment/Financial:  Employment Status: employed full-time 5 blocks from home   Financial Concerns: No concerns identified   Referral to Financial Counselor: No     Lifestyle & Psychosocial Needs:  Socioeconomic History     Marital status:      Spouse name: Violet     Number of children: Not on file     Years of education: Not on file     Highest education level: Not on file     Tobacco Use     Smoking status: Former Smoker     Packs/day: 0.80     Years: 6.00     Pack years: 4.80     Types: Cigarettes     Quit date: 2002     Years since quittin.2     Smokeless tobacco: Never  Used   Substance and Sexual Activity     Alcohol use: No     Alcohol/week: 0.0 standard drinks     Comment: quit in 2007     Drug use: No     Sexual activity: Yes     Partners: Female     Functional Status:  Prior to admission patient needed assistance:   Dependent ADLs:: Independent  Dependent IADLs:: Independent     Mental Health Status:  Mental Health Status: No Current Concerns       Chemical Dependency Status:  Chemical Dependency Status: No Current Concerns       Values/Beliefs:  Spiritual, Cultural Beliefs, Tenriism Practices, Values that affect care: no       Additional Information:  Care Management received referral due to elevate OSCAR score.  SW met with pt to introduce self/role, perform assessment, and discuss resources.    Per discussion, prior to hospitalization, pt is independent with all I/ADLS, works a full time job & lives in a private home with his spouse.  Pt & SW discussed that pt may wish to speak with his employer about reasonable accommodations at work for taking medications as pt said he did not take them for a few days as he was unable to leave his job post to use the restroom.    Pt declines the need for discharge planning assistance.  Wife will transport home when medically stable.    CCRC was completed for follow up appointment.    JESUS MANUEL Wolff

## 2021-03-21 NOTE — PHARMACY-ANTICOAGULATION SERVICE
Clinical Pharmacy - Warfarin Dosing Consult     Pharmacy has been consulted to manage this patient s warfarin therapy.  Indication: Atrial Fibrillation  Therapy Goal: INR 2-3  Provider/Team: Tyrese CASTELLANOS  Warfarin Prior to Admission: Yes  Warfarin PTA Regimen: 1.25mg Friday, 2.5mg rest of week  Recent documented change in oral intake/nutrition: Unknown    INR   Date Value Ref Range Status   03/20/2021 2.58 (H) 0.86 - 1.14 Final   02/24/2021 2.80 (H) 0.86 - 1.14 Final     Comment:     This test is intended for monitoring Coumadin therapy.  Results are not   accurate in patients with prolonged INR due to factor deficiency.         Patient reports taking dose 3/20, will dose based on lab results later today.  Pharmacy will monitor Maurice Jon daily and order warfarin doses to achieve specified goal.      Please contact pharmacy as soon as possible if the warfarin needs to be held for a procedure or if the warfarin goals change.      Natalia Diaz, PharmD

## 2021-03-21 NOTE — PROGRESS NOTES
[place] [facility] [place]    Patient admitted to room 2311 at approximately 2330 via wheel chair from emergency room. Patient was accompanied by transport tech.     Verbal SBAR report received from RIKI Flores prior to patient arrival.     Patient ambulated to bed independently. Patient alert and oriented X 3. Pain is controlled without any medications.  . Admission vital signs: Blood pressure 113/52, pulse 79, temperature 99.8  F (37.7  C), temperature source Oral, resp. rate 16, weight 98.1 kg (216 lb 4.3 oz), SpO2 98 %. Patient was oriented to plan of care, call light, bed controls, tv, telephone, bathroom and visiting hours.     Risk Assessment    The following safety risks were identified during admission: none. Yellow risk band applied: NO.     Skin Initial Assessment    Patient declined full assessment.    Jose Antonio Risk Assessment  Sensory Perception: 4-->no impairment  Moisture: 4-->rarely moist  Activity: 3-->walks occasionally  Mobility: 4-->no limitation  Nutrition: 3-->adequate  Friction and Shear: 3-->no apparent problem  Jose Antonio Score: 21  Mattress: Standard [facility] Mattress (Foam)  Bed Frame: Standard width and length    Education    Patient has a [place] to Observation order: No  Observation education completed and documented: N/A      Salena Yo RN

## 2021-03-21 NOTE — PLAN OF CARE
"Patient alert and orientated with slow response to questions at times, states he \"still feels a little foggy.\"  Denies any shortness of breath or chest discomfort.  Does complain of some cramping in legs, MD updated, will resume his home Magnesium dose.  Replacing Potassium, receiving Lactulose as ordered, multiple loose stools.  "

## 2021-03-21 NOTE — PROGRESS NOTES
"[facility] Internal Medicine Progress Note     Date of Service (when I saw the patient): 03/21/2021    REASON FOR ADMISSION / INTERVAL HISTORY:  Maurice Jon is a 60 year old male admitted on 3/20/2021. He presented to the emergency department for evaluation of \"foggy thinking, fatigue, and chest pressure\". Feels lot clear in his thinking today-no abdominal pain. Had  Several small bowel movements since admission     ASSESSMENT/PLAN:     Hepatic encephalopathy   Reports \"foggy thinking\" and slow though process x 4 days. Occurs in the setting of known cirrhosis with worsening abdominal distention and jaundice, admit ammonia elevated (60). Head CT negative for acute intracranial abnormality. Not on lactulose at home.  Was given lactulose 20 g in the emergency department and started on 10gm bid. Had several BM since admission. Encephalopathy nearly resolved  - Continue lactulose 10 g bid today. May need to be discharged on low dose lactulose chronically. Stop neurocks.      Atypical chest pain / pressure  Present 4 days prior to admission. See below regarding cardiac history. Troponin normal x 2 (0.032 -> 0.034). EKG with paced rhythm, no obvious ST changes but also difficult to evaluate on paced rhythm. Chest x-ray unremarkable. Suspect the chest pressure is related to cirrhosis / ascites and increased fluid volume rather than acute coronary syndrome.  No more CP at this time. No intervention.      Liver Cirrhosis due to fatty liver, with Ascites  Portal hypertension  Portal vein thrombosis  Previously diagnosed, follows with Dr. Roque (hepatology). Patient does not drink alcohol, cirrhosis thought to be due to fatty liver vs possibly hereditary. Recent increased abdominal distention but no pain or fever. Has never needed paracentesis in the past. MELD-Na = 18 (although possibly falsely elevated due to elevated INR from patient being on coumadin). Managed prior to admission with spironolactone 25 mg daily " and torsemide (40 mg q am / 20 mg at noon); patient reports difficulty with consistent compliance with torsemide.   Appears to likely have worsening ascites-may need paracentesis.  - Continue spironolactone and torsemide   - RUQ ultrasound pending to evaluate amount of ascites present, may need to be set up for paracentesis (unavailable until Monday 3/22).      Chronic a-fib, S/P Ablation 12/22/18 -- on Warfarin  Cardiac pacemaker in situ  Long-term (current) use of anticoagulants  Follows cardiology Dr. Evans and IRMA Diaz. Has pacemaker in situ after prior ablation in 2018. Apparently has episodes of VT, typically occur when he is asleep not using his CPAP. Rate controlled prior to admission with metoprolol XL 25 mg daily (which he does not consistently take). Anticoagulated with coumadin, admit INR 2.58.   - Continue / resume metoprolol XL daily with holding parameters. RUQ US today-if moderate ascitis present, will hold coumadin tonight for paracentesis     Right heart failure / HFpEF  Cardiomyopathy  Last echo 2019 with EF 50-55%, reduced left ventricular function, moderate right ventricular dilation and global reduction in right ventricular function. Likely due to known portal hypertension. Chest x-ray does not demonstrate abnormal pulmonary vascularity. BNP normal. Does not appear to be in florid heart failure on admission, although does report some inconsistent use of his torsemide and metoprolol, which may be making his heart failure worse.   - Resume regular use of beta blocker and torsemide     CAD (coronary artery disease)  Last angiogram in 2019, minimal non-obstructive coronary artery disease. Managed prior to admission with beta blocker, but is not on aspirin, statin, or ACEi. See above regarding atypical chest pain - do not suspect acute coronary syndrome at this time.    - See above regarding atypical chest pain   - Defer to outpatient cardiology management    Muscle cramps  Has severe B LE  cramps periodically even at home. Uses magnesium.  -start po magnesium as at home.      GERD (gastroesophageal reflux disease)  Managed prior to admission with Protonix 40 mg daily, continue.      BRUCE-Mild (AHI 9; REM RDI 31)  Patient uses CPAP at home, develops runs of VT / SVT on occasion when he does not use it.  Pt to have his home CPAP delivered today if possible.     COVID status - negative   Tested as asymptomatic COVID screen based on hospital admission criteria. No concern for active COVID infection on admission.  - COVID PCR - negative 3/20/21  - No indication for COVID precautions or repeat testing      DISPO  Will be in hospital for atleast one more day.       JAYDE SCHMIDT MD   Pg [phone]    DVT Prhylaxis: Warfarin  Code Status: Full Code    ROS:  As described in A/P and Exam.  Otherwise ALL are  negative.    PHYSICAL EXAM:  All vitals have been reviewed    Blood pressure 104/49, pulse 72, temperature 99.2  F (37.3  C), temperature source Oral, resp. rate 16, weight 98.1 kg (216 lb 4.3 oz), SpO2 98 %.    I/O this shift:  In: 400 [P.O.:400]  Out: -     GENERAL APPEARANCE: healthy, alert and no distress  EYES: conjunctiva clear, eyes grossly normal  HENT: external ears and nose normal   RESP: lungs clear to auscultation - no rales, rhonchi or wheezes  CV: regular rate and rhythm, normal S1 S2, no S3 or S4 and no murmur, click or rub   ABDOMEN: soft, distended, nontender, no HSM or masses and bowel sounds normal  MS: no clubbing, cyanosis; no edema  SKIN: clear without significant rashes or lesions  NEURO: -non-focal moves all 4 extr    ROUTINE  LABS (Last four results)  CMP  Recent Labs   Lab 03/21/21  0512 03/20/21  1615    140   POTASSIUM 3.0*  3.0* 3.5   CHLORIDE 102 105   CO2 28 25   ANIONGAP 8 10   * 95   BUN 17 18   CR 0.93 0.90   GFRESTIMATED 88 >90   GFRESTBLACK >90 >90   HALEY 7.7* 8.4*   PROTTOTAL 5.2* 5.9*   ALBUMIN 2.9* 3.2*   BILITOTAL 1.3 1.2   ALKPHOS 79 99   AST 54* 73*    ALT 48 57     CBC  Recent Labs   Lab 03/21/21  0512 03/20/21  1615   WBC 4.1 3.8*   RBC 3.13* 3.40*   HGB 8.7* 9.7*   HCT 27.7* 31.1*   MCV 89 92   MCH 27.8 28.5   MCHC 31.4* 31.2*   RDW 16.9* 16.8*   PLT 57* 72*     INR  Recent Labs   Lab 03/21/21  1057 03/20/21  1615   INR 2.39* 2.58*     Arterial Blood GasNo lab results found in last 7 days.    Recent Results (from the past 24 hour(s))   XR Chest 2 Views    Narrative    CHEST TWO VIEWS 3/20/2021 5:54 PM     HISTORY: Dyspnea.    COMPARISON: March 6, 2020       Impression    IMPRESSION: There are no acute infiltrates. The cardiac silhouette is  not enlarged. Pulmonary vasculature is unremarkable.    DAT CHOU MD   CT Head w/o Contrast    Narrative    EXAM: CT HEAD W/O CONTRAST  LOCATION: [facility]  DATE/TIME: 3/20/2021 9:38 PM    INDICATION: Mental status change, unknown cause  COMPARISON: None.  TECHNIQUE: Routine CT Head without IV contrast. Multiplanar reformats. Dose reduction techniques were used.    FINDINGS:  INTRACRANIAL CONTENTS: No intracranial hemorrhage, extraaxial collection, or mass effect.  No CT evidence of acute infarct. Normal parenchymal attenuation. Normal ventricles and sulci. The cerebellar tonsils are at the level the foramen magnum.    VISUALIZED ORBITS/SINUSES/MASTOIDS: No intraorbital abnormality. No paranasal sinus mucosal disease. No middle ear or mastoid effusion.    BONES/SOFT TISSUES: No acute abnormality.      Impression    IMPRESSION:  1.  No CT finding of a mass, hemorrhage or focal area suggestive of acute infarct.

## 2021-03-22 NOTE — TELEPHONE ENCOUNTER
**When finished: Please make a copy for station  and a copy to be sent to scanning. Send originals with patient.     Date received: March 22, 2021   Doctor: Alon Pineda MD  Daytime phone number: [phone]  : Christy Galeana Manager     n: Mail back to patient  y: Mail or fax to destination requesting form  n: Patient to     Other notes:

## 2021-03-22 NOTE — PROGRESS NOTES
"Patient woke with leg cramps. States he, \"takes magnesium\" to relieve. Staff informed patient that there was no order for magnesium outside the replacement protocol and informed patient of current mag levels. Patient requested benadryl for sleep. PRN administered per provider order. Patient resting in bed using CPAP.    "

## 2021-03-22 NOTE — PROGRESS NOTES
Care Management Discharge Note    Discharge Date: 03/22/21       Discharge Disposition: Home    Discharge Services: None    Discharge DME: None    Discharge Transportation: family or friend will provide    Private pay costs discussed: Not applicable    Patient/family educated on Medicare website which has current facility and service quality ratings: no    Education Provided on the Discharge Plan:  yes  Persons Notified of Discharge Plans: patient  Patient/Family in Agreement with the Plan: yes    Handoff Referral Completed: Yes    Additional Information:  Patient likely discharged today.  Plan to return home with family.  He has declined any discharge needs.      Family to provide transportation upon discharge.      Caryl LEIN RN  Inpatient Care Coordinator  [facility] [phone]  [facility] [phone]

## 2021-03-22 NOTE — PLAN OF CARE
Patient alert/oriented. Independent. Mild edema in bilateral ankles/feet. Encouraged ambulation and elevation. Abdomen distended, non-tender. Potassium 3.8, recheck ordered for AM draw. VSS.  /58 (BP Location: Right arm)   Pulse 71   Temp 98  F (36.7  C) (Oral)   Resp 16   Wt 98.1 kg (216 lb 4.3 oz)   SpO2 98%   BMI 38.31 kg/m    Delores Graf RN on 3/22/2021 at 2:47 PM

## 2021-03-23 NOTE — DISCHARGE INSTRUCTIONS
Coumadin Discharge Instructions:  1. Continue Coumadin 1.25 mg Fri, 2.5 mg rest of week.  2. INR recheck was already scheduled for tomorrow at [facility] at 4 pm. You could reschedule this for next week on 3/30 before your 10:40 am appointment w/Kathrin Sutton. Please call [phone] to reschedule.

## 2021-03-23 NOTE — PLAN OF CARE
VSS, patient reports no BM today despite lactulose although his dose has been decreased now and he just got his 2nd daily dose a short time ago.  Has been up independently, IV SL'd, abdomen distended, denies pain/discomfort, appetite good- stated the meal didn't satisfy his appetite adequately so he had multiple additional snacks this evening.  A&O, discharge expected tomorrow.

## 2021-03-23 NOTE — PLAN OF CARE
"Abdomen remains distended,  Patient and writer discussed the family heritage disease within his immediate family,   a nephew with 3 boys watches family disease closely  Patient did awaken during nite and writer changed all his linen  D/T \"I am not sure if I had a fever or not.\"    "

## 2021-03-23 NOTE — DISCHARGE SUMMARY
"[facility]  Discharge Summary  Hospital Medicine       Date of Admission:  3/20/2021  Date of Discharge:  3/23/2021  2:41 PM  Discharging Provider: Nacho Morse MD      Identification and Chief Compaint: Maurice Jon is a 60 year old male who presented on 3/20/2021 with complaint of \"foggy thinking, fatigue, and chest pressure\".    Discharge Diagnoses   Hepatic encephalopathy   Atypical chest pain / pressure  Suspect anxiety  Liver Cirrhosis due to fatty liver, with Ascites  Portal hypertension  Hypokalemia, mild   Pancytopenia, suspect due to cirrhosis, chronic  Portal vein thrombosis  Chronic a-fib, S/P Ablation 12/22/18 -- on Warfarin  Cardiac pacemaker in situ  Long-term (current) use of anticoagulants  Right heart failure / HFpEF  Cardiomyopathy  Coronary artery disease   Muscle cramps  Gastroesophageal reflux disease      Follow-ups Needed After Discharge   Follow-up Appointments     Follow-up and recommended labs and tests       Follow up with primary care provider, Alon Pineda, within 7   days to evaluate medication change and for hospital follow- up.  Follow up   with Dr. Roque as planned in May. Check BMP in the next week when you see   Dr. Pineda.           Hospital Course      Hepatic encephalopathy     Reports \"foggy thinking\" and slow though process x 4 days. Occurs in the setting of known cirrhosis with worsening abdominal distention and jaundice, admit ammonia elevated (60). Head CT negative for acute intracranial abnormality. Not on lactulose at home.    Was given lactulose 20 g in the emergency department and started on 10g bid. Had several BM since admission. Encephalopathy nearly resolved. I suspect constipation was a factor in his encephalopathy.    Having 1-2 BMs daily on lactulose 10g BID. He will continue lactulose to maintain at least 1 BM daily.      Atypical chest pain / pressure  Suspect anxiety    Present 4 days prior to admission. " See below regarding cardiac history. Troponin normal x 2 (0.032 -> 0.034). EKG with paced rhythm, no obvious ST changes but also difficult to evaluate on paced rhythm. Chest x-ray unremarkable. Suspect the chest pressure is related to cirrhosis / ascites and increased fluid volume rather than acute coronary syndrome.    No more CP at this time. No intervention. Suspect anxiety.     Liver Cirrhosis due to fatty liver, with Ascites  Portal hypertension  Portal vein thrombosis    Previously diagnosed, follows with Dr. Roque (hepatology). Patient does not drink alcohol, cirrhosis thought to be due to fatty liver vs possibly hereditary. Recent increased abdominal distention but no pain or fever. Has never needed paracentesis in the past. MELD-Na = 18 (although possibly falsely elevated due to elevated INR from patient being on coumadin). Managed prior to admission with spironolactone 25 mg daily and torsemide (40 mg q am / 20 mg at noon); patient reports difficulty with consistent compliance with torsemide.     Initially concerned for worsening ascites, but RUQ ultrasound does not show significant ascites.   - Continue spironolactone and torsemide, increased spironolactone to 50 mg daily this admission.     Hypokalemia, mild    Suspect due to torsemide therapy. Replacement given. Increased spironolactone as well for better long-term maintenance of potassium levels. Resolved.    Pancytopenia, suspect due to cirrhosis, chronic    Hemoglobin 8.7/9.0, WBC 4.1/3.1, platelets 5/56.      Chronic a-fib, S/P Ablation 12/22/18 -- on Warfarin  Cardiac pacemaker in situ  Long-term (current) use of anticoagulants    Follows cardiology Dr. Evans and IRMA Diaz. Has pacemaker in situ after prior ablation in 2018. Apparently has episodes of VT, typically occur when he is asleep not using his CPAP. Rate controlled prior to admission with metoprolol XL 25 mg daily (which he does not consistently take). Anticoagulated with coumadin,  admit INR 2.58. Abdominal US did not show any significant ascites.    Continue metoprolol XL daily. Continue warfarin.      Right heart failure / HFpEF  Cardiomyopathy    Last echo 2019 with EF 50-55%, reduced left ventricular function, moderate right ventricular dilation and global reduction in right ventricular function. Likely due to known portal hypertension. Chest x-ray does not demonstrate abnormal pulmonary vascularity. BNP normal. Does not appear to be in acute heart failure on admission, although does report some inconsistent use of his torsemide and metoprolol, which may be making his heart failure worse.     Refill of metoprolol provided. Continue torsemide.      CAD (coronary artery disease)    Last angiogram in 2019, minimal non-obstructive coronary artery disease. Managed prior to admission with beta blocker, but is not on aspirin, statin, or ACEi. See above regarding atypical chest pain - do not suspect acute coronary syndrome at this time.    - Defer to outpatient cardiology management     Muscle cramps    Has severe B LE cramps periodically even at home. Uses magnesium.    Continue po magnesium as at home.      GERD (gastroesophageal reflux disease)    Managed prior to admission with Protonix 40 mg daily, continue.      BRUCE-Mild (AHI 9; REM RDI 31)    Patient uses CPAP at home, develops runs of VT / SVT on occasion when he does not use it.    Pt to have his home CPAP delivered today if possible.     COVID status - negative     Tested as asymptomatic COVID screen based on hospital admission criteria. No concern for active COVID infection on admission.  - COVID PCR - negative 3/20/21  - No indication for COVID precautions or repeat testing    Consultations This Hospital Stay   PHARMACY TO DOSE WARFARIN  CARE MANAGEMENT / SOCIAL WORK IP CONSULT    Code Status   Full Code    The discharge plan was discussed with the patient, and he expressed understanding.     Time Spent on this Encounter   Total time  on this discharge was 40 minutes.       Nacho Morse MD  [facility]  ______________________________________________________________________    Physical Exam   Vital Signs: Temp: 98  F (36.7  C) Temp src: Oral BP: 109/47 Pulse: 70   Resp: 18 SpO2: 98 % O2 Device: BiPAP/CPAP    Weight: 212 lbs 4.85 oz  Constitutional: alert and oriented, nontoxic.   CV: Regular  Respiratory: CTA bilaterally  GI: Soft, non-tender   Skin: Warm and dry       Primary Care Physician   Alon [facility]ined[facility]  [id number] [facility] [id number]     Discharge Disposition   Discharged to home  Condition at discharge: Stable    Significant Results and Procedures   Results for orders placed or performed during the hospital encounter of 03/20/21   XR Chest 2 Views    Narrative    CHEST TWO VIEWS 3/20/2021 5:54 PM     HISTORY: Dyspnea.    COMPARISON: March 6, 2020       Impression    IMPRESSION: There are no acute infiltrates. The cardiac silhouette is  not enlarged. Pulmonary vasculature is unremarkable.    DAT CHOU MD   CT Head w/o Contrast    Narrative    EXAM: CT HEAD W/O CONTRAST  LOCATION: [facility]  DATE/TIME: 3/20/2021 9:38 PM    INDICATION: Mental status change, unknown cause  COMPARISON: None.  TECHNIQUE: Routine CT Head without IV contrast. Multiplanar reformats. Dose reduction techniques were used.    FINDINGS:  INTRACRANIAL CONTENTS: No intracranial hemorrhage, extraaxial collection, or mass effect.  No CT evidence of acute infarct. Normal parenchymal attenuation. Normal ventricles and sulci. The cerebellar tonsils are at the level the foramen magnum.    VISUALIZED ORBITS/SINUSES/MASTOIDS: No intraorbital abnormality. No paranasal sinus mucosal disease. No middle ear or mastoid effusion.    BONES/SOFT TISSUES: No acute abnormality.      Impression    IMPRESSION:  1.  No CT finding of a mass, hemorrhage or focal area suggestive of acute infarct.   US Abdomen Limited     Narrative    US ABDOMEN LIMITED 3/21/2021 11:15 AM     HISTORY: Known cirrhosis, evaluate for ascites and whether there is  enough fluid for paracentesis.    COMPARISON: None.      Impression    IMPRESSION: No significant ascites demonstrated.    DAT CHOU MD     *Note: Due to a large number of results and/or encounters for the requested time period, some results have not been displayed. A complete set of results can be found in Results Review.     Procedures    None    Discharge Orders      Care Coordination Referral      Care Coordination Referral      Reason for your hospital stay    Suspect hepatic encephalopathy that was in part due to constipation. Take the lactulose 1-2 times daily to have at least 1 bowel movement every day.     Activity    Your activity upon discharge: activity as tolerated     Follow-up and recommended labs and tests     Follow up with primary care provider, Alon Pineda, within 7 days to evaluate medication change and for hospital follow- up.  Follow up with Dr. Roque as planned in May. Check BMP in the next week when you see Dr. Pineda.     Diet    Follow this diet upon discharge: Low salt (low sodium) diet     Discharge Medications   Current Discharge Medication List      START taking these medications    Details   lactulose (CHRONULAC) 10 GM/15ML solution Take 15 mLs (10 g) by mouth 2 times daily  Qty: 946 mL, Refills: 3    Associated Diagnoses: Encephalopathy         CONTINUE these medications which have CHANGED    Details   metoprolol succinate ER (TOPROL XL) 25 MG 24 hr tablet Take 1 tablet (25 mg) by mouth At Bedtime  Qty: 90 tablet, Refills: 3    Associated Diagnoses: NSVT (nonsustained ventricular tachycardia) (H)      spironolactone (ALDACTONE) 25 MG tablet Take 2 tablets (50 mg) by mouth daily You are overdue for follow up. Call [phone] to schedule an appointment.  Qty: 90 tablet, Refills: 0    Associated Diagnoses: Portal hypertension (H)         CONTINUE  these medications which have NOT CHANGED    Details   albuterol (PROAIR RESPICLICK) 108 (90 Base) MCG/ACT inhaler Inhale 2 puffs into the lungs every 6 hours as needed for shortness of breath / dyspnea or wheezing  Qty: 1 each, Refills: 4    Associated Diagnoses: Seasonal allergic rhinitis, unspecified trigger      calcium carb 1250 mg, 500 mg Tribal,/vitamin D 200 units (OSCAL WITH D) 500-200 MG-UNIT per tablet Take 2 tablets by mouth daily with food.      diphenhydrAMINE HCl (BENADRYL PO) Take 25 mg by mouth as needed       Multiple Vitamins-Minerals (MENS 50+ MULTI VITAMIN/MIN PO) Take 1 tablet by mouth daily      pantoprazole (PROTONIX) 40 MG EC tablet Take 1 tablet (40 mg) by mouth daily  Qty: 90 tablet, Refills: 3    Associated Diagnoses: Gastroesophageal reflux disease without esophagitis      potassium chloride ER (K-TAB) 20 MEQ CR tablet Take 1 tablet (20 mEq) by mouth daily  Qty: 90 tablet, Refills: 3    Associated Diagnoses: Right-sided congestive heart failure, unspecified HF chronicity (H)      torsemide (DEMADEX) 20 MG tablet Take 2 tablets daily in AM and 1 tab daily at noon  Qty: 270 tablet, Refills: 0    Associated Diagnoses: Right-sided congestive heart failure, unspecified HF chronicity (H)      vitamin D3 (CHOLECALCIFEROL) 2000 units (50 mcg) tablet Take 1 tablet by mouth daily      warfarin ANTICOAGULANT (JANTOVEN ANTICOAGULANT) 2.5 MG tablet 1.25 mg Fridays; 2.5mg all other days or As directed by Anticoagulation Clinic.  Qty: 88 tablet, Refills: 0    Associated Diagnoses: Long term current use of anticoagulant therapy; Paroxysmal atrial fibrillation (H); Atrial fibrillation with rapid ventricular response (H)      ACE/ARB/ARNI NOT PRESCRIBED (INTENTIONAL) Please choose reason not prescribed, below    Associated Diagnoses: Thrombocytopenia (H); Cirrhosis of liver without ascites, unspecified hepatic cirrhosis type (H)      Acetaminophen 325 MG CAPS Take 325-650 mg by mouth every 6 hours as needed       ASPIRIN NOT PRESCRIBED (INTENTIONAL) Please choose reason not prescribed, below    Associated Diagnoses: Thrombocytopenia (H)      !! order for DME Open toe size large 30/40 Mediven Assure Medical Compression socks Model [id number].  Or similar as per availability/formulary.  Fax to Fax [phone]. [facility]  Qty: 12 Device, Refills: 0    Associated Diagnoses: Chronic congestive heart failure, unspecified heart failure type (H); Edema, unspecified type      !! order for DME Equipment being ordered:   New CPAP mask, tube, filters,water tray  Qty: 1 Device, Refills: 3    Associated Diagnoses: Sleep apnea      !! ORDER FOR DME Respironics RemStar 60 Series Auto AFlex 12-15 cm H2O with heated humidty and a modem.  Pt chose a Path101 Air FFM size medium.    Associated Diagnoses: BRUCE (obstructive sleep apnea)      oxyCODONE (ROXICODONE) 5 MG tablet Take 1 tablet (5 mg) by mouth every 6 hours as needed for severe pain  Qty: 15 tablet, Refills: 0    Associated Diagnoses: Acute pain of left knee      STATIN NOT PRESCRIBED (INTENTIONAL) Please choose reason not prescribed, below    Associated Diagnoses: Thrombocytopenia (H); Cirrhosis of liver without ascites, unspecified hepatic cirrhosis type (H)      triamcinolone (KENALOG) 0.1 % external cream Apply topically 2 times daily  Qty: 80 g, Refills: 1    Associated Diagnoses: Rash       !! - Potential duplicate medications found. Please discuss with provider.        Allergies   Allergies   Allergen Reactions     Avelox Other (See Comments) and GI Disturbance     portal hypertension     Moxifloxacin Nausea and Vomiting, Nausea and Other (See Comments)     portal hypertension     Sotalol Itching

## 2021-03-23 NOTE — PLAN OF CARE
[facility] [facility] DISCHARGE NOTE    Patient discharged to home at 2:45 PM via wheel chair. Accompanied by spouse and staff. Discharge instructions reviewed with patient, opportunity offered to ask questions. Prescriptions sent to patients preferred pharmacy. All belongings sent with patient.    Virginia Juan RN

## 2021-03-23 NOTE — PHARMACY-ANTICOAGULATION SERVICE
Clinical Pharmacy- Warfarin Discharge Note  This patient is currently on warfarin for the treatment of Atrial fibrillation.  INR Goal= 2-3  Expected length of therapy lifetime.    Warfarin PTA Regimen: 1.25mg Friday, 2.5mg rest of week      Anticoagulation Dose History     Recent Dosing and Labs Latest Ref Rng & Units 2/2/2021 2/9/2021 2/24/2021 3/20/2021 3/21/2021 3/22/2021 3/23/2021    Warfarin 2.5 mg - - - - - 2.5 mg 2.5 mg -    INR 0.86 - 1.14 2.80(H) 3.10(H) 2.80(H) 2.58(H) 2.39(H) 2.34(H) 2.39(H)    INR 0.86 - 1.14 - - - - - - -    INR Point of Care 0.86 - 1.14 - - - - - - -          Vitamin K doses administered during the last 7 days: none  FFP administered during the last 7 days: none  Recommend discharging the patient on a warfarin regimen of 1.25 mg Fri, 2.5 mg ROW.      The patient should have an INR checked in 1 week.    Esme Willams, PharmD

## 2021-03-24 NOTE — PROGRESS NOTES
Clinic Care Coordination Contact    Clinic Care Coordination Contact  OUTREACH    Referral Information:  Referral Source: IP Handoff    Primary Diagnosis: GI Disorders    Chief Complaint   Patient presents with     Clinic Care Coordination - Post Hospital     RN        Organ Utilization: Cardiology, Gastroenterology, INR clinic  Clinic Utilization  Difficulty keeping appointments:: No  Compliance Concerns: No  No-Show Concerns: No  No PCP office visit in Past Year: No  Utilization    Last refreshed: 3/23/2021  5:23 PM: Hospital Admissions 1           Last refreshed: 3/23/2021  5:23 PM: ED Visits 3           Last refreshed: 3/23/2021  5:23 PM: No Show Count (past year) 3              Current as of: 3/23/2021  5:23 PM          Clinical Concerns:    Current Medical Concerns:  Patient was inpatient at [facility] from 3/20/21 to 3/23/21 with hepatic encephalopathy.  Patient has known cirrhosis and was not on lactulose.    Spoke with patient. He states he is feeling much better. Denies abdominal distention. States he is having daily BM's and is going to  the lactulose at the pharmacy today.     Patient is scheduled for INT today. He states he will cancel that as he just had in drawn yesterday in the hospital before discharge.  He is also scheduled to see NP in follow up and states he will reschedule that as he needs appointments later in the day and he prefers to see PCP versus NP.    Patient states his spouse is home with him. She is caring for her father who lives independently but was also in the hospital and was just discharged from a TCU.    Current Behavioral Concerns: Denies concerns    Education Provided to patient: Role of care coordination     Pain  Pain (GOAL):: No    Health Maintenance Reviewed:    Health Maintenance Due   Topic Date Due     PREVENTIVE CARE VISIT  Never done     HF ACTION PLAN  Never done     HIV SCREENING  Never done     COVID-19 Vaccine (1) Never done      HEPATITIS C SCREENING  Never done     ZOSTER IMMUNIZATION (1 of 2) Never done     HEPATITIS B IMMUNIZATION (3 of 3 - Hep B Twinrix risk 3-dose series) 02/10/2013     DIGOXIN  2020     INFLUENZA VACCINE (1) 2020     LIPID  2020     Clinical Pathway: None    Medication Management:  Medication reconciliation status: Medications reviewed and reconciled.  Continue medications without change. Patient is independent in medication administration. He is aware of the increase in spironolactone, start of lactulose and change in metoprolol succinate ER.     Functional Status:  Dependent ADLs:: Independent  Dependent IADLs:: Independent  Bed or wheelchair confined:: No  Mobility Status: Independent  Fallen 2 or more times in the past year?: No  Any fall with injury in the past year?: No    Living Situation:  Current living arrangement:: I live in a private home with spouse  Type of residence:: Private home - [facility]    Lifestyle & Psychosocial Needs:        Diet:: No added salt  Inadequate nutrition (GOAL):: No  Tube Feeding: No  Inadequate activity/exercise (GOAL):: No  Significant changes in sleep pattern (GOAL): No  Transportation means:: Accessible car     Buddhism or spiritual beliefs that impact treatment:: No  Mental health DX:: No  Mental health management concern (GOAL):: No  Chemical Dependency Status: No Current Concerns   Socioeconomic History     Marital status:      Spouse name: Violet     Number of children: Not on file     Years of education: Not on file     Highest education level: Not on file     Tobacco Use     Smoking status: Former Smoker     Packs/day: 0.80     Years: 6.00     Pack years: 4.80     Types: Cigarettes     Quit date: 2002     Years since quittin.2     Smokeless tobacco: Never Used   Substance and Sexual Activity     Alcohol use: No     Alcohol/week: 0.0 standard drinks     Comment: quit in      Drug use: No     Sexual activity: Yes     Partners:  Female     Patient is returning to his full time job tomorrow (2/25/21). He states he is a .      Resources and Interventions:  Current Resources: None     Community Resources: None  Supplies used at home:: None, Compression Stockings  Equipment Currently Used at Home: none  Employment Status: employed full-time    Advance Care Plan/Directive  Advanced Care Plans/Directives on file:: No    Referrals Placed: None     Patient/Caregiver understanding: Patient has good understanding     Future Appointments              Today CL LAB [facility], [facility]    In 6 days Kathrin Sutton APRN CNP [facility], [facility]    In 1 month Hi Roque MD [facility] [place], [facility]    In 3 months GONZALEZ TECH1 [facility], [facility] PSA CLIN          Plan: Patient will change appointments so they fit his schedule.     Clinic care coordination is not indicated. Patient has RN CC contact information if he has future questions.     Asha Patel RN, [facility] - Primary [facility] RN Coordinator  [facility]   3/24/2021    10:59 AM  [phone]

## 2021-03-24 NOTE — PROGRESS NOTES
ANTICOAGULATION  MANAGEMENT: Discharge Review    Mauricepj Jon chart reviewed for anticoagulation continuity of care    Hospital Admission on 3/20/21 for Hepatic Encephalopathy.    Discharge disposition: Home    Results:    Recent labs: (last 7 days)     03/20/21  1615 03/21/21  1057 03/22/21  0457 03/23/21  0528   INR 2.58* 2.39* 2.34* 2.39*     Anticoagulation inpatient management:     home regimen continued    Anticoagulation discharge instructions:     Warfarin dosing: home regimen continued   Bridging: No   INR goal change: No      Medication changes affecting anticoagulation: Yes: Lactulose    Additional factors affecting anticoagulation: No    Plan     Agree with dosing adjustment on discharge    Recommended follow up is scheduled    Anticoagulation Calendar updated    Taylor Felix RN

## 2021-03-29 NOTE — TELEPHONE ENCOUNTER
Called and left message to call back, need to find out what the leave of absence is for.  Tona Sharma CMA

## 2021-03-30 PROBLEM — J44.9 COPD (CHRONIC OBSTRUCTIVE PULMONARY DISEASE) (H): Status: ACTIVE | Noted: 2021-01-01

## 2021-03-30 NOTE — PROGRESS NOTES
ANTICOAGULATION MANAGEMENT     Patient Name:  Maurice Jon  Date:  3/30/2021    ASSESSMENT /SUBJECTIVE:    Today's INR result of 2.7 is therapeutic. Goal INR of 2.0-3.0      Warfarin dose taken: Warfarin taken as instructed    Diet: No new diet changes affecting INR    Medication changes/ interactions: Interaction between lactulose started on 3/21/21 and warfarin may be affecting INR    Previous INR: Therapeutic     S/S of bleeding or thromboembolism: No    New injury or illness: Yes: Recently hospitalized for Hepatic encepahlopathy    Upcoming surgery, procedure or cardioversion: No    Additional findings: First COVID vaccine 3/31/21      PLAN:    Telephone call with Maurice regarding INR result and instructed:     Warfarin Dosing Instructions: Continue your current warfarin dose 1.25 mg Friday and 2.5 mg all othe days    Instructed patient to follow up no later than: 2 weeks  Lab visit scheduled    Education provided: Target INR goal and significance of current INR result, Importance of therapeutic range, Importance of following up for INR monitoring at instructed interval, Importance of taking warfarin as instructed and Potential interaction between warfarin and lactulose      Yomi verbalizes understanding and agrees to warfarin dosing plan.    Instructed to call the Anticoagulation Clinic for any changes, questions or concerns. (#[phone])        Salena Hoyt RN      OBJECTIVE:  Recent labs: (last 7 days)     21  1620   INR 2.70*         No question data found.  Anticoagulation Summary  As of 3/30/2021    INR goal:  2.0-3.0   TTR:  97.1 % (11.8 mo)   INR used for dosin.70 (3/30/2021)   Warfarin maintenance plan:  1.25 mg (2.5 mg x 0.5) every Fri; 2.5 mg (2.5 mg x 1) all other days   Full warfarin instructions:  1.25 mg every Fri; 2.5 mg all other days   Weekly warfarin total:  16.25 mg   Plan last modified:  Susan Beyer RN (2018)   Next INR check:     Priority:   Maintenance   Target end date:  10/4/2018    Indications    Paroxysmal atrial fibrillation (H) [I48.0]  Atrial fibrillation with rapid ventricular response (H) (Resolved) [I48.91]  Long-term (current) use of anticoagulants [Z79.01] [Z79.01]  Chronic atrial fibrillation (H) [I48.20]  Atrial fibrillation with rapid ventricular response (H) [I48.91]             Anticoagulation Episode Summary     INR check location:      Preferred lab:      Send INR reminders to:  [facility]    Comments:  * anticoagulation short period surrounding ablation on 12/21/18. Cardiology to decide when to stop warfarin. has well-compensated cirrhosis      Anticoagulation Care Providers     Provider Role Specialty Phone number    Alon Pineda MD Referring Family Medicine [phone]

## 2021-03-30 NOTE — PROGRESS NOTES
"    Assessment & Plan     Encephalopathy    - Basic metabolic panel  (Ca, Cl, CO2, Creat, Gluc, K, Na, BUN)  - Ammonia  No present \"fogginess\" or concerns.    Liver Cirrhosis 2nd ot Fatty liver, w Ascites    - Basic metabolic panel  (Ca, Cl, CO2, Creat, Gluc, K, Na, BUN)    Morbid obesity (H)      Thrombocytopenia (H)    Stable.    Chronic obstructive pulmonary disease, unspecified COPD type (H)    Denies any concerns with his breathing.    Chronic atrial fibrillation (H)    - ASPIRIN NOT PRESCRIBED (INTENTIONAL); Please choose reason not prescribed from choices below.             See Patient Instructions  Patient Instructions   Continue with lactulose to have one BM daily.  Will be notified of pending las.  Continue all other medications.  Follow up with Dr. Roque and cardiology as scheduled.      Our Clinic hours are:  Mondays    7:20 am - 7 pm  Tues -  Fri  7:20 am - 5 pm    Clinic Phone: [phone]    The clinic lab opens at 7:30 am Mon - Fri and appointments are required.    [facility]. [phone]  Monday  8 am - 7pm  Tues - Fri 8 am - 5:30 pm             Return in about 3 months (around 6/30/2021) for or sooner if symptoms persist or worsen, Routine Visit, Med Check.    JANETTE Dos Santos [facility]    Samara Celaya is a 60 year old who presents for the following health issues  accompanied by his self:    [facility]       Hospital Follow-up Visit:    Hospital/Nursing Home/IP Rehab Facility: [facility]  Date of Admission: 3/20/21  Date of Discharge: 3/23/21  Reason(s) for Admission: \"foggy thinking, fatigue, and chest pressure\".      Was your hospitalization related to COVID-19? No   Problems taking medications regularly:  None  Medication changes since discharge: None  Problems adhering to non-medication therapy:  None    Summary of hospitalization:  [facility] discharge summary reviewed  Diagnostic Tests/Treatments reviewed.  " "Follow up needed: has follow up with GI and cardiology upcoming  Other Healthcare Providers Involved in Patient s Care:         None  Update since discharge: improved.       Post Discharge Medication Reconciliation: discharge medications reconciled, continue medications without change.  Plan of care communicated with patient            Complicated PMH with recent hospitalization for encephalopathy. See below for summary of inpatient visit.  He received IV potassium and is back on daily maintenance of 20 meq daily. They increased levi aldactone. Increase in lactulose and states he is not constipated now.   He has been home for one week and reports feeling back to his norm. He has returned to work.  Reports other chronic health issues are under control.        Discharge Diagnoses     Hepatic encephalopathy   Atypical chest pain / pressure  Suspect anxiety  Liver Cirrhosis due to fatty liver, with Ascites  Portal hypertension  Hypokalemia, mild   Pancytopenia, suspect due to cirrhosis, chronic  Portal vein thrombosis  Chronic a-fib, S/P Ablation 12/22/18 -- on Warfarin  Cardiac pacemaker in situ  Long-term (current) use of anticoagulants  Right heart failure / HFpEF  Cardiomyopathy  Coronary artery disease   Muscle cramps  Gastroesophageal reflux disease            Follow-ups Needed After Discharge     Follow-up Appointments     Follow-up and recommended labs and tests       Follow up with primary care provider, Alon Pineda, within 7   days to evaluate medication change and for hospital follow- up.  Follow up   with Dr. Roque as planned in May. Check BMP in the next week when you see   Dr. Pineda.                 Hospital Course         Hepatic encephalopathy     Reports \"foggy thinking\" and slow though process x 4 days. Occurs in the setting of known cirrhosis with worsening abdominal distention and jaundice, admit ammonia elevated (60). Head CT negative for acute intracranial abnormality. Not on " lactulose at home.    Was given lactulose 20 g in the emergency department and started on 10g bid. Had several BM since admission. Encephalopathy nearly resolved. I suspect constipation was a factor in his encephalopathy.    Having 1-2 BMs daily on lactulose 10g BID. He will continue lactulose to maintain at least 1 BM daily.   Current Outpatient Medications   Medication     ACE/ARB/ARNI NOT PRESCRIBED (INTENTIONAL)     Acetaminophen 325 MG CAPS     albuterol (PROAIR RESPICLICK) 108 (90 Base) MCG/ACT inhaler     ASPIRIN NOT PRESCRIBED (INTENTIONAL)     calcium carb 1250 mg, 500 mg [place]',/vitamin D 200 units (OSCAL WITH D) 500-200 MG-UNIT per tablet     diphenhydrAMINE HCl (BENADRYL PO)     lactulose (CHRONULAC) 10 GM/15ML solution     metoprolol succinate ER (TOPROL XL) 25 MG 24 hr tablet     Multiple Vitamins-Minerals (MENS 50+ MULTI VITAMIN/MIN PO)     order for DME     order for DME     ORDER FOR DME     oxyCODONE (ROXICODONE) 5 MG tablet     pantoprazole (PROTONIX) 40 MG EC tablet     potassium chloride ER (K-TAB) 20 MEQ CR tablet     spironolactone (ALDACTONE) 25 MG tablet     torsemide (DEMADEX) 20 MG tablet     triamcinolone (KENALOG) 0.1 % external cream     vitamin D3 (CHOLECALCIFEROL) 2000 units (50 mcg) tablet     warfarin ANTICOAGULANT (JANTOVEN ANTICOAGULANT) 2.5 MG tablet     STATIN NOT PRESCRIBED (INTENTIONAL)     No current facility-administered medications for this visit.      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 8/22/2005    Cirrhosis, idiopathic     Edema 5/13/2013      Esophageal varices with bleeding(456.0)     Pt denies     Gallstones      Genital herpes, unspecified 3/20/1999    resolving herpes progenitalis     GERD (gastroesophageal reflux disease)      Hematuria      Hypertension      Hypochondriasis     problems in past     Obesity 11/24/2010     BRUCE (obstructive sleep apnea) 03/17/2009    Mild AHI 8.4  RDI 31 - Pt refuses to wear his CPAP     Pericarditis      Portal hypertension (H) 1/28/2010     Unspecified thrombocytopenia 8/22/2005    Thrombocytopenia associated with cirrhosis and portal hypertension         Review of Systems   Constitutional, HEENT, cardiovascular, pulmonary, gi and gu systems are negative, except as otherwise noted.      Objective    There were no vitals taken for this visit.  There is no height or weight on file to calculate BMI.  Physical Exam   GENERAL: healthy, alert and no distress  HENT: ear canals, mild cerumen and TM's normal, pharynx without erythema  NECK: no adenopathy, no asymmetry  RESP: lungs clear to auscultation - no rales, rhonchi or wheezes  CV: regular rate and rhythm, normal S1 S2, no S3 or S4, no murmur  ABDOMEN: soft, obese, nontender, no hepatosplenomegaly, no masses and bowel sounds normal  MS: no gross musculoskeletal defects noted, wearing knee high compression stocking bilaterally  SKIN: mild jaundice

## 2021-03-30 NOTE — PATIENT INSTRUCTIONS
Continue with lactulose to have one BM daily.  Will be notified of pending las.  Continue all other medications.  Follow up with Dr. Roque and cardiology as scheduled.      Our Clinic hours are:  Mondays    7:20 am - 7 pm  Tues -  Fri  7:20 am - 5 pm    Clinic Phone: [phone]    The clinic lab opens at 7:30 am Mon - Fri and appointments are required.    [facility]  Ph. [phone]  Monday  8 am - 7pm  Tues - Fri 8 am - 5:30 pm

## 2021-03-31 NOTE — TELEPHONE ENCOUNTER
Called and left message need to know when he went back to work, so we can finish form  Tona Card CMA

## 2021-04-02 NOTE — TELEPHONE ENCOUNTER
This needs to be addressed by this pt's pcp, Dr. Mrose is a hospitalist and does not manage patients medications post discharge.    Zoraida Jim  [facility] RN

## 2021-04-02 NOTE — TELEPHONE ENCOUNTER
Reason for call:  Patient reporting a symptom    Symptom or request: Spironolactone 50 mg once daily. Pt said this came from when he was in the hospital. Pt does have a 90 day supply of 25 mg one daily. Pt is also asking for 50 mg one daily Qty 90    Phone Number patient can be reached at:  Home number on file [phone] (home)    Best Time:  Any Time      Can we leave a detailed message on this number:  YES    Call taken on 4/2/2021 at 8:09 AM by Zayra Iraheta

## 2021-04-02 NOTE — TELEPHONE ENCOUNTER
Sorry!  I thought that was his hepatologist. He needs to follow-up with Dr. Roque (GI) hepatology He is due for a visit. I can refill spironolactone until he can be seen.

## 2021-04-02 NOTE — TELEPHONE ENCOUNTER
This is managed by Dr Morse. It looks like he is due for an appointment. I would recommend contacting his office to schedule and appointment and if he doesn't have enough medication to get to that appointment ask if they can provide interval refill.

## 2021-04-13 NOTE — TELEPHONE ENCOUNTER
Pt arrived @ clinic for INR lab appt.  Pt has COVID symptoms ie:HA,chills, body aches,ST, PND.  Pt returned to car in parking lot.  Rescheduled pt for VV on 4/15/21 to discuss symptoms/schedule COVID test,Strep, and do INR.  Karolyn

## 2021-04-15 NOTE — PROGRESS NOTES
ANTICOAGULATION MANAGEMENT     Patient Name:  Maurice Jon  Date:  4/15/2021    ASSESSMENT /SUBJECTIVE:    Today's INR result of 2.10 is therapeutic. Goal INR of 2.0-3.0      Warfarin dose taken: Warfarin taken as instructed    Diet: No new diet changes affecting INR    Medication changes/ interactions: No new medications/supplements affecting INR    Previous INR: Therapeutic 2.70    S/S of bleeding or thromboembolism: No    New injury or illness: Yes: tested positive for covid. Patient states his symptoms are starting to improve. Patient is looking into monoclonal antibodies    Upcoming surgery, procedure or cardioversion: No    Additional findings: None      PLAN:    Telephone call with  Yomi regarding INR result and instructed:     Warfarin Dosing Instructions: Continue your current warfarin dose 1.25mg Fri; 2.5mg all other days    Instructed patient to follow up no later than: 2 weeks  Lab visit scheduled    Education provided: Importance of notifying clinic for changes in medications or health; a sooner lab recheck maybe needed, if covid symptoms worsen, we may need an INR sooner than 2 weeks, call if you have any concerns.       Yomi verbalizes understanding and agrees to warfarin dosing plan.    Instructed to call the Anticoagulation Clinic for any changes, questions or concerns. (#[phone])        Deisy Reza RN CACP       OBJECTIVE:  Recent labs: (last 7 days)     04/15/21  0900   INR 2.10*         INR assessment THER    Recheck INR In: 2 WEEKS    INR Location Clinic      Anticoagulation Summary  As of 4/15/2021    INR goal:  2.0-3.0   TTR:  97.1 % (11.8 mo)   INR used for dosin.10 (4/15/2021)   Warfarin maintenance plan:  1.25 mg (2.5 mg x 0.5) every Fri; 2.5 mg (2.5 mg x 1) all other days   Full warfarin instructions:  1.25 mg every Fri; 2.5 mg all other days   Weekly warfarin total:  16.25 mg   Plan last modified:  Susan Beyer RN (2018)   Next INR check:  2021    Priority:  Maintenance   Target end date:  10/4/2018    Indications    Paroxysmal atrial fibrillation (H) [I48.0]  Atrial fibrillation with rapid ventricular response (H) (Resolved) [I48.91]  Long-term (current) use of anticoagulants [Z79.01] [Z79.01]  Chronic atrial fibrillation (H) [I48.20]  Atrial fibrillation with rapid ventricular response (H) [I48.91]             Anticoagulation Episode Summary     INR check location:      Preferred lab:      Send INR reminders to:  [facility]    Comments:  * anticoagulation short period surrounding ablation on 12/21/18. Cardiology to decide when to stop warfarin. has well-compensated cirrhosis      Anticoagulation Care Providers     Provider Role Specialty Phone number    Alon Pineda MD Referring Family Medicine [phone]

## 2021-04-15 NOTE — PROGRESS NOTES
Yomi is a 60 year old who is being evaluated via a billable telephone visit.      What phone number would you like to be contacted at? home  How would you like to obtain your AVS? [facility]    Assessment & Plan     2019 novel coronavirus disease (COVID-19)      Chronic obstructive pulmonary disease, unspecified COPD type (H)      Cardiomyopathy, unspecified type (H)      Acute on chronic systolic congestive heart failure (H)      Portal hypertension (H)      Coronary artery disease involving native coronary artery of native heart without angina pectoris                 FUTURE APPOINTMENTS:       - Follow-up visit in case of new or worsening symptoms. Form was filled out on [facility] website for monoclonal antibodies.     Patient Instructions       Patient Education     Coronavirus Disease 2019 (COVID-19): Caring for Yourself or Others   If you or a household member have symptoms of COVID-19, follow the guidelines below. This will help you manage symptoms and keep the virus from spreading.  If you have symptoms of COVID-19    Stay home and contact your care team. They will tell you what to do. You may be told to stay home and away from others (self-isolate). You may also be told to stay at least 6 feet from others (social distancing).    Stay away from work, school, and public places.    Limit physical contact with others in your home. Limit visitors. No kissing.  Clean surfaces you touch with disinfectant.  If you need to cough or sneeze, do it into a tissue. Then throw the tissue into the trash. If you don't have tissues, cough or sneeze into the bend of your elbow.  Don t share food or personal items with people in your household. This includes items like eating and drinking utensils, towels, and bedding.  Wear a cloth face mask around other people. During a public health emergency, medical face masks may be reserved for healthcare workers. You may need to make a cloth face mask of your own. You can do this using a  bandana, T-shirt, or other cloth. The [facility] has instructions on how to make a face mask. Wear the mask so that it covers both your nose and mouth.  If you need to go to a hospital or clinic, call ahead to let them know. Expect the care team to wear masks, gowns, gloves, and eye protection. You may need to come to a different entrance or wait in a separate room.  Follow all instructions from your care team.    If you ve been diagnosed with COVID-19    Stay home and away from others, including other people in your home. (This is called self-isolation.) Don t leave home unless you need to get medical care. Don t go to work, school, or public places. Don t use buses, taxis, or other public transportation.    Follow all instructions from your care team.    If you need to go to a hospital or clinic, call ahead to let them know. Expect the care team to wear masks, gowns, gloves, and eye protection. You may need to come to a different entrance or wait in a separate room.    Wear a face mask over your nose and mouth. This is to protect others from your germs. If you can t wear a mask, your caregivers should wear one. You may need to make your own mask using a bandana, T-shirt, or other cloth. See the CDC s instructions on how to make a face mask.    Have no contact with pets and other animals.    Don t share food or personal items with people in your household. This includes items like eating and drinking utensils, towels, and bedding.    If you need to cough or sneeze, do it into a tissue. Then throw the tissue into the trash. If you don't have tissues, cough or sneeze into the bend of your elbow.    Wash your hands often.    Self-care at home  The FDA has approved an antiviral medicine (called remdesivir) for people being treated in the hospital. This is for people 12 years and older who weigh more than about 88 pounds (40 kgs). In certain cases, it may also be used for people younger than 12 years or who weigh less than  about 88 pounds (40 kgs)..  Currently, treatment is mostly aimed at helping your body while it fights the virus.    Getting extra rest.    Drink extra fluids 6 to 8 glasses of liquids each day), unless a doctor has told you not to. Ask your care team which fluids are best for you. Avoid fluids that contain caffeine or alcohol.    Taking over-the-counter (OTC) medicine to reduce pain and fever. Your care team will tell you which medicine to use.  If you ve been in the hospital for COVID-19, follow your care team s instructions. They will tell you when to stop self-isolation. They may also have you change positions to help your breathing (such as lying on your belly).  If a test showed that you have COVID-19, you may be asked to donate plasma after you ve recovered. (This is called COVID-19 convalescent plasma donation.) The plasma may contain antibodies to help fight the virus in others. Experts don't know the safety of plasma or how well it works. Research continues, and the FDA has approved it for emergency use in certain people with serious or life-threatening COVID-19.  Caring for a sick person     Follow all instructions from the care team.    Wash your hands often.    Wear protective clothing as advised.    Make sure the sick person wears a mask. If they can't wear a mask, don t stay in the same room with them. If you must be in the same room, wear a face mask. Make sure the mask covers both the nose and mouth.    Keep track of the sick person s symptoms.    Clean surfaces often with disinfectant. This includes phones, kitchen counters, fridge door handles, bathroom surfaces, and others.    Don t let anyone share household items with the sick person. This includes eating and drinking tools, towels, sheets, or blankets.    Clean fabrics and laundry well.    Keep other people and pets away from the sick person.    When you can stop self-isolation  When you are sick with COVID-19, you should stay away from other  people. This is called self-isolation. The rules for ending self-isolation depend on your health in general.  If you are normally healthy:  You can stop self-isolation when all 3 of these are true:    You ve had no fever--and no medicine that reduces fever--for at least 24 hours. And     Your symptoms are better, such as cough or trouble breathing. And     At least 10 days have passed since your symptoms first started.  Talk with your care team before you leave home. They may tell you it s okay to leave, or they may give you different advice. You do not need to be re-tested.  If you have a weak immune system, or you ve had severe COVID-19:  Follow your care team s instructions. You may be asked to self-isolate for 10 days to 20 days after your symptoms first started. Your care team may want to re-test you for COVID-19.  Note: If you re being treated for cancer, have an immune disorder (such as HIV), or have had a transplant (organ or bone marrow), you may have a weak immune system.  If you've already had COVID-19 once:  If you had the virus over 3 months ago, and you ve been exposed again, treat it like you've never had COVID-19. Stay home, limit your contact with others, call your care team, and watch for symptoms.  If it s been less than 3 months since you had the virus, you re symptom-free, and you ve been exposed again: You don t need to self-isolate. You don t need to be re-tested, unless you have new symptoms of COVID-19 that can t be linked to another illness. But if you develop new symptoms, stay home. Contact your care team if you have questions.  When you return to public settings  When you are well enough to go outside your home, follow the CDC s guidance on cloth face masks.    Anyone over age 2 should wear a face mask in public, especially when it's hard to socially distance. This includes public transit, public protests and marches, and crowded stores, bars, and restaurants.    Face masks are most  likely to reduce the spread of COVID-19 when they are widely used by people who are out in the public.  Certain people should not wear a face covering. These include:    Children under 2 years old    Anyone with a health, developmental, or mental health condition that can be made worse by wearing a mask    Anyone who is unconscious or unable to remove the face covering without help. See the CDC's guidance on who should not wear a face mask.  When to call your care team  Call your care team right away if a sick person has any of these:    Trouble breathing    Pain or pressure in chest  If a sick person has any of these, call 911:    Trouble breathing that gets worse    Pain or pressure in chest that gets worse    Blue tint to lips or face    Fast or irregular heartbeat    Confusion or trouble waking    Fainting or loss of consciousness    Coughing up blood  Going home from the hospital   If you have COVID-19 and were recently in the hospital:    Follow the instructions above for self-care and isolation.    Follow the hospital care team s instructions.    Ask questions if anything is unclear to you. Write down answers so you remember them.  Date last modified: 1/15/2021  [employer] last reviewed this educational content on 4/1/2020  This information has been modified by your health care provider with permission from the publisher.    5593-6368 [employer]. [address] [place] [address], [place] [address]. All rights reserved. This information is not intended as a substitute for professional medical care. Always follow your healthcare professional's instructions.           Patient Education     COVID-19: Lying in a Prone Position (Proning)  When you have COVID-19, lying on your belly can help your lungs work better. It can help get more oxygen into your lungs more easily. It can help prevent lung injury. Lying on your belly is known as the prone position. You may also hear it called  proning.  If you re in the  hospital, the healthcare team may position you to help your lungs. Once you are strong enough, your healthcare team may want you to do these position changes on your own.  How does proning help you breathe?  Most of your lung tissue sits closer to your back than to your front. Lying on your back (supine) can put pressure on your lung tissue. This can make the small air sacs in your lungs need to work harder to inflate. If you have to breathe harder to get enough air in your lungs, this can make lung problems worse. It can also cause lung injury.  But lying on your stomach (prone) can help your lungs work better with less stress. It can help prevent problems such as collapsed lung. This is when the air sacs in the lung can t inflate, or they may fill with fluid. It can happen to part or all of one or both lungs. Lying on your side can also help your lungs work better. Part of your time in bed will be spent on your side as well.  If you're in the hospital  If you re awake and still in the hospital, the healthcare team will help position you if needed. They can show you the safest ways to turn over while you re connected to tubes such as an IV. Your blood oxygen level (oxygen saturation, or O2 sat) may be checked often. This is to make sure your lungs are getting enough oxygen into your body. Your O2 sat level may be checked after each time you change position.  Getting ready for proning at home  Before you do prone positioning at home, tell your healthcare provider if you have any of these:    Neck, spine, or pelvic pain or other problems    Acid reflux from your stomach (heartburn or GERD)    Nausea or vomiting  You ll need:    A bed surface that can be flat    Pillows    A towel you can roll up  Before you get into bed:    Use the bathroom. This is so you don t have to get up soon after you re lying down.    Make sure you have things in reach that you need. This includes your phone, TV remote, book or magazine, or a  bell to ring for a family member.  Changing positions over time  You will need to cycle through these positions. Repeat this cycle as often as your healthcare team advises. Do them in this order:  Proning cycle       Position 1: Belly. Lie on your belly on a flat bed. Do this for 30 minutes to 2 hours.       Position 2: Right side. Lie on your right side on a flat bed. Do this for 30 minutes to 2 hours.         Position 3: Sitting up. Sit up in bed at a 30- to 60-degree angle. You can prop up the head of your bed with blocks, or prop yourself up in bed with pillows. Do this for 30 minutes to 2 hours.       Position 4: Left side. Lie on your left side on a flat bed. Do this for 30 minutes to 2 hours.         Position 5: Belly. Lie on your belly on a flat bed. Do this for 30 minutes to 2 hours.        Making proning more comfortable for you    Place pillows under your hips or lower legs for comfort as needed. If you have large breasts or a large belly, you can place pillows on your upper chest and pelvis to help support these areas while prone.    Put a pillow under your head. Turn your head from side to side at least once every 30 minutes, or more often as needed.    If you have neck problems, you can fold a towel into a horseshoe shape to support your face. This will let you lie face down without turning your head to the side.    Try putting your arms in different positions to see what is most comfortable. To start, try putting 1 arm bent and the other straight.    Don t lie on your stomach if you ve just had a meal. This can cause stomach acid reflux. Try to wait a couple of hours after eating before you lie on your belly.    Change position slowly and carefully. If you have an IV or other tubes, make sure to not pull on them.    Change position more often if you are at risk for pressure sores (ulcers).    Follow your healthcare team's instructions  The information above is a guideline. Make sure to follow your  healthcare team s specific instructions. They may tell you:    When to do proning    How often to do proning    How often to change position  When to call your healthcare provider  Call your healthcare provider if you have any of these:    Breathing that gets worse    New or worse neck, spine, or pelvic pain from proning    New or worse acid reflux    New chest pain  [employer]anish[employer] last reviewed this educational content on 6/1/2020 2000-2021 [employer]. All rights reserved. This information is not intended as a substitute for professional medical care. Always follow your healthcare professional's instructions.               No follow-ups on file.    JANETTE Encarnacion CNP  [facility]    Samara Celaya is a 60 year old who presents for the following health issues     HPI       Concern for COVID-19  About how many days ago did these symptoms start? 3 days, sent spit test out on Monday and it came back positive for COVID  Is this your first visit for this illness? Yes  In the 14 days before your symptoms started, have you had close contact with someone with COVID-19 (Coronavirus)? I do not know.  Do you have a fever or chills? chills  Are you having new or worsening difficulty breathing? No  Do you have new or worsening cough? Yes, I am coughing up mucus. Light green, loosening up  Have you had any new or unexplained body aches? No  In the beginning, now resolved  Have you experienced any of the following NEW symptoms?    Headache: No    Sore throat: dry, PND    Loss of taste or smell: YES but improving    Chest pain: YES and is improving     Diarrhea: No    Rash: No  What treatments have you tried? benadryl  Who do you live with? Wife, has sx as well  Are you, or a household member, a healthcare worker or a ? No  Do you live in a nursing home, group home, or shelter? No  Do you have a way to get food/medications if quarantined? Yes, I have a friend or  family member who can help me.    Had first COVID vaccine 3 weeks ago    Review of Systems   Constitutional, HEENT, cardiovascular, pulmonary, gi and gu systems are negative, except as otherwise noted.      Objective           Vitals:  No vitals were obtained today due to virtual visit.    Physical Exam   healthy, alert and no distress  PSYCH: Alert and oriented times 3; coherent speech, normal   rate and volume, able to articulate logical thoughts, able   to abstract reason, no tangential thoughts, no hallucinations   or delusions  His affect is normal and pleasant  RESP: Audible mild loose cough, no audible wheezing, able to talk in full sentences  Remainder of exam unable to be completed due to telephone visits            Phone call duration: 17 minutes

## 2021-04-15 NOTE — PATIENT INSTRUCTIONS
Patient Education     Coronavirus Disease 2019 (COVID-19): Caring for Yourself or Others   If you or a household member have symptoms of COVID-19, follow the guidelines below. This will help you manage symptoms and keep the virus from spreading.  If you have symptoms of COVID-19    Stay home and contact your care team. They will tell you what to do. You may be told to stay home and away from others (self-isolate). You may also be told to stay at least 6 feet from others (social distancing).    Stay away from work, school, and public places.    Limit physical contact with others in your home. Limit visitors. No kissing.  Clean surfaces you touch with disinfectant.  If you need to cough or sneeze, do it into a tissue. Then throw the tissue into the trash. If you don't have tissues, cough or sneeze into the bend of your elbow.  Don t share food or personal items with people in your household. This includes items like eating and drinking utensils, towels, and bedding.  Wear a cloth face mask around other people. During a public health emergency, medical face masks may be reserved for healthcare workers. You may need to make a cloth face mask of your own. You can do this using a bandana, T-shirt, or other cloth. The CDC has instructions on how to make a face mask. Wear the mask so that it covers both your nose and mouth.  If you need to go to a hospital or clinic, call ahead to let them know. Expect the care team to wear masks, gowns, gloves, and eye protection. You may need to come to a different entrance or wait in a separate room.  Follow all instructions from your care team.    If you ve been diagnosed with COVID-19    Stay home and away from others, including other people in your home. (This is called self-isolation.) Don t leave home unless you need to get medical care. Don t go to work, school, or public places. Don t use buses, taxis, or other public transportation.    Follow all instructions from your care  team.    If you need to go to a hospital or clinic, call ahead to let them know. Expect the care team to wear masks, gowns, gloves, and eye protection. You may need to come to a different entrance or wait in a separate room.    Wear a face mask over your nose and mouth. This is to protect others from your germs. If you can t wear a mask, your caregivers should wear one. You may need to make your own mask using a bandana, T-shirt, or other cloth. See the CDC s instructions on how to make a face mask.    Have no contact with pets and other animals.    Don t share food or personal items with people in your household. This includes items like eating and drinking utensils, towels, and bedding.    If you need to cough or sneeze, do it into a tissue. Then throw the tissue into the trash. If you don't have tissues, cough or sneeze into the bend of your elbow.    Wash your hands often.    Self-care at home  The FDA has approved an antiviral medicine (called remdesivir) for people being treated in the hospital. This is for people 12 years and older who weigh more than about 88 pounds (40 kgs). In certain cases, it may also be used for people younger than 12 years or who weigh less than about 88 pounds (40 kgs)..  Currently, treatment is mostly aimed at helping your body while it fights the virus.    Getting extra rest.    Drink extra fluids 6 to 8 glasses of liquids each day), unless a doctor has told you not to. Ask your care team which fluids are best for you. Avoid fluids that contain caffeine or alcohol.    Taking over-the-counter (OTC) medicine to reduce pain and fever. Your care team will tell you which medicine to use.  If you ve been in the hospital for COVID-19, follow your care team s instructions. They will tell you when to stop self-isolation. They may also have you change positions to help your breathing (such as lying on your belly).  If a test showed that you have COVID-19, you may be asked to donate plasma  after you ve recovered. (This is called COVID-19 convalescent plasma donation.) The plasma may contain antibodies to help fight the virus in others. Experts don't know the safety of plasma or how well it works. Research continues, and the FDA has approved it for emergency use in certain people with serious or life-threatening COVID-19.  Caring for a sick person     Follow all instructions from the care team.    Wash your hands often.    Wear protective clothing as advised.    Make sure the sick person wears a mask. If they can't wear a mask, don t stay in the same room with them. If you must be in the same room, wear a face mask. Make sure the mask covers both the nose and mouth.    Keep track of the sick person s symptoms.    Clean surfaces often with disinfectant. This includes phones, kitchen counters, fridge door handles, bathroom surfaces, and others.    Don t let anyone share household items with the sick person. This includes eating and drinking tools, towels, sheets, or blankets.    Clean fabrics and laundry well.    Keep other people and pets away from the sick person.    When you can stop self-isolation  When you are sick with COVID-19, you should stay away from other people. This is called self-isolation. The rules for ending self-isolation depend on your health in general.  If you are normally healthy:  You can stop self-isolation when all 3 of these are true:    You ve had no fever--and no medicine that reduces fever--for at least 24 hours. And     Your symptoms are better, such as cough or trouble breathing. And     At least 10 days have passed since your symptoms first started.  Talk with your care team before you leave home. They may tell you it s okay to leave, or they may give you different advice. You do not need to be re-tested.  If you have a weak immune system, or you ve had severe COVID-19:  Follow your care team s instructions. You may be asked to self-isolate for 10 days to 20 days after  your symptoms first started. Your care team may want to re-test you for COVID-19.  Note: If you re being treated for cancer, have an immune disorder (such as HIV), or have had a transplant (organ or bone marrow), you may have a weak immune system.  If you've already had COVID-19 once:  If you had the virus over 3 months ago, and you ve been exposed again, treat it like you've never had COVID-19. Stay home, limit your contact with others, call your care team, and watch for symptoms.  If it s been less than 3 months since you had the virus, you re symptom-free, and you ve been exposed again: You don t need to self-isolate. You don t need to be re-tested, unless you have new symptoms of COVID-19 that can t be linked to another illness. But if you develop new symptoms, stay home. Contact your care team if you have questions.  When you return to public settings  When you are well enough to go outside your home, follow the CDC s guidance on cloth face masks.    Anyone over age 2 should wear a face mask in public, especially when it's hard to socially distance. This includes public transit, public protests and marches, and crowded stores, bars, and restaurants.    Face masks are most likely to reduce the spread of COVID-19 when they are widely used by people who are out in the public.  Certain people should not wear a face covering. These include:    Children under 2 years old    Anyone with a health, developmental, or mental health condition that can be made worse by wearing a mask    Anyone who is unconscious or unable to remove the face covering without help. See the CDC's guidance on who should not wear a face mask.  When to call your care team  Call your care team right away if a sick person has any of these:    Trouble breathing    Pain or pressure in chest  If a sick person has any of these, call 911:    Trouble breathing that gets worse    Pain or pressure in chest that gets worse    Blue tint to lips or  face    Fast or irregular heartbeat    Confusion or trouble waking    Fainting or loss of consciousness    Coughing up blood  Going home from the hospital   If you have COVID-19 and were recently in the hospital:    Follow the instructions above for self-care and isolation.    Follow the hospital care team s instructions.    Ask questions if anything is unclear to you. Write down answers so you remember them.  Date last modified: 1/15/2021  [employer] last reviewed this educational content on 4/1/2020  This information has been modified by your health care provider with permission from the publisher.    3198-2184 [employer]. [address] [place] [id number]. All rights reserved. This information is not intended as a substitute for professional medical care. Always follow your healthcare professional's instructions.           Patient Education     COVID-19: Lying in a Prone Position (Proning)  When you have COVID-19, lying on your belly can help your lungs work better. It can help get more oxygen into your lungs more easily. It can help prevent lung injury. Lying on your belly is known as the prone position. You may also hear it called  proning.  If you re in the hospital, the healthcare team may position you to help your lungs. Once you are strong enough, your healthcare team may want you to do these position changes on your own.  How does proning help you breathe?  Most of your lung tissue sits closer to your back than to your front. Lying on your back (supine) can put pressure on your lung tissue. This can make the small air sacs in your lungs need to work harder to inflate. If you have to breathe harder to get enough air in your lungs, this can make lung problems worse. It can also cause lung injury.  But lying on your stomach (prone) can help your lungs work better with less stress. It can help prevent problems such as collapsed lung. This is when the air sacs in the lung can t inflate, or  they may fill with fluid. It can happen to part or all of one or both lungs. Lying on your side can also help your lungs work better. Part of your time in bed will be spent on your side as well.  If you're in the hospital  If you re awake and still in the hospital, the healthcare team will help position you if needed. They can show you the safest ways to turn over while you re connected to tubes such as an IV. Your blood oxygen level (oxygen saturation, or O2 sat) may be checked often. This is to make sure your lungs are getting enough oxygen into your body. Your O2 sat level may be checked after each time you change position.  Getting ready for proning at home  Before you do prone positioning at home, tell your healthcare provider if you have any of these:    Neck, spine, or pelvic pain or other problems    Acid reflux from your stomach (heartburn or GERD)    Nausea or vomiting  You ll need:    A bed surface that can be flat    Pillows    A towel you can roll up  Before you get into bed:    Use the bathroom. This is so you don t have to get up soon after you re lying down.    Make sure you have things in reach that you need. This includes your phone, TV remote, book or magazine, or a bell to ring for a family member.  Changing positions over time  You will need to cycle through these positions. Repeat this cycle as often as your healthcare team advises. Do them in this order:  Proning cycle       Position 1: Belly. Lie on your belly on a flat bed. Do this for 30 minutes to 2 hours.       Position 2: Right side. Lie on your right side on a flat bed. Do this for 30 minutes to 2 hours.         Position 3: Sitting up. Sit up in bed at a 30- to 60-degree angle. You can prop up the head of your bed with blocks, or prop yourself up in bed with pillows. Do this for 30 minutes to 2 hours.       Position 4: Left side. Lie on your left side on a flat bed. Do this for 30 minutes to 2 hours.         Position 5: Belly. Lie on  your belly on a flat bed. Do this for 30 minutes to 2 hours.        Making proning more comfortable for you    Place pillows under your hips or lower legs for comfort as needed. If you have large breasts or a large belly, you can place pillows on your upper chest and pelvis to help support these areas while prone.    Put a pillow under your head. Turn your head from side to side at least once every 30 minutes, or more often as needed.    If you have neck problems, you can fold a towel into a horseshoe shape to support your face. This will let you lie face down without turning your head to the side.    Try putting your arms in different positions to see what is most comfortable. To start, try putting 1 arm bent and the other straight.    Don t lie on your stomach if you ve just had a meal. This can cause stomach acid reflux. Try to wait a couple of hours after eating before you lie on your belly.    Change position slowly and carefully. If you have an IV or other tubes, make sure to not pull on them.    Change position more often if you are at risk for pressure sores (ulcers).    Follow your healthcare team's instructions  The information above is a guideline. Make sure to follow your healthcare team s specific instructions. They may tell you:    When to do proning    How often to do proning    How often to change position  When to call your healthcare provider  Call your healthcare provider if you have any of these:    Breathing that gets worse    New or worse neck, spine, or pelvic pain from proning    New or worse acid reflux    New chest pain  [employer] last reviewed this educational content on 6/1/2020 2000-2021 [employer]. All rights reserved. This information is not intended as a substitute for professional medical care. Always follow your healthcare professional's instructions.

## 2021-04-19 NOTE — TELEPHONE ENCOUNTER
Called pt to discuss COVID recovery and he spoke with PCP and gas received Monoclonal antibioties to help fight COVID. Instructed him to pay close attention to heart failure symptoms. He is already feeling much better.

## 2021-04-19 NOTE — TELEPHONE ENCOUNTER
----- Message from Susan Mcintosh sent at 4/16/2021  4:58 PM CDT -----  Hello,    Evans pt. He wants an RN to call him to discuss his COVID recovery and what Cristina would suggest    I tried to get him to f/UP with his pmd  Thanks  Susan

## 2021-05-03 NOTE — PROGRESS NOTES
[facility] [phone]    Yomi is a 60 year old who is being evaluated via a billable video visit.      How would you like to obtain your AVS? MyChart  If the video visit is dropped, the invitation should be resent by: Text to cell phone: [phone]  Will anyone else be joining your video visit? No    Video Start Time: 3:38 PM    Subjective   Yomi is a 60 year old who presents for the following health issues: cirrhosis and PVT    HPI: I have the pleasure of seeing Maurice Jon for follow-up in the liver clinic via video visit on May 3, 2021.   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  He is doing better.  He did have recent COVID-19.  He did receive an antibody cocktail and did not require hospitalization.  He does still complain of some fatigue.  He does note occasional mild abdominal pain, he has no itching or skin rash.        He denies any increased abdominal girth or lower extremity edema.  He denies any gastrointestinal bleeding or any overt signs of encephalopathy.     He denies any fevers or chills, cough or shortness of breath.  He does complain of occasional nausea but no vomiting.  He is on lactulose but does at times feel constipated. He reports his appetite is improving and his weight is down about 15 pounds.  He had received the first COVID-19 vaccine just before he actually developed the disease.    Current Outpatient Medications   Medication     Acetaminophen 325 MG CAPS     albuterol (PROAIR RESPICLICK) 108 (90 Base) MCG/ACT inhaler     Ascorbic Acid (VITAMIN C PO)     calcium carb 1250 mg, 500 mg Resighini,/vitamin D 200 units (OSCAL WITH D) 500-200 MG-UNIT per tablet     diphenhydrAMINE HCl (BENADRYL PO)     lactulose (CHRONULAC) 10 GM/15ML solution     metoprolol succinate ER (TOPROL XL) 25 MG 24 hr tablet     Multiple Vitamins-Minerals (MENS 50+ MULTI VITAMIN/MIN PO)     oxyCODONE (ROXICODONE) 5 MG tablet     pantoprazole  (PROTONIX) 40 MG EC tablet     potassium chloride ER (K-TAB) 20 MEQ CR tablet     spironolactone (ALDACTONE) 25 MG tablet     torsemide (DEMADEX) 20 MG tablet     vitamin D3 (CHOLECALCIFEROL) 2000 units (50 mcg) tablet     warfarin ANTICOAGULANT (JANTOVEN ANTICOAGULANT) 2.5 MG tablet     ACE/ARB/ARNI NOT PRESCRIBED (INTENTIONAL)     ASPIRIN NOT PRESCRIBED (INTENTIONAL)     order for DME     order for DME     ORDER FOR DME     STATIN NOT PRESCRIBED (INTENTIONAL)     triamcinolone (KENALOG) 0.1 % external cream     No current facility-administered medications for this visit.      Objective    Vitals - Patient Reported  Pain Score: No Pain (0)    Physical Exam: GENERAL: Healthy, alert and no distress. EYES: Eyes grossly normal to inspection.  No discharge or erythema, or obvious scleral/conjunctival abnormalities. RESP: No audible wheeze, cough, or visible cyanosis.  No visible retractions or increased work of breathing.  SKIN: Visible skin clear. No significant rash, abnormal pigmentation or lesions. NEURO: Cranial nerves grossly intact.  Mentation and speech appropriate for age. PSYCH: Mentation appears normal, affect normal/bright, judgement and insight intact, normal speech and appearance well-groomed.    His most recent laboratory tests are from March 30 and showed his white count was 3.6, hemoglobin is 10.2 and platelets were 126,000.  His sodium is 135, potassium 3.8, BUN is 23 and creatinine is 0.9.  His AST is 50, ALT is 44 and alkaline phosphatase is 91.  His albumin is 3.5 with a total protein of 6.2 and total bilirubin is 1.3 with a direct bilirubin of 0.5.    Recent Results (from the past 168 hour(s))   INR    Collection Time: 04/30/21  3:17 PM   Result Value Ref Range    INR 2.30 (H) 0.86 - 1.14   Capillary Blood Collection    Collection Time: 04/30/21  3:17 PM   Result Value Ref Range    Capillary Blood Collection Capillary collection performed       FINDINGS: No focal lesions are seen through the liver.  No definite  nodularity of the surface contour. No biliary dilation. Common duct is  upper normal in size at 6 mm. There is a dependent stone within the  neck of the gallbladder. No evidence for gallbladder wall thickening  or pericholecystic fluid. Sonographic Theodore sign is not present.       Visualized portions of the pancreas are unremarkable. The right kidney  measures 12.0 cm in length and the left measures 12.5 cm. There is a  shadowing echogenic stone in the mid left kidney measuring up to 10  mm. There is no evidence of renal mass or hydronephrosis. The spleen  is borderline enlarged measuring 14.7 cm in length. No focal lesions.  Visualized portions of the aorta and IVC are unremarkable.                                                                  IMPRESSION:    1. Single small stone within the gallbladder neck. No evidence for  cholecystitis.      2. Mild splenomegaly.  3. 10 mm stone in the mid left kidney.    My impression is that Mr. 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  His liver disease remains well compensated.  He is up-to-date with regard to vaccines and cancer screening.  I will not be making any change to his medical regimen at this visit.  His wife does believe that he may have some cognitive impairment and they are requesting neuropsych testing.    He will get his second COVID-19 dose in 3 months.     I will see him back in the clinic in 6 months for repeat imaging and blood work.     Thank you very much for allowing me to participate in the care of this patient.  If you have any questions regarding my recommendations, please do not hesitate to contact me.         Hi Roque MD      Professor of Medicine  [employer]      Executive Medical Director, Solid Organ Transplant Program  [facility]    Video-Visit Details    Type of service:  Video Visit    Video End Time:3:56 PM    Originating Location (pt.  Location): Home    Distant Location (provider location):  [facility] [place]     Platform used for Video Visit: [email or web address]

## 2021-05-03 NOTE — LETTER
5/3/2021         RE: Maurice Jon  [id number]  [facility] [address]        Dear Colleague,    Thank you for referring your patient, Maurice Jon, to the [facility] [place]. Please see a copy of my visit note below.    D[employer] [phone]    Yomi is a 60 year old who is being evaluated via a billable video visit.      How would you like to obtain your AVS? MyChart  If the video visit is dropped, the invitation should be resent by: Text to cell phone: [phone]  Will anyone else be joining your video visit? No    Video Start Time: 3:38 PM    Subjective   Yomi is a 60 year old who presents for the following health issues: cirrhosis and PVT    HPI: I have the pleasure of seeing Maurice Jon for follow-up in the liver clinic via video visit on May 3, 2021.   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  He is doing better.  He did have recent COVID-19.  He did receive an antibody cocktail and did not require hospitalization.  He does still complain of some fatigue.  He does note occasional mild abdominal pain, he has no itching or skin rash.        He denies any increased abdominal girth or lower extremity edema.  He denies any gastrointestinal bleeding or any overt signs of encephalopathy.     He denies any fevers or chills, cough or shortness of breath.  He does complain of occasional nausea but no vomiting.  He is on lactulose but does at times feel constipated. He reports his appetite is improving and his weight is down about 15 pounds.  He had received the first COVID-19 vaccine just before he actually developed the disease.    Current Outpatient Medications   Medication     Acetaminophen 325 MG CAPS     albuterol (PROAIR RESPICLICK) 108 (90 Base) MCG/ACT inhaler     Ascorbic Acid (VITAMIN C PO)     calcium carb 1250 mg, 500 mg [place],/vitamin D 200 units (OSCAL WITH D) 500-200 MG-UNIT per tablet      diphenhydrAMINE HCl (BENADRYL PO)     lactulose (CHRONULAC) 10 GM/15ML solution     metoprolol succinate ER (TOPROL XL) 25 MG 24 hr tablet     Multiple Vitamins-Minerals (MENS 50+ MULTI VITAMIN/MIN PO)     oxyCODONE (ROXICODONE) 5 MG tablet     pantoprazole (PROTONIX) 40 MG EC tablet     potassium chloride ER (K-TAB) 20 MEQ CR tablet     spironolactone (ALDACTONE) 25 MG tablet     torsemide (DEMADEX) 20 MG tablet     vitamin D3 (CHOLECALCIFEROL) 2000 units (50 mcg) tablet     warfarin ANTICOAGULANT (JANTOVEN ANTICOAGULANT) 2.5 MG tablet     ACE/ARB/ARNI NOT PRESCRIBED (INTENTIONAL)     ASPIRIN NOT PRESCRIBED (INTENTIONAL)     order for DME     order for DME     ORDER FOR DME     STATIN NOT PRESCRIBED (INTENTIONAL)     triamcinolone (KENALOG) 0.1 % external cream     No current facility-administered medications for this visit.      Objective    Vitals - Patient Reported  Pain Score: No Pain (0)    Physical Exam: GENERAL: Healthy, alert and no distress. EYES: Eyes grossly normal to inspection.  No discharge or erythema, or obvious scleral/conjunctival abnormalities. RESP: No audible wheeze, cough, or visible cyanosis.  No visible retractions or increased work of breathing.  SKIN: Visible skin clear. No significant rash, abnormal pigmentation or lesions. NEURO: Cranial nerves grossly intact.  Mentation and speech appropriate for age. PSYCH: Mentation appears normal, affect normal/bright, judgement and insight intact, normal speech and appearance well-groomed.    His most recent laboratory tests are from March 30 and showed his white count was 3.6, hemoglobin is 10.2 and platelets were 126,000.  His sodium is 135, potassium 3.8, BUN is 23 and creatinine is 0.9.  His AST is 50, ALT is 44 and alkaline phosphatase is 91.  His albumin is 3.5 with a total protein of 6.2 and total bilirubin is 1.3 with a direct bilirubin of 0.5.    Recent Results (from the past 168 hour(s))   INR    Collection Time: 04/30/21  3:17 PM    Result Value Ref Range    INR 2.30 (H) 0.86 - 1.14   Capillary Blood Collection    Collection Time: 04/30/21  3:17 PM   Result Value Ref Range    Capillary Blood Collection Capillary collection performed       FINDINGS: No focal lesions are seen through the liver. No definite  nodularity of the surface contour. No biliary dilation. Common duct is  upper normal in size at 6 mm. There is a dependent stone within the  neck of the gallbladder. No evidence for gallbladder wall thickening  or pericholecystic fluid. Sonographic Theodore sign is not present.       Visualized portions of the pancreas are unremarkable. The right kidney  measures 12.0 cm in length and the left measures 12.5 cm. There is a  shadowing echogenic stone in the mid left kidney measuring up to 10  mm. There is no evidence of renal mass or hydronephrosis. The spleen  is borderline enlarged measuring 14.7 cm in length. No focal lesions.  Visualized portions of the aorta and IVC are unremarkable.                                                                  IMPRESSION:    1. Single small stone within the gallbladder neck. No evidence for  cholecystitis.      2. Mild splenomegaly.  3. 10 mm stone in the mid left kidney.    My impression is that Mr. 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  His liver disease remains well compensated.  He is up-to-date with regard to vaccines and cancer screening.  I will not be making any change to his medical regimen at this visit.  His wife does believe that he may have some cognitive impairment and they are requesting neuropsych testing.    He will get his second COVID-19 dose in 3 months.     I will see him back in the clinic in 6 months for repeat imaging and blood work.     Thank you very much for allowing me to participate in the care of this patient.  If you have any questions regarding my recommendations, please do not hesitate to contact me.         Hi Roque MD      Professor of  Medicine  [employer]      Executive Medical Director, Solid Organ Transplant Program  [facility]    Video-Visit Details    Type of service:  Video Visit    Video End Time:3:56 PM    Originating Location (pt. Location): Home    Distant Location (provider location):  [facility] [place]     Platform used for Video Visit: Doximcarl      Again, thank you for allowing me to participate in the care of your patient.        Sincerely,        Hi Roque MD

## 2021-05-07 NOTE — PROGRESS NOTES
Anticoagulation Management    Unable to reach Yomi today.    Last Friday's INR result of 2.30 is therapeutic (goal INR of 2.0-3.0).  Result received from: Clinic Lab    Follow up required to confirm warfarin dose taken and assess for changes    Patient's INR was drawn from hepatology standing orders so the INR was not routed to [facility] appropriately.     Left voicemail for patient to call [facility] or check SideTour message.    Tentative plan: Continue 1.25mg Fri; 2.5mg all other days. Recheck the INR again in 3 weeks, May 21st    For callbacks transfer to [phone] until 5 PM.    Anticoagulation clinic to follow up    Deisy Reza RN CACP

## 2021-05-10 NOTE — PROGRESS NOTES
Patient read OnlineSheet[employer] message on 5/8/21 at 3:28 PM.     Christina Singer RN 05/10/21 at 11:20 AM

## 2021-05-21 NOTE — TELEPHONE ENCOUNTER
Remote PPM alert received for VT episode.  Since last remote on 3/12/21, patient has had 9 NSVT and 1 VT episode logged.  2 NSVT EGMs available showing NSVT lasting 3.5 to 10 seconds with rates in the 160-170's.  VT EGM from 5/20/21 at 1946 shows VT starting in the 190's and decreasing to the 140's, length available on EGM is 24 seconds, but no end to episode recorded so it may have gone on longer.  Patient has had NSVT episodes in the past, typically correlating when falling asleep on the couch without his CPAP on.  EF per echocardiogram 12/19/19 was 50-55%.  Pt is on Toprol XL.      Called and left message for patient to see if he had any symptoms at time of episodes.      Will route update to Dr. Evans for review.      RIKI Yadav

## 2021-05-25 NOTE — TELEPHONE ENCOUNTER
Called and second message for patient to see if he had any symptoms with the NSVT episode on 5/20/21.  Also reviewed Dr. Evans's recommendation to have an echocardiogram.  Order for echocardiogram at [place] entered.  Pt is also due to see Nadege Montilla PA-C in 7/2021.      Phone number for device provided for patient to call back and discuss symptoms/questions regarding Dr. Evans's recommendations.  Scheduling phone number for [place] provided to schedule echocardiogram and follow-up.     RIKI Yadav

## 2021-05-26 NOTE — PROGRESS NOTES
ANTICOAGULATION MANAGEMENT     Patient Name:  Maurice Jon  Date:  2021    ASSESSMENT /SUBJECTIVE:    Today's INR result of 2.50 is therapeutic. Goal INR of 2.0-3.0      Warfarin dose taken: Warfarin taken as instructed    Diet: No new diet changes affecting INR    Medication changes/ interactions: No new medications/supplements affecting INR    Previous INR: Therapeutic     S/S of bleeding or thromboembolism: No    New injury or illness: No    Upcoming surgery, procedure or cardioversion: No    Additional findings: None      PLAN:    Telephone call with Maurice regarding INR result and instructed:     Warfarin Dosing Instructions: Continue your current warfarin dose 1.25 mg Fri; 2.5 mg ROW.    Instructed patient to follow up no later than: 5 weeks  Lab visit scheduled    Education provided: Contact [facility] Anticoagulation: [phone]  with any changes, questions or concerns.       Yomi verbalizes understanding and agrees to warfarin dosing plan.    Instructed to call the Anticoagulation Clinic for any changes, questions or concerns. (#[phone])        Taylor Felix RN      OBJECTIVE:  Recent labs: (last 7 days)     21  1517   INR 2.50*         No question data found.  Anticoagulation Summary  As of 2021    INR goal:  2.0-3.0   TTR:  97.1 % (11.8 mo)   INR used for dosin.50 (2021)   Warfarin maintenance plan:  1.25 mg (2.5 mg x 0.5) every Fri; 2.5 mg (2.5 mg x 1) all other days   Full warfarin instructions:  1.25 mg every Fri; 2.5 mg all other days   Weekly warfarin total:  16.25 mg   No change documented:  Taylor Felix RN   Plan last modified:  Susan Beyer RN (2018)   Next INR check:  2021   Priority:  Maintenance   Target end date:  10/4/2018    Indications    Paroxysmal atrial fibrillation (H) [I48.0]  Atrial fibrillation with rapid ventricular response (H) (Resolved) [I48.91]  Long-term (current) use of anticoagulants  [Z79.01] [Z79.01]  Chronic atrial fibrillation (H) [I48.20]  Atrial fibrillation with rapid ventricular response (H) [I48.91]             Anticoagulation Episode Summary     INR check location:      Preferred lab:      Send INR reminders to:  [facility]    Comments:  * anticoagulation short period surrounding ablation on 12/21/18. Cardiology to decide when to stop warfarin. has well-compensated cirrhosis      Anticoagulation Care Providers     Provider Role Specialty Phone number    Alon Pineda MD Referring Family Medicine [phone]

## 2021-05-28 NOTE — TELEPHONE ENCOUNTER
Patient returned call. Reviewed episodes with patient. Per patient did not have any symptoms at time episodes logged. Reviewed with patient recommendations from Dr. Evans. Patient stated he will call [place] to get scheduled for recommendations echocardiogram. Asked that patient call with any further questions or concerns.

## 2021-06-28 NOTE — TELEPHONE ENCOUNTER
Endoscopy referral placed.  Left voicemail letthing him know someone from endoscopy scheduling will call to get him scheduled.    Amira VU LPN  Hepatology Clinic       [facility]    Phone Message    May a detailed message be left on voicemail: yes     Reason for Call: Order(s): Other:   Reason for requested: Endoscopy   Date needed: asap   Provider name: Dr. Roque    Patient calling to get orders for an endoscopy from Dr. Roque. States that he would like to get this in before the end of the year.     Please advise and call patient back once orders have been placed     Action Taken: Other:  HEPATOLOGY    Travel Screening: Not Applicable

## 2021-07-01 NOTE — TELEPHONE ENCOUNTER
Left message for pt to call back to discuss his refill requests and to discuss lab and f/u needed. Nick

## 2021-07-02 NOTE — TELEPHONE ENCOUNTER
Spoke to pt who states that he was needing a refill of there Torsemide and Potassium.  Escript sent.  Pt will be having is echo today and pt will have BMP drawn at the time of his INR.  Pt will then f/u with Nadege on 8/24 at 1415.  Pt preferred [place]. Nick

## 2021-07-02 NOTE — PROGRESS NOTES
ANTICOAGULATION MANAGEMENT     Maurice Jon 60 year old male is on warfarin with therapeutic INR result. (Goal INR 2.0-3.0)    Recent labs: (last 7 days)     07/02/21  1130   INR 3.00*       ASSESSMENT     Source(s): Patient/Caregiver Call       Warfarin doses taken: Warfarin taken as instructed    Diet: No new diet changes identified    New illness, injury, or hospitalization: No    Medication/supplement changes: None noted    Signs or symptoms of bleeding or clotting: No    Previous INR: Therapeutic last 2(+) visits    Additional findings: Only changes noted is feeing tired, has an echo today     PLAN     Recommended plan for no diet, medication or health factor changes affecting INR     Dosing Instructions: Continue your current warfarin dose with next INR in 6 weeks       Summary  As of 7/2/2021    Full warfarin instructions:  1.25 mg every Fri; 2.5 mg all other days   Next INR check:  8/13/2021             Telephone call with Maurice whom verbalizes understanding and agrees to plan    Lab visit scheduled    Education provided: Contact [phone]  with any changes, questions or concerns.     Plan made per [facility] anticoagulation protocol    Deisy Reza RN  Anticoagulation Clinic  7/2/2021    _______________________________________________________________________     Anticoagulation Episode Summary     Current INR goal:  2.0-3.0   TTR:  97.1 % (11.8 mo)   Target end date:  10/4/2018   Send INR reminders to:  [facility]    Indications    Paroxysmal atrial fibrillation (H) [I48.0]  Atrial fibrillation with rapid ventricular response (H) (Resolved) [I48.91]  Long-term (current) use of anticoagulants [Z79.01] [Z79.01]  Chronic atrial fibrillation (H) [I48.20]  Atrial fibrillation with rapid ventricular response (H) [I48.91]           Comments:  * anticoagulation short period surrounding ablation on 12/21/18. Cardiology to decide when to stop warfarin. has well-compensated cirrhosis          Anticoagulation Care Providers     Provider Role Specialty Phone number    Alon Pineda MD Referring Family Medicine [phone]

## 2021-07-06 NOTE — TELEPHONE ENCOUNTER
I also just saw this from Dr. Evans with results of echo:    Kristofer Evans MD [facility] Heart Afib Nurse    Cc: Virginia Montilla, PAAnnC             Please inform pt of normal LVEF but given long run of VT recommending a nuclear stress test - exercise if able to otherwise lexican. Last angio normal 3 years ago. If no CAD, we can start Flecainide 100 mg bid for vt. Thanks, qp

## 2021-07-06 NOTE — TELEPHONE ENCOUNTER
Kai - Samantha make sure he's taking the metoprolol XL 25 mg daily when he calls back with any sxs and we'll let Dr. Evans know.    Ksate

## 2021-07-06 NOTE — TELEPHONE ENCOUNTER
JASON Light, your patient had a longer documentation of NSVT on tooday's remote PPM check. Episode occurred on 5/20/21 at 19:46pm lasting 63+ beats (23+ seconds), rates 120-190bpm. EGM was cut off after 23 seconds of NSVT so episode could have lasted longer. EF is 55-60% (7/2021). Left message with patient asking about specific symptoms associated with episode on 5/20/21. You are to see the patient 7/20/21 in [place]. Any changes?            [employer]  CRT-P Remote PPM Device Check    BiVP: 95 %, persistent afib s/p AVNA taking warfarin    Mode: VVIR 70/130    Presenting Rhythm: BiVP    Heart Rate: adequate rates per histograms    Sensing: stable    Pacing Threshold: stable in right ventricle, not recorded in left ventricle     Impedance: stable     Battery Status: 9 years remaining     Atrial Arrhythmia: n/a    Ventricular Arrhythmia: 13 ventricular high rates logged. 7 EGMs for review show irregular VS events suggestive of NSVT (AVNA) lasting 5-63, rates 170-310bpm. EF 55-60% (7/2021). Patient has appointment with LOUISA Bradley 7/20/21, will notify Nadege Montilla PA-C of longer NSVT episode. Left messaged with patient in regards to 23+ seconds NSVT episode asking to call back to discuss symptoms.  Care Plan: Annual threshold in clinic f/u q 3 months. LM with results. GLENDY Farooq      I have reviewed and interpreted the device interrogation, settings, programming and nurse's summary. The device is functioning within normal device parameters. I agree with the current findings, assessment and plan.

## 2021-07-06 NOTE — PROGRESS NOTES
[employer]  CRT-P Remote PPM Device Check    BiVP: 95 %, persistent afib s/p AVNA taking warfarin    Mode: VVIR 70/130    Presenting Rhythm: BiVP    Heart Rate: adequate rates per histograms    Sensing: stable    Pacing Threshold: stable in right ventricle, not recorded in left ventricle     Impedance: stable     Battery Status: 9 years remaining     Atrial Arrhythmia: n/a    Ventricular Arrhythmia: 13 ventricular high rates logged. 7 EGMs for review show irregular VS events suggestive of NSVT (AVNA) lasting 5-63, rates 170-310bpm. EF 55-60% (7/2021). Patient has appointment with IRMA Bradley 7/20/21, will notify Nadege Montilla of longer NSVT episode. Left messaged with patient in regards to 23+ seconds NSVT episode asking to call back to discuss symptoms.  Care Plan: Annual threshold in clinic f/u q 3 months. LM with results. GLENDY Farooq      I have reviewed and interpreted the device interrogation, settings, programming and nurse's summary. The device is functioning within normal device parameters. I agree with the current findings, assessment and plan.

## 2021-07-07 NOTE — TELEPHONE ENCOUNTER
"Pt called back. It is likely he fell asleep in the chair during the 23+ NSVT episode without CPAP, but had no symptoms. He has not been taking metoprolol due to feeling \"foggy.\" Encouraged pt to discuss this with LOUISA Bradley during 7/20/21 appointment. He agreed to stress test and would like to get that scheduled in [place] if possible. Please see Dr. Evans's note below regarding exercise stress test.     VASHTI JoT  "

## 2021-07-07 NOTE — TELEPHONE ENCOUNTER
Spoke to pt who is aware that Dr Evans is requesting a nuclear stress test d/t the longer episode of NSVT on his remote on 7/6. Pt states that he is able to walk, thus a nuclear stress test has been ordered. Pt would like to have done at [place].  Pt is awaiting a phone call, that is very important, so this writer will call to see when pt could have this completed.  Pt would like to have done on the same day that he see's Nadege, which is 7/20.  Spoke to scheduling and testing is only done on Monday and Wednesday. Pt has been scheduled on 7/19 at 1230. Pt will hold his Metoprolol prior for 24 hrs. Pt made aware of date and time and that the test will take 4 hours. Pt states understanding and will call if anything comes up and this needs to be changed. Nick

## 2021-07-16 NOTE — TELEPHONE ENCOUNTER
Screening Questions  1. Are you active on mychart? Yes     2. What insurance is in the chart? [employer]     2.  Ordering/Referring Provider: Hi Roque MD    3. BMI 39.0    4. Are you on daily home oxygen? N    5. Do you have a history of difficult airway? N    6. Have you had a heart, lung, or liver transplant? N    7. Are you currently on dialysis? N    8. Have you had a stroke or Transient ischemic atttack (TIA) within 6 months? N    9. In the past 6 months, have you had any heart related issues including cardiomyopathy or heart attack?         If yes, did it require cardiac stenting or other implantable device?N    10. Do you have any implantable devices in your body (pacemaker, defib, LVAD)? Y - 2019    11. Do you take nitroglycerin? If yes, how often? N    12. Are you currently taking any blood thinners? Y - JANTOVEN    13. Are you a diabetic? N    14. (Females) Are you currently pregnant?   If yes, how many weeks?    15. Have you had a procedure in the past that was difficult to tolerate with conscious sedation? Any allergies to Fentanyl or Versed N    16. Are you taking any scheduled prescription narcotics more than once daily? N    17. Do you have any chemical dependencies such as alcohol, street drugs, or methadone? N    18. Do you have any history of post-traumatic stress syndrome or mental health issues? N    19. Do you transfer independently? Y    20.  Do you have any issues with constipation? N    21. Preferred Pharmacy for Pre Prescription On Chart/ [facility]     Scheduling Details    Colonoscopy Prep Sent?: N/A   Procedure Scheduled: EGD   Provider/Surgeon: Armando   Date of Procedure: 08/13/2021  Location: [facility]  Caller (Please ask for phone number if not scheduled by patient): Yomi Jon/Self      Sedation Type: CS  Conscious Sedation- Needs  for 6 hours after the procedure  MAC/General-Needs  for 24 hours after procedure    Pre-op Required at [facility], [place],  [place] and OR for MAC sedation:   (if yes advise patient they will need a pre-op prior to procedure)      Is patient on blood thinners? -N (If yes- inform patient to follow up with PCP or provider for follow up instructions)     Informed patient they will need an adult  Y  Cannot take any type of public or medical transportation alone    Informed Patient of COVID Test Requirement Y    Confirmed Nurse will call to complete assessment Y    Additional comments: N

## 2021-07-17 NOTE — ED NOTES
Patient arrived by EMS in full v-fib arrest. Justin in use. EMS reports the patient was found down at work. EMS report they arrived one minute after his respirations stopped, CPR was in process. The patient arrived to the ED justin in process, intubated and an IO present to the LLE. EMS report shocking and epinephrine rounding x 4, along with amidarone and bicarb. Dr. Cheung present when the patient arrived in the ED, time of death 1253.  Laurie Charles, charge nurse called the wife, will await her arrival.

## 2021-07-17 NOTE — PROGRESS NOTES
"SPIRITUAL HEALTH SERVICES  [facility] - ED    Referral Source: Staff and Family request    - Yomi came to ED by EMS in Cardiac Arrest.  He did not survive.  I was paged and arrived an hour later to provide support for wife, Violet, and daughter, Kayce.    - I provided empathetic listening and reflection around Yomi' life.    - I provided end-of-life reading and prayer.  Both wife and daughter expressed multiple times how much they appreciated the support of the hospital's \"staff .\"    Plan: No further follow up.    Barry Beyer M.A., [facility]  Lead   [facility]  Office: [phone]    "

## 2021-07-18 NOTE — ED NOTES
Madhu notified that the patient was not a candidate for donation of tissue or eyes. Eder ([phone])will come as soon as he is available.

## 2021-07-18 NOTE — ED PROVIDER NOTES
HPI   Patient is a 60-year-old male presenting with cardiac arrest.  Went down at his place of work.  He was found to be without a pulse.  Approximately 5 minutes later EMS arrived and cardiac compressions were in process.  Formal compressions started with the Justin machine.  Patient was found to have ventricular fibrillation.  He was defibrillated 6 times and given 4 rounds of epinephrine.  He was given amiodarone 300 mg and then 150 mg.  The patient was brought in to the ER for further cares.        Allergies:  Allergies   Allergen Reactions     Avelox Other (See Comments) and GI Disturbance     portal hypertension     Moxifloxacin Nausea and Vomiting, Nausea and Other (See Comments)     portal hypertension     Sotalol Itching     Problem List:    Patient Active Problem List    Diagnosis Date Noted     Health Care Home 07/01/2011     Priority: High     01/07/2014  Status:  Declined  Care Coordinator:  Salena Joel    See Letters for [facility] Care Plan (emergency care plan only)             Portal hypertension (H) 01/28/2010     Priority: High     Liver Cirrhosis 2nd ot Fatty liver, w Ascites 08/22/2005     Priority: High     Cirrhosis, idiopathic         COPD (chronic obstructive pulmonary disease) (H) 03/30/2021     Priority: Medium     Cardiac pacemaker in situ 03/21/2021     Priority: Medium     Elevated INR 03/21/2021     Priority: Medium     Increased ammonia level 03/21/2021     Priority: Medium     Encephalopathy 03/20/2021     Priority: Medium     Chronic atrial fibrillation (H) 09/16/2020     Priority: Medium     Atrial fibrillation with rapid ventricular response (H) 09/16/2020     Priority: Medium     Atypical chest pain 12/20/2019     Priority: Medium     Chest pain 12/19/2019     Priority: Medium     Persistent atrial fibrillation (H) 11/13/2019     Priority: Medium     Added automatically from request for surgery [id number]       Cellulitis 10/06/2019     Priority: Medium     Acute combined systolic  and diastolic (congestive) hrt fail (H) 01/04/2019     Priority: Medium     CAD (coronary artery disease)      Priority: Medium     Cath 12/26/18- mild nonobstructive disease       Cardiomyopathy (H)      Priority: Medium     12/23/18 Echo- EF 25-30%       Pericarditis      Priority: Medium     Acute on chronic systolic congestive heart failure (H)      Priority: Medium     Obesity (BMI 35.0-39.9) with comorbidity (H) 12/28/2018     Priority: Medium     Right heart failure (H) 12/24/2018     Priority: Medium     Chronic a-fib, S/P Ablation 12/22/18 -- on Warfarin 12/21/2018     Priority: Medium     Encounter for monitoring sotalol therapy 10/31/2018     Priority: Medium     Long-term (current) use of anticoagulants [Z79.01] 09/18/2018     Priority: Medium     Portal vein thrombosis 02/02/2017     Priority: Medium     History of MRSA now culture negative 08/06/2015     Priority: Medium     Severe sepsis (H) 07/13/2015     Priority: Medium     Problem list name updated by automated process. Provider to review       Paroxysmal atrial fibrillation (H)      Priority: Medium     S/p cardioversion       Edema 05/13/2013     Priority: Medium     CARDIOVASCULAR SCREENING; LDL GOAL LESS THAN 160 10/31/2010     Priority: Medium     GERD (gastroesophageal reflux disease) 03/25/2010     Priority: Medium     Eczema 01/28/2010     Priority: Medium     BRUCE-Mild (AHI 9; REM RDI 31) 03/20/2009     Priority: Medium     Sleep study 3/09- .0 minutes, latency 3.6 minutes. REM latency 72.0 minutes. Sleep efficiency 87.7%. The sleep architecture was disrupted with frequent sleep stage changes and arousals. Snoring: mild to loud.  RDI 27.7, AHI of 8.4. REM RDI 31.5 TLO2 saturation 83.0%. This study is suggestive of mild sleep apnea, severe during REM sleep (20% TST). Other:  PLM index was 0.0.       Compulsive behaviors 08/26/2008     Priority: Medium     erectile dysfunction 08/22/2005     Priority: Medium     Obesity 11/24/2010      Priority: Low     Thrombocytopenia (H) 08/22/2005     Priority: Low     Thrombocytopenia associated with cirrhosis and portal hypertension  Problem list name updated by automated process. Provider to review        Past Medical History:    Past Medical History:   Diagnosis Date     A-fib (H)      Acute pulmonary edema (H)      Atrial fibrillation (H)      Atrial fibrillation with rapid ventricular response (H) 5/13/2013     CAD (coronary artery disease)      Calculus of ureter 1993, 1997     Cardiomyopathy (H)      Chronic systolic CHF (congestive heart failure) (H)      Cirrhosis of liver without mention of alcohol 8/22/2005     Edema 5/13/2013     Esophageal varices with bleeding(456.0)      Gallstones      Genital herpes, unspecified 3/20/1999     GERD (gastroesophageal reflux disease)      Hematuria      Hypertension      Hypochondriasis      Obesity 11/24/2010     BRUCE (obstructive sleep apnea) 03/17/2009     Pericarditis      Portal hypertension (H) 1/28/2010     Unspecified thrombocytopenia 8/22/2005     Past Surgical History:    Past Surgical History:   Procedure Laterality Date     ANESTHESIA CARDIOVERSION N/A 1/19/2015    Procedure: ANESTHESIA CARDIOVERSION;  Surgeon: Generic Anesthesia Provider;  Location: [facility] OR     ANESTHESIA CARDIOVERSION N/A 2/6/2019    Procedure: ANESTHESIA CARDIOVERSION;  Surgeon: GENERIC ANESTHESIA PROVIDER;  Location:  OR     ANESTHESIA CARDIOVERSION N/A 7/5/2019    Procedure: ANESTHESIA FOR CARDIOVERSION  DR. MURGUIA);  Surgeon: GENERIC ANESTHESIA PROVIDER;  Location:  OR     COLONOSCOPY N/A 8/14/2017    Procedure: COLONOSCOPY;  Colonoscopy Called will arrive appx 12:30 Per phone call;  Surgeon: Vincent Whittington MD;  Location:  GI     CV HEART CATHETERIZATION WITH POSSIBLE INTERVENTION N/A 12/26/2018    Procedure: Heart Catheterization with possible Intervention;  Surgeon: Brandon Arroyo MD;  Location:  HEART CARDIAC CATH LAB     EP ABLATION AV NODE N/A 11/15/2019     Procedure: EP Ablation AV Node;  Surgeon: Ag Maloney MD;  Location:  HEART CARDIAC CATH LAB     EP ABLATION FOCAL AFIB N/A 12/21/2018    Procedure: EP Ablation Focal AFIB;  Surgeon: Kristofer Evans MD;  Location:  HEART CARDIAC CATH LAB     EP PACEMAKER N/A 11/15/2019    Procedure: EP PACEMAKER;  Surgeon: Ag Maloney MD;  Location: [facility] CARDIAC CATH LAB     ESOPHAGOSCOPY, GASTROSCOPY, DUODENOSCOPY (EGD), COMBINED  7/11/2011    Procedure:COMBINED ESOPHAGOSCOPY, GASTROSCOPY, DUODENOSCOPY (EGD); Surgeon:LAURA LAMAS; Location:[facility] GI     ESOPHAGOSCOPY, GASTROSCOPY, DUODENOSCOPY (EGD), COMBINED  9/10/2012    Procedure: COMBINED ESOPHAGOSCOPY, GASTROSCOPY, DUODENOSCOPY (EGD);;  Surgeon: Hi Roque MD;  Location:  GI     ESOPHAGOSCOPY, GASTROSCOPY, DUODENOSCOPY (EGD), COMBINED  9/9/2013    Procedure: COMBINED ESOPHAGOSCOPY, GASTROSCOPY, DUODENOSCOPY (EGD);;  Surgeon: Hi Roque MD;  Location:  GI     ESOPHAGOSCOPY, GASTROSCOPY, DUODENOSCOPY (EGD), COMBINED N/A 9/15/2014    Procedure: COMBINED ESOPHAGOSCOPY, GASTROSCOPY, DUODENOSCOPY (EGD);  Surgeon: Hi Roque MD;  Location:  GI     ESOPHAGOSCOPY, GASTROSCOPY, DUODENOSCOPY (EGD), COMBINED N/A 10/30/2015    Procedure: COMBINED ESOPHAGOSCOPY, GASTROSCOPY, DUODENOSCOPY (EGD);  Surgeon: Feliberto Fox MD;  Location:  GI     ESOPHAGOSCOPY, GASTROSCOPY, DUODENOSCOPY (EGD), COMBINED N/A 10/10/2016    Procedure: COMBINED ESOPHAGOSCOPY, GASTROSCOPY, DUODENOSCOPY (EGD);  Surgeon: Jose Nesbitt MD;  Location:  GI     ESOPHAGOSCOPY, GASTROSCOPY, DUODENOSCOPY (EGD), COMBINED N/A 9/26/2019    Procedure: ESOPHAGOGASTRODUODENOSCOPY (EGD);  Surgeon: Timo Soares MD;  Location: [facility] GI     H ABLATION FOCAL AFIB  12/21/2018     HERNIA REPAIR       IRRIGATION AND DEBRIDEMENT ABSCESS SCROTUM, COMBINED  10/4/2012    Procedure: COMBINED IRRIGATION AND DEBRIDEMENT ABSCESS SCROTUM;  Irrigation and Debridement of Groin  Abscess;  Surgeon: Benny Shoemaker MD;  Location: [facility] OR     SOFT TISSUE SURGERY       SURGICAL HISTORY OF -       rt elbow     SURGICAL HISTORY OF -   1/15/98    repair of ventral and umbilical hernia     SURGICAL HISTORY OF -       endoscopy     Family History:    Family History   Problem Relation Age of Onset     Lipids Mother      Hypertension Mother      Eye Disorder Mother      Heart Disease Father         CHF     Lipids Father      Obesity Father      Heart Disease Brother         MI     Eye Disorder Brother      Hypertension Brother         portal HTN     Liver Disease Brother         Not related to alcohol     Thrombosis Sister         in her lung     Eye Disorder Sister      Cancer Other         maternal grandparent/throat cancer     Social History:  Marital Status:   [2]  Social History     Tobacco Use     Smoking status: Former Smoker     Packs/day: 0.80     Years: 6.00     Pack years: 4.80     Types: Cigarettes     Quit date: 2002     Years since quittin.5     Smokeless tobacco: Never Used   Substance Use Topics     Alcohol use: No     Alcohol/week: 0.0 standard drinks     Comment: quit in      Drug use: No      Medications:    ACE/ARB/ARNI NOT PRESCRIBED (INTENTIONAL)  Acetaminophen 325 MG CAPS  albuterol (PROAIR RESPICLICK) 108 (90 Base) MCG/ACT inhaler  Ascorbic Acid (VITAMIN C PO)  ASPIRIN NOT PRESCRIBED (INTENTIONAL)  calcium carb 1250 mg, 500 mg Qawalangin,/vitamin D 200 units (OSCAL WITH D) 500-200 MG-UNIT per tablet  diphenhydrAMINE HCl (BENADRYL PO)  lactulose (CHRONULAC) 10 GM/15ML solution  metoprolol succinate ER (TOPROL XL) 25 MG 24 hr tablet  Multiple Vitamins-Minerals (MENS 50+ MULTI VITAMIN/MIN PO)  order for DME  order for DME  ORDER FOR DME  oxyCODONE (ROXICODONE) 5 MG tablet  pantoprazole (PROTONIX) 40 MG EC tablet  potassium chloride ER (K-TAB) 20 MEQ CR tablet  spironolactone (ALDACTONE) 25 MG tablet  STATIN NOT PRESCRIBED (INTENTIONAL)  torsemide  (DEMADEX) 20 MG tablet  triamcinolone (KENALOG) 0.1 % external cream  vitamin D3 (CHOLECALCIFEROL) 2000 units (50 mcg) tablet  warfarin ANTICOAGULANT (JANTOVEN ANTICOAGULANT) 2.5 MG tablet      Review of Systems   Unable to perform ROS: Patient unresponsive       PE      Physical Exam  Constitutional:       Appearance: He is obese.   HENT:      Head: Normocephalic and atraumatic.      Right Ear: External ear normal.      Left Ear: External ear normal.      Mouth/Throat:      Mouth: Mucous membranes are dry.   Eyes:      Comments: Pupils are equal, dilated, and fixed.   Cardiovascular:      Comments: Without a pulse.  This is checked centrally and peripherally.  Pulmonary:      Comments: No respiratory effort.  No breath sounds.  Abdominal:      Palpations: Abdomen is soft.   Musculoskeletal:      Comments: No extremity movement.   Skin:     Comments: Cold.   Neurological:      Comments: Unresponsive.         ED COURSE and MDM   Patient in cardiac arrest on arrival.  No additional medications were given.  No additional shocks were given.  The patient had been without a pulse for 50 minutes.  Bedside ultrasound performed and no cardiac activity was identified.    LABS  Labs Ordered and Resulted from Time of ED Arrival Up to the Time of Departure from the ED - No data to display    IMAGING  Images reviewed by me.  Radiology report also reviewed.  No orders to display       Procedures    Medications - No data to display      IMPRESSION       ICD-10-CM    1. Cardiac arrest (H)  I46.9             Medication List      There are no discharge medications for this visit.                       Jaiden Cheung MD  07/17/21 1951

## 2021-07-19 ENCOUNTER — DOCUMENTATION ONLY (OUTPATIENT)
Dept: FAMILY MEDICINE | Facility: CLINIC | Age: 61
End: 2021-07-19

## 2021-07-19 DIAGNOSIS — I48.0 PAROXYSMAL ATRIAL FIBRILLATION (H): Primary | ICD-10-CM

## 2021-07-19 DIAGNOSIS — I48.91 ATRIAL FIBRILLATION WITH RAPID VENTRICULAR RESPONSE (H): ICD-10-CM

## 2021-07-19 DIAGNOSIS — Z79.01 LONG TERM CURRENT USE OF ANTICOAGULANT THERAPY: ICD-10-CM

## 2021-07-19 DIAGNOSIS — I48.20 CHRONIC ATRIAL FIBRILLATION (H): ICD-10-CM

## 2021-07-19 NOTE — PROGRESS NOTES
ANTICOAGULATION  MANAGEMENT    Maurice Jon is being discharged from the [facility] Anticoagulation Management Program ([facility]).    Reason for discharge:     Anticoagulation episode resolved, ACC referral closed and INR Standing order discontinued    Deisy Reza RN

## 2021-07-22 VITALS — BODY MASS INDEX: 37.61 KG/M2 | HEIGHT: 66 IN | WEIGHT: 234 LBS

## 2021-07-22 NOTE — DISCHARGE SUMMARY
Physician Discharge Summary    Primary Care Physician:  Steven Barnhart MD    Discharge Provider: Avtar Reyez     Patient Name: Marily Navarro  Patient YOB: 1960  Patient MRN: [id number]    Admission Date: 5/18/2018.   Admission Diagnoses: Chest pain [R07.9]  Discharge Date and Time: 5/19/2018 3:14 PM   Disposition: Home  Condition at Discharge: Good  Code Status: Full Code     Principal Diagnosis:  Chest pain    Discharge Diagnoses:    Principal Problem:     Chest wall pain     Hypomagnesemia [E83.42]     Splenic vein thrombosis [I82.890]     Atrial fibrillation with rapid ventricular response [I48.91]      Pulmonary nodules, incidental     Esophogeal varices     Portal hypertension     Cirrhosis    Consult/s: none   Significant Diagnostic Studies:  Admission Labs:    Results for MARILY NAVARRO (MRN [id number]) as of 5/19/2018 15:14   Ref. Range 5/18/2018 18:32   Sodium Latest Ref Range: 136 - 145 mmol/L 140   Potassium Latest Ref Range: 3.5 - 5.0 mmol/L 4.4   Chloride Latest Ref Range: 98 - 107 mmol/L 108 (H)   CO2 Latest Ref Range: 22 - 31 mmol/L 23   Anion Gap, Calculation Latest Ref Range: 5 - 18 mmol/L 9   BUN Latest Ref Range: 8 - 22 mg/dL 16   Creatinine Latest Ref Range: 0.70 - 1.30 mg/dL 0.68 (L)   GFR MDRD Af Amer Latest Ref Range: >60 mL/min/1.73m2 >60   GFR MDRD Non Af Amer Latest Ref Range: >60 mL/min/1.73m2 >60   Calcium Latest Ref Range: 8.5 - 10.5 mg/dL 8.9   AST Latest Ref Range: 0 - 40 U/L 87 (H)   ALT Latest Ref Range: 0 - 45 U/L 64 (H)   ALBUMIN Latest Ref Range: 3.5 - 5.0 g/dL 3.3 (L)   Protein, Total Latest Ref Range: 6.0 - 8.0 g/dL 6.1     Results for MARILY NAVARRO (MRN [id number]) as of 5/19/2018 15:14   Ref. Range 5/18/2018 18:32   Alkaline Phosphatase Latest Ref Range: 45 - 120 U/L 72   Bilirubin, Total Latest Ref Range: 0.0 - 1.0 mg/dL 2.4 (H)   Bilirubin, Direct Latest Ref Range: <=0.5 mg/dL 0.9 (H)   Magnesium Latest Ref Range: 1.8 - 2.6 mg/dL 1.6 (L)      Results for MARILY NAVARRO (MRN [id number]) as of 5/19/2018 15:14   Ref. Range 5/18/2018 18:32   Glucose Latest Ref Range: 70 - 125 mg/dL 87   Lipase Latest Ref Range: 0 - 52 U/L 27     Results for MARILY NAVARRO (MRN [id number]) as of 5/19/2018 15:14   Ref. Range 5/18/2018 18:32   WBC Latest Ref Range: 4.0 - 11.0 thou/uL 4.9   RBC Latest Ref Range: 4.40 - 6.20 mill/uL 3.98 (L)   Hemoglobin Latest Ref Range: 14.0 - 18.0 g/dL 14.2   Hematocrit Latest Ref Range: 40.0 - 54.0 % 40.3   MCV Latest Ref Range: 80 - 100 fL 101 (H)   MCH Latest Ref Range: 27.0 - 34.0 pg 35.7 (H)   MCHC Latest Ref Range: 32.0 - 36.0 g/dL 35.2   RDW Latest Ref Range: 11.0 - 14.5 % 13.4   Platelets Latest Ref Range: 140 - 440 thou/uL 54 (L)   MPV Latest Ref Range: 8.5 - 12.5 fL 11.6   Neutrophils % Latest Ref Range: 50 - 70 % 68   Lymphocytes % Latest Ref Range: 20 - 40 % 15 (L)   Monocytes % Latest Ref Range: 2 - 10 % 15 (H)   Eosinophils % Latest Ref Range: 0 - 6 % 3   Basophils % Latest Ref Range: 0 - 2 % 0   Neutrophils Absolute Latest Ref Range: 2.0 - 7.7 thou/uL 3.3   Lymphocytes Absolute Latest Ref Range: 0.8 - 4.4 thou/uL 0.7 (L)   Monocytes Absolute Latest Ref Range: 0.0 - 0.9 thou/uL 0.8   Eosinophils Absolute Latest Ref Range: 0.0 - 0.4 thou/uL 0.1   Basophils Absolute Latest Ref Range: 0.0 - 0.2 thou/uL 0.0     Results for MARILY NAVARRO (MRN [id number]) as of 5/19/2018 15:14   Ref. Range 5/19/2018 07:26   Magnesium Latest Ref Range: 1.8 - 2.6 mg/dL 1.9   Triglycerides Latest Ref Range: <=149 mg/dL 63   HDL Cholesterol Latest Ref Range: >=40 mg/dL 42   LDL Calculated Latest Ref Range: <=129 mg/dL 50     Troponin: 0.02 --> <0.01 --> 0.01    CTA CHEST PE RUN  5/18/2018 7:17 PM     INDICATION: Chest pain, atrial fib, dyspnea chest pain, atrial fib, dyspnea  TECHNIQUE: Helical acquisition through the chest was performed during the arterial phase of contrast enhancement using IV contrast. 2D and 3D reconstructions were  performed by the CT technologist. Dose reduction techniques were used.   IV CONTRAST: Iohexol (Omni) 100 mL  COMPARISON: None.     FINDINGS:  ANGIOGRAM CHEST: No definitive pulmonary artery embolus. Normal caliber pulmonary arteries. Nonaneurysmal aorta without dissection.     LUNGS AND PLEURA: Mild motion artifact. Minimal emphysema. Few patchy opacities, likely atelectasis or scarring. Small pulmonary nodules, many are calcified granulomas. Some are noncalcified, including 2 x 4 mm right lower lobe nodule (series 2, image   59) and elongated 3 mm left lower lobe nodule (image 63). Negative pleural spaces.     MEDIASTINUM: No adenopathy. Scattered mediastinal and perihilar calcified nodes. Mild cardiomegaly with right heart enlargement. Marked distal paraesophageal varices.     LIMITED UPPER ABDOMEN: Cirrhotic liver morphology. Splenomegaly. Enlarged caliber IVC. Large collateral varices. Although incompletely seen, calcification presumably along the course of the splenic vein and near the portal vein confluence. Probable 9 mm   right hepatic cyst. Cholelithiasis.     MUSCULOSKELETAL: Scoliosis of the right. No definitive rib fractures.     IMPRESSION:   CONCLUSION:     1.  No definitive pulmonary artery embolus. Some arteries are mildly compromised from motion artifact.     2.  Normal caliber aorta without dissection.     3.  Hepatic cirrhosis with portal hypertension. Severe distal paraesophageal varices and partially seen upper abdominal varices. Splenomegaly.     4.  Although incompletely seen, suspect chronic thrombus along the splenic vein and near the portal vein confluence. Dedicated imaging could be performed for confirmation.     5.  Multiple small pulmonary nodules are indeterminate at this time. If no history of malignancy, 12 month follow-up could be performed. Otherwise, sooner follow-up could be performed.     6.  Evidence of old granulomatous disease.    US ABDOMEN LIMITED  5/19/2018 12:24  PM     INDICATION: Looking for ascities  COMPARISON: None.     FINDINGS:  No ascites is seen     IMPRESSION:   CONCLUSION:  1.  No ascites is seen    Discharge Labs:  Results for orders placed or performed during the hospital encounter of 05/18/18   Basic Metabolic Panel   Result Value Ref Range    Sodium 137 136 - 145 mmol/L    Potassium 4.1 3.5 - 5.0 mmol/L    Chloride 107 98 - 107 mmol/L    CO2 24 22 - 31 mmol/L    Anion Gap, Calculation 6 5 - 18 mmol/L    Glucose 93 70 - 125 mg/dL    Calcium 8.0 (L) 8.5 - 10.5 mg/dL    BUN 13 8 - 22 mg/dL    Creatinine 0.67 (L) 0.70 - 1.30 mg/dL    GFR MDRD Af Amer >60 >60 mL/min/1.73m2    GFR MDRD Non Af Amer >60 >60 mL/min/1.73m2     Lab Results   Component Value Date    WBC 4.9 05/18/2018    HGB 14.2 05/18/2018    HCT 40.3 05/18/2018     (H) 05/18/2018    PLT 54 (L) 05/18/2018     Surgery: none  Treatments: Monitoring on tele    Discharge Medications:      Medication List      CONTINUE taking these medications          albuterol 90 mcg/actuation inhaler   Dose:  2 puff   Commonly known as:  PROAIR HFA;PROVENTIL HFA;VENTOLIN HFA   2 puffs, Inhalation, Q6H PRN       DULERA 200-5 mcg/actuation Hfaa inhaler   Dose:  2 puff   Generic drug:  mometasone-formoterol   2 puffs, Inhalation, BID       furosemide 40 MG tablet   Dose:  40 mg   Commonly known as:  LASIX   40 mg, Oral, DAILY       * metoprolol succinate 25 MG   Dose:  25 mg   Commonly known as:  TOPROL-XL   25 mg, Oral, QAM, (In addition to 100mg at bedtime)       * metoprolol succinate 100 MG 24 hr tablet   Dose:  100 mg   Commonly known as:  TOPROL-XL   100 mg, Oral, QHS, (in addition to 25mg in the morning)       pantoprazole 40 MG tablet   Dose:  40 mg   Commonly known as:  PROTONIX   40 mg, Oral, DAILY       spironolactone 25 MG tablet   Dose:  25 mg   Commonly known as:  ALDACTONE   25 mg, Oral, DAILY       * Notice:  This list has 2 medication(s) that are the same as other medications prescribed for you. Read  the directions carefully, and ask your doctor or other care provider to review them with you.        Discharge Instructions:  Follow up appointment with Primary Care Physician: Steven Barnhart MD within 1 week  Follow up appointment with Specialist:    - Cardiology during already scheduled June 5th appointment (atrial fibrillation/anticoatulation management and possible need for further stress testing/etc)   - Hepatology/GI to discuss varices and liver disease.  Follow up test / procedure to do as outpatient:    -Likely procedure for banding of varices by GI   -CT scan of chest within 12 months to monitor pulmonary nodules.  Diet: regular diet  Activity: activity as tolerated  Restrictions: None  Wound / drain care:none needed  The following tests have been done with results pending: none     Hospital Summary:   Maurice Jon is a 57 y.o. old male with a PMH of atrial fibrillation not on anticoagulation, portal HTN, and cirrhosis likely due to PATEL who presents to the [facility] ED on 5/18/18 for evaluation of 2 week onset of constant left sided chest pain reproducible on exam to palpation.     Chest wall pain: Likely pain is MSK in origin given reproducibility with palpation and negative troponin x 3 here. Tele monitoring was unremarkable. Additionally, no rash note for Zoster. PE run was negative for pneumonia, dissection, or pulmonary embolism. Additionally, abdominal ultrasound was done looking for ascites which was unremarkable. Patient stated he has follow-up with cardiology already scheduled and consider further work-up with stress test at that time. Additionally was recommended to take tylenol for pain, but avoid NSAIDs given varices. He can also try OTC lidocaine gel/patches and this was discussed with patient.    Atrial fibrillation with rapid ventricular response: Does follow with cardiology once per year, does not recall last visit but has one scheduled for June 2018. Rate of 110-118 on admission  improved with metoprolol.     Splenic vein thrombosis: Incidental finding on CT PE run noted to be likely chronic. Patient is not anticoagulated and has thrombocytopenia. Per review of literature nothing to do acutely.    Esophogeal Varices/PATEL/Cirrohsis: Hx of non-alcoholic fatty liver disease. Noted varices on CTA PE run. Should follow up with GI for possible procedure/banding within 2-4 weeks. Asymptomatic now without signs of bleeding.     Hypomagnesia: 1.6 on admission. Resolved with replacement     Incidental pulmonary nodules: Seen incidentally on CT scan. Radiology recommending monitoring with CT scan within 12 months. Follow-up with PCP.    Vital Signs in last 24 hours:    Temp:  [98.1  F (36.7  C)-99.9  F (37.7  C)] 98.5  F (36.9  C)  Heart Rate:  [] 85  Resp:  [18-30] 24  BP: ()/(47-84) 83/52    Physical Exam:    General appearance: alert and sitting comfortable in bed, no distress.  Head: Normocephalic, without obvious abnormality, atraumatic  Eyes: conjunctivae/corneas clear. PERRL, EOM's intact.   Lungs: clear to auscultation bilaterally, good air movement  Chest wall: Very tender along left chest wall  Heart: Irregular rhythm, rate controlled, no murmur heard  Abdomen: soft, non-tender; bowel sounds normal; no masses,  Splenomegaly - tip palpated about 3 in below ribs  Extremities: 2+ pitting edema bilaterally to knees  Pulses: 2+ and symmetric    Avtar Reyez M.D.  [facility] Resident  Pager#:[phone]       5/19/2018, 1:53 PM    Precepted patient with Dr. Nadege Al.    This chart is completed utilizing dictation software; typos and/or incorrect word substitutions may unintentionally occur.

## 2021-07-22 NOTE — PROGRESS NOTES
Met with patient and pt's wife, Delano to review role of care management, progression of care and possible need for service at discharge, including OP services, home care, or skilled nursing care. Patient alert, oriented and engaged in conversation. Pt lives with spouse in a private residence. Pt does not use any assistive devices and drives. Independent with ADLs at baseline. Denies d/c needs at this time. Spouse to transport at d/c.

## 2021-07-22 NOTE — H&P
Admission History and Physical   Maurice Jon,  1960, MRN [id number]    [facility]  Hypomagnesemia [E83.42]  Splenic vein thrombosis [I82.890]  Chest pain [R07.9]  Atrial fibrillation with rapid ventricular response [I48.91]    PCP: Steven Barnhart MD, [unfilled], [phone]   Code status:  Full Code       Extended Emergency Contact Information  Primary Emergency Contact: Delano Jon   [facility]  Home Phone: [phone]  Relation: Spouse       Assessment and Plan   Principal Problem:    Chest pain    Maurice Jon is a 57 y.o. old male with a PMH of     Chest pain: Likely pain is MSK in origin given reproducibility with palpation and negative cardiac work up thus far. Patient does have history of afib and is not anticoagulated, but does not have any other risk factors for MI. Patient does not have lipids on file in care everywhere to calculate ASCVD score.  Echo done 17 shows EF 55-60% with no wall motion abnormalities and normal ventricular size. Trace mitral and tricuspid regurgitation. Differential could also include pleuritic pain given radiation to left shoulder with deep inspiration. CT does not show any obvious PNA or other infectious etiology to the lungs. No aortic dissection. Unlikely PE given negative PE run.    -Trend troponin x3 (first negative)  -Morphine 1-2mg IV q6h PRN   -AM Lipids    Atrial fibrillation: Does follow with EP cardiology once per year, does not recall last visit but has one scheduled for 2018. Rate of 110-118 on admission, usually well controlled with metoprolol.  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 Patient is not anticoagulated and has thrombocytopenia. Per review of literature nothing to do acutely, but should follow up with GI for possible procedure. Given  "esophageal varices seen on CT, patient may benefit from shunt placement.     Hypomagnesia: 1.6 on admission. Received 2g IV in ED. On replacement.   -AM BMP  -AM Mg    Liver cirrhosis: Unknown cause but follows closely with GI outpatient. Labs appear to be baseline per care everywhere.     BRUCE: Patient did not bring home CPAP, will see if RT has one available tonight for patient to use.       FEN: Cardiac diet  DVT Prophylaxis: Thrombocytopenia, no additional medication needed, ambulate, low risk  Code Status: Full    Med Rec Complete    Dispo: observation status for chest pain rule out, anticipate discharge to home, self cares   Barriers to discharge:  work up in progress   Anticipated discharge date:  1-2 days     Chief Complaint: Chest pain     HPI:    Maurice Jon is a 57 y.o. old male with a PMH of atrial fibrillation not on anticoagulation, portal HTN, and cirrhosis of unknown cause who presents to the ED for evaluation of chest pain.  History is provided by the patient and his wife.     Patient states that chest pain has been ongoing for about 2 weeks.  He was seen at [facility] ED on 5/10/18 and discharge same day with likely musculoskeletal pain.  He says that pain started after eating a sandwich at that time.  Since discharge from the ED pain has been intermittent.  He cannot think of an inciting events \"maybe with food\".  There was some note of possible trauma at work from lifting and hitting his chest on the corner, but he does not think this is the cause of his pain.  When he takes a deep breath in the pain radiates to his left shoulder, but otherwise does not endorse any dyspnea with exertion different from baseline. Does eat a lot of salt in his diet and has baseline swelling in legs, but never been diagnosed with CHF. Currently he rates the pain at 5/10 without any intervention in the ED, but at its worst it gets up to 7/10 and is more of a tightness. Pain does not seem associated with " "certain foods.     He and his wife are very concerned because his brothers both  from the same liver disease and they never had a cause. One of his brothers  \"From an arrhythmia that happened all of a sudden while in the hospital.\" They deny any history of anxiety.     He has noted decreased appetite due to some nausea that has been ongoing since his visit to the ED last week.  He has not had any vomiting or diarrhea.  No fevers, lightheadedness, dizziness, changes to vision.       Medical History  There are no active non-hospital problems to display for this patient.    Past Medical History:   Diagnosis Date     Atrial fibrillation      Cirrhosis         Surgical History  He  has a past surgical history that includes Umbilical hernia repair ().       Social History  Reviewed, and he  reports that he has quit smoking. His smoking use included Cigars. He quit after 5.00 years of use. He does not have any smokeless tobacco history on file. He reports that he does not drink alcohol or use illicit drugs.    Works as an  at [employer] company.    Allergies  Allergies   Allergen Reactions     Avelox [Moxifloxacin] Nausea And Vomiting, Nausea Only and Other (See Comments)     portal hypertension    Family History  Reviewed, and family history includes Cirrhosis in his brother and brother; Heart failure in his father.          Prior to Admission Medications   Prescriptions Prior to Admission   Medication Sig Dispense Refill Last Dose     albuterol (PROAIR HFA;PROVENTIL HFA;VENTOLIN HFA) 90 mcg/actuation inhaler Inhale 2 puffs every 6 (six) hours as needed for wheezing.   Unknown at Unknown time     furosemide (LASIX) 40 MG tablet Take 40 mg by mouth daily.   2018 at am     metoprolol succinate (TOPROL-XL) 100 MG 24 hr tablet Take 100 mg by mouth at bedtime. (in addition to 25mg in the morning)   2018 at Unknown time     metoprolol succinate (TOPROL-XL) 25 MG Take 25 mg by mouth every morning. (In " addition to 100mg at bedtime)   5/18/2018 at Unknown time     mometasone-formoterol (DULERA) 200-5 mcg/actuation HFAA inhaler Inhale 2 puffs 2 (two) times a day.   5/18/2018 at Unknown time     pantoprazole (PROTONIX) 40 MG tablet Take 40 mg by mouth daily.   5/18/2018 at am     spironolactone (ALDACTONE) 25 MG tablet Take 25 mg by mouth daily.   5/17/2018 at am          Review of Systems:  A 12 point comprehensive review of systems was negative except as noted.      Physical Exam:  Temp:  [99.9  F (37.7  C)] 99.9  F (37.7  C)  Heart Rate:  [103-118] 103  Resp:  [20-30] 20  BP: (106-128)/(61-84) 106/61    General appearance: alert and sitting comfortable in bed, no distress  Head: Normocephalic, without obvious abnormality, atraumatic  Eyes: conjunctivae/corneas clear. PERRL, EOM's intact.   Throat: No erythema, no exudate  Back: symmetric,  ROM normal. No CVA tenderness.  Lungs: clear to auscultation bilaterally, good air movement  Chest wall: Very tender along left chest wall  Heart: Irregular rhythm, rate controlled, no murmur heard  Abdomen: soft, non-tender; bowel sounds normal; no masses,  Splenomegaly - tip palpated about 3 in below ribs  Extremities: 2+ pitting edema bilaterally to knees  Pulses: 2+ and symmetric  Skin: Skin color, texture, turgor normal. No rashes or lesions  Neurologic: Grossly normal         Pertinent Labs  Lab Results: personally reviewed.     Results for orders placed or performed during the hospital encounter of 05/18/18   Basic Metabolic Panel   Result Value Ref Range    Sodium 140 136 - 145 mmol/L    Potassium 4.4 3.5 - 5.0 mmol/L    Chloride 108 (H) 98 - 107 mmol/L    CO2 23 22 - 31 mmol/L    Anion Gap, Calculation 9 5 - 18 mmol/L    Glucose 87 70 - 125 mg/dL    Calcium 8.9 8.5 - 10.5 mg/dL    BUN 16 8 - 22 mg/dL    Creatinine 0.68 (L) 0.70 - 1.30 mg/dL    GFR MDRD Af Amer >60 >60 mL/min/1.73m2    GFR MDRD Non Af Amer >60 >60 mL/min/1.73m2   Lipase   Result Value Ref Range    Lipase  27 0 - 52 U/L   Hepatic Profile   Result Value Ref Range    Bilirubin, Total 2.4 (H) 0.0 - 1.0 mg/dL    Bilirubin, Direct 0.9 (H) <=0.5 mg/dL    Protein, Total 6.1 6.0 - 8.0 g/dL    Albumin 3.3 (L) 3.5 - 5.0 g/dL    Alkaline Phosphatase 72 45 - 120 U/L    AST 87 (H) 0 - 40 U/L    ALT 64 (H) 0 - 45 U/L   Magnesium   Result Value Ref Range    Magnesium 1.6 (L) 1.8 - 2.6 mg/dL   Troponin I   Result Value Ref Range    Troponin I 0.02 0.00 - 0.29 ng/mL   INR   Result Value Ref Range    INR 1.35 (H) 0.90 - 1.10   HM1 (CBC with Diff)   Result Value Ref Range    WBC 4.9 4.0 - 11.0 thou/uL    RBC 3.98 (L) 4.40 - 6.20 mill/uL    Hemoglobin 14.2 14.0 - 18.0 g/dL    Hematocrit 40.3 40.0 - 54.0 %     (H) 80 - 100 fL    MCH 35.7 (H) 27.0 - 34.0 pg    MCHC 35.2 32.0 - 36.0 g/dL    RDW 13.4 11.0 - 14.5 %    Platelets 54 (L) 140 - 440 thou/uL    MPV 11.6 8.5 - 12.5 fL    Neutrophils % 68 50 - 70 %    Lymphocytes % 15 (L) 20 - 40 %    Monocytes % 15 (H) 2 - 10 %    Eosinophils % 3 0 - 6 %    Basophils % 0 0 - 2 %    Neutrophils Absolute 3.3 2.0 - 7.7 thou/uL    Lymphocytes Absolute 0.7 (L) 0.8 - 4.4 thou/uL    Monocytes Absolute 0.8 0.0 - 0.9 thou/uL    Eosinophils Absolute 0.1 0.0 - 0.4 thou/uL    Basophils Absolute 0.0 0.0 - 0.2 thou/uL       Pertinent Radiology  Radiology Results: Personally reviewed impression/s    Cta Chest Pe Run    Result Date: 5/18/2018  CTA CHEST PE RUN 5/18/2018 7:17 PM INDICATION: Chest pain, atrial fib, dyspnea chest pain, atrial fib, dyspnea TECHNIQUE: Helical acquisition through the chest was performed during the arterial phase of contrast enhancement using IV contrast. 2D and 3D reconstructions were performed by the CT technologist. Dose reduction techniques were used. IV CONTRAST: Iohexol (Omni) 100 mL COMPARISON: None. FINDINGS: ANGIOGRAM CHEST: No definitive pulmonary artery embolus. Normal caliber pulmonary arteries. Nonaneurysmal aorta without dissection. LUNGS AND PLEURA: Mild motion  artifact. Minimal emphysema. Few patchy opacities, likely atelectasis or scarring. Small pulmonary nodules, many are calcified granulomas. Some are noncalcified, including 2 x 4 mm right lower lobe nodule (series 2, image 59) and elongated 3 mm left lower lobe nodule (image 63). Negative pleural spaces. MEDIASTINUM: No adenopathy. Scattered mediastinal and perihilar calcified nodes. Mild cardiomegaly with right heart enlargement. Marked distal paraesophageal varices. LIMITED UPPER ABDOMEN: Cirrhotic liver morphology. Splenomegaly. Enlarged caliber IVC. Large collateral varices. Although incompletely seen, calcification presumably along the course of the splenic vein and near the portal vein confluence. Probable 9 mm right hepatic cyst. Cholelithiasis. MUSCULOSKELETAL: Scoliosis of the right. No definitive rib fractures.     CONCLUSION: 1.  No definitive pulmonary artery embolus. Some arteries are mildly compromised from motion artifact. 2.  Normal caliber aorta without dissection. 3.  Hepatic cirrhosis with portal hypertension. Severe distal paraesophageal varices and partially seen upper abdominal varices. Splenomegaly. 4.  Although incompletely seen, suspect chronic thrombus along the splenic vein and near the portal vein confluence. Dedicated imaging could be performed for confirmation. 5.  Multiple small pulmonary nodules are indeterminate at this time. If no history of malignancy, 12 month follow-up could be performed. Otherwise, sooner follow-up could be performed. 6.  Evidence of old granulomatous disease.       EKG Results: unable to view EKG electronically, per ED provider:  Atrial fibrillation rate 107, no acute ST elevations or depressions, normal axis.  No prior for comparison.    Precepted patient with  Faculty in morning report    Vikram Oneal ([email or web address])  [facility] Medicine Resident  Pager: [phone]

## 2021-07-22 NOTE — PROGRESS NOTES
Faculty Supervision of Residents   I have examined this patient and the medical care has been evaluated and discussed with the resident. See resident note to follow outlining our discussion.    Patient evaluated for chest wall pain which has been present for the past week.  CT/PE negative for pulmonary emboli, it appears that liver dysfunction is stable.  Patient does have paraesophageal varices noted on CTA chest.  No current bleeding. Hemodynamically stable.  Known atrial fibrillation.  Connected with cardiology and considering vein ablation.  Patient with point chest wall tenderness on examination which is the pain he is concerned with.  No evidence infection.    Patient ruled out for ACS.  Other conditions stable.  Will discharge with outpatient follow up.     DOS 5/19/2018    Salome Al MD

## 2021-07-22 NOTE — ED PROVIDER NOTES
eMERGENCY dEPARTMENT eNCOUnter      CHIEF COMPLAINT    No chief complaint on file.      HPI    I,Raisa Luzt, am serving as a scribe to document services personally performed by Milo Saenz MD, based on my observation and the provider's statements to me.     Maurice Jon is a 57 y.o. male with a history significant for atrial fibrillation and portal hypertension, who presents to the ED for evaluation of chest tightness.  Notes 2 weeks of chest tightness which he describes as pressure.  He was previously seen in outside facility and testing was done, he was discharged with diagnosis of musculoskeletal chest pain and instructions to warm pack the area.  He is not feeling better.  He has shortness of breath with exertion as well as fatigue.  When he takes big breath he has stabbing right-sided abdominal pain.  He has had decreased appetite with nausea.  He has a history of being cardioverted in the distant past but now is rate controlled.  Not currently on anticoagulants.  No fever or chills.  He denies any increased lower extremity swelling although he is wearing compression stockings.  He denies any headache but does have some lightheadedness.    SHx: Patient denies smoking alcohol use.  PCP: Steven Barnhart MD    I, Milo Saenz MD, attest that Raisa Lutz is acting in a scribe capacity, has observed my performance of the services and has documented them in accordance with my direction.     PAST MEDICAL HISTORY    Relevant past surgical history reviewed with patient, unless otherwise stated in HPI, history not pertinent to this visit.    Relevant past medical history reviewed with patient, unless otherwise stated in HPI, history not pertinent to this visit.    CURRENT MEDICATIONS    Patient's Medications    No medications on file       ALLERGIES    Allergies not on file    FAMILY HISTORY    No family history on file.    SOCIAL HISTORY    Social History     Social History     Marital status:       Spouse name: N/A     Number of children: N/A     Years of education: N/A     Social History Main Topics     Smoking status: Not on file     Smokeless tobacco: Not on file     Alcohol use Not on file     Drug use: Not on file     Sexual activity: Not on file     Other Topics Concern     Not on file     Social History Narrative       REVIEW OF SYSTEMS    Review of Systems   Constitutional: Positive for appetite change (Decreased) and fatigue. Negative for chills and fever.        Hubertus hot   Respiratory: Positive for chest tightness and shortness of breath.    Cardiovascular: Negative for chest pain and leg swelling.   Gastrointestinal: Positive for abdominal pain (Right lower quadrant).   All other systems reviewed and are negative.        PHYSICAL EXAM    VITAL SIGNS: /72  Pulse (!) 112  Resp 26  SpO2 96%  Physical Exam   Constitutional: He is oriented to person, place, and time. He appears well-developed and well-nourished.   HENT:   Head: Normocephalic and atraumatic.   Nose: Nose normal.   Mouth/Throat: Oropharynx is clear and moist.   Eyes: Conjunctivae and EOM are normal. Pupils are equal, round, and reactive to light.   Neck: Normal range of motion. Neck supple.   Cardiovascular: Normal heart sounds.    Tachycardic, irregular   Pulmonary/Chest: Effort normal and breath sounds normal.   Abdominal: Soft. Bowel sounds are normal. There is no tenderness. There is no rebound and no guarding.   Musculoskeletal: Normal range of motion.   Lymphadenopathy:     He has no cervical adenopathy.   Neurological: He is alert and oriented to person, place, and time. Coordination normal.   No gross focal neurologic deficits.  Cranial nerves III-XII intact.   Skin: Skin is warm and dry.   Psychiatric: He has a normal mood and affect. His behavior is normal.   Nursing note and vitals reviewed.      LABS  Pertinent lab results reviewed in chart.  Results for orders placed or performed during the hospital encounter of  05/18/18   Basic Metabolic Panel   Result Value Ref Range    Sodium 140 136 - 145 mmol/L    Potassium 4.4 3.5 - 5.0 mmol/L    Chloride 108 (H) 98 - 107 mmol/L    CO2 23 22 - 31 mmol/L    Anion Gap, Calculation 9 5 - 18 mmol/L    Glucose 87 70 - 125 mg/dL    Calcium 8.9 8.5 - 10.5 mg/dL    BUN 16 8 - 22 mg/dL    Creatinine 0.68 (L) 0.70 - 1.30 mg/dL    GFR MDRD Af Amer >60 >60 mL/min/1.73m2    GFR MDRD Non Af Amer >60 >60 mL/min/1.73m2   Lipase   Result Value Ref Range    Lipase 27 0 - 52 U/L   Hepatic Profile   Result Value Ref Range    Bilirubin, Total 2.4 (H) 0.0 - 1.0 mg/dL    Bilirubin, Direct 0.9 (H) <=0.5 mg/dL    Protein, Total 6.1 6.0 - 8.0 g/dL    Albumin 3.3 (L) 3.5 - 5.0 g/dL    Alkaline Phosphatase 72 45 - 120 U/L    AST 87 (H) 0 - 40 U/L    ALT 64 (H) 0 - 45 U/L   Magnesium   Result Value Ref Range    Magnesium 1.6 (L) 1.8 - 2.6 mg/dL   Troponin I   Result Value Ref Range    Troponin I 0.02 0.00 - 0.29 ng/mL   INR   Result Value Ref Range    INR 1.35 (H) 0.90 - 1.10   HM1 (CBC with Diff)   Result Value Ref Range    WBC 4.9 4.0 - 11.0 thou/uL    RBC 3.98 (L) 4.40 - 6.20 mill/uL    Hemoglobin 14.2 14.0 - 18.0 g/dL    Hematocrit 40.3 40.0 - 54.0 %     (H) 80 - 100 fL    MCH 35.7 (H) 27.0 - 34.0 pg    MCHC 35.2 32.0 - 36.0 g/dL    RDW 13.4 11.0 - 14.5 %    Platelets 54 (L) 140 - 440 thou/uL    MPV 11.6 8.5 - 12.5 fL    Neutrophils % 68 50 - 70 %    Lymphocytes % 15 (L) 20 - 40 %    Monocytes % 15 (H) 2 - 10 %    Eosinophils % 3 0 - 6 %    Basophils % 0 0 - 2 %    Neutrophils Absolute 3.3 2.0 - 7.7 thou/uL    Lymphocytes Absolute 0.7 (L) 0.8 - 4.4 thou/uL    Monocytes Absolute 0.8 0.0 - 0.9 thou/uL    Eosinophils Absolute 0.1 0.0 - 0.4 thou/uL    Basophils Absolute 0.0 0.0 - 0.2 thou/uL       EKG  EKG reviewed and interpreted by me.  Atrial fibrillation rate 107, no acute ST elevations or depressions, normal axis.  No prior for comparison.    RADIOLOGY  Images independently reviewed.  Cta Chest Pe  Run    Result Date: 5/18/2018  CTA CHEST PE RUN 5/18/2018 7:17 PM INDICATION: Chest pain, atrial fib, dyspnea chest pain, atrial fib, dyspnea TECHNIQUE: Helical acquisition through the chest was performed during the arterial phase of contrast enhancement using IV contrast. 2D and 3D reconstructions were performed by the CT technologist. Dose reduction techniques were used. IV CONTRAST: Iohexol (Omni) 100 mL COMPARISON: None. FINDINGS: ANGIOGRAM CHEST: No definitive pulmonary artery embolus. Normal caliber pulmonary arteries. Nonaneurysmal aorta without dissection. LUNGS AND PLEURA: Mild motion artifact. Minimal emphysema. Few patchy opacities, likely atelectasis or scarring. Small pulmonary nodules, many are calcified granulomas. Some are noncalcified, including 2 x 4 mm right lower lobe nodule (series 2, image 59) and elongated 3 mm left lower lobe nodule (image 63). Negative pleural spaces. MEDIASTINUM: No adenopathy. Scattered mediastinal and perihilar calcified nodes. Mild cardiomegaly with right heart enlargement. Marked distal paraesophageal varices. LIMITED UPPER ABDOMEN: Cirrhotic liver morphology. Splenomegaly. Enlarged caliber IVC. Large collateral varices. Although incompletely seen, calcification presumably along the course of the splenic vein and near the portal vein confluence. Probable 9 mm right hepatic cyst. Cholelithiasis. MUSCULOSKELETAL: Scoliosis of the right. No definitive rib fractures.     CONCLUSION: 1.  No definitive pulmonary artery embolus. Some arteries are mildly compromised from motion artifact. 2.  Normal caliber aorta without dissection. 3.  Hepatic cirrhosis with portal hypertension. Severe distal paraesophageal varices and partially seen upper abdominal varices. Splenomegaly. 4.  Although incompletely seen, suspect chronic thrombus along the splenic vein and near the portal vein confluence. Dedicated imaging could be performed for confirmation. 5.  Multiple small pulmonary nodules  are indeterminate at this time. If no history of malignancy, 12 month follow-up could be performed. Otherwise, sooner follow-up could be performed. 6.  Evidence of old granulomatous disease.     ED MEDS  New Prescriptions    No medications on file     ED COURSE & MEDICAL DECISION MAKING    5:56 PM The patient was interviewed and examined.  History in the chart was reviewed.  Labs, xray, EKG, CT ordered and personally reviewed by me.  Differential diagnosis includes but not limited to chest wall pain, esophagitis, myocardial infarction, pneumonia, rib fracture, pulmonary embolism, pneumothorax, aortic dissection, aortic aneurysm.  Patient with atrial fibrillation presents with chest tightness.  This been going off and on for a while but worse today, accompanied by fatigue.  He was previously seen for this at an outside facility and was thought to be musculoskeletal in nature.  7:35 PM EKG normal other than atrial fibrillation, no prior for comparison.  Labs are reassuring.  8:02 PM No acute findings for patient's symptoms on CTA.  Patient was given metoprolol IV to help with rate control.  Magnesium will be replaced.  Given atrial fibrillation with rapid ventricular response, chest tightness, and ongoing symptoms I think it is reasonable to observe the patient overnight for cardiac rule out.  Consideration for chronic thrombus of the splenic vein, CT also demonstrates patient's known portal hypertension no anemia, no hematemesis, no indications of GI bleed  8:22 PM Discussed results with patient and spouse.  He still is feeling poorly and is persistently tachycardic.  His symptoms certainly could be related to his poorly controlled atrial fibrillation although I am not sure what the turn would be that would cause him to be more tachycardic than regular.  In any case patient will be admitted to [facility] for further evaluation and treatment.  He also has a new splenic vein thrombus.  I discussed this with  him and it sounds like he was unaware of this problem.  So even though the thrombus appears chronic, it is not previously been evaluated.  8:25 PM admission discussed with [facility] residency service.  At the conclusion of the encounter I discussed  the results of all of the tests and the disposition with patient.   All questions were answered.  The patient acknowledged understanding and was involved in the decision making regarding the overall care plan.  I discussed with patient the utility, limitations and findings of the exam/interventions/studies done during this visit as well as the list of differential diagnosis and symptoms to monitor/return to ER for.  Additional verbal discharge instructions were provided.     PROBLEM LIST   Atrial fibrillation with rapid ventricular response  Chest pain/tightness  Hypomag  Splenic vein thrombus.    FINAL DIAGNOSIS:   1. Atrial fibrillation with rapid ventricular response    2. Chest pain    3. Hypomagnesemia    4. Splenic vein thrombosis      Milo Saenz MD  [facility] Emergency Department       Milo Saenz MD  05/18/18 2026

## 2021-07-22 NOTE — PROGRESS NOTES
Pharmacy Note - Admission Medication History    Pertinent Provider Information: none     ______________________________________________________________________    Prior To Admission (PTA) med list completed and updated in EMR.       PTA Med List   Medication Sig Note Last Dose     albuterol (PROAIR HFA;PROVENTIL HFA;VENTOLIN HFA) 90 mcg/actuation inhaler Inhale 2 puffs every 6 (six) hours as needed for wheezing.  Unknown at Unknown time     furosemide (LASIX) 40 MG tablet Take 40 mg by mouth daily. 5/18/2018: Was told to take half a tablet (20mg) daily but patient states that it's not effective so he takes a full tablet (40mg)  5/17/2018 at am     metoprolol succinate (TOPROL-XL) 100 MG 24 hr tablet Take 100 mg by mouth at bedtime. (in addition to 25mg in the morning)  5/17/2018 at Unknown time     metoprolol succinate (TOPROL-XL) 25 MG Take 25 mg by mouth every morning. (In addition to 100mg at bedtime)  5/18/2018 at Unknown time     mometasone-formoterol (DULERA) 200-5 mcg/actuation HFAA inhaler Inhale 2 puffs 2 (two) times a day.  5/18/2018 at Unknown time     pantoprazole (PROTONIX) 40 MG tablet Take 40 mg by mouth daily.  5/18/2018 at am     spironolactone (ALDACTONE) 25 MG tablet Take 25 mg by mouth daily.  5/17/2018 at am       Information source(s): Patient and CareEverywhere/SureScripts    Summary of Changes to PTA Med List  No PTA medications on file prior to this encounter.    Patient was asked about OTC/herbal products specifically.  PTA med list reflects this.    Based on the pharmacist s assessment, the PTA med list information appears reliable    Patient appears adherent: Yes    Allergies were reviewed, assessed, and updated with the patient.      Medications available for use during hospital stay: dulera, albuterol.     Thank you for the opportunity to participate in the care of this patient.    Amina Osullivan, PharmD  5/18/2018 8:54 PM

## 2022-11-01 NOTE — MR AVS SNAPSHOT
Mauricetom Trujilloon   11/13/2018 3:30 PM   Anticoagulation Therapy Visit    Description:  57 year old male   Provider:  MAHESH ANTI COAG   Department:  Cl Anticoag           INR as of 11/13/2018     Today's INR 2.5      Anticoagulation Summary as of 11/13/2018     INR goal 2.0-3.0   Today's INR 2.5   Full warfarin instructions 1.25 mg on Fri; 2.5 mg all other days   Next INR check 11/27/2018    Indications   Atrial fibrillation (H) [I48.91]  Atrial fibrillation with rapid ventricular response (H) [I48.91]  Long-term (current) use of anticoagulants [Z79.01] [Z79.01]         Your next Anticoagulation Clinic appointment(s)     Nov 27, 2018  2:45 PM CST   Anticoagulation Visit with CL [facility]   [facility] ([facility])    [address] [facility] [address] [phone]              Contact Numbers     Please call [phone] with any problems or questions regarding your therapy.    If you need to cancel and/or reschedule your appointment please call one of the following numbers:  [facility] - [phone]  [facility] [phone]  [facility] [phone]  [facility] [phone]  [place] - [phone]            November 2018 Details    Sun Mon Tue Wed Thu Fri Sat         1               2               3                 4               5               6               7               8               9               10                 11               12               13      2.5 mg   See details      14      2.5 mg         15      2.5 mg         16      1.25 mg         17      2.5 mg           18      2.5 mg         19      2.5 mg         20      2.5 mg         21      2.5 mg         22      2.5 mg         23      1.25 mg         24      2.5 mg           25      2.5 mg         26      2.5 mg         27            28               29               30                 Date Details   11/13 This INR check       Date of next INR:  11/27/2018         How to take your  warfarin dose     To take:  1.25 mg Take 0.5 of a 2.5 mg tablet.    To take:  2.5 mg Take 1 of the 2.5 mg tablets.            Standing/Walking/Toileting/Moving from bed to chair

## 2024-01-16 NOTE — DISCHARGE INSTRUCTIONS
Discharge Instructions after Colonoscopy  or Sigmoidoscopy    Today you had a _x___ Colonoscopy    Activity and Diet  You were given medicine for pain. You may be dizzy or sleepy.  For 24 hours:    Do not drive or use heavy equipment.    Do not make important decisions.    Do not drink any alcohol.  You may return to your normal diet and medicines.    Discomfort    Air was placed in your colon during the exam in order to see it. Walking helps to pass the air.    You may take Tylenol (acetaminophen) for pain unless your doctor has told you not to.    When to call:    Call right away if you have:    Unusual pain in belly or chest pain not relieved with passing air.    More than 1 to 2 Tablespoons of bleeding from your rectum.    Fever above 100.6  F (37.5  C).    If you have severe pain, bleeding, or shortness of breath, go to an emergency room.    If you have questions, call:  Monday to Friday, 7 a.m. to 4:30 p.m.  Endoscopy: [phone] (We may have to call you back)    After hours  Hospital: [phone] (Ask for the GI fellow on call)  
No

## 2024-09-24 NOTE — ED PROVIDER NOTES
[place] EMERGENCY DEPARTMENT ([facility])  12/19/19    History     Chief Complaint   Patient presents with     Chest Pain     The history is provided by the EMS personnel, the patient and medical records.     Maurice Jon is a 59 year old male who has a history of atrial fibrillation (on Warfarin, ablation AV node 11/15/2019), EP pacemaker ([employer],11/15/2019), chronic systolic CHF, cardiomyopathy, acute pulmonary edema, CAD, pericarditis, thrombocytopenia, portal hypertension, liver cirrhosis, HTN, BRUCE who was brought by ambulance to the Emergency Department for chest pain. EMS reports the patient began having pain at 7 am and it began to get worse until they were called at 9 am. The patient states his mid left chest pain as dull, rates it as 5/10, and non-radiating. EMS gave patient nitroglycerin spray x5, 50mcg Fentanyl, and 0.5 mg Ativan. Patient denies abdominal pain, new swelling in the legs, and history of MI or cardiac arrest.    Per chart review, patient's last device check was on 11/19/2019, please see results in detail below. Patient's last echocardiogram was on 6/21/2019 and showed EF of 55%. Compared to prior echo, LV function has improved significantly.    [employer] Val[employer] CRT-P 4 day Post Pacemaker Device Check/ [place]  AP: AF  : 100%  Mode: VVIR  Underlying Rhythm: AF with VR in the mid 50's/ post AVNA  Heart Rate: Histogram with little variability  Sensing: Stable  Pacing Threshold: Stable  Impedance: WNL  Battery Status: 7 years estimated longevity  Incision: outer bandage removed. Steri strips in place until Friday 11-22; spouse is comfortable with removing them. Explained to remove slowly. If any concerns ie skin edges not approximated, swelling, drainage, she is to call without delay. I supplied steri strips to apply if skin edge not entirely approximated. If however there is a larger issue with skin edge healing she is to call without delay.   Atrial Arrhythmia: AF/  Chief Complaint   Patient presents with    Vaginal Problem     Currently not fasting pt has a boil on inside of vaginal lip pt would like to do std testing         HPI    Pt rpeorts that she has been coughing up greeen phlegm in the morning for the past 3 to 6 months. She reports it has not gotten any better. She saw ENT earlier this year and reports he said nothing was wrong. She has been taking prn sudafed with good relief of symptoms.. She denies any shortness of breath, chronic cough, or dyspnea on exertion and reports she has continued to exercise regularly.       Pt reports she was masturbating with a vibrator 3 days ago - vigorously- and the day after she noticed a small bump on her labia. She reports that she tried to pop it and has kept a heating pad on it overnight and it has gotten slightly better but she is concerned there is something else going on. She recently got out of a long term relationship and reports that she thinks he was sleeping with another girl so therefore she would like to be tested for STD/STIs.    Recent PHQ 2/9 Score    PHQ 2:  PHQ 2 Score Adult PHQ 2 Score Adult PHQ 2 Interpretation Little interest or pleasure in activity?   9/24/2024   1:44 PM 0 No further screening needed 0       PHQ 9:        Most Recent HELEN 7 Score           ROS: All other systems negative.    Past Medical History:   Diagnosis Date    Depression     HELEN (generalized anxiety disorder)     PCOS (polycystic ovarian syndrome)         Past Surgical History:   Procedure Laterality Date    [place] tooth extraction  2016        Family History   Problem Relation Age of Onset    Alcohol Abuse Father         significant Hx colon polyps    Psychiatric Half-Sister     Substance Abuse Brother     Psychiatric Brother     Alcohol Abuse Maternal Grandmother     Psychiatric Maternal Grandmother     Leukemia Paternal Grandmother     Cancer, Throat Paternal Grandfather         Social History     Socioeconomic History    Marital  jantoven  Ventricular Arrhythmia: NONE  Setting Change: Lower rate from 80-70 bpm per protocol    Reviewing records patient's cardiologist at [facility].  A month ago patient had AV node ablation with pacemaker placed for atrial fibrillation.  Patient is on Coumadin as noted.  Has had history of some systolic heart failure but it was probably more from his ablation and tachycardias with A. fib in the past.  Recent echo show improvement as noted above.  Patient according to wife is had a very stressful time at work this week with involvement with HR etc.  Patient is very distraught tearful at this point.  No fevers chills etc. noted.  No increasing leg pain leg swelling.    PAST MEDICAL HISTORY  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 8/22/2005    Cirrhosis, idiopathic     Edema 5/13/2013     Esophageal varices with bleeding(456.0)     Pt denies     Gallstones      Genital herpes, unspecified 3/20/1999    resolving herpes progenitalis     GERD (gastroesophageal reflux disease)      Hematuria      Hypertension      Hypochondriasis     problems in past     Obesity 11/24/2010     BRUCE (obstructive sleep apnea) 03/17/2009    Mild AHI 8.4  RDI 31 - Pt refuses to wear his CPAP     Pericarditis      Portal hypertension (H) 1/28/2010     Unspecified thrombocytopenia 8/22/2005    Thrombocytopenia associated with cirrhosis and portal hypertension     PAST SURGICAL HISTORY  Past Surgical History:   Procedure Laterality Date     ANESTHESIA CARDIOVERSION N/A 1/19/2015    Procedure: ANESTHESIA  status: Single     Spouse name: Not on file    Number of children: Not on file    Years of education: Not on file    Highest education level: Not on file   Occupational History    Not on file   Tobacco Use    Smoking status: Never    Smokeless tobacco: Never   Vaping Use    Vaping status: never used   Substance and Sexual Activity    Alcohol use: Never    Drug use: Never    Sexual activity: Yes     Partners: Male     Birth control/protection: Pill   Other Topics Concern    Not on file   Social History Narrative    Social history comments: From [facility] and moved to [place] to be closer to family from [place].      Living situation: Living alone with 2 cats    Occupation: Works at [employer]     Exercise: Weight training 7 days a week minimal cardio    Diet: Dairy free - high protein. \"Snake meal\" - one big meal a day        ===    Results: Gonorrhea and chlamydia negative. Bhavana Valdivia [facility]     04/25/24             Your laboratory results are all well-controlled at this time.  I am still waiting on your chlamydia and gonorrhea panel but all other STDs are negative.  Please continue healthful lifestyle and we will follow-up in 6 months as planned.    Results: Syphilis negative, ferritin 15, thyroid stimulating hormone 1.8-8, LDL 67, total cholesterol 157, hepatitis B-, HIV negative, CMP WDL, CBC WDL. Bhavana Valdivia [facility]     04/25/24         Results: Strep culture negative. Bhavana Valdivia [facility]     01/29/24      Social Determinants of Health     Financial Resource Strain: Not on file   Food Insecurity: Not on file   Transportation Needs: Not on file   Physical Activity: Not on file   Stress: Not on file   Social Connections: Not on file   Interpersonal Safety: Not on file        Immunization History   Administered Date(s) Administered    DTaP 1996, 1996, 1996, 01/05/1998, 08/01/2001    DTaP, 5 Pertussis Antigens 1996, 1996, 1996, 01/05/1998, 08/01/2001    HPV  CARDIOVERSION;  Surgeon: Generic Anesthesia Provider;  Location: [facility] OR     ANESTHESIA CARDIOVERSION N/A 2/6/2019    Procedure: ANESTHESIA CARDIOVERSION;  Surgeon: GENERIC ANESTHESIA PROVIDER;  Location:  OR     ANESTHESIA CARDIOVERSION N/A 7/5/2019    Procedure: ANESTHESIA FOR CARDIOVERSION  DR. MURGUIA);  Surgeon: GENERIC ANESTHESIA PROVIDER;  Location:  OR     COLONOSCOPY N/A 8/14/2017    Procedure: COLONOSCOPY;  Colonoscopy Called will arrive appx 12:30 Per phone call;  Surgeon: Vincent Whittington MD;  Location:  GI     CV HEART CATHETERIZATION WITH POSSIBLE INTERVENTION N/A 12/26/2018    Procedure: Heart Catheterization with possible Intervention;  Surgeon: Brandon Arroyo MD;  Location:  HEART CARDIAC CATH LAB     EP ABLATION AV NODE N/A 11/15/2019    Procedure: EP Ablation AV Node;  Surgeon: Ag Maloney MD;  Location:  HEART CARDIAC CATH LAB     EP ABLATION FOCAL AFIB N/A 12/21/2018    Procedure: EP Ablation Focal AFIB;  Surgeon: Kristofer Murguia MD;  Location:  HEART CARDIAC CATH LAB     EP PACEMAKER N/A 11/15/2019    Procedure: EP PACEMAKER;  Surgeon: Ag Maloney MD;  Location:  HEART CARDIAC CATH LAB     ESOPHAGOSCOPY, GASTROSCOPY, DUODENOSCOPY (EGD), COMBINED  7/11/2011    Procedure:COMBINED ESOPHAGOSCOPY, GASTROSCOPY, DUODENOSCOPY (EGD); Surgeon:LAURA LAMAS; Location:[facility] GI     ESOPHAGOSCOPY, GASTROSCOPY, DUODENOSCOPY (EGD), COMBINED  9/10/2012    Procedure: COMBINED ESOPHAGOSCOPY, GASTROSCOPY, DUODENOSCOPY (EGD);;  Surgeon: Hi Roque MD;  Location:  GI     ESOPHAGOSCOPY, GASTROSCOPY, DUODENOSCOPY (EGD), COMBINED  9/9/2013    Procedure: COMBINED ESOPHAGOSCOPY, GASTROSCOPY, DUODENOSCOPY (EGD);;  Surgeon: Hi Roque MD;  Location:  GI     ESOPHAGOSCOPY, GASTROSCOPY, DUODENOSCOPY (EGD), COMBINED N/A 9/15/2014    Procedure: COMBINED ESOPHAGOSCOPY, GASTROSCOPY, DUODENOSCOPY (EGD);  Surgeon: Hi Roque MD;  Location:  GI     ESOPHAGOSCOPY, GASTROSCOPY,  Quadrivalent 08/27/2008, 11/13/2008, 03/26/2009    Hep B, adolescent or pediatric 1996, 1996, 06/03/1997    IPV 08/01/2001    Influenza, split virus, quadrivalent 12/09/2019, 10/26/2020    Influenza, split virus, trivalent, PF 10/26/2020    Influenza, unspecified formulation 12/09/2019    MMR 09/18/1997, 08/01/2001    Meningococcal B, Unspecified Formulation 08/27/2008    Meningococcal Conjugate MCV4P (Menactra) 08/27/2008    Polio, Oral 1996, 1996, 1996    TD (Adult), 2 Lf tetanus toxoid, preserve free, Adsorbed 10/26/2020    TD Adult, Unspecified Formulation 10/26/2020    Tdap 08/27/2008, 10/25/2020    Varicella 06/03/1997, 08/27/2008        PHYSICAL EXAM   Visit Vitals  BP (!) 140/89 (BP Location: RUE - Right upper extremity, Patient Position: Sitting, Cuff Size: Regular)   Pulse 93   Temp 97.2 °F (36.2 °C)   Ht 5' 5\" (1.651 m)   Wt 96.2 kg (212 lb 3.1 oz)   LMP 06/26/2024 (Exact Date)   SpO2 100%   BMI 35.31 kg/m²        Physical Exam  HENT:      Nose: Nose normal.      Mouth/Throat:      Mouth: Mucous membranes are moist.      Pharynx: Oropharynx is clear.      Neck: Normal range of motion.   Cardiovascular:      Rate and Rhythm: Normal rate and regular rhythm.      Pulses: Normal pulses.      Heart sounds: Normal heart sounds.   Pulmonary:      Effort: Pulmonary effort is normal.      Breath sounds: Normal breath sounds.   Genitourinary:      Skin:     Capillary Refill: Capillary refill takes less than 2 seconds.   Neurological:      General: No focal deficit present.   Psychiatric:         Thought Content: Thought content normal.         Current Outpatient Medications   Medication Sig Dispense Refill    benzonatate (TESSALON PERLES) 200 MG capsule Take 1 capsule by mouth 3 times daily as needed for Cough. 30 capsule 1    valACYclovir (VALTREX) 500 MG tablet TAKE 1 TABLET BY MOUTH EVERY MORNING AND 1 TABLET IN THE EVENING. DO ALL THIS FOR 3 DAYS 6 tablet 0    buPROPion XL  (WELLBUTRIN XL) 150 MG 24 hr tablet TAKE 1 TABLET BY MOUTH DAILY 90 tablet 0    metFORMIN (GLUCOPHAGE) 850 MG tablet Take 1 tablet by mouth in the morning and 1 tablet in the evening. 180 tablet 0    spironolactone (ALDACTONE) 50 MG tablet Take 1 tablet by mouth daily. 90 tablet 1    busPIRone (BUSPAR) 15 MG tablet Take 1 tablet by mouth in the morning and 1 tablet at noon and 1 tablet in the evening. 270 tablet 1    norgestimate-ethinyl estradiol, triphasic, (ORTHO TRI-CYCLEN) 0.18/0.215/0.25 MG-35 MCG tablet Take 1 tablet by mouth daily. 90 tablet 3    mometasone (ELOCON) 0.1 % cream Apply 1 Application topically daily. (Patient not taking: Reported on 7/1/2024) 45 g 0     No current facility-administered medications for this visit.        ASSESSMENT AND PLAN  1. Postnasal drip  Pt has been struggling with ongoing post nasal drip she denies any cough, fevers, chills, or other cold/flu like symptoms at this time. She reports that she has been taking prn sudafed and prn claritin with minimal relief of symptoms. WE discussed using a twice a day flonase along with daily loratadine to help to manage the symptoms if they persist or she develops any fevers chills or night sweats she should follow up for re-evaluation.    2. Screening for STDs (sexually transmitted diseases)  Pt is not having any signs or symptoms of std/stis today. Plan for routine testing.   - HIV 1/HIV 2 Antigen/Antibody Screen  - RPR (Rapid Plasma Reagin) Traditional Syphilis Screen Algorithm  - Hepatitis B Surface Antigen  - Chlamydia/Gonorrhea by Nucleic Acid Amplification    3. Closed comedone  Pt was reportedly masturbating 2 days ago and the day after she developed a bump around her vagina in the same location she had been using her vibrator. On exam she has a small approximately 2 cm in diameter slightly raised lesion that appears to be a closed comedone on the upper right labia majora. We discussed continuing warm compresses along with warm  DUODENOSCOPY (EGD), COMBINED N/A 10/30/2015    Procedure: COMBINED ESOPHAGOSCOPY, GASTROSCOPY, DUODENOSCOPY (EGD);  Surgeon: Feliberto Fox MD;  Location:  GI     ESOPHAGOSCOPY, GASTROSCOPY, DUODENOSCOPY (EGD), COMBINED N/A 10/10/2016    Procedure: COMBINED ESOPHAGOSCOPY, GASTROSCOPY, DUODENOSCOPY (EGD);  Surgeon: Jose Nesbitt MD;  Location:  GI     ESOPHAGOSCOPY, GASTROSCOPY, DUODENOSCOPY (EGD), COMBINED N/A 2019    Procedure: ESOPHAGOGASTRODUODENOSCOPY (EGD);  Surgeon: Timo Soares MD;  Location:  GI     H ABLATION FOCAL AFIB  2018     HERNIA REPAIR       IRRIGATION AND DEBRIDEMENT ABSCESS SCROTUM, COMBINED  10/4/2012    Procedure: COMBINED IRRIGATION AND DEBRIDEMENT ABSCESS SCROTUM;  Irrigation and Debridement of Groin Abscess;  Surgeon: Benny Shoemaker MD;  Location: [facility] OR     SOFT TISSUE SURGERY       SURGICAL HISTORY OF -       rt elbow     SURGICAL HISTORY OF -   1/15/98    repair of ventral and umbilical hernia     SURGICAL HISTORY OF -       endoscopy     FAMILY HISTORY  Family History   Problem Relation Age of Onset     Lipids Mother      Hypertension Mother      Eye Disorder Mother      Heart Disease Father         CHF     Lipids Father      Obesity Father      Heart Disease Brother         MI     Eye Disorder Brother      Hypertension Brother         portal HTN     Thrombosis Sister         in her lung     Eye Disorder Sister      Cancer Other         maternal grandparent/throat cancer     SOCIAL HISTORY  Social History     Tobacco Use     Smoking status: Former Smoker     Packs/day: 0.80     Years: 6.00     Pack years: 4.80     Types: Cigarettes     Last attempt to quit: 2002     Years since quittin.9     Smokeless tobacco: Never Used   Substance Use Topics     Alcohol use: No     Alcohol/week: 0.0 standard drinks     Comment: quit in      MEDICATIONS  Current Facility-Administered Medications   Medication     lidocaine (LMX4)  cream     lidocaine 1 % 0.1-1 mL     medication instruction     melatonin tablet 3 mg     pantoprazole (PROTONIX) EC tablet 40 mg     Patient is already receiving anticoagulation with heparin, enoxaparin (LOVENOX), warfarin (COUMADIN)  or other anticoagulant medication     polyethylene glycol (MIRALAX/GLYCOLAX) Packet 17 g     sodium chloride (PF) 0.9% PF flush 3 mL     sodium chloride (PF) 0.9% PF flush 3 mL     [START ON 12/20/2019] warfarin ANTICOAGULANT (COUMADIN) tablet 2.5 mg     Current Outpatient Medications   Medication     Acetaminophen 325 MG CAPS     budesonide-formoterol (SYMBICORT) 80-4.5 MCG/ACT Inhaler     calcium carb 1250 mg, 500 mg [place],/vitamin D 200 units (OSCAL WITH D) 500-200 MG-UNIT per tablet     furosemide (LASIX) 40 MG tablet     Multiple Vitamins-Minerals (MENS 50+ MULTI VITAMIN/MIN PO)     mupirocin (BACTROBAN) 2 % external ointment     pantoprazole (PROTONIX) 40 MG EC tablet     spironolactone (ALDACTONE) 25 MG tablet     vitamin D3 (CHOLECALCIFEROL) 2000 units (50 mcg) tablet     warfarin ANTICOAGULANT (JANTOVEN ANTICOAGULANT) 2.5 MG tablet     ACE/ARB/ARNI NOT PRESCRIBED (INTENTIONAL)     ASPIRIN NOT PRESCRIBED (INTENTIONAL)     order for DME     order for DME     ORDER FOR DME     STATIN NOT PRESCRIBED (INTENTIONAL)     ALLERGIES  Allergies   Allergen Reactions     Avelox Other (See Comments) and GI Disturbance     portal hypertension     Moxifloxacin Nausea and Vomiting, Nausea and Other (See Comments)     portal hypertension     Sotalol Itching         I have reviewed the Medications, Allergies, Past Medical and Surgical History, and Social History in the Epic system.    Review of Systems   Constitutional: Positive for activity change, appetite change and fatigue. Negative for chills and fever.   HENT: Negative for sinus pressure, trouble swallowing and voice change.    Eyes: Negative for visual disturbance.   Respiratory: Positive for chest tightness (left). Negative for shortness  sittz baths, keeping the area clean and dry, and monitoring for any enlargement or change in the lesion. I suspect the lesion will go away completely in the next few weeks. If she develops any worsening symptoms she should call my office for further evaluation.    Follow up in April 2025 for well adult exam.    Pt declined flu and covid vaccines. She was instructed to call our office if she changes her mind on either vaccination in the future.     "of breath.    Cardiovascular: Positive for chest pain. Negative for leg swelling.   Gastrointestinal: Positive for abdominal distention (liver disease with ascites without pain). Negative for abdominal pain, nausea and vomiting.   Genitourinary: Negative for dysuria.   Musculoskeletal: Negative for back pain.   Skin: Negative for rash.   Allergic/Immunologic: Negative for immunocompromised state.   Neurological: Positive for weakness. Negative for seizures, syncope, speech difficulty, light-headedness and numbness.   Hematological: Bruises/bleeds easily.   Psychiatric/Behavioral: Positive for decreased concentration and dysphoric mood. Negative for confusion. The patient is nervous/anxious.    All other systems reviewed and are negative.      Physical Exam   BP: 122/57  Pulse: 70  Heart Rate: 70  Temp: 97.6  F (36.4  C)  Resp: 13  Height: 165.1 cm (5' 5\")  Weight: 108.9 kg (240 lb)  SpO2: 96 %      Physical Exam  Vitals signs and nursing note reviewed.   Constitutional:       General: He is in acute distress.      Appearance: He is well-developed. He is not ill-appearing or diaphoretic.      Comments: Patient valuate here in the ER.  Patient tearful soft-spoken difficult to get history primarily per EMS initially.  Then patient's wife.  Later it we are will get more history.   HENT:      Head: Normocephalic and atraumatic.      Nose: Nose normal.      Mouth/Throat:      Mouth: Mucous membranes are dry.      Pharynx: Oropharynx is clear.   Eyes:      General: No scleral icterus.     Extraocular Movements: Extraocular movements intact.      Conjunctiva/sclera: Conjunctivae normal.      Pupils: Pupils are equal, round, and reactive to light.   Neck:      Musculoskeletal: Normal range of motion and neck supple.   Cardiovascular:      Rate and Rhythm: Normal rate and regular rhythm.      Comments: Ventricular paced rhythm  Pulmonary:      Effort: No respiratory distress.      Breath sounds: No stridor.   Chest:      " Chest wall: No tenderness.   Abdominal:      General: There is distension.      Palpations: Abdomen is soft.      Comments: Abdomen is soft mildly distended nonrigid there is findings consistent with ascites   Musculoskeletal:         General: No tenderness.   Skin:     General: Skin is warm and dry.      Capillary Refill: Capillary refill takes less than 2 seconds.      Coloration: Skin is not jaundiced.      Findings: No rash.   Neurological:      General: No focal deficit present.      Mental Status: He is alert and oriented to person, place, and time. Mental status is at baseline.      Comments: No focal findings   Psychiatric:      Comments: Flat affect noted tearful at times soft-spoken and somewhat withdrawn.  Not delusional no suicidal homicidal ideations         ED Course          Procedures           Patient valuate here in the ER.  Records reviewed also as noted in epic.  Patient received nitro Ativan and fentanyl in route patient still describes some pain.  EKG shows a paced rhythm.  An echo was ordered.  Findings revealed ejection fraction of 5055%.  No significant wall motion abnormality etc.  Chest x-ray done also did not reveal any significant findings.  CBC chemistries were done.  INR noted to be 3.12.  Initial troponin was 0.03 second troponin repeated 4 hours later and was0.046.  In the ER patient did get nitroglycerin along with fentanyl here in the ER again pain seemed to come and go is feeling somewhat better.  I talked to Dr. marquez who did see the patient down here in the ER with cardiology they will accept the patient patient admitted to cardiology 1 team for ongoing management of slightly elevated troponin with recurrent chest pains.    Laboratory testing lactic acid 1.1.  As noted white count 3.2.  Hemoglobin 9.7.  Lipase 103.    Discussed plan with patient along with sister and wife.  Agree with plan patient admitted to cards 1 service.         EKG Interpretation:      Interpreted by  Kevin Dyson MD  Time reviewed: 10:39 AM  Symptoms at time of EKG: Chest pain   Rhythm: Ventricular paced rhythm  Rate: Normal  Axis: Normal  Ectopy: none  Conduction: normal  ST Segments/ T Waves: No ST-T wave changes  Q Waves: none  Comparison to prior: No old EKG available    Clinical Impression: Ventricular paced rhythm                Critical Care time:  none             Labs Ordered and Resulted from Time of ED Arrival Up to the Time of Departure from the ED   CBC WITH PLATELETS DIFFERENTIAL - Abnormal; Notable for the following components:       Result Value    WBC 3.2 (*)     RBC Count 3.39 (*)     Hemoglobin 9.7 (*)     Hematocrit 31.6 (*)     MCHC 30.7 (*)     RDW 16.2 (*)     Platelet Count 48 (*)     Absolute Lymphocytes 0.4 (*)     All other components within normal limits   INR - Abnormal; Notable for the following components:    INR 3.12 (*)     All other components within normal limits   COMPREHENSIVE METABOLIC PANEL - Abnormal; Notable for the following components:    Albumin 3.3 (*)     Protein Total 5.8 (*)     ALT 76 (*)      (*)     All other components within normal limits   PARTIAL THROMBOPLASTIN TIME - Abnormal; Notable for the following components:    PTT 41 (*)     All other components within normal limits   TROPONIN I - Abnormal; Notable for the following components:    Troponin I ES 0.046 (*)     All other components within normal limits   ISTAT  GASES LACTATE JONATHAN POCT - Abnormal; Notable for the following components:    Ph Venous 7.45 (*)     PCO2 Venous 33 (*)     PO2 Venous 54 (*)     All other components within normal limits   LIPASE   TROPONIN I   NT PROBNP INPATIENT   CREATININE POCT   ISTAT CREATININE NURSING POCT   ISTAT TROPONIN NURSING POCT   PULSE OXIMETRY NURSING   CARDIAC CONTINUOUS MONITORING   PERIPHERAL IV CATHETER   ISTAT CG4 GASES LACTATE JONATHAN NURSING POCT   NO VTE PROPHYLAXIS   IP ASSIGN PROVIDER TEAM TO TREATMENT TEAM   DAILY WEIGHTS   INTAKE AND OUTPUT   OBTAIN  MEDICAL RECORDS   DOCUMENT   PATIENT EDUCATION   TELEMETRY MONITORING CARDIOLOGY ADULT   VITAL SIGNS AND PAIN ASSESSMENT   PULSE OXIMETRY NURSING   PERIPHERAL IV CATHETER   ACTIVITY   NOTIFY PROVIDER   TROPONIN POCT           Results for orders placed or performed during the hospital encounter of 12/19/19   CBC with platelets differential     Status: Abnormal   Result Value Ref Range    WBC 3.2 (L) 4.0 - 11.0 10e9/L    RBC Count 3.39 (L) 4.4 - 5.9 10e12/L    Hemoglobin 9.7 (L) 13.3 - 17.7 g/dL    Hematocrit 31.6 (L) 40.0 - 53.0 %    MCV 93 78 - 100 fl    MCH 28.6 26.5 - 33.0 pg    MCHC 30.7 (L) 31.5 - 36.5 g/dL    RDW 16.2 (H) 10.0 - 15.0 %    Platelet Count 48 (LL) 150 - 450 10e9/L    Diff Method Automated Method     % Neutrophils 70.7 %    % Lymphocytes 12.9 %    % Monocytes 11.4 %    % Eosinophils 4.1 %    % Basophils 0.3 %    % Immature Granulocytes 0.6 %    Nucleated RBCs 0 0 /100    Absolute Neutrophil 2.2 1.6 - 8.3 10e9/L    Absolute Lymphocytes 0.4 (L) 0.8 - 5.3 10e9/L    Absolute Monocytes 0.4 0.0 - 1.3 10e9/L    Absolute Eosinophils 0.1 0.0 - 0.7 10e9/L    Absolute Basophils 0.0 0.0 - 0.2 10e9/L    Abs Immature Granulocytes 0.0 0 - 0.4 10e9/L    Absolute Nucleated RBC 0.0     Platelet Estimate Confirming automated cell count    INR     Status: Abnormal   Result Value Ref Range    INR 3.12 (H) 0.86 - 1.14   Comprehensive metabolic panel     Status: Abnormal   Result Value Ref Range    Sodium 139 133 - 144 mmol/L    Potassium 3.6 3.4 - 5.3 mmol/L    Chloride 108 94 - 109 mmol/L    Carbon Dioxide 22 20 - 32 mmol/L    Anion Gap 9 3 - 14 mmol/L    Glucose 92 70 - 99 mg/dL    Urea Nitrogen 14 7 - 30 mg/dL    Creatinine 0.69 0.66 - 1.25 mg/dL    GFR Estimate >90 >60 mL/min/[1.73_m2]    GFR Estimate If Black >90 >60 mL/min/[1.73_m2]    Calcium 8.6 8.5 - 10.1 mg/dL    Bilirubin Total 1.3 0.2 - 1.3 mg/dL    Albumin 3.3 (L) 3.4 - 5.0 g/dL    Protein Total 5.8 (L) 6.8 - 8.8 g/dL    Alkaline Phosphatase 68 40 - 150 U/L     ALT 76 (H) 0 - 70 U/L     (H) 0 - 45 U/L   Lipase     Status: None   Result Value Ref Range    Lipase 103 73 - 393 U/L   Troponin I     Status: None   Result Value Ref Range    Troponin I ES 0.032 0.000 - 0.045 ug/L   Nt probnp inpatient (BNP)     Status: None   Result Value Ref Range    N-Terminal Pro BNP Inpatient 404 0 - 900 pg/mL   Creatinine POCT     Status: None   Result Value Ref Range    Creatinine 0.7 0.66 - 1.25 mg/dL    GFR Estimate >90 >60 mL/min/[1.73_m2]    GFR Estimate If Black >90 >60 mL/min/[1.73_m2]   PTT     Status: Abnormal   Result Value Ref Range    PTT 41 (H) 22 - 37 sec   Troponin I     Status: Abnormal   Result Value Ref Range    Troponin I ES 0.046 (H) 0.000 - 0.045 ug/L   EKG 12 lead     Status: None   Result Value Ref Range    Interpretation ECG Click View Image link to view waveform and result    Troponin POCT     Status: None   Result Value Ref Range    Troponin I 0.02 0.00 - 0.08 ug/L   ISTAT gases lactate vish POCT     Status: Abnormal   Result Value Ref Range    Ph Venous 7.45 (H) 7.32 - 7.43 pH    PCO2 Venous 33 (L) 40 - 50 mm Hg    PO2 Venous 54 (H) 25 - 47 mm Hg    Bicarbonate Venous 23 21 - 28 mmol/L    O2 Sat Venous 90 %    Lactic Acid 1.1 0.7 - 2.1 mmol/L     Echocardiogram Complete   Final Result      XR Chest Port 1 View   Final Result   IMPRESSION:   1. Cardiomegaly with moderate pulmonary edema.   2. Left pleural effusion with underlying basilar   atelectasis/developing consolidation.      I have personally reviewed the examination and initial interpretation   and I agree with the findings.      ANYI CARMONA MD      CT Angiogram coronary artery    (Results Pending)     No results found for this or any previous visit (from the past 4320 hour(s)).    Assessments & Plan (with Medical Decision Making)  59-year-old male with history of atrial fibrillation with recent AV jerry ablation a month ago at [facility] with pacemaker placed presented the ER with chest pressure  some shortness of breath today.  I do not think patient is ever had any documented coronary artery disease he had history of some heart failure that may be reflective of his previous ablation attempts along with his atrial fibrillation.  He presented without palpitations or syncope but the chest pain is had a lot of stress this week.  He was evaluated here in the ER with a paced rhythm chest x-ray without acute findings.  Troponin went from 0.03 up to 0.047.  Cardiology evaluated here in the ER.  They did agree at this point admit to their service he did do an echo here in the ER which did not show any change in his ejection fraction which was around 50 to 55%.  No pericardial effusion or wall motion abnormality.  As noted unclear as far as the etiology of this it may certainly be coronary artery disease it may be a stress type of cardiomyopathy is very subtle at this point.  Patient admitted to cards 1 service for further work-up         I have reviewed the nursing notes.    I have reviewed the findings, diagnosis, plan and need for follow up with the patient.    New Prescriptions    No medications on file       Final diagnoses:   Chest pain, unspecified type   History of atrial fibrillation   History of atrioventricular jerry ablation   Cardiac pacemaker in situ   Liver Cirrhosis 2nd ot Fatty liver, w Ascites   Elevated troponin     I, Vangie Luis, am serving as a trained medical scribe to document services personally performed by Kevin Dyson MD, based on the provider's statements to me.      I, Kevin Dyson MD, was physically present and have reviewed and verified the accuracy of this note documented by Vangie Luis.       12/19/2019   [facility], EMERGENCY DEPARTMENT    This note was created at least in part by the use of dragon voice dictation system. Inadvertent typographical errors may still exist.  Kevin Dyson MD.         Kevin Dyson MD  12/19/19 0578

## (undated) DEVICE — CABLE PACING ALLIGATOR CLIP 301-CG

## (undated) DEVICE — PATCH CARTO 3 EXTERNAL REFERENCE 3D MAPPING CREFP6

## (undated) DEVICE — INTRO SHEALTH 8.5FRX63CM SRO 406853

## (undated) DEVICE — CATH EP 60CM 5FR 2-8-2MM SPC-

## (undated) DEVICE — RAD INTRODUCER KIT MICRO 5FRX10CM .018 NITINOL G/W

## (undated) DEVICE — CATH EP 6FR 2MM TIP 2-8-2 115C

## (undated) DEVICE — CATH GUIDING BLUE YELLOW PTFE JL4 6FRX100CM 67000400

## (undated) DEVICE — CATH JACKY 5FR 3.5 CURVE 40-5023

## (undated) DEVICE — IMPLANTABLE DEVICE: Type: IMPLANTABLE DEVICE | Status: NON-FUNCTIONAL

## (undated) DEVICE — GUIDEWIRE VASC 182CM .014IN CHC PT I H7491216101J2

## (undated) DEVICE — SHEATH PRELUDE SNAP 13CM 6FR

## (undated) DEVICE — CATH ANGIO JUDKINS R4 6FRX100CM INFINITI 534621T

## (undated) DEVICE — 7.4FR X 60CM, 90-DEG ACUITY PRO CORONARY SINUS INNER CATHETER (IC 90)

## (undated) DEVICE — CATH DECCPOLAR LASSO 2515 NAV

## (undated) DEVICE — CATH RF CARD ABL BID JJ THERMO

## (undated) DEVICE — PACK EP SRG PROC LF DISP SAN32EPFSR

## (undated) DEVICE — PACK PCMKR PERM SRG PROC LF SAN32PC573

## (undated) DEVICE — Device

## (undated) DEVICE — INTRODUCER SHEATH FAST-CATH SWARTZ 8.5FRX63CM SL1 CVD 406849

## (undated) DEVICE — CATH VENOGRAM BLLN W/INFLATION 6225

## (undated) DEVICE — DEFIB PRO-PADZ LVP LQD GEL ADULT 8900-2105-01

## (undated) DEVICE — CATH SOUNDSTAR 8FRX90CM 10439011

## (undated) DEVICE — INTRO GLIDESHEATH SLENDER 6FR 10X45CM 60-1060

## (undated) DEVICE — INTRODUCER CATH VASC 5FRX10CM  MPIS-501-NT-U-SST

## (undated) DEVICE — SYR ANGIOGRAPHY MULTIUSE KIT ACIST 014612

## (undated) DEVICE — INTRODUCER SHEATH GREEN 6.5FRX11CM .038IN PSI-6F-11-038ACT

## (undated) DEVICE — SLEEVE TR BAND RADIAL COMPRESSION DEVICE 24CM TRB24-REG

## (undated) DEVICE — CATH EP 110CM 7FR BLZR2 HTD 2

## (undated) DEVICE — INTRO SFSHEATH WORLEY 40CM 9FR S CSGWORLEY109M

## (undated) DEVICE — NDL TRNSEPT 18GA X 71CM 86 DEG

## (undated) DEVICE — CATH DIAGNOSTIC RADIAL 5FR TIG 4.0

## (undated) RX ORDER — FENTANYL CITRATE 50 UG/ML
INJECTION, SOLUTION INTRAMUSCULAR; INTRAVENOUS
Status: DISPENSED
Start: 2018-12-21

## (undated) RX ORDER — FENTANYL CITRATE 50 UG/ML
INJECTION, SOLUTION INTRAMUSCULAR; INTRAVENOUS
Status: DISPENSED
Start: 2019-11-15

## (undated) RX ORDER — BUPIVACAINE HYDROCHLORIDE 5 MG/ML
INJECTION, SOLUTION EPIDURAL; INTRACAUDAL
Status: DISPENSED
Start: 2019-11-15

## (undated) RX ORDER — POTASSIUM CHLORIDE 1500 MG/1
TABLET, EXTENDED RELEASE ORAL
Status: DISPENSED
Start: 2019-02-06

## (undated) RX ORDER — PROTAMINE SULFATE 10 MG/ML
INJECTION, SOLUTION INTRAVENOUS
Status: DISPENSED
Start: 2018-12-21

## (undated) RX ORDER — HEPARIN SODIUM 1000 [USP'U]/ML
INJECTION, SOLUTION INTRAVENOUS; SUBCUTANEOUS
Status: DISPENSED
Start: 2018-12-21

## (undated) RX ORDER — POTASSIUM CHLORIDE 1500 MG/1
TABLET, EXTENDED RELEASE ORAL
Status: DISPENSED
Start: 2019-07-05

## (undated) RX ORDER — LIDOCAINE HYDROCHLORIDE 10 MG/ML
INJECTION, SOLUTION EPIDURAL; INFILTRATION; INTRACAUDAL; PERINEURAL
Status: DISPENSED
Start: 2019-11-15

## (undated) RX ORDER — LIDOCAINE HYDROCHLORIDE 10 MG/ML
INJECTION, SOLUTION EPIDURAL; INFILTRATION; INTRACAUDAL; PERINEURAL
Status: DISPENSED
Start: 2018-12-21

## (undated) RX ORDER — HEPARIN SODIUM 1000 [USP'U]/ML
INJECTION, SOLUTION INTRAVENOUS; SUBCUTANEOUS
Status: DISPENSED
Start: 2018-12-26

## (undated) RX ORDER — FENTANYL CITRATE 50 UG/ML
INJECTION, SOLUTION INTRAMUSCULAR; INTRAVENOUS
Status: DISPENSED
Start: 2019-09-26

## (undated) RX ORDER — VERAPAMIL HYDROCHLORIDE 2.5 MG/ML
INJECTION, SOLUTION INTRAVENOUS
Status: DISPENSED
Start: 2018-12-26

## (undated) RX ORDER — CEFAZOLIN SODIUM 2 G/100ML
INJECTION, SOLUTION INTRAVENOUS
Status: DISPENSED
Start: 2019-11-15

## (undated) RX ORDER — FENTANYL CITRATE 50 UG/ML
INJECTION, SOLUTION INTRAMUSCULAR; INTRAVENOUS
Status: DISPENSED
Start: 2017-08-14

## (undated) RX ORDER — MAGNESIUM SULFATE HEPTAHYDRATE 40 MG/ML
INJECTION, SOLUTION INTRAVENOUS
Status: DISPENSED
Start: 2019-02-06

## (undated) RX ORDER — LIDOCAINE HYDROCHLORIDE 10 MG/ML
INJECTION, SOLUTION EPIDURAL; INFILTRATION; INTRACAUDAL; PERINEURAL
Status: DISPENSED
Start: 2018-12-26

## (undated) RX ORDER — FUROSEMIDE 10 MG/ML
INJECTION INTRAMUSCULAR; INTRAVENOUS
Status: DISPENSED
Start: 2018-12-21

## (undated) RX ORDER — HEPARIN SODIUM 1000 [USP'U]/ML
INJECTION, SOLUTION INTRAVENOUS; SUBCUTANEOUS
Status: DISPENSED
Start: 2019-11-15

## (undated) RX ORDER — NITROGLYCERIN 0.4 MG/1
TABLET SUBLINGUAL
Status: DISPENSED
Start: 2019-12-20

## (undated) RX ORDER — MAGNESIUM SULFATE HEPTAHYDRATE 40 MG/ML
INJECTION, SOLUTION INTRAVENOUS
Status: DISPENSED
Start: 2019-07-05